# Patient Record
Sex: MALE | Race: WHITE | NOT HISPANIC OR LATINO | Employment: OTHER | URBAN - METROPOLITAN AREA
[De-identification: names, ages, dates, MRNs, and addresses within clinical notes are randomized per-mention and may not be internally consistent; named-entity substitution may affect disease eponyms.]

---

## 2017-01-31 ENCOUNTER — ALLSCRIPTS OFFICE VISIT (OUTPATIENT)
Dept: OTHER | Facility: OTHER | Age: 67
End: 2017-01-31

## 2018-01-14 NOTE — PROGRESS NOTES
Assessment    1  Normal pressure hydrocephalus (331 5) (G91 2)   2  Status post ventriculo-peritoneal shunt placement (V45 2) (Z98 2)    Plan    · Follow-up PRN Evaluation and Treatment  Follow-up  Status: Complete  Done:  [id number]   Ordered; For: Status post ventriculo-peritoneal shunt placement; Ordered By: Kenney Gudino Performed:  Due: [id number]    Discussion/Summary    Very pleasant 58-year-old male, presents for clinical follow-up history  shunt placement for normal pressure hydrocephalus  Clinically he is doing very well, his gait and balance are stable, he is status post left total knee replacement which he is slowly recovering from and has some gait disturbance secondary to this  Otherwise he demonstrates no focal neurologic deficits  His last shunt valve pressure change was 10/27/15, at which point the valve was set at 80 mm water  Further follow-up will be on an as needed basis  He does understand should he develop any new changes relative to his hydrocephalus, again gait or balance disturbance, difficulty with mentation or memory, bowel or bladder incontinence, or level of consciousness he understands to call and return for reassessment  These findings and plans were reviewed with Dr Adrianna Tobar who concurs  These findings, impressions and recommendations are reviewed in great detail with the patient, he expressed understanding and agreement, his questions were answered completely and to his satisfaction  The patient has the current Goals: Management normal pressure hydrocephalus  The patent has the current Barriers: None  Patient is able to Self-Care  The patient was counseled regarding instructions for management, patient and family education, importance of compliance with treatment  Chief Complaint    1   Headache  Follow-up, one year, history  shunt placement, normal pressure hydrocephalus      History of Present Illness  Very pleasant 58-year-old male, returns for follow-up as noted above  No new imaging has been performed at this time  The patient reports he is doing very well, his gait and balance remain at baseline, he is status post left total knee replacement a few months ago, and he has had some gait disturbances secondary to the procedure, but prior to surgery he was fine he reports  As noted in the past he had initial  shunt placement by Dr Julissa Resendez 12/22/05, he then had shunt valve revision 8/3/11 also by Dr Julissa Resendez  At that time the valve was set at 60 mm water  As noted, he had noted some difficulty with gait and balance and was imaged he had some increased hygroma formation, and shunt valve was changed to 80 mm water (10/27/15) which is where the valve remains set  He continues with no complaints of difficulty with memory or mentation, level of consciousness, bowel or bladder incontinence  He otherwise reports no interval changes medical or surgical history, as noted in the past he is a diabetic, maintained on subcutaneous insulins  Krupa Dunlap presents with complaints of headache (no headaches, patient has a  shunt, and sub dural hygroma)      Review of Systems    Constitutional: No fever or chills, feels well, no tiredness, no recent weight gain or weight loss  Eyes: No complaints of eye pain, no red eyes, no discharge from eyes, no itchy eyes  ENT: no complaints of earache, no hearing loss, no nosebleeds, no nasal discharge, no sore throat, no hoarseness  Cardiovascular: No complaints of slow heart rate, no fast heart rate, no chest pain, no palpitations, no leg claudication, no lower extremity  Respiratory: No complaints of shortness of breath, no wheezing, no cough, no SOB on exertion, no orthopnea or PND  Gastrointestinal: No complaints of abdominal pain, no constipation, no nausea or vomiting, no diarrhea or bloody stools     Genitourinary: No complaints of dysuria, no incontinence, no hesitancy, no nocturia, no genital lesion, no testicular pain  Musculoskeletal: arthralgias, joint swelling, limb pain and joint stiffness  Integumentary: No complaints of skin rash or skin lesions, no itching, no skin wound, no dry skin  Neurological: difficulty walking and knee replacement two months ago (L)  Psychiatric: Is not suicidal, no sleep disturbances, no anxiety or depression, no change in personality, no emotional problems  Endocrine: No complaints of proptosis, no hot flashes, no muscle weakness, no erectile dysfunction, no deepening of the voice, no feelings of weakness  Hematologic/Lymphatic: No complaints of swollen glands, no swollen glands in the neck, does not bleed easily, no easy bruising  ROS reviewed  Active Problems    1  Arthritis (716 90) (M19 90)   2  Blurry vision (368 8) (H53 8)   3  Carpal tunnel syndrome (354 0) (G56 00)   4  Chronic sinusitis (473 9) (J32 9)   5  Gait disturbance (781 2) (R26 9)   6  Glaucoma (365 9) (H40 9)   7  Hypertension (401 9) (I10)   8  Neuropathy (355 9) (G62 9)   9  Normal pressure hydrocephalus (331 5) (G91 2)   10  Obstructive sleep apnea (327 23) (G47 33)   11  Status post ventriculo-peritoneal shunt placement (V45 2) (Z98 2)   12  Subdural hygroma (432 1) (D18 1)   13  Type 2 diabetes mellitus (250 00) (E11 9)    Past Medical History    1  Arthritis (716 90) (M19 90)   2  Glaucoma (365 9) (H40 9)   3  History of Gout (274 9) (M10 9)   4  Hypertension (401 9) (I10)   5  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)   6  Obstructive sleep apnea (327 23) (G47 33)   7  Type 2 diabetes mellitus (250 00) (E11 9)   8  History of Urinary tract infection (599 0) (N39 0)    The active problems and past medical history were reviewed and updated today  Surgical History    1  History of Ankle Surgery   2  History of Cataract Surgery   3  History of Elbow Surgery   4  History of Foot Surgery   5  History of Knee Surgery   6   History of Replacement Of Obstructed CSF Shunt Valve   7  History of Tonsillectomy   8  History of Ventricular Shunt (V53 )    The surgical history was reviewed and updated today  Family History  Family History    1  Family history of Family Health Status 1  Children Living   2  Family history of Family Health Status Of Father -    3  Family history of Family Health Status Of Mother -    3  Family history of Family Health Status Siblings 1  Living   5  Family history of Maternal Grandfather Is    6  Family history of Maternal Grandmother Is    7  Family history of Paternal Grandfather Is    6  Family history of Paternal Grandmother Is     The family history was reviewed and updated today  Social History    · Being A Social Drinker   · Denied: History of Drug Use   · Living With Relatives (Other Than Parents)   · Marital History -  ()   · Never A Smoker  The social history was reviewed and updated today  Current Meds   1  Allopurinol 300 MG Oral Tablet; TAKE 1 TABLET DAILY; Therapy: (Recorded:2014) to Recorded   2  Aspirin 325 MG Oral Tablet; TAKE 2 TABLET Twice daily PRN; Therapy: (Recorded:2017) to Recorded   3  Enalapril Maleate 10 MG Oral Tablet; TAKE 1 TABLET DAILY; Therapy: (Recorded:2014) to Recorded   4  Finasteride 5 MG Oral Tablet; TAKE 1 TABLET DAILY; Therapy: (Recorded:2014) to Recorded   5  Lantus SoloStar 100 UNIT/ML SOLN; INJECT 20 TO 25 UNITS SUBCUTANEOUSLY, OR   AS DIRECTED; Therapy: [phone]) to Recorded   6  Multivitamins Oral Capsule; TAKE 1 CAPSULE DAILY; Therapy: (Recorded:2014) to Recorded   7  Tamsulosin HCl - 0 4 MG Oral Capsule; take 1 capsule daily; Therapy: (Recorded:2014) to Recorded   8  Travatan Z 0 004 % Ophthalmic Solution; INSTILL 1 DROP Daily in affected eye; Therapy: (Recorded:2014) to Recorded    The medication list was reviewed and updated today  Allergies    1   No Known Drug Allergies    2  Seasonal    Vitals  Vital Signs    Recorded: [id number] 09:20AM   Temperature 98 4 F   Heart Rate 65   Respiration 18   Systolic 063   Diastolic 58   Height 6 ft 3 in   Weight 309 lb    BMI Calculated 38 62   BSA Calculated 2 64     Physical Exam     Constitutional Patient appears healthy and well developed  No signs of acute distress present  comfortable, obese and appearance reflects stated age  Respiratory Respiratory effort: Normal   Auscultation of lungs: Clear to auscultation bilaterally  Cardiovascular Auscultation of heart: Rate is regular  Rhythm is regular  No heart murmur appreciated  Neurologic - Mental Status: Alert and Oriented x3  Mood and affect: Affect is normal   Grossly nonfocal     Cranial Nerve Exam:  2nd cranial nerve: Visual field was full to confrontation  3rd, 4th, and 6th cranial nerves: Normal with no deficit  5th cranial nerve: Sensation was intact in all three divisions to light touch and temperature  Motor function was intact  7th cranial nerve: Face symmetrical at grimace and at rest  8th cranial nerve: Grossly intact to finger rub bilaterally  9th and 10th cranial nerves: Uvula is midline  11th cranial nerve: Shoulder shrug equal bilaterally  12th cranial nerve: Tongue mideline, no atrophy present  Motor System General Motor Strength: No pronator drift and no parietal drift  Reflexes: Biceps reflexes are 2+ bilaterally  Triceps reflexes are 2+ bilaterally  Achilles reflexes are 2+ bilaterally  Babinski's reflex is 2+ down going bilaterally  Ankle clonus is absent bilaterally  Coordination: Finger to nose was normal    Gait and Station:   Gait and station: Abnormal   Gait evaluation demonstrated a wide-based and ataxic gait        Signatures   Electronically signed by : SKIP Ramos; Jan 31 2017 10:11AM EST                       (Author)    Electronically signed by : CATIA Bennett ; Jan 31 2017  4:16PM EST                       (Author)
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
## 2018-01-22 VITALS
TEMPERATURE: 98.4 F | HEIGHT: 75 IN | WEIGHT: 309 LBS | RESPIRATION RATE: 18 BRPM | DIASTOLIC BLOOD PRESSURE: 58 MMHG | SYSTOLIC BLOOD PRESSURE: 129 MMHG | BODY MASS INDEX: 38.42 KG/M2 | HEART RATE: 65 BPM

## 2018-02-08 ENCOUNTER — TELEPHONE (OUTPATIENT)
Dept: NEUROSURGERY | Facility: CLINIC | Age: 68
End: 2018-02-08

## 2018-02-08 NOTE — TELEPHONE ENCOUNTER
Patient LM on nurse line stating that he had a  shunt placed a number of years ago and would like to get a pocket card with the shunt info on it  I attempted to call patient back and [facility] for call back  I filled out card and mailed it to patient's address on file

## 2020-04-09 ENCOUNTER — TRANSCRIBE ORDERS (OUTPATIENT)
Dept: NEUROSURGERY | Facility: CLINIC | Age: 70
End: 2020-04-09

## 2020-04-09 DIAGNOSIS — Z98.2 STATUS POST VENTRICULOPERITONEAL SHUNT: Primary | ICD-10-CM

## 2020-04-21 ENCOUNTER — CONSULT (OUTPATIENT)
Dept: NEUROSURGERY | Facility: CLINIC | Age: 70
End: 2020-04-21
Payer: COMMERCIAL

## 2020-04-21 VITALS
RESPIRATION RATE: 16 BRPM | HEIGHT: 75 IN | DIASTOLIC BLOOD PRESSURE: 62 MMHG | HEART RATE: 76 BPM | SYSTOLIC BLOOD PRESSURE: 123 MMHG | TEMPERATURE: 98.4 F | WEIGHT: 264 LBS | BODY MASS INDEX: 32.83 KG/M2

## 2020-04-21 DIAGNOSIS — R26.9 UNSPECIFIED ABNORMALITIES OF GAIT AND MOBILITY: Primary | ICD-10-CM

## 2020-04-21 DIAGNOSIS — Z98.2 STATUS POST VENTRICULOPERITONEAL SHUNT: ICD-10-CM

## 2020-04-21 PROCEDURE — 99205 OFFICE O/P NEW HI 60 MIN: CPT | Performed by: PHYSICIAN ASSISTANT

## 2020-04-21 RX ORDER — TRAMADOL HYDROCHLORIDE 50 MG/1
50 TABLET ORAL EVERY 6 HOURS PRN
COMMUNITY
End: 2020-04-30

## 2020-04-21 RX ORDER — ENALAPRIL MALEATE 10 MG/1
10 TABLET ORAL DAILY
Status: ON HOLD | COMMUNITY
Start: 2020-01-20 | End: 2020-07-22 | Stop reason: SDUPTHER

## 2020-04-21 RX ORDER — FINASTERIDE 5 MG/1
1 TABLET, FILM COATED ORAL DAILY
COMMUNITY
End: 2020-06-22

## 2020-04-21 RX ORDER — TRAVOPROST OPHTHALMIC SOLUTION 0.04 MG/ML
1 SOLUTION OPHTHALMIC
COMMUNITY
Start: 2015-07-28

## 2020-04-21 RX ORDER — INSULIN GLARGINE 100 [IU]/ML
INJECTION, SOLUTION SUBCUTANEOUS 2 TIMES DAILY
Status: ON HOLD | COMMUNITY
Start: 2020-04-14 | End: 2020-06-01 | Stop reason: SDUPTHER

## 2020-04-21 RX ORDER — LANCETS 33 GAUGE
EACH MISCELLANEOUS 2 TIMES DAILY
COMMUNITY
Start: 2020-02-10

## 2020-04-21 RX ORDER — METHOCARBAMOL 500 MG/1
500 TABLET, FILM COATED ORAL AS NEEDED
COMMUNITY
End: 2020-04-30

## 2020-04-21 RX ORDER — ALLOPURINOL 300 MG/1
300 TABLET ORAL DAILY
COMMUNITY
Start: 2020-03-25

## 2020-04-21 RX ORDER — DIPHENOXYLATE HYDROCHLORIDE AND ATROPINE SULFATE 2.5; .025 MG/1; MG/1
1 TABLET ORAL DAILY
COMMUNITY

## 2020-04-27 ENCOUNTER — HOSPITAL ENCOUNTER (OUTPATIENT)
Dept: RADIOLOGY | Age: 70
Discharge: HOME | End: 2020-04-27
Payer: COMMERCIAL

## 2020-04-27 ENCOUNTER — APPOINTMENT (OUTPATIENT)
Dept: RADIOLOGY | Age: 70
End: 2020-04-27
Payer: COMMERCIAL

## 2020-04-27 DIAGNOSIS — Z98.2 STATUS POST VENTRICULOPERITONEAL SHUNT: ICD-10-CM

## 2020-04-27 DIAGNOSIS — R26.9 UNSPECIFIED ABNORMALITIES OF GAIT AND MOBILITY: ICD-10-CM

## 2020-04-27 PROCEDURE — 74018 RADEX ABDOMEN 1 VIEW: CPT

## 2020-04-27 PROCEDURE — 70450 CT HEAD/BRAIN W/O DYE: CPT

## 2020-04-27 PROCEDURE — 71046 X-RAY EXAM CHEST 2 VIEWS: CPT

## 2020-04-27 PROCEDURE — 72131 CT LUMBAR SPINE W/O DYE: CPT

## 2020-04-27 PROCEDURE — 70250 X-RAY EXAM OF SKULL: CPT

## 2020-04-30 ENCOUNTER — TELEMEDICINE (OUTPATIENT)
Dept: NEUROSURGERY | Facility: CLINIC | Age: 70
End: 2020-04-30
Payer: COMMERCIAL

## 2020-04-30 DIAGNOSIS — Z98.2 STATUS POST VENTRICULOPERITONEAL SHUNT: Primary | ICD-10-CM

## 2020-04-30 DIAGNOSIS — R26.9 UNSPECIFIED ABNORMALITIES OF GAIT AND MOBILITY: ICD-10-CM

## 2020-04-30 PROCEDURE — G2012 BRIEF CHECK IN BY MD/QHP: HCPCS | Performed by: PHYSICIAN ASSISTANT

## 2020-05-04 ENCOUNTER — HOSPITAL ENCOUNTER (OUTPATIENT)
Dept: CT IMAGING | Facility: HOSPITAL | Age: 70
Discharge: HOME | End: 2020-05-04
Payer: COMMERCIAL

## 2020-05-04 ENCOUNTER — TELEPHONE (OUTPATIENT)
Dept: NEUROSURGERY | Facility: CLINIC | Age: 70
End: 2020-05-04

## 2020-05-04 ENCOUNTER — HOSPITAL ENCOUNTER (OUTPATIENT)
Dept: RADIOLOGY | Facility: HOSPITAL | Age: 70
Discharge: HOME | End: 2020-05-04
Payer: COMMERCIAL

## 2020-05-04 ENCOUNTER — HOSPITAL ENCOUNTER (OUTPATIENT)
Dept: MRI IMAGING | Facility: HOSPITAL | Age: 70
Discharge: HOME | End: 2020-05-04
Payer: COMMERCIAL

## 2020-05-04 DIAGNOSIS — R26.9 UNSPECIFIED ABNORMALITIES OF GAIT AND MOBILITY: ICD-10-CM

## 2020-05-04 PROCEDURE — 72148 MRI LUMBAR SPINE W/O DYE: CPT

## 2020-05-04 PROCEDURE — 70250 X-RAY EXAM OF SKULL: CPT

## 2020-05-04 PROCEDURE — 72128 CT CHEST SPINE W/O DYE: CPT

## 2020-05-07 ENCOUNTER — OFFICE VISIT (OUTPATIENT)
Dept: NEUROSURGERY | Facility: CLINIC | Age: 70
End: 2020-05-07
Payer: COMMERCIAL

## 2020-05-07 VITALS
BODY MASS INDEX: 32.83 KG/M2 | RESPIRATION RATE: 16 BRPM | HEIGHT: 75 IN | WEIGHT: 264 LBS | DIASTOLIC BLOOD PRESSURE: 80 MMHG | TEMPERATURE: 97.8 F | HEART RATE: 67 BPM | SYSTOLIC BLOOD PRESSURE: 142 MMHG

## 2020-05-07 DIAGNOSIS — Z98.2 STATUS POST VENTRICULOPERITONEAL SHUNT: ICD-10-CM

## 2020-05-07 DIAGNOSIS — R26.9 UNSPECIFIED ABNORMALITIES OF GAIT AND MOBILITY: Primary | ICD-10-CM

## 2020-05-07 PROCEDURE — 99214 OFFICE O/P EST MOD 30 MIN: CPT | Performed by: PHYSICIAN ASSISTANT

## 2020-05-08 ENCOUNTER — APPOINTMENT (OUTPATIENT)
Dept: RADIOLOGY | Age: 70
End: 2020-05-08
Payer: COMMERCIAL

## 2020-05-08 DIAGNOSIS — Z98.2 STATUS POST VENTRICULOPERITONEAL SHUNT: ICD-10-CM

## 2020-05-08 DIAGNOSIS — R26.9 UNSPECIFIED ABNORMALITIES OF GAIT AND MOBILITY: ICD-10-CM

## 2020-05-08 PROCEDURE — 70250 X-RAY EXAM OF SKULL: CPT

## 2020-05-28 ENCOUNTER — OFFICE VISIT (OUTPATIENT)
Dept: NEUROSURGERY | Facility: CLINIC | Age: 70
End: 2020-05-28
Payer: COMMERCIAL

## 2020-05-28 ENCOUNTER — APPOINTMENT (EMERGENCY)
Dept: RADIOLOGY | Facility: HOSPITAL | Age: 70
DRG: 629 | End: 2020-05-28
Payer: COMMERCIAL

## 2020-05-28 ENCOUNTER — HOSPITAL ENCOUNTER (INPATIENT)
Facility: HOSPITAL | Age: 70
LOS: 3 days | Discharge: HOME HEALTH | DRG: 629 | End: 2020-06-01
Attending: EMERGENCY MEDICINE | Admitting: INTERNAL MEDICINE
Payer: COMMERCIAL

## 2020-05-28 VITALS
RESPIRATION RATE: 18 BRPM | BODY MASS INDEX: 32.83 KG/M2 | DIASTOLIC BLOOD PRESSURE: 60 MMHG | HEART RATE: 68 BPM | WEIGHT: 264 LBS | TEMPERATURE: 98 F | HEIGHT: 75 IN | SYSTOLIC BLOOD PRESSURE: 148 MMHG

## 2020-05-28 DIAGNOSIS — M43.17 SPONDYLOLISTHESIS OF LUMBOSACRAL REGION: Primary | ICD-10-CM

## 2020-05-28 DIAGNOSIS — E11.621 DIABETIC ULCER OF LEFT MIDFOOT ASSOCIATED WITH TYPE 2 DIABETES MELLITUS, WITH OTHER ULCER SEVERITY (HCC): ICD-10-CM

## 2020-05-28 DIAGNOSIS — Z98.2 STATUS POST VENTRICULOPERITONEAL SHUNT: ICD-10-CM

## 2020-05-28 DIAGNOSIS — I10 ESSENTIAL HYPERTENSION: ICD-10-CM

## 2020-05-28 DIAGNOSIS — L97.529 DIABETIC ULCER OF LEFT FOOT (HCC): ICD-10-CM

## 2020-05-28 DIAGNOSIS — Z11.59 SCREENING FOR VIRAL DISEASE: ICD-10-CM

## 2020-05-28 DIAGNOSIS — L97.428 DIABETIC ULCER OF LEFT MIDFOOT ASSOCIATED WITH TYPE 2 DIABETES MELLITUS, WITH OTHER ULCER SEVERITY (HCC): ICD-10-CM

## 2020-05-28 DIAGNOSIS — E11.65 TYPE 2 DIABETES MELLITUS WITH HYPERGLYCEMIA, WITH LONG-TERM CURRENT USE OF INSULIN (HCC): ICD-10-CM

## 2020-05-28 DIAGNOSIS — E11.621 DIABETIC ULCER OF LEFT FOOT (HCC): ICD-10-CM

## 2020-05-28 DIAGNOSIS — L03.116 CELLULITIS OF LEFT LOWER EXTREMITY: Primary | ICD-10-CM

## 2020-05-28 DIAGNOSIS — Z79.4 TYPE 2 DIABETES MELLITUS WITH HYPERGLYCEMIA, WITH LONG-TERM CURRENT USE OF INSULIN (HCC): ICD-10-CM

## 2020-05-28 PROBLEM — E11.9 TYPE 2 DIABETES MELLITUS (HCC): Status: ACTIVE | Noted: 2020-05-28

## 2020-05-28 PROBLEM — M10.9 GOUT: Status: ACTIVE | Noted: 2020-05-28

## 2020-05-28 PROBLEM — G91.2 NORMAL PRESSURE HYDROCEPHALUS (HCC): Status: ACTIVE | Noted: 2020-05-28

## 2020-05-28 PROBLEM — G47.33 OSA ON CPAP: Status: ACTIVE | Noted: 2020-05-28

## 2020-05-28 PROBLEM — Z99.89 OSA ON CPAP: Status: ACTIVE | Noted: 2020-05-28

## 2020-05-28 LAB
ALBUMIN SERPL BCP-MCNC: 3.2 G/DL (ref 3.5–5)
ALP SERPL-CCNC: 65 U/L (ref 46–116)
ALT SERPL W P-5'-P-CCNC: 25 U/L (ref 12–78)
ANION GAP SERPL CALCULATED.3IONS-SCNC: 8 MMOL/L (ref 4–13)
APTT PPP: 35 SECONDS (ref 23–37)
AST SERPL W P-5'-P-CCNC: 30 U/L (ref 5–45)
BASOPHILS # BLD AUTO: 0.06 THOUSANDS/ΜL (ref 0–0.1)
BASOPHILS NFR BLD AUTO: 1 % (ref 0–1)
BILIRUB SERPL-MCNC: 0.6 MG/DL (ref 0.2–1)
BUN SERPL-MCNC: 16 MG/DL (ref 5–25)
CALCIUM SERPL-MCNC: 9.1 MG/DL (ref 8.3–10.1)
CHLORIDE SERPL-SCNC: 105 MMOL/L (ref 100–108)
CO2 SERPL-SCNC: 24 MMOL/L (ref 21–32)
CREAT SERPL-MCNC: 1.09 MG/DL (ref 0.6–1.3)
EOSINOPHIL # BLD AUTO: 0.2 THOUSAND/ΜL (ref 0–0.61)
EOSINOPHIL NFR BLD AUTO: 3 % (ref 0–6)
ERYTHROCYTE [DISTWIDTH] IN BLOOD BY AUTOMATED COUNT: 14.1 % (ref 11.6–15.1)
GFR SERPL CREATININE-BSD FRML MDRD: 68 ML/MIN/1.73SQ M
GLUCOSE SERPL-MCNC: 102 MG/DL (ref 65–140)
GLUCOSE SERPL-MCNC: 123 MG/DL (ref 65–140)
GLUCOSE SERPL-MCNC: 85 MG/DL (ref 65–140)
HCT VFR BLD AUTO: 41.8 % (ref 36.5–49.3)
HGB BLD-MCNC: 13.7 G/DL (ref 12–17)
IMM GRANULOCYTES # BLD AUTO: 0.03 THOUSAND/UL (ref 0–0.2)
IMM GRANULOCYTES NFR BLD AUTO: 0 % (ref 0–2)
INR PPP: 1.14 (ref 0.84–1.19)
LYMPHOCYTES # BLD AUTO: 0.99 THOUSANDS/ΜL (ref 0.6–4.47)
LYMPHOCYTES NFR BLD AUTO: 13 % (ref 14–44)
MCH RBC QN AUTO: 28.9 PG (ref 26.8–34.3)
MCHC RBC AUTO-ENTMCNC: 32.8 G/DL (ref 31.4–37.4)
MCV RBC AUTO: 88 FL (ref 82–98)
MONOCYTES # BLD AUTO: 0.98 THOUSAND/ΜL (ref 0.17–1.22)
MONOCYTES NFR BLD AUTO: 12 % (ref 4–12)
NEUTROPHILS # BLD AUTO: 5.66 THOUSANDS/ΜL (ref 1.85–7.62)
NEUTS SEG NFR BLD AUTO: 71 % (ref 43–75)
NRBC BLD AUTO-RTO: 0 /100 WBCS
PLATELET # BLD AUTO: 208 THOUSANDS/UL (ref 149–390)
PMV BLD AUTO: 10.3 FL (ref 8.9–12.7)
POTASSIUM SERPL-SCNC: 4.6 MMOL/L (ref 3.5–5.3)
PROT SERPL-MCNC: 6.6 G/DL (ref 6.4–8.2)
PROTHROMBIN TIME: 14 SECONDS (ref 11.6–14.5)
RBC # BLD AUTO: 4.74 MILLION/UL (ref 3.88–5.62)
SARS-COV-2 RNA RESP QL NAA+PROBE: NEGATIVE
SODIUM SERPL-SCNC: 137 MMOL/L (ref 136–145)
WBC # BLD AUTO: 7.92 THOUSAND/UL (ref 4.31–10.16)

## 2020-05-28 PROCEDURE — 73630 X-RAY EXAM OF FOOT: CPT

## 2020-05-28 PROCEDURE — 94660 CPAP INITIATION&MGMT: CPT

## 2020-05-28 PROCEDURE — 85730 THROMBOPLASTIN TIME PARTIAL: CPT | Performed by: PHYSICIAN ASSISTANT

## 2020-05-28 PROCEDURE — 87186 SC STD MICRODIL/AGAR DIL: CPT | Performed by: PHYSICIAN ASSISTANT

## 2020-05-28 PROCEDURE — 99220 PR INITIAL OBSERVATION CARE/DAY 70 MINUTES: CPT | Performed by: INTERNAL MEDICINE

## 2020-05-28 PROCEDURE — 94760 N-INVAS EAR/PLS OXIMETRY 1: CPT

## 2020-05-28 PROCEDURE — 87205 SMEAR GRAM STAIN: CPT | Performed by: PHYSICIAN ASSISTANT

## 2020-05-28 PROCEDURE — 87077 CULTURE AEROBIC IDENTIFY: CPT | Performed by: PHYSICIAN ASSISTANT

## 2020-05-28 PROCEDURE — 83036 HEMOGLOBIN GLYCOSYLATED A1C: CPT | Performed by: INTERNAL MEDICINE

## 2020-05-28 PROCEDURE — 36415 COLL VENOUS BLD VENIPUNCTURE: CPT | Performed by: PHYSICIAN ASSISTANT

## 2020-05-28 PROCEDURE — 80053 COMPREHEN METABOLIC PANEL: CPT | Performed by: PHYSICIAN ASSISTANT

## 2020-05-28 PROCEDURE — 87070 CULTURE OTHR SPECIMN AEROBIC: CPT | Performed by: PHYSICIAN ASSISTANT

## 2020-05-28 PROCEDURE — 87635 SARS-COV-2 COVID-19 AMP PRB: CPT | Performed by: INTERNAL MEDICINE

## 2020-05-28 PROCEDURE — 85610 PROTHROMBIN TIME: CPT | Performed by: PHYSICIAN ASSISTANT

## 2020-05-28 PROCEDURE — 82948 REAGENT STRIP/BLOOD GLUCOSE: CPT

## 2020-05-28 PROCEDURE — 85025 COMPLETE CBC W/AUTO DIFF WBC: CPT | Performed by: PHYSICIAN ASSISTANT

## 2020-05-28 PROCEDURE — 99215 OFFICE O/P EST HI 40 MIN: CPT | Performed by: NURSE PRACTITIONER

## 2020-05-28 PROCEDURE — 87040 BLOOD CULTURE FOR BACTERIA: CPT | Performed by: PHYSICIAN ASSISTANT

## 2020-05-28 PROCEDURE — 99285 EMERGENCY DEPT VISIT HI MDM: CPT | Performed by: PHYSICIAN ASSISTANT

## 2020-05-28 PROCEDURE — 99284 EMERGENCY DEPT VISIT MOD MDM: CPT

## 2020-05-28 RX ORDER — CEFAZOLIN SODIUM 2 G/50ML
2000 SOLUTION INTRAVENOUS ONCE
Status: COMPLETED | OUTPATIENT
Start: 2020-05-28 | End: 2020-05-28

## 2020-05-28 RX ORDER — TRAVOPROST OPHTHALMIC SOLUTION 0.04 MG/ML
1 SOLUTION OPHTHALMIC DAILY
Status: DISCONTINUED | OUTPATIENT
Start: 2020-05-29 | End: 2020-06-01 | Stop reason: HOSPADM

## 2020-05-28 RX ORDER — CHLORHEXIDINE GLUCONATE 0.12 MG/ML
15 RINSE ORAL ONCE
Status: CANCELLED | OUTPATIENT
Start: 2020-05-28 | End: 2020-05-28

## 2020-05-28 RX ORDER — INSULIN GLARGINE 100 [IU]/ML
12 INJECTION, SOLUTION SUBCUTANEOUS
Status: DISCONTINUED | OUTPATIENT
Start: 2020-05-28 | End: 2020-06-01 | Stop reason: HOSPADM

## 2020-05-28 RX ORDER — ALLOPURINOL 300 MG/1
300 TABLET ORAL DAILY
Status: DISCONTINUED | OUTPATIENT
Start: 2020-05-29 | End: 2020-06-01 | Stop reason: HOSPADM

## 2020-05-28 RX ORDER — METRONIDAZOLE 500 MG/1
500 TABLET ORAL EVERY 8 HOURS SCHEDULED
Status: DISCONTINUED | OUTPATIENT
Start: 2020-05-28 | End: 2020-05-31

## 2020-05-28 RX ORDER — LISINOPRIL 20 MG/1
20 TABLET ORAL DAILY
Status: DISCONTINUED | OUTPATIENT
Start: 2020-05-29 | End: 2020-06-01 | Stop reason: HOSPADM

## 2020-05-28 RX ORDER — CEFAZOLIN SODIUM 2 G/50ML
2000 SOLUTION INTRAVENOUS EVERY 8 HOURS
Status: DISCONTINUED | OUTPATIENT
Start: 2020-05-29 | End: 2020-05-29

## 2020-05-28 RX ORDER — ASPIRIN 325 MG
325 TABLET ORAL DAILY
Status: DISCONTINUED | OUTPATIENT
Start: 2020-05-29 | End: 2020-06-01 | Stop reason: HOSPADM

## 2020-05-28 RX ORDER — TERBINAFINE HYDROCHLORIDE 250 MG/1
TABLET ORAL
COMMUNITY
Start: 2020-05-27 | End: 2020-06-01 | Stop reason: HOSPADM

## 2020-05-28 RX ORDER — INSULIN GLARGINE 100 [IU]/ML
10 INJECTION, SOLUTION SUBCUTANEOUS
Status: DISCONTINUED | OUTPATIENT
Start: 2020-05-29 | End: 2020-06-01 | Stop reason: HOSPADM

## 2020-05-28 RX ADMIN — INSULIN GLARGINE 12 UNITS: 100 INJECTION, SOLUTION SUBCUTANEOUS at 22:07

## 2020-05-28 RX ADMIN — METRONIDAZOLE 500 MG: 500 INJECTION, SOLUTION INTRAVENOUS at 17:43

## 2020-05-28 RX ADMIN — METRONIDAZOLE 500 MG: 500 TABLET, FILM COATED ORAL at 21:04

## 2020-05-28 RX ADMIN — CEFAZOLIN SODIUM 2000 MG: 2 SOLUTION INTRAVENOUS at 17:42

## 2020-05-29 ENCOUNTER — APPOINTMENT (OUTPATIENT)
Dept: RADIOLOGY | Facility: HOSPITAL | Age: 70
DRG: 629 | End: 2020-05-29
Payer: COMMERCIAL

## 2020-05-29 ENCOUNTER — APPOINTMENT (INPATIENT)
Dept: RADIOLOGY | Facility: HOSPITAL | Age: 70
DRG: 629 | End: 2020-05-29
Payer: COMMERCIAL

## 2020-05-29 ENCOUNTER — APPOINTMENT (INPATIENT)
Dept: MRI IMAGING | Facility: HOSPITAL | Age: 70
DRG: 629 | End: 2020-05-29
Payer: COMMERCIAL

## 2020-05-29 LAB
ANION GAP SERPL CALCULATED.3IONS-SCNC: 7 MMOL/L (ref 4–13)
BASOPHILS # BLD AUTO: 0.06 THOUSANDS/ΜL (ref 0–0.1)
BASOPHILS NFR BLD AUTO: 1 % (ref 0–1)
BUN SERPL-MCNC: 15 MG/DL (ref 5–25)
CALCIUM SERPL-MCNC: 8.9 MG/DL (ref 8.3–10.1)
CHLORIDE SERPL-SCNC: 103 MMOL/L (ref 100–108)
CO2 SERPL-SCNC: 29 MMOL/L (ref 21–32)
CREAT SERPL-MCNC: 1.29 MG/DL (ref 0.6–1.3)
EOSINOPHIL # BLD AUTO: 0.25 THOUSAND/ΜL (ref 0–0.61)
EOSINOPHIL NFR BLD AUTO: 3 % (ref 0–6)
ERYTHROCYTE [DISTWIDTH] IN BLOOD BY AUTOMATED COUNT: 14.2 % (ref 11.6–15.1)
EST. AVERAGE GLUCOSE BLD GHB EST-MCNC: 120 MG/DL
GFR SERPL CREATININE-BSD FRML MDRD: 55 ML/MIN/1.73SQ M
GLUCOSE P FAST SERPL-MCNC: 106 MG/DL (ref 65–99)
GLUCOSE SERPL-MCNC: 103 MG/DL (ref 65–140)
GLUCOSE SERPL-MCNC: 106 MG/DL (ref 65–140)
GLUCOSE SERPL-MCNC: 107 MG/DL (ref 65–140)
GLUCOSE SERPL-MCNC: 115 MG/DL (ref 65–140)
GLUCOSE SERPL-MCNC: 117 MG/DL (ref 65–140)
HBA1C MFR BLD: 5.8 %
HCT VFR BLD AUTO: 42.7 % (ref 36.5–49.3)
HGB BLD-MCNC: 13.7 G/DL (ref 12–17)
IMM GRANULOCYTES # BLD AUTO: 0.04 THOUSAND/UL (ref 0–0.2)
IMM GRANULOCYTES NFR BLD AUTO: 1 % (ref 0–2)
LYMPHOCYTES # BLD AUTO: 1.11 THOUSANDS/ΜL (ref 0.6–4.47)
LYMPHOCYTES NFR BLD AUTO: 13 % (ref 14–44)
MCH RBC QN AUTO: 28.5 PG (ref 26.8–34.3)
MCHC RBC AUTO-ENTMCNC: 32.1 G/DL (ref 31.4–37.4)
MCV RBC AUTO: 89 FL (ref 82–98)
MONOCYTES # BLD AUTO: 1.02 THOUSAND/ΜL (ref 0.17–1.22)
MONOCYTES NFR BLD AUTO: 12 % (ref 4–12)
NEUTROPHILS # BLD AUTO: 6.12 THOUSANDS/ΜL (ref 1.85–7.62)
NEUTS SEG NFR BLD AUTO: 70 % (ref 43–75)
NRBC BLD AUTO-RTO: 0 /100 WBCS
PLATELET # BLD AUTO: 198 THOUSANDS/UL (ref 149–390)
PMV BLD AUTO: 9.9 FL (ref 8.9–12.7)
POTASSIUM SERPL-SCNC: 4.7 MMOL/L (ref 3.5–5.3)
RBC # BLD AUTO: 4.8 MILLION/UL (ref 3.88–5.62)
SODIUM SERPL-SCNC: 139 MMOL/L (ref 136–145)
WBC # BLD AUTO: 8.6 THOUSAND/UL (ref 4.31–10.16)

## 2020-05-29 PROCEDURE — 99232 SBSQ HOSP IP/OBS MODERATE 35: CPT | Performed by: INTERNAL MEDICINE

## 2020-05-29 PROCEDURE — 82948 REAGENT STRIP/BLOOD GLUCOSE: CPT

## 2020-05-29 PROCEDURE — 87205 SMEAR GRAM STAIN: CPT | Performed by: PODIATRIST

## 2020-05-29 PROCEDURE — 99223 1ST HOSP IP/OBS HIGH 75: CPT | Performed by: INTERNAL MEDICINE

## 2020-05-29 PROCEDURE — 87081 CULTURE SCREEN ONLY: CPT | Performed by: INTERNAL MEDICINE

## 2020-05-29 PROCEDURE — 94660 CPAP INITIATION&MGMT: CPT

## 2020-05-29 PROCEDURE — 73720 MRI LWR EXTREMITY W/O&W/DYE: CPT

## 2020-05-29 PROCEDURE — 87186 SC STD MICRODIL/AGAR DIL: CPT | Performed by: PODIATRIST

## 2020-05-29 PROCEDURE — A9585 GADOBUTROL INJECTION: HCPCS | Performed by: INTERNAL MEDICINE

## 2020-05-29 PROCEDURE — 80048 BASIC METABOLIC PNL TOTAL CA: CPT | Performed by: INTERNAL MEDICINE

## 2020-05-29 PROCEDURE — 99223 1ST HOSP IP/OBS HIGH 75: CPT | Performed by: PODIATRIST

## 2020-05-29 PROCEDURE — 94760 N-INVAS EAR/PLS OXIMETRY 1: CPT

## 2020-05-29 PROCEDURE — 87077 CULTURE AEROBIC IDENTIFY: CPT | Performed by: PODIATRIST

## 2020-05-29 PROCEDURE — 87070 CULTURE OTHR SPECIMN AEROBIC: CPT | Performed by: PODIATRIST

## 2020-05-29 PROCEDURE — 85025 COMPLETE CBC W/AUTO DIFF WBC: CPT | Performed by: INTERNAL MEDICINE

## 2020-05-29 RX ORDER — CEFAZOLIN SODIUM 2 G/50ML
2000 SOLUTION INTRAVENOUS EVERY 8 HOURS
Status: DISCONTINUED | OUTPATIENT
Start: 2020-05-29 | End: 2020-05-31

## 2020-05-29 RX ADMIN — CEFAZOLIN SODIUM 2000 MG: 2 SOLUTION INTRAVENOUS at 14:10

## 2020-05-29 RX ADMIN — INSULIN GLARGINE 12 UNITS: 100 INJECTION, SOLUTION SUBCUTANEOUS at 21:22

## 2020-05-29 RX ADMIN — TRAVOPROST 1 DROP: 0.04 SOLUTION OPHTHALMIC at 09:19

## 2020-05-29 RX ADMIN — METRONIDAZOLE 500 MG: 500 TABLET, FILM COATED ORAL at 05:27

## 2020-05-29 RX ADMIN — ALLOPURINOL 300 MG: 300 TABLET ORAL at 09:19

## 2020-05-29 RX ADMIN — ENOXAPARIN SODIUM 40 MG: 40 INJECTION SUBCUTANEOUS at 09:19

## 2020-05-29 RX ADMIN — METRONIDAZOLE 500 MG: 500 TABLET, FILM COATED ORAL at 21:22

## 2020-05-29 RX ADMIN — INSULIN GLARGINE 10 UNITS: 100 INJECTION, SOLUTION SUBCUTANEOUS at 09:19

## 2020-05-29 RX ADMIN — METRONIDAZOLE 500 MG: 500 TABLET, FILM COATED ORAL at 14:10

## 2020-05-29 RX ADMIN — CEFAZOLIN SODIUM 2000 MG: 2 SOLUTION INTRAVENOUS at 00:54

## 2020-05-29 RX ADMIN — ASPIRIN 325 MG ORAL TABLET 325 MG: 325 PILL ORAL at 09:19

## 2020-05-29 RX ADMIN — GADOBUTROL 13 ML: 604.72 INJECTION INTRAVENOUS at 20:12

## 2020-05-29 RX ADMIN — LISINOPRIL 20 MG: 20 TABLET ORAL at 09:19

## 2020-05-29 RX ADMIN — CEFAZOLIN SODIUM 2000 MG: 2 SOLUTION INTRAVENOUS at 20:46

## 2020-05-30 ENCOUNTER — ANESTHESIA (INPATIENT)
Dept: PERIOP | Facility: HOSPITAL | Age: 70
DRG: 629 | End: 2020-05-30
Payer: COMMERCIAL

## 2020-05-30 ENCOUNTER — APPOINTMENT (INPATIENT)
Dept: RADIOLOGY | Facility: HOSPITAL | Age: 70
DRG: 629 | End: 2020-05-30
Payer: COMMERCIAL

## 2020-05-30 ENCOUNTER — ANESTHESIA EVENT (INPATIENT)
Dept: PERIOP | Facility: HOSPITAL | Age: 70
DRG: 629 | End: 2020-05-30
Payer: COMMERCIAL

## 2020-05-30 LAB
ANION GAP SERPL CALCULATED.3IONS-SCNC: 9 MMOL/L (ref 4–13)
BASOPHILS # BLD AUTO: 0.04 THOUSANDS/ΜL (ref 0–0.1)
BASOPHILS NFR BLD AUTO: 1 % (ref 0–1)
BUN SERPL-MCNC: 16 MG/DL (ref 5–25)
CALCIUM SERPL-MCNC: 8.6 MG/DL (ref 8.3–10.1)
CHLORIDE SERPL-SCNC: 103 MMOL/L (ref 100–108)
CO2 SERPL-SCNC: 24 MMOL/L (ref 21–32)
CREAT SERPL-MCNC: 1.03 MG/DL (ref 0.6–1.3)
EOSINOPHIL # BLD AUTO: 0.15 THOUSAND/ΜL (ref 0–0.61)
EOSINOPHIL NFR BLD AUTO: 2 % (ref 0–6)
ERYTHROCYTE [DISTWIDTH] IN BLOOD BY AUTOMATED COUNT: 14.1 % (ref 11.6–15.1)
GFR SERPL CREATININE-BSD FRML MDRD: 73 ML/MIN/1.73SQ M
GLUCOSE SERPL-MCNC: 102 MG/DL (ref 65–140)
GLUCOSE SERPL-MCNC: 111 MG/DL (ref 65–140)
GLUCOSE SERPL-MCNC: 114 MG/DL (ref 65–140)
GLUCOSE SERPL-MCNC: 115 MG/DL (ref 65–140)
GLUCOSE SERPL-MCNC: 97 MG/DL (ref 65–140)
HCT VFR BLD AUTO: 42.7 % (ref 36.5–49.3)
HGB BLD-MCNC: 14 G/DL (ref 12–17)
IMM GRANULOCYTES # BLD AUTO: 0.05 THOUSAND/UL (ref 0–0.2)
IMM GRANULOCYTES NFR BLD AUTO: 1 % (ref 0–2)
LYMPHOCYTES # BLD AUTO: 0.7 THOUSANDS/ΜL (ref 0.6–4.47)
LYMPHOCYTES NFR BLD AUTO: 10 % (ref 14–44)
MCH RBC QN AUTO: 28.5 PG (ref 26.8–34.3)
MCHC RBC AUTO-ENTMCNC: 32.8 G/DL (ref 31.4–37.4)
MCV RBC AUTO: 87 FL (ref 82–98)
MONOCYTES # BLD AUTO: 0.77 THOUSAND/ΜL (ref 0.17–1.22)
MONOCYTES NFR BLD AUTO: 11 % (ref 4–12)
MRSA NOSE QL CULT: NORMAL
NEUTROPHILS # BLD AUTO: 5.41 THOUSANDS/ΜL (ref 1.85–7.62)
NEUTS SEG NFR BLD AUTO: 75 % (ref 43–75)
NRBC BLD AUTO-RTO: 0 /100 WBCS
PLATELET # BLD AUTO: 213 THOUSANDS/UL (ref 149–390)
PMV BLD AUTO: 9.9 FL (ref 8.9–12.7)
POTASSIUM SERPL-SCNC: 3.9 MMOL/L (ref 3.5–5.3)
RBC # BLD AUTO: 4.91 MILLION/UL (ref 3.88–5.62)
SODIUM SERPL-SCNC: 136 MMOL/L (ref 136–145)
WBC # BLD AUTO: 7.12 THOUSAND/UL (ref 4.31–10.16)

## 2020-05-30 PROCEDURE — 99232 SBSQ HOSP IP/OBS MODERATE 35: CPT | Performed by: INTERNAL MEDICINE

## 2020-05-30 PROCEDURE — 99232 SBSQ HOSP IP/OBS MODERATE 35: CPT | Performed by: PODIATRIST

## 2020-05-30 PROCEDURE — 82948 REAGENT STRIP/BLOOD GLUCOSE: CPT

## 2020-05-30 PROCEDURE — 88304 TISSUE EXAM BY PATHOLOGIST: CPT | Performed by: PATHOLOGY

## 2020-05-30 PROCEDURE — 99239 HOSP IP/OBS DSCHRG MGMT >30: CPT | Performed by: INTERNAL MEDICINE

## 2020-05-30 PROCEDURE — 0QBP0ZZ EXCISION OF LEFT METATARSAL, OPEN APPROACH: ICD-10-PCS | Performed by: INTERNAL MEDICINE

## 2020-05-30 PROCEDURE — 85025 COMPLETE CBC W/AUTO DIFF WBC: CPT | Performed by: INTERNAL MEDICINE

## 2020-05-30 PROCEDURE — 73630 X-RAY EXAM OF FOOT: CPT

## 2020-05-30 PROCEDURE — 80048 BASIC METABOLIC PNL TOTAL CA: CPT | Performed by: INTERNAL MEDICINE

## 2020-05-30 PROCEDURE — 88311 DECALCIFY TISSUE: CPT | Performed by: PATHOLOGY

## 2020-05-30 PROCEDURE — 28113 PART REMOVAL OF METATARSAL: CPT | Performed by: PODIATRIST

## 2020-05-30 RX ORDER — KETAMINE HCL IN NACL, ISO-OSM 100MG/10ML
SYRINGE (ML) INJECTION AS NEEDED
Status: DISCONTINUED | OUTPATIENT
Start: 2020-05-30 | End: 2020-05-30 | Stop reason: SURG

## 2020-05-30 RX ORDER — SODIUM CHLORIDE, SODIUM LACTATE, POTASSIUM CHLORIDE, CALCIUM CHLORIDE 600; 310; 30; 20 MG/100ML; MG/100ML; MG/100ML; MG/100ML
INJECTION, SOLUTION INTRAVENOUS CONTINUOUS PRN
Status: DISCONTINUED | OUTPATIENT
Start: 2020-05-30 | End: 2020-05-30 | Stop reason: SURG

## 2020-05-30 RX ORDER — LIDOCAINE HYDROCHLORIDE 10 MG/ML
INJECTION, SOLUTION EPIDURAL; INFILTRATION; INTRACAUDAL; PERINEURAL AS NEEDED
Status: DISCONTINUED | OUTPATIENT
Start: 2020-05-30 | End: 2020-05-30 | Stop reason: HOSPADM

## 2020-05-30 RX ORDER — MIDAZOLAM HYDROCHLORIDE 2 MG/2ML
INJECTION, SOLUTION INTRAMUSCULAR; INTRAVENOUS AS NEEDED
Status: DISCONTINUED | OUTPATIENT
Start: 2020-05-30 | End: 2020-05-30 | Stop reason: SURG

## 2020-05-30 RX ORDER — PROPOFOL 10 MG/ML
INJECTION, EMULSION INTRAVENOUS AS NEEDED
Status: DISCONTINUED | OUTPATIENT
Start: 2020-05-30 | End: 2020-05-30 | Stop reason: SURG

## 2020-05-30 RX ORDER — GLYCOPYRROLATE 0.2 MG/ML
INJECTION INTRAMUSCULAR; INTRAVENOUS AS NEEDED
Status: DISCONTINUED | OUTPATIENT
Start: 2020-05-30 | End: 2020-05-30 | Stop reason: SURG

## 2020-05-30 RX ORDER — OXYCODONE HYDROCHLORIDE 5 MG/1
5 TABLET ORAL EVERY 6 HOURS PRN
Status: DISCONTINUED | OUTPATIENT
Start: 2020-05-30 | End: 2020-06-01 | Stop reason: HOSPADM

## 2020-05-30 RX ADMIN — CEFAZOLIN SODIUM 2000 MG: 2 SOLUTION INTRAVENOUS at 05:37

## 2020-05-30 RX ADMIN — METRONIDAZOLE 500 MG: 500 TABLET, FILM COATED ORAL at 14:00

## 2020-05-30 RX ADMIN — CEFAZOLIN SODIUM 2000 MG: 2 SOLUTION INTRAVENOUS at 21:35

## 2020-05-30 RX ADMIN — INSULIN GLARGINE 5 UNITS: 100 INJECTION, SOLUTION SUBCUTANEOUS at 09:46

## 2020-05-30 RX ADMIN — PROPOFOL 30 MG: 10 INJECTION, EMULSION INTRAVENOUS at 12:51

## 2020-05-30 RX ADMIN — TRAVOPROST 1 DROP: 0.04 SOLUTION OPHTHALMIC at 09:47

## 2020-05-30 RX ADMIN — Medication 25 MG: at 12:51

## 2020-05-30 RX ADMIN — OXYCODONE HYDROCHLORIDE 5 MG: 5 TABLET ORAL at 20:37

## 2020-05-30 RX ADMIN — CEFAZOLIN SODIUM 2000 MG: 2 SOLUTION INTRAVENOUS at 14:51

## 2020-05-30 RX ADMIN — GLYCOPYRROLATE 0.2 MG: 0.2 INJECTION, SOLUTION INTRAMUSCULAR; INTRAVENOUS at 12:51

## 2020-05-30 RX ADMIN — SODIUM CHLORIDE, SODIUM LACTATE, POTASSIUM CHLORIDE, AND CALCIUM CHLORIDE: .6; .31; .03; .02 INJECTION, SOLUTION INTRAVENOUS at 12:45

## 2020-05-30 RX ADMIN — METRONIDAZOLE 500 MG: 500 TABLET, FILM COATED ORAL at 21:36

## 2020-05-30 RX ADMIN — METRONIDAZOLE 500 MG: 500 TABLET, FILM COATED ORAL at 05:37

## 2020-05-30 RX ADMIN — INSULIN GLARGINE 12 UNITS: 100 INJECTION, SOLUTION SUBCUTANEOUS at 21:36

## 2020-05-30 RX ADMIN — MIDAZOLAM HYDROCHLORIDE 2 MG: 1 INJECTION, SOLUTION INTRAMUSCULAR; INTRAVENOUS at 12:51

## 2020-05-31 LAB
ANION GAP SERPL CALCULATED.3IONS-SCNC: 8 MMOL/L (ref 4–13)
BACTERIA WND AEROBE CULT: ABNORMAL
BACTERIA WND AEROBE CULT: ABNORMAL
BASOPHILS # BLD AUTO: 0.04 THOUSANDS/ΜL (ref 0–0.1)
BASOPHILS NFR BLD AUTO: 1 % (ref 0–1)
BUN SERPL-MCNC: 16 MG/DL (ref 5–25)
CALCIUM SERPL-MCNC: 8.5 MG/DL (ref 8.3–10.1)
CHLORIDE SERPL-SCNC: 102 MMOL/L (ref 100–108)
CO2 SERPL-SCNC: 26 MMOL/L (ref 21–32)
CREAT SERPL-MCNC: 1.11 MG/DL (ref 0.6–1.3)
EOSINOPHIL # BLD AUTO: 0.15 THOUSAND/ΜL (ref 0–0.61)
EOSINOPHIL NFR BLD AUTO: 2 % (ref 0–6)
ERYTHROCYTE [DISTWIDTH] IN BLOOD BY AUTOMATED COUNT: 14 % (ref 11.6–15.1)
GFR SERPL CREATININE-BSD FRML MDRD: 66 ML/MIN/1.73SQ M
GLUCOSE SERPL-MCNC: 101 MG/DL (ref 65–140)
GLUCOSE SERPL-MCNC: 105 MG/DL (ref 65–140)
GLUCOSE SERPL-MCNC: 110 MG/DL (ref 65–140)
GLUCOSE SERPL-MCNC: 121 MG/DL (ref 65–140)
GLUCOSE SERPL-MCNC: 93 MG/DL (ref 65–140)
GRAM STN SPEC: ABNORMAL
GRAM STN SPEC: ABNORMAL
HCT VFR BLD AUTO: 43.3 % (ref 36.5–49.3)
HGB BLD-MCNC: 14.2 G/DL (ref 12–17)
IMM GRANULOCYTES # BLD AUTO: 0.05 THOUSAND/UL (ref 0–0.2)
IMM GRANULOCYTES NFR BLD AUTO: 1 % (ref 0–2)
LYMPHOCYTES # BLD AUTO: 0.79 THOUSANDS/ΜL (ref 0.6–4.47)
LYMPHOCYTES NFR BLD AUTO: 10 % (ref 14–44)
MCH RBC QN AUTO: 28.6 PG (ref 26.8–34.3)
MCHC RBC AUTO-ENTMCNC: 32.8 G/DL (ref 31.4–37.4)
MCV RBC AUTO: 87 FL (ref 82–98)
MONOCYTES # BLD AUTO: 0.86 THOUSAND/ΜL (ref 0.17–1.22)
MONOCYTES NFR BLD AUTO: 11 % (ref 4–12)
NEUTROPHILS # BLD AUTO: 6.12 THOUSANDS/ΜL (ref 1.85–7.62)
NEUTS SEG NFR BLD AUTO: 75 % (ref 43–75)
NRBC BLD AUTO-RTO: 0 /100 WBCS
PLATELET # BLD AUTO: 214 THOUSANDS/UL (ref 149–390)
PMV BLD AUTO: 9.8 FL (ref 8.9–12.7)
POTASSIUM SERPL-SCNC: 4 MMOL/L (ref 3.5–5.3)
RBC # BLD AUTO: 4.96 MILLION/UL (ref 3.88–5.62)
SODIUM SERPL-SCNC: 136 MMOL/L (ref 136–145)
WBC # BLD AUTO: 8.01 THOUSAND/UL (ref 4.31–10.16)

## 2020-05-31 PROCEDURE — 99232 SBSQ HOSP IP/OBS MODERATE 35: CPT | Performed by: INTERNAL MEDICINE

## 2020-05-31 PROCEDURE — 99233 SBSQ HOSP IP/OBS HIGH 50: CPT | Performed by: INTERNAL MEDICINE

## 2020-05-31 PROCEDURE — 99024 POSTOP FOLLOW-UP VISIT: CPT | Performed by: PODIATRIST

## 2020-05-31 PROCEDURE — 97163 PT EVAL HIGH COMPLEX 45 MIN: CPT

## 2020-05-31 PROCEDURE — 97116 GAIT TRAINING THERAPY: CPT

## 2020-05-31 PROCEDURE — NC001 PR NO CHARGE: Performed by: PHYSICIAN ASSISTANT

## 2020-05-31 PROCEDURE — 80048 BASIC METABOLIC PNL TOTAL CA: CPT | Performed by: STUDENT IN AN ORGANIZED HEALTH CARE EDUCATION/TRAINING PROGRAM

## 2020-05-31 PROCEDURE — 85025 COMPLETE CBC W/AUTO DIFF WBC: CPT | Performed by: STUDENT IN AN ORGANIZED HEALTH CARE EDUCATION/TRAINING PROGRAM

## 2020-05-31 PROCEDURE — 82948 REAGENT STRIP/BLOOD GLUCOSE: CPT

## 2020-05-31 RX ADMIN — INSULIN GLARGINE 12 UNITS: 100 INJECTION, SOLUTION SUBCUTANEOUS at 21:34

## 2020-05-31 RX ADMIN — OXYCODONE HYDROCHLORIDE 5 MG: 5 TABLET ORAL at 03:52

## 2020-05-31 RX ADMIN — VANCOMYCIN HYDROCHLORIDE 2000 MG: 1 INJECTION, POWDER, LYOPHILIZED, FOR SOLUTION INTRAVENOUS at 23:58

## 2020-05-31 RX ADMIN — ASPIRIN 325 MG ORAL TABLET 325 MG: 325 PILL ORAL at 08:29

## 2020-05-31 RX ADMIN — CEFAZOLIN SODIUM 2000 MG: 2 SOLUTION INTRAVENOUS at 05:09

## 2020-05-31 RX ADMIN — TRAVOPROST 1 DROP: 0.04 SOLUTION OPHTHALMIC at 08:32

## 2020-05-31 RX ADMIN — INSULIN GLARGINE 10 UNITS: 100 INJECTION, SOLUTION SUBCUTANEOUS at 08:29

## 2020-05-31 RX ADMIN — ENOXAPARIN SODIUM 40 MG: 40 INJECTION SUBCUTANEOUS at 08:29

## 2020-05-31 RX ADMIN — LISINOPRIL 20 MG: 20 TABLET ORAL at 08:29

## 2020-05-31 RX ADMIN — OXYCODONE HYDROCHLORIDE 5 MG: 5 TABLET ORAL at 21:38

## 2020-05-31 RX ADMIN — METRONIDAZOLE 500 MG: 500 TABLET, FILM COATED ORAL at 05:08

## 2020-05-31 RX ADMIN — ALLOPURINOL 300 MG: 300 TABLET ORAL at 08:29

## 2020-05-31 RX ADMIN — VANCOMYCIN HYDROCHLORIDE 2000 MG: 1 INJECTION, POWDER, LYOPHILIZED, FOR SOLUTION INTRAVENOUS at 10:48

## 2020-06-01 VITALS
WEIGHT: 287.7 LBS | RESPIRATION RATE: 18 BRPM | OXYGEN SATURATION: 95 % | TEMPERATURE: 97.7 F | HEIGHT: 75 IN | SYSTOLIC BLOOD PRESSURE: 121 MMHG | DIASTOLIC BLOOD PRESSURE: 63 MMHG | HEART RATE: 60 BPM | BODY MASS INDEX: 35.77 KG/M2

## 2020-06-01 LAB
ANION GAP SERPL CALCULATED.3IONS-SCNC: 8 MMOL/L (ref 4–13)
BACTERIA WND AEROBE CULT: ABNORMAL
BACTERIA WND AEROBE CULT: ABNORMAL
BASOPHILS # BLD AUTO: 0.07 THOUSANDS/ΜL (ref 0–0.1)
BASOPHILS NFR BLD AUTO: 1 % (ref 0–1)
BUN SERPL-MCNC: 16 MG/DL (ref 5–25)
CALCIUM SERPL-MCNC: 8.5 MG/DL (ref 8.3–10.1)
CHLORIDE SERPL-SCNC: 103 MMOL/L (ref 100–108)
CO2 SERPL-SCNC: 27 MMOL/L (ref 21–32)
CREAT SERPL-MCNC: 1.19 MG/DL (ref 0.6–1.3)
EOSINOPHIL # BLD AUTO: 0.15 THOUSAND/ΜL (ref 0–0.61)
EOSINOPHIL NFR BLD AUTO: 2 % (ref 0–6)
ERYTHROCYTE [DISTWIDTH] IN BLOOD BY AUTOMATED COUNT: 14.2 % (ref 11.6–15.1)
GFR SERPL CREATININE-BSD FRML MDRD: 61 ML/MIN/1.73SQ M
GLUCOSE SERPL-MCNC: 104 MG/DL (ref 65–140)
GLUCOSE SERPL-MCNC: 108 MG/DL (ref 65–140)
GLUCOSE SERPL-MCNC: 96 MG/DL (ref 65–140)
GRAM STN SPEC: ABNORMAL
GRAM STN SPEC: ABNORMAL
HCT VFR BLD AUTO: 44.3 % (ref 36.5–49.3)
HGB BLD-MCNC: 14.2 G/DL (ref 12–17)
IMM GRANULOCYTES # BLD AUTO: 0.05 THOUSAND/UL (ref 0–0.2)
IMM GRANULOCYTES NFR BLD AUTO: 1 % (ref 0–2)
LYMPHOCYTES # BLD AUTO: 0.82 THOUSANDS/ΜL (ref 0.6–4.47)
LYMPHOCYTES NFR BLD AUTO: 10 % (ref 14–44)
MCH RBC QN AUTO: 28.1 PG (ref 26.8–34.3)
MCHC RBC AUTO-ENTMCNC: 32.1 G/DL (ref 31.4–37.4)
MCV RBC AUTO: 88 FL (ref 82–98)
MONOCYTES # BLD AUTO: 0.91 THOUSAND/ΜL (ref 0.17–1.22)
MONOCYTES NFR BLD AUTO: 11 % (ref 4–12)
NEUTROPHILS # BLD AUTO: 6.12 THOUSANDS/ΜL (ref 1.85–7.62)
NEUTS SEG NFR BLD AUTO: 75 % (ref 43–75)
NRBC BLD AUTO-RTO: 0 /100 WBCS
PLATELET # BLD AUTO: 210 THOUSANDS/UL (ref 149–390)
PMV BLD AUTO: 9.8 FL (ref 8.9–12.7)
POTASSIUM SERPL-SCNC: 4 MMOL/L (ref 3.5–5.3)
RBC # BLD AUTO: 5.06 MILLION/UL (ref 3.88–5.62)
SODIUM SERPL-SCNC: 138 MMOL/L (ref 136–145)
WBC # BLD AUTO: 8.12 THOUSAND/UL (ref 4.31–10.16)

## 2020-06-01 PROCEDURE — 97116 GAIT TRAINING THERAPY: CPT

## 2020-06-01 PROCEDURE — 80048 BASIC METABOLIC PNL TOTAL CA: CPT | Performed by: INTERNAL MEDICINE

## 2020-06-01 PROCEDURE — 99232 SBSQ HOSP IP/OBS MODERATE 35: CPT | Performed by: INTERNAL MEDICINE

## 2020-06-01 PROCEDURE — 85025 COMPLETE CBC W/AUTO DIFF WBC: CPT | Performed by: INTERNAL MEDICINE

## 2020-06-01 PROCEDURE — 97760 ORTHOTIC MGMT&TRAING 1ST ENC: CPT

## 2020-06-01 PROCEDURE — 82948 REAGENT STRIP/BLOOD GLUCOSE: CPT

## 2020-06-01 RX ORDER — AMOXICILLIN AND CLAVULANATE POTASSIUM 875; 125 MG/1; MG/1
1 TABLET, FILM COATED ORAL EVERY 12 HOURS SCHEDULED
Qty: 9 TABLET | Refills: 0 | Status: SHIPPED | OUTPATIENT
Start: 2020-06-01 | End: 2020-06-06

## 2020-06-01 RX ORDER — ASPIRIN 325 MG
325 TABLET ORAL DAILY
Refills: 0
Start: 2020-06-02 | End: 2020-08-29 | Stop reason: HOSPADM

## 2020-06-01 RX ORDER — AMOXICILLIN AND CLAVULANATE POTASSIUM 875; 125 MG/1; MG/1
1 TABLET, FILM COATED ORAL EVERY 12 HOURS SCHEDULED
Status: DISCONTINUED | OUTPATIENT
Start: 2020-06-01 | End: 2020-06-01 | Stop reason: HOSPADM

## 2020-06-01 RX ORDER — INSULIN GLARGINE 100 [IU]/ML
INJECTION, SOLUTION SUBCUTANEOUS
Qty: 5 PEN | Refills: 0 | Status: SHIPPED | OUTPATIENT
Start: 2020-06-01 | End: 2020-07-22 | Stop reason: HOSPADM

## 2020-06-01 RX ADMIN — LISINOPRIL 20 MG: 20 TABLET ORAL at 08:17

## 2020-06-01 RX ADMIN — ASPIRIN 325 MG ORAL TABLET 325 MG: 325 PILL ORAL at 08:17

## 2020-06-01 RX ADMIN — INSULIN GLARGINE 10 UNITS: 100 INJECTION, SOLUTION SUBCUTANEOUS at 08:19

## 2020-06-01 RX ADMIN — AMOXICILLIN AND CLAVULANATE POTASSIUM 1 TABLET: 875; 125 TABLET, FILM COATED ORAL at 12:41

## 2020-06-01 RX ADMIN — ALLOPURINOL 300 MG: 300 TABLET ORAL at 08:17

## 2020-06-01 RX ADMIN — ENOXAPARIN SODIUM 40 MG: 40 INJECTION SUBCUTANEOUS at 08:18

## 2020-06-01 RX ADMIN — TRAVOPROST 1 DROP: 0.04 SOLUTION OPHTHALMIC at 08:20

## 2020-06-02 LAB
BACTERIA BLD CULT: NORMAL
BACTERIA BLD CULT: NORMAL

## 2020-06-03 ENCOUNTER — TELEPHONE (OUTPATIENT)
Dept: NEUROSURGERY | Facility: CLINIC | Age: 70
End: 2020-06-03

## 2020-06-08 ENCOUNTER — HOSPITAL ENCOUNTER (OUTPATIENT)
Dept: RADIOLOGY | Age: 70
Discharge: HOME | End: 2020-06-08
Payer: COMMERCIAL

## 2020-06-08 DIAGNOSIS — Z98.2 STATUS POST VENTRICULOPERITONEAL SHUNT: ICD-10-CM

## 2020-06-08 DIAGNOSIS — R26.9 UNSPECIFIED ABNORMALITIES OF GAIT AND MOBILITY: ICD-10-CM

## 2020-06-08 PROCEDURE — 70450 CT HEAD/BRAIN W/O DYE: CPT

## 2020-06-09 ENCOUNTER — ANESTHESIA EVENT (OUTPATIENT)
Dept: PERIOP | Facility: HOSPITAL | Age: 70
DRG: 460 | End: 2020-06-09
Payer: COMMERCIAL

## 2020-06-10 ENCOUNTER — TRANSCRIBE ORDERS (OUTPATIENT)
Dept: RADIOLOGY | Facility: HOSPITAL | Age: 70
End: 2020-06-10

## 2020-06-10 ENCOUNTER — HOSPITAL ENCOUNTER (OUTPATIENT)
Dept: RADIOLOGY | Facility: HOSPITAL | Age: 70
Discharge: HOME | End: 2020-06-10
Payer: COMMERCIAL

## 2020-06-10 ENCOUNTER — OFFICE VISIT (OUTPATIENT)
Dept: NEUROSURGERY | Facility: CLINIC | Age: 70
End: 2020-06-10
Payer: COMMERCIAL

## 2020-06-10 VITALS
BODY MASS INDEX: 32.45 KG/M2 | TEMPERATURE: 98.9 F | HEART RATE: 70 BPM | SYSTOLIC BLOOD PRESSURE: 140 MMHG | RESPIRATION RATE: 20 BRPM | DIASTOLIC BLOOD PRESSURE: 74 MMHG | HEIGHT: 75 IN | WEIGHT: 261 LBS

## 2020-06-10 DIAGNOSIS — G91.2 NORMAL PRESSURE HYDROCEPHALUS (HCC): ICD-10-CM

## 2020-06-10 DIAGNOSIS — D18.1 HYGROMA: ICD-10-CM

## 2020-06-10 DIAGNOSIS — Z74.09 IMPAIRED FUNCTIONAL MOBILITY, BALANCE, GAIT, AND ENDURANCE: ICD-10-CM

## 2020-06-10 DIAGNOSIS — G91.2 NORMAL PRESSURE HYDROCEPHALUS (HCC): Primary | ICD-10-CM

## 2020-06-10 PROCEDURE — 70250 X-RAY EXAM OF SKULL: CPT

## 2020-06-10 PROCEDURE — 62252 CSF SHUNT REPROGRAM: CPT | Performed by: PHYSICIAN ASSISTANT

## 2020-06-10 PROCEDURE — 99212 OFFICE O/P EST SF 10 MIN: CPT | Performed by: PHYSICIAN ASSISTANT

## 2020-06-16 ENCOUNTER — LAB (OUTPATIENT)
Dept: LAB | Facility: HOSPITAL | Age: 70
End: 2020-06-16
Attending: NEUROLOGICAL SURGERY
Payer: COMMERCIAL

## 2020-06-16 DIAGNOSIS — M43.17 SPONDYLOLISTHESIS OF LUMBOSACRAL REGION: ICD-10-CM

## 2020-06-16 DIAGNOSIS — Z11.59 SCREENING FOR VIRAL DISEASE: ICD-10-CM

## 2020-06-16 LAB
ABO GROUP BLD: NORMAL
ALBUMIN SERPL BCP-MCNC: 3.5 G/DL (ref 3.5–5)
ALP SERPL-CCNC: 68 U/L (ref 46–116)
ALT SERPL W P-5'-P-CCNC: 24 U/L (ref 12–78)
ANION GAP SERPL CALCULATED.3IONS-SCNC: 7 MMOL/L (ref 4–13)
APTT PPP: 33 SECONDS (ref 23–37)
AST SERPL W P-5'-P-CCNC: 17 U/L (ref 5–45)
ATRIAL RATE: 61 BPM
BASOPHILS # BLD AUTO: 0.05 THOUSANDS/ΜL (ref 0–0.1)
BASOPHILS NFR BLD AUTO: 1 % (ref 0–1)
BILIRUB SERPL-MCNC: 0.55 MG/DL (ref 0.2–1)
BLD GP AB SCN SERPL QL: NEGATIVE
BUN SERPL-MCNC: 13 MG/DL (ref 5–25)
CALCIUM SERPL-MCNC: 8.8 MG/DL (ref 8.3–10.1)
CHLORIDE SERPL-SCNC: 107 MMOL/L (ref 100–108)
CO2 SERPL-SCNC: 25 MMOL/L (ref 21–32)
CREAT SERPL-MCNC: 1.03 MG/DL (ref 0.6–1.3)
EOSINOPHIL # BLD AUTO: 0.14 THOUSAND/ΜL (ref 0–0.61)
EOSINOPHIL NFR BLD AUTO: 2 % (ref 0–6)
ERYTHROCYTE [DISTWIDTH] IN BLOOD BY AUTOMATED COUNT: 14 % (ref 11.6–15.1)
EST. AVERAGE GLUCOSE BLD GHB EST-MCNC: 123 MG/DL
GFR SERPL CREATININE-BSD FRML MDRD: 73 ML/MIN/1.73SQ M
GLUCOSE SERPL-MCNC: 96 MG/DL (ref 65–140)
HBA1C MFR BLD: 5.9 %
HCT VFR BLD AUTO: 44.1 % (ref 36.5–49.3)
HGB BLD-MCNC: 14.3 G/DL (ref 12–17)
IMM GRANULOCYTES # BLD AUTO: 0.05 THOUSAND/UL (ref 0–0.2)
IMM GRANULOCYTES NFR BLD AUTO: 1 % (ref 0–2)
INR PPP: 1.06 (ref 0.84–1.19)
LYMPHOCYTES # BLD AUTO: 0.89 THOUSANDS/ΜL (ref 0.6–4.47)
LYMPHOCYTES NFR BLD AUTO: 11 % (ref 14–44)
MCH RBC QN AUTO: 28.1 PG (ref 26.8–34.3)
MCHC RBC AUTO-ENTMCNC: 32.4 G/DL (ref 31.4–37.4)
MCV RBC AUTO: 87 FL (ref 82–98)
MONOCYTES # BLD AUTO: 0.63 THOUSAND/ΜL (ref 0.17–1.22)
MONOCYTES NFR BLD AUTO: 8 % (ref 4–12)
NEUTROPHILS # BLD AUTO: 6.47 THOUSANDS/ΜL (ref 1.85–7.62)
NEUTS SEG NFR BLD AUTO: 77 % (ref 43–75)
NRBC BLD AUTO-RTO: 0 /100 WBCS
PLATELET # BLD AUTO: 230 THOUSANDS/UL (ref 149–390)
PMV BLD AUTO: 10.1 FL (ref 8.9–12.7)
POTASSIUM SERPL-SCNC: 3.9 MMOL/L (ref 3.5–5.3)
PR INTERVAL: 156 MS
PROT SERPL-MCNC: 6.8 G/DL (ref 6.4–8.2)
PROTHROMBIN TIME: 13.4 SECONDS (ref 11.6–14.5)
QRS AXIS: 152 DEGREES
QRSD INTERVAL: 106 MS
QT INTERVAL: 430 MS
QTC INTERVAL: 432 MS
RBC # BLD AUTO: 5.08 MILLION/UL (ref 3.88–5.62)
RH BLD: POSITIVE
SODIUM SERPL-SCNC: 139 MMOL/L (ref 136–145)
SPECIMEN EXPIRATION DATE: NORMAL
T WAVE AXIS: 74 DEGREES
VENTRICULAR RATE: 61 BPM
WBC # BLD AUTO: 8.23 THOUSAND/UL (ref 4.31–10.16)

## 2020-06-16 PROCEDURE — 93010 ELECTROCARDIOGRAM REPORT: CPT | Performed by: INTERNAL MEDICINE

## 2020-06-16 PROCEDURE — 93005 ELECTROCARDIOGRAM TRACING: CPT

## 2020-06-16 PROCEDURE — 85025 COMPLETE CBC W/AUTO DIFF WBC: CPT

## 2020-06-16 PROCEDURE — 85610 PROTHROMBIN TIME: CPT

## 2020-06-16 PROCEDURE — 80053 COMPREHEN METABOLIC PANEL: CPT

## 2020-06-16 PROCEDURE — 83036 HEMOGLOBIN GLYCOSYLATED A1C: CPT

## 2020-06-16 PROCEDURE — 86901 BLOOD TYPING SEROLOGIC RH(D): CPT

## 2020-06-16 PROCEDURE — 36415 COLL VENOUS BLD VENIPUNCTURE: CPT

## 2020-06-16 PROCEDURE — 86900 BLOOD TYPING SEROLOGIC ABO: CPT

## 2020-06-16 PROCEDURE — 85730 THROMBOPLASTIN TIME PARTIAL: CPT

## 2020-06-16 PROCEDURE — 87081 CULTURE SCREEN ONLY: CPT

## 2020-06-16 PROCEDURE — 86850 RBC ANTIBODY SCREEN: CPT

## 2020-06-17 LAB — MRSA NOSE QL CULT: NORMAL

## 2020-06-22 NOTE — PRE-PROCEDURE INSTRUCTIONS
Pre-Surgery Instructions:   Medication Instructions    allopurinol (ZYLOPRIM) 300 mg tablet Instructed patient per Anesthesia Guidelines   aspirin 325 mg tablet Patient was instructed by Physician and understands   enalapril (VASOTEC) 10 mg tablet Instructed patient per Anesthesia Guidelines   LANTUS SOLOSTAR 100 units/mL injection pen Instructed patient per Anesthesia Guidelines   multivitamin (THERAGRAN) TABS Instructed patient per Anesthesia Guidelines   travoprost (Travatan Z) 0 004 % ophthalmic solution Instructed patient per Anesthesia Guidelines  Pt instructed to stop nsaids and supplements one week prior to surgery  Pt instructed to take rx meds except for enalapril on day of surgery with 1-2 sips of water  Pt instructed to use lantus insulin on day of surgery  Pt denies sob, cough, fever and sore throat  Pt having covid testing on 7/1 zahida  Pt verbalized understanding of shower instructions he received from surgeons office

## 2020-06-22 NOTE — PRE-PROCEDURE INSTRUCTIONS
Pre-Surgery Instructions:   Medication Instructions    allopurinol (ZYLOPRIM) 300 mg tablet Instructed patient per Anesthesia Guidelines   aspirin 325 mg tablet Patient was instructed by Physician and understands   enalapril (VASOTEC) 10 mg tablet Instructed patient per Anesthesia Guidelines   LANTUS SOLOSTAR 100 units/mL injection pen Instructed patient per Anesthesia Guidelines   multivitamin (THERAGRAN) TABS Instructed patient per Anesthesia Guidelines   travoprost (Travatan Z) 0 004 % ophthalmic solution Instructed patient per Anesthesia Guidelines

## 2020-07-01 ENCOUNTER — HOSPITAL ENCOUNTER (OUTPATIENT)
Dept: RADIOLOGY | Age: 70
Discharge: HOME | End: 2020-07-01
Payer: COMMERCIAL

## 2020-07-01 DIAGNOSIS — D18.1 HYGROMA: ICD-10-CM

## 2020-07-01 DIAGNOSIS — G91.2 NORMAL PRESSURE HYDROCEPHALUS (HCC): ICD-10-CM

## 2020-07-01 DIAGNOSIS — M43.17 SPONDYLOLISTHESIS OF LUMBOSACRAL REGION: ICD-10-CM

## 2020-07-01 DIAGNOSIS — Z74.09 IMPAIRED FUNCTIONAL MOBILITY, BALANCE, GAIT, AND ENDURANCE: ICD-10-CM

## 2020-07-01 DIAGNOSIS — Z11.59 SCREENING FOR VIRAL DISEASE: ICD-10-CM

## 2020-07-01 PROCEDURE — 70450 CT HEAD/BRAIN W/O DYE: CPT

## 2020-07-01 PROCEDURE — U0003 INFECTIOUS AGENT DETECTION BY NUCLEIC ACID (DNA OR RNA); SEVERE ACUTE RESPIRATORY SYNDROME CORONAVIRUS 2 (SARS-COV-2) (CORONAVIRUS DISEASE [COVID-19]), AMPLIFIED PROBE TECHNIQUE, MAKING USE OF HIGH THROUGHPUT TECHNOLOGIES AS DESCRIBED BY CMS-2020-01-R: HCPCS | Performed by: OBSTETRICS & GYNECOLOGY

## 2020-07-02 ENCOUNTER — DOCUMENTATION (OUTPATIENT)
Dept: NEUROSURGERY | Facility: CLINIC | Age: 70
End: 2020-07-02

## 2020-07-02 ENCOUNTER — OFFICE VISIT (OUTPATIENT)
Dept: NEUROSURGERY | Facility: CLINIC | Age: 70
End: 2020-07-02
Payer: COMMERCIAL

## 2020-07-02 ENCOUNTER — HOSPITAL ENCOUNTER (OUTPATIENT)
Dept: RADIOLOGY | Facility: HOSPITAL | Age: 70
Discharge: HOME | End: 2020-07-02
Payer: COMMERCIAL

## 2020-07-02 VITALS
RESPIRATION RATE: 16 BRPM | DIASTOLIC BLOOD PRESSURE: 70 MMHG | TEMPERATURE: 98.7 F | BODY MASS INDEX: 32.33 KG/M2 | SYSTOLIC BLOOD PRESSURE: 134 MMHG | WEIGHT: 260 LBS | HEIGHT: 75 IN | HEART RATE: 67 BPM

## 2020-07-02 DIAGNOSIS — Z98.2 STATUS POST VENTRICULOPERITONEAL SHUNT: ICD-10-CM

## 2020-07-02 DIAGNOSIS — G91.2 NORMAL PRESSURE HYDROCEPHALUS (HCC): ICD-10-CM

## 2020-07-02 DIAGNOSIS — M43.17 SPONDYLOLISTHESIS OF LUMBOSACRAL REGION: ICD-10-CM

## 2020-07-02 DIAGNOSIS — G91.2 NORMAL PRESSURE HYDROCEPHALUS (HCC): Primary | ICD-10-CM

## 2020-07-02 PROBLEM — Z74.09 IMPAIRED FUNCTIONAL MOBILITY, BALANCE, GAIT, AND ENDURANCE: Status: ACTIVE | Noted: 2020-07-02

## 2020-07-02 PROCEDURE — 62252 CSF SHUNT REPROGRAM: CPT | Performed by: PHYSICIAN ASSISTANT

## 2020-07-02 PROCEDURE — 70250 X-RAY EXAM OF SKULL: CPT

## 2020-07-02 PROCEDURE — 99212 OFFICE O/P EST SF 10 MIN: CPT | Performed by: PHYSICIAN ASSISTANT

## 2020-07-02 NOTE — PROGRESS NOTES
Patient ID: Bo Gage is a 69 y o  male  Diagnoses and all orders for this visit:    Normal pressure hydrocephalus (HCC)  -     XR skull < 4 vw; Future  -     CT head without contrast; Future    Status post ventriculoperitoneal shunt  -     XR skull < 4 vw; Future  -     CT head without contrast; Future    Spondylolisthesis of lumbosacral region          Assessment/Plan:    Very pleasant 76-year-old male, returns to review CT head this is she normal-pressure hydrocephalus  He is status post shunt adjustment approximately 1 month ago  He makes specific note that over the last 3 weeks his gait balance has become significantly more difficult  With occasional falls  He reports this started about 1 week after his shunt adjustment 6/10/20  He makes note that at his last visit with Neurosurgery, 5/7/20 his shunt was adjusted from 80 cm of water to 70 cm of water which resulted in improved gait and balance  His complaint at that visit was gait and balance difficulties and urinary incontinence  He subsequently had MRI of the brain and his shunt valve was repositioned by the study to 80-90 cm of water  In addition to CT head revealed decreased ventricular size as well as some evidence of 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  With these findings in mind, shunt valve was repositioned to 120 cm of water  He has had CT head as planned 7/1/20, this revealed minimal increase in the ventricles as well as resolution of the subdural hygromas  The patient also reports he has surgery planned for 7/6/20, minimally invasive transverse lumbar interbody fusion L5-S1 left-sided approach by Dr Elvis Rosales  This is for a significant L5-S1 spondylolisthesis  Shunt valve reposition today from 120 cm of water to 90 cm of water  Patient underwent plain films of the skull 7/2/20 and they confirm change of the shunt valve position to  cm of water      At this juncture further follow-up relative this use she will be in approximately 4 weeks for repeat CT of the head  He does understand should he notice any worsening gait or balance issues, urinary incontinence, or decreased level consciousness, for headache, he has return sooner for reassessment  Return in about 4 weeks (around 7/30/2020) for Review CT head  Chief Complaint  Returns to review CT head, reports worsening gait and balance over past 3 weeks    HPI       The following portions of the patient's history were reviewed and updated as appropriate: allergies, current medications, past family history, past medical history, past social history and past surgical history  Review of Systems   Constitutional: Negative  HENT: Negative  Eyes: Negative  Respiratory: Negative  Cardiovascular: Negative  Gastrointestinal: Negative  Endocrine: Negative  Genitourinary: Negative  Musculoskeletal: Positive for gait problem (difficulty waslking and standing since last shunt adjustment  Uses walker for assiatance)  Skin: Negative  Allergic/Immunologic: Negative  Hematological: Bruises/bleeds easily (On Aspirin)  Psychiatric/Behavioral: Negative  All other systems reviewed and are negative  Objective:    Physical Exam   Constitutional: He is oriented to person, place, and time  He appears well-developed and well-nourished  HENT:   Head: Normocephalic and atraumatic  Eyes: Pupils are equal, round, and reactive to light  EOM are normal    Cardiovascular: Normal rate  Pulmonary/Chest: Effort normal and breath sounds normal    Neurological: He is alert and oriented to person, place, and time  Skin: Skin is warm and dry  Psychiatric: He has a normal mood and affect  Vitals reviewed  Neurologic Exam     Mental Status   Oriented to person, place, and time  Cranial Nerves     CN III, IV, VI   Pupils are equal, round, and reactive to light    Extraocular motions are normal           CT BRAIN - WITHOUT CONTRAST 7/1/20     INDICATION:   G91 2: (Idiopathic) normal pressure hydrocephalus  Z74 09: Other reduced mobility  D18 1: Lymphangioma, any site      COMPARISON:  CT 6/8/2020     TECHNIQUE:  CT examination of the brain was performed  In addition to axial images, coronal 2D reformatted images were created and submitted for interpretation        Radiation dose length product (DLP) for this visit:  902 mGy-cm   This examination, like all CT scans performed in the [facility], was performed utilizing techniques to minimize radiation dose exposure, including the use of iterative   reconstruction and automated exposure control        IMAGE QUALITY:  Diagnostic      FINDINGS:     PARENCHYMA:  No acute disease  There is no acute ischemia, intracranial mass or mass effect  No edema  Stable left parieto-occipital cortical infarct      Stable gliosis along the tract of the anterior right frontal shunt catheter  Catheter appears is the midline corpus callosum terminating in the mid body of the left lateral ventricle  Ventricular size is increased slightly in the interval since prior   study and the bilateral subdural hygromas have resolved  Previously, 3rd ventricle approximately a centimeter in width approaches 1 4 cm  Similar increase elsewhere within the ventricular system      VENTRICLES AND EXTRA-AXIAL SPACES:  Interval increase in size of ventricular system with resorption of bilateral subdural hygromas      VISUALIZED ORBITS AND PARANASAL SINUSES:  Unremarkable      CALVARIUM AND EXTRACRANIAL SOFT TISSUES:  Normal      IMPRESSION:     As the ventricular system has increased in size, the subdural hygromas have resolved  3rd ventricle has increased from 1 cm to approximately 1 4 cm in width            PROGRAMMABLE SHUNT EVALUATION 7/2/20     INDICATION:   G91 2: (Idiopathic) normal pressure hydrocephalus  Z98 2: Presence of cerebrospinal fluid drainage device      COMPARISON:  6/10/2020     VIEWS:  XR SKULL < 4 VW         FINDINGS:     Views of the calvarium are obtained with dedicated view positioned to evaluate pressure setting dial of ventriculostomy catheter      The programmable shunt has changed, now in the region of 90 to 100 mm of H2O      IMPRESSION:     Change in programmable shunt as described above

## 2020-07-02 NOTE — PATIENT INSTRUCTIONS
Proceed for skull x-ray, to verify change in shunt valve position  The planned follow-up for surgery 7/6/20 with Dr Scotty Weber  Repeat CT head in approximately 4 weeks  Return sooner with any new changes such as urinary incontinence, proved persisting gait balance issues, decreased level consciousness

## 2020-07-02 NOTE — H&P (VIEW-ONLY)
Patient ID: Lexi Portillo is a 69 y o  male  Diagnoses and all orders for this visit:    Normal pressure hydrocephalus (HCC)  -     XR skull < 4 vw; Future  -     CT head without contrast; Future    Status post ventriculoperitoneal shunt  -     XR skull < 4 vw; Future  -     CT head without contrast; Future    Spondylolisthesis of lumbosacral region          Assessment/Plan:    Very pleasant 54-year-old male, returns to review CT head this is she normal-pressure hydrocephalus  He is status post shunt adjustment approximately 1 month ago  He makes specific note that over the last 3 weeks his gait balance has become significantly more difficult  With occasional falls  He reports this started about 1 week after his shunt adjustment 6/10/20  He makes note that at his last visit with Neurosurgery, 5/7/20 his shunt was adjusted from 80 cm of water to 70 cm of water which resulted in improved gait and balance  His complaint at that visit was gait and balance difficulties and urinary incontinence  He subsequently had MRI of the brain and his shunt valve was repositioned by the study to 80-90 cm of water  In addition to CT head revealed decreased ventricular size as well as some evidence of 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  With these findings in mind, shunt valve was repositioned to 120 cm of water  He has had CT head as planned 7/1/20, this revealed minimal increase in the ventricles as well as resolution of the subdural hygromas  The patient also reports he has surgery planned for 7/6/20, minimally invasive transverse lumbar interbody fusion L5-S1 left-sided approach by Dr Immanuel Quinteros  This is for a significant L5-S1 spondylolisthesis  Shunt valve reposition today from 120 cm of water to 90 cm of water  Patient underwent plain films of the skull 7/2/20 and they confirm change of the shunt valve position to  cm of water      At this juncture further follow-up relative this use she will be in approximately 4 weeks for repeat CT of the head  He does understand should he notice any worsening gait or balance issues, urinary incontinence, or decreased level consciousness, for headache, he has return sooner for reassessment  Return in about 4 weeks (around 7/30/2020) for Review CT head  Chief Complaint  Returns to review CT head, reports worsening gait and balance over past 3 weeks    HPI       The following portions of the patient's history were reviewed and updated as appropriate: allergies, current medications, past family history, past medical history, past social history and past surgical history  Review of Systems   Constitutional: Negative  HENT: Negative  Eyes: Negative  Respiratory: Negative  Cardiovascular: Negative  Gastrointestinal: Negative  Endocrine: Negative  Genitourinary: Negative  Musculoskeletal: Positive for gait problem (difficulty waslking and standing since last shunt adjustment  Uses walker for assiatance)  Skin: Negative  Allergic/Immunologic: Negative  Hematological: Bruises/bleeds easily (On Aspirin)  Psychiatric/Behavioral: Negative  All other systems reviewed and are negative  Objective:    Physical Exam   Constitutional: He is oriented to person, place, and time  He appears well-developed and well-nourished  HENT:   Head: Normocephalic and atraumatic  Eyes: Pupils are equal, round, and reactive to light  EOM are normal    Cardiovascular: Normal rate  Pulmonary/Chest: Effort normal and breath sounds normal    Neurological: He is alert and oriented to person, place, and time  Skin: Skin is warm and dry  Psychiatric: He has a normal mood and affect  Vitals reviewed  Neurologic Exam     Mental Status   Oriented to person, place, and time  Cranial Nerves     CN III, IV, VI   Pupils are equal, round, and reactive to light    Extraocular motions are normal           CT BRAIN - WITHOUT CONTRAST 7/1/20     INDICATION:   G91 2: (Idiopathic) normal pressure hydrocephalus  Z74 09: Other reduced mobility  D18 1: Lymphangioma, any site      COMPARISON:  CT 6/8/2020     TECHNIQUE:  CT examination of the brain was performed  In addition to axial images, coronal 2D reformatted images were created and submitted for interpretation        Radiation dose length product (DLP) for this visit:  902 mGy-cm   This examination, like all CT scans performed in the [facility], was performed utilizing techniques to minimize radiation dose exposure, including the use of iterative   reconstruction and automated exposure control        IMAGE QUALITY:  Diagnostic      FINDINGS:     PARENCHYMA:  No acute disease  There is no acute ischemia, intracranial mass or mass effect  No edema  Stable left parieto-occipital cortical infarct      Stable gliosis along the tract of the anterior right frontal shunt catheter  Catheter appears is the midline corpus callosum terminating in the mid body of the left lateral ventricle  Ventricular size is increased slightly in the interval since prior   study and the bilateral subdural hygromas have resolved  Previously, 3rd ventricle approximately a centimeter in width approaches 1 4 cm  Similar increase elsewhere within the ventricular system      VENTRICLES AND EXTRA-AXIAL SPACES:  Interval increase in size of ventricular system with resorption of bilateral subdural hygromas      VISUALIZED ORBITS AND PARANASAL SINUSES:  Unremarkable      CALVARIUM AND EXTRACRANIAL SOFT TISSUES:  Normal      IMPRESSION:     As the ventricular system has increased in size, the subdural hygromas have resolved  3rd ventricle has increased from 1 cm to approximately 1 4 cm in width            PROGRAMMABLE SHUNT EVALUATION 7/2/20     INDICATION:   G91 2: (Idiopathic) normal pressure hydrocephalus  Z98 2: Presence of cerebrospinal fluid drainage device      COMPARISON:  6/10/2020     VIEWS:  XR SKULL < 4 VW         FINDINGS:     Views of the calvarium are obtained with dedicated view positioned to evaluate pressure setting dial of ventriculostomy catheter      The programmable shunt has changed, now in the region of 90 to 100 mm of H2O      IMPRESSION:     Change in programmable shunt as described above

## 2020-07-02 NOTE — LETTER
July 2, 2020     [place] [address] [address]  Gissel Sarmiento    Patient: Smiley Reyna   YOB: 1950   Date of Visit: 7/2/2020       Dear Dr Alanis Shadow:    Thank you for referring Nestor Snyder to me for evaluation  Below are my notes for this consultation  If you have questions, please do not hesitate to call me  I look forward to following your patient along with you  Sincerely,        Abdulaziz Oden PA-C        CC: No Recipients  Abdulaziz Oden PA-C  7/2/2020 12:48 PM  Sign at close encounter  Patient ID: Smiley Reyna is a 69 y o  male  Diagnoses and all orders for this visit:    Normal pressure hydrocephalus (HCC)  -     XR skull < 4 vw; Future  -     CT head without contrast; Future    Status post ventriculoperitoneal shunt  -     XR skull < 4 vw; Future  -     CT head without contrast; Future    Spondylolisthesis of lumbosacral region          Assessment/Plan:    Very pleasant 76-year-old male, returns to review CT head this is she normal-pressure hydrocephalus  He is status post shunt adjustment approximately 1 month ago  He makes specific note that over the last 3 weeks his gait balance has become significantly more difficult  With occasional falls  He reports this started about 1 week after his shunt adjustment 6/10/20  He makes note that at his last visit with Neurosurgery, 5/7/20 his shunt was adjusted from 80 cm of water to 70 cm of water which resulted in improved gait and balance  His complaint at that visit was gait and balance difficulties and urinary incontinence  He subsequently had MRI of the brain and his shunt valve was repositioned by the study to 80-90 cm of water  In addition to CT head revealed decreased ventricular size as well as some evidence of 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  With these findings in mind, shunt valve was repositioned to 120 cm of water      He has had CT head as planned 7/1/20, this revealed minimal increase in the ventricles as well as resolution of the subdural hygromas  The patient also reports he has surgery planned for 7/6/20, minimally invasive transverse lumbar interbody fusion L5-S1 left-sided approach by Dr Mcgowan Dawley  This is for a significant L5-S1 spondylolisthesis  Shunt valve reposition today from 120 cm of water to 90 cm of water  Patient underwent plain films of the skull 7/2/20 and they confirm change of the shunt valve position to  cm of water  At this juncture further follow-up relative this use she will be in approximately 4 weeks for repeat CT of the head  He does understand should he notice any worsening gait or balance issues, urinary incontinence, or decreased level consciousness, for headache, he has return sooner for reassessment  Return in about 4 weeks (around 7/30/2020) for Review CT head  Chief Complaint  Returns to review CT head, reports worsening gait and balance over past 3 weeks    HPI       The following portions of the patient's history were reviewed and updated as appropriate: allergies, current medications, past family history, past medical history, past social history and past surgical history  Review of Systems   Constitutional: Negative  HENT: Negative  Eyes: Negative  Respiratory: Negative  Cardiovascular: Negative  Gastrointestinal: Negative  Endocrine: Negative  Genitourinary: Negative  Musculoskeletal: Positive for gait problem (difficulty waslking and standing since last shunt adjustment  Uses walker for assiatance)  Skin: Negative  Allergic/Immunologic: Negative  Hematological: Bruises/bleeds easily (On Aspirin)  Psychiatric/Behavioral: Negative  All other systems reviewed and are negative  Objective:    Physical Exam   Constitutional: He is oriented to person, place, and time  He appears well-developed and well-nourished  HENT:   Head: Normocephalic and atraumatic     Eyes: Pupils are equal, round, and reactive to light  EOM are normal    Cardiovascular: Normal rate  Pulmonary/Chest: Effort normal and breath sounds normal    Neurological: He is alert and oriented to person, place, and time  Skin: Skin is warm and dry  Psychiatric: He has a normal mood and affect  Vitals reviewed  Neurologic Exam     Mental Status   Oriented to person, place, and time  Cranial Nerves     CN III, IV, VI   Pupils are equal, round, and reactive to light  Extraocular motions are normal           CT BRAIN - WITHOUT CONTRAST   7/1/20     INDICATION:   G91 2: (Idiopathic) normal pressure hydrocephalus  Z74 09: Other reduced mobility  D18 1: Lymphangioma, any site      COMPARISON:  CT 6/8/2020     TECHNIQUE:  CT examination of the brain was performed  In addition to axial images, coronal 2D reformatted images were created and submitted for interpretation        Radiation dose length product (DLP) for this visit:  902 mGy-cm   This examination, like all CT scans performed in the [facility], was performed utilizing techniques to minimize radiation dose exposure, including the use of iterative   reconstruction and automated exposure control        IMAGE QUALITY:  Diagnostic      FINDINGS:     PARENCHYMA:  No acute disease  There is no acute ischemia, intracranial mass or mass effect  No edema  Stable left parieto-occipital cortical infarct      Stable gliosis along the tract of the anterior right frontal shunt catheter  Catheter appears is the midline corpus callosum terminating in the mid body of the left lateral ventricle  Ventricular size is increased slightly in the interval since prior   study and the bilateral subdural hygromas have resolved  Previously, 3rd ventricle approximately a centimeter in width approaches 1 4 cm    Similar increase elsewhere within the ventricular system      VENTRICLES AND EXTRA-AXIAL SPACES:  Interval increase in size of ventricular system with resorption of bilateral subdural hygromas      VISUALIZED ORBITS AND PARANASAL SINUSES:  Unremarkable      CALVARIUM AND EXTRACRANIAL SOFT TISSUES:  Normal      IMPRESSION:     As the ventricular system has increased in size, the subdural hygromas have resolved  3rd ventricle has increased from 1 cm to approximately 1 4 cm in width            PROGRAMMABLE SHUNT EVALUATION 7/2/20     INDICATION:   G91 2: (Idiopathic) normal pressure hydrocephalus  Z98 2: Presence of cerebrospinal fluid drainage device      COMPARISON:  6/10/2020     VIEWS:  XR SKULL < 4 VW         FINDINGS:     Views of the calvarium are obtained with dedicated view positioned to evaluate pressure setting dial of ventriculostomy catheter      The programmable shunt has changed, now in the region of 90 to 100 mm of H2O      IMPRESSION:     Change in programmable shunt as described above

## 2020-07-05 NOTE — ANESTHESIA PREPROCEDURE EVALUATION
Review of Systems/Medical History  Patient summary reviewed  Chart reviewed  No history of anesthetic complications     Cardiovascular  EKG reviewed, Hypertension controlled,    Pulmonary  Negative pulmonary ROS Sleep apnea Sleep Study completed and CPAP,        GI/Hepatic  Negative GI/hepatic ROS          Negative  ROS        Endo/Other  Diabetes well controlled type 2 Insulin,      GYN       Hematology  Negative hematology ROS      Musculoskeletal  Negative musculoskeletal ROS        Neurology    Diabetic neuropathy,   Comment: [facility] s/p [facility] Psychology   Negative psychology ROS              Physical Exam    Airway    Mallampati score: II  TM Distance: >3 FB  Neck ROM: full     Dental   No notable dental hx upper dentures,     Cardiovascular  Rhythm: regular, Rate: normal, Cardiovascular exam normal    Pulmonary  Pulmonary exam normal Breath sounds clear to auscultation,     Other Findings        Anesthesia Plan  ASA Score- 3     Anesthesia Type- general with ASA Monitors  Additional Monitors:   Airway Plan: ETT  Plan Factors-    Induction- intravenous  Postoperative Plan- Plan for postoperative opioid use  Planned trial extubation    Informed Consent- Anesthetic plan and risks discussed with patient  I personally reviewed this patient with the CRNA  Discussed and agreed on the Anesthesia Plan with the CRNA           NPO verified  NKDA  Patient received PO Tylenol pre-op  Preop glucose was 100 this AM     Plan:  GETA    Risks and benefits discussed with patient  Questions answered  Patient consented

## 2020-07-06 ENCOUNTER — HOSPITAL ENCOUNTER (INPATIENT)
Facility: HOSPITAL | Age: 70
LOS: 4 days | DRG: 460 | End: 2020-07-10
Attending: NEUROLOGICAL SURGERY | Admitting: NEUROLOGICAL SURGERY
Payer: COMMERCIAL

## 2020-07-06 ENCOUNTER — HOSPITAL ENCOUNTER (OUTPATIENT)
Dept: RADIOLOGY | Facility: HOSPITAL | Age: 70
Setting detail: SURGERY ADMIT
Discharge: HOME | DRG: 460 | End: 2020-07-06
Payer: COMMERCIAL

## 2020-07-06 ENCOUNTER — ANESTHESIA (OUTPATIENT)
Dept: PERIOP | Facility: HOSPITAL | Age: 70
DRG: 460 | End: 2020-07-06
Payer: COMMERCIAL

## 2020-07-06 DIAGNOSIS — N28.9 RENAL INSUFFICIENCY: Primary | ICD-10-CM

## 2020-07-06 DIAGNOSIS — Z01.89 ENCOUNTER FOR GERIATRIC ASSESSMENT: ICD-10-CM

## 2020-07-06 DIAGNOSIS — M43.17 SPONDYLOLISTHESIS OF LUMBOSACRAL REGION: ICD-10-CM

## 2020-07-06 DIAGNOSIS — Z74.09 IMPAIRED FUNCTIONAL MOBILITY, BALANCE, GAIT, AND ENDURANCE: ICD-10-CM

## 2020-07-06 DIAGNOSIS — R41.89 COGNITIVE DECLINE: ICD-10-CM

## 2020-07-06 DIAGNOSIS — R26.9 UNSPECIFIED ABNORMALITIES OF GAIT AND MOBILITY: ICD-10-CM

## 2020-07-06 DIAGNOSIS — Z98.890 POST-OPERATIVE STATE: ICD-10-CM

## 2020-07-06 LAB
ABO GROUP BLD: NORMAL
GLUCOSE SERPL-MCNC: 100 MG/DL (ref 65–140)
GLUCOSE SERPL-MCNC: 144 MG/DL (ref 65–140)
RH BLD: POSITIVE
SARS-COV-2 RNA RESP QL NAA+PROBE: NEGATIVE
SARS-COV-2 RNA SPEC QL NAA+PROBE: NOT DETECTED

## 2020-07-06 PROCEDURE — 22853 INSJ BIOMECHANICAL DEVICE: CPT | Performed by: NEUROLOGICAL SURGERY

## 2020-07-06 PROCEDURE — 20936 SP BONE AGRFT LOCAL ADD-ON: CPT | Performed by: NEUROLOGICAL SURGERY

## 2020-07-06 PROCEDURE — 62225 REPLACE/IRRIGATE CATHETER: CPT | Performed by: NEUROLOGICAL SURGERY

## 2020-07-06 PROCEDURE — 0ST40ZZ RESECTION OF LUMBOSACRAL DISC, OPEN APPROACH: ICD-10-PCS | Performed by: NEUROLOGICAL SURGERY

## 2020-07-06 PROCEDURE — C1713 ANCHOR/SCREW BN/BN,TIS/BN: HCPCS | Performed by: NEUROLOGICAL SURGERY

## 2020-07-06 PROCEDURE — 0JWS0JZ REVISION OF SYNTHETIC SUBSTITUTE IN HEAD AND NECK SUBCUTANEOUS TISSUE AND FASCIA, OPEN APPROACH: ICD-10-PCS | Performed by: NEUROLOGICAL SURGERY

## 2020-07-06 PROCEDURE — 22840 INSERT SPINE FIXATION DEVICE: CPT | Performed by: NEUROLOGICAL SURGERY

## 2020-07-06 PROCEDURE — 22633 ARTHRD CMBN 1NTRSPC LUMBAR: CPT | Performed by: NEUROLOGICAL SURGERY

## 2020-07-06 PROCEDURE — 0SG30AJ FUSION OF LUMBOSACRAL JOINT WITH INTERBODY FUSION DEVICE, POSTERIOR APPROACH, ANTERIOR COLUMN, OPEN APPROACH: ICD-10-PCS | Performed by: NEUROLOGICAL SURGERY

## 2020-07-06 PROCEDURE — 4A11X4G MONITORING OF PERIPHERAL NERVOUS ELECTRICAL ACTIVITY, INTRAOPERATIVE, EXTERNAL APPROACH: ICD-10-PCS | Performed by: NEUROLOGICAL SURGERY

## 2020-07-06 PROCEDURE — U0003 INFECTIOUS AGENT DETECTION BY NUCLEIC ACID (DNA OR RNA); SEVERE ACUTE RESPIRATORY SYNDROME CORONAVIRUS 2 (SARS-COV-2) (CORONAVIRUS DISEASE [COVID-19]), AMPLIFIED PROBE TECHNIQUE, MAKING USE OF HIGH THROUGHPUT TECHNOLOGIES AS DESCRIBED BY CMS-2020-01-R: HCPCS | Performed by: NEUROLOGICAL SURGERY

## 2020-07-06 PROCEDURE — 72100 X-RAY EXAM L-S SPINE 2/3 VWS: CPT

## 2020-07-06 PROCEDURE — 86900 BLOOD TYPING SEROLOGIC ABO: CPT | Performed by: NEUROLOGICAL SURGERY

## 2020-07-06 PROCEDURE — 86901 BLOOD TYPING SEROLOGIC RH(D): CPT | Performed by: NEUROLOGICAL SURGERY

## 2020-07-06 PROCEDURE — 99223 1ST HOSP IP/OBS HIGH 75: CPT | Performed by: INTERNAL MEDICINE

## 2020-07-06 PROCEDURE — 82948 REAGENT STRIP/BLOOD GLUCOSE: CPT

## 2020-07-06 DEVICE — DBM 005005 PROGENIX DBM 5 CC SRVC FEE
Type: IMPLANTABLE DEVICE | Site: SPINE LUMBAR | Status: FUNCTIONAL
Brand: PROGENIX® PUTTY AND PROGENIX® PLUS

## 2020-07-06 DEVICE — MAS 54850016555 4.75 EXT TAB CANN 6.5X55
Type: IMPLANTABLE DEVICE | Site: SPINE LUMBAR | Status: FUNCTIONAL
Brand: CD HORIZON® SOLERA® SPINAL SYSTEM

## 2020-07-06 DEVICE — SCREW SET 4.75MM SOLERA PERC: Type: IMPLANTABLE DEVICE | Site: SPINE LUMBAR | Status: FUNCTIONAL

## 2020-07-06 RX ORDER — SODIUM CHLORIDE 9 MG/ML
INJECTION, SOLUTION INTRAVENOUS CONTINUOUS PRN
Status: DISCONTINUED | OUTPATIENT
Start: 2020-07-06 | End: 2020-07-06 | Stop reason: SURG

## 2020-07-06 RX ORDER — FENTANYL CITRATE/PF 50 MCG/ML
25 SYRINGE (ML) INJECTION
Status: DISCONTINUED | OUTPATIENT
Start: 2020-07-06 | End: 2020-07-06 | Stop reason: HOSPADM

## 2020-07-06 RX ORDER — CEFAZOLIN SODIUM 2 G/50ML
2000 SOLUTION INTRAVENOUS EVERY 8 HOURS
Status: COMPLETED | OUTPATIENT
Start: 2020-07-06 | End: 2020-07-07

## 2020-07-06 RX ORDER — SENNOSIDES 8.6 MG
1 TABLET ORAL DAILY
Status: DISCONTINUED | OUTPATIENT
Start: 2020-07-07 | End: 2020-07-10 | Stop reason: HOSPADM

## 2020-07-06 RX ORDER — OXYCODONE HYDROCHLORIDE 5 MG/1
2.5 TABLET ORAL EVERY 4 HOURS PRN
Status: DISCONTINUED | OUTPATIENT
Start: 2020-07-06 | End: 2020-07-10 | Stop reason: HOSPADM

## 2020-07-06 RX ORDER — TRAVOPROST OPHTHALMIC SOLUTION 0.04 MG/ML
1 SOLUTION OPHTHALMIC
Status: DISCONTINUED | OUTPATIENT
Start: 2020-07-06 | End: 2020-07-10 | Stop reason: HOSPADM

## 2020-07-06 RX ORDER — ALLOPURINOL 300 MG/1
300 TABLET ORAL DAILY
Status: DISCONTINUED | OUTPATIENT
Start: 2020-07-07 | End: 2020-07-10 | Stop reason: HOSPADM

## 2020-07-06 RX ORDER — SUCCINYLCHOLINE/SOD CL,ISO/PF 100 MG/5ML
SYRINGE (ML) INTRAVENOUS AS NEEDED
Status: DISCONTINUED | OUTPATIENT
Start: 2020-07-06 | End: 2020-07-06 | Stop reason: SURG

## 2020-07-06 RX ORDER — BUPIVACAINE HYDROCHLORIDE AND EPINEPHRINE 5; 5 MG/ML; UG/ML
INJECTION, SOLUTION EPIDURAL; INTRACAUDAL; PERINEURAL AS NEEDED
Status: DISCONTINUED | OUTPATIENT
Start: 2020-07-06 | End: 2020-07-06 | Stop reason: HOSPADM

## 2020-07-06 RX ORDER — SODIUM CHLORIDE 9 MG/ML
75 INJECTION, SOLUTION INTRAVENOUS CONTINUOUS
Status: DISCONTINUED | OUTPATIENT
Start: 2020-07-06 | End: 2020-07-07

## 2020-07-06 RX ORDER — LIDOCAINE HYDROCHLORIDE 10 MG/ML
0.5 INJECTION, SOLUTION EPIDURAL; INFILTRATION; INTRACAUDAL; PERINEURAL ONCE AS NEEDED
Status: COMPLETED | OUTPATIENT
Start: 2020-07-06 | End: 2020-07-06

## 2020-07-06 RX ORDER — SODIUM CHLORIDE, SODIUM LACTATE, POTASSIUM CHLORIDE, CALCIUM CHLORIDE 600; 310; 30; 20 MG/100ML; MG/100ML; MG/100ML; MG/100ML
100 INJECTION, SOLUTION INTRAVENOUS CONTINUOUS
Status: DISCONTINUED | OUTPATIENT
Start: 2020-07-06 | End: 2020-07-06

## 2020-07-06 RX ORDER — DOCUSATE SODIUM 100 MG/1
100 CAPSULE, LIQUID FILLED ORAL 2 TIMES DAILY
Status: DISCONTINUED | OUTPATIENT
Start: 2020-07-06 | End: 2020-07-10 | Stop reason: HOSPADM

## 2020-07-06 RX ORDER — HYDROMORPHONE HCL/PF 1 MG/ML
0.2 SYRINGE (ML) INJECTION EVERY 4 HOURS PRN
Status: DISCONTINUED | OUTPATIENT
Start: 2020-07-06 | End: 2020-07-07

## 2020-07-06 RX ORDER — GLYCOPYRROLATE 0.2 MG/ML
INJECTION INTRAMUSCULAR; INTRAVENOUS AS NEEDED
Status: DISCONTINUED | OUTPATIENT
Start: 2020-07-06 | End: 2020-07-06 | Stop reason: SURG

## 2020-07-06 RX ORDER — ONDANSETRON 2 MG/ML
4 INJECTION INTRAMUSCULAR; INTRAVENOUS ONCE AS NEEDED
Status: DISCONTINUED | OUTPATIENT
Start: 2020-07-06 | End: 2020-07-06 | Stop reason: HOSPADM

## 2020-07-06 RX ORDER — LISINOPRIL 20 MG/1
20 TABLET ORAL DAILY
Status: DISCONTINUED | OUTPATIENT
Start: 2020-07-07 | End: 2020-07-06

## 2020-07-06 RX ORDER — METHOCARBAMOL 500 MG/1
500 TABLET, FILM COATED ORAL EVERY 6 HOURS SCHEDULED
Status: DISCONTINUED | OUTPATIENT
Start: 2020-07-06 | End: 2020-07-10 | Stop reason: HOSPADM

## 2020-07-06 RX ORDER — CEFAZOLIN SODIUM 1 G/50ML
1000 SOLUTION INTRAVENOUS ONCE
Status: COMPLETED | OUTPATIENT
Start: 2020-07-06 | End: 2020-07-06

## 2020-07-06 RX ORDER — NEOSTIGMINE METHYLSULFATE 1 MG/ML
INJECTION INTRAVENOUS AS NEEDED
Status: DISCONTINUED | OUTPATIENT
Start: 2020-07-06 | End: 2020-07-06 | Stop reason: SURG

## 2020-07-06 RX ORDER — SODIUM CHLORIDE, SODIUM LACTATE, POTASSIUM CHLORIDE, CALCIUM CHLORIDE 600; 310; 30; 20 MG/100ML; MG/100ML; MG/100ML; MG/100ML
75 INJECTION, SOLUTION INTRAVENOUS CONTINUOUS
Status: DISCONTINUED | OUTPATIENT
Start: 2020-07-06 | End: 2020-07-07

## 2020-07-06 RX ORDER — FENTANYL CITRATE 50 UG/ML
INJECTION, SOLUTION INTRAMUSCULAR; INTRAVENOUS AS NEEDED
Status: DISCONTINUED | OUTPATIENT
Start: 2020-07-06 | End: 2020-07-06 | Stop reason: SURG

## 2020-07-06 RX ORDER — CEFAZOLIN SODIUM 2 G/50ML
2000 SOLUTION INTRAVENOUS ONCE
Status: COMPLETED | OUTPATIENT
Start: 2020-07-06 | End: 2020-07-06

## 2020-07-06 RX ORDER — POLYETHYLENE GLYCOL 3350 17 G/17G
17 POWDER, FOR SOLUTION ORAL DAILY PRN
Status: DISCONTINUED | OUTPATIENT
Start: 2020-07-06 | End: 2020-07-10 | Stop reason: HOSPADM

## 2020-07-06 RX ORDER — ONDANSETRON 2 MG/ML
4 INJECTION INTRAMUSCULAR; INTRAVENOUS EVERY 6 HOURS PRN
Status: DISCONTINUED | OUTPATIENT
Start: 2020-07-06 | End: 2020-07-10 | Stop reason: HOSPADM

## 2020-07-06 RX ORDER — OXYCODONE HYDROCHLORIDE 5 MG/1
5 TABLET ORAL EVERY 4 HOURS PRN
Status: DISCONTINUED | OUTPATIENT
Start: 2020-07-06 | End: 2020-07-10 | Stop reason: HOSPADM

## 2020-07-06 RX ORDER — HYDROMORPHONE HCL/PF 1 MG/ML
0.25 SYRINGE (ML) INJECTION
Status: DISCONTINUED | OUTPATIENT
Start: 2020-07-06 | End: 2020-07-06 | Stop reason: HOSPADM

## 2020-07-06 RX ORDER — ACETAMINOPHEN 325 MG/1
975 TABLET ORAL ONCE
Status: COMPLETED | OUTPATIENT
Start: 2020-07-06 | End: 2020-07-06

## 2020-07-06 RX ORDER — LIDOCAINE HYDROCHLORIDE AND EPINEPHRINE 10; 10 MG/ML; UG/ML
INJECTION, SOLUTION INFILTRATION; PERINEURAL AS NEEDED
Status: DISCONTINUED | OUTPATIENT
Start: 2020-07-06 | End: 2020-07-06 | Stop reason: HOSPADM

## 2020-07-06 RX ORDER — ROCURONIUM BROMIDE 10 MG/ML
INJECTION, SOLUTION INTRAVENOUS AS NEEDED
Status: DISCONTINUED | OUTPATIENT
Start: 2020-07-06 | End: 2020-07-06 | Stop reason: SURG

## 2020-07-06 RX ORDER — LIDOCAINE HYDROCHLORIDE 10 MG/ML
INJECTION, SOLUTION EPIDURAL; INFILTRATION; INTRACAUDAL; PERINEURAL AS NEEDED
Status: DISCONTINUED | OUTPATIENT
Start: 2020-07-06 | End: 2020-07-06 | Stop reason: SURG

## 2020-07-06 RX ORDER — PROPOFOL 10 MG/ML
INJECTION, EMULSION INTRAVENOUS CONTINUOUS PRN
Status: DISCONTINUED | OUTPATIENT
Start: 2020-07-06 | End: 2020-07-06 | Stop reason: SURG

## 2020-07-06 RX ORDER — ONDANSETRON 2 MG/ML
INJECTION INTRAMUSCULAR; INTRAVENOUS AS NEEDED
Status: DISCONTINUED | OUTPATIENT
Start: 2020-07-06 | End: 2020-07-06 | Stop reason: SURG

## 2020-07-06 RX ORDER — DEXAMETHASONE SODIUM PHOSPHATE 10 MG/ML
INJECTION, SOLUTION INTRAMUSCULAR; INTRAVENOUS AS NEEDED
Status: DISCONTINUED | OUTPATIENT
Start: 2020-07-06 | End: 2020-07-06 | Stop reason: SURG

## 2020-07-06 RX ORDER — ACETAMINOPHEN 325 MG/1
975 TABLET ORAL EVERY 8 HOURS SCHEDULED
Status: DISCONTINUED | OUTPATIENT
Start: 2020-07-06 | End: 2020-07-10 | Stop reason: HOSPADM

## 2020-07-06 RX ORDER — PROPOFOL 10 MG/ML
INJECTION, EMULSION INTRAVENOUS AS NEEDED
Status: DISCONTINUED | OUTPATIENT
Start: 2020-07-06 | End: 2020-07-06 | Stop reason: SURG

## 2020-07-06 RX ORDER — DEXAMETHASONE SODIUM PHOSPHATE 10 MG/ML
INJECTION, SOLUTION INTRAMUSCULAR; INTRAVENOUS AS NEEDED
Status: DISCONTINUED | OUTPATIENT
Start: 2020-07-06 | End: 2020-07-06

## 2020-07-06 RX ORDER — HYDROMORPHONE HCL/PF 1 MG/ML
SYRINGE (ML) INJECTION AS NEEDED
Status: DISCONTINUED | OUTPATIENT
Start: 2020-07-06 | End: 2020-07-06 | Stop reason: SURG

## 2020-07-06 RX ORDER — CHLORHEXIDINE GLUCONATE 0.12 MG/ML
15 RINSE ORAL ONCE
Status: COMPLETED | OUTPATIENT
Start: 2020-07-06 | End: 2020-07-06

## 2020-07-06 RX ORDER — EPHEDRINE SULFATE 50 MG/ML
INJECTION INTRAVENOUS AS NEEDED
Status: DISCONTINUED | OUTPATIENT
Start: 2020-07-06 | End: 2020-07-06 | Stop reason: SURG

## 2020-07-06 RX ORDER — ALBUMIN, HUMAN INJ 5% 5 %
SOLUTION INTRAVENOUS CONTINUOUS PRN
Status: DISCONTINUED | OUTPATIENT
Start: 2020-07-06 | End: 2020-07-06 | Stop reason: SURG

## 2020-07-06 RX ADMIN — SODIUM CHLORIDE: 9 INJECTION, SOLUTION INTRAVENOUS at 11:00

## 2020-07-06 RX ADMIN — ONDANSETRON 4 MG: 2 INJECTION INTRAMUSCULAR; INTRAVENOUS at 14:12

## 2020-07-06 RX ADMIN — EPHEDRINE SULFATE 5 MG: 50 INJECTION, SOLUTION INTRAVENOUS at 14:34

## 2020-07-06 RX ADMIN — HYDROMORPHONE HYDROCHLORIDE 0.3 MG: 1 INJECTION, SOLUTION INTRAMUSCULAR; INTRAVENOUS; SUBCUTANEOUS at 13:11

## 2020-07-06 RX ADMIN — PROPOFOL 100 MCG/KG/MIN: 10 INJECTION, EMULSION INTRAVENOUS at 11:35

## 2020-07-06 RX ADMIN — PHENYLEPHRINE HYDROCHLORIDE 20 MCG/MIN: 10 INJECTION INTRAVENOUS at 11:35

## 2020-07-06 RX ADMIN — EPHEDRINE SULFATE 10 MG: 50 INJECTION, SOLUTION INTRAVENOUS at 11:59

## 2020-07-06 RX ADMIN — DOCUSATE SODIUM 100 MG: 100 CAPSULE, LIQUID FILLED ORAL at 17:55

## 2020-07-06 RX ADMIN — CEFAZOLIN SODIUM 2000 MG: 2 SOLUTION INTRAVENOUS at 11:34

## 2020-07-06 RX ADMIN — ACETAMINOPHEN 975 MG: 325 TABLET ORAL at 09:23

## 2020-07-06 RX ADMIN — PROPOFOL 200 MG: 10 INJECTION, EMULSION INTRAVENOUS at 10:55

## 2020-07-06 RX ADMIN — EPHEDRINE SULFATE 5 MG: 50 INJECTION, SOLUTION INTRAVENOUS at 15:04

## 2020-07-06 RX ADMIN — LIDOCAINE HYDROCHLORIDE 100 MG: 10 INJECTION, SOLUTION EPIDURAL; INFILTRATION; INTRACAUDAL; PERINEURAL at 10:55

## 2020-07-06 RX ADMIN — ALBUMIN (HUMAN): 12.5 SOLUTION INTRAVENOUS at 12:24

## 2020-07-06 RX ADMIN — SODIUM CHLORIDE, SODIUM LACTATE, POTASSIUM CHLORIDE, AND CALCIUM CHLORIDE 100 ML/HR: .6; .31; .03; .02 INJECTION, SOLUTION INTRAVENOUS at 09:47

## 2020-07-06 RX ADMIN — LIDOCAINE HYDROCHLORIDE 0.5 ML: 10 INJECTION, SOLUTION EPIDURAL; INFILTRATION; INTRACAUDAL; PERINEURAL at 09:47

## 2020-07-06 RX ADMIN — METHOCARBAMOL 500 MG: 500 TABLET, FILM COATED ORAL at 17:55

## 2020-07-06 RX ADMIN — PROPOFOL 80 MG: 10 INJECTION, EMULSION INTRAVENOUS at 12:06

## 2020-07-06 RX ADMIN — CHLORHEXIDINE GLUCONATE 0.12% ORAL RINSE 15 ML: 1.2 LIQUID ORAL at 09:23

## 2020-07-06 RX ADMIN — OXYCODONE HYDROCHLORIDE 5 MG: 5 TABLET ORAL at 19:43

## 2020-07-06 RX ADMIN — NEOSTIGMINE METHYLSULFATE 3 MG: 1 INJECTION, SOLUTION INTRAVENOUS at 14:05

## 2020-07-06 RX ADMIN — EPHEDRINE SULFATE 5 MG: 50 INJECTION, SOLUTION INTRAVENOUS at 11:13

## 2020-07-06 RX ADMIN — DEXAMETHASONE SODIUM PHOSPHATE 10 MG: 10 INJECTION, SOLUTION INTRAMUSCULAR; INTRAVENOUS at 11:00

## 2020-07-06 RX ADMIN — SODIUM CHLORIDE 75 ML/HR: 0.9 INJECTION, SOLUTION INTRAVENOUS at 16:14

## 2020-07-06 RX ADMIN — EPHEDRINE SULFATE 5 MG: 50 INJECTION, SOLUTION INTRAVENOUS at 14:50

## 2020-07-06 RX ADMIN — ALBUMIN (HUMAN): 12.5 SOLUTION INTRAVENOUS at 12:01

## 2020-07-06 RX ADMIN — CEFAZOLIN SODIUM 1000 MG: 1 SOLUTION INTRAVENOUS at 11:35

## 2020-07-06 RX ADMIN — FENTANYL CITRATE 25 MCG: 50 INJECTION, SOLUTION INTRAMUSCULAR; INTRAVENOUS at 10:55

## 2020-07-06 RX ADMIN — DEXMEDETOMIDINE 1 MCG/KG/HR: 100 INJECTION, SOLUTION, CONCENTRATE INTRAVENOUS at 11:35

## 2020-07-06 RX ADMIN — CEFAZOLIN SODIUM 2000 MG: 2 SOLUTION INTRAVENOUS at 19:43

## 2020-07-06 RX ADMIN — HYDROMORPHONE HYDROCHLORIDE 0.5 MG: 1 INJECTION, SOLUTION INTRAMUSCULAR; INTRAVENOUS; SUBCUTANEOUS at 11:55

## 2020-07-06 RX ADMIN — ROCURONIUM BROMIDE 20 MG: 10 INJECTION, SOLUTION INTRAVENOUS at 11:19

## 2020-07-06 RX ADMIN — Medication 120 MG: at 10:56

## 2020-07-06 RX ADMIN — ACETAMINOPHEN 975 MG: 325 TABLET, FILM COATED ORAL at 17:55

## 2020-07-06 RX ADMIN — GLYCOPYRROLATE 0.4 MG: 0.2 INJECTION, SOLUTION INTRAMUSCULAR; INTRAVENOUS at 14:05

## 2020-07-06 NOTE — OP NOTE
OPERATIVE REPORT  PATIENT NAME: Kelsey Berry    :  [id number]  MRN: [id number]  Pt Location: [facility] OR ROOM 17    SURGERY DATE: 2020    Surgeon(s) and Role:     * Ninfa Montana MD - Primary     * Pauline Monroy PA-C - Observing    Preop Diagnosis:  Spondylolisthesis of lumbosacral region [M43 17]    Post-Op Diagnosis Codes: * Spondylolisthesis of lumbosacral region [M43 17]    Procedure(s) (LRB):  MINIMALLY INVASIVE TRANSVERSE LUMBAR INTERBODY FUSION L5-S1 FROM LEFT-SIDED APPROACH (N/A)  REVISION OF SCALP OVER VENTRICULAR CATHETER IN THE RIGHT CORONAL REGION (Right)    Specimen(s):  * No specimens in log *    Estimated Blood Loss:   150 mL    Drains:  [REMOVED] Urethral Catheter Latex 16 Fr  (Removed)   Site Assessment Clean;Skin intact 2020  3:23 PM   Collection Container Standard drainage bag 2020 11:15 AM   Number of days: 0       Anesthesia Type:   General    Operative Indications:  Spondylolisthesis of lumbosacral region [M43 17]      Operative Findings:  Grade 2 spondylolisthesis and superficial    Complications:   None    Procedure and Technique:  After adequate general endotracheal anesthesia the patient was placed prone on the operating room Dex table  The back was prepped with Betadine soap then DuraPrep  Double layer drapes were placed in normal fashion and a Betadine impregnated sticky drape was placed over these  A time-out was called and all parameters a time-out were followed    The procedure began by imaging the projection of the pedicles onto the skin of the low back  In this region, a 30 mm incision was made  These were made in the pedicular line on both sides  K-wires were then introduced into the L5 and S1 pedicles utilizing a Janshede needle  Each was tapped and stimulated and found to be within threshold  On the right side screws were introduced    Solara screws with a percutaneous extender utilizing a 6 5 x 55 mm screw was placed at the L5 level on the right  At the S1 level at 6 5 x 45 mm screw was placed  This was measured to be 40 mm and a 45 mm evelin was utilized  The screws were then distracted and the evelin was tightened into final position  Next attention was placed to the left side  Progressive dilators were placed on the left side and the quadrant retractor system was placed  This was angled medially and checked in the AP and lateral planes fluoroscopically  The intraoperative microscope was used at various degrees of magnification to aid in the identification, illumination, and microdissection necessary to perform this procedure  Next the pars interarticularis was drilled and a partial medial facetectomy was performed  The thecal sac was identified  There were no CSF leaks  The disc space was thus identified  The thecal sac was retracted minimally and an annulotomy was performed  A series of pituitary rongeurs Kerrison rongeurs curettes and rasps were used to perform a complete diskectomy  Demineralized bone matrix was then placed in the to the contralateral side and in 8 mm x 23 mm extra lordotic expandable cage was impacted into position without difficulty  This was expanded  Next 6 5 x 55 mm Solara screw was placed at L5 and a 6 5 x 45 mm screw was placed at S1 a 45 mm cobalt chromium evelin was placed and the caps were tightened to the break-off torque    The fascia was approximated with interrupted 2-0 Vicryl suture  The epidermis was approximated with interrupted inverted 3 0 Vicryl suture  Benzoin and Steri-Strips were placed  Clean sterile dressings were placed  Final x-rays were obtained  The instrumentation was in acceptable position  There were no significant alterations in the somatosensory-evoked potentials or EMG evoked potentials  All the screw stimulation evoked potentials were well within threshold  The right coronal region was then clipped free of hair  The scalp was prepped with Betadine soap then DuraPrep    Double layer drapes were placed in normal fashion and a Betadine impregnated sticky drape was placed over these  A time-out was called and all parameters of the time-out were followed  The skin over the right coronal region was injected with 0 5% bupivacaine with 1 to 557003 %  epinephrine  A 15 blade was used to incise the skin  Dissection was carried out to the underlying shunt and catheter  The catheter was secured down to the skull with the use of a stitch in the para cranium and the galea so as to allow for to be force down towards the bone away from the scalp  The thinned area was ellipsed and removed  The scalp was then undermined over a wide area and the galea was approximated with interrupted inverted 3 0 Vicryl suture  Skin was closed with interrupted inverted vertical mattress sutures with 3 0 Prolene suture  Clean sterile dressings were placed  The patient was taken to the recovery room having tolerated the procedure well             I was present for the entire procedure    Patient Disposition:  PACU     SIGNATURE: Reji Hyde MD  DATE: July 6, 2020  TIME: 3:51 PM

## 2020-07-06 NOTE — PLAN OF CARE
Problem: Prexisting or High Potential for Compromised Skin Integrity  Goal: Skin integrity is maintained or improved  Description  INTERVENTIONS:  - Identify patients at risk for skin breakdown  - Assess and monitor skin integrity  - Assess and monitor nutrition and hydration status  - Monitor labs   - Assess for incontinence   - Turn and reposition patient  - Assist with mobility/ambulation  - Relieve pressure over bony prominences  - Avoid friction and shearing  - Provide appropriate hygiene as needed including keeping skin clean and dry  - Evaluate need for skin moisturizer/barrier cream  - Collaborate with interdisciplinary team   - Patient/family teaching  - Consider wound care consult   Outcome: Progressing     Problem: PAIN - ADULT  Goal: Verbalizes/displays adequate comfort level or baseline comfort level  Description  Interventions:  - Encourage patient to monitor pain and request assistance  - Assess pain using appropriate pain scale  - Administer analgesics based on type and severity of pain and evaluate response  - Implement non-pharmacological measures as appropriate and evaluate response  - Consider cultural and social influences on pain and pain management  - Notify physician/advanced practitioner if interventions unsuccessful or patient reports new pain  Outcome: Progressing     Problem: INFECTION - ADULT  Goal: Absence or prevention of progression during hospitalization  Description  INTERVENTIONS:  - Assess and monitor for signs and symptoms of infection  - Monitor lab/diagnostic results  - Monitor all insertion sites, i e  indwelling lines, tubes, and drains  - Monitor endotracheal if appropriate and nasal secretions for changes in amount and color  - [place] appropriate cooling/warming therapies per order  - Administer medications as ordered  - Instruct and encourage patient and family to use good hand hygiene technique  - Identify and instruct in appropriate isolation precautions for identified infection/condition  Outcome: Progressing  Goal: Absence of fever/infection during neutropenic period  Description  INTERVENTIONS:  - Monitor WBC    Outcome: Progressing     Problem: SAFETY ADULT  Goal: Patient will remain free of falls  Description  INTERVENTIONS:  - Assess patient frequently for physical needs  -  Identify cognitive and physical deficits and behaviors that affect risk of falls    -  [place] fall precautions as indicated by assessment   - Educate patient/family on patient safety including physical limitations  - Instruct patient to call for assistance with activity based on assessment  - Modify environment to reduce risk of injury  - Consider OT/PT consult to assist with strengthening/mobility  Outcome: Progressing  Goal: Maintain or return to baseline ADL function  Description  INTERVENTIONS:  -  Assess patient's ability to carry out ADLs; assess patient's baseline for ADL function and identify physical deficits which impact ability to perform ADLs (bathing, care of mouth/teeth, toileting, grooming, dressing, etc )  - Assess/evaluate cause of self-care deficits   - Assess range of motion  - Assess patient's mobility; develop plan if impaired  - Assess patient's need for assistive devices and provide as appropriate  - Encourage maximum independence but intervene and supervise when necessary  - Involve family in performance of ADLs  - Assess for home care needs following discharge   - Consider OT consult to assist with ADL evaluation and planning for discharge  - Provide patient education as appropriate  Outcome: Progressing  Goal: Maintain or return mobility status to optimal level  Description  INTERVENTIONS:  - Assess patient's baseline mobility status (ambulation, transfers, stairs, etc )    - Identify cognitive and physical deficits and behaviors that affect mobility  - Identify mobility aids required to assist with transfers and/or ambulation (gait belt, sit-to-stand, lift, walker, cane, etc )  - [place] fall precautions as indicated by assessment  - Record patient progress and toleration of activity level on Mobility SBAR; progress patient to next Phase/Stage  - Instruct patient to call for assistance with activity based on assessment  - Consider rehabilitation consult to assist with strengthening/weightbearing, etc   Outcome: Progressing     Problem: DISCHARGE PLANNING  Goal: Discharge to home or other facility with appropriate resources  Description  INTERVENTIONS:  - Identify barriers to discharge w/patient and caregiver  - Arrange for needed discharge resources and transportation as appropriate  - Identify discharge learning needs (meds, wound care, etc )  - Arrange for interpretive services to assist at discharge as needed  - Refer to Case Management Department for coordinating discharge planning if the patient needs post-hospital services based on physician/advanced practitioner order or complex needs related to functional status, cognitive ability, or social support system  Outcome: Progressing     Problem: Knowledge Deficit  Goal: Patient/family/caregiver demonstrates understanding of disease process, treatment plan, medications, and discharge instructions  Description  Complete learning assessment and assess knowledge base    Interventions:  - Provide teaching at level of understanding  - Provide teaching via preferred learning methods  Outcome: Progressing

## 2020-07-06 NOTE — ANESTHESIA POSTPROCEDURE EVALUATION
Post-Op Assessment Note    CV Status:  Stable  Pain Score: 0    Pain management: adequate     Mental Status:  Sleepy and arousable   Hydration Status:  Stable and euvolemic   PONV Controlled:  Controlled   Airway Patency:  Patent   Post Op Vitals Reviewed: Yes      Staff: Anesthesiologist, CRNA   Comments: Report given to recovering RN, VSS, Pt resting comfortable          /51 (07/06/20 1537)    Temp 98 °F (36 7 °C) (07/06/20 1537)    Pulse 60 (07/06/20 1537)   Resp 16 (07/06/20 1537)    SpO2 98 % (07/06/20 1537)

## 2020-07-06 NOTE — INTERVAL H&P NOTE
H&P reviewed  After examining the patient I find no changes in the patients condition since the H&P had been written      Vitals:    07/06/20 0956   BP: 119/56   Pulse: 62   Resp: 16   Temp: 98 5 °F (36 9 °C)   SpO2: 95%

## 2020-07-06 NOTE — CONSULTS
Consultation - Nephrology   Boone Ruffin 69 y o  male MRN: [id number]  Unit/Bed#: [facility] 611-01 Encounter: [id number]    ASSESSMENT and PLAN:  Perioperative optimization to reduce incidence of acute kidney injury in the setting of suspect renal insufficiency with history of diabetes and hypertension  -last albumin creatinine ratio less than 4 8 on 05/16/2018  -A1c 5 9 on 06/16/2020  -after review medical records and through Care everywhere baseline creatinine between 1 0-1 3 dating back to 2018  With EGFR mainly mid 60 range, however intermittent decreased to 55  -will hold ACE-inhibitor for now  -continue with IV fluids  -avoid nephrotoxins/NSAIDs  -avoid hypotension to prevent decreased renal perfusion  -continue trend I/O, lab values volume status  -will check BMP, phosphorus and magnesium in a m   -need follow-up on discharge    Blood pressure:  -120/50's  -home medication includes Vasotec 10 mg daily-last dose this a m   -will hold further ACE-inhibitor  -maximize hemodynamics to maintain MAP >65  -avoid hypotension or fluctuations in blood pressure  -will continue to trend    H&H/anemia:  -current hemoglobin 14 3  -will continue to trend  -transfuse if hemoglobin less than 7 0    Diabetes:  Per primary team  -need adequate blood sugar control  -A1c 5 9 on 06/16/2020    Spondylolisthesis: S/P minimally invasive transverse  lumbar interbody fusion of L5-S1   -neurosurgery following        HISTORY OF PRESENT ILLNESS:  Requesting Physician: Gail Quezada MD  Reason for Consult: perioperative optimization to reduce incidence of acute kidney injury  Boone Ruffin is a 69 y o  male who has past medical history of hypertension, diabetes II with diabetic neuropathy, sleep apnea with CPAP use, gout, known hydrocephalus with shunt and follows neurosurgery, and spondylolisthesis with now presents for elective minimally invasive transfers lumbar interbody fusion of L5-S1     A renal consultation is requested today for assistance perioperative optimization to reduce incidence of acute kidney injury  After review medical records through Care everywhere, baseline creatinine 1 0-1 3 dating back to 2018  Most recently, EGFR decreased to 55 on 05/29/2020 however mainly runs 60s to 70s  He did undergo MRI of foot with and without contrast on 05/29/2020  On discussion, he reports taking his Vasotec this morning before surgery  He denies chest pain, shortness of breath, dizziness, lightheadedness, nausea, vomiting, urinary issues, fevers, chills, lower extremity edema, or ill contacts  He is unaware of having any kidney disease  He denies NSAID use    PAST MEDICAL HISTORY:  Past Medical History:   Diagnosis Date    Cancer 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     [facility]     DM[facility], [facility]     Gout     HTN (hypertension)     [facility]     Hypertension     Neuropathy     ZHOU on CPAP     Pressure injury of skin     TIA (transient ischemic attack)        PAST SURGICAL HISTORY:  Past Surgical History:   Procedure Laterality Date    CSF SHUNT      x3 Op Reports, Dr Irvin Cardoso, Hiren Patel in [facility] chart viewer   [facility]    Procedure: excision 5th metatarsal head   excision 5th metatarsal ulcer ;  Surgeon: Antonette Chua DPM;  Location: [facility];  Service: Podiatry       ALLERGIES:  Allergies   Allergen Reactions    Pollen Extract Allergic Rhinitis       SOCIAL HISTORY:  Social History     Substance and Sexual Activity   Alcohol Use Not Currently    Frequency: Monthly or less    Drinks per session: 1 or 2     Social History     Substance and Sexual Activity   Drug Use Never     Social History     Tobacco Use   Smoking Status Never Smoker   Smokeless Tobacco Never Used       FAMILY HISTORY:  Family History   Problem Relation Age of Onset    Polymyositis Mother     Heart failure Father MEDICATIONS:    Current Facility-Administered Medications:     acetaminophen (TYLENOL) tablet 975 mg, 975 mg, Oral, Q8H [address], Pauline Monroy PA-C    [START ON 7/7/2020] allopurinol (ZYLOPRIM) tablet 300 mg, 300 mg, Oral, Daily, Pauline Monroy PA-C    ceFAZolin (ANCEF) IVPB (premix) 2,000 mg 50 mL, 2,000 mg, Intravenous, Q8H, Pauline Monroy PA-C    docusate sodium (COLACE) capsule 100 mg, 100 mg, Oral, BID, Pauline Monroy PA-C    HYDROmorphone (DILAUDID) injection 0 2 mg, 0 2 mg, Intravenous, Q4H PRN, Pauline Monroy PA-C    insulin lispro (HumaLOG) 100 units/mL subcutaneous injection 2-12 Units, 2-12 Units, Subcutaneous, TID AC **AND** Fingerstick Glucose (POCT), , , TID AC, Pauline Monroy PA-C    lactated ringers infusion, 75 mL/hr, Intravenous, Continuous, Cleve Oliva MD    methocarbamol (ROBAXIN) tablet 500 mg, 500 mg, Oral, Q6H LEONORA, Pauline Monroy PA-C    ondansetron (ZOFRAN) injection 4 mg, 4 mg, Intravenous, Q6H PRN, Pauline Monroy PA-C    oxyCODONE (ROXICODONE) IR tablet 2 5 mg, 2 5 mg, Oral, Q4H PRN, Pauline Monroy PA-C    oxyCODONE (ROXICODONE) IR tablet 5 mg, 5 mg, Oral, Q4H PRN, Pauline Monroy PA-C    polyethylene glycol (MIRALAX) packet 17 g, 17 g, Oral, Daily PRN, Pauline Monroy PA-C    [START ON 7/7/2020] senna (SENOKOT) tablet 8 6 mg, 1 tablet, Oral, Daily, Pauline Monroy PA-C    sodium chloride 0 9 % infusion, 75 mL/hr, Intravenous, Continuous, Pauline Monroy PA-C, Last Rate: 75 mL/hr at 07/06/20 1614, 75 mL/hr at 07/06/20 1614    travoprost (TRAVATAN-Z) 0 004 % ophthalmic solution 1 drop, 1 drop, Right Eye, HS, Pauline Monroy PA-C      REVIEW OF SYSTEMS:  A complete 10 point review of systems was performed and found to be negative unless otherwise noted below or in the HPI  General: No fevers, chills, weakness  Cardiovascular:  Denies chest pain, shortness of breath, palpitations, or leg edema    Respiratory:  Denies cough, sputum production, shortness of breath  Gastrointestinal:  Denies nausea, vomiting, abdominal pain, diarrhea or constipation    Genitourinary: No dysuria, burning, hematuria or increased frequency or difficulty with stream     PHYSICAL EXAM:  Current Weight: Weight - Scale: 118 kg (260 lb)  First Weight: Weight - Scale: 118 kg (260 lb)  Vitals:    07/06/20 1537 07/06/20 1545 07/06/20 1600 07/06/20 1615   BP: 112/51 110/52 113/55 113/56   Pulse: 60 66 62 62   Resp: 16 22 18 19   Temp: 98 °F (36 7 °C)   97 6 °F (36 4 °C)   TempSrc:       SpO2: 98% 98% 99% 98%   Weight:       Height:           Intake/Output Summary (Last 24 hours) at 7/6/2020 1655  Last data filed at 7/6/2020 1614  Gross per 24 hour   Intake 1100 ml   Output 500 ml   Net 600 ml     General: conscious, coherent, cooperative, not in acute distres  Skin: no rash, warm and dry  Eyes: pale conjunctivae, anicteric sclerae  ENT:  Fairly dry lips and mucous membranes  Neck: supple, no JVD noted  Chest:  Essentially clear clear breath sounds  CVS: normal rate, regular rhythm without rub  Abdomen: soft, non-tender, non-distended, bowel sounds present  Extremities: no significant edema of both legs noted  Neuro: awake, alert, oriented  Psych: appropriate affect            Lab Results:       Other Studies:

## 2020-07-07 ENCOUNTER — APPOINTMENT (INPATIENT)
Dept: RADIOLOGY | Facility: HOSPITAL | Age: 70
DRG: 460 | End: 2020-07-07
Payer: COMMERCIAL

## 2020-07-07 LAB
ANION GAP SERPL CALCULATED.3IONS-SCNC: 5 MMOL/L (ref 4–13)
BUN SERPL-MCNC: 16 MG/DL (ref 5–25)
CALCIUM SERPL-MCNC: 8.4 MG/DL (ref 8.3–10.1)
CHLORIDE SERPL-SCNC: 109 MMOL/L (ref 100–108)
CO2 SERPL-SCNC: 24 MMOL/L (ref 21–32)
CREAT SERPL-MCNC: 0.99 MG/DL (ref 0.6–1.3)
ERYTHROCYTE [DISTWIDTH] IN BLOOD BY AUTOMATED COUNT: 14 % (ref 11.6–15.1)
GFR SERPL CREATININE-BSD FRML MDRD: 76 ML/MIN/1.73SQ M
GLUCOSE SERPL-MCNC: 111 MG/DL (ref 65–140)
GLUCOSE SERPL-MCNC: 119 MG/DL (ref 65–140)
GLUCOSE SERPL-MCNC: 122 MG/DL (ref 65–140)
GLUCOSE SERPL-MCNC: 123 MG/DL (ref 65–140)
GLUCOSE SERPL-MCNC: 132 MG/DL (ref 65–140)
HCT VFR BLD AUTO: 39.2 % (ref 36.5–49.3)
HGB BLD-MCNC: 13 G/DL (ref 12–17)
MAGNESIUM SERPL-MCNC: 2.1 MG/DL (ref 1.6–2.6)
MCH RBC QN AUTO: 28.7 PG (ref 26.8–34.3)
MCHC RBC AUTO-ENTMCNC: 33.2 G/DL (ref 31.4–37.4)
MCV RBC AUTO: 87 FL (ref 82–98)
PHOSPHATE SERPL-MCNC: 3.5 MG/DL (ref 2.3–4.1)
PLATELET # BLD AUTO: 172 THOUSANDS/UL (ref 149–390)
PMV BLD AUTO: 10.6 FL (ref 8.9–12.7)
POTASSIUM SERPL-SCNC: 4.1 MMOL/L (ref 3.5–5.3)
RBC # BLD AUTO: 4.53 MILLION/UL (ref 3.88–5.62)
SODIUM SERPL-SCNC: 138 MMOL/L (ref 136–145)
WBC # BLD AUTO: 14.22 THOUSAND/UL (ref 4.31–10.16)

## 2020-07-07 PROCEDURE — 83735 ASSAY OF MAGNESIUM: CPT | Performed by: NURSE PRACTITIONER

## 2020-07-07 PROCEDURE — 99233 SBSQ HOSP IP/OBS HIGH 50: CPT | Performed by: INTERNAL MEDICINE

## 2020-07-07 PROCEDURE — 97163 PT EVAL HIGH COMPLEX 45 MIN: CPT

## 2020-07-07 PROCEDURE — 97167 OT EVAL HIGH COMPLEX 60 MIN: CPT

## 2020-07-07 PROCEDURE — 85027 COMPLETE CBC AUTOMATED: CPT | Performed by: PHYSICIAN ASSISTANT

## 2020-07-07 PROCEDURE — 82948 REAGENT STRIP/BLOOD GLUCOSE: CPT

## 2020-07-07 PROCEDURE — 99222 1ST HOSP IP/OBS MODERATE 55: CPT | Performed by: INTERNAL MEDICINE

## 2020-07-07 PROCEDURE — 80048 BASIC METABOLIC PNL TOTAL CA: CPT | Performed by: PHYSICIAN ASSISTANT

## 2020-07-07 PROCEDURE — 84100 ASSAY OF PHOSPHORUS: CPT | Performed by: NURSE PRACTITIONER

## 2020-07-07 PROCEDURE — 72100 X-RAY EXAM L-S SPINE 2/3 VWS: CPT

## 2020-07-07 RX ORDER — HEPARIN SODIUM 5000 [USP'U]/ML
5000 INJECTION, SOLUTION INTRAVENOUS; SUBCUTANEOUS EVERY 8 HOURS SCHEDULED
Status: DISCONTINUED | OUTPATIENT
Start: 2020-07-07 | End: 2020-07-10 | Stop reason: HOSPADM

## 2020-07-07 RX ADMIN — DOCUSATE SODIUM 100 MG: 100 CAPSULE, LIQUID FILLED ORAL at 09:32

## 2020-07-07 RX ADMIN — ACETAMINOPHEN 975 MG: 325 TABLET, FILM COATED ORAL at 21:35

## 2020-07-07 RX ADMIN — METHOCARBAMOL 500 MG: 500 TABLET, FILM COATED ORAL at 20:31

## 2020-07-07 RX ADMIN — METHOCARBAMOL 500 MG: 500 TABLET, FILM COATED ORAL at 14:23

## 2020-07-07 RX ADMIN — SODIUM CHLORIDE 75 ML/HR: 0.9 INJECTION, SOLUTION INTRAVENOUS at 03:59

## 2020-07-07 RX ADMIN — ACETAMINOPHEN 975 MG: 325 TABLET, FILM COATED ORAL at 05:23

## 2020-07-07 RX ADMIN — OXYCODONE HYDROCHLORIDE 5 MG: 5 TABLET ORAL at 21:36

## 2020-07-07 RX ADMIN — STANDARDIZED SENNA CONCENTRATE 8.6 MG: 8.6 TABLET ORAL at 09:32

## 2020-07-07 RX ADMIN — HEPARIN SODIUM 5000 UNITS: 5000 INJECTION INTRAVENOUS; SUBCUTANEOUS at 21:35

## 2020-07-07 RX ADMIN — METHOCARBAMOL 500 MG: 500 TABLET, FILM COATED ORAL at 00:59

## 2020-07-07 RX ADMIN — METHOCARBAMOL 500 MG: 500 TABLET, FILM COATED ORAL at 05:23

## 2020-07-07 RX ADMIN — ALLOPURINOL 300 MG: 300 TABLET ORAL at 09:32

## 2020-07-07 RX ADMIN — POLYETHYLENE GLYCOL 3350 17 G: 17 POWDER, FOR SOLUTION ORAL at 21:36

## 2020-07-07 RX ADMIN — TRAVOPROST 1 DROP: 0.04 SOLUTION/ DROPS OPHTHALMIC at 21:36

## 2020-07-07 RX ADMIN — ACETAMINOPHEN 975 MG: 325 TABLET, FILM COATED ORAL at 14:23

## 2020-07-07 RX ADMIN — CEFAZOLIN SODIUM 2000 MG: 2 SOLUTION INTRAVENOUS at 03:54

## 2020-07-07 RX ADMIN — CEFAZOLIN SODIUM 2000 MG: 2 SOLUTION INTRAVENOUS at 10:46

## 2020-07-07 NOTE — ASSESSMENT & PLAN NOTE
Lab Results   Component Value Date    HGBA1C 5 9 (H) 06/16/2020       Recent Labs     07/06/20  0911 07/06/20  1550 07/07/20  0754   POCGLU 100 144* 122       Blood Sugar Average: Last 72 hrs:  (P) 122     On ISS

## 2020-07-07 NOTE — PROGRESS NOTES
NEPHROLOGY PROGRESS NOTE   Leo Campbell 69 y o  male MRN: [id number]  Unit/Bed#: [facility] 611-01 Encounter: [id number]      ASSESSMENT & PLAN    1  Stage II chronic kidney disease  o Now with good urine output no Griffith catheterization  o No recent renal imaging but stable creatinine  o Plan:  Blood pressures are acceptable postoperative continue to hold enalapril  o Agree with discontinuing intravenous  2  Electrolytes:  o Stable  3  Acid/Base:  o No anion gap bicarb acceptable  4  BP/HR:  o Hypertension-Blood pressures are at goal from a renal standpoint continue to hold enalapril  5  Volume Status  o Euvolemic given breathing comfortably  6   Clinical Course/Health Maintanance/Risk Reduction  o Status post  shunt, status post L5 S1 fusion per neurosurgery    SUBJECTIVE:    Patient was seen sitting he was having some difficulties urinating but now urinating well  Denies any pain no shortness of breath    OBJECTIVE:  Current Weight: Weight - Scale: 133 kg (293 lb 4 8 oz)  Vitals:    07/07/20 1126   BP: 121/62   Pulse: 61   Resp: 17   Temp: 98 2 °F (36 8 °C)   SpO2: 96%       Intake/Output Summary (Last 24 hours) at 7/7/2020 1209  Last data filed at 7/7/2020 4319  Gross per 24 hour   Intake 2480 ml   Output 1654 ml   Net 826 ml     Weight (last 2 days)     Date/Time   Weight    07/07/20 0600   133 (293 3)    07/06/20 0956   118 (260)              General: conscious, cooperative, in not acute distress, had surgical site stable appropriately dressed  Eyes: conjunctivae pink, anicteric sclerae  ENT: lips and mucous membranes moist  Neck: supple, no JVD  Chest: clear breath sounds bilateral, no crackles, ronchus or wheezings  CVS: distinct S1 & S2, normal rate, regular rhythm  Abdomen: soft, non-tender, non-distended, normoactive bowel sounds  Extremities: no edema of both legs  Skin: no rash  Neuro: awake, alert, oriented      Medications:    Current Facility-Administered Medications:     acetaminophen (TYLENOL) tablet 975 mg, 975 mg, Oral, Q8H [address], Pauline N Ekaterinaifereese, PA-C, 975 mg at 07/07/20 0523    allopurinol (ZYLOPRIM) tablet 300 mg, 300 mg, Oral, Daily, Paulinesarahy Monroy, PA-C, 300 mg at 07/07/20 0932    docusate sodium (COLACE) capsule 100 mg, 100 mg, Oral, BID, Pauline Monroy, PA-C, 100 mg at 07/07/20 0932    heparin (porcine) subcutaneous injection 5,000 Units, 5,000 Units, Subcutaneous, Q8H LEONORA, Pauline Monroy, PA-C    insulin lispro (HumaLOG) 100 units/mL subcutaneous injection 2-12 Units, 2-12 Units, Subcutaneous, TID AC **AND** Fingerstick Glucose (POCT), , , TID AC, Pauline Monroy PA-C    methocarbamol (ROBAXIN) tablet 500 mg, 500 mg, Oral, Q6H [address], Pauline N Ekaterinaifereese, PA-C, 500 mg at 07/07/20 0523    ondansetron (ZOFRAN) injection 4 mg, 4 mg, Intravenous, Q6H PRN, Pauline Monroy PA-C    oxyCODONE (ROXICODONE) IR tablet 2 5 mg, 2 5 mg, Oral, Q4H PRN, Pauline Monroy, PA-C    oxyCODONE (ROXICODONE) IR tablet 5 mg, 5 mg, Oral, Q4H PRN, Pauline Monroy, PA-C, 5 mg at 07/06/20 1943    polyethylene glycol (MIRALAX) packet 17 g, 17 g, Oral, Daily PRN, Pauline Monroy PA-C    senna (SENOKOT) tablet 8 6 mg, 1 tablet, Oral, Daily, Pauline Monroy, PA-C, 8 6 mg at 07/07/20 0932    travoprost (TRAVATAN-Z) 0 004 % ophthalmic solution 1 drop, 1 drop, Right Eye, HS, Pauline Monroy PA-C    Invasive Devices:      Lab Results:   Results from last 7 days   Lab Units 07/07/20  0530 07/07/20  0511   WBC Thousand/uL  --  14 22*   HEMOGLOBIN g/dL  --  13 0   HEMATOCRIT %  --  39 2   PLATELETS Thousands/uL  --  172   POTASSIUM mmol/L 4 1  --    CHLORIDE mmol/L 109*  --    CO2 mmol/L 24  --    BUN mg/dL 16  --    CREATININE mg/dL 0 99  --    CALCIUM mg/dL 8 4  --    MAGNESIUM mg/dL  --  2 1   PHOSPHORUS mg/dL  --  3 5       Previous work up:  Please see previous notes

## 2020-07-07 NOTE — PROGRESS NOTES
Progress Note - Aba Li 1950, 69 y o  male MRN: [id number]    Unit/Bed#: [facility] 611-01 Encounter: [id number]    Primary Care Provider: Minerva Nobles   Date and time admitted to hospital: 7/6/2020  8:31 AM        * Spondylolisthesis of lumbosacral region  Assessment & Plan  POD#1 L5-S1 TLIF    Imaging reviewed personally and with attending, results are as follows:   Upright XR lumbar spine ordered and pending to assess spinal hardware    Plan:   Pain control - well tolerated on current regimen  o Tylenol 975mg [id number]   o Robaxin 500mg [id number] for muscle spasm  o Oxycodone 2 5mg and 5mg [id number] as needed for moderate and severe pain  o Will discontinue IV pain meds   Kae regimen   o Continue senna, colace, and PRN miralax   Will stop fluids as patient tolerating PO diet   Geriatric consult pending   DVT ppx:  SCDs, will start heparin this evening   Mobilize as tolerated with assistance, PT / OT evaluation pending    Neurosurgery will continue to follow  Patient is not yet stable for discharge  Please call with questions or concerns      Status post ventriculoperitoneal shunt  Assessment & Plan  POD#1 revision of scalp over ventricular catheter  · Initial VPS placed on 9/26/2005  · Externalization and wound debridement on 10/29/2005  · New  shunt placement with reprogrammable shunt on 12/22/2005  · Shunt revision on 08/03/2011  · Per [facility] office notes, shunt has been dialed up to 120cm water, no additional adjustments have been made since then      Impaired functional mobility, balance, gait, and endurance  Assessment & Plan  See plan above    Normal pressure hydrocephalus ([address] )  Assessment & Plan  See plan above, shunt in place    Type 2 diabetes mellitus [facility])  Assessment & Plan  Lab Results   Component Value Date    HGBA1C 5 9 (H) 06/16/2020       Recent Labs     07/06/20  0911 07/06/20  1550 07/07/20  0754   POCGLU 100 144* 122       Blood Sugar Average: Last 72 hrs:  (P) 122     On ISS    Unspecified abnormalities of gait and mobility  Assessment & Plan  See plan above    Subjective/Objective   Chief Complaint: "I feel with my fingers"    Subjective:  Patient is in a good mood, making jokes during exam   States he is doing well today with minimal back pain  Moved to the chair this morning with therapy  Denies any new numbness / tingling / weakness from his baseline  Eating well but states he wants to lose some weight  Denies headaches, dizziness, CP, SOB, abdominal pain, N/V     Nursing rounds completed with Selene Pham - no significant overnight events noted  Objective:  Sitting in chair, NAD    I/O       07/05 0701 - 07/06 0700 07/06 0701 - 07/07 0700 07/07 0701 - 07/08 0700    P  O    480    I V  (mL/kg)  1500 (11 3)     IV Piggyback  500     Total Intake(mL/kg)  2000 (15) 480 (3 6)    Urine (mL/kg/hr)  1204 300 (0 6)    Blood  150     Total Output  1354 300    Net  +646 +180           Unmeasured Urine Occurrence   1 x          Invasive Devices     Peripheral Intravenous Line            Peripheral IV 07/06/20 Right Hand 1 day    Peripheral IV 07/06/20 Left Arm less than 1 day                Physical Exam:  Vitals: Blood pressure 121/63, pulse (!) 53, temperature 98 1 °F (36 7 °C), resp  rate 16, height 6' 3" (1 905 m), weight 133 kg (293 lb 4 8 oz), SpO2 96 %  ,Body mass index is 36 66 kg/m²        General appearance: alert, appears stated age, cooperative and no distress  Head: Normocephalic, without obvious abnormality, dressing on scalp intact  Eyes:  Conjugate gaze  Neck: supple, symmetrical, trachea midline and NT  Back: dressing over incision intact with strike through on the right side of dressing  Lungs: non labored breathing  Heart: regular heart rate  Neurologic:   Mental status: Alert, oriented x 3, follows commands, thought content appropriate  Cranial nerves: grossly intact (Cranial nerves II-XII)  Sensory: decreased sensation to LT BLE distal to the knee which is chronic from diabetes, JPS 3/3 bilateral ankle, DST not intact distal BLE  Motor: moving all extremities without focal weakness, strength 5/5 throughout  Reflexes: not well appreciated bilateral knees given previous knee replacements, no curiel or clonus noted  Coordination: finger to nose normal bilaterally, no drift bilaterally      Lab Results:  Results from last 7 days   Lab Units 07/07/20  0511   WBC Thousand/uL 14 22*   HEMOGLOBIN g/dL 13 0   HEMATOCRIT % 39 2   PLATELETS Thousands/uL 172     Results from last 7 days   Lab Units 07/07/20  0530   POTASSIUM mmol/L 4 1   CHLORIDE mmol/L 109*   CO2 mmol/L 24   BUN mg/dL 16   CREATININE mg/dL 0 99   CALCIUM mg/dL 8 4     Results from last 7 days   Lab Units 07/07/20  0511   MAGNESIUM mg/dL 2 1     Results from last 7 days   Lab Units 07/07/20  0511   PHOSPHORUS mg/dL 3 5       Imaging Studies: I have personally reviewed pertinent reports  and I have personally reviewed pertinent films in PACS    Xr Spine Lumbar 2 Or 3 Views Injury    Result Date: 7/7/2020  Impression: Fluoroscopic guidance provided for surgical procedure  Please refer to the separate procedure notes for additional details  Workstation performed: [id number]       EKG, Pathology, and Other Studies: I have personally reviewed pertinent reports        VTE Pharmacologic Prophylaxis: Heparin - later this afternoon    VTE Mechanical Prophylaxis: sequential compression device

## 2020-07-07 NOTE — CONSULTS
Consultation - Geriatric Medicine   Aba Li 69 y o  male MRN: [id number]  Unit/Bed#: [facility] 611-01 Encounter: [id number]      Assessment/Plan     1) Spondylolisthesis of L5-S1  -S/P Minimally Invasive Transverse Lumbar Interbody Fusion of L5-S1; POD #1  -Reports pain is currently controlled on current regimen  -Current pain regimen: scheduled Tylenol 975mg q8hrs, Robaxin 500mg q6hrs  -PRN:  Roxicodone 2 5mg q4hrs for moderate pain, Roxicodone 5mg q4hrs for severe pain, and Dilaudid 0 2mg q4hrs for breakthrough pain  -PT/OT evaluation pending  -Primary team: Neurosurgery      2) Normal Pressure Hydrocephalus   -S/P  shunt placement in 2005 with repositioning   -Follows with Neurosurgery; Dr Ana Cristina Cooper outpaitent  -Hx of adjustments due to worsening gait instability and urinary incontinence       3) Ambulatory dysfunction  -Hx of about two falls per year for "many years"  -Does not use any assistive devices on a regular basis but reports having canes and a walker at home and in his car as needed   -Has a First Alert   Discussed the importance of using assistive devices on a regular basis due to neuropathy and hydrocephalus   -Recommend PT/OT evaluation and may benefit from STR upon discharge    3) Concern for Memory/Cognitive Decline  -Alert and oriented to person, place, and year upon exam  -MOCA performed showed score of 22 indicating mild cognitive impairment   -Will need a follow up [facility] outpatient        4)  Frailty:  -Clinical Frailty score of 4-5: Vulnerable to Mildly Frail  -Patient is able to shop, feed, dress, shower, drive, handle finances on his own but has his friend cook for him and drive him to a few doctor's appointments  -Due to hx of falls and gait issues, patient does not use assistive devices but is strongly recommended to use on regular basis       5) Urinary Incontinence  -Likely secondary to NPH  -Pt states he frequently has issues getting to the bathroom in time  -Does not utilize any adult diapers  -Recommend outpatient Urology      5) Vision and Hearing Impairment:   -Uses glasses on a regular basis with regular visits to ophthalmologist for vision exams  -No change in prescription upon last visit last week  -No use of hearing aides but states his friends have noted he has worsening hearing at times  -Recommend out patient hearing exam   -Patient states he is worried financially regarding the payment if hearing aides needed, may need CM assistance  6) HTN  PTA medication:  Enalapril 10mg q daily  Continue to hold per Nephrology    7) Type 2 DM  Most recent A1C from 6/16/20: 5 9  -PTA: Lantus 10 units AM and 12Units PM  -Care per primary team    8) ZHOU with CPAP    9)Home Medication Review  Confirmed with patient and [id number]    -Allopurinol 300mg qdaily  - mg q daily  -Lantus 10 units AM and 12Units PM  -Travopost opth  Solution  -Enalapril 10mg q daily  -Tramadol 50mg q6hrs PRN        iADL's:  Shopping: patient, Food: patient shops for [employer] and [employer], Housekeeping: Patient, Laundry: patient, Transport: patient and at times friends drives him to doctors visits, Medications: Patient handles on own, Finances: Patient handles    History of Present Illness   Physician Requesting Consult: Pranav Ryder MD  Reason for Consult / Principal Problem: Concern for cognitive decline   Hx and PE limited by: none  Additional history obtained from: patient      HPI: John Nichole is a 69y o  year old male with history of HTN, Type 2DM with neuropathy, ZHOU with CPAP, gout, known Normal Pressure Hydrocephalus S/P  shunt placement in 2005 and signficant Spondylolisthesis of L5-S1  who is S/P Invasive Transverse Lumbar Interbody fusion of L5-S1, POD #1  Geriatrics was consulted due to concern for cognitive decline and history of multiple falls due to balance issues  Patient lives in a home with his friend, Anjelica Lewis who is his close friend for over 21 years   It is a two story home with 13 steps up to the top floor where his bedroom is located  Dimas eBrgeron does the majority of cooking but the patient is able to do is own housekeeping, shopping, finances, dressing, and medications  Patient also drives on his own, no history of any car accidents or difficulty finding his way around town but at times his friend will drive him to doctor visits  In regards to history of falls, he states that he falls about twice a year for "many years" which he states is due to his neuropathy  His most recent fall was last week at his home in which he has fixing the slats of his bed and he suddenly fell between his bed and his dresser  He denies any head injury, LOC, or other lesions  He states he tends to feel off balance when he pivots or turns quickly but if he falls he tries to turn his body to fall onto his bottom and protect his head due to his shunt  His worst injury was about 3 years ago when he was in [place] where he fell backwards after closing the trunk of his car  He fell backwards and after MRI was done, he had broken his 7th Vertebrae and had to use a back brace for about 6 months  No significant falls since then  He denies using any assistive devices regularly for ambulation  When he is home he tends to walk fine on his own, but he does have a cane on each floor of his home and in the trunk of his car if needed  He also has a walker with a seat at his home and in the trunk of his car if needed, but does not feel as though he needs to use any assistance regularly despite the insistence of his PCP and Neurology  Per EMR, patient was seen by neurology in May and at the time of the visit he was reporting "attacks of his legs giving out" on him spontaneously but resolves after he sits and rests for about 5 minutes  He uses glasses on a regular basis and sees his optometrist 4 times a year, most recent visit last week with no change in prescription   He does not use any hearing aides but he states his friends tend to think he has problem hearing at times  Patient has a history of ZHOU with use of CPAP nightly  In addition, patient does have urinary incontinence in which he states he has struggled with for a few years in which he states at times he may not have time to make it to the bathroom but does not use any adult diapers      -Patient states his PCP is Dr Thea Goodman of [facility] and sees him about 4 times a year  Inpatient consult to Gerontology     Performed by  Johan Dee, DO     Authorized by [facility]              Review of Systems   Constitutional: Negative for appetite change, chills, fatigue and fever  HENT: Negative for congestion, rhinorrhea, sinus pain and trouble swallowing  Eyes: Negative for photophobia, pain and visual disturbance  Respiratory: Negative for cough, chest tightness, shortness of breath and wheezing  Cardiovascular: Negative for chest pain, palpitations and leg swelling  Gastrointestinal: Negative for abdominal pain  Genitourinary: Positive for frequency and urgency  Negative for decreased urine volume, difficulty urinating, dysuria and hematuria  Musculoskeletal: Positive for back pain and gait problem  Negative for joint swelling and neck pain  Skin: Negative for rash and wound  Neurological: Positive for dizziness (occasional ), light-headedness (on occasionally but not currently) and numbness (LE due to neuropathy from feet to below knees bilaterally)  Negative for speech difficulty and headaches  Psychiatric/Behavioral: The patient is not nervous/anxious          Historical Information   Past Medical History:   Diagnosis Date    Cancer [facility]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     [facility]     DM2 (di[facility] mellitus, type 2) [facility] [address] [facility]     Gout     HTN (hypertension)     [facility]     Hypertension     Neuropathy     ZHOU on CPAP     Pressure injury of skin     TIA (transient ischemic attack)      Past Surgical History:   Procedure Laterality Date    CSF SHUNT      x3 Op Reports, Dr Krysta Wood in [facility] chart viewer    [address] Left 5/30/2020    Procedure: excision 5th metatarsal head  excision 5th metatarsal ulcer ;  Surgeon: Rajan Greco DPM;  Location: [facility];  Service: Podiatry     Social History   Social History     Substance and Sexual Activity   Alcohol Use Not Currently    Frequency: Monthly or less    Drinks per session: 1 or 2     Social History     Substance and Sexual Activity   Drug Use Never     Social History     Tobacco Use   Smoking Status Never Smoker   Smokeless Tobacco Never Used     Family History:   Family History   Problem Relation Age of Onset    Polymyositis Mother     Heart failure Father        Meds/Allergies   all current active meds have been reviewed, current meds:   Current Facility-Administered Medications   Medication Dose Route Frequency    acetaminophen (TYLENOL) tablet 975 mg  975 mg Oral Q8H [address]    allopurinol (ZYLOPRIM) tablet 300 mg  300 mg Oral Daily    docusate sodium (COLACE) capsule 100 mg  100 mg Oral BID    heparin (porcine) subcutaneous injection 5,000 Units  5,000 Units Subcutaneous Q8H [address]    insulin lispro (HumaLOG) 100 units/mL subcutaneous injection 2-12 Units  2-12 Units Subcutaneous TID AC    methocarbamol (ROBAXIN) tablet 500 mg  500 mg Oral Q6H [address]    ondansetron (ZOFRAN) injection 4 mg  4 mg Intravenous Q6H PRN    oxyCODONE (ROXICODONE) IR tablet 2 5 mg  2 5 mg Oral Q4H PRN    oxyCODONE (ROXICODONE) IR tablet 5 mg  5 mg Oral Q4H PRN    polyethylene glycol (MIRALAX) packet 17 g  17 g Oral Daily PRN    senna (SENOKOT) tablet 8 6 mg  1 tablet Oral Daily    travoprost (TRAVATAN-Z) 0 004 % ophthalmic solution 1 drop  1 drop Right Eye HS    and PTA meds:   Prior to Admission Medications   Prescriptions Last Dose Informant Patient Reported? Taking?    JOSELYN SAMPSON 100 units/mL injection pen 2020 at 0630  No Yes   Sig: Take 10 units and AM and 12 units before bedtime   Lancets (ONETOUCH DELICA PLUS [id number]) MISC   Yes No   Si (two) times a day   ONE TOUCH ULTRA TEST test strip   Yes No   Sig: daily   allopurinol (ZYLOPRIM) 300 mg tablet 2020 at 0530  Yes Yes   Si mg daily   aspirin 325 mg tablet 2020 at 0800  No Yes   Sig: Take 1 tablet (325 mg total) by mouth daily   enalapril (VASOTEC) 10 mg tablet 2020 at 0530  Yes No   Sig: 10 mg daily   multivitamin (THERAGRAN) TABS 2020 at 0900  Yes Yes   Sig: Take 1 tablet by mouth daily   travoprost (Travatan Z) 0 004 % ophthalmic solution 2020 at 2000  Yes Yes   Sig: Administer 1 drop to the right eye daily at bedtime       Facility-Administered Medications: None       Allergies   Allergen Reactions    Pollen Extract Allergic Rhinitis       Objective     Intake/Output Summary (Last 24 hours) at 2020 0836  Last data filed at 2020 0601  Gross per 24 hour   Intake 2000 ml   Output 1354 ml   Net 646 ml     Invasive Devices     Peripheral Intravenous Line            Peripheral IV 20 Left Arm less than 1 day    Peripheral IV 20 Right Hand less than 1 day                Physical Exam   Constitutional: He is oriented to person, place, and time  He appears well-developed and well-nourished  No distress  HENT:   Right Ear: External ear normal    Left Ear: External ear normal    Nose: Nose normal    Mouth/Throat: Oropharynx is clear and moist    Bandage placed over left side of head   Eyes: Pupils are equal, round, and reactive to light  EOM are normal    Neck: Neck supple  Cardiovascular: Normal rate and regular rhythm  Pulmonary/Chest: Effort normal and breath sounds normal  No respiratory distress  He has no wheezes  He has no rales  Abdominal: Soft  Bowel sounds are normal  He exhibits no distension  There is no tenderness  Musculoskeletal: He exhibits no edema or tenderness  Lymphadenopathy:     He has no cervical adenopathy  Neurological: He is alert and oriented to person, place, and time  A sensory deficit (decreased in LE bilaterally from feet to below knees) is present  No cranial nerve deficit  Skin: Skin is warm and dry  He is not diaphoretic  No erythema  Psychiatric: He has a normal mood and affect  His behavior is normal  Judgment and thought content normal    Vitals reviewed        Lab Results:   Lab Results   Component Value Date    WBC 14 22 (H) 07/07/2020    HGB 13 0 07/07/2020    HCT 39 2 07/07/2020    MCV 87 07/07/2020     07/07/2020     Lab Results   Component Value Date    SODIUM 138 07/07/2020    K 4 1 07/07/2020     (H) 07/07/2020    CO2 24 07/07/2020    BUN 16 07/07/2020    CREATININE 0 99 07/07/2020    GLUC 132 07/07/2020    CALCIUM 8 4 07/07/2020       Imaging:  XR lumbar spine 2 or 3 views    (Results Pending)       Therapies:   PT: Pending Evaluation  OT: Pending Evaluation    VTE Prophylaxis: Reason for no pharmacologic prophylaxis recent surgery    Code Status: Prior  Advance Directive and Living Will:      Power of :    POLST:      Family and Social Support: Radha Carvalho   Living Arrangements: Friends  Support Systems: Family members  Assistance Needed: Wheelchair  Type of Current Residence: Private residence  Current Home Care Services: No      Goals of Care: Full Code, hopes to be able to ambulate without pain         Julito Russell DO PGY-2  Herman Moore

## 2020-07-07 NOTE — PHYSICAL THERAPY NOTE
PHYSICAL THERAPY EVALUATION          Patient Name: Aba Li  DGMOV'R Date: 7/7/2020 07/07/20 0930   Note Type   Note type Eval only   Pain Assessment   Pain Assessment Tool 0-10   Pain Score 5   Pain Location/Orientation Orientation: Lower; Location: Back   Pain Onset/Description Onset: Ongoing; Descriptor: Sore   Patient's Stated Pain Goal No pain   Hospital Pain Intervention(s) Repositioned; Ambulation/increased activity   Multiple Pain Sites Yes  (Head (incisional))   Home Living   Type of Home House   Home Layout Two level;Stairs to enter with rails;Bed/bath upstairs  (3 SOBIA; unable to have first floor setup)   Bathroom Shower/Tub Tub/shower unit   Bathroom Toilet Standard   Bathroom Equipment Grab bars in shower   [address] Other (Comment); Life alert  (Rollator)   Prior Function   Level of Anabel Independent with ADLs and functional mobility   Lives With Friend(s)  (Pt lives with friend João Rizzo who works 6-8 hours/day)   Receives Help From Friend(s); Neighbor  (Pt receives help from brayan who is RN)   [id number] in the last 6 months 1 to 4  (2)   Vocational Retired   Comments Pt ambulated with rollator PTA  Pt + home alone while João Rizzo is at work; Pt reports João Rizzo is unable to help him physically  Restrictions/Precautions   Weight Bearing Precautions Per Order No   Braces or Orthoses MAFO  (Pt ambulates short distances w/o; no brace per neurosx)   Other Precautions Chair Alarm;Multiple lines; Fall Risk;Pain;Spinal precautions; Visual impairment  (+ glasses)   General   Additional Pertinent History Pt prefers to be called "Ethan"    Family/Caregiver Present No   Cognition   Overall Cognitive Status WFL   Arousal/Participation Alert   Orientation Level Oriented X4   Memory Decreased recall of recent events;Decreased recall of precautions   Following Commands Follows all commands and directions without difficulty   RLE Assessment   RLE Assessment WFL  (ROM WFL)   Strength RLE   RLE Overall Strength 3+/5   LLE Assessment   LLE Assessment WFL  (ROM WFL)   Strength LLE   LLE Overall Strength 3+/5   Bed Mobility   Rolling R 3  Moderate assistance   Additional items Assist x 2;Bedrails; Increased time required;LE management;Verbal cues   Supine to Sit 3  Moderate assistance   Additional items Assist x 2;Bedrails; Increased time required;Verbal cues;LE management   Transfers   Sit to Stand 3  Moderate assistance   Additional items Assist x 2; Increased time required;Verbal cues   Stand to Sit 3  Moderate assistance   Additional items Assist x 2; Increased time required;Verbal cues;Armrests   Additional Comments Transfers performed using RW    Ambulation/Elevation   Gait pattern R Knee Jannette;L Knee Jannette;Decreased foot clearance; Excessively slow   Gait Assistance 3  Moderate assist   Additional items Assist x 2;Verbal cues; Tactile cues   Assistive Device Rolling walker   Distance 3 ft to chair   Stair Management Assistance Not tested   Balance   Static Sitting Fair -   Static Standing Poor   Ambulatory Poor   Endurance Deficit   Endurance Deficit Yes   Endurance Deficit Description Deteriorating posture; BLE buckling   Activity Tolerance   Activity Tolerance Patient limited by fatigue;Patient limited by pain   Medical Staff Made Aware Pt ok to mobilize per nsg   Nurse Made Aware Bree RN   Assessment   Prognosis Fair   Problem List Decreased strength;Decreased endurance; Impaired balance;Decreased mobility;Obesity;Orthopedic restrictions;Pain   Assessment Pt is a 69 y o  Male presenting to [facility] with an admitting diagnosis of spondylolisthesis of lumbosacral region and other active problems including Impaired functional mobility, balance, gait, and endurance   On 7/6/20, pt underwent MINIMALLY INVASIVE TRANSVERSE LUMBAR INTERBODY FUSION L5-S1 FROM LEFT-SIDED APPROACH (N/A) and REVISION OF SCALP OVER VENTRICULAR CATHETER IN THE RIGHT CORONAL REGION (Right)  Pt's PMH affecting evaluation includes cancer, T2DM, gout, HTN, hydrocephalus, HTN, neuropathy, ZHOU on CPAP, hx of TIA and personal factors including being home alone for most of the day while caregiver is at work and inability to have first floor set up with steps to negotiate at home  Pt seen for high complexity PT eval due to ongoing medical management of admitting diagnosis, s/p surgical intervention, ongoing pulse oximetry monitoring, lumbar spine precautions, ongoing use of MAFO, increased reliance on more restrictive device, fall risk and pain  Upon eval, pt was resting in bed  Pt required Mod Ax2 for bed mobility, transfers and ambulation using RW  Based on eval, pt's current list of problems includes decreased b/l LE strength, decreased endurance, impaired balance, decreased mobility, spinal precautions and pain  PT will continue to follow pt for remainder hospital stay to address these impairments  At conclusion of session, pt was left up in chair with all needs within reach  PT currently recommending rehab at D/C due to decreased caregiver support and inaccessible home environment  Barriers to Discharge Decreased caregiver support; Inaccessible home environment   Goals   Patient Goals To go home   STG Expiration Date 07/17/20   Short Term Goal #1 In 10 days, pt will  [place] 1) perform all aspects of bed mobility with supervision in order to reduce caregiver burden  2) perform transfers with Min Ax1 in order to increase functional independence  3) ambulate 100 ft with Min Ax1 and least restrictive assistive device in order to access community environment  4) ascend/descend 3 steps with Min Ax1 in order to enter home safely  5) increase b/l LE strength by 1/2 grade in order to perform transfers with greater ease   6) improve ambulatory balance by 1 grade in order to reduce risk of falls  Plan   Treatment/Interventions Functional transfer training;LE strengthening/ROM; Elevations; Therapeutic exercise; Endurance training;Patient/family training;Equipment eval/education; Bed mobility;Gait training;Spoke to nursing;OT   PT Frequency Other (Comment)  (3-6x/wk)   Recommendation   PT Discharge Recommendation Post-Acute Rehabilitation Services   Equipment Recommended Walker  (RW)   PT - OK to Discharge Yes  (rehab when medically cleared)   Modified Gray Scale   Modified Yonny Scale 4   Barthel Index   Feeding 10   Bathing 0   Grooming Score 5   Dressing Score 5   Bladder Score 10   Bowels Score 10   Toilet Use Score 5   Transfers (Bed/Chair) Score 5   Mobility (Level Surface) Score 0   Stairs Score 0   Barthel Index Score 50     Ingrid Dunn, SPT

## 2020-07-07 NOTE — ASSESSMENT & PLAN NOTE
POD#1 revision of scalp over ventricular catheter  · Initial VPS placed on 9/26/2005  · Externalization and wound debridement on 10/29/2005  · New  shunt placement with reprogrammable shunt on 12/22/2005  · Shunt revision on 08/03/2011  · Per [address] [place] [address] office notes, shunt has been dialed up to 120cm water, no additional adjustments have been made since then

## 2020-07-07 NOTE — OCCUPATIONAL THERAPY NOTE
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     [facility]     DM2 (diabetes mellitus, type 2) ([facility]     Gout     HTN (hypertension)     Hydrocephalus [facility]     Hypertension     Neuropathy     ZHOU on CPAP     Pressure injury of skin     TIA (transient ischemic attack)      Past Surgical History  Past Surgical History:   Procedure Laterality Date    CSF SHUNT      x3 Op Reports, Sailaja Perez in [facility] chart viewer    [address] Left 5/30/2020    Procedure: excision 5th metatarsal head  excision 5th metatarsal ulcer ;  Surgeon: Alison Andrews DPM;  Location: [facility];  Service: Podiatry    NJ ARTHDSIS POST/POSTEROLATRL/POSTINTERBODY LUMBAR N/A 7/6/2020    Procedure: MINIMALLY INVASIVE TRANSVERSE LUMBAR INTERBODY FUSION L5-S1 FROM LEFT-SIDED APPROACH;  Surgeon: Chacho Connor MD;  Location: [facility];  Service: Neurosurgery    NJ REPLACEMENT/REVISION,CSF SHUNT Right 7/6/2020    Procedure: REVISION OF SCALP OVER VENTRICULAR CATHETER IN THE RIGHT CORONAL REGION;  Surgeon: Chacho Connor MD;  [facility];  Service: Neurosurgery      07/07/20 0931   Note Type   Note type Eval only   Restrictions/Precautions   Weight Bearing Precautions Per Order No   Braces or Orthoses MAFO  (MAFO on L leg, pt reports not wearing during short distances)   Other Precautions Chair Alarm; Bed Alarm;Spinal precautions;Pain; Fall Risk  (No brace per neurosurgery, Chair alarm on at end of session)   Pain Assessment   Pain Assessment Tool 0-10   Pain Score 5   Pain Location/Orientation Orientation: Lower; Location: Back   Hospital Pain Intervention(s) Repositioned; Ambulation/increased activity; Emotional support   Home Living   Type of [facility] Two level;Stairs to enter with rails;Bed/bath upstairs  (3 SOBIA, declines being able to have a 1FL set up)   Bathroom Shower/Tub Tub/shower unit   Bathroom Toilet Standard   Bathroom Equipment Grab bars in shower;Grab bars around toilet   Home Equipment   (Rollator, Life alery, C-PAP)   Prior Function   Level of [place] Independent with ADLs and functional mobility   Lives With Friend(s)  (Friend Shelby Tabares, reports friend works 6-8 hr/day)   Receives Help From [facility])  Rosalva Hannah; reports neighbor is a RN that can assist when needed)   [id number] in the last 6 months 1 to 4  (falls 2x a month)   Vocational Retired   Comments Pt reports that Shelby Tabares is able to assist when he is not working but mentioned that Shelby Tabares will not be able to physically help him due to weight  Lifestyle   Autonomy PTA, pt reports being I in ADLs, IADLs and functional mobility   Reciprocal Relationships His friend, Shelby Tabares   Service to Others Retired   [employer] work   Subjective   Subjective "When vic is at work I am alone, but I recently got a life alert"   ADL   Where Assessed Chair   Eating Assistance 5  Supervision/Setup   Grooming Assistance 5  Supervision/Setup   UB Bathing Assistance 4  Minimal Assistance   LB [address] 2  Maximal Assistance   [address] 4  [address] 2  Maximal [address]  2  Maximal Assistance   Bed Mobility   Rolling R 3  Moderate assistance   Additional items Assist x 2; Increased time required;LE management;Verbal cues   Supine to Sit 3  Moderate assistance   Additional items Assist x 1;Increased time required;LE management;Verbal cues; Bedrails   Transfers   Sit to Stand 3  Moderate assistance   Additional items Assist x 2; Increased time required;Verbal cues   Stand to Sit 3  Moderate assistance   Additional items Assist x 2; Increased time required;Verbal cues   Functional Mobility   Functional Mobility 3  Moderate assistance   Additional Comments Mod Ax2    Additional items Rolling walker   Balance   Static Sitting Fair -   Dynamic Sitting Poor +   Static Standing Poor   Dynamic Standing Poor -   Ambulatory Poor   Activity Tolerance   Activity Tolerance Patient limited by fatigue;Patient limited by pain   Medical Staff Made Aware PT Nahid Zambrano, SPT Jonh Franco, OT Aakash   Nurse Made Aware Spoke with TRACIE Combs Assessment   RUE Assessment WFL   LUE Assessment   LUE Assessment WFL   Hand Function   Gross Motor Coordination Functional   Fine Motor Coordination Functional   Cognition   Overall Cognitive Status WFL   Arousal/Participation Cooperative   Attention Within functional limits   Orientation Level Oriented X4   Memory Decreased recall of recent events;Decreased recall of precautions   Following Commands Follows all commands and directions without difficulty   Comments Pt able to recall 2/3 precautions at end of session, pt re-educated on precautions with visual cue of precautions on board   Assessment   Limitation Decreased ADL status; Decreased Safe judgement during ADL;Decreased endurance;Decreased self-care trans;Decreased high-level ADLs   Prognosis Good   Assessment Pt is a 70 y/o M admitted to [facility] on 7/6/20 with Spondylolisthesis of lumbosacral region s/p minimally invasive transverse lumbar interbody fusion L5-S1 and revision of scalp over ventricular catheter in R coronal region  Pt PMH includes s/p ventriculoperitoneal shunt (4/21/20), abnormalities of gait and mobility, DM2, and normal pressure hydrocephalus  Active OT order and OOB orders  Pt currently has spinal precautions   PTA, pt reports being I in ADLs, IADLs and uses a rollator during functional mobility  Pt is currently Min A with UB ADLs and Max A for LB ADLs  Pt requires MOD Ax2 for transfers and functional mobility with use of RW  Pt is limited at this time due to activity tolerance, pain, endurance, decreased I in ADLs and IADLs, and unsupportive home environment  These impair baseline function in grooming, bathing, toilet hygiene, dressing, health maintenance, functional mobility, and household maintenance  OT educated pt on spinal precautions  From OT standpoint, d/c to short term rehab pending on progress  OT will continue to follow 3-5x in the next 7-10 days in order to meet the following goals  Goals   Patient Goals Return home   LTG Time Frame 7-10   Recommendation   OT Discharge Recommendation Post-Acute Rehabilitation Services   OT - OK to Discharge Yes   Modified Yonny Scale   Modified Yonny Scale 4     GOALS    1  Pt will complete UB ADL tasks with MOD I with DME PRN    2  Pt will complete LB ADL tasks with MOD I with DME PRN    3  Pt will complete IADL tasks with MOD I with DME PRN    4  Pt will have G carryover of spinal precautions during ADL/IADL tasks and functional mobility    5  Pt will have G carryover of safety awareness during ADL/IADL tasks and functional mobility    6  Pt will complete transfers with MOD I with DME PRN    7   Pt will complete functional mobility with MOD I with DME PRN    STANISLAV Lopez

## 2020-07-07 NOTE — SOCIAL WORK
CM met with patient and explained CM role  Patient lives in a 2 fl 3 mikey home w/ his friend Roldan Blount  Patient IPTA, drives and uses a RW to ambulate  Patient owns 3 canes, and a rollator  Patient states he has grab bars in his shower  Patient denies STR, MH and drugs/etoh in patient hx  Patient has had [facility]  Patient states he has advance directives set up and his brother Erin Mott and friend Roldan Blount will execute his wishes  Roldan Blount can be reached at [phone]  PCP is Dr Omar Jimenes  [facility]  CM reviewed d/c planning process including the following: identifying help at home, patient preference for d/c planning needs, Discharge Lounge, [facility] Meds to Bed program, availability of treatment team to discuss questions or concerns patient and/or family may have regarding understanding medications and recognizing signs and symptoms once discharged  CM also encouraged patient to follow up with all recommended appointments after discharge  Patient advised of importance for patient and family to participate in managing patients medical well being

## 2020-07-07 NOTE — UTILIZATION REVIEW
Initial Clinical Review    Elective Inpatient surgical procedure  Age/Sex: 69 y o  male  Surgery Date: 7/6  Procedure: S/P MINIMALLY INVASIVE TRANSVERSE LUMBAR INTERBODY FUSION L5-S1 FROM LEFT-SIDED APPROACH (N/A)  REVISION OF SCALP OVER VENTRICULAR CATHETER IN THE RIGHT CORONAL REGION (Right)  Anesthesia: General    7/7 POD#1 Progress Note: POD#1 L5-S1 TLIF  Pain control  Upright XR lumbar spine ordered and pending to assess spinal hardware  Geriatric consult pending  Admission Orders: Date/Time/Statement: Admission Orders (From admission, onward)     Ordered        07/06/20 1126  Inpatient Admission  Once                   Orders Placed This Encounter   Procedures    Inpatient Admission     Standing Status:   Standing     Number of Occurrences:   1     Order Specific Question:   Admitting Physician     Answer: Lisandro Ojeda [[id number]]     Order Specific Question:   Level of Care     Answer:   Med Surg [[id number]]     Order Specific Question:   Estimated length of stay     Answer:   More than 2 Midnights     Order Specific Question:   Certification     Answer:   I certify that inpatient services are medically necessary for this patient for a duration of greater than two midnights  See H&P and MD Progress Notes for additional information about the patient's course of treatment       Vital Signs: /62   Pulse 61   Temp 98 2 °F (36 8 °C)   Resp 17   Ht 6' 3" (1 905 m)   Wt 133 kg (293 lb 4 8 oz)   SpO2 96%   BMI 36 66 kg/m²      Diet: Consistent Carb  Mobility: OOB  DVT Prophylaxis: SCD    Medications/Pain Control:   Scheduled Medications:  Medications:  acetaminophen 975 mg Oral Q8H [facility]   allopurinol 300 mg Oral Daily   docusate sodium 100 mg Oral BID   heparin (porcine) 5,000 Units Subcutaneous Q8H [facility]   insulin lispro 2-12 Units Subcutaneous TID AC   methocarbamol 500 mg Oral Q6H [facility]ON[facility]   senna 1 tablet Oral Daily   travoprost 1 drop Right Eye HS     Continuous IV Infusions:     PRN Meds:  ondansetron 4 mg Intravenous Q6H PRN   oxyCODONE 2 5 mg Oral Q4H PRN   oxyCODONE 5 mg Oral Q4H PRN 7/6 x1   polyethylene glycol 17 g Oral Daily PRN     Network Utilization Review Department  [email or web address]  ATTENTION: Please call with any questions or concerns to [phone] and carefully listen to the prompts so that you are directed to the right person  All voicemails are confidential   Rubi Fajardo all requests for admission clinical reviews, approved or denied determinations and any other requests to dedicated fax number below belonging to the campus where the patient is receiving treatment   List of dedicated fax numbers for the Facilities:  [address] DENIALS (Administrative/Medical Necessity) [phone]   [address] (Maternity/NICU/Pediatrics) [phone]   Bess Stewart [phone]   Gretta Baez [phone]   Gerardo Fung [phone]   Gerri Nyhan [phone]   [address] [place] [address] [phone]   [facility]  [phone]   [address] [place] [address], S W  24[address] [facility] 1000 [address] [facility] [phone]

## 2020-07-07 NOTE — PLAN OF CARE
Problem: OCCUPATIONAL THERAPY ADULT  Goal: Performs self-care activities at highest level of function for planned discharge setting  See evaluation for individualized goals  Description             See flowsheet documentation for full assessment, interventions and recommendations  Note:   Limitation: Decreased ADL status, Decreased Safe judgement during ADL, Decreased endurance, Decreased self-care trans, Decreased high-level ADLs  Prognosis: Good  Assessment: Pt is a 68 y/o M admitted to [facility] on 7/6/20 with Spondylolisthesis of lumbosacral region s/p minimally invasive transverse lumbar interbody fusion L5-S1 and revision of scalp over ventricular catheter in R coronal region  Pt PMH includes s/p ventriculoperitoneal shunt (4/21/20), abnormalities of gait and mobility, DM2, and normal pressure hydrocephalus  Active OT order and OOB orders  Pt currently has spinal precautions  PTA, pt reports being I in ADLs, IADLs and uses a rollator during functional mobility  Pt is currently Min A with UB ADLs and Max A for LB ADLs  Pt requires MOD Ax2 for transfers and functional mobility with use of RW  Pt is limited at this time due to activity tolerance, pain, endurance, decreased I in ADLs and IADLs, and unsupportive home environment  These impair baseline function in grooming, bathing, toilet hygiene, dressing, health maintenance, functional mobility, and household maintenance  OT educated pt on spinal precautions  From OT standpoint, d/c to short term rehab pending on progress  OT will continue to follow 3-5x in the next 7-10 days in order to meet the following goals  OT Discharge Recommendation: Post-Acute Rehabilitation Services  OT - OK to Discharge:  Yes

## 2020-07-07 NOTE — ASSESSMENT & PLAN NOTE
POD#1 L5-S1 TLIF    Imaging reviewed personally and with attending, results are as follows:   Upright XR lumbar spine ordered and pending to assess spinal hardware    Plan:   Pain control - well tolerated on current regimen  o Tylenol 975mg [id number]   o Robaxin 500mg a0xeeux for muscle spasm  o Oxycodone 2 5mg and 5mg m0oxdpw as needed for moderate and severe pain  o Will discontinue IV pain meds   Kae regimen   o Continue senna, colace, and PRN miralax   Will stop fluids as patient tolerating PO diet   Geriatric consult pending   DVT ppx:  SCDs, will start heparin this evening   Mobilize as tolerated with assistance, PT / OT evaluation pending    Neurosurgery will continue to follow  Patient is not yet stable for discharge  Please call with questions or concerns

## 2020-07-07 NOTE — SOCIAL WORK
Patient reviewed  Patient is here with Spondylolisthesis of lumbosacral region  Patient is POD 1 from L5-S1 fusion  PT/OT evaluation pending  CM will continue to follow for recommendations  Update:    PT/OT recommending rehab  Patient refusing in patient and is requesting home therapies w/ [facility] Patient states his friend Lillian Nix will be home with him for most of the day  Referral made in [place]  Patient is charted as an assist of 2  Update:    Patient now agreeable to rehab, choices provided to patient patient only chose [facility]   PMR consulted

## 2020-07-08 LAB
GLUCOSE SERPL-MCNC: 110 MG/DL (ref 65–140)
GLUCOSE SERPL-MCNC: 116 MG/DL (ref 65–140)
GLUCOSE SERPL-MCNC: 124 MG/DL (ref 65–140)
SARS-COV-2 RNA RESP QL NAA+PROBE: NEGATIVE

## 2020-07-08 PROCEDURE — 82948 REAGENT STRIP/BLOOD GLUCOSE: CPT

## 2020-07-08 PROCEDURE — 97116 GAIT TRAINING THERAPY: CPT

## 2020-07-08 PROCEDURE — 97535 SELF CARE MNGMENT TRAINING: CPT

## 2020-07-08 PROCEDURE — 99233 SBSQ HOSP IP/OBS HIGH 50: CPT | Performed by: INTERNAL MEDICINE

## 2020-07-08 PROCEDURE — 99223 1ST HOSP IP/OBS HIGH 75: CPT | Performed by: PHYSICAL MEDICINE & REHABILITATION

## 2020-07-08 PROCEDURE — U0003 INFECTIOUS AGENT DETECTION BY NUCLEIC ACID (DNA OR RNA); SEVERE ACUTE RESPIRATORY SYNDROME CORONAVIRUS 2 (SARS-COV-2) (CORONAVIRUS DISEASE [COVID-19]), AMPLIFIED PROBE TECHNIQUE, MAKING USE OF HIGH THROUGHPUT TECHNOLOGIES AS DESCRIBED BY CMS-2020-01-R: HCPCS | Performed by: PHYSICIAN ASSISTANT

## 2020-07-08 RX ORDER — LANOLIN ALCOHOL/MO/W.PET/CERES
6 CREAM (GRAM) TOPICAL
Status: DISCONTINUED | OUTPATIENT
Start: 2020-07-08 | End: 2020-07-10 | Stop reason: HOSPADM

## 2020-07-08 RX ADMIN — OXYCODONE HYDROCHLORIDE 5 MG: 5 TABLET ORAL at 16:26

## 2020-07-08 RX ADMIN — ACETAMINOPHEN 975 MG: 325 TABLET, FILM COATED ORAL at 05:00

## 2020-07-08 RX ADMIN — MELATONIN 6 MG: at 21:39

## 2020-07-08 RX ADMIN — OXYCODONE HYDROCHLORIDE 5 MG: 5 TABLET ORAL at 04:55

## 2020-07-08 RX ADMIN — TRAVOPROST 1 DROP: 0.04 SOLUTION/ DROPS OPHTHALMIC at 21:19

## 2020-07-08 RX ADMIN — OXYCODONE HYDROCHLORIDE 5 MG: 5 TABLET ORAL at 20:15

## 2020-07-08 RX ADMIN — HEPARIN SODIUM 5000 UNITS: 5000 INJECTION INTRAVENOUS; SUBCUTANEOUS at 13:01

## 2020-07-08 RX ADMIN — STANDARDIZED SENNA CONCENTRATE 8.6 MG: 8.6 TABLET ORAL at 08:06

## 2020-07-08 RX ADMIN — OXYCODONE HYDROCHLORIDE 5 MG: 5 TABLET ORAL at 12:30

## 2020-07-08 RX ADMIN — ACETAMINOPHEN 975 MG: 325 TABLET, FILM COATED ORAL at 21:19

## 2020-07-08 RX ADMIN — DOCUSATE SODIUM 100 MG: 100 CAPSULE, LIQUID FILLED ORAL at 17:33

## 2020-07-08 RX ADMIN — METHOCARBAMOL 500 MG: 500 TABLET, FILM COATED ORAL at 17:33

## 2020-07-08 RX ADMIN — METHOCARBAMOL 500 MG: 500 TABLET, FILM COATED ORAL at 12:30

## 2020-07-08 RX ADMIN — DOCUSATE SODIUM 100 MG: 100 CAPSULE, LIQUID FILLED ORAL at 08:06

## 2020-07-08 RX ADMIN — HEPARIN SODIUM 5000 UNITS: 5000 INJECTION INTRAVENOUS; SUBCUTANEOUS at 21:19

## 2020-07-08 RX ADMIN — ALLOPURINOL 300 MG: 300 TABLET ORAL at 08:06

## 2020-07-08 RX ADMIN — ACETAMINOPHEN 975 MG: 325 TABLET, FILM COATED ORAL at 13:02

## 2020-07-08 RX ADMIN — HEPARIN SODIUM 5000 UNITS: 5000 INJECTION INTRAVENOUS; SUBCUTANEOUS at 05:00

## 2020-07-08 RX ADMIN — METHOCARBAMOL 500 MG: 500 TABLET, FILM COATED ORAL at 01:59

## 2020-07-08 RX ADMIN — METHOCARBAMOL 500 MG: 500 TABLET, FILM COATED ORAL at 05:00

## 2020-07-08 NOTE — ASSESSMENT & PLAN NOTE
Lab Results   Component Value Date    HGBA1C 5 9 (H) 06/16/2020       Recent Labs     07/07/20  1703 07/07/20  2125 07/08/20  0720 07/08/20  1120   POCGLU 119 111 124 116       Blood Sugar Average: Last 72 hrs:  (P) 119 875     On ISS

## 2020-07-08 NOTE — PLAN OF CARE
Problem: PHYSICAL THERAPY ADULT  Goal: Performs mobility at highest level of function for planned discharge setting  See evaluation for individualized goals  Description  Treatment/Interventions: Functional transfer training, LE strengthening/ROM, Elevations, Therapeutic exercise, Endurance training, Patient/family training, Equipment eval/education, Bed mobility, Gait training, Spoke to nursing, OT  Equipment Recommended: Walker(RW)       See flowsheet documentation for full assessment, interventions and recommendations  Outcome: Progressing  Note:   Prognosis: Good  Problem List: Decreased strength, Impaired balance, Decreased endurance, Decreased range of motion, Decreased mobility, Pain  Assessment: Patient seated in recliner upon arrival, MAFO donned prior to ambualtion  Pt requires Mod A x2 for sit to stand transfers, once standing pt is able to maintain upright balance with B/L LE non buckling  Ambulates 30' with RW and MIn A x1 with one seated rest break, pt has no LOB and is porgressing well, pt returned to room via recliner remained seated in recliner at end of session, doffed MAFO at end of session  Pt will continue to benenfit from skilled PT to increase strength, endurance and balance to maximize safe fucntional mobility  Barriers to Discharge: Inaccessible home environment, Decreased caregiver support     PT Discharge Recommendation: [address]     PT - OK to Discharge: Yes(to STR when medically ready)    See flowsheet documentation for full assessment

## 2020-07-08 NOTE — PROGRESS NOTES
Progress Note - John Nichole 1950, 69 y o  male MRN: [id number]    Unit/Bed#: [facility] 611-01 Encounter: [id number]    Primary Care Provider: Winter Graves   Date and time admitted to hospital: 7/6/2020  8:31 AM        * Spondylolisthesis of lumbosacral region  Assessment & Plan  POD#2 L5-S1 TLIF    Imaging reviewed personally and with attending, results are as follows:   Upright XR lumbar spine 7/7/2020:  Status post TLIF at L5-S1  No complication of the hardware  Plan:   Pain control - well tolerated on current regimen  o Tylenol 975mg [id number]   o Robaxin 500mg [id number] for muscle spasm  o Oxycodone 2 5mg and 5mg [id number] as needed for moderate and severe pain  o Will order heating pad   Kae regimen - passing gas but no bowel movement  o Continue senna, colace, and PRN miralax   Geriatric following, appreciate input - fall risk paperwork in patient's chart, okay to go to rehab when able   [facility] following, appreciate input - continue to hold ACEI, cleared to go to rehab when able   DVT ppx:  SCDs, heparin   Mobilize as tolerated with assistance, PT / OT evaluation recommending rehab, PMR states patient is good ARC candidate  o CM working on placement to [facility] as this is the only place patient would like to go    Neurosurgery will continue to follow  Patient is stable for discharge to rehab, pending acceptance to the [facility]  Please call with questions or concerns      Status post ventriculoperitoneal shunt  Assessment & Plan  POD#2 revision of scalp over ventricular catheter  · Initial VPS placed on 9/26/2005  · Externalization and wound debridement on 10/29/2005  · New  shunt placement with reprogrammable shunt on 12/22/2005  · Shunt revision on 08/03/2011  · Per [facility] office notes, shunt has been dialed up to 120cm water, no additional adjustments have been made since then      Impaired functional mobility, balance, gait, and endurance  Assessment & Plan  See plan above    Normal pressure hydrocephalus [facility])  Assessment & Plan  See plan above, shunt in place    Type 2 diabetes mellitus [facility])  Assessment & Plan  Lab Results   Component Value Date    HGBA1C 5 9 (H) 06/16/2020       Recent Labs     07/07/20  1703 07/07/20  2125 07/08/20  0720 07/08/20  1120   POCGLU 119 111 124 116       Blood Sugar Average: Last 72 hrs:  (P) 119 875     On ISS    Unspecified abnormalities of gait and mobility  Assessment & Plan  See plan above      Subjective/Objective   Chief Complaint: "I am doing well"    Subjective:  Patient states he is doing well today  Complains of 5/10 incisional back pain  Also has some muscular pain on the right side  Did not sleep well last night secondary to pain and does admit to taking some oxycodone  Urinating per baseline  No bowel movement but is passing gas  No news numbness/tingling/weakness, headache, dizziness, chest pain, shortness of breath, abdominal pain, nausea or vomiting, bowel or bladder issues  Nursing rounds completed with Juana Eid - no significant overnight events  Objective:  Sitting in chair, watching TV, NAD    I/O       07/06 0701 - 07/07 0700 07/07 0701 - 07/08 0700 07/08 0701 - 07/09 0700    P  O   1182 480    I V  (mL/kg) 1500 (11 3) 450 (3 4)     IV Piggyback 500      Total Intake(mL/kg) 2000 (15) 1632 (12 3) 480 (3 6)    Urine (mL/kg/hr) 1204 2360 (0 7) 300 (0 5)    Blood 150      Total Output 1354 2360 300    Net +646 -728 +180           Unmeasured Urine Occurrence  1 x           Invasive Devices     Peripheral Intravenous Line            Peripheral IV 07/06/20 Left Arm 2 days    Peripheral IV 07/06/20 Right Hand 2 days                Physical Exam:  Vitals: Blood pressure 136/66, pulse 61, temperature 98 2 °F (36 8 °C), resp  rate 16, height 6' 3" (1 905 m), weight 133 kg (293 lb 4 8 oz), SpO2 94 %  ,Body mass index is 36 66 kg/m²        General appearance: alert, appears stated age, cooperative and no distress  Head: Normocephalic, without obvious abnormality, dressing on head removed and left to air, some dried blood noted but incision intact  Eyes: conjugate gaze  Neck: supple, symmetrical, trachea midline  Back: incision with some steri-strips off, some bloody discharge on gaze but not actively bleeding, new dressing applied with gauze and tegederm  Lungs: non labored breathing  Heart: regular heart rate  Neurologic:   Mental status: Alert, oriented x 3, follows commands, thought content appropriate  Cranial nerves: grossly intact (Cranial nerves II-XII)  Sensory:  Decreased sensation to light touch distal to the knee bilaterally, JPS in DST not intact distal bilateral lower extremities  Motor: moving all extremities without focal weakness, 5/5 BUE, 4+/5 BLE  Reflexes:  No Jensen or clonus noted  Coordination: finger to nose normal bilaterally, no drift bilaterally      Lab Results:  Results from last 7 days   Lab Units 07/07/20  0511   WBC Thousand/uL 14 22*   HEMOGLOBIN g/dL 13 0   HEMATOCRIT % 39 2   PLATELETS Thousands/uL 172     Results from last 7 days   Lab Units 07/07/20  0530   POTASSIUM mmol/L 4 1   CHLORIDE mmol/L 109*   CO2 mmol/L 24   BUN mg/dL 16   CREATININE mg/dL 0 99   CALCIUM mg/dL 8 4     Results from last 7 days   Lab Units 07/07/20  0511   MAGNESIUM mg/dL 2 1     Results from last 7 days   Lab Units 07/07/20  0511   PHOSPHORUS mg/dL 3 5       Imaging Studies: I have personally reviewed pertinent reports  and I have personally reviewed pertinent films in PACS    Xr Lumbar Spine 2 Or 3 Views    Result Date: 7/8/2020  Impression: Status post TLIF at L5-S1  No complication of the hardware  Multilevel lumbar spondylosis and L5-S1 spondylolisthesis  Ventriculoperitoneal catheter is incompletely assessed on this study  Workstation performed: [id number]     Xr Spine Lumbar 2 Or 3 Views Injury    Result Date: 7/7/2020  Impression: Fluoroscopic guidance provided for surgical procedure    Please refer to the separate procedure notes for additional details  Workstation performed: [id number]       EKG, Pathology, and Other Studies: I have personally reviewed pertinent reports        VTE Pharmacologic Prophylaxis: Heparin    VTE Mechanical Prophylaxis: sequential compression device

## 2020-07-08 NOTE — PHYSICAL THERAPY NOTE
Physical Therapy Progress Note     07/08/20 1048   Pain Assessment   Pain Assessment Tool 0-10   Pain Score 8   Pain Location/Orientation Orientation: Bilateral;Location: Neck   Hospital Pain Intervention(s) Medication (See MAR); Ambulation/increased activity;Repositioned   Restrictions/Precautions   Weight Bearing Precautions Per Order No   Braces or Orthoses MAFO   Other Precautions Chair Alarm; Bed Alarm;Pain; Fall Risk;Spinal precautions   General   Chart Reviewed Yes   Response to Previous Treatment Patient with no complaints from previous session  Family/Caregiver Present No   Cognition   Overall Cognitive Status WFL   Arousal/Participation Alert; Responsive; Cooperative   Attention Within functional limits   Orientation Level Oriented X4   Memory Decreased recall of precautions;Decreased recall of recent events   Transfers   Sit to Stand 3  Moderate assistance   Additional items Assist x 2; Increased time required;Verbal cues;Armrests   Stand to Sit 3  Moderate assistance   Additional items Assist x 1; Increased time required;Verbal cues;Armrests   Ambulation/Elevation   Gait pattern Improper Weight shift;Decreased foot clearance; Excessively slow; Shuffling; Short stride   Gait Assistance 4  Minimal assist   Additional items Assist x 1;Verbal cues; Tactile cues   Assistive Device Rolling walker   Distance 30'   Endurance Deficit   Endurance Deficit Yes   Activity Tolerance   Activity Tolerance Patient limited by fatigue   Nurse Made Aware RN ok to see   Assessment   Prognosis Good   Problem List Decreased strength; Impaired balance;Decreased endurance;Decreased range of motion;Decreased mobility;Pain   Assessment Patient seated in recliner upon arrival, MAFO donned prior to ambualtion  Pt requires Mod A x2 for sit to stand transfers, once standing pt is able to maintain upright balance with B/L LE non buckling   Ambulates 30' with RW and MIn A x1 with one seated rest break, pt has no LOB and is porgressing well, pt returned to room via recliner remained seated in recliner at end of session, doffed MAFO at end of session  Pt will continue to benenfit from skilled PT to increase strength, endurance and balance to maximize safe fucntional mobility  Barriers to Discharge Inaccessible home environment;Decreased caregiver support   Goals   Patient Goals to go to rehab   STG Expiration Date 07/17/20   Short Term Goal #1 In 10 days, pt will  Liam Arreaga 1) perform all aspects of bed mobility with supervision in order to reduce caregiver burden  2) perform transfers with Min Ax1 in order to increase functional independence  3) ambulate 100 ft with Min Ax1 and least restrictive assistive device in order to access community environment  4) ascend/descend 3 steps with Min Ax1 in order to enter home safely  5) increase b/l LE strength by 1/2 grade in order to perform transfers with greater ease  6) improve ambulatory balance by 1 grade in order to reduce risk of falls  Plan   Treatment/Interventions Functional transfer training;LE strengthening/ROM; Therapeutic exercise; Endurance training;Bed mobility;Gait training   Progress Progressing toward goals   PT Frequency   (3-6x/week)   Recommendation   PT Discharge Recommendation Post-Acute Rehabilitation Services   Equipment Recommended Walker   PT - OK to Discharge Yes  (to STR when medically ready)     Diane Summers, PTA

## 2020-07-08 NOTE — PLAN OF CARE
Problem: OCCUPATIONAL THERAPY ADULT  Goal: Performs self-care activities at highest level of function for planned discharge setting  See evaluation for individualized goals  Description             See flowsheet documentation for full assessment, interventions and recommendations  Outcome: Progressing  Note:   Limitation: Decreased ADL status, Decreased Safe judgement during ADL, Decreased endurance, Decreased self-care trans, Decreased high-level ADLs  Prognosis: Good  Assessment: pt participated in am ot session and was seen focusing on lb dressing incuding socks shoes and mafo  pt requried max asst to lionel doff b socks shoes and l mafo  pt reports sitting eob usually to lionel same  pt now with spinal precautions limiting reachability  pt requries min asst ub adls and max asst ;lb  pt is incontinent at times of urine and needs max asst clothing management in stance  pt was able to stand with asst x 2 from armchair (taller) and walk short distance with rw and min asst x 1   pt incrased from mod asst x 1 and min of another for sit to stand when fatigued to needing mod asst x 2 for sit to stand  pt performed 3 sit to from stands from armchair and walked x2 short distance while wearing shoes / brace  OT Discharge Recommendation: Post-Acute Rehabilitation Services  OT - OK to Discharge:  Yes     WARD Browning

## 2020-07-08 NOTE — SOCIAL WORK
Patient reviewed in care rounds  Patient is here with Spondylolisthesis of lumbosacral region  Patient is POD 2 from L5-S1 fusion  PT/OT evaluation recommending rehab  Patient agreeable to [facility] only at this time  ARC pending at this time  PMR consult pending  CM will continue to follow for recommendations

## 2020-07-08 NOTE — PROGRESS NOTES
69year-old postop day 2 from a transverse lumbar interbody fusion and deformity correction at L5-S1    In general his pain is well controlled  He complains of pain in his ribs secondary to movement in bed  He says that this has happened to him before  There is no burning quality to the pain and so I do not suspect shingles  Dressings are in place  His scalp dressing remains in place  Recommendations:  -Increased ambulation  -Likely will require some rehabilitation  -he has stairs at home and will be necessary for him to be able to traverse these to go to the bathroom    The ability to safely traversed stairs will be a goal of this physical therapy and rehabilitation

## 2020-07-08 NOTE — OCCUPATIONAL THERAPY NOTE
Occupational Therapy Treatment Note:       07/08/20 1050   Restrictions/Precautions   Braces or Orthoses MAFO   Other Precautions Chair Alarm; Fall Risk   Pain Assessment   Pain Assessment Tool FLACC   Pain Rating: FLACC (Rest) - Face 0   Pain Rating: FLACC (Rest) - Legs 0   Pain Rating: FLACC (Rest) - Activity 0   Pain Rating: FLACC (Rest) - Cry 0   Pain Rating: FLACC (Rest) - Consolability 0   Score: FLACC (Rest) 0   Pain Rating: FLACC (Activity) - Face 1   Pain Rating: FLACC (Activity) - Legs 0   Pain Rating: FLACC (Activity) - Activity 0   Pain Rating: FLACC (Activity) - Cry 0   Pain Rating: FLACC (Activity) - Consolability 0   Score: FLACC (Activity) 1   ADL   UB Dressing Assistance 4  Minimal Assistance   LB Dressing Assistance 2  Maximal Assistance   LB Dressing Comments to ilonel b shoes, mafo and socks while seated on chair  Toileting Assistance  2  Maximal Assistance   Functional Standing Tolerance   Time   (poor plus balance in stance for adls)   Transfers   Sit to Stand 3  Moderate assistance   Additional items Assist x 2   Functional Mobility   Functional Mobility 4  Minimal assistance   Additional items Rolling walker   Cognition   Overall Cognitive Status WFL   Arousal/Participation Alert   Activity Tolerance   Activity Tolerance Patient tolerated treatment well   Assessment   Assessment pt participated in am ot session and was seen focusing on lb dressing incuding socks shoes and mafo  pt requried max asst to lionel doff b socks shoes and l mafo  pt reports sitting eob usually to lionel same  pt now with spinal precautions limiting reachability  pt requries min asst ub adls and max asst ;lb  pt is incontinent at times of urine and needs max asst clothing management in stance   pt was able to stand with asst x 2 from armchair (taller) and walk short distance with rw and min asst x 1   pt incrased from mod asst x 1 and min of another for sit to stand when fatigued to needing mod asst x 2 for sit to stand  pt performed 3 sit to from stands from armchair and walked x2 short distance while wearing shoes / brace  Plan   Treatment Interventions ADL retraining;Functional transfer training;UE strengthening/ROM; Endurance training;Patient/family training;Equipment evaluation/education; Activityengagement   OT Treatment Day   (1)   OT Frequency 3-5x/wk   Recommendation   OT Discharge Recommendation Post-Acute Rehabilitation Services   OT - OK to Discharge Yes   April WARD Powell

## 2020-07-08 NOTE — ASSESSMENT & PLAN NOTE
POD#2 L5-S1 TLIF    Imaging reviewed personally and with attending, results are as follows:   Upright XR lumbar spine 7/7/2020:  Status post TLIF at L5-S1  No complication of the hardware  Plan:   Pain control - well tolerated on current regimen  o Tylenol 975mg [id number]   o Robaxin 500mg [id number] for muscle spasm  o Oxycodone 2 5mg and 5mg [id number] as needed for moderate and severe pain  o Will order heating pad   Kae regimen - passing gas but no bowel movement  o Continue senna, colace, and PRN miralax   Geriatric following, appreciate input - fall risk paperwork in patient's chart, okay to go to rehab when able  [facility] following, appreciate input - continue to hold ACEI, cleared to go to rehab when able   DVT ppx:  SCDs, heparin   Mobilize as tolerated with assistance, PT / OT evaluation recommending rehab, PMR states patient is good ARC candidate  o CM working on placement to [facility] as this is the only place patient would like to go    Neurosurgery will continue to follow  Patient is stable for discharge to rehab, pending acceptance to the [facility]  Please call with questions or concerns

## 2020-07-08 NOTE — PROGRESS NOTES
Progress note- Nephrology   Joon Forbes 69 y o  male MRN: [id number]  Unit/Bed#: [facility] 611-01 Encounter: [id number]    ASSESSMENT and PLAN:  Perioperative optimization to reduce incidence of acute kidney injury in the setting of Chronic Kidney Disease  II with history of diabetes and hypertension  -last albumin creatinine ratio less than 4 8 on 05/16/2018  -A1c 5 9 on 06/16/2020  -after review medical records and through Care everywhere baseline creatinine between 1 0-1 3 dating back to 2018  With EGFR mainly mid 60 range, however intermittent decreased to 55  -Enalapril remains on hold-continue to hold for now  -most recent creatinine 0 99, most recent BP 130s/60s  -avoid nephrotoxins/NSAIDs  -avoid hypotension to prevent decreased renal perfusion  -continue trend I/O, lab values volume status  -okay from renal standpoint for discharge to rehab medically ready  -will need follow-up on discharge     Blood pressure:   BP acceptable 130s/60's and at goal  -remains-off ACE-inhibitor  -home medication includes Vasotec 10 mg daily-currently on hold  -maximize hemodynamics to maintain MAP >65  -avoid hypotension or fluctuations in blood pressure  -will continue to trend     H&H/anemia:  Stable  -current hemoglobin 13 0  -will continue to trend  -transfuse if hemoglobin less than 7 0     Diabetes:  Per primary team  -need adequate blood sugar control  -A1c 5 9 on 06/16/2020     Spondylolisthesis: S/P minimally invasive transverse  lumbar interbody fusion of L5-S1  5/6/2020  -neurosurgery following    Volume status:  Patient examining euvolemic  -no respiratory distress        Disposition:  Renal function stable from a nephrology standpoint  Okay for discharge when medically ready    SUBJECTIVE:  Patient seen examined  Denies chest pain shortness of breath  Seen while working with physical therapy    Patient anticipating discharge to rehab to assist with ambulation    OBJECTIVE:  Current Weight: Weight - Scale: 133 kg (293 lb 4 8 oz)  Vitals:    07/07/20 1452 07/07/20 1927 07/08/20 0010 07/08/20 0646   BP: 135/69 133/63 149/75 136/66   Pulse: 62 63 60 61   Resp: 16  20 16   Temp: 98 3 °F (36 8 °C)   98 2 °F (36 8 °C)   TempSrc:       SpO2: 97% 96% 100% 94%   Weight:       Height:           Intake/Output Summary (Last 24 hours) at 7/8/2020 1155  Last data filed at 7/8/2020 1100  Gross per 24 hour   Intake 1632 ml   Output 2360 ml   Net -728 ml     General:  No acute distress, out of bed, cooperative  Skin:  Warm and dry without rash  ENMT:  Mucous membranes moist, sclera anicteric, tongue is midline  Neck:  Supple without JVD  Respiratory:  Essentially clear on auscultation without crackles, rhonchi, wheezes  Cardiac:  Regular rate and rhythm without rub  GI:  Soft, nontender, no distention, active bowel sounds  Extremities:  No significant edema noted bilaterally  Neuro:  Alert oriented and awake, no gross deficits  Psych:  Appropriate affect    Medications:    Current Facility-Administered Medications:     acetaminophen (TYLENOL) tablet 975 mg, 975 mg, Oral, Q8H [address], Pauline Monroy PA-C, 975 mg at 07/08/20 0500    allopurinol (ZYLOPRIM) tablet 300 mg, 300 mg, Oral, Daily, Paulinesarahy Monroy PA-C, 300 mg at 07/08/20 0806    docusate sodium (COLACE) capsule 100 mg, 100 mg, Oral, BID, Paulinesarahy Monroy, PA-C, 100 mg at 07/08/20 0806    heparin (porcine) subcutaneous injection 5,000 Units, 5,000 Units, Subcutaneous, Q8H [address], Pauline LUZ Monroy PA-C, 5,000 Units at 07/08/20 0500    insulin lispro (HumaLOG) 100 units/mL subcutaneous injection 2-12 Units, 2-12 Units, Subcutaneous, TID AC **AND** Fingerstick Glucose (POCT), , , TID AC, Pauline LUZ Monroy PA-C    methocarbamol (ROBAXIN) tablet 500 mg, 500 mg, Oral, Q6H LEONORA, Pauline Monroy PA-C, 500 mg at 07/08/20 0500    ondansetron (ZOFRAN) injection 4 mg, 4 mg, Intravenous, Q6H PRN, Pauline Monroy PA-C    oxyCODONE (ROXICODONE) IR tablet 2 5 mg, 2 5 mg, Oral, Q4H PRN, Mishel Monroy PA-C    oxyCODONE (ROXICODONE) IR tablet 5 mg, 5 mg, Oral, Q4H PRN, Pauline Monroy, PA-C, 5 mg at 07/08/20 0455    polyethylene glycol (MIRALAX) packet 17 g, 17 g, Oral, Daily PRN, Pauline Tobarifereese, PA-C, 17 g at 07/07/20 2136    senna (SENOKOT) tablet 8 6 mg, 1 tablet, Oral, Daily, Pauline Monroy, PA-C, 8 6 mg at 07/08/20 0806    travoprost (TRAVATAN-Z) 0 004 % ophthalmic solution 1 drop, 1 drop, Right Eye, HS, Pauline Monroy, PA-C, 1 drop at 07/07/20 2136    Laboratory Results:  Results from last 7 days   Lab Units 07/07/20  0530 07/07/20  0511   WBC Thousand/uL  --  14 22*   HEMOGLOBIN g/dL  --  13 0   HEMATOCRIT %  --  39 2   PLATELETS Thousands/uL  --  172   SODIUM mmol/L 138  --    POTASSIUM mmol/L 4 1  --    CHLORIDE mmol/L 109*  --    CO2 mmol/L 24  --    BUN mg/dL 16  --    CREATININE mg/dL 0 99  --    CALCIUM mg/dL 8 4  --    MAGNESIUM mg/dL  --  2 1   PHOSPHORUS mg/dL  --  3 5

## 2020-07-08 NOTE — CONSULTS
PHYSICAL MEDICINE AND REHABILITATION CONSULT NOTE  Camelia Toney 69 y o  male MRN: [id number]  Unit/Bed#: [facility] 611-01 Encounter: [id number]    Requested by (Physician/Service): John Arrington MD  Reason for Consultation:  Assessment of rehabilitation needs    Assessment:  Rehabilitation Diagnosis:    Spondylolisthesis s/p L5-S1 invasive transverse lumbar interbody fusion   NPH with  shunt    Recommendations:  Rehabilitation Plan:   Continue PT/OT while on acute care   The patient is a good candidate for acute inpatient rehabilitation once medically stable  Medical necessity includes acute on chronic pain, hx of NPH with shunt, blood sugar control, incisional healing, blood pressure management, and infection control  Medical Co-morbidities Plan:  · Spondylolisthesis s/p L5-S1 invasive transverse lumbar interbody fusion  · NPH with hx of shunt placement  · Acute pain  · Diabetes type 2  · Neuropathy  · ZHOU on CPAP  · Gout  · Chronic kidney disease stage 2  · Leukocytosis   · HTN  · DVT ppx:  Heparin and SCD    Thank you for this consultation  Do not hesitate to contact service with further questions  Orpha Se, CRNP  PM&R    History of Present Illness:  Camelia Toney is a 69 y o  male with a PMH of NPH, spondylolisthesis of the lumbosacral region, HTN, diabetes type 2, neuropathy, ZHOU with CPAP and gout who presented to the [address] on 7/6/20 for an elective L5-S1 invasive transverse lumbar interbody fusion  He is POD #2  PM&R are consulted for rehabilitation recommendations  Review of Systems: 10 point ROS negative except for what is noted in HPI    Function:  Prior level of function and living situation:  The patient lives with a friend in a two story home with 3 SOBIA and 13 steps to the 2nd floor  He was independent for ADLs  He has a supportive neighbor who is an RN and can assist as well  He uses a RW to ambulate    He has a MAFO for the left leg       Current level of function:  Physical Therapy: Moderate assist for bed mobility, transfers and ambulation  Occupational Therapy:  Supervision for eating and grooming, minimal assist for UB bathing/dressing, maximal assist for LB bathing/dressing, toileting  Physical Exam:  /66   Pulse 61   Temp 98 2 °F (36 8 °C)   Resp 16   Ht 6' 3" (1 905 m)   Wt 133 kg (293 lb 4 8 oz)   SpO2 94%   BMI 36 66 kg/m²        Intake/Output Summary (Last 24 hours) at 7/8/2020 1023  Last data filed at 7/8/2020 0852  Gross per 24 hour   Intake 1632 ml   Output 2060 ml   Net -428 ml       Body mass index is 36 66 kg/m²  Physical Exam   Constitutional: He is oriented to person, place, and time  He appears well-developed and well-nourished  HENT:   Head: Normocephalic  Dressing over  shunt site   Eyes: EOM are normal    Cardiovascular: Intact distal pulses  Pulmonary/Chest: Effort normal    Abdominal: He exhibits no distension  Musculoskeletal: Normal range of motion  Neurological: He is alert and oriented to person, place, and time  Skin: Skin is warm and dry  Psychiatric: He has a normal mood and affect     Tearful at times      Social History:    Social History     Socioeconomic History    Marital status:      Spouse name: None    Number of children: None    Years of education: None    Highest education level: None   Occupational History    None   Social Needs    Financial resource strain: None    Food insecurity:     Worry: None     Inability: None    Transportation needs:     Medical: None     Non-medical: None   Tobacco Use    Smoking status: Never Smoker    Smokeless tobacco: Never Used   Substance and Sexual Activity    Alcohol use: Not Currently     Frequency: Monthly or less     Drinks per session: 1 or 2    Drug use: Never    Sexual activity: None   Lifestyle    Physical activity:     Days per week: None     Minutes per session: None    Stress: None Relationships    Social connections:     Talks on phone: None     Gets together: None     Attends Scientologist service: None     Active member of club or organization: None     Attends meetings of clubs or organizations: None     Relationship status: None    Intimate partner violence:     Fear of current or ex partner: None     Emotionally abused: None     Physically abused: None     Forced sexual activity: None   Other Topics Concern    None   Social History Narrative    None        Family History:    Family History   Problem Relation Age of Onset    Polymyositis Mother     Heart failure Father          Medications:     Current Facility-Administered Medications:     acetaminophen (TYLENOL) tablet 975 mg, 975 mg, Oral, Q8H [address], Pauline N Ireifej, PA-C, 975 mg at 07/08/20 0500    allopurinol (ZYLOPRIM) tablet 300 mg, 300 mg, Oral, Daily, Pauline N Ireifej, PA-C, 300 mg at 07/08/20 0806    docusate sodium (COLACE) capsule 100 mg, 100 mg, Oral, BID, Pauline N Ireifej, PA-C, 100 mg at 07/08/20 0806    heparin (porcine) subcutaneous injection 5,000 Units, 5,000 Units, Subcutaneous, Q8H [address], Pauline N Ireifej, PA-C, 5,000 Units at 07/08/20 0500    insulin lispro (HumaLOG) 100 units/mL subcutaneous injection 2-12 Units, 2-12 Units, Subcutaneous, TID AC **AND** Fingerstick Glucose (POCT), , , TID AC, Pauline N Ireifej, PA-C    methocarbamol (ROBAXIN) tablet 500 mg, 500 mg, Oral, Q6H [address], Pauline N Ireifej, PA-C, 500 mg at 07/08/20 0500    ondansetron (ZOFRAN) injection 4 mg, 4 mg, Intravenous, Q6H PRN, Pauline N Ireifej, PA-C    oxyCODONE (ROXICODONE) IR tablet 2 5 mg, 2 5 mg, Oral, Q4H PRN, Pauline N Ireifej, PA-C    oxyCODONE (ROXICODONE) IR tablet 5 mg, 5 mg, Oral, Q4H PRN, Pauline N Ireifej, PA-C, 5 mg at 07/08/20 0455    polyethylene glycol (MIRALAX) packet 17 g, 17 g, Oral, Daily PRN, Pauline Monroy PA-C, 17 g at 07/07/20 2136    senna (SENOKOT) tablet 8 6 mg, 1 tablet, Oral, Daily, Manuel Fragmin EVI Monroy, 8 6 mg at 07/08/20 0806    travoprost (TRAVATAN-Z) 0 004 % ophthalmic solution 1 drop, 1 drop, Right Eye, HS, Pauline ESCOBAR EVI Monroy, 1 drop at 07/07/20 2136    Past Medical History:     Past Medical History:   Diagnosis Date    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     Diabetes mellitus ([facility] )     DM2 (diabetes mellitus, type 2) ([facility] )     Gout     HTN (hypertension)     Hydrocephalus ([place] [address] )     Hypertension     Neuropathy     ZHOU on CPAP     Pressure injury of skin     TIA (transient ischemic attack)         Past Surgical History:     Past Surgical History:   Procedure Laterality Date    CSF SHUNT      x3 Op Reports, Mitesh Chen in [facility] chart viewer   [facility] 62 Left 5/30/2020    Procedure: excision 5th metatarsal head  excision 5th metatarsal ulcer ;  Surgeon: Flora Perez DPM;  Location: [facility];  Service: Podiatry    OH ARTHDSIS POST/POSTEROLATRL/POSTINTERBODY LUMBAR N/A 7/6/2020    Procedure: MINIMALLY INVASIVE TRANSVERSE LUMBAR INTERBODY FUSION L5-S1 FROM LEFT-SIDED APPROACH;  Surgeon: Tatiana Donis MD;  Location: [facility];  Service: Neurosurgery    OH REPLACEMENT/REVISION,CSF SHUNT Right 7/6/2020    Procedure: REVISION OF SCALP OVER VENTRICULAR CATHETER IN THE RIGHT CORONAL REGION;  Surgeon: Tatiana Donis MD;  Location: [facility];  Service: Neurosurgery         Allergies:      Allergies   Allergen Reactions    Pollen Extract Allergic Rhinitis           LABORATORY RESULTS:      Lab Results   Component Value Date    HGB 13 0 07/07/2020    HCT 39 2 07/07/2020    WBC 14 22 (H) 07/07/2020     Lab Results   Component Value Date    BUN 16 07/07/2020    K 4 1 07/07/2020     (H) 07/07/2020    CREATININE 0 99 07/07/2020     Lab Results   Component Value Date    PROTIME 13 4 06/16/2020    INR 1 06 06/16/2020        DIAGNOSTIC STUDIES: Reviewed  Xr Lumbar Spine 2 Or 3 Views    Result Date: 7/8/2020  Impression: Status post TLIF at L5-S1  No complication of the hardware  Multilevel lumbar spondylosis and L5-S1 spondylolisthesis  Ventriculoperitoneal catheter is incompletely assessed on this study  Workstation performed: [id number]     Xr Spine Lumbar 2 Or 3 Views Injury    Result Date: 7/7/2020  Impression: Fluoroscopic guidance provided for surgical procedure  Please refer to the separate procedure notes for additional details   Workstation performed: [id number]

## 2020-07-08 NOTE — ASSESSMENT & PLAN NOTE
POD#2 revision of scalp over ventricular catheter  · Initial VPS placed on 9/26/2005  · Externalization and wound debridement on 10/29/2005  · New  shunt placement with reprogrammable shunt on 12/22/2005  · Shunt revision on 08/03/2011  · Per [address] [place] [address] office notes, shunt has been dialed up to 120cm water, no additional adjustments have been made since then

## 2020-07-09 LAB
GLUCOSE SERPL-MCNC: 112 MG/DL (ref 65–140)
GLUCOSE SERPL-MCNC: 112 MG/DL (ref 65–140)
GLUCOSE SERPL-MCNC: 122 MG/DL (ref 65–140)
GLUCOSE SERPL-MCNC: 137 MG/DL (ref 65–140)

## 2020-07-09 PROCEDURE — 99024 POSTOP FOLLOW-UP VISIT: CPT | Performed by: PHYSICIAN ASSISTANT

## 2020-07-09 PROCEDURE — 82948 REAGENT STRIP/BLOOD GLUCOSE: CPT

## 2020-07-09 RX ORDER — BISACODYL 10 MG
10 SUPPOSITORY, RECTAL RECTAL DAILY PRN
Status: DISCONTINUED | OUTPATIENT
Start: 2020-07-09 | End: 2020-07-10 | Stop reason: HOSPADM

## 2020-07-09 RX ORDER — BISACODYL 10 MG
10 SUPPOSITORY, RECTAL RECTAL DAILY PRN
Status: DISCONTINUED | OUTPATIENT
Start: 2020-07-09 | End: 2020-07-09

## 2020-07-09 RX ADMIN — MELATONIN 6 MG: at 22:40

## 2020-07-09 RX ADMIN — TRAVOPROST 1 DROP: 0.04 SOLUTION/ DROPS OPHTHALMIC at 21:26

## 2020-07-09 RX ADMIN — ACETAMINOPHEN 975 MG: 325 TABLET, FILM COATED ORAL at 05:20

## 2020-07-09 RX ADMIN — DOCUSATE SODIUM 100 MG: 100 CAPSULE, LIQUID FILLED ORAL at 08:24

## 2020-07-09 RX ADMIN — METHOCARBAMOL 500 MG: 500 TABLET, FILM COATED ORAL at 05:20

## 2020-07-09 RX ADMIN — STANDARDIZED SENNA CONCENTRATE 8.6 MG: 8.6 TABLET ORAL at 08:24

## 2020-07-09 RX ADMIN — OXYCODONE HYDROCHLORIDE 5 MG: 5 TABLET ORAL at 01:13

## 2020-07-09 RX ADMIN — HEPARIN SODIUM 5000 UNITS: 5000 INJECTION INTRAVENOUS; SUBCUTANEOUS at 05:20

## 2020-07-09 RX ADMIN — ALLOPURINOL 300 MG: 300 TABLET ORAL at 08:24

## 2020-07-09 RX ADMIN — ACETAMINOPHEN 975 MG: 325 TABLET, FILM COATED ORAL at 14:55

## 2020-07-09 RX ADMIN — OXYCODONE HYDROCHLORIDE 5 MG: 5 TABLET ORAL at 05:20

## 2020-07-09 RX ADMIN — METHOCARBAMOL 500 MG: 500 TABLET, FILM COATED ORAL at 11:49

## 2020-07-09 RX ADMIN — METHOCARBAMOL 500 MG: 500 TABLET, FILM COATED ORAL at 17:27

## 2020-07-09 RX ADMIN — ACETAMINOPHEN 975 MG: 325 TABLET, FILM COATED ORAL at 21:26

## 2020-07-09 RX ADMIN — HEPARIN SODIUM 5000 UNITS: 5000 INJECTION INTRAVENOUS; SUBCUTANEOUS at 14:55

## 2020-07-09 RX ADMIN — METHOCARBAMOL 500 MG: 500 TABLET, FILM COATED ORAL at 01:12

## 2020-07-09 RX ADMIN — HEPARIN SODIUM 5000 UNITS: 5000 INJECTION INTRAVENOUS; SUBCUTANEOUS at 21:26

## 2020-07-09 RX ADMIN — POLYETHYLENE GLYCOL 3350 17 G: 17 POWDER, FOR SOLUTION ORAL at 18:39

## 2020-07-09 RX ADMIN — DOCUSATE SODIUM 100 MG: 100 CAPSULE, LIQUID FILLED ORAL at 17:27

## 2020-07-09 NOTE — PROGRESS NOTES
Progress Note - Mo Guzmán 1950, 69 y o  male MRN: [id number]    Unit/Bed#: [facility] 611-01 Encounter: [id number]    Primary Care Provider: Tomy Melendez   Date and time admitted to hospital: 7/6/2020  8:31 AM        * Spondylolisthesis of lumbosacral region  Assessment & Plan  POD#3 L5-S1 TLIF    Imaging reviewed personally and with attending, results are as follows:   Upright XR lumbar spine 7/7/2020:  Status post TLIF at L5-S1  No complication of the hardware  Plan:   Pain control - well tolerated on current regimen  o Tylenol 975mg [id number]   o Robaxin 500mg [id number] for muscle spasm  o Oxycodone 2 5mg and 5mg [id number] as needed for moderate and severe pain  o Aqua K   Bowel regimen - passing gas but no bowel movement  o Continue senna, colace, and PRN miralax  Ordered dulcolax suppository PRN as he has not had BM in 4 days on current regimen    Geriatrics following, appreciate input - fall risk paperwork in patient's chart, okay to go to rehab when able  [facility] following, appreciate input - continue to hold ACEI, cleared to go to rehab when able   DVT ppx:  SCDs, heparin   Mobilize as tolerated with assistance, PT / OT evaluation recommending rehab, PMR states patient is good ARC candidate  o Cleared for [facility], waiting for insurance authorization    Neurosurgery will continue to follow  Patient is stable for discharge to rehab, pending insurance authorization  Please call with questions or concerns      Status post ventriculoperitoneal shunt  Assessment & Plan  POD#3 revision of scalp over ventricular catheter  · Initial VPS placed on 9/26/2005  · Externalization and wound debridement on 10/29/2005  · New  shunt placement with reprogrammable shunt on 12/22/2005  · Shunt revision on 08/03/2011  · Per [address] [place] [address] office notes, shunt has been dialed up to 120cm water, no additional adjustments have been made since then        Impaired functional mobility, balance, gait, and endurance  Assessment & Plan  See plan above    Normal pressure hydrocephalus (HCC)  Assessment & Plan  See plan above, shunt in place    Type 2 diabetes mellitus [facility])  Assessment & Plan  Lab Results   Component Value Date    HGBA1C 5 9 (H) 06/16/2020       Recent Labs     07/08/20  0720 07/08/20  1120 07/08/20  1725 07/09/20  0759   POCGLU 124 116 110 122       Blood Sugar Average: Last 72 hrs:  (P) 119 1     On ISS    Unspecified abnormalities of gait and mobility  Assessment & Plan  See plan above        Subjective/Objective   Chief Complaint: I have a little headache at my incision site    Subjective: Patient with NAEO  C/o no BM, but passing gas  Ambulated with PT without difficulty  Pain well controlled  Also complaining of not being able to sleep well at night  Otherwise, patient doing very well  Objective: patient sitting in chair in NAD  VSS  Intake/Output       07/09/20 0701 - 07/10/20 0700      [id number] 4795-3977 Total       Intake    P O   600  -- 600    Total Intake 600 -- 600       Output    Urine  593  -- 593    Urine 400 -- 400    Weighted Urine Output (mL) 193 -- 193    Total Output 593 -- 593       Net I/O     7 -- 7          Invasive Devices     Peripheral Intravenous Line            Peripheral IV 07/06/20 Right Hand 3 days                Vitals: Blood pressure 132/68, pulse (!) 54, temperature 99 2 °F (37 3 °C), resp  rate 19, height 6' 3" (1 905 m), weight 127 kg (280 lb), SpO2 98 %  ,Body mass index is 35 kg/m²  General appearance: alert, appears stated age, cooperative and no distress  Head: Normocephalic, cranial incision CDI, closed with sutures, minimal dried blood around incision  Eyes: EOMI, PERRL  Back: bilateral lumbar incisions CDI, no erythema or drainage  Dressing removed     Lungs: non labored breathing  Heart: regular heart rate  Neurologic:   Mental status: Alert, oriented x3, thought content appropriate  Cranial nerves: grossly intact (Cranial nerves II-XII)  Sensory: normal to LT bilaterally except decreased sensation bilateral feet  Motor: moving all extremities without focal weakness, 5/5 strength bilaterally  Reflexes: 2+ and symmetric    Lab Results: I have personally reviewed pertinent results  Results from last 7 days   Lab Units 07/07/20  0511   WBC Thousand/uL 14 22*   HEMOGLOBIN g/dL 13 0   HEMATOCRIT % 39 2   PLATELETS Thousands/uL 172     Results from last 7 days   Lab Units 07/07/20  0530   POTASSIUM mmol/L 4 1   CHLORIDE mmol/L 109*   CO2 mmol/L 24   BUN mg/dL 16   CREATININE mg/dL 0 99   CALCIUM mg/dL 8 4     Results from last 7 days   Lab Units 07/07/20  0511   MAGNESIUM mg/dL 2 1     Results from last 7 days   Lab Units 07/07/20  0511   PHOSPHORUS mg/dL 3 5         No results found for: TROPONINT  ABG:No results found for: PHART, UAP7QCL, PO2ART, GVQ2DNH, E1EJDRHD, BEART, SOURCE    Imaging Studies: I have personally reviewed pertinent reports  and I have personally reviewed pertinent films in PACS     Xr Skull < 4 Vw    Result Date: 7/2/2020  Narrative: PROGRAMMABLE SHUNT EVALUATION INDICATION:   G91 2: (Idiopathic) normal pressure hydrocephalus Z98 2: Presence of cerebrospinal fluid drainage device  COMPARISON:  6/10/2020 VIEWS:  XR SKULL < 4 VW FINDINGS: Views of the calvarium are obtained with dedicated view positioned to evaluate pressure setting dial of ventriculostomy catheter  The programmable shunt has changed, now in the region of 90 to 100 mm of H2O  Impression: Change in programmable shunt as described above  Workstation performed: [id number]     Xr Skull < 4 Vw    Result Date: 6/10/2020  Narrative: SHUNT EVALUATION INDICATION:   G91 2: (Idiopathic) normal pressure hydrocephalus Z74 09: Other reduced mobility D18 1: Lymphangioma, any site  COMPARISON:  None VIEWS:  XR SKULL < 4 VW FINDINGS: Views of the calvarium are obtained with dedicated view positioned to evaluate pressure setting dial of ventriculostomy catheter   Shunt setting at approximately 120 mm of water      Impression: Shunt setting at approximately 120 mm of water    Workstation performed: [id number]     RG Lumbar Spine 2 Or 3 Views    Result Date: 7/8/2020  Narrative: LUMBAR SPINE INDICATION:   L5-S1 TLIF  COMPARISON:  Intraoperative films from July 6, 2020  VIEWS:  XR SPINE LUMBAR 2 OR 3 VIEWS INJURY FINDINGS: There are 5 non rib bearing lumbar vertebral bodies  Redemonstrated postoperative changes of translumbar interbody fusion with dorsal paired vertical rods and transpedicular pedicle screw fixation at L5 and S1 with an intervening bone graft in the L5-S1 disc space  The previously noted spondylolisthesis is difficult to define due to the projection  The remaining lumbar vertebral bodies demonstrate a maintained lordotic curvature  No lateral subluxation  Redemonstrated multilevel disc space narrowing, endplate sclerosis and marginal osteophytes  Ventricular peritoneal shunt catheter with its tip in the left lower quadrant  There are segments of the catheter which are difficult to define on this radiograph  Impression: Status post TLIF at L5-S1  No complication of the hardware  Multilevel lumbar spondylosis and L5-S1 spondylolisthesis  Ventriculoperitoneal catheter is incompletely assessed on this study  Workstation performed: [id number]     Xr Spine Lumbar 2 Or 3 Views Injury    Result Date: 7/7/2020  Narrative: C-ARM - LUMBOSACRAL SPINE INDICATION: M43 17: Spondylolisthesis, lumbosacral region  Procedure guidance  COMPARISON:  5/4/2020 TECHNIQUE: FLUOROSCOPY TIME:   3 MIN 48 SEC 3 FLUOROSCOPIC IMAGES FINDINGS: Fluoroscopic guidance provided for surgical procedure  Initial image was obtained for intraoperative level verification demonstrating wires at the L5 and S1 levels posteriorly  Subsequently additional images were obtained after posterior fusion at L5-S1  with pedicle screws and bone graft placement  Osseous and soft tissue detail limited by technique       Impression: Fluoroscopic guidance provided for surgical procedure  Please refer to the separate procedure notes for additional details  Workstation performed: [id number]     Ct Head Without Contrast    Result Date: 7/1/2020  Narrative: CT BRAIN - WITHOUT CONTRAST INDICATION:   G91 2: (Idiopathic) normal pressure hydrocephalus Z74 09: Other reduced mobility D18 1: Lymphangioma, any site  COMPARISON:  CT 6/8/2020 TECHNIQUE:  CT examination of the brain was performed  In addition to axial images, coronal 2D reformatted images were created and submitted for interpretation  Radiation dose length product (DLP) for this visit:  902 mGy-cm   This examination, like all CT scans performed in the [facility], was performed utilizing techniques to minimize radiation dose exposure, including the use of iterative reconstruction and automated exposure control  IMAGE QUALITY:  Diagnostic  FINDINGS: PARENCHYMA:  No acute disease  There is no acute ischemia, intracranial mass or mass effect  No edema  Stable left parieto-occipital cortical infarct  Stable gliosis along the tract of the anterior right frontal shunt catheter  Catheter appears is the midline corpus callosum terminating in the mid body of the left lateral ventricle  Ventricular size is increased slightly in the interval since prior study and the bilateral subdural hygromas have resolved  Previously, 3rd ventricle approximately a centimeter in width approaches 1 4 cm  Similar increase elsewhere within the ventricular system  VENTRICLES AND EXTRA-AXIAL SPACES:  Interval increase in size of ventricular system with resorption of bilateral subdural hygromas  VISUALIZED ORBITS AND PARANASAL SINUSES:  Unremarkable  CALVARIUM AND EXTRACRANIAL SOFT TISSUES:  Normal      Impression: As the ventricular system has increased in size, the subdural hygromas have resolved  3rd ventricle has increased from 1 cm to approximately 1 4 cm in width   Stable gliosis along the tract of right frontal shunt catheter  Workstation performed: [id number]     EKG, Pathology, and Other Studies: I have personally reviewed pertinent reports        VTE Pharmacologic Prophylaxis: Heparin    VTE Mechanical Prophylaxis: sequential compression device

## 2020-07-09 NOTE — ASSESSMENT & PLAN NOTE
Lab Results   Component Value Date    HGBA1C 5 9 (H) 06/16/2020       Recent Labs     07/08/20  0720 07/08/20  1120 07/08/20  1725 07/09/20  0759   POCGLU 124 116 110 122       Blood Sugar Average: Last 72 hrs:  (P) 119 1     On ISS

## 2020-07-09 NOTE — ASSESSMENT & PLAN NOTE
POD#3 L5-S1 TLIF    Imaging reviewed personally and with attending, results are as follows:   Upright XR lumbar spine 7/7/2020:  Status post TLIF at L5-S1  No complication of the hardware  Plan:   Pain control - well tolerated on current regimen  o Tylenol 975mg [id number]   o Robaxin 500mg [id number] for muscle spasm  o Oxycodone 2 5mg and 5mg [id number] as needed for moderate and severe pain  o Aqua K   Bowel regimen - passing gas but no bowel movement  o Continue senna, colace, and PRN miralax  Ordered dulcolax suppository PRN as he has not had BM in 4 days on current regimen    Geriatrics following, appreciate input - fall risk paperwork in patient's chart, okay to go to rehab when able  [facility] following, appreciate input - continue to hold ACEI, cleared to go to rehab when able   DVT ppx:  SCDs, heparin   Mobilize as tolerated with assistance, PT / OT evaluation recommending rehab, PMR states patient is good ARC candidate  o Cleared for [facility], waiting for insurance authorization    Neurosurgery will continue to follow  Patient is stable for discharge to rehab, pending insurance authorization  Please call with questions or concerns

## 2020-07-09 NOTE — PROGRESS NOTES
PHYSICAL MEDICINE AND REHABILITATION   PREADMISSION ASSESSMENT     Projected [facility] Diagnoses:  Impairment of mobility, safety and Activities of Daily Living (ADLs) due to Orthopedic Disorders:  08 9  Other Orthopedic    Etiologic: Spondylolisthesis of lumbosacral region   Date of Onset: 7/6/2020   Date of surgery: 7/6/2020 MINIMALLY INVASIVE TRANSVERSE LUMBAR INTERBODY FUSION L5-S1 FROM LEFT-SIDED APPROACH (N/A) REVISION OF SCALP OVER VENTRICULAR CATHETER IN THE RIGHT CORONAL REGION (Right)    PATIENT INFORMATION  Name: Kady Cartagena Phone #: [phone] (home)   Address: [address] [place] [address] Dr CLEMENTINE Velazquez [address]  YOB: 1950 Age: 69 y o  SS#   Marital Status:   Ethnicity:    Employment Status: retired  Extended Emergency Contact Information  Primary Emergency Contact: Suresh Torres  Address: [address] [place] [address] Phone: [phone]  Work Phone: [employer] Phone: [phone]  Relation: Friend  Secondary Emergency Contact: Radha Allen  [place] Phone: [phone]  Relation: Brother  Advance Directive: Level 1 Full Code, Unknown Advanced Directive     INSURANCE/COVERAGE:     Primary Payor: JOSUE Denton / Plan: [facility] REP / Product Type: Medicare HMO /   Secondary Payer: PRIVATE PAY   Payer Contact: Valente Dante Payer Contact:   Contact Phone: [phone]  Contact Fax: [phone] Contact Phone:     Authorization #: [id number]  Coverage Dates: 7/10/2020 - 7/17/2020  LCD: 7/17/2020 with an update due that day  MEDICARE #: [id number]  Medicare Days: N/A  Medical Record #: [id number]    REFERRAL SOURCE:   Referring provider: Roda Eisenmenger, MD  Referring facility: [facility]   Room: [place] [address]/[facility]  PCP: Usha Gardner PCP phone number: [phone]    MEDICAL INFORMATION  HPI: Patient is a 69year old male who originally presented to the [facility] on 7/6/2020 for an elective L5-S1 invasive transverse lumbar interbody fusion  Patient does have normal pressure hydrocephalus and is s/p shunt adjustment approximately 1 month ago (initial placement was 9/26/2005)  Over the last few weeks the patient has had gait imbalance and ambulation has become more difficult  Per the patient, the difficult started about 1 week after his shunt adjustment on 6/10/2020  A CT of the head was done on 7/1/2020 which revealed minimal increase in the ventricles as well as resolution of the subdural hygromas  The patient went to the OR on 7/6/2020 with Dr Paxton Snyder due to the spondylolisthesis of the lumbosacral region for a transverse lumbar interbody fusion and deformity correction at L5-S1   A renal consultation is requested for assistance perioperative optimization to reduce incidence of acute kidney injury  After review - baseline creatinine 1 0-1 3 dating back to 2018  Pain was well controlled post op  PMR was consulted for rehab recommendations and both PT/OT as well as PMR are all recommending inpatient acute rehab  The patient will transfer to [address] [place] [address] in [facility] later on today  COVID negative on 7/6/2020 and 7/8/2020  Past Medical History:   Past Surgical History: Allergies:     Past Medical History:   Diagnosis Date    Cancer [facility]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     Diabetes mellitus ([facility] [address] )     DM2 (diabetes mellitus, type 2) ([facility] [address] )     Gout     HTN (hypertension)     Hydrocephalus (HCC)     Hypertension     Neuropathy     ZHOU on CPAP     Pressure injury of skin     TIA (transient ischemic attack)     Past Surgical History:   Procedure Laterality Date    CSF SHUNT      x3 Op Reports, Fredy Horne in [facility] chart viewer   [facility] Left 5/30/2020    Procedure: excision 5th metatarsal head   excision 5th metatarsal ulcer ;  Surgeon: Lexie Hawkins DPM;  Location: [facility] Main OR;  Service: Podiatry    SC ARTHDSIS POST/POSTEROLATRL/POSTINTERBODY LUMBAR N/A 7/6/2020    Procedure: MINIMALLY INVASIVE TRANSVERSE LUMBAR INTERBODY FUSION L5-S1 FROM LEFT-SIDED APPROACH;  Surgeon: Cherelle Briceño MD;  Location: [facility] MAIN OR;  Service: Neurosurgery    SC REPLACEMENT/REVISION,CSF SHUNT Right 7/6/2020    Procedure: REVISION OF SCALP OVER VENTRICULAR CATHETER IN THE RIGHT CORONAL REGION;  Surgeon: Cherelle Briceño MD;  Location: [facility];  Service: Neurosurgery     Allergies   Allergen Reactions    Pollen Extract Allergic Rhinitis         Comorbidities/Surgeries in the last 100 days: NPH with hx of shunt placement, acute pain, diabetes type 2, neuropathy, ZHOU on CPAP, gout, Chronic kidney disease stage 2, leukocytosis, hypertension, anemia     CURRENT VITAL SIGNS:   Temp:  [98 1 °F (36 7 °C)-99 2 °F (37 3 °C)] 98 6 °F (37 °C)  HR:  [51-59] 51  Resp:  [16-18] 16  BP: (125-137)/(63-72) 137/72   Intake/Output Summary (Last 24 hours) at 7/10/2020 1009  Last data filed at 7/10/2020 0840  Gross per 24 hour   Intake 1320 ml   Output 2450 ml   Net -1130 ml        LABORATORY RESULTS:      Lab Results   Component Value Date    HGB 13 0 07/07/2020    HCT 39 2 07/07/2020    WBC 14 22 (H) 07/07/2020     Lab Results   Component Value Date    BUN 16 07/07/2020    K 4 1 07/07/2020     (H) 07/07/2020    CREATININE 0 99 07/07/2020     Lab Results   Component Value Date    PROTIME 13 4 06/16/2020    INR 1 06 06/16/2020        DIAGNOSTIC STUDIES:  Xr Lumbar Spine 2 Or 3 Views    Result Date: 7/8/2020  Impression: Status post TLIF at L5-S1  No complication of the hardware  Multilevel lumbar spondylosis and L5-S1 spondylolisthesis  Ventriculoperitoneal catheter is incompletely assessed on this study  Workstation performed: [id number]     Xr Spine Lumbar 2 Or 3 Views Injury    Result Date: 7/7/2020  Impression: Fluoroscopic guidance provided for surgical procedure    Please refer to the separate procedure notes for additional details   Workstation performed: [id number]       PRECAUTIONS/SPECIAL NEEDS:  Tobacco:   Social History     Tobacco Use   Smoking Status Never Smoker   Smokeless Tobacco Never Used   , Alcohol:    Social History     Substance and Sexual Activity   Alcohol Use Not Currently    Frequency: Monthly or less    Drinks per session: 1 or 2   , Splints/Braces: MAFO brace, Anticoagulation:  heparin, Blood Sugar Management: per MD recommendations, Edema Management, Safety Concerns, Pain Management, IV: Type: peripheral Location: right hand Reason: medications and fluids, Language Preference: English and fall precautions     MEDICATIONS:     Current Facility-Administered Medications:     acetaminophen (TYLENOL) tablet 975 mg, 975 mg, Oral, Q8H [address], Pauline N Ireifej, PA-C, 975 mg at 07/10/20 0601    allopurinol (ZYLOPRIM) tablet 300 mg, 300 mg, Oral, Daily, Pauline N Ireifej, PA-C, 300 mg at 07/10/20 4261    bisacodyl (DULCOLAX) rectal suppository 10 mg, 10 mg, Rectal, Daily PRN, Vania Fox DO    docusate sodium (COLACE) capsule 100 mg, 100 mg, Oral, BID, Pauline N Ireifej, PA-C, 100 mg at 07/10/20 0824    heparin (porcine) subcutaneous injection 5,000 Units, 5,000 Units, Subcutaneous, Q8H [address], Pauline N Ireifej, PA-C, 5,000 Units at 07/10/20 0601    insulin lispro (HumaLOG) 100 units/mL subcutaneous injection 2-12 Units, 2-12 Units, Subcutaneous, TID AC **AND** Fingerstick Glucose (POCT), , , TID AC, Pauline N Ireifej, PA-C    melatonin tablet 6 mg, 6 mg, Oral, HS, Aleda Footman, PA-C, 6 mg at 07/09/20 2240    methocarbamol (ROBAXIN) tablet 500 mg, 500 mg, Oral, Q6H [address], Pauline N Ireifej, PA-C, 500 mg at 07/10/20 0601    ondansetron (ZOFRAN) injection 4 mg, 4 mg, Intravenous, Q6H PRN, Pauline N Ireifej, PA-C    oxyCODONE (ROXICODONE) IR tablet 2 5 mg, 2 5 mg, Oral, Q4H PRN, Pauline Monroy PA-C    oxyCODONE (ROXICODONE) IR tablet 5 mg, 5 mg, Oral, Q4H PRN, Olu Royal MALISSA Aguilar-LAURA, 5 mg at 07/10/20 0601    polyethylene glycol (MIRALAX) packet 17 g, 17 g, Oral, Daily PRN, Pauline MALISSA Aguilar-C, 17 g at 07/09/20 1839    senna (SENOKOT) tablet 8 6 mg, 1 tablet, Oral, Daily, Pauline MALISSA Aguilar-C, 8 6 mg at 07/10/20 0824    travoprost (TRAVATAN-Z) 0 004 % ophthalmic solution 1 drop, 1 drop, Right Eye, HS, Pauline MALISSA Aguilar-C, 1 drop at 07/09/20 2126    SKIN INTEGRITY:   Incision noted to the bilateral back - dry dressing, incision noted to the right head - PAULO, neuropathic/diabetic foot ulcer (left) - pre-existing    PRIOR LEVEL OF FUNCTION:  He lives in [facility] single family home  All Jacob is  and lives with his friend Nandini Del Toro  Self Care: Independent, Indoor Mobility: Independent, Stairs (in/outdoor): Independent and Cognition: [place] [employer] does the majority of cooking but the patient is able to do is own housekeeping, shopping, finances, dressing, and medications  Patient also drives on his own, no history of any car accidents or difficulty finding his way around town but at times his friend will drive him to doctor visits  In regards to history of falls, he states that he falls about twice a year for "many years" which he states is due to his neuropathy  When he is home he tends to walk fine on his own, but he does have a cane on each floor of his home and in the trunk of his car if needed  He also has a walker with a seat at his home and in the trunk of his car if needed, but does not feel as though he needs to use any assistance regularly despite the insistence of his PCP and Neurology  FALLS IN THE LAST 6 MONTHS: 1-4 (2x in a month)    HOME ENVIRONMENT:  The living area: bedroom on 2nd floor and bathroom on 2nd floor  There are 3 steps to enter the home and 13 steps to the 2nd floor  The patient will not have 24 hour supervision/physical assistance available upon discharge      Adrian Gross - can assist the patient when he is not working  He does work 6-8 hour days  Per the patient, the patient cannot physically assist too much however  When Jr Botello is not home, the patient will be home alone  He has a supportive neighbor who is an RN and can assist as well  PREVIOUS DME:  Equipment in home (previous DME): Grab bars in the shower, grab bars around the toilet, rollator, life-alert, CPAP    FUNCTIONAL STATUS:  Physical Therapy Occupational Therapy Speech Therapy   7/8/2020    Transfers   Sit to Stand 3  Moderate assistance   Additional items Assist x 2; Increased time required;Verbal cues;Armrests   Stand to Sit 3  Moderate assistance   Additional items Assist x 1; Increased time required;Verbal cues;Armrests   Ambulation/Elevation   Gait pattern Improper Weight shift;Decreased foot clearance; Excessively slow; Shuffling; Short stride   Gait Assistance 4  Minimal assist   Additional items Assist x 1;Verbal cues; Tactile cues   Assistive Device Rolling walker   Distance 30'   Endurance Deficit   Endurance Deficit Yes   Activity Tolerance   Activity Tolerance Patient limited by fatigue   Nurse Made Aware RN ok to see   Assessment   Prognosis Good   Problem List Decreased strength; Impaired balance;Decreased endurance;Decreased range of motion;Decreased mobility;Pain   Assessment Patient seated in recliner upon arrival, MAFO donned prior to ambualtion  Pt requires Mod A x2 for sit to stand transfers, once standing pt is able to maintain upright balance with B/L LE non buckling  Ambulates 30' with RW and MIn A x1 with one seated rest break, pt has no LOB and is porgressing well, pt returned to room via recliner remained seated in recliner at end of session, doffed MAFO at end of session  Pt will continue to benenfit from skilled PT to increase strength, endurance and balance to maximize safe fucntional mobility         7/8/2020    ADL   UB Dressing Assistance 4  Minimal Assistance   LB Dressing Assistance 2  Maximal Assistance   LB Dressing Comments to lionel b shoes, mafo and socks while seated on chair  Toileting Assistance  2  Maximal Assistance   Functional Standing Tolerance   Time    (poor plus balance in stance for adls)   Transfers   Sit to Stand 3  Moderate assistance   Additional items Assist x 2   Functional Mobility   Functional Mobility 4  Minimal assistance   Additional items Rolling walker   Cognition   Overall Cognitive Status WFL   Arousal/Participation Alert   Activity Tolerance   Activity Tolerance Patient tolerated treatment well   Assessment   Assessment pt participated in am ot session and was seen focusing on lb dressing incuding socks shoes and mafo  pt requried max asst to lionel doff b socks shoes and l mafo  pt reports sitting eob usually to lionel same  pt now with spinal precautions limiting reachability  pt requries min asst ub adls and max asst ;lb  pt is incontinent at times of urine and needs max asst clothing management in stance  pt was able to stand with asst x 2 from armchair (taller) and walk short distance with rw and min asst x 1   pt incrased from mod asst x 1 and min of another for sit to stand when fatigued to needing mod asst x 2 for sit to stand  pt performed 3 sit to from stands from armchair and walked x2 short distance while wearing shoes / brace  Per Geriatrics on 7/7/2020    screening revealed [facility]30 suggestive of MCI         CURRENT GAP IN FUNCTION  Prior to Admission: Functional Status: Patient was independent with mobility/ambulation, transfers, ADL's, IADL's  Estimated length of stay: 10 to 14 days    Anticipated Post-Discharge Disposition/Treatment  Disposition: Return to previous home/apartment    Outpatient Services: Physical Therapy (PT) and Occupational Therapy (OT)    BARRIERS TO DISCHARGE  Weakness, Pain, Balance Difficulty, Fatigue, Home Accessibility, Caregiver Accessibility, Financial Resources, Equipment Needs and Resource Availability    INTERVENTIONS FOR DISCHARGE  Adaptive equipment, Patient/Family/Caregiver Education, [employer], Financial Assistance, Arrange DME needs, Home Modifcations, Medication Changes per MD recommendations, Therapy exercises, Center of balance support  and Energy conservation education     REQUIRED THERAPY:  Patient will require PT and OT 90 minutes each per day, five days per week to achieve rehab goals  REQUIRED FUNCTIONAL AND MEDICAL MANAGEMENT FOR INPATIENT REHABILITATION:  Skin:  Incision noted to the bilateral back - dry dressing, incision noted to the right head - PAULO, neuropathic/diabetic foot ulcer (left) - pre-existing, Pain Management: Overall pain is moderately controlled, Deep Vein Thrombosis (DVT) Prophylaxis:  heparin, Diabetes Management: continue sliding scale insulin, patient to do finger sticks as ordered, SLIM to continue to manage diabetes, and diet, and further internal medicine management of additional medical conditions while on the ARC, PT/OT intervention, patient/family education and training, and any needed consults PRN    RECOMMENDED LEVEL OF CARE: Patient is a 69year old male who originally presented to the [address] [place] [address] on 7/6/2020 for an elective L5-S1 invasive transverse lumbar interbody fusion  Patient does have normal pressure hydrocephalus and is s/p shunt adjustment approximately 1 month ago (initial placement was 9/26/2005)  Prior to admission the patient was fully independent with both ADLS and IADLS  He did use a rollator for functional mobility  He currently requires min assist for UB ADLS and max assist for LB ADLS  In addition, he also requires mod assist x 1-2 for transfers and min assist x 1 for ambulation  With the use of a RW the patient was able to ambulate 30 feet  At this time close medical management and PM&R management is recommended at this time for the patient while on the ARC to help monitor labs as well as other medical conditions   Nursing management will be required to monitor bowel/bladder function to prevent incontinent episodes and skin breakdown as well as education on medication changes  Inpatient acute rehab is recommended at this time for the patient to maximize overall strength, endurance, self care, and mobility upon discharge to home with the support of his friends/family

## 2020-07-09 NOTE — SOCIAL WORK
Patient reviewed in care rounds  Patient is here with Spondylolisthesis of lumbosacral region  Patient is POD 3 from L5-S1 fusion  PT/OT evaluation recommending rehab  Patient agreeable to [facility] only at this time  ARC accepted, insurance auth pending at this time  PMR consult complete  CM will continue to follow for recommendations

## 2020-07-09 NOTE — DISCHARGE SUMMARY
Discharge- Izabel Soto 1950, 69 y o  male MRN: [id number]    Unit/Bed#: [facility] 611-01 Encounter: [id number]    Primary Care Provider: Katherine Birmingham   Date and time admitted to hospital: 7/6/2020  8:31 AM        * Spondylolisthesis of lumbosacral region  Assessment & Plan  POD#4 L5-S1 TLIF    Imaging reviewed personally and with attending, results are as follows:   Upright XR lumbar spine 7/7/2020:  Status post TLIF at L5-S1  No complication of the hardware  Plan:   Pain control - well tolerated on current regimen  o Tylenol 975mg [id number]   o Robaxin 500mg [id number] for muscle spasm  o Oxycodone 2 5mg and 5mg [id number] as needed for moderate and severe pain  o Aqua K   Bowel regimen - passing gas but no bowel movement  o Continue senna, colace, and PRN miralax  Ordered dulcolax suppository PRN as he has not had BM in 4 days on current regimen    Geriatrics following, appreciate input - fall risk paperwork in patient's chart, okay to go to rehab when able  [facility] following, appreciate input - continue to hold ACEI, cleared to go to rehab    DVT ppx:  SCDs, heparin   Mobilize as tolerated with assistance, PT / OT evaluation recommending rehab, PMR states patient is good ARC candidate  o Cleared for SLB ARC today    Patient is stable for discharge to rehab this afternoon  Follow up appointments are already scheduled  Please call with questions or concerns      Status post ventriculoperitoneal shunt  Assessment & Plan  POD#4 revision of scalp over ventricular catheter  · Initial VPS placed on 9/26/2005  · Externalization and wound debridement on 10/29/2005  · New  shunt placement with reprogrammable shunt on 12/22/2005  · Shunt revision on 08/03/2011  · Per [facility] office notes, shunt has been dialed up to 120cm water, no additional adjustments have been made since then        Impaired functional mobility, balance, gait, and endurance  Assessment & Plan  See plan above    Normal pressure hydrocephalus [facility])  Assessment & Plan  See plan above, shunt in place    Type 2 diabetes mellitus [facility])  Assessment & Plan  Lab Results   Component Value Date    HGBA1C 5 9 (H) 06/16/2020       Recent Labs     07/09/20  1150 07/09/20  1642 07/09/20  2059 07/10/20  0854   POCGLU 112 112 137 171*       Blood Sugar Average: Last 72 hrs:  (P) 123 25     On ISS    Unspecified abnormalities of gait and mobility  Assessment & Plan  See plan above            Resolved Problems  Date Reviewed: 7/9/2020    None          Discharge Date: 7/10/2020     Admitting Diagnosis: Spondylolisthesis of lumbosacral region [M43 17]    Discharge Diagnosis: same    Attending: Dr Say Baird Physician(s): Gerontology, nephrology    Procedures Performed: L5-S1 TLIF, revision scalp incision    Pathology: none    Hospital Course: Maggy Wood is a 68 y/o male who presented to the outpatient office complaining of gait imbalance, back pain, UI, and balance difficulties  Imaging revealed L5-S1 spondylolisthesis  Patient underwent L5-S1 TLIF as well as revision of scalp over ventricular catheter under general anesthesia with minimal blood loss and no complications  Patient was kept in the PACU until stable and then moved to the floor  Patient received xray lumbar spine postoperatively which revealed intact hardware s/p L5-S1 fusion  No drain was placed  PT and OT were consulted and recommend acute rehab  Patient was cleared medically for discharge to [facility]  Prior to discharge, postoperative instructions were discussed with patient  During that time, all questions and concerns were addressed  Patient will follow up outpatient in 2 weeks for an incision check and 6 weeks with repeat xray lumbar spine and CT head w/o contrast     Physical exam:  General appearance: alert, appears stated age, cooperative and no distress  Head: Normocephalic, right incision CDI, closed with sutures   Small amount of dried blood over incision but no evidence of active bleeding  Eyes: EOMI, PERRL  Back: Bilateral lumbar incisions CDI, no erythema or drainage noted  Dressing removed  Lungs: non labored breathing  Heart: regular heart rate  Neurologic:   Mental status: Alert, oriented x3, thought content appropriate  Cranial nerves: grossly intact (Cranial nerves II-XII)  Sensory: normal to LT bilaterally except decreased sensation bilateral feet  Motor: moving all extremities without focal weakness, 5/5 strength bilaterally    Condition at Discharge: good     Discharge instructions/Information to patient and family:   See after visit summary for information provided to patient and family  Provisions for Follow-Up Care:  See after visit summary for information related to follow-up care and any pertinent home health orders  Disposition: Short-term rehab [facility]        Planned Readmission: No    Discharge Statement   I spent 25 minutes discharging the patient  This time was spent on the day of discharge  I had direct contact with the patient on the day of discharge  Additional documentation is required if more than 30 minutes were spent on discharge  Discharge Medications:  See after visit summary for reconciled discharge medications provided to patient and family

## 2020-07-09 NOTE — PLAN OF CARE
Problem: Prexisting or High Potential for Compromised Skin Integrity  Goal: Skin integrity is maintained or improved  Description  INTERVENTIONS:  - Identify patients at risk for skin breakdown  - Assess and monitor skin integrity  - Assess and monitor nutrition and hydration status  - Monitor labs   - Assess for incontinence   - Turn and reposition patient  - Assist with mobility/ambulation  - Relieve pressure over bony prominences  - Avoid friction and shearing  - Provide appropriate hygiene as needed including keeping skin clean and dry  - Evaluate need for skin moisturizer/barrier cream  - Collaborate with interdisciplinary team   - Patient/family teaching  - Consider wound care consult   Outcome: Progressing     Problem: PAIN - ADULT  Goal: Verbalizes/displays adequate comfort level or baseline comfort level  Description  Interventions:  - Encourage patient to monitor pain and request assistance  - Assess pain using appropriate pain scale  - Administer analgesics based on type and severity of pain and evaluate response  - Implement non-pharmacological measures as appropriate and evaluate response  - Consider cultural and social influences on pain and pain management  - Notify physician/advanced practitioner if interventions unsuccessful or patient reports new pain  Outcome: Progressing     Problem: INFECTION - ADULT  Goal: Absence or prevention of progression during hospitalization  Description  INTERVENTIONS:  - Assess and monitor for signs and symptoms of infection  - Monitor lab/diagnostic results  - Monitor all insertion sites, i e  indwelling lines, tubes, and drains  - Monitor endotracheal if appropriate and nasal secretions for changes in amount and color  - [place] appropriate cooling/warming therapies per order  - Administer medications as ordered  - Instruct and encourage patient and family to use good hand hygiene technique  - Identify and instruct in appropriate isolation precautions for identified infection/condition  Outcome: Progressing  Goal: Absence of fever/infection during neutropenic period  Description  INTERVENTIONS:  - Monitor WBC    Outcome: Progressing     Problem: SAFETY ADULT  Goal: Patient will remain free of falls  Description  INTERVENTIONS:  - Assess patient frequently for physical needs  -  Identify cognitive and physical deficits and behaviors that affect risk of falls    -  [place] fall precautions as indicated by assessment   - Educate patient/family on patient safety including physical limitations  - Instruct patient to call for assistance with activity based on assessment  - Modify environment to reduce risk of injury  - Consider OT/PT consult to assist with strengthening/mobility  Outcome: Progressing  Goal: Maintain or return to baseline ADL function  Description  INTERVENTIONS:  -  Assess patient's ability to carry out ADLs; assess patient's baseline for ADL function and identify physical deficits which impact ability to perform ADLs (bathing, care of mouth/teeth, toileting, grooming, dressing, etc )  - Assess/evaluate cause of self-care deficits   - Assess range of motion  - Assess patient's mobility; develop plan if impaired  - Assess patient's need for assistive devices and provide as appropriate  - Encourage maximum independence but intervene and supervise when necessary  - Involve family in performance of ADLs  - Assess for home care needs following discharge   - Consider OT consult to assist with ADL evaluation and planning for discharge  - Provide patient education as appropriate  Outcome: Progressing  Goal: Maintain or return mobility status to optimal level  Description  INTERVENTIONS:  - Assess patient's baseline mobility status (ambulation, transfers, stairs, etc )    - Identify cognitive and physical deficits and behaviors that affect mobility  - Identify mobility aids required to assist with transfers and/or ambulation (gait belt, sit-to-stand, lift, walker, cane, etc )  - [place] fall precautions as indicated by assessment  - Record patient progress and toleration of activity level on Mobility SBAR; progress patient to next Phase/Stage  - Instruct patient to call for assistance with activity based on assessment  - Consider rehabilitation consult to assist with strengthening/weightbearing, etc   Outcome: Progressing     Problem: DISCHARGE PLANNING  Goal: Discharge to home or other facility with appropriate resources  Description  INTERVENTIONS:  - Identify barriers to discharge w/patient and caregiver  - Arrange for needed discharge resources and transportation as appropriate  - Identify discharge learning needs (meds, wound care, etc )  - Arrange for interpretive services to assist at discharge as needed  - Refer to Case Management Department for coordinating discharge planning if the patient needs post-hospital services based on physician/advanced practitioner order or complex needs related to functional status, cognitive ability, or social support system  Outcome: Progressing     Problem: Knowledge Deficit  Goal: Patient/family/caregiver demonstrates understanding of disease process, treatment plan, medications, and discharge instructions  Description  Complete learning assessment and assess knowledge base  Interventions:  - Provide teaching at level of understanding  - Provide teaching via preferred learning methods  Outcome: Progressing     Problem: Potential for Falls  Goal: Patient will remain free of falls  Description  INTERVENTIONS:  - Assess patient frequently for physical needs  -  Identify cognitive and physical deficits and behaviors that affect risk of falls    -  [place] fall precautions as indicated by assessment   - Educate patient/family on patient safety including physical limitations  - Instruct patient to call for assistance with activity based on assessment  - Modify environment to reduce risk of injury  - Consider OT/PT consult to assist with strengthening/mobility  Outcome: Progressing     Problem: NEUROSENSORY - ADULT  Goal: Achieves stable or improved neurological status  Description  INTERVENTIONS  - Monitor and report changes in neurological status  - Monitor vital signs such as temperature, blood pressure, glucose, and any other labs ordered   - Initiate measures to prevent increased intracranial pressure  - Monitor for seizure activity and implement precautions if appropriate      Outcome: Progressing  Goal: Achieves maximal functionality and self care  Description  INTERVENTIONS  - Monitor swallowing and airway patency with patient fatigue and changes in neurological status  - Encourage and assist patient to increase activity and self care     - Encourage visually impaired, hearing impaired and aphasic patients to use assistive/communication devices  Outcome: Progressing     Problem: SKIN/TISSUE INTEGRITY - ADULT  Goal: Skin integrity remains intact  Description  INTERVENTIONS  - Identify patients at risk for skin breakdown  - Assess and monitor skin integrity  - Assess and monitor nutrition and hydration status  - Monitor labs (i e  albumin)  - Assess for incontinence   - Turn and reposition patient  - Assist with mobility/ambulation  - Relieve pressure over bony prominences  - Avoid friction and shearing  - Provide appropriate hygiene as needed including keeping skin clean and dry  - Evaluate need for skin moisturizer/barrier cream  - Collaborate with interdisciplinary team (i e  Nutrition, Rehabilitation, etc )   - Patient/family teaching  Outcome: Progressing  Goal: Incision(s), wounds(s) or drain site(s) healing without S/S of infection  Description  INTERVENTIONS  - Assess and document risk factors for skin impairment   - Assess and document dressing, incision, wound bed, drain sites and surrounding tissue  - Consider nutrition services referral as needed  - Oral mucous membranes remain intact  - Provide patient/ family education  Outcome: Progressing  Goal: Oral mucous membranes remain intact  Description  INTERVENTIONS  - Assess oral mucosa and hygiene practices  - Implement preventative oral hygiene regimen  - Implement oral medicated treatments as ordered  - Initiate Nutrition services referral as needed  Outcome: Progressing     Problem: MUSCULOSKELETAL - ADULT  Goal: Maintain or return mobility to safest level of function  Description  INTERVENTIONS:  - Assess patient's ability to carry out ADLs; assess patient's baseline for ADL function and identify physical deficits which impact ability to perform ADLs (bathing, care of mouth/teeth, toileting, grooming, dressing, etc )  - Assess/evaluate cause of self-care deficits   - Assess range of motion  - Assess patient's mobility  - Assess patient's need for assistive devices and provide as appropriate  - Encourage maximum independence but intervene and supervise when necessary  - Involve family in performance of ADLs  - Assess for home care needs following discharge   - Consider OT consult to assist with ADL evaluation and planning for discharge  - Provide patient education as appropriate  Outcome: Progressing  Goal: Maintain proper alignment of affected body part  Description  INTERVENTIONS:  - Support, maintain and protect limb and body alignment  - Provide patient/ family with appropriate education  Outcome: Progressing

## 2020-07-09 NOTE — ASSESSMENT & PLAN NOTE
POD#3 revision of scalp over ventricular catheter  · Initial VPS placed on 9/26/2005  · Externalization and wound debridement on 10/29/2005  · New  shunt placement with reprogrammable shunt on 12/22/2005  · Shunt revision on 08/03/2011  · Per [address] [place] [address] office notes, shunt has been dialed up to 120cm water, no additional adjustments have been made since then

## 2020-07-10 ENCOUNTER — HOSPITAL ENCOUNTER (INPATIENT)
Facility: HOSPITAL | Age: 70
LOS: 12 days | Discharge: HOME | DRG: 560 | End: 2020-07-22
Attending: PHYSICAL MEDICINE & REHABILITATION | Admitting: PHYSICAL MEDICINE & REHABILITATION
Payer: COMMERCIAL

## 2020-07-10 VITALS
WEIGHT: 280 LBS | HEART RATE: 49 BPM | OXYGEN SATURATION: 96 % | BODY MASS INDEX: 34.82 KG/M2 | DIASTOLIC BLOOD PRESSURE: 72 MMHG | RESPIRATION RATE: 16 BRPM | SYSTOLIC BLOOD PRESSURE: 137 MMHG | TEMPERATURE: 98.6 F | HEIGHT: 75 IN

## 2020-07-10 DIAGNOSIS — E11.9 TYPE 2 DIABETES MELLITUS (HCC): Primary | ICD-10-CM

## 2020-07-10 DIAGNOSIS — Z74.09 IMPAIRED FUNCTIONAL MOBILITY, BALANCE, GAIT, AND ENDURANCE: ICD-10-CM

## 2020-07-10 DIAGNOSIS — I10 ESSENTIAL HYPERTENSION: ICD-10-CM

## 2020-07-10 DIAGNOSIS — M43.17 SPONDYLOLISTHESIS OF LUMBOSACRAL REGION: ICD-10-CM

## 2020-07-10 DIAGNOSIS — R52 PAIN: ICD-10-CM

## 2020-07-10 PROBLEM — S01.01XA SCALP LACERATION: Status: ACTIVE | Noted: 2020-07-10

## 2020-07-10 PROBLEM — S00.03XA SCALP HEMATOMA: Status: ACTIVE | Noted: 2020-07-10

## 2020-07-10 PROBLEM — R29.898 WEAKNESS OF LEFT FOOT: Status: ACTIVE | Noted: 2020-07-10

## 2020-07-10 LAB
GLUCOSE SERPL-MCNC: 107 MG/DL (ref 65–140)
GLUCOSE SERPL-MCNC: 109 MG/DL (ref 65–140)
GLUCOSE SERPL-MCNC: 132 MG/DL (ref 65–140)
GLUCOSE SERPL-MCNC: 171 MG/DL (ref 65–140)

## 2020-07-10 PROCEDURE — 99223 1ST HOSP IP/OBS HIGH 75: CPT | Performed by: PHYSICAL MEDICINE & REHABILITATION

## 2020-07-10 PROCEDURE — 99024 POSTOP FOLLOW-UP VISIT: CPT | Performed by: PHYSICIAN ASSISTANT

## 2020-07-10 PROCEDURE — 82948 REAGENT STRIP/BLOOD GLUCOSE: CPT

## 2020-07-10 PROCEDURE — NC001 PR NO CHARGE

## 2020-07-10 RX ORDER — ONDANSETRON 2 MG/ML
4 INJECTION INTRAMUSCULAR; INTRAVENOUS EVERY 6 HOURS PRN
Status: CANCELLED | OUTPATIENT
Start: 2020-07-10

## 2020-07-10 RX ORDER — ACETAMINOPHEN 325 MG/1
650 TABLET ORAL EVERY 8 HOURS SCHEDULED
Status: DISCONTINUED | OUTPATIENT
Start: 2020-07-10 | End: 2020-07-21

## 2020-07-10 RX ORDER — OXYCODONE HYDROCHLORIDE 5 MG/1
2.5 TABLET ORAL EVERY 4 HOURS PRN
Status: CANCELLED | OUTPATIENT
Start: 2020-07-10

## 2020-07-10 RX ORDER — LANOLIN ALCOHOL/MO/W.PET/CERES
6 CREAM (GRAM) TOPICAL
Status: DISCONTINUED | OUTPATIENT
Start: 2020-07-10 | End: 2020-07-22 | Stop reason: HOSPADM

## 2020-07-10 RX ORDER — ALLOPURINOL 300 MG/1
300 TABLET ORAL DAILY
Status: DISCONTINUED | OUTPATIENT
Start: 2020-07-11 | End: 2020-07-22 | Stop reason: HOSPADM

## 2020-07-10 RX ORDER — DOCUSATE SODIUM 100 MG/1
100 CAPSULE, LIQUID FILLED ORAL 2 TIMES DAILY
Status: CANCELLED | OUTPATIENT
Start: 2020-07-10

## 2020-07-10 RX ORDER — OXYCODONE HYDROCHLORIDE 5 MG/1
5 TABLET ORAL EVERY 4 HOURS PRN
Status: DISCONTINUED | OUTPATIENT
Start: 2020-07-10 | End: 2020-07-22 | Stop reason: HOSPADM

## 2020-07-10 RX ORDER — ALLOPURINOL 300 MG/1
300 TABLET ORAL DAILY
Status: CANCELLED | OUTPATIENT
Start: 2020-07-11

## 2020-07-10 RX ORDER — SENNOSIDES 8.6 MG
1 TABLET ORAL DAILY
Status: DISCONTINUED | OUTPATIENT
Start: 2020-07-11 | End: 2020-07-22 | Stop reason: HOSPADM

## 2020-07-10 RX ORDER — BISACODYL 10 MG
10 SUPPOSITORY, RECTAL RECTAL DAILY PRN
Status: DISCONTINUED | OUTPATIENT
Start: 2020-07-10 | End: 2020-07-11

## 2020-07-10 RX ORDER — OXYCODONE HYDROCHLORIDE 5 MG/1
2.5 TABLET ORAL EVERY 4 HOURS PRN
Status: DISCONTINUED | OUTPATIENT
Start: 2020-07-10 | End: 2020-07-22 | Stop reason: HOSPADM

## 2020-07-10 RX ORDER — TRAVOPROST OPHTHALMIC SOLUTION 0.04 MG/ML
1 SOLUTION OPHTHALMIC
Status: DISCONTINUED | OUTPATIENT
Start: 2020-07-10 | End: 2020-07-22 | Stop reason: HOSPADM

## 2020-07-10 RX ORDER — OXYCODONE HYDROCHLORIDE 5 MG/1
5 TABLET ORAL EVERY 4 HOURS PRN
Status: CANCELLED | OUTPATIENT
Start: 2020-07-10

## 2020-07-10 RX ORDER — HEPARIN SODIUM 5000 [USP'U]/ML
5000 INJECTION, SOLUTION INTRAVENOUS; SUBCUTANEOUS EVERY 8 HOURS SCHEDULED
Status: CANCELLED | OUTPATIENT
Start: 2020-07-10

## 2020-07-10 RX ORDER — BISACODYL 10 MG
10 SUPPOSITORY, RECTAL RECTAL DAILY PRN
Status: DISCONTINUED | OUTPATIENT
Start: 2020-07-10 | End: 2020-07-10

## 2020-07-10 RX ORDER — ONDANSETRON 4 MG/1
4 TABLET, ORALLY DISINTEGRATING ORAL EVERY 6 HOURS PRN
Status: DISCONTINUED | OUTPATIENT
Start: 2020-07-10 | End: 2020-07-22 | Stop reason: HOSPADM

## 2020-07-10 RX ORDER — TRAVOPROST OPHTHALMIC SOLUTION 0.04 MG/ML
1 SOLUTION OPHTHALMIC
Status: CANCELLED | OUTPATIENT
Start: 2020-07-10

## 2020-07-10 RX ORDER — ACETAMINOPHEN 325 MG/1
975 TABLET ORAL EVERY 8 HOURS SCHEDULED
Status: CANCELLED | OUTPATIENT
Start: 2020-07-10

## 2020-07-10 RX ORDER — POLYETHYLENE GLYCOL 3350 17 G/17G
17 POWDER, FOR SOLUTION ORAL DAILY PRN
Status: DISCONTINUED | OUTPATIENT
Start: 2020-07-10 | End: 2020-07-22 | Stop reason: HOSPADM

## 2020-07-10 RX ORDER — METHOCARBAMOL 500 MG/1
500 TABLET, FILM COATED ORAL EVERY 6 HOURS SCHEDULED
Status: CANCELLED | OUTPATIENT
Start: 2020-07-10

## 2020-07-10 RX ORDER — DOCUSATE SODIUM 100 MG/1
100 CAPSULE, LIQUID FILLED ORAL 2 TIMES DAILY
Status: DISCONTINUED | OUTPATIENT
Start: 2020-07-10 | End: 2020-07-22 | Stop reason: HOSPADM

## 2020-07-10 RX ORDER — SENNOSIDES 8.6 MG
1 TABLET ORAL DAILY
Status: CANCELLED | OUTPATIENT
Start: 2020-07-11

## 2020-07-10 RX ORDER — LANOLIN ALCOHOL/MO/W.PET/CERES
6 CREAM (GRAM) TOPICAL
Status: CANCELLED | OUTPATIENT
Start: 2020-07-10

## 2020-07-10 RX ORDER — BISACODYL 10 MG
10 SUPPOSITORY, RECTAL RECTAL DAILY PRN
Status: CANCELLED | OUTPATIENT
Start: 2020-07-10

## 2020-07-10 RX ORDER — POLYETHYLENE GLYCOL 3350 17 G/17G
17 POWDER, FOR SOLUTION ORAL DAILY PRN
Status: CANCELLED | OUTPATIENT
Start: 2020-07-10

## 2020-07-10 RX ORDER — HEPARIN SODIUM 5000 [USP'U]/ML
5000 INJECTION, SOLUTION INTRAVENOUS; SUBCUTANEOUS EVERY 8 HOURS SCHEDULED
Status: DISCONTINUED | OUTPATIENT
Start: 2020-07-10 | End: 2020-07-22 | Stop reason: HOSPADM

## 2020-07-10 RX ORDER — METHOCARBAMOL 500 MG/1
500 TABLET, FILM COATED ORAL EVERY 6 HOURS SCHEDULED
Status: DISCONTINUED | OUTPATIENT
Start: 2020-07-10 | End: 2020-07-22 | Stop reason: HOSPADM

## 2020-07-10 RX ADMIN — DOCUSATE SODIUM 100 MG: 100 CAPSULE, LIQUID FILLED ORAL at 17:05

## 2020-07-10 RX ADMIN — METHOCARBAMOL 500 MG: 500 TABLET, FILM COATED ORAL at 12:44

## 2020-07-10 RX ADMIN — METHOCARBAMOL 500 MG: 500 TABLET, FILM COATED ORAL at 02:50

## 2020-07-10 RX ADMIN — METHOCARBAMOL 500 MG: 500 TABLET, FILM COATED ORAL at 17:05

## 2020-07-10 RX ADMIN — METHOCARBAMOL 500 MG: 500 TABLET, FILM COATED ORAL at 06:01

## 2020-07-10 RX ADMIN — METHOCARBAMOL 500 MG: 500 TABLET, FILM COATED ORAL at 23:48

## 2020-07-10 RX ADMIN — ALLOPURINOL 300 MG: 300 TABLET ORAL at 09:29

## 2020-07-10 RX ADMIN — DOCUSATE SODIUM 100 MG: 100 CAPSULE, LIQUID FILLED ORAL at 08:24

## 2020-07-10 RX ADMIN — STANDARDIZED SENNA CONCENTRATE 8.6 MG: 8.6 TABLET ORAL at 08:24

## 2020-07-10 RX ADMIN — TRAVOPROST 1 DROP: 0.04 SOLUTION/ DROPS OPHTHALMIC at 22:41

## 2020-07-10 RX ADMIN — OXYCODONE HYDROCHLORIDE 5 MG: 5 TABLET ORAL at 06:01

## 2020-07-10 RX ADMIN — HEPARIN SODIUM 5000 UNITS: 5000 INJECTION INTRAVENOUS; SUBCUTANEOUS at 21:26

## 2020-07-10 RX ADMIN — MELATONIN 6 MG: at 21:25

## 2020-07-10 RX ADMIN — HEPARIN SODIUM 5000 UNITS: 5000 INJECTION INTRAVENOUS; SUBCUTANEOUS at 06:01

## 2020-07-10 RX ADMIN — ACETAMINOPHEN 975 MG: 325 TABLET, FILM COATED ORAL at 06:01

## 2020-07-10 RX ADMIN — ACETAMINOPHEN 650 MG: 325 TABLET, FILM COATED ORAL at 21:25

## 2020-07-10 NOTE — SOCIAL WORK
Patient reviewed in care rounds  Patient is here with Spondylolisthesis of lumbosacral region  Patient is POD 4 from L5-S1 fusion  PT/OT evaluation recommending rehab  Patient requested and accepted to [facility] room 458 at 1 pm  RN should call report to   Patient, provider, facility and RN at bedside aware of discharge arrangements

## 2020-07-10 NOTE — DISCHARGE INSTRUCTIONS
Discharge Instructions  Posterior Lumbar Fusion/Fixation      Surgical incisional care:   Maintain dressing in place for 3 days  On postoperative day 3, dressing may be removed and incision may be left open to air   Keep incision clean and dry  Avoid applying creams, lotion or antiseptic to incision area   Check the wound daily  If the incision becomes red, swollen, tender, warm, or has increased drainage please notify physician immediately   If steri-strips are in place, allow them to fall off  If still in place at two week postoperative visit, we will remove them   May shower 3 days after surgery, but do not soak in a tub and no swimming until cleared   o Incision may be cleaned with water and a mild antimicrobial soap using a clean washcloth  Incision is to be gently patted dry with a clean towel   Continue to change bed linens and pajamas more frequently  Wear clean clothes daily  Activity Restrictions:   No heavy lifting greater than 10lbs and no strenuous activities until cleared   No bending or twisting  No BLTs (bending, lifting, twisting)  Avoid pushing/pulling movements   May walk as tolerated  Encourage at least 4 short walks per day  No prolonged sitting   No driving for at least 2 weeks or until cleared by physician   If you were provided a brace, it is to be worn whenever you are out of bed until directed otherwise  It may be put on while sitting at bedside  Postoperative medication:   Take pain medications to relieve incision pain, and muscle relaxants to prevent spasms as directed  Please see after visit summary (AVS) for details   Do not operate heavy machinery or vehicles while taking sedating medications   Use a bowel regimen while on opioids as they induce constipation  Ie  Senokot-S, Miralax, Colace, etc  Increase fiber and water intake      Do not take ibuprofen, Naproxen/Aleve or any non steroidal anti-inflammatory medications, NSAIDs, until cleared by surgeon  May take Tylenol instead   If taking Coumadin, Aspirin, or Plavix, you may resume these medications when cleared by Neurosurgery  Follow-up as scheduled for a 2 week incision check  Follow-up as scheduled for 6 week postoperative visit with repeat Xrays       **Please notify MD if you experience a fever 101F, chills or have increased pain, numbness, tingling, or weakness in your legs, increased walking difficulties and/or bowel/bladder dysfunction/incontinence**

## 2020-07-10 NOTE — ASSESSMENT & PLAN NOTE
POD#4 revision of scalp over ventricular catheter  · Initial VPS placed on 9/26/2005  · Externalization and wound debridement on 10/29/2005  · New  shunt placement with reprogrammable shunt on 12/22/2005  · Shunt revision on 08/03/2011  · Per [address] [place] [address] office notes, shunt has been dialed up to 120cm water, no additional adjustments have been made since then

## 2020-07-10 NOTE — CONSULTS
Internal Medicine  Consultation Note    Patient: Daysi Carpenter  Age/sex: 69 y o  male  Medical Record #: [id number]    Assessment/Plan:     L5-S1 TLIF with hx L-S spondylolisthesis on 7/6/20 ([place])   Monitor incision   Pain control per primary    Scalp revision/hx NPH  shunt 7/6/20   Watch incision    Bradycardia   Appears to be running HR in 50's but BPs OK    DM2   At home on Lantus 10U qma/12U qpm   Continue DM diet, QID Accuchecks/SSI   Currently only on SSI so will add back Lantus when needed    Mild cognitive impairment   Seen by Geriatrics as I/P and MOCA was 20/32   For OP followup with them an a repeat MOCA    HTN   Takes Vasotec at home   Will resume here when needed   Had been held in post-op period to prevent postop SACHI    ZHOU/CPAP   Continue CPAP    Hx gout   Continue Allopurinol    Urine incontinence   Supposedly having this issue at home and Geriatrics recommended that he see Urology as an OP   Will check PVRs here and freq toilet to prevent accidents    Left TMA, diabetic neuropathy   Likely contributes to some gait instability        Subjective/HPI: Daysi Carpenter is a 69year old patient with a history of DM2 with neuropathy, NPH/ shunt, ZHOU/CPAP, gout, HTN, left TMA, lumbar sacral spondylolisthesis who underwent an elective L5-S1 TLIF and a revision of the scalp overlying his  shunt on 7/6/20  Renal saw for advice to prevent SACHI in post-op period  He has normal renal function  ACEI was held and is to be restarted here in [facility] when needed  Geriatrics saw in consult  He had a MOCA done which showed mild cognitive impairmant 22/30  He is to see them as an OP and have a repeat MOCA done  Currently there are no complaints of CP, SOB, dizziness, N/V/D      ROS:   A 10 point ROS was performed; negative except as noted above       Social History:    Substance Use History:   Social History     Substance and Sexual Activity   Alcohol Use Not Currently    Frequency: Monthly or less    Drinks per session: 1 or 2     Social History     Tobacco Use   Smoking Status Never Smoker   Smokeless Tobacco Never Used     Social History     Substance and Sexual Activity   Drug Use Never       Family History:    Family History   Problem Relation Age of Onset    Polymyositis Mother     Heart failure Father          Review of Scheduled Meds:    Current Facility-Administered Medications:  acetaminophen 650 mg Oral Q8H Armani Chopra MD   [START ON 7/11/2020] allopurinol 300 mg Oral Daily Antony Merlos MD   bisacodyl 10 mg Rectal Daily PRN Antony Merlos MD   docusate sodium 100 mg Oral BID Antony Merlos MD   heparin (porcine) 5,000 Units Subcutaneous Q8H Armani Chopra MD   insulin lispro 2-12 Units Subcutaneous TID AC Antony Merlos MD   melatonin 6 mg Oral HS Antony Merlos MD   methocarbamol 500 mg Oral Q6H [facility] Antony Merlos MD   ondansetron 4 mg Oral Q6H PRN Antony Merlos MD   oxyCODONE 2 5 mg Oral Q4H PRN Antony Merlos MD   oxyCODONE 5 mg Oral Q4H PRN Antony Merlos MD   polyethylene glycol 17 g Oral Daily PRN Antony Merlos MD   [START ON 7/11/2020] senna 1 tablet Oral Daily Antony Merlos MD   travoprost 1 drop Right Eye HS Antony Merlos MD       Labs:     Results from last 7 days   Lab Units 07/07/20  0511   WBC Thousand/uL 14 22*   HEMOGLOBIN g/dL 13 0   HEMATOCRIT % 39 2   PLATELETS Thousands/uL 172     Results from last 7 days   Lab Units 07/07/20  0530   SODIUM mmol/L 138   POTASSIUM mmol/L 4 1   CHLORIDE mmol/L 109*   CO2 mmol/L 24   BUN mg/dL 16   CREATININE mg/dL 0 99   CALCIUM mg/dL 8 4                Results from last 7 days   Lab Units 07/10/20  1607 07/10/20  1141 07/10/20  0854   POC GLUCOSE mg/dl 109 107 171*       Lab Results   Component Value Date    BLOODCX No Growth After 5 Days  05/28/2020    BLOODCX No Growth After 5 Days   05/28/2020    WOUNDCULT Few Colonies of Enterococcus faecalis (A) 05/29/2020    WOUNDCULT Few Colonies of  05/29/2020 WOUNDCULT 1+ Growth of Enterococcus faecalis (A) 2020    WOUNDCULT 1+ Growth of  2020       Input and Output Summary (last 24 hours):     No intake or output data in the 24 hours ending 07/10/20 1704    Imaging:     No orders to display       *Labs /Radiology studies reviewed  *Medications reviewed and reconciled as needed  *Please refer to order section for additional ordered labs studies  *Case discussed with primary attending during morning huddle case rounds      Vitals:   Temp (24hrs), Av 4 °F (36 9 °C), Min:97 8 °F (36 6 °C), Max:98 8 °F (37 1 °C)    Temp:  [97 8 °F (36 6 °C)-98 8 °F (37 1 °C)] 97 8 °F (36 6 °C)  HR:  [49-59] 58  Resp:  [16-18] 16  BP: (125-168)/(63-81) 168/81  SpO2:  [94 %-99 %] 99 %  Body mass index is 34 56 kg/m²  Physical Exam:   General Appearance: no distress, conversive  HEENT: PERRLA, conjuctiva normal; oropharynx clear; mucous membranes moist   Scalp incision is w/o erythema/drainage   Neck:  Supple, normal ROM, no JVD  Lungs: CTA, normal respiratory effort, no retractions, expiratory effort normal  CV: regular rate and rhythm; no rubs/murmurs/gallops, PMI normal   ABD: soft; ND/NT; +BS  Back:  Back incision is w/o erythema/drainage  EXT: no edema  Skin: normal turgor, normal texture, no rashes  Psych: affect normal, mood normal  Neuro: AAO          Invasive Devices     None                  DVT prophylaxis:  HSQ  Code Status: Level 1 - Full Code  Current Length of Stay: 0 day(s)    Total floor / unit time spent today 1 hour with more than 50% spent counseling/coordinating care  Counseling includes discussion with patient re: progress  and discussion with patient of his/her current medical state/information  Coordination of patient's care was performed in conjunction with primary service  Time invested included review of patient's labs, vitals, and management of their comorbidities with continued monitoring   In addition, this patient was discussed with medical team including physician and advanced extenders  The care of the patient was extensively discussed and appropriate treatment plan was formulated unique for this patient  ** Please Note: Fluency Direct voice to text software may have been used in the creation of this document   Audio transcription errors may occur**

## 2020-07-10 NOTE — PLAN OF CARE
Problem: Prexisting or High Potential for Compromised Skin Integrity  Goal: Skin integrity is maintained or improved  Description  INTERVENTIONS:  - Identify patients at risk for skin breakdown  - Assess and monitor skin integrity  - Assess and monitor nutrition and hydration status  - Monitor labs   - Assess for incontinence   - Turn and reposition patient  - Assist with mobility/ambulation  - Relieve pressure over bony prominences  - Avoid friction and shearing  - Provide appropriate hygiene as needed including keeping skin clean and dry  - Evaluate need for skin moisturizer/barrier cream  - Collaborate with interdisciplinary team   - Patient/family teaching  - Consider wound care consult   Outcome: Adequate for Discharge     Problem: PAIN - ADULT  Goal: Verbalizes/displays adequate comfort level or baseline comfort level  Description  Interventions:  - Encourage patient to monitor pain and request assistance  - Assess pain using appropriate pain scale  - Administer analgesics based on type and severity of pain and evaluate response  - Implement non-pharmacological measures as appropriate and evaluate response  - Consider cultural and social influences on pain and pain management  - Notify physician/advanced practitioner if interventions unsuccessful or patient reports new pain  Outcome: Adequate for Discharge     Problem: INFECTION - ADULT  Goal: Absence or prevention of progression during hospitalization  Description  INTERVENTIONS:  - Assess and monitor for signs and symptoms of infection  - Monitor lab/diagnostic results  - Monitor all insertion sites, i e  indwelling lines, tubes, and drains  - Monitor endotracheal if appropriate and nasal secretions for changes in amount and color  - [place] appropriate cooling/warming therapies per order  - Administer medications as ordered  - Instruct and encourage patient and family to use good hand hygiene technique  - Identify and instruct in appropriate isolation precautions for identified infection/condition  Outcome: Adequate for Discharge  Goal: Absence of fever/infection during neutropenic period  Description  INTERVENTIONS:  - Monitor WBC    Outcome: Adequate for Discharge     Problem: SAFETY ADULT  Goal: Patient will remain free of falls  Description  INTERVENTIONS:  - Assess patient frequently for physical needs  -  Identify cognitive and physical deficits and behaviors that affect risk of falls    -  [place] fall precautions as indicated by assessment   - Educate patient/family on patient safety including physical limitations  - Instruct patient to call for assistance with activity based on assessment  - Modify environment to reduce risk of injury  - Consider OT/PT consult to assist with strengthening/mobility  Outcome: Adequate for Discharge  Goal: Maintain or return to baseline ADL function  Description  INTERVENTIONS:  -  Assess patient's ability to carry out ADLs; assess patient's baseline for ADL function and identify physical deficits which impact ability to perform ADLs (bathing, care of mouth/teeth, toileting, grooming, dressing, etc )  - Assess/evaluate cause of self-care deficits   - Assess range of motion  - Assess patient's mobility; develop plan if impaired  - Assess patient's need for assistive devices and provide as appropriate  - Encourage maximum independence but intervene and supervise when necessary  - Involve family in performance of ADLs  - Assess for home care needs following discharge   - Consider OT consult to assist with ADL evaluation and planning for discharge  - Provide patient education as appropriate  Outcome: Adequate for Discharge  Goal: Maintain or return mobility status to optimal level  Description  INTERVENTIONS:  - Assess patient's baseline mobility status (ambulation, transfers, stairs, etc )    - Identify cognitive and physical deficits and behaviors that affect mobility  - Identify mobility aids required to assist with transfers and/or ambulation (gait belt, sit-to-stand, lift, walker, cane, etc )  - [place] fall precautions as indicated by assessment  - Record patient progress and toleration of activity level on Mobility SBAR; progress patient to next Phase/Stage  - Instruct patient to call for assistance with activity based on assessment  - Consider rehabilitation consult to assist with strengthening/weightbearing, etc   Outcome: Adequate for Discharge     Problem: DISCHARGE PLANNING  Goal: Discharge to home or other facility with appropriate resources  Description  INTERVENTIONS:  - Identify barriers to discharge w/patient and caregiver  - Arrange for needed discharge resources and transportation as appropriate  - Identify discharge learning needs (meds, wound care, etc )  - Arrange for interpretive services to assist at discharge as needed  - Refer to Case Management Department for coordinating discharge planning if the patient needs post-hospital services based on physician/advanced practitioner order or complex needs related to functional status, cognitive ability, or social support system  Outcome: Adequate for Discharge     Problem: Knowledge Deficit  Goal: Patient/family/caregiver demonstrates understanding of disease process, treatment plan, medications, and discharge instructions  Description  Complete learning assessment and assess knowledge base  Interventions:  - Provide teaching at level of understanding  - Provide teaching via preferred learning methods  Outcome: Adequate for Discharge     Problem: Potential for Falls  Goal: Patient will remain free of falls  Description  INTERVENTIONS:  - Assess patient frequently for physical needs  -  Identify cognitive and physical deficits and behaviors that affect risk of falls    -  [place] fall precautions as indicated by assessment   - Educate patient/family on patient safety including physical limitations  - Instruct patient to call for assistance with activity based on assessment  - Modify environment to reduce risk of injury  - Consider OT/PT consult to assist with strengthening/mobility  Outcome: Adequate for Discharge     Problem: NEUROSENSORY - ADULT  Goal: Achieves stable or improved neurological status  Description  INTERVENTIONS  - Monitor and report changes in neurological status  - Monitor vital signs such as temperature, blood pressure, glucose, and any other labs ordered   - Initiate measures to prevent increased intracranial pressure  - Monitor for seizure activity and implement precautions if appropriate      Outcome: Adequate for Discharge  Goal: Achieves maximal functionality and self care  Description  INTERVENTIONS  - Monitor swallowing and airway patency with patient fatigue and changes in neurological status  - Encourage and assist patient to increase activity and self care     - Encourage visually impaired, hearing impaired and aphasic patients to use assistive/communication devices  Outcome: Adequate for Discharge     Problem: SKIN/TISSUE INTEGRITY - ADULT  Goal: Skin integrity remains intact  Description  INTERVENTIONS  - Identify patients at risk for skin breakdown  - Assess and monitor skin integrity  - Assess and monitor nutrition and hydration status  - Monitor labs (i e  albumin)  - Assess for incontinence   - Turn and reposition patient  - Assist with mobility/ambulation  - Relieve pressure over bony prominences  - Avoid friction and shearing  - Provide appropriate hygiene as needed including keeping skin clean and dry  - Evaluate need for skin moisturizer/barrier cream  - Collaborate with interdisciplinary team (i e  Nutrition, Rehabilitation, etc )   - Patient/family teaching  Outcome: Adequate for Discharge  Goal: Incision(s), wounds(s) or drain site(s) healing without S/S of infection  Description  INTERVENTIONS  - Assess and document risk factors for skin impairment   - Assess and document dressing, incision, wound bed, drain sites and surrounding tissue  - Consider nutrition services referral as needed  - Oral mucous membranes remain intact  - Provide patient/ family education  Outcome: Adequate for Discharge  Goal: Oral mucous membranes remain intact  Description  INTERVENTIONS  - Assess oral mucosa and hygiene practices  - Implement preventative oral hygiene regimen  - Implement oral medicated treatments as ordered  - Initiate Nutrition services referral as needed  Outcome: Adequate for Discharge     Problem: MUSCULOSKELETAL - ADULT  Goal: Maintain or return mobility to safest level of function  Description  INTERVENTIONS:  - Assess patient's ability to carry out ADLs; assess patient's baseline for ADL function and identify physical deficits which impact ability to perform ADLs (bathing, care of mouth/teeth, toileting, grooming, dressing, etc )  - Assess/evaluate cause of self-care deficits   - Assess range of motion  - Assess patient's mobility  - Assess patient's need for assistive devices and provide as appropriate  - Encourage maximum independence but intervene and supervise when necessary  - Involve family in performance of ADLs  - Assess for home care needs following discharge   - Consider OT consult to assist with ADL evaluation and planning for discharge  - Provide patient education as appropriate  Outcome: Adequate for Discharge  Goal: Maintain proper alignment of affected body part  Description  INTERVENTIONS:  - Support, maintain and protect limb and body alignment  - Provide patient/ family with appropriate education  Outcome: Adequate for Discharge

## 2020-07-10 NOTE — ASSESSMENT & PLAN NOTE
s/p L5-S1 transverse lumbar interbody fusion and deformity correction at L5-S1 on 7/6/20 by Dr Pineda Suh healing well with sterstribassam  Follow-up with Dr Cora Westbrook follow-up  X-rays reviewed: Status post TLIF at L5-S1  No complication of the hardware  Multilevel lumbar spondylosis and L5-S1 spondylolisthesis

## 2020-07-10 NOTE — ASSESSMENT & PLAN NOTE
Lab Results   Component Value Date    HGBA1C 5 9 (H) 06/16/2020       Recent Labs     07/09/20  1150 07/09/20  1642 07/09/20  2059 07/10/20  0854   POCGLU 112 112 137 171*       Blood Sugar Average: Last 72 hrs:  (P) 123 25     On ISS

## 2020-07-10 NOTE — PLAN OF CARE
Problem: Prexisting or High Potential for Compromised Skin Integrity  Goal: Skin integrity is maintained or improved  Description  INTERVENTIONS:  - Identify patients at risk for skin breakdown  - Assess and monitor skin integrity  - Assess and monitor nutrition and hydration status  - Monitor labs   - Assess for incontinence   - Turn and reposition patient  - Assist with mobility/ambulation  - Relieve pressure over bony prominences  - Avoid friction and shearing  - Provide appropriate hygiene as needed including keeping skin clean and dry  - Evaluate need for skin moisturizer/barrier cream  - Collaborate with interdisciplinary team   - Patient/family teaching  - Consider wound care consult   Outcome: Progressing     Problem: PAIN - ADULT  Goal: Verbalizes/displays adequate comfort level or baseline comfort level  Description  Interventions:  - Encourage patient to monitor pain and request assistance  - Assess pain using appropriate pain scale  - Administer analgesics based on type and severity of pain and evaluate response  - Implement non-pharmacological measures as appropriate and evaluate response  - Consider cultural and social influences on pain and pain management  - Notify physician/advanced practitioner if interventions unsuccessful or patient reports new pain  Outcome: Progressing     Problem: INFECTION - ADULT  Goal: Absence or prevention of progression during hospitalization  Description  INTERVENTIONS:  - Assess and monitor for signs and symptoms of infection  - Monitor lab/diagnostic results  - Monitor all insertion sites, i e  indwelling lines, tubes, and drains  - Monitor endotracheal if appropriate and nasal secretions for changes in amount and color  - [place] appropriate cooling/warming therapies per order  - Administer medications as ordered  - Instruct and encourage patient and family to use good hand hygiene technique  - Identify and instruct in appropriate isolation precautions for identified infection/condition  Outcome: Progressing  Goal: Absence of fever/infection during neutropenic period  Description  INTERVENTIONS:  - Monitor WBC    Outcome: Progressing     Problem: SAFETY ADULT  Goal: Patient will remain free of falls  Description  INTERVENTIONS:  - Assess patient frequently for physical needs  -  Identify cognitive and physical deficits and behaviors that affect risk of falls    -  [place] fall precautions as indicated by assessment   - Educate patient/family on patient safety including physical limitations  - Instruct patient to call for assistance with activity based on assessment  - Modify environment to reduce risk of injury  - Consider OT/PT consult to assist with strengthening/mobility  Outcome: Progressing  Goal: Maintain or return to baseline ADL function  Description  INTERVENTIONS:  -  Assess patient's ability to carry out ADLs; assess patient's baseline for ADL function and identify physical deficits which impact ability to perform ADLs (bathing, care of mouth/teeth, toileting, grooming, dressing, etc )  - Assess/evaluate cause of self-care deficits   - Assess range of motion  - Assess patient's mobility; develop plan if impaired  - Assess patient's need for assistive devices and provide as appropriate  - Encourage maximum independence but intervene and supervise when necessary  - Involve family in performance of ADLs  - Assess for home care needs following discharge   - Consider OT consult to assist with ADL evaluation and planning for discharge  - Provide patient education as appropriate  Outcome: Progressing  Goal: Maintain or return mobility status to optimal level  Description  INTERVENTIONS:  - Assess patient's baseline mobility status (ambulation, transfers, stairs, etc )    - Identify cognitive and physical deficits and behaviors that affect mobility  - Identify mobility aids required to assist with transfers and/or ambulation (gait belt, sit-to-stand, lift, walker, cane, etc )  - [place] fall precautions as indicated by assessment  - Record patient progress and toleration of activity level on Mobility SBAR; progress patient to next Phase/Stage  - Instruct patient to call for assistance with activity based on assessment  - Consider rehabilitation consult to assist with strengthening/weightbearing, etc   Outcome: Progressing     Problem: DISCHARGE PLANNING  Goal: Discharge to home or other facility with appropriate resources  Description  INTERVENTIONS:  - Identify barriers to discharge w/patient and caregiver  - Arrange for needed discharge resources and transportation as appropriate  - Identify discharge learning needs (meds, wound care, etc )  - Arrange for interpretive services to assist at discharge as needed  - Refer to Case Management Department for coordinating discharge planning if the patient needs post-hospital services based on physician/advanced practitioner order or complex needs related to functional status, cognitive ability, or social support system  Outcome: Progressing     Problem: Knowledge Deficit  Goal: Patient/family/caregiver demonstrates understanding of disease process, treatment plan, medications, and discharge instructions  Description  Complete learning assessment and assess knowledge base  Interventions:  - Provide teaching at level of understanding  - Provide teaching via preferred learning methods  Outcome: Progressing     Problem: Potential for Falls  Goal: Patient will remain free of falls  Description  INTERVENTIONS:  - Assess patient frequently for physical needs  -  Identify cognitive and physical deficits and behaviors that affect risk of falls    -  [place] fall precautions as indicated by assessment   - Educate patient/family on patient safety including physical limitations  - Instruct patient to call for assistance with activity based on assessment  - Modify environment to reduce risk of injury  - Consider OT/PT consult to assist with strengthening/mobility  Outcome: Progressing     Problem: NEUROSENSORY - ADULT  Goal: Achieves stable or improved neurological status  Description  INTERVENTIONS  - Monitor and report changes in neurological status  - Monitor vital signs such as temperature, blood pressure, glucose, and any other labs ordered   - Initiate measures to prevent increased intracranial pressure  - Monitor for seizure activity and implement precautions if appropriate      Outcome: Progressing  Goal: Achieves maximal functionality and self care  Description  INTERVENTIONS  - Monitor swallowing and airway patency with patient fatigue and changes in neurological status  - Encourage and assist patient to increase activity and self care     - Encourage visually impaired, hearing impaired and aphasic patients to use assistive/communication devices  Outcome: Progressing     Problem: SKIN/TISSUE INTEGRITY - ADULT  Goal: Skin integrity remains intact  Description  INTERVENTIONS  - Identify patients at risk for skin breakdown  - Assess and monitor skin integrity  - Assess and monitor nutrition and hydration status  - Monitor labs (i e  albumin)  - Assess for incontinence   - Turn and reposition patient  - Assist with mobility/ambulation  - Relieve pressure over bony prominences  - Avoid friction and shearing  - Provide appropriate hygiene as needed including keeping skin clean and dry  - Evaluate need for skin moisturizer/barrier cream  - Collaborate with interdisciplinary team (i e  Nutrition, Rehabilitation, etc )   - Patient/family teaching  Outcome: Progressing  Goal: Incision(s), wounds(s) or drain site(s) healing without S/S of infection  Description  INTERVENTIONS  - Assess and document risk factors for skin impairment   - Assess and document dressing, incision, wound bed, drain sites and surrounding tissue  - Consider nutrition services referral as needed  - Oral mucous membranes remain intact  - Provide patient/ family education  Outcome: Progressing  Goal: Oral mucous membranes remain intact  Description  INTERVENTIONS  - Assess oral mucosa and hygiene practices  - Implement preventative oral hygiene regimen  - Implement oral medicated treatments as ordered  - Initiate Nutrition services referral as needed  Outcome: Progressing     Problem: MUSCULOSKELETAL - ADULT  Goal: Maintain or return mobility to safest level of function  Description  INTERVENTIONS:  - Assess patient's ability to carry out ADLs; assess patient's baseline for ADL function and identify physical deficits which impact ability to perform ADLs (bathing, care of mouth/teeth, toileting, grooming, dressing, etc )  - Assess/evaluate cause of self-care deficits   - Assess range of motion  - Assess patient's mobility  - Assess patient's need for assistive devices and provide as appropriate  - Encourage maximum independence but intervene and supervise when necessary  - Involve family in performance of ADLs  - Assess for home care needs following discharge   - Consider OT consult to assist with ADL evaluation and planning for discharge  - Provide patient education as appropriate  Outcome: Progressing  Goal: Maintain proper alignment of affected body part  Description  INTERVENTIONS:  - Support, maintain and protect limb and body alignment  - Provide patient/ family with appropriate education  Outcome: Progressing

## 2020-07-10 NOTE — COVID-19 HEALTH CARE FACILITY TRANSFER FORM
[facility] to [address] Transfer - COVID-19 Assessment             Name of Patient: Duke Taylor                : [id number]          Transport Date: 07/10/20       Has the patient been laboratory tested for COVID-19? []  NO  If No,Test was not indicated per  CDC Testing Criteria  May Transfer Patient   [x] YES  If Tested Results below     COVID-19 References              SARS-CoV-2   Date/Time Value Ref Range Status   2020 09:23 PM Negative Negative Final     SARS-CoV-2    Date/Time Value Ref Range Status   2020 01:02 PM Not Detected Not Detected Final     Comment: This test was developed and its performance characteristics determined  by [facility]  This test has not been FDA cleared or  approved  This test has been authorized by FDA under an Emergency Use  Authorization (EUA)  This test is only authorized for the duration of  time the declaration that circumstances exist justifying the  authorization of the emergency use of in vitro diagnostic tests for  detection of SARS-CoV-2 virus and/or diagnosis of COVID-19 infection  under section 564(b)(1) of the Act, 21 U  S C  756MUM-0(C)(6), unless  the authorization is terminated or revoked sooner  When diagnostic testing is negative, the possibility of a false  negative result should be considered in the context of a patient's  recent exposures and the presence of clinical signs and symptoms  consistent with COVID-19  An individual without symptoms of COVID-19  and who is not shedding SARS-CoV-2 virus would expect to have a  negative (not detected) result in this assay              Question is to be completed for any patient who tests positive for COVID-19        1  [x] Yes [May Transfer] [] No [May Not Transfer]          Question is to be completed for any patient who is tested for COVID-19            2    [] Yes [May Not Transfer] [x] No [May Transfer]          Signature of Physician or Health Care Professional: Doreen Fontenot PA-C 07/10/20          Form updated as of 3/24/2020

## 2020-07-10 NOTE — ASSESSMENT & PLAN NOTE
POD#4 L5-S1 TLIF    Imaging reviewed personally and with attending, results are as follows:   Upright XR lumbar spine 7/7/2020:  Status post TLIF at L5-S1  No complication of the hardware  Plan:   Pain control - well tolerated on current regimen  o Tylenol 975mg [id number]   o Robaxin 500mg [id number] for muscle spasm  o Oxycodone 2 5mg and 5mg [id number] as needed for moderate and severe pain  o Aqua K   Bowel regimen - passing gas but no bowel movement  o Continue senna, colace, and PRN miralax  Ordered dulcolax suppository PRN as he has not had BM in 4 days on current regimen    Geriatrics following, appreciate input - fall risk paperwork in patient's chart, okay to go to rehab when able  [facility] following, appreciate input - continue to hold ACEI, cleared to go to rehab    DVT ppx:  SCDs, heparin   Mobilize as tolerated with assistance, PT / OT evaluation recommending rehab, PMR states patient is good ARC candidate  o Cleared for [facility] ARC today    Patient is stable for discharge to rehab this afternoon  Follow up appointments are already scheduled  Please call with questions or concerns

## 2020-07-11 LAB
ANION GAP SERPL CALCULATED.3IONS-SCNC: 5 MMOL/L (ref 4–13)
BASOPHILS # BLD AUTO: 0.06 THOUSANDS/ΜL (ref 0–0.1)
BASOPHILS NFR BLD AUTO: 1 % (ref 0–1)
BUN SERPL-MCNC: 21 MG/DL (ref 5–25)
CALCIUM SERPL-MCNC: 8.9 MG/DL (ref 8.3–10.1)
CHLORIDE SERPL-SCNC: 104 MMOL/L (ref 100–108)
CO2 SERPL-SCNC: 26 MMOL/L (ref 21–32)
CREAT SERPL-MCNC: 0.94 MG/DL (ref 0.6–1.3)
EOSINOPHIL # BLD AUTO: 0.2 THOUSAND/ΜL (ref 0–0.61)
EOSINOPHIL NFR BLD AUTO: 3 % (ref 0–6)
ERYTHROCYTE [DISTWIDTH] IN BLOOD BY AUTOMATED COUNT: 13.9 % (ref 11.6–15.1)
GFR SERPL CREATININE-BSD FRML MDRD: 81 ML/MIN/1.73SQ M
GLUCOSE P FAST SERPL-MCNC: 126 MG/DL (ref 65–99)
GLUCOSE SERPL-MCNC: 100 MG/DL (ref 65–140)
GLUCOSE SERPL-MCNC: 101 MG/DL (ref 65–140)
GLUCOSE SERPL-MCNC: 119 MG/DL (ref 65–140)
GLUCOSE SERPL-MCNC: 121 MG/DL (ref 65–140)
GLUCOSE SERPL-MCNC: 126 MG/DL (ref 65–140)
HCT VFR BLD AUTO: 40.8 % (ref 36.5–49.3)
HGB BLD-MCNC: 13.3 G/DL (ref 12–17)
IMM GRANULOCYTES # BLD AUTO: 0.06 THOUSAND/UL (ref 0–0.2)
IMM GRANULOCYTES NFR BLD AUTO: 1 % (ref 0–2)
LYMPHOCYTES # BLD AUTO: 0.93 THOUSANDS/ΜL (ref 0.6–4.47)
LYMPHOCYTES NFR BLD AUTO: 12 % (ref 14–44)
MCH RBC QN AUTO: 28.2 PG (ref 26.8–34.3)
MCHC RBC AUTO-ENTMCNC: 32.6 G/DL (ref 31.4–37.4)
MCV RBC AUTO: 87 FL (ref 82–98)
MONOCYTES # BLD AUTO: 0.8 THOUSAND/ΜL (ref 0.17–1.22)
MONOCYTES NFR BLD AUTO: 11 % (ref 4–12)
NEUTROPHILS # BLD AUTO: 5.52 THOUSANDS/ΜL (ref 1.85–7.62)
NEUTS SEG NFR BLD AUTO: 72 % (ref 43–75)
NRBC BLD AUTO-RTO: 0 /100 WBCS
PLATELET # BLD AUTO: 215 THOUSANDS/UL (ref 149–390)
PMV BLD AUTO: 10.6 FL (ref 8.9–12.7)
POTASSIUM SERPL-SCNC: 4.2 MMOL/L (ref 3.5–5.3)
RBC # BLD AUTO: 4.71 MILLION/UL (ref 3.88–5.62)
SODIUM SERPL-SCNC: 135 MMOL/L (ref 136–145)
WBC # BLD AUTO: 7.57 THOUSAND/UL (ref 4.31–10.16)

## 2020-07-11 PROCEDURE — 97110 THERAPEUTIC EXERCISES: CPT

## 2020-07-11 PROCEDURE — 97535 SELF CARE MNGMENT TRAINING: CPT

## 2020-07-11 PROCEDURE — 97530 THERAPEUTIC ACTIVITIES: CPT

## 2020-07-11 PROCEDURE — 97163 PT EVAL HIGH COMPLEX 45 MIN: CPT

## 2020-07-11 PROCEDURE — 80048 BASIC METABOLIC PNL TOTAL CA: CPT | Performed by: NURSE PRACTITIONER

## 2020-07-11 PROCEDURE — 85025 COMPLETE CBC W/AUTO DIFF WBC: CPT | Performed by: NURSE PRACTITIONER

## 2020-07-11 PROCEDURE — 97166 OT EVAL MOD COMPLEX 45 MIN: CPT

## 2020-07-11 PROCEDURE — 82948 REAGENT STRIP/BLOOD GLUCOSE: CPT

## 2020-07-11 RX ORDER — BISACODYL 10 MG
10 SUPPOSITORY, RECTAL RECTAL DAILY PRN
Status: DISCONTINUED | OUTPATIENT
Start: 2020-07-11 | End: 2020-07-22 | Stop reason: HOSPADM

## 2020-07-11 RX ORDER — LISINOPRIL 10 MG/1
10 TABLET ORAL DAILY
Status: DISCONTINUED | OUTPATIENT
Start: 2020-07-11 | End: 2020-07-13

## 2020-07-11 RX ADMIN — METHOCARBAMOL 500 MG: 500 TABLET, FILM COATED ORAL at 11:11

## 2020-07-11 RX ADMIN — ALLOPURINOL 300 MG: 300 TABLET ORAL at 08:04

## 2020-07-11 RX ADMIN — HEPARIN SODIUM 5000 UNITS: 5000 INJECTION INTRAVENOUS; SUBCUTANEOUS at 14:23

## 2020-07-11 RX ADMIN — ACETAMINOPHEN 650 MG: 325 TABLET, FILM COATED ORAL at 22:02

## 2020-07-11 RX ADMIN — METHOCARBAMOL 500 MG: 500 TABLET, FILM COATED ORAL at 23:50

## 2020-07-11 RX ADMIN — METHOCARBAMOL 500 MG: 500 TABLET, FILM COATED ORAL at 17:32

## 2020-07-11 RX ADMIN — MELATONIN 6 MG: at 22:04

## 2020-07-11 RX ADMIN — ACETAMINOPHEN 650 MG: 325 TABLET, FILM COATED ORAL at 05:44

## 2020-07-11 RX ADMIN — ACETAMINOPHEN 650 MG: 325 TABLET, FILM COATED ORAL at 14:23

## 2020-07-11 RX ADMIN — LISINOPRIL 10 MG: 10 TABLET ORAL at 11:11

## 2020-07-11 RX ADMIN — HEPARIN SODIUM 5000 UNITS: 5000 INJECTION INTRAVENOUS; SUBCUTANEOUS at 22:10

## 2020-07-11 RX ADMIN — METHOCARBAMOL 500 MG: 500 TABLET, FILM COATED ORAL at 05:44

## 2020-07-11 RX ADMIN — HEPARIN SODIUM 5000 UNITS: 5000 INJECTION INTRAVENOUS; SUBCUTANEOUS at 05:47

## 2020-07-11 RX ADMIN — TRAVOPROST 1 DROP: 0.04 SOLUTION/ DROPS OPHTHALMIC at 22:09

## 2020-07-11 NOTE — TREATMENT PLAN
Individualized Plan of 81 [facility]  Mo Guzmán 69 y o  male MRN: [id number]  Unit/Bed#: -01 Encounter: [id number]     PATIENT INFORMATION  ADMISSION DATE: 7/10/2020  2:41 PM ESTEBAN CATEGORY:Lumbar stenosis/spondylolisthesis   ADMISSION DIAGNOSIS: Lumbar and sacral spondyloarthritis [M47 [id number]]  EXPECTED LOS: 10-14 days     MEDICAL/FUNCTIONAL PROGNOSIS  Based on my assessment of the patient's medical conditions and current functional status, the prognosis for attaining medical and functional goals or the IRF stay is:  Good    Medical Goals: Patient will be medically stable for [facility] completion of [facility] program and Patient will be able to manage medical conditions and comorbid conditions with medications and follow up upon completion of rehab program    ANTICIPATED DISCHARGE [place]: Home - Assistance    ANTICIPATED FOLLOW-UP SERVICE:   Home Health Services: PT, OT and Nursing    DISCIPLINE SPECIFIC PLANS:  Required Disciplines & Services: Rehabillitation Nursing and Case Management    REQUIRED THERAPY:  Therapy Hours per Day Days per Week Total Days   Physical Therapy 1 5 5-6 7   Occupational Therapy 1 5 5-6 7   Speech/Language Therapy 0 0 0       ANTICIPATED FUNCTIONAL OUTCOMES:  ADL: Patient will be independent with ADLs with least restrictive device upon completion of rehab program   Bladder/Bowel: Patient will return to premorbid level for bladder/bowel management upon completion of rehab program   Transfers: Patient will be independent with transfers with least restrictive device upon completion of rehab program   Locomotion: Patient will be independent with locomotion with least restrictive device upon completion of rehab program   Cognitive: Patient will return to premorbid level of cognitive activity upon completion of rehab program     DISCHARGE PLANNING NEEDS  Equipment needs: Discharge needs to be reviewed with team      REHAB ANTICIPATED PARTICIPATION RESTRICTIONS:  None

## 2020-07-11 NOTE — PROGRESS NOTES
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  · Watch incision     Bradycardia  · Appears to be running HR in 50's but BPs OK  · No symptoms     DM2  · At home on Lantus 10U qma/12U qpm  · Continue DM diet, QID Accuchecks/SSI  · Currently only on SSI so will add back Lantus when needed  · BS reviewed     Mild cognitive impairment  · Seen by Geriatrics as I/P and MOCA was 22/30  · For OP followup with them an a repeat MOCA     HTN  · Resume home vasotec 10mg  · Will resume now as BP slighlty trending up      ZHOU/CPAP  · Continue CPAP     Hx gout  · Continue Allopurinol     Urine incontinence  · Supposedly having this issue at home and Geriatrics recommended that he see Urology as an OP  · Will check PVRs here and freq toilet to prevent accidents     Left TMA, diabetic neuropathy  · Likely contributes to some gait instability       The above assessment and plan was reviewed and updated as determined by my evaluation of the patient on 7/11/2020      Labs:   Results from last 7 days   Lab Units 07/11/20  0530 07/07/20  0511   WBC Thousand/uL 7 57 14 22*   HEMOGLOBIN g/dL 13 3 13 0   HEMATOCRIT % 40 8 39 2   PLATELETS Thousands/uL 215 172     Results from last 7 days   Lab Units 07/11/20  0530 07/07/20  0530   SODIUM mmol/L 135* 138   POTASSIUM mmol/L 4 2 4 1   CHLORIDE mmol/L 104 109*   CO2 mmol/L 26 24   BUN mg/dL 21 16   CREATININE mg/dL 0 94 0 99   CALCIUM mg/dL 8 9 8 4             Results from last 7 days   Lab Units 07/11/20  0629 07/10/20  2103 07/10/20  1607   POC GLUCOSE mg/dl 121 132 109       Review of Scheduled Meds:    Current Facility-Administered Medications:  acetaminophen 650 mg Oral Q8H Sarah Bradshaw MD   allopurinol 300 mg Oral Daily Missy Bass MD   bisacodyl 10 mg Rectal Daily PRN Missy Bass MD   docusate sodium 100 mg Oral BID Missy Bass MD   heparin (porcine) 5,000 Units Subcutaneous Q8H [address] Missy Bass MD   insulin lispro 1-5 Units Subcutaneous TID AC AR Epstein   insulin lispro 1-5 Units Subcutaneous HS AR Epstein   melatonin 6 mg Oral HS Missy Bass MD   methocarbamol 500 mg Oral Q6H [address] Missy Bass MD   ondansetron 4 mg Oral Q6H PRN Missy Bass MD   oxyCODONE 2 5 mg Oral Q4H PRN Missy Bass MD   oxyCODONE 5 mg Oral Q4H PRN Missy Bass MD   polyethylene glycol 17 g Oral Daily PRN Missy Bass MD   senna 1 tablet Oral Daily Missy Bass MD   travoprost 1 drop Right Eye HS Missy Bass MD       Subjective/ HPI: Patients overnight issues or events were reviewed with nursing or staff during rounds or morning huddle session  No new or overnight issues  Offers no complaints  ROS:   A 10 point ROS was performed; negative except as noted above         Imaging:     No orders to display       *Labs /Radiology studies reviewed  *Medications reviewed and reconciled as needed  *Please refer to order section for additional ordered labs studies  *Case discussed with primary attending during morning huddle case rounds      Physical Examination:  Vitals:   Vitals:    07/10/20 1442 07/10/20 1553 07/10/20 2030 07/11/20 0539   BP: 168/81  118/63 136/73   BP Location: Right arm  Right arm    Pulse: 58  58 (!) 54   Resp: 16  16 20   Temp: 97 8 °F (36 6 °C)  98 6 °F (37 °C) 97 5 °F (36 4 °C)   TempSrc: Oral  Oral Oral   SpO2: 99%  97% 97%   Weight: 127 kg (279 lb 15 8 oz) 125 kg (276 lb 8 oz)     Height: 6' 3" (1 905 m)          General Appearance: no distress, conversive  HEENT: PERRLA, conjuctiva normal; oropharynx clear; mucous membranes moist   Neck:  Supple, normal ROM, no JVD  Lungs: CTA, normal respiratory effort, no retractions, expiratory effort normal  CV: regular rate and rhythm; no rubs/murmurs/gallops, PMI normal   ABD: soft; ND/NT; +BS  EXT: no edema  Skin: normal turgor, normal texture, no rashes  Psych: affect normal, mood normal  Neuro: AAO      The above physical exam was reviewed and updated as determined by my evaluation of the patient on 7/11/2020  Invasive Devices     None                    VTE Pharmacologic Prophylaxis: Heparin  Code Status: Level 1 - Full Code  Current Length of Stay: 1 day(s)      Total time spent:  30 minutes with more than 50% spent counseling/coordinating care  Counseling includes discussion with patient re: progress  and discussion with patient of his/her current medical state/information  Coordination of patient's care was performed in conjunction with primary service  Time invested included review of patient's labs, vitals, and management of their comorbidities with continued monitoring  In addition, this patient was discussed with medical team including physician and advanced extenders  The care of the patient was extensively discussed and appropriate treatment plan was formulated unique for this patient  ** Please Note:  voice to text software may have been used in the creation of this document   Although proof errors in transcription or interpretation are a potential of such software**

## 2020-07-11 NOTE — PROGRESS NOTES
07/11/20 1240   Patient Data   Rehab Impairment impairment of mobility, safety and ADL's   Etiologic Diagnosis Spondylolisthesis lumbar spine   Date of Onset 07/10/20   Support System   Name sandro rodriguez   Home Setup   Type of Home Multi Level   Method of Entry Curb;Stairs;Hand Rail Bilateral   Number of Stairs 3  (+ 12 inch curb step)   Number of Stairs in Home 13   In Home Hand Rail Bilateral   First Floor Bathroom None   Available Equipment Roller Walker;Rollator;Single [employer]   Prior Level of Function   Self-Care 3  Independent - Patient completed the activities by him/herself, with or without an assistive device, with no assistance from a helper  Indoor-Mobility (Ambulation) 3  Independent - Patient completed the activities by him/herself, with or without an assistive device, with no assistance from a helper  Stairs 3  Independent - Patient completed the activities by him/herself, with or without an assistive device, with no assistance from a helper  Functional Cognition 3  Independent - Patient completed the activities by him/herself, with or without an assistive device, with no assistance from a helper  Falls in the Last Year   Number of falls in the past 12 months 3   Psychosocial   Psychosocial (WDL) WDL   Pain Assessment   Pain Score 4   Pain Location/Orientation Orientation: Bilateral;Orientation: Mid;Orientation: Lower   Hospital Pain Intervention(s) Repositioned; Ambulation/increased activity  (5/10 end of session, did not want pain meds)   Transfer Bed/Chair/Wheelchair   Limitations Noted In Balance; Endurance;UE Strength;LE Strength   Adaptive Equipment Roller Walker   Type of Assistance Needed Physical assistance; Adaptive equipment   Amount of Physical Assistance Provided 50%-74%   Comment heavy reliance on BUE support  Pt rpeorts he has difficulty getting up out of a chair without arm rests/that isn't stable at home PTA   He sits in a recliner and eats meals in this chair at home   Chair/Bed-to-Chair Transfer CARE Score 2   Roll Left and Right   Type of Assistance Needed Physical assistance; Adaptive equipment   Amount of Physical Assistance Provided 75% or more   Roll Left and Right CARE Score 2   Sit to Lying   Type of Assistance Needed Physical assistance; Adaptive equipment   Amount of Physical Assistance Provided 50%-74%   Sit to Lying CARE Score 2   Lying to Sitting on Side of Bed   Type of Assistance Needed Physical assistance; Adaptive equipment   Amount of Physical Assistance Provided 50%-74%   Lying to Sitting on Side of Bed CARE Score 2   Sit to Stand   Type of Assistance Needed Physical assistance; Adaptive equipment   Amount of Physical Assistance Provided 50%-74%   Sit to Stand CARE Score 2   Picking Up Object   Type of Assistance Needed Physical assistance; Adaptive equipment   Amount of Physical Assistance Provided 25%-49%   Picking Up Object CARE Score 3   Car Transfer   Reason if not Attempted Safety concerns   Car Transfer CARE Score 88   Ambulation   Primary Mode of Locomotion Prior to Admission Walk   Distance Walked (feet) 30 ft  (x2)   Assist Device Roller Walker   Gait Pattern Inconsistant Kathie; Slow Kathie;Decreased foot clearance; Forward Flexion; Step through; Improper weight shift   Limitations Noted In Balance; Endurance; Heel Strike;Posture; Safety; Sequencing;Strength;Swing;Speed   Provided Assistance with: Balance;Weight Shift;Trunk Support   Walk Assist Level Minimum Assist;Moderate Assist;Chair Follow   Findings due to many surgeries in [place] past, pt does not have full knee ext bilat  He has forward flexed posture in conjunction iwth bent knees while walking  started with step to pattern and was able to begin to transition to step through pattern  gait belt used, pt rpeorted he feels more fatigued with walking now than PTA   Walk 10 Feet   Type of Assistance Needed Physical assistance; Adaptive equipment   Amount of Physical Assistance Provided Total assistance   Comment Ax2   Walk 10 Feet CARE Score 1   Walk 50 Feet with Two Turns   Reason if not Attempted Safety concerns   Walk 50 Feet with Two Turns CARE Score 88   Walk 150 Feet   Reason if not Attempted Safety concerns   Walk 150 Feet CARE Score 88   Walking 10 Feet on Uneven Surfaces   Reason if not Attempted Safety concerns   Walking 10 Feet on Uneven Surfaces CARE Score 88   Wheel 50 Feet with Two Turns   Reason if not Attempted Activity not applicable   Wheel 50 Feet with Two Turns CARE Score 9   Wheel 150 Feet   Reason if not Attempted Activity not applicable   Wheel 014 Feet CARE Score 9   Curb or Single Stair   Style negotiated Single stair   Type of Assistance Needed Physical assistance; Adaptive equipment   Amount of Physical Assistance Provided 25%-49%   1 Step (Curb) CARE Score 3   4 Steps   Type of Assistance Needed Physical assistance; Adaptive equipment   Amount of Physical Assistance Provided Total assistance   Comment Ax2, 2nd person for safety   4 Steps CARE Score 1   12 Steps   Reason if not Attempted Safety concerns   12 Steps CARE Score 88   Stairs   Type  Steps   # of Steps 6   Assist Devices Bilateral Rail   Findings up forward and down back  with AFO on LLE and sneakers  Practiced up and down with each leg  Attempted to turn around on [place] top step and come down forward, however pt did not feel balanced enough for this  gait belt   Comprehension   QI: Comprehension 3  Usually Understands: Understands most conversations, but misses some part/intent of message  Requires cues at times to understand  Expression   QI: Expression 4   Express complex messages without difficulty and with speech that is clear and easy to [place]   RLE Assessment   RLE Assessment   (gross 3/5)   LLE Assessment   LLE Assessment   (gross 3/5)   Sensation   Light Touch Partial deficits in the RLE;Partial deficits in the LLE   Cognition   Arousal/Participation Cooperative   Objective Measure   PT Measure(s) session focused on review of home setup, PLOF, review of goals and expectations of rehab/therapy  Discussed fall risk factors and need for recommendations to prioritize safety  Pt reported watning to go home in a week but seemed understanding of why recommendations are for longer than a week  Therapeutic Exercise   Therapeutic Exercise/Activity NuStep: 3 min x2 at 0 level but SPM 50-60 for endurance/speed  then did 10 sec on at level 9-12 with BUE x5 with rest breaks as needed between and 3x10 sec reps at level 8 but no UE  assisted pt back to room end of session  Discharge Information   Patient's Discharge Plan dc home with some help from rommate   Patient's Rehab Expectations to get better and go home in a week   Barriers to Discharge Home Limited Family Support;Decreased Cognitive Function;Decreased Endurance; Safety Considerations  (steps, fall risk)   Impressions Pt presents to [facility] s/p elective L5-S1 invasive transverse lumbar interbody fusion on 7/6/2020  Pt has PMH including NPH with shunt, DM2,neuropathy, ZHOU on CPAP, gout, Chronic kidney disease stage 2, leukocytosis, hypertension, anemia  Pt has lumbar spine precautions and acute surgical pain  Pt lives in a house on the 2nd floor with no bathroom on the first floor with a roommate; pt reports his roommate is only home about 6-8 hours a day, and most of the time he is home alone  Pt reports he has neighbors who will help him, however one neighbor is elderly and the other has a full time job  Pt reports he was not using a device for walking at home PTA, however he would use a rollator in the community (grocery shopping and dr pérez and he leaves the rollator in his trunk)  Pt has 2 SPC that he would leave one at top and other at bottom of the stairs to use as needed with fatigue  Pt reports his house is small and he would furniture walk   Pt is aware of the neuropathy, dec knee ROM as fall risk factors, and that when he has fallen he tries his best to land on his bottom so he hasn't gotten hurt yet  Reviewed fall risk factors and that recommendations are made to dec fall risk/dec risk for injury, and most likely recommendation will be for RW at home instead of no device/SPC  With lumbar spine precautions, pt will have more difficulty performing functional mobility at this time in order to maintain precautions  Pt presents with dec BLE strength, dec bilat knee and ankle ROM, dec stability in stance (pt uses MAFO LLE); pt has significantly reduced righting reactions; pt has dec sensation in his feet and hx of TMA, and these in conjunction with NPH and dec activity tolerance significantly inc risk for falls  Currently anticipating need for S level at home with RW for stairs; however goals include Eric for short distance walking and stairs to help pt return home  Cont to reocmmend help for IADLs  Pt will benefit from skilled PT with focus on BLE strength and improving activity tolerance, as well as balance interventions to inc safety and dec fall risk      PT Therapy Minutes   PT Time In 1240   PT Time Out 1420   PT Total Time (minutes) 100   PT Mode of treatment - Individual (minutes) 100   PT Mode of treatment - Concurrent (minutes) 0   PT Mode of treatment - Group (minutes) 0   PT Mode of treatment - Co-treat (minutes) 0   PT Mode of Treatment - Total time(minutes) 100 minutes   PT Cumulative Minutes 100   Cumulative Minutes   Cumulative therapy minutes 190

## 2020-07-11 NOTE — PROGRESS NOTES
Occupational Therapy [facility]     07/11/20 0832   Patient Data   Rehab Impairment impairment of mobility, safety and ADL's   Etiologic Diagnosis Spondylolisthesis lumbar spine   Date of Onset 07/10/20   Home Setup   Type of Home Multi Level   Method of Entry Stairs;Hand Rail Bilateral   Number of Stairs 3   Number of Stairs in Home 13   In Home Hand Rail Bilateral   First Floor Bathroom None   Second Floor Bathroom Tub   Second Floor Bathroom Accessibility Grab bars in tub/shower   Available Equipment Rollator;Single Point Cane   Baseline Information   Transportation    Prior Device(s) Used Rollator;Single [employer]   Prior IADL Participation   Home Cleaning Full Participation   Prior Level of Function   Self-Care 3  Independent - Patient completed the activities by him/herself, with or without an assistive device, with no assistance from a helper  Indoor-Mobility (Ambulation) 3  Independent - Patient completed the activities by him/herself, with or without an assistive device, with no assistance from a helper  Stairs 3  Independent - Patient completed the activities by him/herself, with or without an assistive device, with no assistance from a helper  Functional Cognition 3  Independent - Patient completed the activities by him/herself, with or without an assistive device, with no assistance from a helper  Patient Preference   Nickname (Patient Preference) Darren Burns   Psychosocial   Psychosocial (WDL) WDL   Pain Assessment   Pain Assessment Tool 0-10   Pain Score 4   Pain Location/Orientation Orientation: Bilateral;Location: Back   Effect of Pain on Daily Activities minimal   Eating Assessment   Type of Assistance Needed Set-up / clean-up   Amount of Physical Assistance Provided No physical assistance   Comment A to open packages/containers due to decreased sensation in hands     Eating CARE Score 5   Oral Hygiene   Type of Assistance Needed Set-up / clean-up   Amount of Physical Assistance Provided No physical assistance   Comment Pt requires A to open packages due to decreased sensation in B hands  Oral Hygiene CARE Score 5   Grooming   Able To Initiate Tasks;Comb/Brush Hair;Wash/Dry Face;Brush/Clean Teeth;Wash/Dry Hands   Limitation Noted In Strength   Findings Pt requires A to open packages due to decreased sensation in B hands  Tub/Shower Transfer   Reason Not Assessed Sponge Bath   Shower/Bathe Self   Type of Assistance Needed Physical assistance   Amount of Physical Assistance Provided 25%-49%   Comment Pt requires A for all tasks below upper leg due to spinal precautions  Pt with decreased reach to back side  Pt completed sponge bath in seated at sink and half stand for therapist assist with backside  Shower/Bathe Self CARE Score 3   Dressing/Undressing Clothing   Remove UB Clothes Pullover Shirt   Don UB Clothes Pullover Shirt   Type of Assistance Needed Set-up / clean-up   Amount of Physical Assistance Provided No physical assistance   Upper Body Dressing CARE Score 5   Remove LB Clothes Socks   Don LB [employer]; Undergarment;Socks; Shoes   Type of Assistance Needed Physical assistance   Amount of Physical Assistance Provided 75% or more   Comment Pt required OT support to don all LE clothing due to decreased dynamic reach below waist due to spinal precautions and decreased fine motor dexterity  Pt introduced to [facility] and would benefit from continued training and practice  Lower Body Dressing CARE Score 2   Putting On/Taking Off Footwear   Type of Assistance Needed Physical assistance   Amount of Physical Assistance Provided Total assistance   Comment Pt unable to reach LE's due to spinal precautions and decreased dynamic sitting balance     Putting On/Taking Off Footwear CARE Score 1   Toileting Hygiene   Type of Assistance Needed Physical assistance   Amount of Physical Assistance Provided 75% or more   Comment Pt unaware that he missed the toilet while urinating, pt required A x 2 for reese care and clothing management due to decreased dynamic standing balance  Toileting Hygiene CARE Score 2   Toilet Transfer   Surface Assessed Standard Commode   Transfer Technique Stand Pivot   Limitations Noted In Balance; Endurance; Safety;UE Strength;LE Strength   Adaptive Equipment Grab Bar;Walker   Type of Assistance Needed Physical assistance   Amount of Physical Assistance Provided 25%-49%   Comment Pt mod A x 1 with RW  verbal cues for hand placement  Toilet Transfer CARE Score 3   Transfer Bed/Chair/Wheelchair   Positioning Concerns Skin Integrity   Limitations Noted In Balance; Endurance;UE Strength;LE Strength   Adaptive Equipment Roller Walker   Type of Assistance Needed Physical assistance   Amount of Physical Assistance Provided 25%-49%   Comment Pt initially demonstrating difficulty pushing to stand with increased success with repetition  Pt benefits from raised surfaces for increased success  Pt completed stand pivot transfers with RW mod A x1  Chair/Bed-to-Chair Transfer CARE Score 3   Roll Left and Right   Type of Assistance Needed Physical assistance   Amount of Physical Assistance Provided 25%-49%   Comment verbal reminder of spinal precautions  Roll Left and Right CARE Score 3   Sit to Lying   Type of Assistance Needed Physical assistance   Amount of Physical Assistance Provided 25%-49%   Comment Reminders for spinal precautions  Sit to Lying CARE Score 3   Lying to Sitting on Side of Bed   Type of Assistance Needed Physical assistance   Amount of Physical Assistance Provided 25%-49%   Comment Verbal cues for hand placement  Log roll technique  Lying to Sitting on Side of Bed CARE Score 3   Sit to Stand   Type of Assistance Needed Physical assistance   Amount of Physical Assistance Provided 25%-49%   Comment Mod phys A initially, improved to min A with repetition     Sit to Stand CARE Score 3   Comprehension   Assist Devices Glasses   Auditory Basic;Complex   Visual Basic;Complex   QI: Comprehension 3  Usually Understands: Understands most conversations, but misses some part/intent of message  Requires cues at times to understand  Comprehension (FIM) 5 - Understands basic directions and conversation   Expression   Verbal Basic;Complex   Non-Verbal Basic;Complex   Intelligibility Sentence   QI: Expression 4  Express complex messages without difficulty and with speech that is clear and easy to [place]   Expression (FIM) 6 - Expresses complex/abstract but requires:  more time   Social Interaction   Cooperation with staff   Participation Individual   Behaviors observed Appropriate   Social Interaction (FIM) 6 - Interacts appropriately with others BUT requires extra  time   Problem Solving   Complex Manages finances;Manages discharge planning;Manages medications   Routine Manages call bell;Manges precautions;Manages ADL   Problem solving (FIM) 5 - Solves basic problems 90% of time   Memory   Recognize People Yes   Remember Routine Yes   Initiates Tasks Yes   Short-Term Intact   Long Term Intact   Recalls Precaution Yes   Memory (FIM) 6 - Recognizes with extra time   RUE Assessment   RUE Assessment WFL   LUE Assessment   LUE Assessment WFL   Sensation   Light Touch Partial deficits in the RUE;Partial deficits in the LUE   Cognition   Orientation Level Oriented X4   Objective Measure   OT Measure(s) Functional ADL assessment, MMT, ROM   OT Findings Pt presents s/p elective L5-S1 transverse lumbar interbody fusion and deformity correction with decreased endurance, balance, generalized weakness, and decreased UE sensation impacting pt safety and independence with ADLs and functional transfers  Pt currently requires mod physical A for functional transfers, max A toileting and LB ADL tasks and setup/supervision for all other tasks  Pt lives in a 2 story house with 3 SOBIA and 13 steps to the 2nd floor  Bed and bathroom on 2nd floor only   Pt lives with a friend who can provide some assistance  Pt would benefit from continued skilled OT services to increase pt safety and independence for return home at a mod I level  ELOS 2-3 weeks pending patient progress  Pt good rehab candidate     OT Therapy Minutes   OT Time In 0830   OT Time Out 1000   OT Total Time (minutes) 90   OT Mode of treatment - Individual (minutes) 90   OT Mode of treatment - Concurrent (minutes) 0   OT Mode of treatment - Group (minutes) 0   OT Mode of treatment - Co-treat (minutes) 0   OT Mode of Treatment - Total time(minutes) 90 minutes   OT Cumulative Minutes 90   Cumulative Minutes   Cumulative therapy minutes 90   Franci Valle, OT

## 2020-07-12 LAB
GLUCOSE SERPL-MCNC: 101 MG/DL (ref 65–140)
GLUCOSE SERPL-MCNC: 106 MG/DL (ref 65–140)
GLUCOSE SERPL-MCNC: 128 MG/DL (ref 65–140)
GLUCOSE SERPL-MCNC: 134 MG/DL (ref 65–140)

## 2020-07-12 PROCEDURE — 82948 REAGENT STRIP/BLOOD GLUCOSE: CPT

## 2020-07-12 PROCEDURE — 97535 SELF CARE MNGMENT TRAINING: CPT

## 2020-07-12 PROCEDURE — 97530 THERAPEUTIC ACTIVITIES: CPT

## 2020-07-12 PROCEDURE — 97110 THERAPEUTIC EXERCISES: CPT

## 2020-07-12 RX ADMIN — METHOCARBAMOL 500 MG: 500 TABLET, FILM COATED ORAL at 11:13

## 2020-07-12 RX ADMIN — HEPARIN SODIUM 5000 UNITS: 5000 INJECTION INTRAVENOUS; SUBCUTANEOUS at 05:58

## 2020-07-12 RX ADMIN — TRAVOPROST 1 DROP: 0.04 SOLUTION/ DROPS OPHTHALMIC at 22:34

## 2020-07-12 RX ADMIN — ACETAMINOPHEN 650 MG: 325 TABLET, FILM COATED ORAL at 05:58

## 2020-07-12 RX ADMIN — ACETAMINOPHEN 650 MG: 325 TABLET, FILM COATED ORAL at 13:13

## 2020-07-12 RX ADMIN — HEPARIN SODIUM 5000 UNITS: 5000 INJECTION INTRAVENOUS; SUBCUTANEOUS at 13:12

## 2020-07-12 RX ADMIN — METHOCARBAMOL 500 MG: 500 TABLET, FILM COATED ORAL at 05:58

## 2020-07-12 RX ADMIN — MELATONIN 6 MG: at 21:58

## 2020-07-12 RX ADMIN — METHOCARBAMOL 500 MG: 500 TABLET, FILM COATED ORAL at 17:07

## 2020-07-12 RX ADMIN — HEPARIN SODIUM 5000 UNITS: 5000 INJECTION INTRAVENOUS; SUBCUTANEOUS at 21:59

## 2020-07-12 RX ADMIN — METHOCARBAMOL 500 MG: 500 TABLET, FILM COATED ORAL at 23:48

## 2020-07-12 RX ADMIN — ACETAMINOPHEN 650 MG: 325 TABLET, FILM COATED ORAL at 21:59

## 2020-07-12 RX ADMIN — ALLOPURINOL 300 MG: 300 TABLET ORAL at 08:33

## 2020-07-12 RX ADMIN — LISINOPRIL 10 MG: 10 TABLET ORAL at 08:33

## 2020-07-12 NOTE — PROGRESS NOTES
Internal Medicine Progress Note  Patient: Michael Whittington  Age/sex: 69 y o  male  Medical Record #: [id number]      ASSESSMENT/PLAN: (Interval History)  Michael Kidney is seen and examined and management for following issues:    L5-S1 TLIF with hx L-S spondylolisthesis on 7/6/20 ([place])  · Monitor incision  · Pain control per primary     Scalp revision/hx NPH  shunt 7/6/20  · Watch incision     Bradycardia  · Appears to be running HR in 50's but BPs OK  · No symptoms     DM2  · At home on Lantus 10U qam/12U qpm  · Continue DM diet, QID Accuchecks/SSI  · Currently only on SSI   · Add back Lantus when needed vs try Metformin     Mild cognitive impairment  · Seen by Geriatrics as I/P and MOCA was 22/30  · For OP followup with them an a repeat MOCA     HTN  · At home he is on Vasotec 10mg qd   · On 7/11/20, added Lisinopril 10mg qd as substitute     ZHOU/CPAP  · Continue CPAP     Hx gout  · Continue Allopurinol     Urine incontinence  · Supposedly having this issue at home and Geriatrics recommended that he see Urology as an OP  · Will check PVRs here and freq toilet to prevent accidents  · Only had 1 PVR done and was 300ml so will order again qshift and call >350ml     Left TMA, diabetic neuropathy  · Likely contributes to some gait instability       The above assessment and plan was reviewed and updated as determined by my evaluation of the patient on 7/12/2020      Labs:   Results from last 7 days   Lab Units 07/11/20  0530 07/07/20  0511   WBC Thousand/uL 7 57 14 22*   HEMOGLOBIN g/dL 13 3 13 0   HEMATOCRIT % 40 8 39 2   PLATELETS Thousands/uL 215 172     Results from last 7 days   Lab Units 07/11/20  0530 07/07/20  0530   SODIUM mmol/L 135* 138   POTASSIUM mmol/L 4 2 4 1   CHLORIDE mmol/L 104 109*   CO2 mmol/L 26 24   BUN mg/dL 21 16   CREATININE mg/dL 0 94 0 99   CALCIUM mg/dL 8 9 8 4             Results from last 7 days   Lab Units 07/12/20  0616 07/11/20  2117 07/11/20  1618   POC GLUCOSE mg/dl 134 101 119 Review of Scheduled Meds:    Current Facility-Administered Medications:  acetaminophen 650 mg Oral Q8H Sarah Bradshaw MD   allopurinol 300 mg Oral Daily Missy Bass MD   bisacodyl 10 mg Rectal Daily PRN Missy Bass MD   docusate sodium 100 mg Oral BID Missy Bass MD   heparin (porcine) 5,000 Units Subcutaneous Q8H [address] Missy Bass MD   insulin lispro 1-5 Units Subcutaneous TID AC AR Epstein   insulin lispro 1-5 Units Subcutaneous HS AR Pastor   lisinopril 10 mg Oral Daily Mariana Frederick DO   melatonin 6 mg Oral HS Missy Bass MD   methocarbamol 500 mg Oral Q6H [address] Missy Bass MD   ondansetron 4 mg Oral Q6H PRN Missy Bass MD   oxyCODONE 2 5 mg Oral Q4H PRN Missy Bass MD   oxyCODONE 5 mg Oral Q4H PRN Missy Bass MD   polyethylene glycol 17 g Oral Daily PRN Missy Bass MD   senna 1 tablet Oral Daily Missy Bass MD   travoprost 1 drop Right Eye HS Missy Bass MD       Subjective/ HPI: Patients overnight issues or events were reviewed with nursing or staff during rounds or morning huddle session  No new or overnight issues  Offers no complaints  ROS:   A 10 point ROS was performed; negative except as noted above         Imaging:     No orders to display       *Labs /Radiology studies reviewed  *Medications reviewed and reconciled as needed  *Please refer to order section for additional ordered labs studies  *Case discussed with primary attending during morning huddle case rounds      Physical Examination:  Vitals:   Vitals:    07/11/20 1111 07/11/20 1329 07/11/20 1938 07/12/20 0611   BP: 131/62 151/64 142/62 121/62   BP Location:  Right arm Right arm Right arm   Pulse:  63 68 60   Resp:  20 18 20   Temp:  98 1 °F (36 7 °C) 98 4 °F (36 9 °C) 98 °F (36 7 °C)   TempSrc:  Oral Oral Oral   SpO2:  96% 96% 98%   Weight:       Height:           General Appearance: no distress, conversive  HEENT: PERRLA, conjuctiva normal; oropharynx clear; mucous membranes moist   Head incision is scabbed with no erythema/drainage   Neck:  Supple, normal ROM, no JVD  Lungs: CTA, normal respiratory effort, no retractions, expiratory effort normal  CV: regular rate and rhythm; no rubs/murmurs/gallops, PMI normal   ABD: soft; ND/NT; +BS  EXT: no edema  Skin: normal turgor, normal texture, no rashes  Psych: affect normal, mood normal  Neuro: AAO      The above physical exam was reviewed and updated as determined by my evaluation of the patient on 7/12/2020  Invasive Devices     None                    VTE Pharmacologic Prophylaxis: Heparin  Code Status: Level 1 - Full Code  Current Length of Stay: 2 day(s)      Total time spent:  30 minutes with more than 50% spent counseling/coordinating care  Counseling includes discussion with patient re: progress  and discussion with patient of his/her current medical state/information  Coordination of patient's care was performed in conjunction with primary service  Time invested included review of patient's labs, vitals, and management of their comorbidities with continued monitoring  In addition, this patient was discussed with medical team including physician and advanced extenders  The care of the patient was extensively discussed and appropriate treatment plan was formulated unique for this patient  ** Please Note:  voice to text software may have been used in the creation of this document   Although proof errors in transcription or interpretation are a potential of such software**

## 2020-07-12 NOTE — PROGRESS NOTES
[facility] Occupational Therapy Daily Note  Patient Active Problem List   Diagnosis    Status post ventriculoperitoneal shunt    Unspecified abnormalities of gait and mobility    Diabetic ulcer of left foot associated with type 2 diabetes mellitus ([place] [address] )    Type 2 diabetes mellitus ([place] [address] )    Normal pressure hydrocephalus (HCC)    Essential hypertension    Gout    ZHOU on CPAP    Spondylolisthesis of lumbosacral region    Impaired functional mobility, balance, gait, and endurance    Weakness of left foot    Pain    Scalp hematoma    Scalp laceration       Past Medical History:   Diagnosis Date    Cancer 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 )     DM2 (diabetes mellitus, type 2) ([place] [address] )     Gout     HTN (hypertension)     Hydrocephalus ([place] [address] )     Hypertension     Neuropathy     ZHOU on CPAP     Pressure injury of skin     TIA (transient ischemic attack)      Etiologic Diagnosis: Spondylolisthesis lumbar spine  Restrictions/Precautions  Precautions: Spinal precautions, Fall Risk, Bed/chair alarms  Braces or Orthoses: MAFO((hinge) L LE)  ADL Team Goal: Patient will be independent with ADLs with least restrictive device upon completion of rehab program        07/12/20 0930   Pain Assessment   Pain Assessment Tool 0-10   Pain Score 3   Pain Location/Orientation Orientation: Lower; Location: Back;Orientation: Mid   Pain Onset/Description Descriptor: [facility] Pain Intervention(s) Repositioned   Restrictions/Precautions   Precautions Spinal precautions; Fall Risk;Bed/chair alarms   Braces or Orthoses MAFO  ((hinge) L LE)   Lifestyle   Autonomy "I drive to [place] every year "    Reciprocal Relationships Has suopportive roommate, and neighbor   Service to Others retired bridge    spends time taking care of the house   Intrinsic Gratification reports enjoying reading books, playing with his Beagle,    Putting On/Taking Off Footwear   Comment able to recall spinal prec that limit forward reach  pt reports that he sits on the EOB with his leg supported on the bed  Sit to Stand   Type of Assistance Needed Physical assistance   Amount of Physical Assistance Provided 50%-74%   Comment lower surface is more difficulty with 2x LOB retropulsivly during hand transition  Sit to Stand CARE Score 2   Bed-Chair Transfer   Type of Assistance Needed Physical assistance   Amount of Physical Assistance Provided 25%-49%   Comment Priovided with bariatric RW for improved foot clearance  current rollator in room require repair to handles   Chair/Bed-to-Chair Transfer CARE Score 3   Fadi 77 reporst that he has had more difficulty recently using his R UE  states "Its frozen" however demo abilty to complete near full flexion, but demo limited extension and int rotation  Pt reports that he has been using his L hand for hygiene   ROM- Right Upper Extremities   R Hand   (noted dec extension 2* stritic changes )   ROM - Left Upper Extremities    L Hand   (noted dec extension 2* stritic changes )   Right Upper Extremity- Strength   RUE Strength Comment Pt is R hand dominant  R UE Gross Grasp 60,60,70lbs   Left Upper Extremity-Strength   LUE Strength Comment Gross grasp 65,65,70lbs   Cognition   Overall Cognitive Status WFL   Arousal/Participation Alert; Cooperative   Attention Within functional limits   Orientation Level Oriented X4   Memory Decreased short term memory   Comments Pt reports he is able to drive despite having no feeling in his feet below the knee  Activity Tolerance   Activity Tolerance Patient tolerated treatment well   Assessment   Treatment Assessment Pt participated in skilled OT treatment session with treatment focus on fxnl xfers and cont OT evaluation of UE function, psycosocial interviewing    Pt tolerated session well, with reports of minimal discomfort in his back   Pt continues to require skilled acute rehab OT services to increase overall functional independence and safety w/ ADLs and functional transfers, continue to follow plan of care  Prognosis Good   Problem List Decreased strength;Decreased range of motion;Decreased endurance; Impaired balance;Decreased mobility; Decreased coordination; Impaired sensation   Plan   Treatment/Interventions ADL retraining;Functional transfer training;LE strengthening/ROM; Therapeutic exercise; Endurance training;Cognitive reorientation;Patient/family training;Equipment eval/education; Bed mobility; Compensatory technique education   Progress Progressing toward goals   OT Therapy Minutes   OT Time In 0930   OT Time Out 1030   OT Total Time (minutes) 60   OT Mode of treatment - Individual (minutes) 60   OT Mode of treatment - Concurrent (minutes) 0   OT Mode of treatment - Group (minutes) 0   OT Mode of treatment - Co-treat (minutes) 0   OT Mode of Treatment - Total time(minutes) 60 minutes   OT Cumulative Minutes 150

## 2020-07-12 NOTE — PROGRESS NOTES
07/12/20 1200   Pain Assessment   Pain Score 4   Pain Location/Orientation Orientation: Mid;Orientation: Lower; Location: Back   Hospital Pain Intervention(s) Repositioned; Ambulation/increased activity   Restrictions/Precautions   Precautions Bed/chair alarms;Cognitive; Fall Risk;Spinal precautions;Supervision on toilet/commode;Pain   Braces or Orthoses MAFO  (LLE)   Cognition   Arousal/Participation Cooperative   Subjective   Subjective pt rpeorted feeling well and ready for therapy  he reports his walking still has improvement to go   Sit to Stand   Type of Assistance Needed Physical assistance; Adaptive equipment   Amount of Physical Assistance Provided 25%-49%   Sit to Stand CARE Score 3   Bed-Chair Transfer   Type of Assistance Needed Physical assistance; Adaptive equipment   Amount of Physical Assistance Provided 25%-49%   Chair/Bed-to-Chair Transfer CARE Score 3   Transfer Bed/Chair/Wheelchair   Limitations Noted In Balance; Endurance;UE Strength;LE Strength   Adaptive Equipment Roller Walker   Stand Pivot Minimal Assist   Sit to Stand Minimal Assist   Stand to Sit Minimal Assist   Walk 10 Feet   Type of Assistance Needed Physical assistance; Adaptive equipment   Amount of Physical Assistance Provided 25%-49%   Walk 10 Feet CARE Score 3   Walk 50 Feet with Two Turns   Type of Assistance Needed Physical assistance; Adaptive equipment   Amount of Physical Assistance Provided Total assistance   Comment Ax2   Walk 50 Feet with Two Turns CARE Score 1   Ambulation   Primary Mode of Locomotion Prior to Admission Walk   Distance Walked (feet) 30 ft  (40 65)   Assist Device Roller Walker   Gait Pattern Inconsistant Kathie;Decreased foot clearance; Slow Kathie; Forward Flexion; Step to; Step through; Improper weight shift   Limitations Noted In Balance; Endurance; Heel Strike;Speed;Strength;Swing;Posture;Sensation   Provided Assistance with: Balance;Trunk Support;Weight Shift   Walk Assist Level Minimum Assist;Moderate Assist  (CFA)   Findings better at walking after NuStep    Stairs   Findings to work on 6"  steps tomorrow, BHR for step ups, down backwards  can step up to top and then back down backwards   Equipment Use   NuStep 5 min at level 3 with only legs, SPM around 70  then 5 times of 10 sec at level 8 legs only for strengthening purposes    Other Comments   Comments pt reported wide RW will fit in his house  wide RW is better than standard    Assessment   Treatment Assessment Pt demonstrated slight progress in ambulatory balance today, however still relies heavily on BUE support and still has inc fall risk factors at this time (dec righitng reactions, varied step length, slow gait speed, relies on BUE support on RW with forward flexed posture)  Pt will cont to benefit from skilled PT to focus on BLE strengthening, overall activity toelrance, to progress safety and (I) with walking and stairs  Currently still recommending S level for stairs at home due to fall risk factors, however will cont to progress towards Eric as able as pt will be home alone in [place]ayne morning when he goes doesntairs, as his roommate leaves for work before he waks up  Barriers to Discharge Inaccessible home environment;Decreased caregiver support   PT Barriers   Physical Impairment Decreased strength;Decreased range of motion;Decreased endurance; Impaired balance;Decreased mobility;Pain; Impaired sensation   Functional Limitation Car transfers;Stair negotiation;Standing;Transfers; Walking   Plan   Treatment/Interventions Functional transfer training;LE strengthening/ROM; Elevations; Therapeutic exercise; Endurance training;Patient/family training;Equipment eval/education; Bed mobility;Gait training   Progress Progressing toward goals   Recommendation   PT Discharge Recommendation Home with skilled therapy   Equipment Recommended Walker  (wide RW)   PT Therapy Minutes   PT Time In 1200   PT Time Out 1230   PT Total Time (minutes) 30   PT Mode of treatment - Individual (minutes) 30   PT Mode of treatment - Concurrent (minutes) 0   PT Mode of treatment - Group (minutes) 0   PT Mode of treatment - Co-treat (minutes) 0   PT Mode of Treatment - Total time(minutes) 30 minutes   PT Cumulative Minutes 130   Therapy Time missed   Time missed?  No

## 2020-07-13 LAB
ANION GAP SERPL CALCULATED.3IONS-SCNC: 6 MMOL/L (ref 4–13)
BASOPHILS # BLD AUTO: 0.06 THOUSANDS/ΜL (ref 0–0.1)
BASOPHILS NFR BLD AUTO: 1 % (ref 0–1)
BUN SERPL-MCNC: 19 MG/DL (ref 5–25)
CALCIUM SERPL-MCNC: 9.1 MG/DL (ref 8.3–10.1)
CHLORIDE SERPL-SCNC: 102 MMOL/L (ref 100–108)
CO2 SERPL-SCNC: 26 MMOL/L (ref 21–32)
CREAT SERPL-MCNC: 0.97 MG/DL (ref 0.6–1.3)
EOSINOPHIL # BLD AUTO: 0.18 THOUSAND/ΜL (ref 0–0.61)
EOSINOPHIL NFR BLD AUTO: 2 % (ref 0–6)
ERYTHROCYTE [DISTWIDTH] IN BLOOD BY AUTOMATED COUNT: 14 % (ref 11.6–15.1)
GFR SERPL CREATININE-BSD FRML MDRD: 78 ML/MIN/1.73SQ M
GLUCOSE P FAST SERPL-MCNC: 141 MG/DL (ref 65–99)
GLUCOSE SERPL-MCNC: 113 MG/DL (ref 65–140)
GLUCOSE SERPL-MCNC: 130 MG/DL (ref 65–140)
GLUCOSE SERPL-MCNC: 141 MG/DL (ref 65–140)
GLUCOSE SERPL-MCNC: 159 MG/DL (ref 65–140)
GLUCOSE SERPL-MCNC: 97 MG/DL (ref 65–140)
HCT VFR BLD AUTO: 45 % (ref 36.5–49.3)
HGB BLD-MCNC: 14.6 G/DL (ref 12–17)
IMM GRANULOCYTES # BLD AUTO: 0.13 THOUSAND/UL (ref 0–0.2)
IMM GRANULOCYTES NFR BLD AUTO: 2 % (ref 0–2)
LYMPHOCYTES # BLD AUTO: 0.94 THOUSANDS/ΜL (ref 0.6–4.47)
LYMPHOCYTES NFR BLD AUTO: 11 % (ref 14–44)
MCH RBC QN AUTO: 28.5 PG (ref 26.8–34.3)
MCHC RBC AUTO-ENTMCNC: 32.4 G/DL (ref 31.4–37.4)
MCV RBC AUTO: 88 FL (ref 82–98)
MONOCYTES # BLD AUTO: 0.72 THOUSAND/ΜL (ref 0.17–1.22)
MONOCYTES NFR BLD AUTO: 8 % (ref 4–12)
NEUTROPHILS # BLD AUTO: 6.88 THOUSANDS/ΜL (ref 1.85–7.62)
NEUTS SEG NFR BLD AUTO: 76 % (ref 43–75)
NRBC BLD AUTO-RTO: 0 /100 WBCS
PLATELET # BLD AUTO: 276 THOUSANDS/UL (ref 149–390)
PMV BLD AUTO: 10.1 FL (ref 8.9–12.7)
POTASSIUM SERPL-SCNC: 3.9 MMOL/L (ref 3.5–5.3)
RBC # BLD AUTO: 5.13 MILLION/UL (ref 3.88–5.62)
SODIUM SERPL-SCNC: 134 MMOL/L (ref 136–145)
WBC # BLD AUTO: 8.91 THOUSAND/UL (ref 4.31–10.16)

## 2020-07-13 PROCEDURE — 80048 BASIC METABOLIC PNL TOTAL CA: CPT | Performed by: NURSE PRACTITIONER

## 2020-07-13 PROCEDURE — 99233 SBSQ HOSP IP/OBS HIGH 50: CPT | Performed by: PHYSICAL MEDICINE & REHABILITATION

## 2020-07-13 PROCEDURE — 97530 THERAPEUTIC ACTIVITIES: CPT

## 2020-07-13 PROCEDURE — 97116 GAIT TRAINING THERAPY: CPT

## 2020-07-13 PROCEDURE — 82948 REAGENT STRIP/BLOOD GLUCOSE: CPT

## 2020-07-13 PROCEDURE — 97110 THERAPEUTIC EXERCISES: CPT

## 2020-07-13 PROCEDURE — 85025 COMPLETE CBC W/AUTO DIFF WBC: CPT | Performed by: NURSE PRACTITIONER

## 2020-07-13 RX ORDER — LISINOPRIL 5 MG/1
5 TABLET ORAL DAILY
Status: DISCONTINUED | OUTPATIENT
Start: 2020-07-14 | End: 2020-07-22 | Stop reason: HOSPADM

## 2020-07-13 RX ADMIN — ACETAMINOPHEN 650 MG: 325 TABLET, FILM COATED ORAL at 21:43

## 2020-07-13 RX ADMIN — METHOCARBAMOL 500 MG: 500 TABLET, FILM COATED ORAL at 05:37

## 2020-07-13 RX ADMIN — ACETAMINOPHEN 650 MG: 325 TABLET, FILM COATED ORAL at 14:55

## 2020-07-13 RX ADMIN — INSULIN LISPRO 1 UNITS: 100 INJECTION, SOLUTION INTRAVENOUS; SUBCUTANEOUS at 08:21

## 2020-07-13 RX ADMIN — HEPARIN SODIUM 5000 UNITS: 5000 INJECTION INTRAVENOUS; SUBCUTANEOUS at 14:54

## 2020-07-13 RX ADMIN — MELATONIN 6 MG: at 22:52

## 2020-07-13 RX ADMIN — METHOCARBAMOL 500 MG: 500 TABLET, FILM COATED ORAL at 17:52

## 2020-07-13 RX ADMIN — ALLOPURINOL 300 MG: 300 TABLET ORAL at 08:35

## 2020-07-13 RX ADMIN — TRAVOPROST 1 DROP: 0.04 SOLUTION/ DROPS OPHTHALMIC at 21:44

## 2020-07-13 RX ADMIN — LISINOPRIL 10 MG: 10 TABLET ORAL at 08:35

## 2020-07-13 RX ADMIN — ACETAMINOPHEN 650 MG: 325 TABLET, FILM COATED ORAL at 05:37

## 2020-07-13 RX ADMIN — METHOCARBAMOL 500 MG: 500 TABLET, FILM COATED ORAL at 12:01

## 2020-07-13 RX ADMIN — HEPARIN SODIUM 5000 UNITS: 5000 INJECTION INTRAVENOUS; SUBCUTANEOUS at 05:37

## 2020-07-13 RX ADMIN — HEPARIN SODIUM 5000 UNITS: 5000 INJECTION INTRAVENOUS; SUBCUTANEOUS at 21:43

## 2020-07-13 NOTE — SOCIAL WORK
Met with Pt to review rehab routine, and CM role  Pt shared that he resides with his friend, Kuldip Spivey, who assists with cooking, etc  Pt states there are 3STE, with double handrails, and 13 stairs to the second floor, with double handrails  Pt also has grabs bars, rollator, and life alert  Pt reports he has a lot of supportive friends and neighbors  Pt is not currently connected to community services/supports  Pt has been involved with [facility], but cannot recall the provider  Pt worked with [facility]  for [employer], and was pleased with their service  CM explained the team meeting process, as well as potential LOS  Following to assist with d/c planning needs, and cont'd stay review due 7/17

## 2020-07-13 NOTE — TEAM CONFERENCE
Acute RehabilitationTeam Conference Note  Date: 7/14/2020   Time: 10:50 AM       Patient Name:  Smiley Reyna       Medical Record Number: [id number]   YOB: 1950  Sex: Male          Room/Bed:  [facility] 458/[facility] 458-01  Payor Info:  Payor: Sarah Grant / Plan: Ame Gutierrez [facility] REP / Product Type: Medicare HMO /      Admitting Diagnosis: Lumbar and sacral spondyloarthritis [[id number]]   Admit Date/Time:  7/10/2020  2:41 PM  Admission Comments: No comment available     Primary Diagnosis:  Spondylolisthesis of lumbosacral region  Principal Problem: Spondylolisthesis of lumbosacral region    Patient Active Problem List    Diagnosis Date Noted    Weakness of left foot 07/10/2020    Pain 07/10/2020    Scalp hematoma 07/10/2020    Scalp laceration 07/10/2020    Spondylolisthesis of lumbosacral region 07/02/2020    Impaired functional mobility, balance, gait, and endurance 07/02/2020    Diabetic ulcer of left foot associated with type 2 diabetes mellitus ([facility] ) 05/28/2020    Type 2 diabetes mellitus ([facility] ) 05/28/2020    Normal pressure hydrocephalus ([facility] ) 05/28/2020    Essential hypertension 05/28/2020    Gout 05/28/2020    ZHOU on CPAP 05/28/2020    Status post ventriculoperitoneal shunt 04/21/2020    Unspecified abnormalities of gait and mobility 04/21/2020       Physical Therapy:    Weight Bearing Status: Full Weight Bearing  Transfers: Minimal Assistance  Bed Mobility: Maximum Assistance  Amulation Distance (ft): 50 feet  Ambulation: Minimal Assistance  Assistive Device for Ambulation: Roller Walker  Number of Stairs: 8  Assistive Device for Stairs: Bilateral [employer]  Stair Assistance: Minimal Assistance  Discharge Recommendations: Home with:  [address] with[de-identified] Home Physical Therapy, Family Support    7/13/2020: overall pt making progress towards LTGs since his admission to the [facility] Maryann level with use of RW and Max A for bed mobility   Physical impairments include dec strength, dec ROM B/L knees, impaired balance, dec righting reactions, dec act tolerance and functional mobility  barriers to d/c include SOBIA, stairs to second floor to access bathroom, and dec caregiver support  Pt lives with Yany Rojas but roomate works so pt is alone during the day  Only BR is on second floor however pt has been urinating in jug to avoid having to use steps  Will have to go upstairs for BM  Pt with 3 SOBIA BHR and 12in curb step into house with mounted grab bars pt pulls up from  has BHR on FF to second floor  Due to dec act tolerance, impaired LE strength and ROM, B/L LE neuropathy and MAFO LLE pt at inc risk of falls  Cont to address physical impairments prior to d/c  Will need Eric level for d/c home  Reports roommate can help with IADLs does have neighbors who help but one works full time and the other is elderly     Occupational Therapy:  Eating: Supervision  Grooming: Supervision  Bathing: Moderate Assistance  Bathing: Moderate Assistance  Upper Body Dressing: Supervision  Lower Body Dressing: Maximum Assistance  Toileting: Maximum Assistance  Toilet Transfer: Maximum Assistance  Cognition: Exceptions to WNL  Cognition: Decreased Memory, Decreased Safety  Orientation: Person, Place, Time, Situation  Discharge Recommendations: Home with:  [address] with[de-identified] Family Support, First Floor Setup, Home Occupational Therapy       Pt is demonstrating good progress with occupational therapy and is progressing toward Long term goals for ADL, IADL, and functional transfers/mobility  Pts Long term goals for ADLs are Independent with Rolling Walker  Pt continues to present with impairments in activity tolerance, endurance, standing balance/tolerance, sitting balance/tolerance, UE strength, UE ROM, memory and insight  Occupational performance remains limited by decreased sensation, LE Bracing, fatigue, pain, spinal precautions, orthopedic restricitions , decreased caregiver support and risk for falls   Pt will continue to benefit from skilled acute rehab OT services to address above mentioned barriers and maximize functional independence in baseline areas of occupation to meet established treatment goals with overall decreased burden of care  Plan of care cont to focus on ADL re-training, [address] training, IADL re-training, fxnl xfers, fxnl cognition, short term memory, fxnl attention, standing tolerance, standing balance, UE strengthening, UE endurance, DME training/education, family training/education, EC techniques/education, healthy coping education, leisure pursuits, sitting balance and core/trunk control  Anticipate re-team at this time  Speech Therapy:           No notes on file    Nursing Notes:  Appetite: Good  Diet Type: Diabetic                                                                     Pain Location/Orientation: Location: Back, Orientation: Lower  Pain Score: 0                       Hospital Pain Intervention(s): Repositioned, Shower/Bath          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, lumbar spinal stenosis who presented to the [facility] on 7/6/20 for worsening back pain and gait instability from known spondylolisthesis L5/S1  He was recommended to have operative intervention, and was taken to the OR on 7/6/20 by Dr Alpesh Singh for an elective L5-S1 transverse lumbar interbody fusion and deformity correction at L5-S1  History of hypertension, diabetes, cancer, TIA, NPH with shunt revision 6/10/2020, neuropathy, gout and ZHOU with CPAP at HS  Pain managed with PRN Tylenol and oxycodone and routine robaxin  Diabetes managed with QID BS and insulin  Gout managed with zyloprim  Hypertension managed with lisinopril  Diabetic diet is ordered  Skin is intact with the exception of a lumbar incision, diabetic foot ulcer, and scalp incision from shunt revision  Alarms required for safety   Incontinent for bladder and continent for bowel  This week we will encourage independence with ADLs while monitoring vitals and maintaining skin integrity  We will keep the patient free from falls through patient education with safe transferring and maintaining safety precautions  Case Management:     Discharge Planning  Living Arrangements: Friends  Support Systems: Family members  Assistance Needed: tbd  Type of Current Residence: Private residence  [address]: No  Pt is participating well with therapy, and plans on a return home  Pt has been educated on the potential for cont'd care, ie therapy and services  Following to assist with d/c planning needs  Is the patient actively participating in therapies? yes  List any modifications to the treatment plan:     Barriers Interventions   Decreased activity tolerance Energy conservation education   Decreased righting reactions Therapy exercises/adaptive equipment   Impaired balance Therapy exercises   Decreased strength Therapy exercises         Is the patient making expected progress toward goals?  yes  List any update or changes to goals:     Medical Goals: Patient will be medically stable for discharge to [facility] upon completion of rehab program and Patient will be able to manage medical conditions and comorbid conditions with medications and follow up upon completion of rehab program    Weekly Team Goals:   Rehab Team Goals  ADL Team Goal: Patient will be independent with ADLs with least restrictive device upon completion of rehab program  Bowel/Bladder Team Goal: Patient will return to premorbid level for bladder/bowel management upon completion of rehab program  Transfer Team Goal: Patient will be independent with transfers with least restrictive device upon completion of rehab program  Locomotion Team Goal: Patient will be independent with locomotion with least restrictive device upon completion of rehab program  Cognitive Team Goal: Patient will return to premorbid level of cognitive activity upon completion of rehab program    Discussion: Pt presents with the above barriers  Pt is currently min a for transfers, max a for bed mobility, ambulating 50ft, min a with RW  Pt has completed 8 stairs, min a with bilateral handrails  Pts ADLs are mod for bathing, supervision for upper body dressing, max a for lower body dressing, toileting, toilet transfer  Pt goals are mod I, with a 2wk ELOS  Anticipated Discharge Date: reteam [facility] Team Members Present: The following team members are supervising care for this patient and were present during this Weekly Team Conference      Physician: Dr Augustine Calvillo MD  : VALERIANO Richmond/ ERINW  Registered Nurse: Alicia Fuchs RN  Physical Therapist: Esme Lima DPT  Occupational Therapist: Ebb Mortimer, MS, OTR/L  Speech Therapist: Kelin Terry MA, CCC-SLP  Other:

## 2020-07-13 NOTE — PROGRESS NOTES
[facility] Occupational Therapy Daily Note  Patient Active Problem List   Diagnosis    Status post ventriculoperitoneal shunt    Unspecified abnormalities of gait and mobility    Diabetic ulcer of left foot associated with type 2 diabetes mellitus ([place] [address] )    Type 2 diabetes mellitus ([place] [address] )    Normal pressure hydrocephalus ([facility])    Essential hypertension    Gout    ZHOU on CPAP    Spondylolisthesis of lumbosacral region    Impaired functional mobility, balance, gait, and endurance    Weakness of left foot    Pain    Scalp hematoma    Scalp laceration       Past Medical History:   Diagnosis Date    Cancer [facility]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     Diabetes mellitus ([place] [address] )     DM2 (diabetes mellitus, type 2) ([place] [address] )     Gout     HTN (hypertension)     Hydrocephalus ([place] [address] )     Hypertension     Neuropathy     ZHOU on CPAP     Pressure injury of skin     TIA (transient ischemic attack)      Etiologic Diagnosis: Spondylolisthesis lumbar spine  Restrictions/Precautions  Precautions: Spinal precautions, Fall Risk, Bed/chair alarms  Braces or Orthoses: MAFO((hinge) L LE)  ADL Team Goal: Patient will be independent with ADLs with least restrictive device upon completion of rehab program  Recommendation  Equipment Recommended: (P) Tub seat with back(Pt owns RW, rollator, and BSC)     07/13/20 1030   Pain Assessment   Pain Assessment Tool 0-10   Pain Score 4   Pain Location/Orientation Orientation: Lower; Location: Back   Hospital Pain Intervention(s) Repositioned   Lifestyle   Autonomy "I fell like its less of a chore to sit up "   Intrinsic Gratification Pt enjoys sports  was a state champion in [place] Mode Global Transfer   Type of Assistance Needed Physical assistance   Amount of Physical Assistance Provided Less than 25%   Comment trialed use of rollator for short distance func mobilty  Pt demo good safety w/ brake management and control  Rollaor handles are loose 2* replaced screws  Chair/Bed-to-Chair Transfer CARE Score 3   Kitchen Mobility   Kitchen Mobility Comments Discussed participation in meal prep tasks in kitchen  reports he has a small ktichen that he can sit in  table is cliose to counter top for item transfer  discussed use of rollator in the house at time of d/c for item transport  Neuromuscular Education   Trunk Control table top glides for initiating forward weight shifting  3x 10 reps   Additional Activities   Additional Activities Comments Pt educated on hardware placement with viewing of xrays to increase understanding of back precautions and safety during ADLS and daily tasks  Activity Tolerance   Activity Tolerance Patient tolerated treatment well   Assessment   Treatment Assessment Pt participated in skilled OT treatment session with treatment focus on fxnl xfers and education on procedures  Pt cont to reports improvements in LE strength, and stating that his back is feeling less tight  Pt cont to make good progress  Prognosis Good   Problem List Decreased range of motion;Decreased endurance; Impaired balance;Decreased coordination;Orthopedic restrictions   Barriers to Discharge Decreased caregiver support   Plan   Treatment/Interventions ADL retraining;Functional transfer training;LE strengthening/ROM; Therapeutic exercise; Endurance training;Cognitive reorientation;Patient/family training;Equipment eval/education; Bed mobility; Compensatory technique education   Progress Progressing toward goals   Recommendation   Equipment Recommended Tub seat with back  (Pt owns RW, rollator, and BSC)   OT Therapy Minutes   OT Time In 1030   OT Time Out 1130   OT Total Time (minutes) 60   OT Mode of treatment - Individual (minutes) 60   OT Mode of treatment - Concurrent (minutes) 0   OT Mode of treatment - Group (minutes) 0   OT Mode of treatment - Co-treat (minutes) 0   OT Mode of Treatment - Total time(minutes) 60 minutes   OT Cumulative Minutes 210

## 2020-07-13 NOTE — PROGRESS NOTES
07/13/20 0830   Pain Assessment   Pain Assessment Tool 0-10   Pain Score 4   Pain Location/Orientation Orientation: Lower; Location: Back  (at incision )   Pain Onset/Description Descriptor: Aching   Effect of Pain on Daily Activities minimal c/o pain t o session   Hospital Pain Intervention(s) Repositioned; Ambulation/increased activity   Restrictions/Precautions   Precautions Bed/chair alarms;Cognitive; Fall Risk;Pain;Spinal precautions;Supervision on toilet/commode   Braces or Orthoses MAFO  (LLE)   Cognition   Arousal/Participation Alert; Cooperative   Subjective   Subjective pt reports slept well for first time last night  Agreeable to PT session    Sit to Stand   Type of Assistance Needed Physical assistance; Adaptive equipment   Amount of Physical Assistance Provided 25%-49%   Comment Maryann w RW   Sit to Stand CARE Score 3   Bed-Chair Transfer   Type of Assistance Needed Physical assistance; Adaptive equipment   Amount of Physical Assistance Provided 25%-49%   Comment Maryann w RW  Heavy reliance of BUE support on RW w trasnfer    Chair/Bed-to-Chair Transfer CARE Score 3   Transfer Bed/Chair/Wheelchair   Limitations Noted In Balance; Endurance;UE Strength;LE Strength   Adaptive Equipment Roller Walker   Stand Pivot Minimal Assist   Sit to Stand Minimal Assist   Stand to Sit Minimal Assist   Findings Maryann w RW   Car Transfer   Comment anticipate difficulty with lower surface    Reason if not Attempted Safety concerns   Car Transfer CARE Score 88   Walk 10 Feet   Type of Assistance Needed Physical assistance; Adaptive equipment   Amount of Physical Assistance Provided 25%-49%   Walk 10 Feet CARE Score 3   Walk 50 Feet with Two Turns   Type of Assistance Needed Physical assistance; Adaptive equipment   Amount of Physical Assistance Provided Total assistance   Comment Ax2    Walk 50 Feet with Two Turns CARE Score 1   Walk 150 Feet   Reason if not Attempted Safety concerns   Walk 150 Feet CARE Score 88   Walking 10 Feet on Uneven Surfaces   Reason if not Attempted Safety concerns   Walking 10 Feet on Uneven Surfaces CARE Score 88   Ambulation   Does the patient walk? 2  Yes   Primary Mode of Locomotion Prior to Admission Walk   Distance Walked (feet) 50 ft  (60,75)   Assist Device Roller Walker   Gait Pattern Inconsistant Kathie;Decreased foot clearance; Slow Kathie; Forward Flexion; Step to; Step through; Improper weight shift   Limitations Noted In Balance; Endurance; Heel Strike;Posture;Sensation;Speed;Swing;Strength   Provided Assistance with: Balance;Trunk Support   Walk Assist Level Minimum Assist;Chair Follow   Findings Maryann with CFA for safety  complete 50ft amb no CFA brought for longer distance amb for seat when pt got tired   heavy use of BUE support on RW  complete nu-step as nural priming before amb trials    Wheel 50 Feet with Two Turns   Reason if not Attempted Activity not applicable   Wheel 50 Feet with Two Turns CARE Score 9   Wheel 150 Feet   Reason if not Attempted Activity not applicable   Wheel 365 Feet CARE Score 9   Wheelchair mobility   Does the patient use a wheelchair? 0  No   Curb or Single Stair   Style negotiated Single stair   Type of Assistance Needed Physical assistance; Adaptive equipment   Amount of Physical Assistance Provided 25%-49%   1 Step (Curb) CARE Score 3   4 Steps   Type of Assistance Needed Physical assistance; Adaptive equipment   Amount of Physical Assistance Provided 25%-49%   4 Steps CARE Score 3   12 Steps   Reason if not Attempted Safety concerns   12 Steps CARE Score 88   Stairs   Type Stairs   # of Steps 8   Weight Bearing Precautions Fall Risk   Assist Devices Bilateral Rail   Findings on 6"  stairs ascned fwd descend bwd non recip pattern pt leading up w LLE   Maryann with gait belt for safety    Therapeutic Interventions   Strengthening repeated STSx5,    Flexibility BLE seated passive HS and gastroc stretch x4min x2 during session    Balance Static standing tolerance at window 2minx2    Equipment Use   NuStep all LEs only 7min L3 then additional 10second intervals x5 at level 5 for inc LE strengthening    Assessment   Treatment Assessment Session focus on inc endurance, LE strengthening, ambulation and stairs  overall pt making progress towards LTGs since his admission to the [facility]  Functioning Maryann level with use of RW and Max A for bed mobility  Physical impairments include dec strength, dec ROM B/L knees, impaired balance, dec righting reactions, dec act tolerance and functional mobility  barriers to d/c include SOBIA, stairs to second floor to access bathroom, and dec caregiver support  Pt lives with Yany Rojas but roomate works so pt is alone during the day  Only BR is on second floor however pt has been urinating in jug to avoid having to use steps  Will have to go upstairs for BM  Pt with 3 SOBIA BHR and 12in curb step into house with mounted grab bars pt pulls up from  has BHR on FF to second floor  Due to dec act tolerance, impaired LE strength and ROM, B/L LE neuropathy and MAFO LLE pt at inc risk of falls  Cont to address physical impairments prior to d/c    Family/Caregiver Present no    Barriers to Discharge Inaccessible home environment;Decreased caregiver support   PT Barriers   Physical Impairment Decreased strength;Decreased range of motion; Impaired balance;Decreased endurance;Decreased mobility; Impaired sensation;Orthopedic restrictions;Pain   Functional Limitation Car transfers; Ramp negotiation;Standing;Stair negotiation;Transfers; Walking   Plan   Treatment/Interventions Functional transfer training;LE strengthening/ROM; Elevations; Therapeutic exercise; Endurance training;Patient/family training;Equipment eval/education; Bed mobility;Gait training   Progress Progressing toward goals   Recommendation   PT Discharge Recommendation Home with skilled therapy   Equipment Recommended Walker  (wide RW )   PT Therapy Minutes   PT Time In 0830   PT Time Out 1000   PT Total Time (minutes) 90   PT Mode of treatment - Individual (minutes) 90   PT Mode of treatment - Concurrent (minutes) 0   PT Mode of treatment - Group (minutes) 0   PT Mode of treatment - Co-treat (minutes) 0   PT Mode of Treatment - Total time(minutes) 90 minutes   PT Cumulative Minutes 220   Therapy Time missed   Time missed?  No

## 2020-07-13 NOTE — PCC PHYSICAL THERAPY
7/20/20  Pt has made steady functional progress this past week  Pt with good safety awareness and judgement  Pt progressed to Ind in his room with use of RW  Practiced walking 10ft with SPC as pt uses when he is upstairs with no LOB  Able to negotiate FF of stairs with B HR w/o complaints  Has DME in place at home  Recommend d/c home this week  7/13/2020: overall pt making progress towards LTGs since his admission to the [facility]  Functioning Maryann level with use of RW and Max A for bed mobility  Physical impairments include dec strength, dec ROM B/L knees, impaired balance, dec righting reactions, dec act tolerance and functional mobility  barriers to d/c include SOBIA, stairs to second floor to access bathroom, and dec caregiver support  Pt lives with Vesta Miller but roomate works so pt is alone during the day  Only BR is on second floor however pt has been urinating in jug to avoid having to use steps  Will have to go upstairs for BM  Pt with 3 SOBIA BHR and 12in curb step into house with mounted grab bars pt pulls up from  has BHR on FF to second floor  Due to dec act tolerance, impaired LE strength and ROM, B/L LE neuropathy and MAFO LLE pt at inc risk of falls  Cont to address physical impairments prior to d/c  Will need Eric level for d/c home   Reports roommate can help with IADLs does have neighbors who help but one works full time and the other is elderly

## 2020-07-13 NOTE — PROGRESS NOTES
07/13/20 1400   Pain Assessment   Pain Assessment Tool 0-10   Pain Score 4   Pain Location/Orientation Orientation: Lower; Location: Back   Hospital Pain Intervention(s) Repositioned   Restrictions/Precautions   Precautions Bed/chair alarms;Cognitive; Fall Risk;Pain;Spinal precautions;Supervision on toilet/commode   Braces or Orthoses MAFO  (LLE)   Exercise Tools   Exercise Tools Yes   Other Exercise Tool 1 Seated in recliner with Sup, [id number] each chest press, horiz ABD/ADD, bicep curls, and prograde rowing utlizing 3# tbar (4# for bicep curls) for increased UB strength and endurance for improved safety and stability during fxl transfers  Pt tolerated well, easily distractable and tangential, requiring A to keep track of rep count and for timeliness  Required rest breaks between sets to manage fatigue  Pt observed with difficulty maintaining B/L gross grasp on tbar, requiring frequent vc's and [facility] A to maintain grasp, with pt stating peripheral neuropathy causes him to have difficulty maintaining grasp  Pt also observed with difficulty fully supinating B/L hands for proper positioning and  when holding tbar underhanded, requiring vc's for body awareness and to improve gross grasp  Cognition   Overall Cognitive Status WFL   Arousal/Participation Alert; Cooperative   Attention Attends with cues to redirect   Orientation Level Oriented X4   Memory Decreased short term memory   Following Commands Follows all commands and directions without difficulty   Activity Tolerance   Activity Tolerance Patient tolerated treatment well   Assessment   Treatment Assessment Pt seen for 30min skilled OT session focused on increasing BUE strength and endurance for improved safety and stability during fxl transfers and ADLs, and decreased caregiver burden  See detailed descriptions of fxl performance above   Pt continues to be limited by decreased endurance, strength, and UE sensation, and requires vc's for body awareness/positioning 2* sensory deficits and rest breaks to manage fatigue  Pt able to recall and verbalize spinal precautions, G carryover  Pt would benefit from continued skilled OT focused on fxl transfers, dynamic standing balance, toileting, ADL skills retraining, endurance/strengthening, and standing tolerance  Prognosis Good   Problem List Decreased strength;Decreased range of motion;Decreased endurance; Impaired balance;Decreased coordination;Orthopedic restrictions; Impaired sensation   Barriers to Discharge Decreased caregiver support   Plan   Treatment/Interventions ADL retraining;Functional transfer training; Therapeutic exercise; Endurance training;Cognitive reorientation;Patient/family training;Equipment eval/education; Bed mobility; Compensatory technique education   Progress Progressing toward goals   Recommendation   OT Discharge Recommendation Other (Comment)  (pending pt progress)   OT Therapy Minutes   OT Time In 1400   OT Time Out 1430   OT Total Time (minutes) 30   OT Mode of treatment - Individual (minutes) 30   OT Mode of treatment - Concurrent (minutes) 0   OT Mode of treatment - Group (minutes) 0   OT Mode of treatment - Co-treat (minutes) 0   OT Mode of Treatment - Total time(minutes) 30 minutes   OT Cumulative Minutes 240   Therapy Time missed   Time missed?  No

## 2020-07-13 NOTE — PCC NURSING
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, lumbar spinal stenosis who presented to the [facility] on 7/6/20 for worsening back pain and gait instability from known spondylolisthesis L5/S1  He was recommended to have operative intervention, and was taken to the OR on 7/6/20 by Dr Theron Taveras for an elective L5-S1 transverse lumbar interbody fusion and deformity correction at L5-S1  History of hypertension, diabetes, cancer, TIA, NPH with shunt revision 6/10/2020, neuropathy, gout and ZHOU with CPAP at HS  Pain managed with PRN Tylenol and oxycodone and routine robaxin  Diabetes managed with QID BS and insulin  Gout managed with zyloprim  Hypertension managed with lisinopril  Diabetic diet is ordered  Skin is intact with the exception of a lumbar incision, diabetic foot ulcer, and scalp incision from shunt revision  Alarms required for safety  Incontinent for bladder and continent for bowel  This week we will encourage independence with ADLs while monitoring vitals and maintaining skin integrity  We will keep the patient free from falls through patient education with safe transferring and maintaining safety precautions

## 2020-07-13 NOTE — PCC CARE MANAGEMENT
Pt continues to participate well with therapy, and plans to return home this week  Pt has been educated on the potential for cont'd care, ie therapy and services  Following to assist with d/c planning needs

## 2020-07-13 NOTE — PCC OCCUPATIONAL THERAPY
Pt is demonstrating good progress with occupational therapy and is progressing toward LTGs for ADL, IADL , and functional transfers/mobility  Pt's goals are for [place] with ADL tasks with RW for d/c to PLOF  Pt has progressed to Mod I level at this time and recommending discharge scheduled

## 2020-07-13 NOTE — PLAN OF CARE
Reviewed    Problem: PAIN - ADULT  Goal: Verbalizes/displays adequate comfort level or baseline comfort level  Description  Interventions:  - Encourage patient to monitor pain and request assistance  - Assess pain using appropriate pain scale  - Administer analgesics based on type and severity of pain and evaluate response  - Implement non-pharmacological measures as appropriate and evaluate response  - Consider cultural and social influences on pain and pain management  - Notify physician/advanced practitioner if interventions unsuccessful or patient reports new pain  Outcome: Progressing     Problem: INFECTION - ADULT  Goal: Absence or prevention of progression during hospitalization  Description  INTERVENTIONS:  - Assess and monitor for signs and symptoms of infection  - Monitor lab/diagnostic results  - Monitor all insertion sites, i e  indwelling lines, tubes, and drains  - Monitor endotracheal if appropriate and nasal secretions for changes in amount and color  - [place] appropriate cooling/warming therapies per order  - Administer medications as ordered  - Instruct and encourage patient and family to use good hand hygiene technique  - Identify and instruct in appropriate isolation precautions for identified infection/condition  Outcome: Progressing  Goal: Absence of fever/infection during neutropenic period  Description  INTERVENTIONS:  - Monitor WBC    Outcome: Progressing     Problem: SAFETY ADULT  Goal: Patient will remain free of falls  Description  INTERVENTIONS:  - Assess patient frequently for physical needs  -  Identify cognitive and physical deficits and behaviors that affect risk of falls    -  [place] fall precautions as indicated by assessment   - Educate patient/family on patient safety including physical limitations  - Instruct patient to call for assistance with activity based on assessment  - Modify environment to reduce risk of injury  - Consider OT/PT consult to assist with strengthening/mobility  Outcome: Progressing  Goal: Maintain or return to baseline ADL function  Description  INTERVENTIONS:  -  Assess patient's ability to carry out ADLs; assess patient's baseline for ADL function and identify physical deficits which impact ability to perform ADLs (bathing, care of mouth/teeth, toileting, grooming, dressing, etc )  - Assess/evaluate cause of self-care deficits   - Assess range of motion  - Assess patient's mobility; develop plan if impaired  - Assess patient's need for assistive devices and provide as appropriate  - Encourage maximum independence but intervene and supervise when necessary  - Involve family in performance of ADLs  - Assess for home care needs following discharge   - Consider OT consult to assist with ADL evaluation and planning for discharge  - Provide patient education as appropriate  Outcome: Progressing  Goal: Maintain or return mobility status to optimal level  Description  INTERVENTIONS:  - Assess patient's baseline mobility status (ambulation, transfers, stairs, etc )    - Identify cognitive and physical deficits and behaviors that affect mobility  - Identify mobility aids required to assist with transfers and/or ambulation (gait belt, sit-to-stand, lift, walker, cane, etc )  - [place] fall precautions as indicated by assessment  - Record patient progress and toleration of activity level on Mobility SBAR; progress patient to next Phase/Stage  - Instruct patient to call for assistance with activity based on assessment  - Consider rehabilitation consult to assist with strengthening/weightbearing, etc   Outcome: Progressing     Problem: Prexisting or High Potential for Compromised Skin Integrity  Goal: Skin integrity is maintained or improved  Description  INTERVENTIONS:  - Identify patients at risk for skin breakdown  - Assess and monitor skin integrity  - Assess and monitor nutrition and hydration status  - Monitor labs   - Assess for incontinence   - Turn and reposition patient  - Assist with mobility/ambulation  - Relieve pressure over bony prominences  - Avoid friction and shearing  - Provide appropriate hygiene as needed including keeping skin clean and dry  - Evaluate need for skin moisturizer/barrier cream  - Collaborate with interdisciplinary team   - Patient/family teaching  - Consider wound care consult   Outcome: Progressing     Problem: Potential for Falls  Goal: Patient will remain free of falls  Description  INTERVENTIONS:  - Assess patient frequently for physical needs  -  Identify cognitive and physical deficits and behaviors that affect risk of falls    -  [place] fall precautions as indicated by assessment   - Educate patient/family on patient safety including physical limitations  - Instruct patient to call for assistance with activity based on assessment  - Modify environment to reduce risk of injury  - Consider OT/PT consult to assist with strengthening/mobility  Outcome: Progressing

## 2020-07-13 NOTE — PROGRESS NOTES
Internal Medicine Progress Note  Patient: Juliano Wolfe  Age/sex: 69 y o  male  Medical Record #: [id number]      ASSESSMENT/PLAN: (Interval History)  Juliano Wolfe is seen and examined and management for following issues:    L5-S1 TLIF with hx L-S spondylolisthesis on 7/6/20 ([place])  · Monitor incision  · Pain control per primary     Scalp revision/hx NPH  shunt 7/6/20  · Sutures intact     Bradycardia  · stable     DM2  · At home on Lantus 10U qam/12U qpm  · Continue DM diet, QID Accuchecks/SSI  · Currently only on SSI   · BS remain stable on sliding scale only  · Add back Lantus when needed vs try Metformin     Mild cognitive impairment  · Seen by Geriatrics as I/P and MOCA was 22/30  · For OP followup with them an a repeat MOCA     HTN  · At home he is on Vasotec 10mg qd   · On 7/11/20, added Lisinopril 10mg qd as substitute     ZHOU/CPAP  · Continue CPAP     Hx gout  · Continue Allopurinol     Urine incontinence  · Supposedly having this issue at home and Geriatrics recommended that he see Urology as an OP  · PMR managing     Left TMA, diabetic neuropathy  · Likely contributes to some gait instability       The above assessment and plan was reviewed and updated as determined by my evaluation of the patient on 7/13/2020      Labs:   Results from last 7 days   Lab Units 07/13/20  0540 07/11/20  0530   WBC Thousand/uL 8 91 7 57   HEMOGLOBIN g/dL 14 6 13 3   HEMATOCRIT % 45 0 40 8   PLATELETS Thousands/uL 276 215     Results from last 7 days   Lab Units 07/13/20  0540 07/11/20  0530   SODIUM mmol/L 134* 135*   POTASSIUM mmol/L 3 9 4 2   CHLORIDE mmol/L 102 104   CO2 mmol/L 26 26   BUN mg/dL 19 21   CREATININE mg/dL 0 97 0 94   CALCIUM mg/dL 9 1 8 9             Results from last 7 days   Lab Units 07/13/20  0602 07/12/20 2015 07/12/20  1601   POC GLUCOSE mg/dl 159* 128 106       Review of Scheduled Meds:    Current Facility-Administered Medications:  acetaminophen 650 mg Oral Q8H Shelby Vega MD allopurinol 300 mg Oral Daily Daquan López MD   bisacodyl 10 mg Rectal Daily PRN Daquan López MD   docusate sodium 100 mg Oral BID Daquan López MD   heparin (porcine) 5,000 Units Subcutaneous Q8H [address] Daquan López MD   insulin lispro 1-5 Units Subcutaneous TID AC Elizabeth Ramirez, [place]   insulin lispro 1-5 Units Subcutaneous HS Teetee Fernando, [place]   lisinopril 10 mg Oral Daily Guillaume Olson, DO   melatonin 6 mg Oral HS Daquan López MD   methocarbamol 500 mg Oral Q6H Mirta Christianson MD   ondansetron 4 mg Oral Q6H PRN Daquan López MD   oxyCODONE 2 5 mg Oral Q4H PRN Daquan López MD   oxyCODONE 5 mg Oral Q4H PRN Daquan López MD   polyethylene glycol 17 g Oral Daily PRN Daquan López MD   senna 1 tablet Oral Daily Daquan López MD   travoprost 1 drop Right Eye HS Daquan López MD       Subjective/ HPI: Patients overnight issues or events were reviewed with nursing or staff during rounds or morning huddle session  No new or overnight issues  Offers no complaints  ROS:   A 10 point ROS was performed; negative except as noted above         Imaging:     No orders to display       *Labs /Radiology studies reviewed  *Medications reviewed and reconciled as needed  *Please refer to order section for additional ordered labs studies  *Case discussed with primary attending during morning huddle case rounds      Physical Examination:  Vitals:   Vitals:    07/12/20 1345 07/12/20 2031 07/13/20 0531 07/13/20 0831   BP: 96/56 111/58 121/54 122/60   BP Location: Right arm Left arm Right arm Right arm   Pulse: 56 55 67 58   Resp: 20 20 20    Temp: 98 1 °F (36 7 °C) 98 8 °F (37 1 °C) (!) 97 3 °F (36 3 °C)    TempSrc: Oral Oral Oral    SpO2: 98% 97% 99%    Weight:       Height:           GEN: No apparent distress, interactive  NEURO: Alert and oriented x3  HEENT: Pupils are equal and reactive, EOMI, mucous membranes are moist, face symmetrical  CV: S1 S2 regular, no MRG, no peripheral edema noted  RESP: Lungs are clear bilaterally, no wheezes, rales or rhonchi noted, on room air, respirations easy and non labored  GI: obese, soft non tender, non distended; +BS x4  : Voiding without difficulty  MUSC: Moves all extremities  SKIN: pink, warm and dry, normal turgor, no rashes, lesions; scalp incision intact; lumbar incision/dressing intact         The above physical exam was reviewed and updated as determined by my evaluation of the patient on 7/13/2020  Invasive Devices     None                    VTE Pharmacologic Prophylaxis: Heparin  Code Status: Level 1 - Full Code  Current Length of Stay: 3 day(s)      Total time spent:  30 minutes with more than 50% spent counseling/coordinating care  Counseling includes discussion with patient re: progress  and discussion with patient of his/her current medical state/information  Coordination of patient's care was performed in conjunction with primary service  Time invested included review of patient's labs, vitals, and management of their comorbidities with continued monitoring  In addition, this patient was discussed with medical team including physician and advanced extenders  The care of the patient was extensively discussed and appropriate treatment plan was formulated unique for this patient  ** Please Note:  voice to text software may have been used in the creation of this document   Although proof errors in transcription or interpretation are a potential of such software**

## 2020-07-13 NOTE — UTILIZATION REVIEW
Notification of Discharge  This is a Notification of Discharge from our facility [address]  Please be advised that this patient has been discharge from our facility  Below you will find the admission and discharge date and time including the patients disposition  PRESENTATION DATE: 7/6/2020  8:31 AM  OBS ADMISSION DATE:   IP ADMISSION DATE: 7/6/20 1126   DISCHARGE DATE: 7/10/2020  2:15 PM  DISPOSITION: [address] [place] [address] Dr [facility]   Admission Orders listed below:  Admission Orders (From admission, onward)     Ordered        07/06/20 1126  Inpatient Admission  Once                   Please contact the UR Department if additional information is required to close this patient's authorization/case  [address] Utilization Review Department  Main: [phone] x carefully listen to the prompts  All voicemails are confidential   [email or web address]  Send all requests for admission clinical reviews, approved or denied determinations and any other requests to dedicated fax number below belonging to the campus where the patient is receiving treatment   List of dedicated fax numbers:  [address] DENIALS (Administrative/Medical Necessity) [phone]   [address] (Maternity/NICU/Pediatrics) [phone]   Samantha Couch [phone]   Alison Ash [phone]   Cullen Opitz [phone]   [facility] [address] [place] [address] [phone]   [facility]  [phone]   [address] [facility] [address] [facility]  [address] [facility] [address] [facility] [phone]

## 2020-07-14 LAB
GLUCOSE SERPL-MCNC: 113 MG/DL (ref 65–140)
GLUCOSE SERPL-MCNC: 121 MG/DL (ref 65–140)
GLUCOSE SERPL-MCNC: 133 MG/DL (ref 65–140)
GLUCOSE SERPL-MCNC: 142 MG/DL (ref 65–140)

## 2020-07-14 PROCEDURE — 97535 SELF CARE MNGMENT TRAINING: CPT

## 2020-07-14 PROCEDURE — 97110 THERAPEUTIC EXERCISES: CPT

## 2020-07-14 PROCEDURE — 99233 SBSQ HOSP IP/OBS HIGH 50: CPT | Performed by: PHYSICAL MEDICINE & REHABILITATION

## 2020-07-14 PROCEDURE — 82948 REAGENT STRIP/BLOOD GLUCOSE: CPT

## 2020-07-14 PROCEDURE — 97530 THERAPEUTIC ACTIVITIES: CPT

## 2020-07-14 PROCEDURE — 97116 GAIT TRAINING THERAPY: CPT

## 2020-07-14 RX ADMIN — ACETAMINOPHEN 650 MG: 325 TABLET, FILM COATED ORAL at 21:51

## 2020-07-14 RX ADMIN — HEPARIN SODIUM 5000 UNITS: 5000 INJECTION INTRAVENOUS; SUBCUTANEOUS at 05:40

## 2020-07-14 RX ADMIN — METHOCARBAMOL 500 MG: 500 TABLET, FILM COATED ORAL at 00:27

## 2020-07-14 RX ADMIN — TRAVOPROST 1 DROP: 0.04 SOLUTION/ DROPS OPHTHALMIC at 22:37

## 2020-07-14 RX ADMIN — ACETAMINOPHEN 650 MG: 325 TABLET, FILM COATED ORAL at 05:40

## 2020-07-14 RX ADMIN — DOCUSATE SODIUM 100 MG: 100 CAPSULE, LIQUID FILLED ORAL at 17:16

## 2020-07-14 RX ADMIN — SENNOSIDES 8.6 MG: 8.6 TABLET ORAL at 08:36

## 2020-07-14 RX ADMIN — DOCUSATE SODIUM 100 MG: 100 CAPSULE, LIQUID FILLED ORAL at 08:36

## 2020-07-14 RX ADMIN — METHOCARBAMOL 500 MG: 500 TABLET, FILM COATED ORAL at 13:04

## 2020-07-14 RX ADMIN — HEPARIN SODIUM 5000 UNITS: 5000 INJECTION INTRAVENOUS; SUBCUTANEOUS at 21:51

## 2020-07-14 RX ADMIN — ALLOPURINOL 300 MG: 300 TABLET ORAL at 08:35

## 2020-07-14 RX ADMIN — ACETAMINOPHEN 650 MG: 325 TABLET, FILM COATED ORAL at 13:04

## 2020-07-14 RX ADMIN — HEPARIN SODIUM 5000 UNITS: 5000 INJECTION INTRAVENOUS; SUBCUTANEOUS at 13:03

## 2020-07-14 RX ADMIN — METHOCARBAMOL 500 MG: 500 TABLET, FILM COATED ORAL at 17:16

## 2020-07-14 RX ADMIN — LISINOPRIL 5 MG: 5 TABLET ORAL at 08:36

## 2020-07-14 RX ADMIN — METHOCARBAMOL 500 MG: 500 TABLET, FILM COATED ORAL at 05:40

## 2020-07-14 RX ADMIN — MELATONIN 6 MG: at 21:51

## 2020-07-14 NOTE — PROGRESS NOTES
07/14/20 1400   Pain Assessment   Pain Assessment Tool 0-10   Pain Score 3   Pain Location/Orientation Location: Back   Effect of Pain on Daily Activities tolerated all interventions well   Restrictions/Precautions   Precautions Cognitive; Fall Risk;Pain;Supervision on toilet/commode;Spinal precautions; Bed/chair alarms   Braces or Orthoses MARCO ANTONIOO  (LLE)   Cognition   Overall Cognitive Status WFL   Arousal/Participation Alert; Cooperative   Attention Attends with cues to redirect   Orientation Level Oriented X4   Memory Decreased short term memory   Following Commands Follows all commands and directions without difficulty   Comments Pt able to recall 2/3 spinal precautions this session needing assist for "no lifting"   Subjective   Subjective Pt reporting he is anxious upon PT's arrival as he had been waiting all day for therapy tx  Pt calm, pleasant, and agreeale to session after PT introduced self  Sit to Stand   Type of Assistance Needed Physical assistance; Adaptive equipment   Amount of Physical Assistance Provided 25%-49%   Comment with RW, CGA for standing, Hellen for descent as pt with poor eccentric control  Also one posterior LOB during initial standing as pt reports "I had all my weight back on my heels" requiring modAx1  Sit to Stand CARE Score 3   Bed-Chair Transfer   Type of Assistance Needed Physical assistance; Adaptive equipment   Amount of Physical Assistance Provided Less than 25%   Comment RW   Chair/Bed-to-Chair Transfer CARE Score 3   Transfer Bed/Chair/Wheelchair   Limitations Noted In Balance; Endurance;LE Strength;UE Strength   Adaptive Equipment Roller Walker   Stand Pivot Minimal Assist;Contact Guard   Sit to Stand Minimal Assist;Contact Guard   Stand to [employer]   Reason if not Attempted Safety concerns   Car Transfer CARE Score 88   Walk 10 Feet   Type of Assistance Needed Physical assistance; Adaptive equipment   Amount of Physical Assistance Provided Less than 25%   Comment with RW   Walk 10 Feet CARE Score 3   Walk 50 Feet with Two Turns   Type of Assistance Needed Physical assistance; Adaptive equipment   Amount of Physical Assistance Provided Less than 25%   Comment with RW and CF   Walk 50 Feet with Two Turns CARE Score 3   Walk 150 Feet   Type of Assistance Needed Physical assistance   Amount of Physical Assistance Provided Total assistance   Comment minAx1 with RW and CF by second person for safety; pt cont with heavy b/l UE weight bearing, also noted veering to R side repeatedly needing cues to stay in center of hallway to avoid obstacles   Walk 150 Feet CARE Score 1   Walking 10 Feet on Uneven Surfaces   Reason if not Attempted Safety concerns   Walking 10 Feet on Uneven Surfaces CARE Score 88   Ambulation   Does the patient walk? 2  Yes   Primary Mode of Locomotion Prior to Admission Walk   Distance Walked (feet) 205 ft  (x1, 135'x1, 125'x1)   Assist Device Roller Walker   Gait Pattern Inconsistant Kathie; Forward Flexion;Narrow ZABRINA; Decreased foot clearance; Step to; Step through   Limitations Noted In Balance; Endurance; Heel Strike;Posture;Speed;Strength;Swing   Provided Assistance with: Balance   Walk Assist Level Minimum Assist;Contact Guard; Chair Follow   Findings fluctuates from CGA to minAx1 with fatigue, however, CF by second person for longer distances due to pt's fatigue levels  Pt able to increase amb distances this session with fatigue being limiting factor to farther distance amb  Did offer to trial rollator during amb, however pt wishing to utilize RW this session,    Wheel 50 Feet with Two Turns   Reason if not Attempted Activity not applicable   Wheel 50 Feet with Two Turns CARE Score 9   Wheel 150 Feet   Reason if not Attempted Activity not applicable   Wheel 530 Feet CARE Score 9   Wheelchair mobility   Does the patient use a wheelchair? 0   No   Curb or Single Stair   Style negotiated Single stair   Type of Assistance Needed Physical assistance Amount of Physical Assistance Provided 25%-49%   1 Step (Curb) CARE Score 3   4 Steps   Type of Assistance Needed Physical assistance   Amount of Physical Assistance Provided 25%-49%   4 Steps CARE Score 3   12 Steps   Reason if not Attempted Safety concerns   12 Steps CARE Score 88   Stairs   Type Stairs   # of Steps 6   Weight Bearing Precautions Fall Risk   Assist Devices Bilateral Rail   Findings pt ascending/descending forward this session as he reports he does not wish to descend bacwkards  Second person SBA, however, not needed  On first 2 steps descending, pt needing vc's for hand placement as pt keeping hands posteriorly on railings and with posterior lean  Pt able to self correct on next trials  Fatigued after completing 6 steps this session needing seated rest break  Therapeutic Interventions   Strengthening seated TE with 3# ankle weights: 3x15 hip flex (maintaining spinal precautions) and 3x15 LAQs  Pt tolerated well and weight could be increased next session as pt not reporting difficulty with exercises  Flexibility B/L passive seated hamstring/gastroc stretching x5 mins total   Equipment Use   NuStep Lvl 3, 10 mins, all extremeties to improve pt's overall strength and endurance; pt tolerated well   Assessment   Treatment Assessment Pt engaged in skilled PT session focusing primarily on transfers, gait training, stair trng, and improving pt's activity tolerance and LE strength  Pt with good tolerance to session however does require seated therapeutic rest breaks between activities due to decreased endurance  Pt currently functioning at [place] [address] with use of RW for transfers and amb, did have second person CF for safety for long distance amb  Focused on increasing amb distances this session which pt able to do so, however, believe that limited pt's ability to complete more steps on  steps this session as he was fatigued after amb trials   Did speak with pt about utilizing RW vs rollator, however, pt reporting that he wishes to utilize RW at this time as he feels more stable with RW  Plan to cont to work on pt's overall strength, activity tolerance, gait, transfers, and balance in order to decrease pt's risk of falls and caregiver burden  Pt is progressing towards [place] (short distance) and S goals at this time but would benefit from cont skilled PT intervention to address barriers to d/c home  Family/Caregiver Present no    Problem List Decreased strength;Decreased range of motion;Decreased endurance; Impaired balance;Decreased coordination;Orthopedic restrictions; Impaired sensation;Obesity   Barriers to Discharge Decreased caregiver support   PT Barriers   Physical Impairment Decreased strength;Decreased range of motion; Impaired balance;Decreased endurance;Decreased mobility; Impaired sensation;Orthopedic restrictions;Pain;Obesity   Functional Limitation Car transfers; Ramp negotiation;Standing;Stair negotiation;Transfers; Walking   Plan   Treatment/Interventions Functional transfer training;LE strengthening/ROM; Therapeutic exercise; Endurance training;Gait training   Progress Progressing toward goals   Recommendation   PT Discharge Recommendation Home with skilled therapy   Equipment Recommended Walker   PT - OK to Discharge No   PT Therapy Minutes   PT Time In 1400   PT Time Out 1530   PT Total Time (minutes) 90   PT Mode of treatment - Individual (minutes) 90   PT Mode of treatment - Concurrent (minutes) 0   PT Mode of treatment - Group (minutes) 0   PT Mode of treatment - Co-treat (minutes) 0   PT Mode of Treatment - Total time(minutes) 90 minutes   PT Cumulative Minutes 310   Therapy Time missed   Time missed?  No

## 2020-07-14 NOTE — PLAN OF CARE
Problem: PAIN - ADULT  Goal: Verbalizes/displays adequate comfort level or baseline comfort level  Description  Interventions:  - Encourage patient to monitor pain and request assistance  - Assess pain using appropriate pain scale  - Administer analgesics based on type and severity of pain and evaluate response  - Implement non-pharmacological measures as appropriate and evaluate response  - Consider cultural and social influences on pain and pain management  - Notify physician/advanced practitioner if interventions unsuccessful or patient reports new pain  Outcome: Progressing     Problem: INFECTION - ADULT  Goal: Absence or prevention of progression during hospitalization  Description  INTERVENTIONS:  - Assess and monitor for signs and symptoms of infection  - Monitor lab/diagnostic results  - Monitor all insertion sites, i e  indwelling lines, tubes, and drains  - Monitor endotracheal if appropriate and nasal secretions for changes in amount and color  - [place] appropriate cooling/warming therapies per order  - Administer medications as ordered  - Instruct and encourage patient and family to use good hand hygiene technique  - Identify and instruct in appropriate isolation precautions for identified infection/condition  Outcome: Progressing  Goal: Absence of fever/infection during neutropenic period  Description  INTERVENTIONS:  - Monitor WBC    Outcome: Progressing     Problem: SAFETY ADULT  Goal: Patient will remain free of falls  Description  INTERVENTIONS:  - Assess patient frequently for physical needs  -  Identify cognitive and physical deficits and behaviors that affect risk of falls    -  [place] fall precautions as indicated by assessment   - Educate patient/family on patient safety including physical limitations  - Instruct patient to call for assistance with activity based on assessment  - Modify environment to reduce risk of injury  - Consider OT/PT consult to assist with strengthening/mobility  Outcome: Progressing  Goal: Maintain or return to baseline ADL function  Description  INTERVENTIONS:  -  Assess patient's ability to carry out ADLs; assess patient's baseline for ADL function and identify physical deficits which impact ability to perform ADLs (bathing, care of mouth/teeth, toileting, grooming, dressing, etc )  - Assess/evaluate cause of self-care deficits   - Assess range of motion  - Assess patient's mobility; develop plan if impaired  - Assess patient's need for assistive devices and provide as appropriate  - Encourage maximum independence but intervene and supervise when necessary  - Involve family in performance of ADLs  - Assess for home care needs following discharge   - Consider OT consult to assist with ADL evaluation and planning for discharge  - Provide patient education as appropriate  Outcome: Progressing  Goal: Maintain or return mobility status to optimal level  Description  INTERVENTIONS:  - Assess patient's baseline mobility status (ambulation, transfers, stairs, etc )    - Identify cognitive and physical deficits and behaviors that affect mobility  - Identify mobility aids required to assist with transfers and/or ambulation (gait belt, sit-to-stand, lift, walker, cane, etc )  - [place] fall precautions as indicated by assessment  - Record patient progress and toleration of activity level on Mobility SBAR; progress patient to next Phase/Stage  - Instruct patient to call for assistance with activity based on assessment  - Consider rehabilitation consult to assist with strengthening/weightbearing, etc   Outcome: Progressing     Problem: Prexisting or High Potential for Compromised Skin Integrity  Goal: Skin integrity is maintained or improved  Description  INTERVENTIONS:  - Identify patients at risk for skin breakdown  - Assess and monitor skin integrity  - Assess and monitor nutrition and hydration status  - Monitor labs   - Assess for incontinence   - Turn and reposition patient  - Assist with mobility/ambulation  - Relieve pressure over bony prominences  - Avoid friction and shearing  - Provide appropriate hygiene as needed including keeping skin clean and dry  - Evaluate need for skin moisturizer/barrier cream  - Collaborate with interdisciplinary team   - Patient/family teaching  - Consider wound care consult   Outcome: Progressing     Problem: Potential for Falls  Goal: Patient will remain free of falls  Description  INTERVENTIONS:  - Assess patient frequently for physical needs  -  Identify cognitive and physical deficits and behaviors that affect risk of falls    -  [place] fall precautions as indicated by assessment   - Educate patient/family on patient safety including physical limitations  - Instruct patient to call for assistance with activity based on assessment  - Modify environment to reduce risk of injury  - Consider OT/PT consult to assist with strengthening/mobility  Outcome: Progressing

## 2020-07-14 NOTE — PROGRESS NOTES
[facility] Occupational Therapy Daily Note  Patient Active Problem List   Diagnosis    Status post ventriculoperitoneal shunt    Unspecified abnormalities of gait and mobility    Diabetic ulcer of left foot associated with type 2 diabetes mellitus ([place] [id number] )    Type 2 diabetes mellitus ([place] [address] )    Normal pressure hydrocephalus (HCC)    Essential hypertension    Gout    ZHOU on CPAP    Spondylolisthesis of lumbosacral region    Impaired functional mobility, balance, gait, and endurance    Weakness of left foot    Pain    Scalp hematoma    Scalp laceration       Past Medical History:   Diagnosis Date    Cancer [facility]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     Diabetes mellitus ([place] [address] )     DM2 (diabetes mellitus, type 2) ([place] [address] )     Gout     HTN (hypertension)     Hydrocephalus ([place] [address] )     Hypertension     Neuropathy     ZHOU on CPAP     Pressure injury of skin     TIA (transient ischemic attack)      Etiologic Diagnosis: Spondylolisthesis lumbar spine  Restrictions/Precautions  Precautions: Bed/chair alarms, Cognitive, Fall Risk, Pain, Spinal precautions, Supervision on toilet/commode  Braces or Orthoses: ANNI(LLE)  ADL Team Goal: Patient will be independent with ADLs with least restrictive device upon completion of rehab program  Recommendation  OT Discharge Recommendation: (P) (Home with friend support-Home OT)  Equipment Recommended: Tub seat with back(Pt owns RW, rollator, and BSC)      07/14/20 0700   Pain Assessment   Pain Assessment Tool 0-10   Pain Score 4   Pain Location/Orientation Location: Back;Orientation: Lower   Hospital Pain Intervention(s) Repositioned; Shower/Bath   Lifestyle   Autonomy "The bench didnt fit in the tub "   Shower/Bathe Self   Type of Assistance Needed Physical assistance   Amount of Physical Assistance Provided Less than 25%   Comment EDU on LH sponge use for reaching to lower feet  EDU for use of long towel for drying   able to use L UE for bathing buttocks in stance with grab bar use, CGA in stance    Shower/Bathe Self CARE Score 3   Tub/Shower Transfer   Findings Pt complete lateral step over simulated tub lip roughly 6" with grab bars  Pt reports that in the past he had a tub bench but he did not care for the hang over  educated on use of shower chair  will require bariatric version for optimal safety  will ask roommmate to measure space to ensure best selection of chair is he wishes to purchase one  Upper Body Dressing   Type of Assistance Needed Set-up / clean-up   Upper Body Dressing CARE Score 5   Lower Body Dressing   Type of Assistance Needed Physical assistance   Amount of Physical Assistance Provided 50%-74%   Comment assist for threading underwear  able to thread shorts with larger waist band  Pt edu on reacher use for reach to feet  upon stance shorts fall to the floor  pt states "that would be a good thing for the reacher " Pt may benefit from EDU on a clothing clip  Lower Body Dressing CARE Score 2   Putting On/Taking Off Footwear   Comment Pt completes use of LH shoe horn to reduce reaching to feel for slipping feet into shoe  assist for donning sock at this time until positioning completed  following footwear donning Pt is able to complete side sit at EOB with LE supported  Pt reports that this is how he dons footwear at home  Pt able to reach foot with minimal reaching  will cont to trial abilty to complete  Pt reports no change in pain in position      Sit to Stand   Type of Assistance Needed Physical assistance   Amount of Physical Assistance Provided 25%-49%   Comment still req control for safe dencent to surface at times   Sit to Stand CARE Score 3   Bed-Chair Transfer   Type of Assistance Needed Physical assistance   Amount of Physical Assistance Provided Less than 25%   Chair/Bed-to-Chair Transfer CARE Score 3   Activity Tolerance   Activity Tolerance Patient tolerated treatment well   Assessment   Treatment Assessment Pt participated in skilled OT session focusing on functional ADL retraining, activity tolerance, activity modification, use of AE/DME, and functional transfers  See above for details on ADL function  Pt continues to demonstrate improvements in standing balance, however still req NGUYỄN  At times for retropulsion in stance  Pt continues to require skilled acute rehab OT services to increase overall functional independence and safety w/ ADLs and functional transfers, continue to follow plan of care  Continued plan of care for OT sessions to focus on the following areas:  ADL re-training, [address] training, IADL re-training, fxnl xfers, fxnl cognition, standing tolerance, standing balance, UE strengthening, UE endurance, DME training/education, family training/education, EC techniques/education, healthy coping education, leisure pursuits, community re-integration, sitting balance and core/trunk control   Prognosis Good   Plan   Treatment/Interventions ADL retraining;Functional transfer training;LE strengthening/ROM; Therapeutic exercise; Endurance training;Cognitive reorientation;Equipment eval/education;Patient/family training;Bed mobility; Compensatory technique education   Progress Progressing toward goals   Recommendation   OT Discharge Recommendation   (Home with friend support-Home OT)   OT Therapy Minutes   OT Time In 0700   OT Time Out 0830   OT Total Time (minutes) 90   OT Mode of treatment - Individual (minutes) 90   OT Mode of treatment - Concurrent (minutes) 0   OT Mode of treatment - Group (minutes) 0   OT Mode of treatment - Co-treat (minutes) 0   OT Mode of Treatment - Total time(minutes) 90 minutes   OT Cumulative Minutes 330

## 2020-07-14 NOTE — PROGRESS NOTES
Internal Medicine Progress Note  Patient: Lexi Portillo  Age/sex: 69 y o  male  Medical Record #: [id number]      ASSESSMENT/PLAN: (Interval History)  Lexi Portillo is seen and examined and management for following issues:    L5-S1 TLIF with hx L-S spondylolisthesis on 7/6/20 ([place])  · Monitor incision  · Pain control per primary     Scalp revision/hx NPH  shunt 7/6/20  · Sutures intact     Bradycardia  · stable     DM2  · At home on Lantus 10U qam/12U qpm  · Continue DM diet, QID Accuchecks/SSI  · Currently only on SSI   · BS remain stable on sliding scale only  · Add back Lantus when needed vs try Metformin     Mild cognitive impairment  · Seen by Geriatrics as I/P and MOCA was 22/30  · For OP followup with them an a repeat MOCA     HTN  · At home he is on Vasotec 10mg qd   · On 7/11/20, added Lisinopril 10mg qd as substitute  · BP stable     ZHOU/CPAP  · Continue CPAP     Hx gout  · stable  · Continue Allopurinol     Urine incontinence  · Supposedly having this issue at home and Geriatrics recommended that he see Urology as an OP  · PMR managing     Left TMA, diabetic neuropathy  · Likely contributes to some gait instability       The above assessment and plan was reviewed and updated as determined by my evaluation of the patient on 7/14/2020      Labs:   Results from last 7 days   Lab Units 07/13/20  0540 07/11/20  0530   WBC Thousand/uL 8 91 7 57   HEMOGLOBIN g/dL 14 6 13 3   HEMATOCRIT % 45 0 40 8   PLATELETS Thousands/uL 276 215     Results from last 7 days   Lab Units 07/13/20  0540 07/11/20  0530   SODIUM mmol/L 134* 135*   POTASSIUM mmol/L 3 9 4 2   CHLORIDE mmol/L 102 104   CO2 mmol/L 26 26   BUN mg/dL 19 21   CREATININE mg/dL 0 97 0 94   CALCIUM mg/dL 9 1 8 9             Results from last 7 days   Lab Units 07/14/20  0627 07/13/20  2106 07/13/20  1606   POC GLUCOSE mg/dl 121 130 113       Review of Scheduled Meds:    Current Facility-Administered Medications:  acetaminophen 650 mg Oral [facility] Vic Vieira MD   allopurinol 300 mg Oral Daily Vic Vieira MD   bisacodyl 10 mg Rectal Daily PRN Vic Vieira MD   docusate sodium 100 mg Oral BID Vic Vieira MD   heparin (porcine) 5,000 Units Subcutaneous Q8H [address] Vic Vieira MD   insulin lispro 1-5 Units Subcutaneous TID AC Elizabeth Ramirez, CRNP   insulin lispro 1-5 Units Subcutaneous HS Elizabeth Ramirez, AR   lisinopril 5 mg Oral Daily Denia Sood, CRNP   melatonin 6 mg Oral HS Vic Vieira MD   methocarbamol 500 mg Oral Q6H [address] Vic Vieira MD   ondansetron 4 mg Oral Q6H PRN Vic Vieira MD   oxyCODONE 2 5 mg Oral Q4H PRN Vic Vieira MD   oxyCODONE 5 mg Oral Q4H PRN Vic Vieira MD   polyethylene glycol 17 g Oral Daily PRN Vic Vieira MD   senna 1 tablet Oral Daily Vic Vieira MD   travoprost 1 drop Right Eye HS Vic Vieira MD       Subjective/ HPI: Patients overnight issues or events were reviewed with nursing or staff during rounds or morning huddle session  No new or overnight issues  Offers no complaints  ROS:   A 10 point ROS was performed; negative except as noted above         Imaging:     No orders to display       *Labs /Radiology studies reviewed  *Medications reviewed and reconciled as needed  *Please refer to order section for additional ordered labs studies  *Case discussed with primary attending during morning huddle case rounds      Physical Examination:  Vitals:   Vitals:    07/13/20 0831 07/13/20 1302 07/13/20 2015 07/14/20 0540   BP: 122/60 98/52 106/55 121/60   BP Location: Right arm Right arm Right arm    Pulse: 58 59 (!) 53 58   Resp:  18 18 20   Temp:  98 5 °F (36 9 °C) 98 3 °F (36 8 °C) 98 °F (36 7 °C)   TempSrc:  Oral Oral Oral   SpO2:  96% 98% 100%   Weight:       Height:           GEN: No apparent distress, interactive  NEURO: Alert and oriented x3  HEENT: Pupils are equal and reactive, EOMI, mucous membranes are moist, face symmetrical  CV: S1 S2 regular, no MRG, no peripheral edema noted  RESP: Lungs are clear bilaterally, no wheezes, rales or rhonchi noted, on room air, respirations easy and non labored  GI: obese, soft non tender, non distended; +BS x4  : Voiding without difficulty  MUSC: Moves all extremities  SKIN: pink, warm and dry, normal turgor, no rashes, lesions; scalp incision intact; lumbar incision/dressing intact         The above physical exam was reviewed and updated as determined by my evaluation of the patient on 7/14/2020  Invasive Devices     None                    VTE Pharmacologic Prophylaxis: Heparin  Code Status: Level 1 - Full Code  Current Length of Stay: 4 day(s)      Total time spent:  30 minutes with more than 50% spent counseling/coordinating care  Counseling includes discussion with patient re: progress  and discussion with patient of his/her current medical state/information  Coordination of patient's care was performed in conjunction with primary service  Time invested included review of patient's labs, vitals, and management of their comorbidities with continued monitoring  In addition, this patient was discussed with medical team including physician and advanced extenders  The care of the patient was extensively discussed and appropriate treatment plan was formulated unique for this patient  ** Please Note:  voice to text software may have been used in the creation of this document   Although proof errors in transcription or interpretation are a potential of such software**

## 2020-07-14 NOTE — PROGRESS NOTES
PM&R Progress Note:    HPI: Lenora Xiong is a 69 y o  male with HTN, DM2, Gout, known NPH s/p VPS chronic left foot weakness, lumbar spinal stenosis who presented to the [address] [place] [address] on 7/6/20 for worsening back pain and gait instability from known spondylolisthesis L5/S1  He was recommended to have operative intervention, and was taken to the OR on 7/6/20 by Dr Mauricio Akers for an elective L5-S1 transverse lumbar interbody fusion and deformity correction at L5-S1  Patient found to have functional deficits from baseline and admitted to [address] [place] [address] on 7/10/20  ASSESSMENT: Stable, progressing      PLAN:    Rehabilitation   Continue current rehabilitation plan of care to maximize function  I personally attended, reviewed, and discussed medical and functional updates in team conference today  Please refer to advance care planning note for details   Expected Discharge: TBD, RETEAM    Pain   Controlled  DVT prophylaxis   Heparin    Bladder plan   Incontinent at times  Bowel plan   Continent    * Spondylolisthesis of lumbosacral region  Assessment & Plan  s/p L5-S1 transverse lumbar interbody fusion and deformity correction at L5-S1 on 7/6/20 by Dr Friedman Ek healing well with staples intact  Scalp laceration  Assessment & Plan  Sutures in place from scalp closure about 1 week ago - extensive scabbing continues to be present  Will not remove sutures  Pain  Assessment & Plan  Managed on Tylenol and Robaxin  P r n  Oxycodone    ZHOU on CPAP  Assessment & Plan  Continue nocturnal CPAP home settings    Gout  Assessment & Plan  Managed on allopurinol    Essential hypertension  Assessment & Plan  Stable without blood pressure medication    Normal pressure hydrocephalus (HCC)  Assessment & Plan  Chronic  shunt        Appreciate IM consultants medical co-management  Labs, medications, and imaging personally reviewed  SUBJECTIVE:  Patient seen face to face today    No acute issues overnight  Feeling day by day he has making improvement especially with his lower extremity strength  Having only mild pain in his lower back  ROS:  A ten point review of systems was completed 7/14/2020 and pertinent positives are listed in subjective section  All other systems reviewed were negative  OBJECTIVE:   BP 96/50 (BP Location: Right arm)   Pulse 56   Temp 97 5 °F (36 4 °C) (Oral)   Resp 17   Ht 6' 3" (1 905 m)   Wt 125 kg (276 lb 8 oz)   SpO2 97%   BMI 34 56 kg/m²     Physical Exam   Constitutional: He is oriented to person, place, and time  He appears well-developed and well-nourished  No distress  HENT:   Head: Normocephalic  Nose: Nose normal    Eyes: Conjunctivae and EOM are normal    Neck: Neck supple  Cardiovascular: Normal rate and intact distal pulses  Pulmonary/Chest: Effort normal  He has no wheezes  Abdominal: Soft  He exhibits no distension  Musculoskeletal: He exhibits no edema  Neurological: He is alert and oriented to person, place, and time  Skin:   Back incisions clean dry and intact   Psychiatric: He has a normal mood and affect  Nursing note and vitals reviewed         Lab Results   Component Value Date    WBC 8 91 07/13/2020    HGB 14 6 07/13/2020    HCT 45 0 07/13/2020    MCV 88 07/13/2020     07/13/2020     Lab Results   Component Value Date    SODIUM 134 (L) 07/13/2020    K 3 9 07/13/2020     07/13/2020    CO2 26 07/13/2020    BUN 19 07/13/2020    CREATININE 0 97 07/13/2020    GLUC 141 (H) 07/13/2020    CALCIUM 9 1 07/13/2020     Lab Results   Component Value Date    INR 1 06 06/16/2020    INR 1 14 05/28/2020    PROTIME 13 4 06/16/2020    PROTIME 14 0 05/28/2020           Current Facility-Administered Medications:     acetaminophen (TYLENOL) tablet 650 mg, 650 mg, Oral, Q8H [address], Nadir Rajput MD, 650 mg at 07/14/20 1304    allopurinol (ZYLOPRIM) tablet 300 mg, 300 mg, Oral, Daily, Nadir Rajput MD, 300 mg at 07/14/20 9242    bisacodyl (DULCOLAX) rectal suppository 10 mg, 10 mg, Rectal, Daily PRN, Negro Vargas MD    docusate sodium (COLACE) capsule 100 mg, 100 mg, Oral, BID, Negro Vargas MD, 100 mg at 07/14/20 0836    heparin (porcine) subcutaneous injection 5,000 Units, 5,000 Units, Subcutaneous, Q8H [address], Negro Vargas MD, 5,000 Units at 07/14/20 1303    insulin lispro (HumaLOG) 100 units/mL subcutaneous injection 1-5 Units, 1-5 Units, Subcutaneous, TID AC, 1 Units at 07/13/20 0821 **AND** Fingerstick Glucose (POCT), , , TID AC, AR Gan    insulin lispro (HumaLOG) 100 units/mL subcutaneous injection 1-5 Units, 1-5 Units, Subcutaneous, HS, AR Gan    lisinopril (ZESTRIL) tablet 5 mg, 5 mg, Oral, Daily, DeniaAR Lazo, 5 mg at 07/14/20 0836    melatonin tablet 6 mg, 6 mg, Oral, HS, Negro Vargas MD, 6 mg at 07/13/20 2252    methocarbamol (ROBAXIN) tablet 500 mg, 500 mg, Oral, Q6H [address], Negro Vargas MD, 500 mg at 07/14/20 1304    ondansetron (ZOFRAN-ODT) dispersible tablet 4 mg, 4 mg, Oral, Q6H PRN, Negro Vargas MD    oxyCODONE (ROXICODONE) IR tablet 2 5 mg, 2 5 mg, Oral, Q4H PRN, Negro Vargas MD    oxyCODONE (ROXICODONE) IR tablet 5 mg, 5 mg, Oral, Q4H PRN, Negro Vargas MD    polyethylene glycol (MIRALAX) packet 17 g, 17 g, Oral, Daily PRN, Negro Vargas MD    senna (SENOKOT) tablet 8 6 mg, 1 tablet, Oral, Daily, Negro Vargas MD, 8 6 mg at 07/14/20 0836    travoprost (TRAVATAN-Z) 0 004 % ophthalmic solution 1 drop, 1 drop, Right Eye, HS, Negro Vargas MD, 1 drop at 07/13/20 9519    Past Medical History:   Diagnosis Date    Cancer [facility]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     Diabetes mellitus ([place] [id number] )     DM2 (diabetes mellitus, type 2) ([place] [address] )     Gout     HTN (hypertension)     Hydrocephalus ([place] [address] )     Hypertension     Neuropathy     ZHOU on CPAP     Pressure injury of skin     TIA (transient ischemic attack)        Patient Active Problem List Diagnosis Date Noted    Spondylolisthesis of lumbosacral region 07/02/2020     Priority: High    Weakness of left foot 07/10/2020    Pain 07/10/2020    Scalp hematoma 07/10/2020    Scalp laceration 07/10/2020    Impaired functional mobility, balance, gait, and endurance 07/02/2020    Diabetic ulcer of left foot associated with type 2 diabetes mellitus ([facility] [address] ) 05/28/2020    [facility] 05/28/2020    Normal pressure hydrocephalus [facility] ) 05/28/2020    Essential hypertension 05/28/2020    Gout 05/28/2020    ZHOU on CPAP 05/28/2020    Status post ventriculoperitoneal shunt 04/21/2020    Unspecified abnormalities of gait and mobility 04/21/2020          Elida Cobb MD    Total time spent:  45 minutes with more than 50% spent counseling/coordinating care  Counseling includes discussion with patient re: progress and discussion with patient of his/her current medical state/information  Coordination of patient's care was performed in conjunction with consulting services  Time invested included review of patient's labs, vitals, and management of their comorbidities with continued monitoring  The care of the patient was extensively discussed and appropriate treatment plan was formulated unique for this patient  ** Please Note:  voice to text software may have been used in the creation of this document   Although proof errors in transcription or interpretation are a potential of such software**

## 2020-07-14 NOTE — SOCIAL WORK
Met with Pt to review the team meeting  Pt understands, and is in agreement with, the recommendation to reteam again next week    CM offered to provide a list of HHA, in the event Pt feels he needs more assistance at d/c       CM provided Pt with the [address] list

## 2020-07-15 LAB
GLUCOSE SERPL-MCNC: 105 MG/DL (ref 65–140)
GLUCOSE SERPL-MCNC: 109 MG/DL (ref 65–140)
GLUCOSE SERPL-MCNC: 110 MG/DL (ref 65–140)
GLUCOSE SERPL-MCNC: 110 MG/DL (ref 65–140)

## 2020-07-15 PROCEDURE — 82948 REAGENT STRIP/BLOOD GLUCOSE: CPT

## 2020-07-15 PROCEDURE — 97530 THERAPEUTIC ACTIVITIES: CPT

## 2020-07-15 PROCEDURE — 99233 SBSQ HOSP IP/OBS HIGH 50: CPT | Performed by: PHYSICAL MEDICINE & REHABILITATION

## 2020-07-15 PROCEDURE — 97535 SELF CARE MNGMENT TRAINING: CPT

## 2020-07-15 PROCEDURE — 97110 THERAPEUTIC EXERCISES: CPT

## 2020-07-15 RX ADMIN — ACETAMINOPHEN 650 MG: 325 TABLET, FILM COATED ORAL at 13:21

## 2020-07-15 RX ADMIN — ACETAMINOPHEN 650 MG: 325 TABLET, FILM COATED ORAL at 22:32

## 2020-07-15 RX ADMIN — METHOCARBAMOL 500 MG: 500 TABLET, FILM COATED ORAL at 05:40

## 2020-07-15 RX ADMIN — METHOCARBAMOL 500 MG: 500 TABLET, FILM COATED ORAL at 17:30

## 2020-07-15 RX ADMIN — ACETAMINOPHEN 650 MG: 325 TABLET, FILM COATED ORAL at 05:40

## 2020-07-15 RX ADMIN — HEPARIN SODIUM 5000 UNITS: 5000 INJECTION INTRAVENOUS; SUBCUTANEOUS at 22:33

## 2020-07-15 RX ADMIN — TRAVOPROST 1 DROP: 0.04 SOLUTION/ DROPS OPHTHALMIC at 22:36

## 2020-07-15 RX ADMIN — HEPARIN SODIUM 5000 UNITS: 5000 INJECTION INTRAVENOUS; SUBCUTANEOUS at 05:41

## 2020-07-15 RX ADMIN — HEPARIN SODIUM 5000 UNITS: 5000 INJECTION INTRAVENOUS; SUBCUTANEOUS at 13:21

## 2020-07-15 RX ADMIN — ALLOPURINOL 300 MG: 300 TABLET ORAL at 08:00

## 2020-07-15 RX ADMIN — MELATONIN 6 MG: at 22:32

## 2020-07-15 RX ADMIN — METHOCARBAMOL 500 MG: 500 TABLET, FILM COATED ORAL at 11:28

## 2020-07-15 NOTE — PROGRESS NOTES
07/15/20 1410   Pain Assessment   Pain Score 2   Pain Location/Orientation Orientation: Lower; Location: Back   Restrictions/Precautions   Braces or Orthoses MAFO  (L LE )   Cognition   Arousal/Participation Alert; Cooperative   Attention Within functional limits   Memory Decreased short term memory   Following Commands Follows multistep commands with increased time or repetition   Subjective   Subjective No new complaints reported  Pt  ready to participate in PT    Sit to Stand   Type of Assistance Needed Incidental touching   Sit to Stand CARE Score 4   Bed-Chair Transfer   Type of Assistance Needed Incidental touching   Chair/Bed-to-Chair Transfer CARE Score 4   Transfer Bed/Chair/Wheelchair   Adaptive Equipment Roller Walker   Sit to [employer]   Stand to [employer] Transfer   Comment Pt  stated "it will just a waste of time, I know how to get in out of my car, I have a high seat car so it will be easy"   Walk 10 Feet   Type of Assistance Needed Incidental touching   Walk 10 Feet CARE Score 4   Walk 50 Feet with Two Turns   Type of Assistance Needed Incidental touching   Walk 50 Feet with Two Turns CARE Score 4   Walk 150 Feet   Type of Assistance Needed Incidental touching   Walk 150 Feet CARE Score 4   Ambulation   Does the patient walk? 2  Yes   Primary Mode of Locomotion Prior to Admission Walk   Distance Walked (feet) 150 ft  (X 2 )   Assist Device Roller Walker   Gait Pattern Inconsistant Kathie; Forward Flexion  (B knee flex)   Limitations Noted In Balance; Endurance   Provided Assistance with: Balance   Walk Assist Level Contact Guard   Findings no CF this session   Wheelchair mobility   Does the patient use a wheelchair? 0   No   Therapeutic Interventions   Strengthening standing marching in place using RW, hip abd holding on to rail outside and hamstring curl using green TB   Equipment Use   NuStep Level 2 X 10 mins    Assessment   Treatment Assessment Pt  tolerated session but was fatigued after standing exercise  Pt  stated "I am done" after he did hip abd by the rail but agreed to do nu step after  Pt  noted to have inc in forward flexion when he was fatigued but no extra physical assist needed and had no LOB  Pt  again was in recliner at end of session with no other complaints  Cont with POC to inc strength, balance , activity tolerance and inc overall safety and independence in functional mobility  Problem List Decreased strength;Decreased endurance; Impaired balance;Decreased mobility;Orthopedic restrictions;Pain   Barriers to Discharge Inaccessible home environment;Decreased caregiver support   PT Barriers   Physical Impairment Decreased strength;Decreased endurance; Impaired balance;Decreased mobility;Orthopedic restrictions;Pain   Functional Limitation Car transfers; Ramp negotiation;Stair negotiation;Standing;Transfers; Walking   Plan   Treatment/Interventions Functional transfer training;LE strengthening/ROM; Elevations; Therapeutic exercise; Endurance training;Patient/family training;Equipment eval/education; Bed mobility;Gait training   Recommendation   PT Discharge Recommendation Return to previous environment with social support;Home with skilled therapy  (TBD)   Equipment Recommended Walker   PT Therapy Minutes   PT Time In 1410   PT Time Out 1440   PT Total Time (minutes) 30   PT Mode of treatment - Individual (minutes) 30   PT Mode of treatment - Concurrent (minutes) 0   PT Mode of treatment - Group (minutes) 0   PT Mode of treatment - Co-treat (minutes) 0   PT Mode of Treatment - Total time(minutes) 30 minutes   PT Cumulative Minutes 400   Therapy Time missed   Time missed?  No

## 2020-07-15 NOTE — PROGRESS NOTES
PM&R Progress Note:    HPI: Izabel Soto is a 69 y o  male with HTN, DM2, Gout, known NPH s/p VPS chronic left foot weakness, lumbar spinal stenosis who presented to the [facility] on 7/6/20 for worsening back pain and gait instability from known spondylolisthesis L5/S1  He was recommended to have operative intervention, and was taken to the OR on 7/6/20 by Dr Raj Pollard for an elective L5-S1 transverse lumbar interbody fusion and deformity correction at L5-S1  Patient found to have functional deficits from baseline and admitted to [address] [place] [address] on 7/10/20  ASSESSMENT: Stable, progressing      PLAN:    Rehabilitation   Continue current rehabilitation plan of care to maximize function  Rehab update:  Min assist transfers  Total assist ambulation 150 ft with rolling walker and wheelchair follow  Mod assist lower body dressing   Expected Discharge: TBD, RETEAM    Pain   Controlled  DVT prophylaxis   Heparin    Bladder plan   Incontinent at times but improving  Bowel plan   Continent    * Spondylolisthesis of lumbosacral region  Assessment & Plan  s/p L5-S1 transverse lumbar interbody fusion and deformity correction at L5-S1 on 7/6/20 by Dr Erika Lucero healing well with staples intact  Scalp laceration  Assessment & Plan  Sutures in place from scalp closure about 1 week ago - extensive scabbing continues to be present  Will not remove sutures  Pain  Assessment & Plan  Managed on Tylenol and Robaxin  P r n  Oxycodone    ZHOU on CPAP  Assessment & Plan  Continue nocturnal CPAP home settings    Gout  Assessment & Plan  Managed on allopurinol    Essential hypertension  Assessment & Plan  Stable without blood pressure medication    Normal pressure hydrocephalus (HCC)  Assessment & Plan  Chronic  shunt with scalp revision  Appreciate IM consultants medical co-management  Labs, medications, and imaging personally reviewed        SUBJECTIVE:  Patient seen face to face today  No acute issues overnight  He states that he feels he is progressing in therapy  He denies any current complaints  ROS:  A ten point review of systems was completed 7/15/2020 and pertinent positives are listed in subjective section  All other systems reviewed were negative  OBJECTIVE:   /69 (BP Location: Right arm)   Pulse 60   Temp 98 2 °F (36 8 °C) (Oral)   Resp 18   Ht 6' 3" (1 905 m)   Wt 122 kg (269 lb 13 5 oz)   SpO2 97%   BMI 33 73 kg/m²     Physical Exam   Constitutional: He is oriented to person, place, and time  He appears well-developed and well-nourished  HENT:   Head: Normocephalic and atraumatic  Mouth/Throat: Oropharynx is clear and moist    Eyes: Pupils are equal, round, and reactive to light  EOM are normal    Neck: Normal range of motion  Neck supple  Cardiovascular: Normal rate  Pulmonary/Chest: Effort normal and breath sounds normal    Abdominal: Soft  Bowel sounds are normal    Musculoskeletal: Normal range of motion  Neurological: He is alert and oriented to person, place, and time  Skin: Skin is warm and dry  Scalp incision well approximated with sutures  There is significant scabbing  Psychiatric: He has a normal mood and affect  Vitals reviewed         Lab Results   Component Value Date    WBC 8 91 07/13/2020    HGB 14 6 07/13/2020    HCT 45 0 07/13/2020    MCV 88 07/13/2020     07/13/2020     Lab Results   Component Value Date    SODIUM 134 (L) 07/13/2020    K 3 9 07/13/2020     07/13/2020    CO2 26 07/13/2020    BUN 19 07/13/2020    CREATININE 0 97 07/13/2020    GLUC 141 (H) 07/13/2020    CALCIUM 9 1 07/13/2020     Lab Results   Component Value Date    INR 1 06 06/16/2020    INR 1 14 05/28/2020    PROTIME 13 4 06/16/2020    PROTIME 14 0 05/28/2020           Current Facility-Administered Medications:     acetaminophen (TYLENOL) tablet 650 mg, 650 mg, Oral, Q8H [facility], Lala Kerr MD, 650 mg at 07/15/20 1321   allopurinol (ZYLOPRIM) tablet 300 mg, 300 mg, Oral, Daily, Prieto Ponce MD, 300 mg at 07/15/20 0800    bisacodyl (DULCOLAX) rectal suppository 10 mg, 10 mg, Rectal, Daily PRN, Prieto Ponce MD    docusate sodium (COLACE) capsule 100 mg, 100 mg, Oral, BID, Prieto Ponce MD, 100 mg at 07/14/20 1716    heparin (porcine) subcutaneous injection 5,000 Units, 5,000 Units, Subcutaneous, Q8H [facility], Prieto Ponce MD, 5,000 Units at 07/15/20 1321    insulin lispro (HumaLOG) 100 units/mL subcutaneous injection 1-5 Units, 1-5 Units, Subcutaneous, TID AC, 1 Units at 07/13/20 0821 **AND** Fingerstick Glucose (POCT), , , TID AC, AR Goldstein    insulin lispro (HumaLOG) 100 units/mL subcutaneous injection 1-5 Units, 1-5 Units, Subcutaneous, HS, AR Goldstein    lisinopril (ZESTRIL) tablet 5 mg, 5 mg, Oral, Daily, AR Donato, Stopped at 07/15/20 0803    melatonin tablet 6 mg, 6 mg, Oral, HS, Prieto Ponce MD, 6 mg at 07/14/20 2151    methocarbamol (ROBAXIN) tablet 500 mg, 500 mg, Oral, Q6H [facility], Prieto Ponce MD, 500 mg at 07/15/20 1128    ondansetron (ZOFRAN-ODT) dispersible tablet 4 mg, 4 mg, Oral, Q6H PRN, Prieto Ponce MD    oxyCODONE (ROXICODONE) IR tablet 2 5 mg, 2 5 mg, Oral, Q4H PRN, Prieto Ponce MD    oxyCODONE (ROXICODONE) IR tablet 5 mg, 5 mg, Oral, Q4H PRN, Prieto Ponce MD    polyethylene glycol (MIRALAX) packet 17 g, 17 g, Oral, Daily PRN, Prieto Ponce MD    senna (SENOKOT) tablet 8 6 mg, 1 tablet, Oral, Daily, Prieto Ponce MD, 8 6 mg at 07/14/20 0836    travoprost (TRAVATAN-Z) 0 004 % ophthalmic solution 1 drop, 1 drop, Right Eye, HS, Prieto Ponce MD, 1 drop at 07/14/20 6167    Past Medical History:   Diagnosis Date    Cancer [facility]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     Diabetes mellitus ([facility] )     DM2 (diabetes mellitus, type 2) ([facility] )     Gout     HTN (hypertension)     Hydrocephalus ([facility] )     Hypertension     Neuropathy     ZHOU on CPAP  Pressure injury of skin     TIA (transient ischemic attack)        Patient Active Problem List    Diagnosis Date Noted    Spondylolisthesis of lumbosacral region 07/02/2020     Priority: High    Weakness of left foot 07/10/2020    Pain 07/10/2020    Scalp hematoma 07/10/2020    Scalp laceration 07/10/2020    Impaired functional mobility, balance, gait, and endurance 07/02/2020    Diabetic ulcer of left foot associated with type 2 diabetes mellitus ([facility] ) 05/28/2020    Type 2 diabetes mellitus ([facility] ) 05/28/2020    Normal pressure hydrocephalus ([facility] ) 05/28/2020    Essential hypertension 05/28/2020    Gout 05/28/2020    ZHOU on CPAP 05/28/2020    Status post ventriculoperitoneal shunt 04/21/2020    Unspecified abnormalities of gait and mobility 04/21/2020          Alexandria Bobby PA-C    Total time spent:  30 minutes with more than 50% spent counseling/coordinating care  Counseling includes discussion with patient re: progress and discussion with patient of his/her current medical state/information  Coordination of patient's care was performed in conjunction with consulting services  Time invested included review of patient's labs, vitals, and management of their comorbidities with continued monitoring  The care of the patient was extensively discussed and appropriate treatment plan was formulated unique for this patient  ** Please Note:  voice to text software may have been used in the creation of this document   Although proof errors in transcription or interpretation are a potential of such software**

## 2020-07-15 NOTE — PROGRESS NOTES
07/15/20 1030   Pain Assessment   Pain Score 2   Pain Location/Orientation Orientation: Lower; Location: Back   Hospital Pain Intervention(s)   (pain is tolerable )   Restrictions/Precautions   Precautions Fall Risk;Spinal precautions;Supervision on toilet/commode   Braces or Orthoses ANNI  (L LE )   Cognition   Arousal/Participation Alert; Cooperative   Attention Within functional limits   Memory Decreased short term memory   Following Commands Follows multistep commands with increased time or repetition   Subjective   Subjective Pt  has min pain as above and reported that he is getting stroger everyday    Sit to Stand   Type of Assistance Needed Incidental touching   Sit to Stand CARE Score 4   Bed-Chair Transfer   Type of Assistance Needed Incidental touching   Chair/Bed-to-Chair Transfer CARE Score 4   Transfer Bed/Chair/Wheelchair   Adaptive Equipment Roller Walker   Stand Pivot Contact Guard   Sit to [facility]   Stand to [facility]   Walk 10 Feet   Type of Assistance Needed Physical assistance   Amount of Physical Assistance Provided Total assistance   Comment CGA with CF   Walk 10 Feet CARE Score 1   Walk 50 Feet with Two Turns   Type of Assistance Needed Physical assistance   Amount of Physical Assistance Provided Total assistance   Comment CGA with CF   Walk 50 Feet with Two Turns CARE Score 1   Walk 150 Feet   Type of Assistance Needed Physical assistance   Amount of Physical Assistance Provided Total assistance   Comment CGA with CF   Walk 150 Feet CARE Score 1   Ambulation   Does the patient walk? 2  Yes   Primary Mode of Locomotion Prior to Admission Walk   Distance Walked (feet) 150 ft  (X 2 and back to room from gym but not limited )   Assist Device Roller Walker   Gait Pattern Inconsistant Kathie; Forward Flexion   Limitations Noted In Balance; Endurance   Walk Assist Level Contact Guard; Chair Follow   Wheelchair mobility   Does the patient use a wheelchair? 0   No   Curb or Single Stair   Style negotiated Single stair   Type of Assistance Needed Physical assistance   Amount of Physical Assistance Provided Total assistance   Comment CGA with CF   1 Step (Curb) CARE Score 1   4 Steps   Type of Assistance Needed Physical assistance   Amount of Physical Assistance Provided Total assistance   Comment CGA with CF   4 Steps CARE Score 1   12 Steps   Reason if not Attempted Safety concerns   12 Steps CARE Score 88   Stairs   Type Stairs   # of Steps 6   Weight Bearing Precautions Back; Fall Risk   Assist Devices Bilateral Rail   Therapeutic Interventions   Strengthening LAQ and hip flex with 3# wts, add squeeze with ball , hip abd with green TB   Flexibility B hamstring and gastroc stretching   Equipment Use   Torbitep Level 2 X 10 mins    Assessment   Treatment Assessment Pt  able to tolerate 60 mins PT session and able to participate above activities  Pt  CGA with most functional mobility but CF needed to be provided for walking and steps for safety and just incase pt gets fatigued  No complaints reported by pt  Cont with POC  Problem List Decreased strength;Decreased endurance; Impaired balance;Decreased mobility;Orthopedic restrictions;Pain   Barriers to Discharge Inaccessible home environment;Decreased caregiver support   PT Barriers   Physical Impairment Decreased strength;Decreased endurance; Impaired balance;Decreased mobility;Orthopedic restrictions;Pain   Functional Limitation Car transfers; Ramp negotiation;Stair negotiation;Standing;Transfers; Walking   Plan   Treatment/Interventions Functional transfer training;LE strengthening/ROM; Elevations; Therapeutic exercise; Endurance training;Patient/family training;Equipment eval/education; Bed mobility;Gait training   Recommendation   PT Discharge Recommendation Home with skilled therapy   Equipment Recommended Walker   PT Therapy Minutes   PT Time In 1030   PT Time Out 1130   PT Total Time (minutes) 60   PT Mode of treatment - Individual (minutes) 60   PT Mode of treatment - Concurrent (minutes) 0   PT Mode of treatment - Group (minutes) 0   PT Mode of treatment - Co-treat (minutes) 0   PT Mode of Treatment - Total time(minutes) 60 minutes   PT Cumulative Minutes 370   Therapy Time missed   Time missed?  No

## 2020-07-15 NOTE — PROGRESS NOTES
07/15/20 1320   Pain Assessment   Pain Assessment Tool 0-10   Pain Score No Pain   Restrictions/Precautions   Precautions Fall Risk;Spinal precautions;Supervision on toilet/commode   Braces or Orthoses MAFO   Tub/Shower Transfer   Findings Pt engaged in tub transfer with use of step over technique and grab bar assist to assess safety with ADL transfer and determine DME needs  Pt reports that he has tubshower and has 3 grab bars in place, no chair  Pt edu on safe xfer technique for dry tub xfer with grab bar assist, pt completed at min A level to clear tub ledge  Pt edu on recommendation for tub seat with back, pt verbalized understanding but states "I dont need that, i really only shower for 5-6 minutes standing" continue to recommend tub seat   Commmunity Re-entry   Community Re-entry pt engaged in safe item retrieval with use of rollator as pt reports he utilizes in community setting  Pt completed cone retrieval with use of LHR to collect cones from various surfaces and heights at min A level with safety edu on positioning and use of reacher  min VCs for carryover   Assessment   Treatment Assessment Pt engaged in skilled OT tx session with focus on safe item retrieval and ADL transfers  see above for further details  Pt demo dec safety with rollator today, with recommendation to be use of RW, will confirm with PT tomorrow  continue per OT POC   OT Therapy Minutes   OT Time In 1320   OT Time Out 1400   OT Total Time (minutes) 40   OT Mode of treatment - Individual (minutes) 40   OT Mode of treatment - Concurrent (minutes) 0   OT Mode of treatment - Group (minutes) 0   OT Mode of treatment - Co-treat (minutes) 0   OT Mode of Treatment - Total time(minutes) 40 minutes   OT Cumulative Minutes 430   Therapy Time missed   Time missed?  No

## 2020-07-15 NOTE — PROGRESS NOTES
Internal Medicine Progress Note  Patient: Duke Taylor  Age/sex: 69 y o  male  Medical Record #: [id number]      ASSESSMENT/PLAN: (Interval History)  Duke Taylor is seen and examined and management for following issues:    L5-S1 TLIF with hx L-S spondylolisthesis on 7/6/20 ([place])  · Monitor incision  · Pain control per primary  · Progressing in therapy     Scalp revision/hx NPH  shunt 7/6/20  · Sutures intact     Bradycardia  · stable     DM2  · At home on Lantus 10U qam/12U qpm  · Continue DM diet, QID Accuchecks/SSI  · Currently only on SSI   · BS remain stable on sliding scale only  · Add back Lantus when needed vs try Metformin     Mild cognitive impairment  · Seen by Geriatrics as I/P and MOCA was 22/30  · For OP followup with them an a repeat MOCA     HTN  · At home he is on Vasotec 10mg qd   · On 7/11/20, added Lisinopril 10mg qd as substitute  · BP stable     ZHOU/CPAP  · Continue CPAP     Hx gout  · stable  · Continue Allopurinol     Urine incontinence  · Supposedly having this issue at home and Geriatrics recommended that he see Urology as an OP  · PMR managing     Left TMA, diabetic neuropathy  · Likely contributes to some gait instability       The above assessment and plan was reviewed and updated as determined by my evaluation of the patient on 7/15/2020      Labs:   Results from last 7 days   Lab Units 07/13/20  0540 07/11/20  0530   WBC Thousand/uL 8 91 7 57   HEMOGLOBIN g/dL 14 6 13 3   HEMATOCRIT % 45 0 40 8   PLATELETS Thousands/uL 276 215     Results from last 7 days   Lab Units 07/13/20  0540 07/11/20  0530   SODIUM mmol/L 134* 135*   POTASSIUM mmol/L 3 9 4 2   CHLORIDE mmol/L 102 104   CO2 mmol/L 26 26   BUN mg/dL 19 21   CREATININE mg/dL 0 97 0 94   CALCIUM mg/dL 9 1 8 9             Results from last 7 days   Lab Units 07/15/20  0637 07/14/20  2111 07/14/20  1607   POC GLUCOSE mg/dl 105 133 113       Review of Scheduled Meds:    Current Facility-Administered Medications:  acetaminophen 650 mg Oral Q8H [address] Missy Bass MD   allopurinol 300 mg Oral Daily Missy Bass MD   bisacodyl 10 mg Rectal Daily PRN Missy Bass MD   docusate sodium 100 mg Oral BID Missy Bass MD   heparin (porcine) 5,000 Units Subcutaneous Q8H [address] Missy Bass MD   insulin lispro 1-5 Units Subcutaneous TID AC Elizabeth Ramirez, AR   insulin lispro 1-5 Units Subcutaneous HS AR Epstein   lisinopril 5 mg Oral Daily Denia Sood, CRNP   melatonin 6 mg Oral HS Missy Bass MD   methocarbamol 500 mg Oral Q6H [address] Missy Bass MD   ondansetron 4 mg Oral Q6H PRN Missy Bass MD   oxyCODONE 2 5 mg Oral Q4H PRN Missy Bass MD   oxyCODONE 5 mg Oral Q4H PRN Missy Bass MD   polyethylene glycol 17 g Oral Daily PRN Missy Bass MD   senna 1 tablet Oral Daily Missy Bass MD   travoprost 1 drop Right Eye HS Missy Bass MD       Subjective/ HPI: Patients overnight issues or events were reviewed with nursing or staff during rounds or morning huddle session  No new or overnight issues  Offers no complaints  Pt without any overnight issues or concerns      ROS:   A 10 point ROS was performed; negative except as noted above  Imaging:     No orders to display       *Labs /Radiology studies reviewed  *Medications reviewed and reconciled as needed  *Please refer to order section for additional ordered labs studies  *Case discussed with primary attending during morning huddle case rounds      Physical Examination:  Vitals:   Vitals:    07/14/20 2100 07/15/20 0604 07/15/20 0757 07/15/20 0803   BP: 116/56 111/58 109/51 108/62   BP Location: Right arm  Right arm    Pulse: 55 (!) 52 60    Resp: 18 20     Temp: 98 1 °F (36 7 °C) 97 6 °F (36 4 °C)     TempSrc: Oral Oral     SpO2: 97% 99%     Weight:  122 kg (269 lb 13 5 oz)     Height:             GEN: No apparent distress, interactive  NEURO: Alert and oriented x3, mild cognitive delay  HEENT: Pupils are equal and reactive, EOMI, mucous membranes are moist, face symmetrical;   CV: S1 S2 regular, no MRG, no peripheral edema noted  RESP: Lungs are clear bilaterally, no wheezes, rales or rhonchi noted, on room air, respirations easy and non labored  GI: Flat, soft non tender, non distended; +BS x4  : Voiding without difficulty  MUSC: Moves all extremities, brace to LLE  SKIN: pink, warm and dry, normal turgor, no rashes, lesions; incision intact to head and lumbar spine         The above physical exam was reviewed and updated as determined by my evaluation of the patient on 7/15/2020  Invasive Devices     None                    VTE Pharmacologic Prophylaxis: Heparin  Code Status: Level 1 - Full Code  Current Length of Stay: 5 day(s)      Total time spent:  30 minutes    with more than 50% spent counseling/coordinating care  Counseling includes discussion with patient re: progress  and discussion with patient of his/her current medical state/information  Coordination of patient's care was performed in conjunction with primary service  Time invested included review of patient's labs, vitals, and management of their comorbidities with continued monitoring  In addition, this patient was discussed with medical team including physician and advanced extenders  The care of the patient was extensively discussed and appropriate treatment plan was formulated unique for this patient  ** Please Note:  voice to text software may have been used in the creation of this document   Although proof errors in transcription or interpretation are a potential of such software**

## 2020-07-15 NOTE — PROGRESS NOTES
07/15/20 0800   Pain Assessment   Pain Assessment Tool 0-10   Pain Score No Pain  (stiffness)   Restrictions/Precautions   Precautions Fall Risk;Supervision on toilet/commode;Spinal precautions   Braces or Orthoses MAFO   Oral Hygiene   Type of Assistance Needed Supervision   Comment pt completed in stance at CS level with no LOB noted   Oral Hygiene CARE Score 4   Putting On/Taking Off Footwear   Type of Assistance Needed Physical assistance   Amount of Physical Assistance Provided 25%-49%   Comment Pt completed LB dressing seated EOB to don/doff b/l socks, LLE MAFO, and b/l sneakers  pt able to don/doff b/l socks and don R sneaker  Pt req A to don L sneaker with MAFO donned   Putting On/Taking Off Footwear CARE Score 3   Sit to Stand   Type of Assistance Needed Supervision   Sit to Stand CARE Score 4   Bed-Chair Transfer   Type of Assistance Needed Incidental touching   Chair/Bed-to-Chair Transfer CARE Score 4   Functional Standing Tolerance   Time 6 min x 1 with engagement in game of scrabble encouraging fxnl reach and balance/standing tolerance for improved indep and tolerance with ADl/IADL fxn  Pt tolereated reaching min outside ZABRINA with no LOB at CS level   Assessment   Treatment Assessment Pt engaged in skilled OT tx session wtih focus on standing tolerance/balance, ADL fxn, and pt education  see above for further details  Pt prefers to complete LB ADL fxn seated EOB to inc low dynamic reach  OTR edu pt on use of LHAE to dec need for low dynamic reach and to reduce strain on low back  Pt verbalized understanding  Will continue to address with ADL fxn      OT Therapy Minutes   OT Time In 0800   OT Time Out 0900   OT Total Time (minutes) 60   OT Mode of treatment - Individual (minutes) 60   OT Mode of treatment - Concurrent (minutes) 0   OT Mode of treatment - Group (minutes) 0   OT Mode of treatment - Co-treat (minutes) 0   OT Mode of Treatment - Total time(minutes) 60 minutes   OT Cumulative Minutes 390 Therapy Time missed   Time missed?  No

## 2020-07-15 NOTE — PLAN OF CARE
Problem: PAIN - ADULT  Goal: Verbalizes/displays adequate comfort level or baseline comfort level  Description  Interventions:  - Encourage patient to monitor pain and request assistance  - Assess pain using appropriate pain scale  - Administer analgesics based on type and severity of pain and evaluate response  - Implement non-pharmacological measures as appropriate and evaluate response  - Consider cultural and social influences on pain and pain management  - Notify physician/advanced practitioner if interventions unsuccessful or patient reports new pain  Outcome: Progressing     Problem: INFECTION - ADULT  Goal: Absence or prevention of progression during hospitalization  Description  INTERVENTIONS:  - Assess and monitor for signs and symptoms of infection  - Monitor lab/diagnostic results  - Monitor all insertion sites, i e  indwelling lines, tubes, and drains  - Monitor endotracheal if appropriate and nasal secretions for changes in amount and color  - [place] appropriate cooling/warming therapies per order  - Administer medications as ordered  - Instruct and encourage patient and family to use good hand hygiene technique  - Identify and instruct in appropriate isolation precautions for identified infection/condition  Outcome: Progressing  Goal: Absence of fever/infection during neutropenic period  Description  INTERVENTIONS:  - Monitor WBC    Outcome: Progressing     Problem: SAFETY ADULT  Goal: Patient will remain free of falls  Description  INTERVENTIONS:  - Assess patient frequently for physical needs  -  Identify cognitive and physical deficits and behaviors that affect risk of falls    -  [place] fall precautions as indicated by assessment   - Educate patient/family on patient safety including physical limitations  - Instruct patient to call for assistance with activity based on assessment  - Modify environment to reduce risk of injury  - Consider OT/PT consult to assist with strengthening/mobility  Outcome: Progressing  Goal: Maintain or return to baseline ADL function  Description  INTERVENTIONS:  -  Assess patient's ability to carry out ADLs; assess patient's baseline for ADL function and identify physical deficits which impact ability to perform ADLs (bathing, care of mouth/teeth, toileting, grooming, dressing, etc )  - Assess/evaluate cause of self-care deficits   - Assess range of motion  - Assess patient's mobility; develop plan if impaired  - Assess patient's need for assistive devices and provide as appropriate  - Encourage maximum independence but intervene and supervise when necessary  - Involve family in performance of ADLs  - Assess for home care needs following discharge   - Consider OT consult to assist with ADL evaluation and planning for discharge  - Provide patient education as appropriate  Outcome: Progressing  Goal: Maintain or return mobility status to optimal level  Description  INTERVENTIONS:  - Assess patient's baseline mobility status (ambulation, transfers, stairs, etc )    - Identify cognitive and physical deficits and behaviors that affect mobility  - Identify mobility aids required to assist with transfers and/or ambulation (gait belt, sit-to-stand, lift, walker, cane, etc )  - [place] fall precautions as indicated by assessment  - Record patient progress and toleration of activity level on Mobility SBAR; progress patient to next Phase/Stage  - Instruct patient to call for assistance with activity based on assessment  - Consider rehabilitation consult to assist with strengthening/weightbearing, etc   Outcome: Progressing     Problem: Prexisting or High Potential for Compromised Skin Integrity  Goal: Skin integrity is maintained or improved  Description  INTERVENTIONS:  - Identify patients at risk for skin breakdown  - Assess and monitor skin integrity  - Assess and monitor nutrition and hydration status  - Monitor labs   - Assess for incontinence   - Turn and reposition patient  - Assist with mobility/ambulation  - Relieve pressure over bony prominences  - Avoid friction and shearing  - Provide appropriate hygiene as needed including keeping skin clean and dry  - Evaluate need for skin moisturizer/barrier cream  - Collaborate with interdisciplinary team   - Patient/family teaching  - Consider wound care consult   Outcome: Progressing     Problem: Potential for Falls  Goal: Patient will remain free of falls  Description  INTERVENTIONS:  - Assess patient frequently for physical needs  -  Identify cognitive and physical deficits and behaviors that affect risk of falls    -  [place] fall precautions as indicated by assessment   - Educate patient/family on patient safety including physical limitations  - Instruct patient to call for assistance with activity based on assessment  - Modify environment to reduce risk of injury  - Consider OT/PT consult to assist with strengthening/mobility  Outcome: Progressing

## 2020-07-16 ENCOUNTER — TELEPHONE (OUTPATIENT)
Dept: NEUROSURGERY | Facility: CLINIC | Age: 70
End: 2020-07-16

## 2020-07-16 LAB
ANION GAP SERPL CALCULATED.3IONS-SCNC: 5 MMOL/L (ref 4–13)
BASOPHILS # BLD AUTO: 0.06 THOUSANDS/ΜL (ref 0–0.1)
BASOPHILS NFR BLD AUTO: 1 % (ref 0–1)
BUN SERPL-MCNC: 19 MG/DL (ref 5–25)
CALCIUM SERPL-MCNC: 9.1 MG/DL (ref 8.3–10.1)
CHLORIDE SERPL-SCNC: 102 MMOL/L (ref 100–108)
CO2 SERPL-SCNC: 27 MMOL/L (ref 21–32)
CREAT SERPL-MCNC: 0.97 MG/DL (ref 0.6–1.3)
EOSINOPHIL # BLD AUTO: 0.18 THOUSAND/ΜL (ref 0–0.61)
EOSINOPHIL NFR BLD AUTO: 3 % (ref 0–6)
ERYTHROCYTE [DISTWIDTH] IN BLOOD BY AUTOMATED COUNT: 13.8 % (ref 11.6–15.1)
GFR SERPL CREATININE-BSD FRML MDRD: 78 ML/MIN/1.73SQ M
GLUCOSE P FAST SERPL-MCNC: 110 MG/DL (ref 65–99)
GLUCOSE SERPL-MCNC: 110 MG/DL (ref 65–140)
GLUCOSE SERPL-MCNC: 119 MG/DL (ref 65–140)
GLUCOSE SERPL-MCNC: 141 MG/DL (ref 65–140)
GLUCOSE SERPL-MCNC: 144 MG/DL (ref 65–140)
GLUCOSE SERPL-MCNC: 92 MG/DL (ref 65–140)
HCT VFR BLD AUTO: 42.1 % (ref 36.5–49.3)
HGB BLD-MCNC: 13.7 G/DL (ref 12–17)
IMM GRANULOCYTES # BLD AUTO: 0.08 THOUSAND/UL (ref 0–0.2)
IMM GRANULOCYTES NFR BLD AUTO: 1 % (ref 0–2)
LYMPHOCYTES # BLD AUTO: 1.15 THOUSANDS/ΜL (ref 0.6–4.47)
LYMPHOCYTES NFR BLD AUTO: 18 % (ref 14–44)
MCH RBC QN AUTO: 28.2 PG (ref 26.8–34.3)
MCHC RBC AUTO-ENTMCNC: 32.5 G/DL (ref 31.4–37.4)
MCV RBC AUTO: 87 FL (ref 82–98)
MONOCYTES # BLD AUTO: 0.8 THOUSAND/ΜL (ref 0.17–1.22)
MONOCYTES NFR BLD AUTO: 12 % (ref 4–12)
NEUTROPHILS # BLD AUTO: 4.29 THOUSANDS/ΜL (ref 1.85–7.62)
NEUTS SEG NFR BLD AUTO: 65 % (ref 43–75)
NRBC BLD AUTO-RTO: 0 /100 WBCS
PLATELET # BLD AUTO: 227 THOUSANDS/UL (ref 149–390)
PMV BLD AUTO: 9.6 FL (ref 8.9–12.7)
POTASSIUM SERPL-SCNC: 4.1 MMOL/L (ref 3.5–5.3)
RBC # BLD AUTO: 4.85 MILLION/UL (ref 3.88–5.62)
SODIUM SERPL-SCNC: 134 MMOL/L (ref 136–145)
WBC # BLD AUTO: 6.56 THOUSAND/UL (ref 4.31–10.16)

## 2020-07-16 PROCEDURE — 97530 THERAPEUTIC ACTIVITIES: CPT

## 2020-07-16 PROCEDURE — 82948 REAGENT STRIP/BLOOD GLUCOSE: CPT

## 2020-07-16 PROCEDURE — 80048 BASIC METABOLIC PNL TOTAL CA: CPT | Performed by: NURSE PRACTITIONER

## 2020-07-16 PROCEDURE — 97535 SELF CARE MNGMENT TRAINING: CPT

## 2020-07-16 PROCEDURE — 97116 GAIT TRAINING THERAPY: CPT

## 2020-07-16 PROCEDURE — 97110 THERAPEUTIC EXERCISES: CPT

## 2020-07-16 PROCEDURE — 85025 COMPLETE CBC W/AUTO DIFF WBC: CPT | Performed by: NURSE PRACTITIONER

## 2020-07-16 PROCEDURE — 99233 SBSQ HOSP IP/OBS HIGH 50: CPT | Performed by: PHYSICAL MEDICINE & REHABILITATION

## 2020-07-16 RX ADMIN — METHOCARBAMOL 500 MG: 500 TABLET, FILM COATED ORAL at 12:16

## 2020-07-16 RX ADMIN — TRAVOPROST 1 DROP: 0.04 SOLUTION/ DROPS OPHTHALMIC at 21:53

## 2020-07-16 RX ADMIN — ACETAMINOPHEN 650 MG: 325 TABLET, FILM COATED ORAL at 14:59

## 2020-07-16 RX ADMIN — METHOCARBAMOL 500 MG: 500 TABLET, FILM COATED ORAL at 05:41

## 2020-07-16 RX ADMIN — ACETAMINOPHEN 650 MG: 325 TABLET, FILM COATED ORAL at 05:41

## 2020-07-16 RX ADMIN — METHOCARBAMOL 500 MG: 500 TABLET, FILM COATED ORAL at 18:22

## 2020-07-16 RX ADMIN — HEPARIN SODIUM 5000 UNITS: 5000 INJECTION INTRAVENOUS; SUBCUTANEOUS at 05:41

## 2020-07-16 RX ADMIN — ACETAMINOPHEN 650 MG: 325 TABLET, FILM COATED ORAL at 21:53

## 2020-07-16 RX ADMIN — METHOCARBAMOL 500 MG: 500 TABLET, FILM COATED ORAL at 00:03

## 2020-07-16 RX ADMIN — DOCUSATE SODIUM 100 MG: 100 CAPSULE, LIQUID FILLED ORAL at 18:22

## 2020-07-16 RX ADMIN — HEPARIN SODIUM 5000 UNITS: 5000 INJECTION INTRAVENOUS; SUBCUTANEOUS at 21:53

## 2020-07-16 RX ADMIN — HEPARIN SODIUM 5000 UNITS: 5000 INJECTION INTRAVENOUS; SUBCUTANEOUS at 14:59

## 2020-07-16 RX ADMIN — MELATONIN 6 MG: at 21:53

## 2020-07-16 RX ADMIN — ALLOPURINOL 300 MG: 300 TABLET ORAL at 09:47

## 2020-07-16 RX ADMIN — LISINOPRIL 5 MG: 5 TABLET ORAL at 09:47

## 2020-07-16 NOTE — PLAN OF CARE
Reviewed    Problem: PAIN - ADULT  Goal: Verbalizes/displays adequate comfort level or baseline comfort level  Description  Interventions:  - Encourage patient to monitor pain and request assistance  - Assess pain using appropriate pain scale  - Administer analgesics based on type and severity of pain and evaluate response  - Implement non-pharmacological measures as appropriate and evaluate response  - Consider cultural and social influences on pain and pain management  - Notify physician/advanced practitioner if interventions unsuccessful or patient reports new pain  Outcome: Progressing     Problem: INFECTION - ADULT  Goal: Absence or prevention of progression during hospitalization  Description  INTERVENTIONS:  - Assess and monitor for signs and symptoms of infection  - Monitor lab/diagnostic results  - Monitor all insertion sites, i e  indwelling lines, tubes, and drains  - Monitor endotracheal if appropriate and nasal secretions for changes in amount and color  - [place] appropriate cooling/warming therapies per order  - Administer medications as ordered  - Instruct and encourage patient and family to use good hand hygiene technique  - Identify and instruct in appropriate isolation precautions for identified infection/condition  Outcome: Progressing  Goal: Absence of fever/infection during neutropenic period  Description  INTERVENTIONS:  - Monitor WBC    Outcome: Progressing     Problem: SAFETY ADULT  Goal: Patient will remain free of falls  Description  INTERVENTIONS:  - Assess patient frequently for physical needs  -  Identify cognitive and physical deficits and behaviors that affect risk of falls    -  [place] fall precautions as indicated by assessment   - Educate patient/family on patient safety including physical limitations  - Instruct patient to call for assistance with activity based on assessment  - Modify environment to reduce risk of injury  - Consider OT/PT consult to assist with strengthening/mobility  Outcome: Progressing  Goal: Maintain or return to baseline ADL function  Description  INTERVENTIONS:  -  Assess patient's ability to carry out ADLs; assess patient's baseline for ADL function and identify physical deficits which impact ability to perform ADLs (bathing, care of mouth/teeth, toileting, grooming, dressing, etc )  - Assess/evaluate cause of self-care deficits   - Assess range of motion  - Assess patient's mobility; develop plan if impaired  - Assess patient's need for assistive devices and provide as appropriate  - Encourage maximum independence but intervene and supervise when necessary  - Involve family in performance of ADLs  - Assess for home care needs following discharge   - Consider OT consult to assist with ADL evaluation and planning for discharge  - Provide patient education as appropriate  Outcome: Progressing  Goal: Maintain or return mobility status to optimal level  Description  INTERVENTIONS:  - Assess patient's baseline mobility status (ambulation, transfers, stairs, etc )    - Identify cognitive and physical deficits and behaviors that affect mobility  - Identify mobility aids required to assist with transfers and/or ambulation (gait belt, sit-to-stand, lift, walker, cane, etc )  - [place] fall precautions as indicated by assessment  - Record patient progress and toleration of activity level on Mobility SBAR; progress patient to next Phase/Stage  - Instruct patient to call for assistance with activity based on assessment  - Consider rehabilitation consult to assist with strengthening/weightbearing, etc   Outcome: Progressing     Problem: Prexisting or High Potential for Compromised Skin Integrity  Goal: Skin integrity is maintained or improved  Description  INTERVENTIONS:  - Identify patients at risk for skin breakdown  - Assess and monitor skin integrity  - Assess and monitor nutrition and hydration status  - Monitor labs   - Assess for incontinence   - Turn and reposition patient  - Assist with mobility/ambulation  - Relieve pressure over bony prominences  - Avoid friction and shearing  - Provide appropriate hygiene as needed including keeping skin clean and dry  - Evaluate need for skin moisturizer/barrier cream  - Collaborate with interdisciplinary team   - Patient/family teaching  - Consider wound care consult   Outcome: Progressing     Problem: Potential for Falls  Goal: Patient will remain free of falls  Description  INTERVENTIONS:  - Assess patient frequently for physical needs  -  Identify cognitive and physical deficits and behaviors that affect risk of falls    -  [place] fall precautions as indicated by assessment   - Educate patient/family on patient safety including physical limitations  - Instruct patient to call for assistance with activity based on assessment  - Modify environment to reduce risk of injury  - Consider OT/PT consult to assist with strengthening/mobility  Outcome: Progressing

## 2020-07-16 NOTE — SOCIAL WORK
Clinical update sent to [facility], fax#[phone], attn: Jim Teran, requesting additional days for Pt

## 2020-07-16 NOTE — PROGRESS NOTES
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  · Sutures intact  · Wound clean non erythema     Bradycardia  · stable     DM2  · At home on Lantus 10U qam/12U qpm  · Continue DM diet, QID Accuchecks/SSI  · Currently only on SSI   · BS remain stable on sliding scale only  · Add back Lantus when needed vs try Metformin     Mild cognitive impairment  · Seen by Geriatrics as I/P and MOCA was 22/30  · For OP followup with them an a repeat MOCA     HTN  · At home he is on Vasotec 10mg qd   · On 7/11/20, added Lisinopril 10mg qd as substitute  · BP stable     ZHOU/CPAP  · Continue CPAP     Hx gout  · stable  · Continue Allopurinol     Urine incontinence  · Supposedly having this issue at home and Geriatrics recommended that he see Urology as an OP  · PMR managing     Left TMA, diabetic neuropathy  · Likely contributes to some gait instability       The above assessment and plan was reviewed and updated as determined by my evaluation of the patient on 7/16/2020      Labs:   Results from last 7 days   Lab Units 07/16/20  0445 07/13/20  0540   WBC Thousand/uL 6 56 8 91   HEMOGLOBIN g/dL 13 7 14 6   HEMATOCRIT % 42 1 45 0   PLATELETS Thousands/uL 227 276     Results from last 7 days   Lab Units 07/16/20  0445 07/13/20  0540   SODIUM mmol/L 134* 134*   POTASSIUM mmol/L 4 1 3 9   CHLORIDE mmol/L 102 102   CO2 mmol/L 27 26   BUN mg/dL 19 19   CREATININE mg/dL 0 97 0 97   CALCIUM mg/dL 9 1 9 1             Results from last 7 days   Lab Units 07/16/20  0629 07/15/20  2103 07/15/20  1614   POC GLUCOSE mg/dl 119 109 110       Review of Scheduled Meds:    Current Facility-Administered Medications:  acetaminophen 650 mg Oral Q8H [address] Missy Bass MD   allopurinol 300 mg Oral Daily Missy Bass MD   bisacodyl 10 mg Rectal Daily PRN Missy Bass MD   docusate sodium 100 mg Oral BID Missy Bass MD   heparin (porcine) 5,000 Units Subcutaneous Q8H [address] Missy Bass MD   insulin lispro 1-5 Units Subcutaneous TID AC Elizabeth Ramirez, CRNP   insulin lispro 1-5 Units Subcutaneous HS AR Epstein   lisinopril 5 mg Oral Daily Denia Sood, CRNP   melatonin 6 mg Oral HS Missy Bass MD   methocarbamol 500 mg Oral Q6H [address] Missy Bass MD   ondansetron 4 mg Oral Q6H PRN Missy Bass MD   oxyCODONE 2 5 mg Oral Q4H PRN Missy Bass MD   oxyCODONE 5 mg Oral Q4H PRN Missy Bass MD   polyethylene glycol 17 g Oral Daily PRN Missy Bass MD   senna 1 tablet Oral Daily Missy Bass MD   travoprost 1 drop Right Eye HS Missy Bass MD       Subjective/ HPI: Patients overnight issues or events were reviewed with nursing or staff during rounds or morning huddle session  No new or overnight issues  Offers no complaints  Pt without any overnight events or reported nursing issues      ROS:   A 10 point ROS was performed; negative except as noted above         Imaging:     No orders to display       *Labs /Radiology studies reviewed  *Medications reviewed and reconciled as needed  *Please refer to order section for additional ordered labs studies  *Case discussed with primary attending during morning huddle case rounds      Physical Examination:  Vitals:   Vitals:    07/15/20 0803 07/15/20 1301 07/15/20 2106 07/16/20 0541   BP: 108/62 140/69 131/73 124/66   BP Location:  Right arm Right arm    Pulse:  60 56 (!) 53   Resp:  18 18 20   Temp:  98 2 °F (36 8 °C) 98 °F (36 7 °C) 98 °F (36 7 °C)   TempSrc:  Oral Oral Oral   SpO2:  97% 99% 98%   Weight:       Height:           GEN: No apparent distress, interactive  NEURO: Alert and oriented x3  HEENT: Pupils are equal and reactive, EOMI, mucous membranes are moist, face symmetrical  CV: S1 S2 regular, no MRG, no peripheral edema noted  RESP: Lungs are clear bilaterally, no wheezes, rales or rhonchi noted, on room air, respirations easy and non labored  GI: Flat, soft non tender, non distended; +BS x4  : Voiding without difficulty  MUSC: Moves all extremities  SKIN: pink, warm and dry, normal turgor, no rashes, lesions, incision intact to head and lumbar spine         The above physical exam was reviewed and updated as determined by my evaluation of the patient on 7/16/2020  Invasive Devices     None                    VTE Pharmacologic Prophylaxis: Heparin  Code Status: Level 1 - Full Code  Current Length of Stay: 6 day(s)      Total time spent:  30 minutes    with more than 50% spent counseling/coordinating care  Counseling includes discussion with patient re: progress  and discussion with patient of his/her current medical state/information  Coordination of patient's care was performed in conjunction with primary service  Time invested included review of patient's labs, vitals, and management of their comorbidities with continued monitoring  In addition, this patient was discussed with medical team including physician and advanced extenders  The care of the patient was extensively discussed and appropriate treatment plan was formulated unique for this patient  ** Please Note:  voice to text software may have been used in the creation of this document   Although proof errors in transcription or interpretation are a potential of such software**

## 2020-07-16 NOTE — TELEPHONE ENCOUNTER
07/23/2020-PT DISCHARGED TO HOME    07/22/2020-PT STILL IN HOSPITAL    07/21/2020-PT STILL IN HOSPITAL    07/20/2020-PT STILL IN HOSPITAL  07/20 APT CX-Hospitalized (Still admitted in [facility] to see patient )  08/20/2020-6 [address] POV      07/17/2020-PT Atul Reaves    07/16/2020-PT Atul Reaves  07/20/2020-2 [facility] POV W/NURSE  08/20/2020-6 [facility] POV W/DKO      07/10/2020-(KIET)DISCHARGED TO [facility] TODAY  2 AND 6 WEEK FOLLOW UP APPOINTMENTS SCHEDULED

## 2020-07-16 NOTE — PROGRESS NOTES
07/16/20 1010   Pain Assessment   Pain Assessment Tool 0-10   Pain Score 3   Pain Location/Orientation Orientation: Lower; Location: Back   Pain Onset/Description Onset: Ongoing; Descriptor: Aching;Descriptor: Sore   Effect of Pain on Daily Activities tolerated session no c/o pain    Hospital Pain Intervention(s) Repositioned; Ambulation/increased activity   Restrictions/Precautions   Precautions Fall Risk;Supervision on toilet/commode;Spinal precautions   Braces or Orthoses MAFO  (LLE )   Cognition   Overall Cognitive Status WFL   Arousal/Participation Alert; Cooperative   Subjective   Subjective no new c o, agreeable to PT session    Sit to Stand   Type of Assistance Needed Supervision; Adaptive equipment   Amount of Physical Assistance Provided No physical assistance   Comment CS w RW   Sit to Stand CARE Score 4   Bed-Chair Transfer   Type of Assistance Needed Supervision; Adaptive equipment   Amount of Physical Assistance Provided No physical assistance   Comment    Chair/Bed-to-Chair Transfer CARE Score 4   Transfer Bed/Chair/Wheelchair   Limitations Noted In Balance; Endurance;UE Strength;LE Strength   Adaptive Equipment Roller Walker   Findings CS w 6APT   Reason if not Attempted Safety concerns   Car Transfer CARE Score 88   Walk 10 Feet   Type of Assistance Needed Incidental touching; Adaptive equipment   Amount of Physical Assistance Provided No physical assistance   Comment RW   Walk 10 Feet CARE Score 4   Walk 50 Feet with Two Turns   Type of Assistance Needed Incidental touching; Adaptive equipment   Amount of Physical Assistance Provided No physical assistance   Comment RW   Walk 50 Feet with Two Turns CARE Score 4   Walk 150 Feet   Type of Assistance Needed Incidental touching; Adaptive equipment   Amount of Physical Assistance Provided No physical assistance   Comment RW   Walk 150 Feet CARE Score 4   Walking 10 Feet on Uneven Surfaces   Reason if not Attempted Safety concerns   Walking 10 Feet on Uneven Surfaces CARE Score 88   Ambulation   Does the patient walk? 2  Yes   Primary Mode of Locomotion Prior to Admission Walk   Distance Walked (feet) 200 ft  (x2 )   Assist Device Roller Walker   Gait Pattern Inconsistant Kathie; Slow Kathie; Forward Flexion; Wide ZABRINA;Step through   Limitations Noted In Balance; Endurance; Heel Strike;Speed;Strength;Swing   Provided Assistance with: Balance   Walk Assist Level Contact Guard;Close Supervision   Findings CGA/CS w RW   Wheel 50 Feet with Two Turns   Reason if not Attempted Activity not applicable   Wheel 50 Feet with Two Turns CARE Score 9   Wheel 150 Feet   Reason if not Attempted Activity not applicable   Wheel 220 Feet CARE Score 9   Wheelchair mobility   Does the patient use a wheelchair? 0  No   Stairs   Findings to perform next session    Therapeutic Interventions   Flexibility Seated passive HS and gastroc stretch x5min total    Equipment Use   NuStep x10min BLE only L2 SPM 80-90 for general strengthening and endurance    Assessment   Treatment Assessment short session focus on functional trasnfers, ambulation and nu-step for inc LE strength and endurance  Pt overall CGA/CS level with use of RW  Plan to complete stairs and ambulation outside B entrance next session to assess RW mgmt and balance  Pt making good progress w therapy  Stairs to BR remain biggest barrier at this time  Pt will need to achieve Eric level for d/c home as will be home alone during the day  cont with FF with ambulation and mobility  Cont to benefit from skilled PT foucs on general strengthening, balance, endurance, stairs and mobility to maximize indpendence and safety    Family/Caregiver Present no    Barriers to Discharge Inaccessible home environment;Decreased caregiver support   PT Barriers   Physical Impairment Decreased strength;Decreased range of motion;Decreased endurance; Impaired balance;Decreased mobility; Impaired sensation;Orthopedic restrictions;Pain   Functional Limitation Car transfers;Stair negotiation;Ramp negotiation;Standing;Transfers; Walking   Plan   Treatment/Interventions Functional transfer training;LE strengthening/ROM; Elevations; Therapeutic exercise; Endurance training;Patient/family training;Equipment eval/education;Gait training;Bed mobility   Progress Progressing toward goals   Recommendation   PT Discharge Recommendation Home with skilled therapy   Equipment Recommended Walker   PT Therapy Minutes   PT Time In 1010   PT Time Out 1040   PT Total Time (minutes) 30   PT Mode of treatment - Individual (minutes) 20   PT Mode of treatment - Concurrent (minutes) 10   PT Mode of treatment - Group (minutes) 0   PT Mode of treatment - Co-treat (minutes) 0   PT Mode of Treatment - Total time(minutes) 30 minutes   PT Cumulative Minutes 430   Therapy Time missed   Time missed?  No

## 2020-07-16 NOTE — PROGRESS NOTES
07/16/20 1230   Pain Assessment   Pain Assessment Tool Pain Assessment not indicated - pt denies pain   Pain Score No Pain   Restrictions/Precautions   Precautions Fall Risk;Supervision on toilet/commode;Spinal precautions   Braces or Orthoses MAFO  (LLE )   Cognition   Overall Cognitive Status WFL   Arousal/Participation Alert; Cooperative   Subjective   Subjective Pt ready for PT session    Sit to Stand   Type of Assistance Needed Supervision; Adaptive equipment   Amount of Physical Assistance Provided No physical assistance   Sit to Stand CARE Score 4   Bed-Chair Transfer   Type of Assistance Needed Supervision; Adaptive equipment   Amount of Physical Assistance Provided No physical assistance   Comment RW   Chair/Bed-to-Chair Transfer CARE Score 4   Transfer Bed/Chair/Wheelchair   Limitations Noted In Balance; Endurance;LE Strength;UE Strength   Adaptive Equipment Roller Walker   Walk 10 Feet   Type of Assistance Needed Incidental touching; Adaptive equipment   Amount of Physical Assistance Provided No physical assistance   Walk 10 Feet CARE Score 4   Walk 50 Feet with Two Turns   Type of Assistance Needed Incidental touching; Adaptive equipment   Amount of Physical Assistance Provided No physical assistance   Walk 50 Feet with Two Turns CARE Score 4   Walk 150 Feet   Type of Assistance Needed Incidental touching; Adaptive equipment   Amount of Physical Assistance Provided No physical assistance   Walk 150 Feet CARE Score 4   Walking 10 Feet on Uneven Surfaces   Type of Assistance Needed Incidental touching; Adaptive equipment   Amount of Physical Assistance Provided No physical assistance   Comment CGA outside B entrance on sidewalk, curb cutout    Walking 10 Feet on Uneven Surfaces CARE Score 4   Ambulation   Does the patient walk? 2   Yes   Primary Mode of Locomotion Prior to Admission Walk   Distance Walked (feet) 200 ft  ( 150  and 100 outside )   Assist Device Roller Walker   Gait Pattern Inconsistant Kathie; Slow Kathie; Forward Flexion; Wide ZABRINA;Step through   Limitations Noted In Balance; Endurance; Heel Strike;Speed;Strength;Swing;Posture   Provided Assistance with: Balance   Walk Assist Level Contact Guard   Curb or Single Stair   Style negotiated Curb   Type of Assistance Needed Adaptive equipment; Incidental touching   Comment CGA on curb step outside B entrance   1 Step (Curb) CARE Score 4   4 Steps   Type of Assistance Needed Physical assistance; Adaptive equipment   Amount of Physical Assistance Provided Less than 25%   Comment Min/CGA    4 Steps CARE Score 3   12 Steps   Reason if not Attempted Safety concerns   12 Steps CARE Score 88   Stairs   Type Stairs;Curb;Ramp   # of Steps 6   Weight Bearing Precautions Fall Risk;Back   Assist Devices Bilateral Rail   Findings min/CGA on  steps BHR reciprocal pattern ascending non-recip descending    Therapeutic Interventions   Strengthening standing marching 2x10 3# BLE, seated LAQ 2x10 3# BLE    Flexibility Seated passive hs and gastroc stretch x5min    Balance amb w RW weaving through cones and stepping over [id number] x4   Other ambulation, curb step and curb cut out outside B entrance    Assessment   Treatment Assessment session focus on functional mobility outside B entrance, balance, strengthening, stairs  PT overall CS/CGA with mobility using RW  offered to trial use of rollator but pt reports feels it is too "fast" for him at this moment  Pt does rely on BUE support and FF posture with mobility  Good safety awareness and pacing with mobility and txfr  Will cont to require skilled PT focus on inc endurance, balance, mobilit, act tolerance as pt high fall risk due to B/L LE weakness, neuropathy, LLE MAFO and general deconditioning  Family/Caregiver Present no    Barriers to Discharge Inaccessible home environment;Decreased caregiver support   PT Barriers   Physical Impairment Decreased strength;Decreased range of motion;Decreased endurance; Impaired balance;Decreased mobility; Impaired sensation;Orthopedic restrictions;Pain   Functional Limitation Car transfers; Ramp negotiation;Stair negotiation;Standing;Transfers; Walking   Plan   Treatment/Interventions Functional transfer training;LE strengthening/ROM; Elevations; Therapeutic exercise; Endurance training;Patient/family training;Equipment eval/education; Bed mobility;Gait training   Progress Progressing toward goals   Recommendation   PT Discharge Recommendation Home with skilled therapy   Equipment Recommended Walker   PT Therapy Minutes   PT Time In 1230   PT Time Out 1330   PT Total Time (minutes) 60   PT Mode of treatment - Individual (minutes) 60   PT Mode of treatment - Concurrent (minutes) 0   PT Mode of treatment - Group (minutes) 0   PT Mode of treatment - Co-treat (minutes) 0   PT Mode of Treatment - Total time(minutes) 60 minutes   PT Cumulative Minutes 490   Therapy Time missed   Time missed?  No

## 2020-07-16 NOTE — PROGRESS NOTES
07/16/20 0745   Pain Assessment   Pain Assessment Tool 0-10   Pain Score 2   Pain Location/Orientation Location: Back   Effect of Pain on Daily Activities no change, pt tolerated tx well   Hospital Pain Intervention(s) Shower/Bath   Restrictions/Precautions   Precautions Fall Risk;Spinal precautions   Braces or Orthoses MAFO   Eating   Type of Assistance Needed Independent   Eating CARE Score 6   Oral Hygiene   Type of Assistance Needed Supervision   Comment in stance at sink; pt able to manage all aspects at S level    Oral Hygiene CARE Score 4   Shower/Bathe Self   Type of Assistance Needed Supervision   Shower/Bathe Self CARE Score 4   Bathing   Assessed Bath Style Shower   Anticipated D/C Bath Style Tub   Able to Gather/Transport Yes   Able to Adjust Water Temperature Yes   Able to Wash/Rinse/Dry (body part) Left Arm;Right Arm;L Upper Leg;L Lower Leg/Foot;R Upper Leg;R Lower Leg/Foot;Chest;Abdomen;Perineal Area; Buttocks   Limitations Noted in Endurance;Strength;Balance   Positioning Seated;Standing   Adaptive Equipment Shower Bars; Shower Seat;Hand Held Shower;Longhand Sponge   Findings  Pt edu on recommendation for LH sponge to manage LB bathing pt states "i just let the water run over my legs at home"  additional recommendations for shower chair with back to dec fall risk and dec need for low dynamic reach to adhere to spinal precautions  Pt states "i only really stand for 7-8 minutes in the shower and i have grab bars"   Tub/Shower Transfer   Limitations Noted In Balance; Endurance;LE Strength   Adaptive Equipment Grab Bars;Seat with Back   Findings S level transfer with grab bar assist   Upper Body Dressing   Type of Assistance Needed Supervision   Comment seated EOB   Upper Body Dressing CARE Score 4   Lower Body Dressing   Type of Assistance Needed Supervision   Comment seated EOB with minor deviation in balance for sit>stand when donning pants over hips   Edu on use of unilateral UE support technique on RW when donning/doffing pants over hips  Pt carried over at S level   Lower Body Dressing CARE Score 4   Putting On/Taking Off Footwear   Type of Assistance Needed Physical assistance   Amount of Physical Assistance Provided 25%-49%   Comment A for management of L shoe and to don strap on distal aspect of MAFO of LLE  pt utilizing shoe horn to assist   Putting On/Taking Off Footwear CARE Score 3   Sit to Stand   Type of Assistance Needed Supervision   Comment CS with RW   Sit to Stand CARE Score 4   Bed-Chair Transfer   Type of Assistance Needed Supervision   Amount of Physical Assistance Provided No physical assistance   Comment CS with RW   Chair/Bed-to-Chair Transfer CARE Score 4   Assessment   Treatment Assessment Pt engaged in skilled OT tx session with focus on ADL fxn, fxnl xfers, LHAE/DME education, and safe d/c planning  see above for further details  Pt demo improved performance with ADL fxn and xfers today, overall S with exception to A for LB dressing to don L shoe and assist with MAFO strap management  Pt requires cues at times to adhere to spinal precautions however is able to problem solve EOB technique to manage LB dressing  It continues to be recommended that pt have shower chair in place in home environment  continue per OT POC   Plan   Treatment/Interventions ADL retraining;Functional transfer training; Therapeutic exercise; Endurance training;Patient/family training;Equipment eval/education   Progress Progressing toward goals   Recommendation   Equipment Recommended Tub seat with back   OT Therapy Minutes   OT Time In 0745   OT Time Out 0915   OT Total Time (minutes) 90   OT Mode of treatment - Individual (minutes) 90   OT Mode of treatment - Concurrent (minutes) 0   OT Mode of treatment - Group (minutes) 0   OT Mode of treatment - Co-treat (minutes) 0   OT Mode of Treatment - Total time(minutes) 90 minutes   OT Cumulative Minutes 520   Therapy Time missed   Time missed?  No

## 2020-07-16 NOTE — PROGRESS NOTES
PM&R Progress Note:    HPI: Pj Trujillo is a 69 y o  male with HTN, DM2, Gout, known NPH s/p VPS chronic left foot weakness, lumbar spinal stenosis who presented to the [facility] on 7/6/20 for worsening back pain and gait instability from known spondylolisthesis L5/S1  He was recommended to have operative intervention, and was taken to the OR on 7/6/20 by Dr Nilsa Herrera for an elective L5-S1 transverse lumbar interbody fusion and deformity correction at L5-S1  Patient found to have functional deficits from baseline and admitted to [address] [place] [address] on 7/10/20  ASSESSMENT: Stable, progressing      PLAN:    Rehabilitation   Continue current rehabilitation plan of care to maximize function   Expected Discharge: TBD, RETEAM    Pain   Controlled  DVT prophylaxis   Heparin    Bladder plan   Rare incontinence  Bowel plan   Continent    * Spondylolisthesis of lumbosacral region  Assessment & Plan  s/p L5-S1 transverse lumbar interbody fusion and deformity correction at L5-S1 on 7/6/20 by Dr Kapoor July healing well with staples intact  Scalp laceration  Assessment & Plan  Sutures in place from scalp closure about 1 week ago - extensive scabbing continues to be present  Will not remove sutures  Pain  Assessment & Plan  Managed on Tylenol and Robaxin  P r n  Oxycodone    ZHOU on CPAP  Assessment & Plan  Continue nocturnal CPAP home settings    Gout  Assessment & Plan  Managed on allopurinol    Essential hypertension  Assessment & Plan  Stable without blood pressure medication    Normal pressure hydrocephalus (HCC)  Assessment & Plan  Chronic  shunt with scalp revision  Appreciate IM consultants medical co-management  Labs, medications, and imaging personally reviewed  SUBJECTIVE:  Patient seen face to face today  He continues to make progress towards discharge  Pain is well controlled  Urinary incontinence is improving    He denies any current complaints  ROS:  A ten point review of systems was completed 7/16/2020 and pertinent positives are listed in subjective section  All other systems reviewed were negative  OBJECTIVE:   /59 (BP Location: Right arm)   Pulse 65   Temp 97 6 °F (36 4 °C) (Oral)   Resp 17   Ht 6' 3" (1 905 m)   Wt 122 kg (269 lb 13 5 oz)   SpO2 97%   BMI 33 73 kg/m²     Physical Exam   Constitutional: He is oriented to person, place, and time  He appears well-developed and well-nourished  HENT:   Head: Normocephalic and atraumatic  Mouth/Throat: Oropharynx is clear and moist    Eyes: Pupils are equal, round, and reactive to light  EOM are normal    Neck: Normal range of motion  Neck supple  Cardiovascular: Normal rate  Pulmonary/Chest: Effort normal and breath sounds normal    Abdominal: Soft  Bowel sounds are normal    Musculoskeletal: Normal range of motion  Neurological: He is alert and oriented to person, place, and time  Skin: Skin is warm and dry  Scabbed areas at the scalp incision   Psychiatric: He has a normal mood and affect  Vitals reviewed         Lab Results   Component Value Date    WBC 6 56 07/16/2020    HGB 13 7 07/16/2020    HCT 42 1 07/16/2020    MCV 87 07/16/2020     07/16/2020     Lab Results   Component Value Date    SODIUM 134 (L) 07/16/2020    K 4 1 07/16/2020     07/16/2020    CO2 27 07/16/2020    BUN 19 07/16/2020    CREATININE 0 97 07/16/2020    GLUC 110 07/16/2020    CALCIUM 9 1 07/16/2020     Lab Results   Component Value Date    INR 1 06 06/16/2020    INR 1 14 05/28/2020    PROTIME 13 4 06/16/2020    PROTIME 14 0 05/28/2020           Current Facility-Administered Medications:     acetaminophen (TYLENOL) tablet 650 mg, 650 mg, Oral, Q8H [address], Raj Ornelas MD, 650 mg at 07/16/20 0541    allopurinol (ZYLOPRIM) tablet 300 mg, 300 mg, Oral, Daily, Raj Ornelas MD, 300 mg at 07/16/20 0947    bisacodyl (DULCOLAX) rectal suppository 10 mg, 10 mg, Rectal, Daily PRN, Ariane Correa MD    docusate sodium (COLACE) capsule 100 mg, 100 mg, Oral, BID, Ariane Correa MD, 100 mg at 07/14/20 1716    heparin (porcine) subcutaneous injection 5,000 Units, 5,000 Units, Subcutaneous, Q8H [facility], Ariane Correa MD, 5,000 Units at 07/16/20 0541    insulin lispro (HumaLOG) 100 units/mL subcutaneous injection 1-5 Units, 1-5 Units, Subcutaneous, TID AC, 1 Units at 07/13/20 0821 **AND** Fingerstick Glucose (POCT), , , TID AC, AR Capone    insulin lispro (HumaLOG) 100 units/mL subcutaneous injection 1-5 Units, 1-5 Units, Subcutaneous, HS, AR Capone    lisinopril (ZESTRIL) tablet 5 mg, 5 mg, Oral, Daily, AR Zuniga, 5 mg at 07/16/20 0947    melatonin tablet 6 mg, 6 mg, Oral, HS, Ariane Correa MD, 6 mg at 07/15/20 2232    methocarbamol (ROBAXIN) tablet 500 mg, 500 mg, Oral, Q6H [facility], Ariane Correa MD, 500 mg at 07/16/20 1216    ondansetron (ZOFRAN-ODT) dispersible tablet 4 mg, 4 mg, Oral, Q6H PRN, Ariane Correa MD    oxyCODONE (ROXICODONE) IR tablet 2 5 mg, 2 5 mg, Oral, Q4H PRN, Ariane Correa MD    oxyCODONE (ROXICODONE) IR tablet 5 mg, 5 mg, Oral, Q4H PRN, Ariane Correa MD    polyethylene glycol (MIRALAX) packet 17 g, 17 g, Oral, Daily PRN, Ariane Correa MD    senna (SENOKOT) tablet 8 6 mg, 1 tablet, Oral, Daily, Ariane Correa MD, 8 6 mg at 07/14/20 0836    travoprost (TRAVATAN-Z) 0 004 % ophthalmic solution 1 drop, 1 drop, Right Eye, HS, Ariane Correa MD, 1 drop at 07/15/20 5985    Past Medical History:   Diagnosis Date    Cancer [facility]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     Diabetes mellitus ([place] [address] )     DM2 (diabetes mellitus, type 2) ([place] [address] )     Gout     HTN (hypertension)     Hydrocephalus ([place] [address] )     Hypertension     Neuropathy     ZHOU on CPAP     Pressure injury of skin     TIA (transient ischemic attack)        Patient Active Problem List    Diagnosis Date Noted    Spondylolisthesis of lumbosacral region 07/02/2020 Priority: High    Weakness of left foot 07/10/2020    Pain 07/10/2020    Scalp hematoma 07/10/2020    Scalp laceration 07/10/2020    Impaired functional mobility, balance, gait, and endurance 07/02/2020    Diabetic ulcer [facility] left foot [facility] with [facility] ) 05/28/2020    Type 2 diabetes mellitus ([facility] ) 05/28/2020    Normal pressure hydrocephalus ([facility] ) 05/28/2020    Essential hypertension 05/28/2020    Gout 05/28/2020    ZHOU on CPAP 05/28/2020    Status post ventriculoperitoneal shunt 04/21/2020    Unspecified abnormalities of gait and mobility 04/21/2020          Alexandria Bobby PA-C    Total time spent:  30 minutes with more than 50% spent counseling/coordinating care  Counseling includes discussion with patient re: progress and discussion with patient of his/her current medical state/information  Coordination of patient's care was performed in conjunction with consulting services  Time invested included review of patient's labs, vitals, and management of their comorbidities with continued monitoring  The care of the patient was extensively discussed and appropriate treatment plan was formulated unique for this patient  ** Please Note:  voice to text software may have been used in the creation of this document   Although proof errors in transcription or interpretation are a potential of such software**

## 2020-07-17 LAB
GLUCOSE SERPL-MCNC: 111 MG/DL (ref 65–140)
GLUCOSE SERPL-MCNC: 121 MG/DL (ref 65–140)
GLUCOSE SERPL-MCNC: 134 MG/DL (ref 65–140)
GLUCOSE SERPL-MCNC: 96 MG/DL (ref 65–140)

## 2020-07-17 PROCEDURE — 97530 THERAPEUTIC ACTIVITIES: CPT

## 2020-07-17 PROCEDURE — 97110 THERAPEUTIC EXERCISES: CPT

## 2020-07-17 PROCEDURE — 82948 REAGENT STRIP/BLOOD GLUCOSE: CPT

## 2020-07-17 PROCEDURE — 97116 GAIT TRAINING THERAPY: CPT

## 2020-07-17 PROCEDURE — 99233 SBSQ HOSP IP/OBS HIGH 50: CPT | Performed by: PHYSICAL MEDICINE & REHABILITATION

## 2020-07-17 RX ADMIN — HEPARIN SODIUM 5000 UNITS: 5000 INJECTION INTRAVENOUS; SUBCUTANEOUS at 21:52

## 2020-07-17 RX ADMIN — HEPARIN SODIUM 5000 UNITS: 5000 INJECTION INTRAVENOUS; SUBCUTANEOUS at 05:12

## 2020-07-17 RX ADMIN — METHOCARBAMOL 500 MG: 500 TABLET, FILM COATED ORAL at 23:05

## 2020-07-17 RX ADMIN — ACETAMINOPHEN 650 MG: 325 TABLET, FILM COATED ORAL at 15:01

## 2020-07-17 RX ADMIN — HEPARIN SODIUM 5000 UNITS: 5000 INJECTION INTRAVENOUS; SUBCUTANEOUS at 15:01

## 2020-07-17 RX ADMIN — METHOCARBAMOL 500 MG: 500 TABLET, FILM COATED ORAL at 11:33

## 2020-07-17 RX ADMIN — ALLOPURINOL 300 MG: 300 TABLET ORAL at 08:48

## 2020-07-17 RX ADMIN — TRAVOPROST 1 DROP: 0.04 SOLUTION/ DROPS OPHTHALMIC at 21:52

## 2020-07-17 RX ADMIN — MELATONIN 6 MG: at 21:52

## 2020-07-17 RX ADMIN — ACETAMINOPHEN 650 MG: 325 TABLET, FILM COATED ORAL at 05:12

## 2020-07-17 RX ADMIN — METHOCARBAMOL 500 MG: 500 TABLET, FILM COATED ORAL at 05:13

## 2020-07-17 RX ADMIN — METHOCARBAMOL 500 MG: 500 TABLET, FILM COATED ORAL at 17:00

## 2020-07-17 RX ADMIN — LISINOPRIL 5 MG: 5 TABLET ORAL at 08:48

## 2020-07-17 RX ADMIN — ACETAMINOPHEN 650 MG: 325 TABLET, FILM COATED ORAL at 21:51

## 2020-07-17 RX ADMIN — METHOCARBAMOL 500 MG: 500 TABLET, FILM COATED ORAL at 00:09

## 2020-07-17 NOTE — PROGRESS NOTES
07/17/20 1030   Pain Assessment   Pain Assessment Tool Pain Assessment not indicated - pt denies pain   Pain Score No Pain   Restrictions/Precautions   Precautions Fall Risk;Spinal precautions   Braces or Orthoses MAFO   Picking Up Object   Type of Assistance Needed Supervision; Adaptive equipment;Verbal cues   Amount of Physical Assistance Provided No physical assistance   Comment With CS in dynamic stance at RW, pt engaged in retrieving small foam puzzle letters from floor-level located around OT gym (across 20ft) using LHR and placing letters in walker basket to transport  Focus was on dynamic standing balance/tolerance, utilization of RW basket for item transportation, and standing tolerance  Pt tolerated stance for approx 5min, no LOB, min vc's for posture  Educated pt on safe approach with RW to avoid twisting, as pt attempted to retrieve pieces by twisting x2 requiring cues to recognize breaking spinal precaution  After retrieving all letters, pt then in static stance at raised table for 3min to place letters into puzzle board, no LOB, mod vc's for increased upright posture  Picking Up Object CARE Score 4   Sit to Stand   Type of Assistance Needed Supervision; Adaptive equipment   Amount of Physical Assistance Provided No physical assistance   Comment CS w/RW   Sit to Stand CARE Score 4   Bed-Chair Transfer   Type of Assistance Needed Supervision; Adaptive equipment   Amount of Physical Assistance Provided No physical assistance   Comment fxl mobility from pt's room<>therapy gym w/RW   Chair/Bed-to-Chair Transfer CARE Score 4   Transfer Bed/Chair/Wheelchair   Positioning Concerns Skin Integrity   Limitations Noted In Balance; Endurance;UE Strength;LE Strength   Adaptive Equipment Roller Walker   Functional Standing Tolerance   Time 6min x1   Activity item transport in pt's room   Comments With CS at RW, pt engaged in transporting items from pt's room to pt's bathroom and back, with focus on trialing RW basket for increased safety and independence with item transport  Educated pt on RW bag vs  tray vs  basket, with pt resistant to utilizing RW tray as pt strongly believes he will not be using RW at home  Pt states he already uses walker bag w/rollator during community mobility  Educated pt on all options, pending pt progress in therapy  Exercise Tools   Exercise Tools Yes   Other Exercise Tool 1 With Sup while seated, [id number] each BUE chest press, FF, horiz ABD/ADD, biceps curls, and prograde/retrograde rowing using 3# tbar (4# for biceps curls), for increased UB strength/endurance for improved safety during fxl transfers and fxl mob  Required rest breaks between sets to manage mild fatigue  Pt continues to demo min difficulty maintaining gross grasp underhanded on tbar during biceps curls 2* peripheral neuropathy and arthritis, requiring rest breaks to readjust   Cognition   Overall Cognitive Status WFL   Arousal/Participation Alert; Cooperative   Attention Within functional limits   Orientation Level Oriented X4   Memory Decreased short term memory   Following Commands Follows multistep commands with increased time or repetition   Activity Tolerance   Activity Tolerance Patient tolerated treatment well   Assessment   Treatment Assessment Pt seen for 60min skilled OT session focused on UB strengthening, item transport w/RW, education on RW bag/tray/basket for improved independence w/IADLs, dynamic standing balance, item retrieval w/LHR, and standing tolerance, for increased independence w/ADLs and fxl mobility, and decreased caregiver burden  See detailed descriptions of fxl performance above  Pt tolerated session well, but continues to be limited by spinal precautions and tangential nature  Continues to require min cues to adhere to spinal precautions during dynamic standing tasks   Pt would benefit from continued skilled OT focused on ADL skills retraining, LHAE carryover, fxl transfers/mobility w/RW, standing balance/tolerance, fxl cog, UE strength/endurance, community re-integration, and sitting balance/core control  Prognosis Good   Problem List Decreased strength;Decreased range of motion;Decreased endurance; Impaired balance;Decreased mobility; Decreased safety awareness;Decreased skin integrity;Orthopedic restrictions; Obesity   Barriers to Discharge Inaccessible home environment   Plan   Treatment/Interventions ADL retraining;Functional transfer training; Therapeutic exercise; Endurance training;Patient/family training;Equipment eval/education; Compensatory technique education   Progress Progressing toward goals   Recommendation   OT Discharge Recommendation Home with skilled therapy  (home w/friend support, home OT)   OT Therapy Minutes   OT Time In 1030   OT Time Out 1130   OT Total Time (minutes) 60   OT Mode of treatment - Individual (minutes) 60   OT Mode of treatment - Concurrent (minutes) 0   OT Mode of treatment - Group (minutes) 0   OT Mode of treatment - Co-treat (minutes) 0   OT Mode of Treatment - Total time(minutes) 60 minutes   OT Cumulative Minutes 580   Therapy Time missed   Time missed?  No

## 2020-07-17 NOTE — PROGRESS NOTES
Internal Medicine Progress Note  Patient: Viridiana Gay  Age/sex: 69 y o  male  Medical Record #: [id number]      ASSESSMENT/PLAN: (Interval History)  Viridiana Gay is seen and examined and management for following issues:    L5-S1 TLIF with hx L-S spondylolisthesis on 7/6/20 ([place])  · Monitor incision  · Pain control per primary  · Progressing in therapy     Scalp revision/hx NPH  shunt 7/6/20  · Sutures intact  · Wound clean non erythema     Bradycardia  · stable     DM2  · At home on Lantus 10U qam/12U qpm  · Continue DM diet, QID Accuchecks/SSI  · Currently only on SSI   · BS remain stable on sliding scale only  · Add back Lantus when needed vs try Metformin     Mild cognitive impairment  · Seen by Geriatrics as I/P and MOCA was 22/30  · For OP followup with them an a repeat MOCA     HTN  · At home he is on Vasotec 10mg qd   · On 7/11/20, added Lisinopril 10mg qd as substitute  · BP stable     ZHOU/CPAP  · Continue CPAP     Hx gout  · stable  · Continue Allopurinol     Urine incontinence  · Supposedly having this issue at home and Geriatrics recommended that he see Urology as an OP  · PMR managing     Left TMA, diabetic neuropathy  · Likely contributes to some gait instability       The above assessment and plan was reviewed and updated as determined by my evaluation of the patient on 7/17/2020      Labs:   Results from last 7 days   Lab Units 07/16/20  0445 07/13/20  0540   WBC Thousand/uL 6 56 8 91   HEMOGLOBIN g/dL 13 7 14 6   HEMATOCRIT % 42 1 45 0   PLATELETS Thousands/uL 227 276     Results from last 7 days   Lab Units 07/16/20  0445 07/13/20  0540   SODIUM mmol/L 134* 134*   POTASSIUM mmol/L 4 1 3 9   CHLORIDE mmol/L 102 102   CO2 mmol/L 27 26   BUN mg/dL 19 19   CREATININE mg/dL 0 97 0 97   CALCIUM mg/dL 9 1 9 1             Results from last 7 days   Lab Units 07/17/20  0643 07/16/20  2107 07/16/20  1608   POC GLUCOSE mg/dl 121 144* 141*       Review of Scheduled Meds:    Current Facility-Administered Medications:  acetaminophen 650 mg Oral Q8H [address] Bryan Henriquez MD   allopurinol 300 mg Oral Daily Bryan Henriquez MD   bisacodyl 10 mg Rectal Daily PRN Bryan Henriquez MD   docusate sodium 100 mg Oral BID Bryan Henriquez MD   heparin (porcine) 5,000 Units Subcutaneous Q8H [address] Bryan Henriquez MD   insulin lispro 1-5 Units Subcutaneous TID AC Elizabeth Ramirez, [place]   insulin lispro 1-5 Units Subcutaneous HS AR Epstein   lisinopril 5 mg Oral Daily Denia Sood, AR   melatonin 6 mg Oral HS Onejerald Henriquez MD   methocarbamol 500 mg Oral Q6H [address] Bryan Henriquez MD   ondansetron 4 mg Oral Q6H PRN Bryan Henriquez MD   oxyCODONE 2 5 mg Oral Q4H PRN Bryan Henriquez MD   oxyCODONE 5 mg Oral Q4H PRN Bryan Henriquez MD   polyethylene glycol 17 g Oral Daily PRN Bryan Henriquez MD   senna 1 tablet Oral Daily Onejerald Henriquez MD   travoprost 1 drop Right Eye HS Bryan Henriquez MD       Subjective/ HPI: Patients overnight issues or events were reviewed with nursing or staff during rounds or morning huddle session  No new or overnight issues  Offers no complaints  Pt without any overnight events or reported nursing issues      ROS:   A 10 point ROS was performed; negative except as noted above         Imaging:     No orders to display       *Labs /Radiology studies reviewed  *Medications reviewed and reconciled as needed  *Please refer to order section for additional ordered labs studies  *Case discussed with primary attending during morning huddle case rounds      Physical Examination:  Vitals:   Vitals:    07/16/20 1352 07/16/20 2028 07/17/20 0613 07/17/20 0848   BP: 113/59 100/58 119/66 120/68   BP Location: Right arm Right arm     Pulse: 65 59 55    Resp: 17 18 20    Temp: 97 6 °F (36 4 °C) 98 1 °F (36 7 °C) 98 °F (36 7 °C)    TempSrc: Oral Oral Oral    SpO2: 97% 97% 100%    Weight:       Height:           GEN: No apparent distress, interactive  NEURO: Alert and oriented x3  HEENT: Pupils are equal and reactive, EOMI, mucous membranes are moist, face symmetrical  CV: S1 S2 regular, no MRG, no peripheral edema noted  RESP: Lungs are clear bilaterally, no wheezes, rales or rhonchi noted, on room air, respirations easy and non labored  GI: Flat, soft non tender, non distended; +BS x4  : Voiding without difficulty  MUSC: Moves all extremities  SKIN: pink, warm and dry, normal turgor, no rashes, lesions, incision intact to head and lumbar spine         The above physical exam was reviewed and updated as determined by my evaluation of the patient on 7/17/2020  Invasive Devices     None                    VTE Pharmacologic Prophylaxis: Heparin  Code Status: Level 1 - Full Code  Current Length of Stay: 7 day(s)      Total time spent:  30 minutes    with more than 50% spent counseling/coordinating care  Counseling includes discussion with patient re: progress  and discussion with patient of his/her current medical state/information  Coordination of patient's care was performed in conjunction with primary service  Time invested included review of patient's labs, vitals, and management of their comorbidities with continued monitoring  In addition, this patient was discussed with medical team including physician and advanced extenders  The care of the patient was extensively discussed and appropriate treatment plan was formulated unique for this patient  ** Please Note:  voice to text software may have been used in the creation of this document   Although proof errors in transcription or interpretation are a potential of such software**

## 2020-07-17 NOTE — SOCIAL WORK
Met with Pt to touch base, and he reports things are going very well  Pt believes that a date will be set for d/c at the team meeting Tuesday  Fax received from [facility] stating that Pt is covered through 7/22, with review due 7/23

## 2020-07-17 NOTE — PROGRESS NOTES
PM&R Progress Note:    HPI: Khloe Rangel is a 69 y o  male with HTN, DM2, Gout, known NPH s/p VPS chronic left foot weakness, lumbar spinal stenosis who presented to the [email or web address] on 7/6/20 for worsening back pain and gait instability from known spondylolisthesis L5/S1  He was recommended to have operative intervention, and was taken to the OR on 7/6/20 by Dr Todd Batista for an elective L5-S1 transverse lumbar interbody fusion and deformity correction at L5-S1  Patient found to have functional deficits from baseline and admitted to [facility] on 7/10/20  ASSESSMENT: Stable, progressing      PLAN:    Rehabilitation   Continue current rehabilitation plan of care to maximize function   Functional deficits: Continues to have proximal leg weakness, impaired mobility and self care   Expected Discharge: TBD, RETEAM    Pain   Located in lower back and hips: managed on scheduled tylenol, robaxin    DVT prophylaxis   Continue SQ Heparin    Bladder plan   Overall continent    Bowel plan   Continent    * Spondylolisthesis of lumbosacral region  Assessment & Plan  s/p L5-S1 transverse lumbar interbody fusion and deformity correction at L5-S1 on 7/6/20 by Dr James Money healing well with staples intact  Follow-up with Dr Todd Batista    Scalp laceration  Mikayla Founds in place from scalp closure about 1 week ago - extensive scabbing continues to be present  Will not remove sutures either today or Tuesday    Pain  Assessment & Plan  Managed on Tylenol and Robaxin  P r n  Oxycodone    ZHOU on CPAP  Assessment & Plan  Continue nocturnal CPAP home settings    Gout  Assessment & Plan  Managed on allopurinol    Essential hypertension  Assessment & Plan  Stable without blood pressure medication    Normal pressure hydrocephalus (HCC)  Assessment & Plan  Chronic  shunt with scalp revision  Appreciate IM consultants medical co-management    Labs, medications, and imaging personally reviewed  SUBJECTIVE:  Patient seen face to face  No acute issues overnight  Improving  Back pain is minimal       ROS:  A ten point review of systems was completed 7/17/2020 and pertinent positives are listed in subjective section  All other systems reviewed were negative  OBJECTIVE:   /66   Pulse 66   Temp 98 °F (36 7 °C)   Resp 18   Ht 6' 3" (1 905 m)   Wt 122 kg (269 lb 13 5 oz)   SpO2 94%   BMI 33 73 kg/m²     Physical Exam   Constitutional: He is oriented to person, place, and time  He appears well-developed and well-nourished  No distress  HENT:   Head: Normocephalic  Nose: Nose normal    Eyes: Conjunctivae and EOM are normal    Neck: Neck supple  Cardiovascular: Normal rate, regular rhythm and intact distal pulses  Pulmonary/Chest: Effort normal and breath sounds normal  He has no wheezes  Abdominal: Soft  He exhibits no distension  Musculoskeletal: He exhibits no edema  AFO in place   Neurological: He is alert and oriented to person, place, and time  Bilateral lower extremity motor: 4/5 throughout   Skin: Skin is warm  Incision C/D/I on back    Psychiatric: He has a normal mood and affect  Nursing note and vitals reviewed         Lab Results   Component Value Date    WBC 6 56 07/16/2020    HGB 13 7 07/16/2020    HCT 42 1 07/16/2020    MCV 87 07/16/2020     07/16/2020     Lab Results   Component Value Date    SODIUM 134 (L) 07/16/2020    K 4 1 07/16/2020     07/16/2020    CO2 27 07/16/2020    BUN 19 07/16/2020    CREATININE 0 97 07/16/2020    GLUC 110 07/16/2020    CALCIUM 9 1 07/16/2020     Lab Results   Component Value Date    INR 1 06 06/16/2020    INR 1 14 05/28/2020    PROTIME 13 4 06/16/2020    PROTIME 14 0 05/28/2020           Current Facility-Administered Medications:     acetaminophen (TYLENOL) tablet 650 mg, 650 mg, Oral, Q8H [facility], Mc Charles MD, 650 mg at 07/17/20 0512    allopurinol (ZYLOPRIM) tablet 300 mg, 300 mg, Oral, Daily, Bennie Parker MD, 300 mg at 07/17/20 0848    bisacodyl (DULCOLAX) rectal suppository 10 mg, 10 mg, Rectal, Daily PRN, Bennie Parker MD    docusate sodium (COLACE) capsule 100 mg, 100 mg, Oral, BID, Bennie Parker MD, 100 mg at 07/16/20 1822    heparin (porcine) subcutaneous injection 5,000 Units, 5,000 Units, Subcutaneous, Q8H [address], Bennie Parker MD, 5,000 Units at 07/17/20 0512    insulin lispro (HumaLOG) 100 units/mL subcutaneous injection 1-5 Units, 1-5 Units, Subcutaneous, TID AC, 1 Units at 07/13/20 8576 **AND** Fingerstick Glucose (POCT), , , TID AC, AR Liang    insulin lispro (HumaLOG) 100 units/mL subcutaneous injection 1-5 Units, 1-5 Units, Subcutaneous, HS, AR Liang    lisinopril (ZESTRIL) tablet 5 mg, 5 mg, Oral, Daily, Denia SoodAR, 5 mg at 07/17/20 0848    melatonin tablet 6 mg, 6 mg, Oral, HS, Bennie Parker MD, 6 mg at 07/16/20 2153    methocarbamol (ROBAXIN) tablet 500 mg, 500 mg, Oral, Q6H [address], Bennie Parker MD, 500 mg at 07/17/20 1133    ondansetron (ZOFRAN-ODT) dispersible tablet 4 mg, 4 mg, Oral, Q6H PRN, Bennie Parker MD    oxyCODONE (ROXICODONE) IR tablet 2 5 mg, 2 5 mg, Oral, Q4H PRN, Bennie Parker MD    oxyCODONE (ROXICODONE) IR tablet 5 mg, 5 mg, Oral, Q4H PRN, Bennie Parker MD    polyethylene glycol (MIRALAX) packet 17 g, 17 g, Oral, Daily PRN, Bennie Parker MD    senna (SENOKOT) tablet 8 6 mg, 1 tablet, Oral, Daily, Bennie Parker MD, 8 6 mg at 07/14/20 0836    travoprost (TRAVATAN-Z) 0 004 % ophthalmic solution 1 drop, 1 drop, Right Eye, HS, Bennie Parker MD, 1 drop at 07/16/20 2119    Past Medical History:   Diagnosis Date    Cancer [facility]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     Diabetes mellitus ([facility] )     DM2 (diabetes mellitus, type 2) ([facility] )     Gout     HTN (hypertension)     Hydrocephalus ([facility] )     Hypertension     Neuropathy     ZHOU on CPAP     Pressure injury of skin     TIA (transient ischemic attack)        Patient Active Problem List    Diagnosis Date Noted    Spondylolisthesis of lumbosacral region 07/02/2020     Priority: High    Weakness of left foot 07/10/2020    Pain 07/10/2020    Scalp hematoma 07/10/2020    Scalp laceration 07/10/2020    Impaired functional mobility, balance, gait, and endurance 07/02/2020    Diabetic ulcer of left foot associated with type 2 diabetes mellitus ([facility] ) 05/28/2020    Type 2 diabetes mellitus ([facility] ) 05/28/2020    Normal pressure hydrocephalus ([facility] ) 05/28/2020    Essential hypertension 05/28/2020    Gout 05/28/2020    ZHOU on CPAP 05/28/2020    Status post ventriculoperitoneal shunt 04/21/2020    Unspecified abnormalities of gait and mobility 04/21/2020          Ayanna Garvin MD    Total time spent:  30 minutes with more than 50% spent counseling/coordinating care  Counseling includes discussion with patient re: progress and discussion with patient of his/her current medical state/information  Coordination of patient's care was performed in conjunction with consulting services  Time invested included review of patient's labs, vitals, and management of their comorbidities with continued monitoring  The care of the patient was extensively discussed and appropriate treatment plan was formulated unique for this patient  ** Please Note:  voice to text software may have been used in the creation of this document   Although proof errors in transcription or interpretation are a potential of such software**

## 2020-07-17 NOTE — PROGRESS NOTES
07/17/20 1300   Pain Assessment   Pain Assessment Tool 0-10   Pain Score No Pain   Restrictions/Precautions   Precautions Fall Risk;Supervision on toilet/commode;Spinal precautions   Braces or Orthoses MAFO   Cognition   Overall Cognitive Status WFL   Arousal/Participation Alert; Cooperative   Attention Within functional limits   Orientation Level Oriented X4   Memory Decreased short term memory   Following Commands Follows multistep commands with increased time or repetition   Subjective   Subjective Patient seated in chair and ready for PT  Sit to Stand   Type of Assistance Needed Supervision   Amount of Physical Assistance Provided No physical assistance   Comment CS with RW   Sit to Stand CARE Score 4   Transfer Bed/Chair/Wheelchair   Limitations Noted In Balance; Endurance   Adaptive Equipment Roller Walker   Stand Pivot Supervision   Sit to Stand Supervision   Stand to [employer]   Findings CS with RW   Walk 10 Feet   Type of Assistance Needed Incidental touching   Amount of Physical Assistance Provided No physical assistance   Comment RW   Walk 10 Feet CARE Score 4   Walk 50 Feet with Two Turns   Type of Assistance Needed Incidental touching   Amount of Physical Assistance Provided No physical assistance   Comment RW   Walk 50 Feet with Two Turns CARE Score 4   Ambulation   Does the patient walk? 2  Yes   Primary Mode of Locomotion Prior to Admission Walk   Distance Walked (feet) 100 ft   Assist Device Roller Walker   Gait Pattern Inconsistant Kathie; Slow Kathie; Forward Flexion; Wide ZABRINA;Step through   Limitations Noted In Balance; Endurance; Heel Strike;Speed;Strength;Swing;Posture   Provided Assistance with: Balance   Walk Assist Level Contact Guard   Findings CS with RW for shorter distances   Wheelchair mobility   Does the patient use a wheelchair? 0   No   Curb or Single Stair   Style negotiated Single stair   Type of Assistance Needed Adaptive equipment   Amount of Physical Assistance Provided 25%-49%   Comment CGA with bilateral hand rail   1 Step (Curb) CARE Score 3   4 Steps   Type of Assistance Needed Physical assistance   Amount of Physical Assistance Provided Less than 25%   Comment min assist with bilateral hand rail   4 Steps CARE Score 3   Stairs   Type Stairs;Curb   # of Steps 6   Weight Bearing Precautions Fall Risk;Back   Assist Devices Bilateral Rail   Findings min assist with increased steps   Therapeutic Interventions   Strengthening Hip Flexion, Hip Abduction, Hip extension- 20 reps, 3 second holds; mini squats- 20 reps; seated abduction with green theraband, 20 reps 3 second holds; 20 reps, 3 second holds for seated adduction 20 reps, 3 second holds   Balance Reciprocal step taps- 2 UE support, 20 reps, floor to second 4" step    Neuromuscular Re-Education Step negotiation per objective- 6 steps with bilateral hand rails   Other Ambulation and transfers per objective   Equipment Use   NuStep 10 minutes bilateral LE and UE, seat 12 level 2   Assessment   Treatment Assessment Patient tolerated session well today, focusing on stair training and LE strengthening  Patient demonstrates challenged with quad strengthening and decreased endurance, patient improving with tolerance but continually reliant on UE support with steps  Patient demonstrates decreased step heights as well  Next session focused on ambulation and confidence reducing UE support  Patient requires skilled PT in order to improve his LE strengthening and to maximize function  Family/Caregiver Present no   PT Family training done with:  no   Problem List Decreased strength;Decreased range of motion;Decreased endurance; Impaired balance;Decreased safety awareness;Decreased skin integrity;Orthopedic restrictions;Pain   Barriers to Discharge Inaccessible home environment   PT Barriers   Physical Impairment Decreased strength;Decreased range of motion;Decreased endurance; Impaired balance;Decreased mobility; Impaired sensation;Orthopedic restrictions;Pain   Functional Limitation Car transfers; Ramp negotiation;Stair negotiation;Standing;Transfers; Walking   Plan   Treatment/Interventions Functional transfer training;LE strengthening/ROM; Elevations; Therapeutic exercise; Endurance training;Patient/family training;Equipment eval/education; Bed mobility;Gait training   Progress Progressing toward goals   PT Therapy Minutes   PT Time In 1300   PT Time Out 1400   PT Total Time (minutes) 60   PT Mode of treatment - Individual (minutes) 60   PT Mode of treatment - Concurrent (minutes) 0   PT Mode of treatment - Group (minutes) 0   PT Mode of treatment - Co-treat (minutes) 0   PT Mode of Treatment - Total time(minutes) 60 minutes   PT Cumulative Minutes 550   Therapy Time missed   Time missed?  No

## 2020-07-17 NOTE — PROGRESS NOTES
07/17/20 1500   Pain Assessment   Pain Assessment Tool 0-10   Pain Score No Pain   Restrictions/Precautions   Precautions Fall Risk;Supervision on toilet/commode;Spinal precautions   Braces or Orthoses MAFO   Cognition   Overall Cognitive Status WFL   Arousal/Participation Alert; Cooperative   Attention Within functional limits   Orientation Level Oriented X4   Memory Decreased short term memory   Following Commands Follows multistep commands with increased time or repetition   Subjective   Subjective Patient resting in chair ready for PT  Sit to Stand   Type of Assistance Needed Supervision   Amount of Physical Assistance Provided No physical assistance   Comment CS with RW   Sit to Stand CARE Score 4   Transfer Bed/Chair/Wheelchair   Limitations Noted In Balance; Endurance   Adaptive Equipment Roller Walker   Stand Pivot Supervision   Sit to Stand Supervision   Stand to [employer]   Findings CS with RW   Walk 10 Feet   Type of Assistance Needed Incidental touching   Amount of Physical Assistance Provided No physical assistance   Comment  RW   Walk 10 Feet CARE Score 4   Walk 50 Feet with Two Turns   Type of Assistance Needed Incidental touching   Amount of Physical Assistance Provided No physical assistance   Comment RW   Walk 50 Feet with Two Turns CARE Score 4   Walk 150 Feet   Type of Assistance Needed Incidental touching   Amount of Physical Assistance Provided No physical assistance   Comment Rollator Walker   Walk 150 Feet CARE Score 4   Ambulation   Does the patient walk? 2  Yes   Primary Mode of Locomotion Prior to Admission Walk   Distance Walked (feet) 200 ft  (200 x2, 200 feet with Rollator with WC follow)   Assist Device Rollator   Gait Pattern Inconsistant Kathie; Slow Kathie; Forward Flexion; Wide ZABRINA;Step through   Limitations Noted In Balance; Endurance; Heel Strike;Speed;Strength;Swing;Posture   Provided Assistance with: Balance   Walk Assist Level Close Supervision   Findings CS with Rollator today   Wheelchair mobility   Does the patient use a wheelchair? 0  No   Curb or Single Stair   Style negotiated Single stair   Type of Assistance Needed Adaptive equipment   Amount of Physical Assistance Provided 25%-49%   Comment CGA with bilateral hand rail 8" step    1 Step (Curb) CARE Score 3   4 Steps   Type of Assistance Needed Physical assistance   Amount of Physical Assistance Provided Less than 25%   Comment Min assist with increased distance    4 Steps CARE Score 3   Therapeutic Interventions   Strengthening Sit to stand 20 reps    Balance Cone taps with 2 UE support 20 reps    Neuromuscular Re-Education Step activities per objective with 8" step    Other Gait activities- Ambulation with RW- step over obstacles ranging from 2" to 6", Weaving between cones, use of rollator walker; Gait activities completed 10x by 20 feet distances; rollator 200 feet   Assessment   Treatment Assessment Patient tolerated session well, focusing on complex gait tasks and increasing difficulties with endurance  Patient demonstrates slow cadenace but safety with increased gait challenges per objective, patient challenged but improving  Patient trialed rollator walker today for increased distance, patient demonstrates slow cesia but demonstrated adequate control  Patient requires skilled PT in order to improve his balance, strength, and function to return home safely  Family/Caregiver Present no   PT Family training done with: no   Problem List Decreased strength;Decreased range of motion;Decreased endurance; Impaired balance;Decreased safety awareness;Decreased skin integrity;Orthopedic restrictions;Pain   Barriers to Discharge Inaccessible home environment   PT Barriers   Physical Impairment Decreased strength;Decreased range of motion;Decreased endurance; Impaired balance;Decreased mobility; Impaired sensation;Orthopedic restrictions;Pain   Functional Limitation Car transfers; Ramp negotiation;Stair negotiation;Standing;Transfers; Walking   Plan   Treatment/Interventions Functional transfer training   Progress Progressing toward goals   PT Therapy Minutes   PT Time In 1500   PT Time Out 1600   PT Total Time (minutes) 60   PT Mode of treatment - Individual (minutes) 60   PT Mode of treatment - Concurrent (minutes) 0   PT Mode of treatment - Group (minutes) 0   PT Mode of treatment - Co-treat (minutes) 0   PT Mode of Treatment - Total time(minutes) 60 minutes   PT Cumulative Minutes 610   Therapy Time missed   Time missed?  No

## 2020-07-18 LAB
GLUCOSE SERPL-MCNC: 104 MG/DL (ref 65–140)
GLUCOSE SERPL-MCNC: 115 MG/DL (ref 65–140)
GLUCOSE SERPL-MCNC: 139 MG/DL (ref 65–140)
GLUCOSE SERPL-MCNC: 95 MG/DL (ref 65–140)

## 2020-07-18 PROCEDURE — 97530 THERAPEUTIC ACTIVITIES: CPT

## 2020-07-18 PROCEDURE — 97110 THERAPEUTIC EXERCISES: CPT

## 2020-07-18 PROCEDURE — 82948 REAGENT STRIP/BLOOD GLUCOSE: CPT

## 2020-07-18 RX ADMIN — TRAVOPROST 1 DROP: 0.04 SOLUTION/ DROPS OPHTHALMIC at 21:26

## 2020-07-18 RX ADMIN — HEPARIN SODIUM 5000 UNITS: 5000 INJECTION INTRAVENOUS; SUBCUTANEOUS at 21:25

## 2020-07-18 RX ADMIN — ACETAMINOPHEN 650 MG: 325 TABLET, FILM COATED ORAL at 21:25

## 2020-07-18 RX ADMIN — METHOCARBAMOL 500 MG: 500 TABLET, FILM COATED ORAL at 11:56

## 2020-07-18 RX ADMIN — LISINOPRIL 5 MG: 5 TABLET ORAL at 08:29

## 2020-07-18 RX ADMIN — METHOCARBAMOL 500 MG: 500 TABLET, FILM COATED ORAL at 17:21

## 2020-07-18 RX ADMIN — HEPARIN SODIUM 5000 UNITS: 5000 INJECTION INTRAVENOUS; SUBCUTANEOUS at 05:32

## 2020-07-18 RX ADMIN — ALLOPURINOL 300 MG: 300 TABLET ORAL at 08:29

## 2020-07-18 RX ADMIN — DOCUSATE SODIUM 100 MG: 100 CAPSULE, LIQUID FILLED ORAL at 17:21

## 2020-07-18 RX ADMIN — MELATONIN 6 MG: at 21:25

## 2020-07-18 RX ADMIN — METHOCARBAMOL 500 MG: 500 TABLET, FILM COATED ORAL at 23:29

## 2020-07-18 RX ADMIN — ACETAMINOPHEN 650 MG: 325 TABLET, FILM COATED ORAL at 05:32

## 2020-07-18 RX ADMIN — HEPARIN SODIUM 5000 UNITS: 5000 INJECTION INTRAVENOUS; SUBCUTANEOUS at 14:09

## 2020-07-18 RX ADMIN — ACETAMINOPHEN 650 MG: 325 TABLET, FILM COATED ORAL at 14:09

## 2020-07-18 RX ADMIN — METHOCARBAMOL 500 MG: 500 TABLET, FILM COATED ORAL at 05:32

## 2020-07-18 NOTE — PROGRESS NOTES
07/18/20 1300   Pain Assessment   Pain Assessment Tool Pain Assessment not indicated - pt denies pain   Pain Score No Pain   Restrictions/Precautions   Precautions Fall Risk;Spinal precautions;Supervision on toilet/commode   Braces or Orthoses ANNI MEYER)   Lifestyle   Autonomy "I've learned how to fall safely"   Sit to Stand   Type of Assistance Needed Supervision   Amount of Physical Assistance Provided No physical assistance   Sit to Stand CARE Score 4   Bed-Chair Transfer   Type of Assistance Needed Supervision   Amount of Physical Assistance Provided No physical assistance   Comment RW   Chair/Bed-to-Chair Transfer CARE Score 4   Kitchen Mobility   Kitchen-Mobility Level Walker   Kitchen Activity Retrieve items;Transport items   Kitchen Mobility Comments Engaged in kitchen mobility with RW at S level  Educated on use of counter for support during reach into upper cabinets, use of countertop for transport of items  Pt completed with G carryover of education  Cont to be resistive to use of walker tray or basket with pt reporting table within arms reach of countertop and items can be transitioned in this manner  Spoke about use of walker bag on front of RW for transport of snacks, etc with pt verbalizing agreement  Does report that most of meal prep done by roommate but will microwave meal prep at times and perform simple baking tasks, therefore, educated on sitting for prep of food prior to cooking if necessary  Cognition   Overall Cognitive Status WFL   Arousal/Participation Alert; Cooperative   Attention Attends with cues to redirect   Orientation Level Oriented X4   Memory Decreased short term memory   Following Commands Follows multistep commands with increased time or repetition   Activity Tolerance   Activity Tolerance Patient tolerated treatment well   Assessment   Treatment Assessment Pt participated in skilled OT session with focus on kitchen mobility with RW  Refer above for details   Pt in recliner at end of session  Cont with POC with focus on dynamic balance, IADL management, self care completion to dec burden of care upon dc home  Prognosis Good   Problem List Decreased strength;Decreased endurance; Impaired balance;Decreased mobility;Orthopedic restrictions   Barriers to Discharge Inaccessible home environment;Decreased caregiver support   Plan   Treatment/Interventions ADL retraining;Functional transfer training; Therapeutic exercise; Endurance training;Patient/family training;Equipment eval/education; Bed mobility   Progress Progressing toward goals   Recommendation   OT Discharge Recommendation Home with skilled therapy   Equipment Recommended   (home OT)   OT Therapy Minutes   OT Time In 1300   OT Time Out 1330   OT Total Time (minutes) 30   OT Mode of treatment - Individual (minutes) 30   OT Mode of treatment - Concurrent (minutes) 0   OT Mode of treatment - Group (minutes) 0   OT Mode of treatment - Co-treat (minutes) 0   OT Mode of Treatment - Total time(minutes) 30 minutes   OT Cumulative Minutes 610   Therapy Time missed   Time missed?  No

## 2020-07-18 NOTE — PROGRESS NOTES
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  · Sutures intact  · Wound clean non erythema     Bradycardia  · stable     DM2  · At home on Lantus 10U qam/12U qpm  · Continue DM diet, QID Accuchecks/SSI  · Currently only on SSI   · BS remain stable on sliding scale only  · Add back Lantus when needed vs try Metformin     Mild cognitive impairment  · Seen by [facility] as I/P and MOCA was 22/30  · For OP followup with them an a repeat MOCA     HTN  · At home he is on Vasotec 10mg qd   · On 7/11/20, added Lisinopril 10mg qd as substitute  · BP stable     ZHOU/CPAP  · Continue CPAP     Hx gout  · stable  · Continue Allopurinol     Urine incontinence  · Supposedly having this issue at home and [facility] recommended that he see Urology as an OP  · PMR managing     Left TMA, diabetic neuropathy  · Likely contributes to some gait instability      The above assessment and plan was reviewed and updated as determined by my evaluation of the patient on 7/18/2020      Labs:   Results from last 7 days   Lab Units 07/16/20  0445 07/13/20  0540   WBC Thousand/uL 6 56 8 91   HEMOGLOBIN g/dL 13 7 14 6   HEMATOCRIT % 42 1 45 0   PLATELETS Thousands/uL 227 276     Results from last 7 days   Lab Units 07/16/20  0445 07/13/20  0540   SODIUM mmol/L 134* 134*   POTASSIUM mmol/L 4 1 3 9   CHLORIDE mmol/L 102 102   CO2 mmol/L 27 26   BUN mg/dL 19 19   CREATININE mg/dL 0 97 0 97   CALCIUM mg/dL 9 1 9 1             Results from last 7 days   Lab Units 07/18/20  0650 07/17/20  2106 07/17/20  1555   POC GLUCOSE mg/dl 115 134 111       Review of Scheduled Meds:    Current Facility-Administered Medications:  acetaminophen 650 mg Oral Q8H [address] Han Hollins MD   allopurinol 300 mg Oral Daily Han Hollins MD   bisacodyl 10 mg Rectal Daily PRN Han Hollins MD   docusate sodium 100 mg Oral BID Han Hollins MD   heparin (porcine) 5,000 Units Subcutaneous Q8H [address] Han Hollins MD   insulin lispro 1-5 Units Subcutaneous TID AC Elizabeth Ramirez, CRNP   insulin lispro 1-5 Units Subcutaneous HS Elizabeth Ramirez, AR   lisinopril 5 mg Oral Daily Denia Sood, CRNP   melatonin 6 mg Oral HS Han Hollins MD   methocarbamol 500 mg Oral Q6H [address] Han Hollins MD   ondansetron 4 mg Oral Q6H PRN Han Hollins MD   oxyCODONE 2 5 mg Oral Q4H PRLUZ Hollins MD   oxyCODONE 5 mg Oral Q4H PRN Han Hollins MD   polyethylene glycol 17 g Oral Daily PRLUZ Hollins MD   senna 1 tablet Oral Daily Han Hollins MD   travoprost 1 drop Right Eye HS Han Hollins MD       Subjective/ HPI: Patient seen and examined  Patients overnight issues or events were reviewed with nursing or staff during rounds or morning huddle session  New or overnight issues include the following:     Pt states he is doing well  He would like to have the sutures removed from the shunt revision site  He is 14 days post-op 7/20/2020  He denies any other complaints  ROS:   A 10 point ROS was performed; negative except as noted above         Imaging:     No orders to display       *Labs /Radiology studies Reviewed  *Medications  reviewed and reconciled as needed  *Please refer to order section for additional ordered labs studies  *Case discussed with primary attending during morning huddle case rounds    Physical Examination:  Vitals:   Vitals:    07/17/20 1300 07/17/20 2031 07/18/20 0547 07/18/20 0829   BP: 118/66 97/54 115/57 112/60   BP Location:  Right arm Right arm    Pulse: 66 59 60    Resp: 18 18 18    Temp: 98 °F (36 7 °C) 97 9 °F (36 6 °C) 97 7 °F (36 5 °C)    TempSrc:  Oral Oral    SpO2: 94% 97% 98% Weight:       Height:           General Appearance: no distress, conversive  HEENT: PERRLA, conjuctiva normal; oropharynx clear; mucous membranes moist;   Neck:  Supple, no lymphadenopathy or thyromegaly  Lungs: CTA, normal respiratory effort, no retractions, expiratory effort normal  CV: regular rate and rhythm , PMI normal   ABD: soft non tender, no masses , no hepatic or splenomegaly  EXT: DP pulses intact, no lymphadenopathy, no edema  Skin: normal turgor, normal texture, no rash  Well healing incisions scalp and lumbar spine  Psych: affect normal, mood normal  Neuro: AAOx3      The above physical exam was reviewed and updated as determined by my evaluation of the patient on 7/18/2020  Invasive Devices     None                    VTE Pharmacologic Prophylaxis: Heparin  Code Status: Level 1 - Full Code  Current Length of Stay: 8 day(s)      Total time spent:  30 minutes with more than 50% spent counseling/coordinating care  Counseling includes discussion with patient re: progress  and discussion with patient of his/her current medical state/information  Coordination of patient's care was performed in conjunction with primary service  Time invested included review of patient's labs, vitals, and management of their comorbidities with continued monitoring  In addition, this patient was discussed with medical team including physician and advanced extenders  The care of the patient was extensively discussed and appropriate treatment plan was formulated unique for this patient  ** Please Note:  voice to text software may have been used in the creation of this document   Although proof errors in transcription or interpretation are a potential of such software**

## 2020-07-18 NOTE — PROGRESS NOTES
07/18/20 0930   Pain Assessment   Pain Score No Pain   Restrictions/Precautions   Precautions Fall Risk;Spinal precautions;Supervision on toilet/commode   Braces or Orthoses MAFO  (L MAFO)   Cognition   Arousal/Participation Alert; Cooperative   Attention Attends with cues to redirect   Memory Decreased short term memory   Following Commands Follows multistep commands with increased time or repetition   Subjective   Subjective Pt  denies pain and is is ready to participate in therapy    Sit to Stand   Type of Assistance Needed Supervision   Sit to Stand CARE Score 4   Bed-Chair Transfer   Type of Assistance Needed Supervision   Chair/Bed-to-Chair Transfer CARE Score 4   Transfer Bed/Chair/Wheelchair   Adaptive Equipment Roller Walker   Stand Pivot Supervision   Sit to Stand Supervision   Stand to Sit Supervision   Walk 10 Feet   Type of Assistance Needed Supervision   Comment CS   Walk 10 Feet CARE Score 4   Walk 50 Feet with Two Turns   Type of Assistance Needed Supervision   Comment CS   Walk 50 Feet with Two Turns CARE Score 4   Walk 150 Feet   Type of Assistance Needed Supervision   Comment CS   Walk 150 Feet CARE Score 4   Ambulation   Does the patient walk? 2  Yes   Primary Mode of Locomotion Prior to Admission Walk   Distance Walked (feet) 150 ft  (then short distance walking room<> gym x 3 )   Assist Device Roller Walker   Gait Pattern Inconsistant Kathie; Forward Flexion; Improper weight shift   Limitations Noted In Balance; Endurance   Provided Assistance with:   (standby)   Walk Assist Level Close Supervision   Wheelchair mobility   Does the patient use a wheelchair? 0   No   Curb or Single Stair   Style negotiated Single stair   Type of Assistance Needed Incidental touching   1 Step (Curb) CARE Score 4   4 Steps   Type of Assistance Needed Incidental touching   4 Steps CARE Score 4   12 Steps   Reason if not Attempted Safety concerns   12 Steps CARE Score 88   Stairs   Type Stairs  ( )   # of Steps 8   Weight Bearing Precautions Fall Risk   Assist Devices Bilateral Rail   Toilet Transfer   Type of Assistance Needed Supervision   Toilet Transfer CARE Score 4   Toilet Transfer   Surface Assessed Raised Toilet   Positioning Concerns Grab Bars   Therapeutic Interventions   Strengthening seated LAQ and hip flex with 2# wts, add squeeze with ball, hip abd and hamstring curl with red TB   Flexibility B hamstring and gastroc stretching    Equipment Use   NuStep Level 2 X 10 mins    Assessment   Treatment Assessment Pt  tolerated 60 mins PT session and was able to participate in above exercises  Pt  mostly CS level in functional mobility using RW with no LOB noted  Pt  reported that he only uses rollator outside because his house is not accessible for it   Otherwise he uses RW inside the house  No complaints reported  pt  hoping that he would be able to go home on wednesday  COnt with POC   Problem List Decreased strength;Decreased endurance; Impaired balance;Decreased mobility;Orthopedic restrictions   Barriers to Discharge Inaccessible home environment;Decreased caregiver support   PT Barriers   Physical Impairment Decreased strength;Decreased endurance; Impaired balance;Decreased mobility;Orthopedic restrictions   Functional Limitation Car transfers;Stair negotiation;Standing;Transfers; Walking   Plan   Treatment/Interventions Functional transfer training;LE strengthening/ROM; Elevations; Therapeutic exercise; Endurance training;Patient/family training;Equipment eval/education; Bed mobility;Gait training   Recommendation   PT Discharge Recommendation Home with skilled therapy   Equipment Recommended Walker   PT Therapy Minutes   PT Time In 0930   PT Time Out 1030   PT Total Time (minutes) 60   PT Mode of treatment - Individual (minutes) 60   PT Mode of treatment - Concurrent (minutes) 0   PT Mode of treatment - Group (minutes) 0   PT Mode of treatment - Co-treat (minutes) 0   PT Mode of Treatment - Total time(minutes) 60 minutes   PT Cumulative Minutes 670   Therapy Time missed   Time missed?  No

## 2020-07-19 LAB
GLUCOSE SERPL-MCNC: 110 MG/DL (ref 65–140)
GLUCOSE SERPL-MCNC: 115 MG/DL (ref 65–140)
GLUCOSE SERPL-MCNC: 127 MG/DL (ref 65–140)
GLUCOSE SERPL-MCNC: 129 MG/DL (ref 65–140)

## 2020-07-19 PROCEDURE — 97530 THERAPEUTIC ACTIVITIES: CPT

## 2020-07-19 PROCEDURE — 82948 REAGENT STRIP/BLOOD GLUCOSE: CPT

## 2020-07-19 RX ORDER — GINSENG 100 MG
1 CAPSULE ORAL 2 TIMES DAILY
Status: DISCONTINUED | OUTPATIENT
Start: 2020-07-19 | End: 2020-07-22 | Stop reason: HOSPADM

## 2020-07-19 RX ADMIN — METHOCARBAMOL 500 MG: 500 TABLET, FILM COATED ORAL at 17:38

## 2020-07-19 RX ADMIN — METHOCARBAMOL 500 MG: 500 TABLET, FILM COATED ORAL at 05:56

## 2020-07-19 RX ADMIN — TRAVOPROST 1 DROP: 0.04 SOLUTION/ DROPS OPHTHALMIC at 22:17

## 2020-07-19 RX ADMIN — METHOCARBAMOL 500 MG: 500 TABLET, FILM COATED ORAL at 11:21

## 2020-07-19 RX ADMIN — ACETAMINOPHEN 650 MG: 325 TABLET, FILM COATED ORAL at 14:08

## 2020-07-19 RX ADMIN — HEPARIN SODIUM 5000 UNITS: 5000 INJECTION INTRAVENOUS; SUBCUTANEOUS at 05:56

## 2020-07-19 RX ADMIN — BACITRACIN 1 SMALL APPLICATION: 500 OINTMENT TOPICAL at 11:22

## 2020-07-19 RX ADMIN — HEPARIN SODIUM 5000 UNITS: 5000 INJECTION INTRAVENOUS; SUBCUTANEOUS at 14:08

## 2020-07-19 RX ADMIN — BACITRACIN 1 SMALL APPLICATION: 500 OINTMENT TOPICAL at 17:38

## 2020-07-19 RX ADMIN — MELATONIN 6 MG: at 22:16

## 2020-07-19 RX ADMIN — ALLOPURINOL 300 MG: 300 TABLET ORAL at 09:10

## 2020-07-19 RX ADMIN — DOCUSATE SODIUM 100 MG: 100 CAPSULE, LIQUID FILLED ORAL at 17:38

## 2020-07-19 RX ADMIN — HEPARIN SODIUM 5000 UNITS: 5000 INJECTION INTRAVENOUS; SUBCUTANEOUS at 22:16

## 2020-07-19 RX ADMIN — ACETAMINOPHEN 650 MG: 325 TABLET, FILM COATED ORAL at 22:16

## 2020-07-19 RX ADMIN — ACETAMINOPHEN 650 MG: 325 TABLET, FILM COATED ORAL at 05:56

## 2020-07-19 RX ADMIN — METHOCARBAMOL 500 MG: 500 TABLET, FILM COATED ORAL at 23:10

## 2020-07-19 NOTE — PROGRESS NOTES
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  · Sutures intact - to be removed 7/21/2020  Add bacitracin 2x daily to loosen up scabbed area  · Wound clean non erythema     Bradycardia  · stable     DM2  · At home on Lantus 10U qam/12U qpm  · Continue DM diet, QID Accuchecks/SSI  · Currently only on SSI   · BS remain stable on sliding scale only  · Add back Lantus when needed vs try Metformin     Mild cognitive impairment  · Seen by [facility] as I/P and MOCA was 22/30  · For OP followup with them an a repeat MOCA     HTN  · At home he is on Vasotec 10mg qd   · On 7/11/20, added Lisinopril  · Lisinopril held the past 2 days  Will monitor but may need to decrease dose  ZHOU/CPAP  · Continue CPAP     Hx gout  · stable  · Continue Allopurinol     Urine incontinence  · Supposedly having this issue at home and [facility] recommended that he see Urology as an OP  · PMR managing     Left TMA, diabetic neuropathy  · Likely contributes to some gait instability      The above assessment and plan was reviewed and updated as determined by my evaluation of the patient on 7/19/2020      Labs:   Results from last 7 days   Lab Units 07/16/20  0445 07/13/20  0540   WBC Thousand/uL 6 56 8 91   HEMOGLOBIN g/dL 13 7 14 6   HEMATOCRIT % 42 1 45 0   PLATELETS Thousands/uL 227 276     Results from last 7 days   Lab Units 07/16/20  0445 07/13/20  0540   SODIUM mmol/L 134* 134*   POTASSIUM mmol/L 4 1 3 9   CHLORIDE mmol/L 102 102   CO2 mmol/L 27 26   BUN mg/dL 19 19   CREATININE mg/dL 0 97 0 97   CALCIUM mg/dL 9 1 9 1             Results from last 7 days   Lab Units 07/19/20  0634 07/18/20  2101 07/18/20  1603   POC GLUCOSE mg/dl 115 95 139       Review of Scheduled Meds:    Current Facility-Administered Medications:  acetaminophen 650 mg Oral Q8H [address] Delories Day, MD   allopurinol 300 mg Oral Daily Delories Day, MD   bisacodyl 10 mg Rectal Daily PRN Delories Day, MD   docusate sodium 100 mg Oral BID Delories Day, MD   heparin (porcine) 5,000 Units Subcutaneous Q8H [address] Delories Day, MD   insulin lispro 1-5 Units Subcutaneous TID AC Elizabeth Ramirez, AR   insulin lispro 1-5 Units Subcutaneous HS AR Epstein   lisinopril 5 mg Oral Daily Denia Sood, AR   melatonin 6 mg Oral HS Delories Day, MD   methocarbamol 500 mg Oral Q6H [address] Delories Day, MD   ondansetron 4 mg Oral Q6H PRN Delories Day, MD   oxyCODONE 2 5 mg Oral Q4H PRN Delories Day, MD   oxyCODONE 5 mg Oral Q4H PRN Delories Day, MD   polyethylene glycol 17 g Oral Daily PRN Delories Day, MD   senna 1 tablet Oral Daily Delories Day, MD   travoprost 1 drop Right Eye HS Delories Day, MD       Subjective/ HPI: Patient seen and examined  Patients overnight issues or events were reviewed with nursing or staff during rounds or morning huddle session  New or overnight issues include the following:     Pt states he is doing well  He denies any current complaints  ROS:   A 10 point ROS was performed; negative except as noted above         Imaging:     No orders to display       *Labs /Radiology studies Reviewed  *Medications  reviewed and reconciled as needed  *Please refer to order section for additional ordered labs studies  *Case discussed with primary attending during morning huddle case rounds    Physical Examination:  Vitals:   Vitals:    07/18/20 1300 07/18/20 2000 07/19/20 0552 07/19/20 0914   BP: 100/60 125/60 102/55 96/52   BP Location: Right arm Right arm     Pulse: 59 (!) 51 57    Resp: 15 16 20    Temp: 97 7 °F (36 5 °C) 97 6 °F (36 4 °C) 97 7 °F (36 5 °C)    TempSrc: Oral Oral Oral    SpO2: 97% 100% 96%    Weight:       Height:           General Appearance: no distress, conversive  HEENT: PERRLA, conjuctiva normal; oropharynx clear; mucous membranes moist;   Neck:  Supple, no lymphadenopathy or thyromegaly  Lungs: CTA, normal respiratory effort, no retractions, expiratory effort normal  CV: regular rate and rhythm , PMI normal   ABD: soft non tender, no masses , no hepatic or splenomegaly  EXT: DP pulses intact, no lymphadenopathy, no edema  Skin: normal turgor, normal texture, no rash  Sutures present on scalp  Lumbar spine with steristrips  Psych: affect normal, mood normal  Neuro: AAOx3       The above physical exam was reviewed and updated as determined by my evaluation of the patient on 7/19/2020  Invasive Devices     None                    VTE Pharmacologic Prophylaxis: Heparin  Code Status: Level 1 - Full Code  Current Length of Stay: 9 day(s)      Total time spent:  30 minutes with more than 50% spent counseling/coordinating care  Counseling includes discussion with patient re: progress  and discussion with patient of his/her current medical state/information  Coordination of patient's care was performed in conjunction with primary service  Time invested included review of patient's labs, vitals, and management of their comorbidities with continued monitoring  In addition, this patient was discussed with medical team including physician and advanced extenders  The care of the patient was extensively discussed and appropriate treatment plan was formulated unique for this patient  ** Please Note:  voice to text software may have been used in the creation of this document   Although proof errors in transcription or interpretation are a potential of such software**

## 2020-07-19 NOTE — PROGRESS NOTES
07/19/20 0930   Pain Assessment   Pain Assessment Tool Pain Assessment not indicated - pt denies pain   Pain Score No Pain   Restrictions/Precautions   Precautions Fall Risk;Spinal precautions   Braces or Orthoses MAFO  (LLE)   Sit to Stand   Type of Assistance Needed Supervision   Amount of Physical Assistance Provided No physical assistance   Sit to Stand CARE Score 4   Bed-Chair Transfer   Type of Assistance Needed Supervision   Amount of Physical Assistance Provided No physical assistance   Comment RW   Chair/Bed-to-Chair Transfer CARE Score 4   Toilet Transfer   Type of Assistance Needed Supervision   Amount of Physical Assistance Provided No physical assistance   Toilet Transfer CARE Score 4   Light Housekeeping   Light Housekeeping Level Walker   Light Housekeeping Level of Assistance Close supervision   Light Housekeeping Engaged in simulated laundry task with focus on promoting safe postioniing to maintain back precautions as well as balance safety  Utilized LH reacher to retrieve clothing from dryer  Pt receptive to education  Anticipate pt will progress to mod(I) with cont progression of mobility and balance with RW  Pt reporting roommate will A with transport of clthing up and down stairs  Cognition   Overall Cognitive Status WFL   Arousal/Participation Alert; Cooperative   Attention Attends with cues to redirect   Orientation Level Oriented X4   Memory Decreased short term memory   Following Commands Follows multistep commands with increased time or repetition   Activity Tolerance   Activity Tolerance Patient tolerated treatment well   Assessment   Treatment Assessment Engaged in OT session with focus on IADL management of laundry and pet care  Pt reporting roommate can assist with large item transport including clothing and pet supplies  Pt verbalized techniques for pet care management including use of tea kettle to direct water into water bowl   Educated that pt can sit on chair in kitchen to [facility] maintenance of precautions during task as well  Pt does not have to walk dog as he has fenced in yard and keeps leash attached to door handle so he does not have to bend to retrieve  Educated on attaching to dog collar while seated again to limit bend as well as limit risk of LOB  Pt receptive  Pt performing mobility at S overall and demo ability to be progressed to indep soon as anticipating dc home at end of this week with home OT and support from roommate  Cont with POC with focus on self care and IADL management  Prognosis Good   Problem List Decreased strength;Decreased endurance; Impaired balance;Decreased mobility;Orthopedic restrictions   Barriers to Discharge Inaccessible home environment;Decreased caregiver support   Plan   Treatment/Interventions ADL retraining;Functional transfer training; Therapeutic exercise; Endurance training;Patient/family training;Equipment eval/education; Bed mobility   Progress Progressing toward goals   Recommendation   OT Discharge Recommendation Home with skilled therapy   OT Therapy Minutes   OT Time In 0930   OT Time Out 1000   OT Total Time (minutes) 30   OT Mode of treatment - Individual (minutes) 30   OT Mode of treatment - Concurrent (minutes) 0   OT Mode of treatment - Group (minutes) 0   OT Mode of treatment - Co-treat (minutes) 0   OT Mode of Treatment - Total time(minutes) 30 minutes   OT Cumulative Minutes 640   Therapy Time missed   Time missed?  No

## 2020-07-19 NOTE — PROGRESS NOTES
07/19/20 0800   Restrictions/Precautions   Precautions Fall Risk;Spinal precautions   Braces or Orthoses MAFO  (L MAFO)   Cognition   Arousal/Participation Alert; Cooperative   Attention Attends with cues to redirect   Memory Decreased short term memory   Following Commands Follows multistep commands with increased time or repetition   Subjective   Subjective No new complaints reported , ready to participate in PT    Sit to Stand   Type of Assistance Needed Supervision   Sit to Stand CARE Score 4   Bed-Chair Transfer   Type of Assistance Needed Supervision   Chair/Bed-to-Chair Transfer CARE Score 4   Transfer Bed/Chair/Wheelchair   Adaptive Equipment Roller Walker   Stand Pivot Supervision   Sit to Stand Supervision   Stand to Sit Supervision   Walk 10 Feet   Type of Assistance Needed Supervision   Comment CS   Walk 10 Feet CARE Score 4   Walk 50 Feet with Two Turns   Type of Assistance Needed Supervision   Comment CS   Walk 50 Feet with Two Turns CARE Score 4   Walk 150 Feet   Type of Assistance Needed Supervision   Comment CS   Walk 150 Feet CARE Score 4   Ambulation   Does the patient walk? 2  Yes   Primary Mode of Locomotion Prior to Admission Walk   Distance Walked (feet) 150 ft  (X 2 )   Assist Device Roller Walker   Gait Pattern Inconsistant Kathie; Forward Flexion   Limitations Noted In Posture   Walk Assist Level Close Supervision   Wheelchair mobility   Does the patient use a wheelchair? 0   No   Curb or Single Stair   Style negotiated Single stair   Type of Assistance Needed Incidental touching   1 Step (Curb) CARE Score 4   4 Steps   Type of Assistance Needed Incidental touching   4 Steps CARE Score 4   12 Steps   Reason if not Attempted Safety concerns   12 Steps CARE Score 88   Stairs   Type Stairs   # of Steps 8   Weight Bearing Precautions Back   Assist Devices Roller Walker   Findings Trial FF tomorrow with chair on top for safety if pt  needs to rest and sit   Equipment Use   NuStep Level 2 X 10 mins    Assessment   Treatment Assessment Pt  engaged in 30 mins PT session with tx focused on general endurance through walking, stairs management and nu step  Pt  CS with all functional mobility with no LOB noted  Trial IRP next couple of days if safe  COnt with POC to improve overall balance, strength, independence and safety   Problem List Decreased strength;Decreased endurance; Impaired balance;Decreased mobility;Orthopedic restrictions   Barriers to Discharge Inaccessible home environment;Decreased caregiver support   PT Barriers   Physical Impairment Decreased strength;Decreased endurance; Impaired balance;Decreased mobility;Orthopedic restrictions   Functional Limitation Car transfers;Stair negotiation;Standing;Transfers; Walking   Plan   Treatment/Interventions Functional transfer training;LE strengthening/ROM; Elevations; Therapeutic exercise; Endurance training;Patient/family training;Equipment eval/education; Bed mobility;Gait training   Recommendation   PT Discharge Recommendation Home with skilled therapy   Equipment Recommended Walker   PT Therapy Minutes   PT Time In 0800   PT Time Out 0830   PT Total Time (minutes) 30   PT Mode of treatment - Individual (minutes) 30   PT Mode of treatment - Concurrent (minutes) 0   PT Mode of treatment - Group (minutes) 0   PT Mode of treatment - Co-treat (minutes) 0   PT Mode of Treatment - Total time(minutes) 30 minutes   PT Cumulative Minutes 700   Therapy Time missed   Time missed?  No

## 2020-07-20 LAB
ANION GAP SERPL CALCULATED.3IONS-SCNC: 6 MMOL/L (ref 4–13)
BASOPHILS # BLD AUTO: 0.06 THOUSANDS/ΜL (ref 0–0.1)
BASOPHILS NFR BLD AUTO: 1 % (ref 0–1)
BUN SERPL-MCNC: 18 MG/DL (ref 5–25)
CALCIUM SERPL-MCNC: 9.2 MG/DL (ref 8.3–10.1)
CHLORIDE SERPL-SCNC: 104 MMOL/L (ref 100–108)
CO2 SERPL-SCNC: 26 MMOL/L (ref 21–32)
CREAT SERPL-MCNC: 0.94 MG/DL (ref 0.6–1.3)
EOSINOPHIL # BLD AUTO: 0.16 THOUSAND/ΜL (ref 0–0.61)
EOSINOPHIL NFR BLD AUTO: 2 % (ref 0–6)
ERYTHROCYTE [DISTWIDTH] IN BLOOD BY AUTOMATED COUNT: 14.1 % (ref 11.6–15.1)
GFR SERPL CREATININE-BSD FRML MDRD: 81 ML/MIN/1.73SQ M
GLUCOSE P FAST SERPL-MCNC: 107 MG/DL (ref 65–99)
GLUCOSE SERPL-MCNC: 105 MG/DL (ref 65–140)
GLUCOSE SERPL-MCNC: 107 MG/DL (ref 65–140)
GLUCOSE SERPL-MCNC: 115 MG/DL (ref 65–140)
GLUCOSE SERPL-MCNC: 130 MG/DL (ref 65–140)
GLUCOSE SERPL-MCNC: 94 MG/DL (ref 65–140)
HCT VFR BLD AUTO: 40.7 % (ref 36.5–49.3)
HGB BLD-MCNC: 13.1 G/DL (ref 12–17)
IMM GRANULOCYTES # BLD AUTO: 0.06 THOUSAND/UL (ref 0–0.2)
IMM GRANULOCYTES NFR BLD AUTO: 1 % (ref 0–2)
LYMPHOCYTES # BLD AUTO: 0.99 THOUSANDS/ΜL (ref 0.6–4.47)
LYMPHOCYTES NFR BLD AUTO: 13 % (ref 14–44)
MCH RBC QN AUTO: 27.9 PG (ref 26.8–34.3)
MCHC RBC AUTO-ENTMCNC: 32.2 G/DL (ref 31.4–37.4)
MCV RBC AUTO: 87 FL (ref 82–98)
MONOCYTES # BLD AUTO: 0.69 THOUSAND/ΜL (ref 0.17–1.22)
MONOCYTES NFR BLD AUTO: 9 % (ref 4–12)
NEUTROPHILS # BLD AUTO: 5.92 THOUSANDS/ΜL (ref 1.85–7.62)
NEUTS SEG NFR BLD AUTO: 74 % (ref 43–75)
NRBC BLD AUTO-RTO: 0 /100 WBCS
PLATELET # BLD AUTO: 237 THOUSANDS/UL (ref 149–390)
PMV BLD AUTO: 10 FL (ref 8.9–12.7)
POTASSIUM SERPL-SCNC: 4.2 MMOL/L (ref 3.5–5.3)
RBC # BLD AUTO: 4.7 MILLION/UL (ref 3.88–5.62)
SODIUM SERPL-SCNC: 136 MMOL/L (ref 136–145)
WBC # BLD AUTO: 7.88 THOUSAND/UL (ref 4.31–10.16)

## 2020-07-20 PROCEDURE — 85025 COMPLETE CBC W/AUTO DIFF WBC: CPT | Performed by: NURSE PRACTITIONER

## 2020-07-20 PROCEDURE — 82948 REAGENT STRIP/BLOOD GLUCOSE: CPT

## 2020-07-20 PROCEDURE — 99232 SBSQ HOSP IP/OBS MODERATE 35: CPT | Performed by: PHYSICAL MEDICINE & REHABILITATION

## 2020-07-20 PROCEDURE — 97110 THERAPEUTIC EXERCISES: CPT

## 2020-07-20 PROCEDURE — 97535 SELF CARE MNGMENT TRAINING: CPT

## 2020-07-20 PROCEDURE — 97530 THERAPEUTIC ACTIVITIES: CPT

## 2020-07-20 PROCEDURE — 97116 GAIT TRAINING THERAPY: CPT

## 2020-07-20 PROCEDURE — 99024 POSTOP FOLLOW-UP VISIT: CPT | Performed by: NEUROLOGICAL SURGERY

## 2020-07-20 PROCEDURE — 80048 BASIC METABOLIC PNL TOTAL CA: CPT | Performed by: NURSE PRACTITIONER

## 2020-07-20 RX ORDER — PANTOPRAZOLE SODIUM 40 MG/1
40 TABLET, DELAYED RELEASE ORAL
Status: DISCONTINUED | OUTPATIENT
Start: 2020-07-20 | End: 2020-07-22 | Stop reason: HOSPADM

## 2020-07-20 RX ORDER — CALCIUM CARBONATE 200(500)MG
500 TABLET,CHEWABLE ORAL DAILY PRN
Status: DISCONTINUED | OUTPATIENT
Start: 2020-07-20 | End: 2020-07-22 | Stop reason: HOSPADM

## 2020-07-20 RX ADMIN — METHOCARBAMOL 500 MG: 500 TABLET, FILM COATED ORAL at 17:21

## 2020-07-20 RX ADMIN — ACETAMINOPHEN 650 MG: 325 TABLET, FILM COATED ORAL at 05:16

## 2020-07-20 RX ADMIN — LISINOPRIL 5 MG: 5 TABLET ORAL at 08:15

## 2020-07-20 RX ADMIN — HEPARIN SODIUM 5000 UNITS: 5000 INJECTION INTRAVENOUS; SUBCUTANEOUS at 14:01

## 2020-07-20 RX ADMIN — ACETAMINOPHEN 650 MG: 325 TABLET, FILM COATED ORAL at 14:01

## 2020-07-20 RX ADMIN — ACETAMINOPHEN 650 MG: 325 TABLET, FILM COATED ORAL at 22:09

## 2020-07-20 RX ADMIN — ALLOPURINOL 300 MG: 300 TABLET ORAL at 08:15

## 2020-07-20 RX ADMIN — METHOCARBAMOL 500 MG: 500 TABLET, FILM COATED ORAL at 23:51

## 2020-07-20 RX ADMIN — PANTOPRAZOLE SODIUM 40 MG: 40 TABLET, DELAYED RELEASE ORAL at 11:42

## 2020-07-20 RX ADMIN — MELATONIN 6 MG: at 22:08

## 2020-07-20 RX ADMIN — HEPARIN SODIUM 5000 UNITS: 5000 INJECTION INTRAVENOUS; SUBCUTANEOUS at 05:16

## 2020-07-20 RX ADMIN — HEPARIN SODIUM 5000 UNITS: 5000 INJECTION INTRAVENOUS; SUBCUTANEOUS at 22:09

## 2020-07-20 RX ADMIN — METHOCARBAMOL 500 MG: 500 TABLET, FILM COATED ORAL at 11:42

## 2020-07-20 RX ADMIN — BACITRACIN 1 SMALL APPLICATION: 500 OINTMENT TOPICAL at 17:21

## 2020-07-20 RX ADMIN — BACITRACIN 1 SMALL APPLICATION: 500 OINTMENT TOPICAL at 08:15

## 2020-07-20 RX ADMIN — METHOCARBAMOL 500 MG: 500 TABLET, FILM COATED ORAL at 05:16

## 2020-07-20 RX ADMIN — TRAVOPROST 1 DROP: 0.04 SOLUTION/ DROPS OPHTHALMIC at 22:11

## 2020-07-20 NOTE — ASSESSMENT & PLAN NOTE
POD 15 L5-S1 TLIF     Imaging:    Upright lumbar spine x-ray 07/07/2020:Status post TLIF at L5-S1  No complication of the hardware  Plan:  · Continue neurological exams  · Posterior lumbar incision C/D/I healed well, no erythema or drainage noted some Steri-Strips remain in place  · Pain well controlled on current regimen  · Medical management and pain control per primary team  · Mobilize patient as tolerated  · DVT PPX: SCDs and SQ heparin     Neurosurgery will sign off, patient will follow up in 4 weeks with repeat CT head and upright lumbar spine x-ray, call with any questions or concerns

## 2020-07-20 NOTE — PROGRESS NOTES
Progress Note - Boone Ruffin 1950, 69 y o  male MRN: [id number]    Unit/Bed#: -01 Encounter: [id number]    Primary Care Provider: Addis Ortega   Date and time admitted to hospital: 7/10/2020  2:41 PM    Patient is seen today for 2 week incision check    * Spondylolisthesis of lumbosacral region  Assessment & Plan  Seen today for 2 week incision check  POD 14 L5-S1 TLIF     Imaging:    Upright lumbar spine x-ray 07/07/2020:Status post TLIF at L5-S1  No complication of the hardware  Plan:  · Continue neurological exams  · Posterior lumbar incision C/DI healed well, no erythema or drainage noted some Steri-Strips remain in place  · Pain well controlled on current regimen  · Medical management and pain control per primary team  · Mobilize patient as tolerated  · DVT PPX: SCDs and SQ heparin     Neurosurgery will continue to follow, pending CT head, will tentatively remove sutures tomorrow from scalp revision, call with any questions or concerns          Normal pressure hydrocephalus (HCC)  Assessment & Plan  · See above plan, shunt in place    Type 2 diabetes mellitus [facility])  Assessment & Plan  Lab Results   Component Value Date    HGBA1C 5 9 (H) 06/16/2020       Recent Labs     07/19/20  2127 07/20/20  0622 07/20/20  1048 07/20/20  1603   POCGLU 129 115 94 105       Blood Sugar Average: Last 72 hrs:  (P) 114   · Management per primary team    Status post ventriculoperitoneal shunt  Assessment & Plan  POD 14 revision of scalp over ventricular catheter    · Initial VPS placed on 09/26/2005  · Externalization and wound debridement on 10/29/2005  · New  shunt placement with reprogrammable shunt on 12/22/2005  · Shunt revision on 08/03/2011  · Per last neurosurgery note shunt was dialed down to 90 cm water, no additional adjustments have been made since then  · Right sided incision C/D/I, closed with sutures with small amount of dried blood over incision, no erythema or new drainage noted  · Ordered CT head for tomorrow morning  · Will tentatively remove sutures tomorrow      Subjective/Objective      Chief Complaint: "I feel great"    Subjective:  Patient denies any back pain, headaches, dizziness, blurry vision, chest pain, shortness of breath, abdominal pain, nausea, vomiting, diarrhea, no problems with bowel or bladder, no new weakness or numbness/tingling  Patient states he has chronic numbness/tingling in bilateral hands and from mid shin to feet  Patient reports he had a BM this morning  Objective:  Patient comfortably sitting on recliner, NAD  I/O       07/18 0701 - 07/19 0700 07/19 0701 - 07/20 0700 07/20 0701 - 07/21 0700    P  O  600 840 180    Total Intake(mL/kg) 600 (4 9) 840 (6 9) 180 (1 5)    Urine (mL/kg/hr) 650 (0 2) 500 (0 2)     Stool 0      Total Output 650 500     Net -50 +340 +180           Unmeasured Urine Occurrence  2 x     Unmeasured Stool Occurrence 1 x            Invasive Devices     None                 Physical Exam:  Vitals: Blood pressure 125/66, pulse 65, temperature 97 7 °F (36 5 °C), temperature source Oral, resp  rate 16, height 6' 3" (1 905 m), weight 122 kg (269 lb 13 5 oz), SpO2 98 %  ,Body mass index is 33 73 kg/m²  General appearance: alert, appears stated age, cooperative and no distress  Head:  Right-sided incision C/D/I, closed with sutures with small amount of dried blood over incision, no erythema or new drainage noted  Eyes: EOMI, PERRL, conjugate gaze  Back: B/L lumbar incisions C/D/I, incision remains open to air with some Steri-Strips in place, no erythema or drainage noted    No tenderness to palpation   Lungs: non labored breathing  Heart: regular heart rate  Neurologic:   Mental status: Alert, oriented x3, thought content appropriate, speech is clear, following commands  Cranial nerves: grossly intact (Cranial nerves II-XII)  Sensory: normal to LT in all extremities except for decreased sensation in bilateral hands and from mid shift to feet which is chronic  Motor: moving all extremities without focal weakness strength 5/5 throughout  Reflexes: 2+ and symmetric, no Ariza's or clonus appreciated  Coordination: finger to nose normal bilaterally, no drift bilaterally      Lab Results:  Results from last 7 days   Lab Units 07/20/20 0446 07/16/20  0445   WBC Thousand/uL 7 88 6 56   HEMOGLOBIN g/dL 13 1 13 7   HEMATOCRIT % 40 7 42 1   PLATELETS Thousands/uL 237 227   NEUTROS PCT % 74 65   MONOS PCT % 9 12     Results from last 7 days   Lab Units 07/20/20  0446 07/16/20  0445   POTASSIUM mmol/L 4 2 4 1   CHLORIDE mmol/L 104 102   CO2 mmol/L 26 27   BUN mg/dL 18 19   CREATININE mg/dL 0 94 0 97   CALCIUM mg/dL 9 2 9 1                 No results found for: TROPONINT  ABG:No results found for: PHART, GOS3FJY, PO2ART, SQA6NOH, V6BVBHPU, BEART, SOURCE    Imaging Studies: I have personally reviewed pertinent reports  and I have personally reviewed pertinent films in PACS    No results found  EKG, Pathology, and Other Studies: I have personally reviewed pertinent reports        VTE Pharmacologic Prophylaxis: Heparin    VTE Mechanical Prophylaxis: sequential compression device

## 2020-07-20 NOTE — ASSESSMENT & PLAN NOTE
Lab Results   Component Value Date    HGBA1C 5 9 (H) 06/16/2020       Recent Labs     07/19/20  2127 07/20/20  0622 07/20/20  1048 07/20/20  1603   POCGLU 129 115 94 105       Blood Sugar Average: Last 72 hrs:  (P) 114   · Management per primary team

## 2020-07-20 NOTE — ASSESSMENT & PLAN NOTE
POD 15 revision of scalp over ventricular catheter    Imaging:    CT head 07/21/2020: Minimal decreased size of ventricular system with associated minimal increased bilateral cerebral hemispheres subdural hygromas  Skull x-ray 07/21/2020:Shunt valve has been set to approximately 100 mm H2O      Plan:  · Initial VPS placed on 09/26/2005  · Externalization and wound debridement on 10/29/2005  · New  shunt placement with reprogrammable shunt on 12/22/2005  · Shunt revision on 08/03/2011  · Per last neurosurgery office note shunt was dialed down to 90 cm water  · Reviewed imaging with patient   · Shunt today 7/21 was dialed up to 100 cm water  · Right sided incision C/D/I, removed sutures without difficulty, small amount of dried blood over incision, no erythema or new drainage noted

## 2020-07-20 NOTE — ASSESSMENT & PLAN NOTE
Seen today for 2 week incision check  POD 14 L5-S1 TLIF     Imaging:    Upright lumbar spine x-ray 07/07/2020:Status post TLIF at L5-S1  No complication of the hardware  Plan:  · Continue neurological exams  · Posterior lumbar incision C/DI healed well, no erythema or drainage noted some Steri-Strips remain in place  · Pain well controlled on current regimen  · Medical management and pain control per primary team  · Mobilize patient as tolerated  · DVT PPX: SCDs and SQ heparin     Neurosurgery will continue to follow, pending CT head, will tentatively remove sutures tomorrow, call with any questions or concerns

## 2020-07-20 NOTE — PROGRESS NOTES
Internal Medicine Progress Note  Patient: Mcneil Gowers  Age/sex: 69 y o  male  Medical Record #: [id number]      ASSESSMENT/PLAN: (Interval History)  Mcneil Gowers is seen and examined and management for following issues:    L5-S1 TLIF with hx L-S spondylolisthesis on 7/6/20 ([place])  · Monitor incision  · Pain control per primary  · Progressing in therapy     Scalp revision/hx NPH  shunt 7/6/20  · Sutures intact - to be removed 7/21/2020  · Cont bacitracin 2x daily to loosen up scabbed area  · Wound clean non erythema     Bradycardia  · stable     DM2  · At home on Lantus 10U qam/12U qpm  · Continue DM diet, QID Accuchecks/SSI  · Currently only on SSI   · BS remain stable on sliding scale only  · Add back Lantus when needed vs try Metformin     Mild cognitive impairment  · Seen by Geriatrics as I/P and MOCA was 22/30  · For OP followup with them an a repeat MOCA     HTN  · At home he is on Vasotec 10mg qd   · On 7/11/20, added Lisinopril  · Lisinopril held this past week  · BP elevated this a m  · Will monitor      ZHOU/CPAP  · Continue CPAP     Hx gout  · stable  · Continue Allopurinol     Urine incontinence  · Supposedly having this issue at home and Geriatrics recommended that he see Urology as an OP  · PMR managing     Left TMA, diabetic neuropathy  · Likely contributes to some gait instability    GERD  · Add PPI daily  · TUMS prn        The above assessment and plan was reviewed and updated as determined by my evaluation of the patient on 7/20/2020      Labs:   Results from last 7 days   Lab Units 07/20/20  0446 07/16/20  0445   WBC Thousand/uL 7 88 6 56   HEMOGLOBIN g/dL 13 1 13 7   HEMATOCRIT % 40 7 42 1   PLATELETS Thousands/uL 237 227     Results from last 7 days   Lab Units 07/20/20  0446 07/16/20  0445   SODIUM mmol/L 136 134*   POTASSIUM mmol/L 4 2 4 1   CHLORIDE mmol/L 104 102   CO2 mmol/L 26 27   BUN mg/dL 18 19   CREATININE mg/dL 0 94 0 97   CALCIUM mg/dL 9 2 9 1             Results from last 7 days   Lab Units 07/20/20  1048 07/20/20  0622 07/19/20  2127   POC GLUCOSE mg/dl 94 115 129       Review of Scheduled Meds:    Current Facility-Administered Medications:  acetaminophen 650 mg Oral Q8H [address] Xin Sibley MD   allopurinol 300 mg Oral Daily Xin Sibley MD   bacitracin 1 small application Topical BID Alexandria Bobby PA-C   bisacodyl 10 mg Rectal Daily PRN Xin Sibley MD   calcium carbonate 500 mg Oral Daily PRN AR Huff   docusate sodium 100 mg Oral BID Xin Sibley MD   heparin (porcine) 5,000 Units Subcutaneous Q8H [address] Xin Sibley MD   insulin lispro 1-5 Units Subcutaneous TID AC AR Epstein   insulin lispro 1-5 Units Subcutaneous HS AR Epstein   lisinopril 5 mg Oral Daily AR Zuniga   melatonin 6 mg Oral HS Xin Sibley MD   methocarbamol 500 mg Oral Q6H [address] Xin Sibley MD   ondansetron 4 mg Oral Q6H PRN Xin Sibley MD   oxyCODONE 2 5 mg Oral Q4H PRN Xin Sibley MD   oxyCODONE 5 mg Oral Q4H PRN Xin Sibley MD   pantoprazole 40 mg Oral Early Morning AR Zuniga   polyethylene glycol 17 g Oral Daily PRN Xin Sibley MD   senna 1 tablet Oral Daily Xin Sibley MD   travoprost 1 drop Right Eye HS Xin Sibley MD       Subjective/ HPI: Patient seen and examined  Patients overnight issues or events were reviewed with nursing or staff during rounds or morning huddle session  New or overnight issues include the following:     Pt without complaints this am, states GERD is improved but will keep above reccs    ROS:   A 10 point ROS was performed; negative except as noted above         Imaging:     No orders to display       *Labs /Radiology studies Reviewed  *Medications  reviewed and reconciled as needed  *Please refer to order section for additional ordered labs studies  *Case discussed with primary attending during morning huddle case rounds    Physical Examination:  Vitals:   Vitals:    07/19/20 1314 07/19/20 2000 07/20/20 0516 07/20/20 0815   BP: 109/62 111/59 127/62 159/78   BP Location: Right arm Right arm     Pulse: 61 56 92    Resp: 18 18 20    Temp: 97 8 °F (36 6 °C) 98 4 °F (36 9 °C) 97 7 °F (36 5 °C)    TempSrc: Oral Oral Oral    SpO2: 96% 99% 98%    Weight:       Height:           GEN: No apparent distress, interactive  NEURO: Alert and oriented x3;   HEENT: Pupils are equal and reactive, EOMI, mucous membranes are moist, face symmetrical  CV: S1 S2 regular, no MRG, no peripheral edema noted  RESP: Lungs are clear bilaterally, no wheezes, rales or rhonchi noted, on room air, respirations easy and non labored  GI: obese, soft non tender, non distended; +BS x4  : Voiding without difficulty  MUSC: Moves all extremities  SKIN: pink, warm and dry, normal turgor, no rashes, lesions; scalp incision intact, lumbar incision intact       The above physical exam was reviewed and updated as determined by my evaluation of the patient on 7/20/2020  Invasive Devices     None                    VTE Pharmacologic Prophylaxis: Heparin  Code Status: Level 1 - Full Code  Current Length of Stay: 10 day(s)      Total time spent:  30 minutes with more than 50% spent counseling/coordinating care  Counseling includes discussion with patient re: progress  and discussion with patient of his/her current medical state/information  Coordination of patient's care was performed in conjunction with primary service  Time invested included review of patient's labs, vitals, and management of their comorbidities with continued monitoring  In addition, this patient was discussed with medical team including physician and advanced extenders  The care of the patient was extensively discussed and appropriate treatment plan was formulated unique for this patient  ** Please Note:  voice to text software may have been used in the creation of this document   Although proof errors in transcription or interpretation are a potential of such software**

## 2020-07-20 NOTE — PROGRESS NOTES
PM&R Progress Note:    HPI: Kelsey Berry is a 69 y o  male with HTN, DM2, Gout, known NPH s/p VPS chronic left foot weakness, lumbar spinal stenosis who presented to the [facility] on 7/6/20 for worsening back pain and gait instability from known spondylolisthesis L5/S1  He was recommended to have operative intervention, and was taken to the OR on 7/6/20 by Dr Dunia Sanchez for an elective L5-S1 transverse lumbar interbody fusion and deformity correction at L5-S1  Patient found to have functional deficits from baseline and admitted to [address] [place] [address] on 7/10/20  ASSESSMENT: Stable, progressing      PLAN:    Rehabilitation   Continue current rehabilitation plan of care to maximize function   Functional deficits: Continues to have proximal leg weakness, impaired mobility and self care   Expected Discharge: 7/24    Pain   Located in lower back and hips: managed on scheduled tylenol, robaxin    DVT prophylaxis   Continue SQ Heparin    Bladder plan   Overall continent    Bowel plan   Continent    Scalp laceration  Assessment & Plan  Sutures in place from scalp closure about 1 week ago - extensive scabbing continues to be present  Anticipate removal on 7/21    Pain  Assessment & Plan  Managed on Tylenol and Robaxin  P r n  Oxycodone    ZHOU on CPAP  Assessment & Plan  Continue nocturnal CPAP home settings    Gout  Assessment & Plan  Managed on allopurinol    Essential hypertension  Assessment & Plan  Stable without blood pressure medication    Normal pressure hydrocephalus (HCC)  Assessment & Plan  Chronic  shunt with scalp revision  * Spondylolisthesis of lumbosacral region  Assessment & Plan  s/p L5-S1 transverse lumbar interbody fusion and deformity correction at L5-S1 on 7/6/20 by Dr Bibi Bullock healing well with staples intact  Follow-up with Dr Elia Naranjo    Labs, medications, and imaging personally reviewed  SUBJECTIVE:  No acute events over weekend  Patient requests earlier discharge than anticipated date 7/24/20  Dr Temitope Cornejo to discuss in team meeting tomorrow  ROS:  No fever, chills, chest pain, SOB, cough, nausea, vomiting, diarrhea, constipation, abdominal pain, dysuria, bowel/bladder incontinence, new weakness or changes in sensation  Complete ROS obtained and otherwise negative  OBJECTIVE:   /78   Pulse 92   Temp 97 7 °F (36 5 °C) (Oral)   Resp 20   Ht 6' 3" (1 905 m)   Wt 122 kg (269 lb 13 5 oz)   SpO2 98%   BMI 33 73 kg/m²     Physical Exam   Constitutional: He is oriented to person, place, and time  He appears well-developed and well-nourished  No distress  HENT:   Head: Normocephalic  Nose: Nose normal    Eyes: Conjunctivae and EOM are normal    Neck: Neck supple  Cardiovascular: Normal rate, regular rhythm and intact distal pulses  Pulmonary/Chest: Effort normal and breath sounds normal  He has no wheezes  Abdominal: Soft  He exhibits no distension  Musculoskeletal: He exhibits no edema  AFO in place   Neurological: He is alert and oriented to person, place, and time  Bilateral lower extremity motor: 4/5 throughout   Skin: Skin is warm  Incision C/D/I on back    Psychiatric: He has a normal mood and affect  Nursing note and vitals reviewed         Lab Results   Component Value Date    WBC 7 88 07/20/2020    HGB 13 1 07/20/2020    HCT 40 7 07/20/2020    MCV 87 07/20/2020     07/20/2020     Lab Results   Component Value Date    SODIUM 136 07/20/2020    K 4 2 07/20/2020     07/20/2020    CO2 26 07/20/2020    BUN 18 07/20/2020    CREATININE 0 94 07/20/2020    GLUC 107 07/20/2020    CALCIUM 9 2 07/20/2020     Lab Results   Component Value Date    INR 1 06 06/16/2020    INR 1 14 05/28/2020    PROTIME 13 4 06/16/2020    PROTIME 14 0 05/28/2020           Current Facility-Administered Medications:     acetaminophen (TYLENOL) tablet 650 mg, 650 mg, Isabela Mckeon, Tavo Reyes MD, 650 mg at 07/20/20 0516    allopurinol (ZYLOPRIM) tablet 300 mg, 300 mg, Oral, Daily, Tavo Reyes MD, 300 mg at 07/20/20 0815    bacitracin topical ointment 1 small application, 1 small application, Topical, BID, Alexandria Bobby PA-C, 1 small application at 83/42/73 0815    bisacodyl (DULCOLAX) rectal suppository 10 mg, 10 mg, Rectal, Daily PRN, Tavo Reyes MD    calcium carbonate (TUMS) chewable tablet 500 mg, 500 mg, Oral, Daily PRN, AR Talamantes    docusate sodium (COLACE) capsule 100 mg, 100 mg, Oral, BID, Tavo Reyes MD, 100 mg at 07/19/20 1738    heparin (porcine) subcutaneous injection 5,000 Units, 5,000 Units, Subcutaneous, Q8H [address], Tavo Reyes MD, 5,000 Units at 07/20/20 0516    insulin lispro (HumaLOG) 100 units/mL subcutaneous injection 1-5 Units, 1-5 Units, Subcutaneous, TID AC, 1 Units at 07/13/20 0696 **AND** Fingerstick Glucose (POCT), , , TID AC, AR Honeycutt    insulin lispro (HumaLOG) 100 units/mL subcutaneous injection 1-5 Units, 1-5 Units, Subcutaneous, HS, AR Honeycutt    lisinopril (ZESTRIL) tablet 5 mg, 5 mg, Oral, Daily, AR Zuniga, 5 mg at 07/20/20 0815    melatonin tablet 6 mg, 6 mg, Oral, HS, Tavo Reyes MD, 6 mg at 07/19/20 2216    methocarbamol (ROBAXIN) tablet 500 mg, 500 mg, Oral, Q6H [address], Tavo Reyes MD, 500 mg at 07/20/20 0516    ondansetron (ZOFRAN-ODT) dispersible tablet 4 mg, 4 mg, Oral, Q6H PRN, Tavo Reyes MD    oxyCODONE (ROXICODONE) IR tablet 2 5 mg, 2 5 mg, Oral, Q4H PRN, Tavo Reyes MD    oxyCODONE (ROXICODONE) IR tablet 5 mg, 5 mg, Oral, Q4H PRN, Tavo Reyes MD    pantoprazole (PROTONIX) EC tablet 40 mg, 40 mg, Oral, Early Morning, AR Zuniga    polyethylene glycol (MIRALAX) packet 17 g, 17 g, Oral, Daily PRN, Tavo Reyes MD    senna (SENOKOT) tablet 8 6 mg, 1 tablet, Oral, Daily, Tavo Reyes MD, 8 6 mg at 07/14/20 0836    travoprost (TRAVATAN-Z) 0 004 % ophthalmic solution 1 drop, 1 drop, Right Eye, HS, Elida Cobb MD, 1 drop at 07/19/20 8975    Past Medical History:   Diagnosis Date    Cancer [facility]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     Diabetes mellitus ([place] [address] )     DM2 (diabetes mellitus, type 2) ([place] [address] )     Gout     HTN (hypertension)     Hydrocephalus ([place] [address] )     Hypertension     Neuropathy     ZHOU on CPAP     Pressure injury of skin     TIA (transient ischemic attack)        Patient Active Problem List    Diagnosis Date Noted    Weakness of left foot 07/10/2020    Pain 07/10/2020    Scalp hematoma 07/10/2020    Scalp laceration 07/10/2020    Spondylolisthesis of lumbosacral region 07/02/2020    Impaired functional mobility, balance, gait, and endurance 07/02/2020    Diabetic ulcer of left foot associated with type 2 diabetes mellitus ([place] [address] ) 05/28/2020    Type 2 diabetes mellitus ([place] [address] ) 05/28/2020    Normal pressure hydrocephalus ([place] [address] ) 05/28/2020    Essential hypertension 05/28/2020    Gout 05/28/2020    ZHOU on CPAP 05/28/2020    Status post ventriculoperitoneal shunt 04/21/2020    Unspecified abnormalities of gait and mobility 04/21/2020          Jason Preston MD    ** Please Note:  voice to text software may have been used in the creation of this document   Although proof errors in transcription or interpretation are a potential of such software**

## 2020-07-20 NOTE — PROGRESS NOTES
07/20/20 1230   Pain Assessment   Pain Assessment Tool 0-10   Pain Score No Pain   Restrictions/Precautions   Precautions Fall Risk   Braces or Orthoses MAFO   General   Change In Medical/Functional Status Progressed to In Room Privileges   Cognition   Overall Cognitive Status WFL   Subjective   Subjective feels that he is getting stronger and progressing well   Roll Left and Right   Type of Assistance Needed Independent   Roll Left and Right CARE Score 6   Sit to Lying   Type of Assistance Needed Independent   Sit to Lying CARE Score 6   Lying to Sitting on Side of Bed   Type of Assistance Needed Independent   Lying to Sitting on Side of Bed CARE Score 6   Sit to Stand   Type of Assistance Needed Independent   Sit to Stand CARE Score 6   Bed-Chair Transfer   Type of Assistance Needed Independent; Adaptive equipment   Comment with    Chair/Bed-to-Chair Transfer CARE Score 6   Car Transfer   Type of Assistance Needed Supervision   Car Transfer CARE Score 4   Walk 10 Feet   Type of Assistance Needed Independent; Adaptive equipment   Comment RW   Walk 10 Feet CARE Score 6   Walk 50 Feet with Two Turns   Type of Assistance Needed Set-up / clean-up; Adaptive equipment   Comment RW   Walk 50 Feet with Two Turns CARE Score 5   Walk 150 Feet   Type of Assistance Needed Supervision; Adaptive equipment   Comment RW   Walk 150 Feet CARE Score 4   Ambulation   Does the patient walk? 2  Yes   Primary Mode of Locomotion Prior to Admission Walk   Distance Walked (feet) 200 ft  (x3, 150 x 2)   Assist Device IQcard   Wheelchair mobility   Does the patient use a wheelchair? 0   No   4 Steps   Type of Assistance Needed Supervision   Comment B HR   4 Steps CARE Score 4   12 Steps   Type of Assistance Needed Supervision   Comment B HR   12 Steps CARE Score 4   Stairs   Type Stairs   # of Steps 14   Assist Devices Bilateral Rail   Therapeutic Interventions   Flexibility B seated HS stretch   Other Practiced functional mobility in room having pt manage moving bedside table and position Rw within reach  Equipment Use   NuStep 10 min lvl 3 no complaints   Assessment   Treatment Assessment Pt continues to functionally progress toward Mod I  Pt demo excellent safety awareness, managing well in room with RW  Progressed to Mod I in room  Able to complete FF of stairs today with B HR  No LOB, pain or knee instability with any activity  Practiced walking with SPC as pt uses once he is upstairs at home  Plan for d/c home this week  Will confirm with team tomorrow  PT Therapy Minutes   PT Time In 1230   PT Time Out 1400   PT Total Time (minutes) 90   PT Mode of treatment - Individual (minutes) 90   PT Mode of treatment - Concurrent (minutes) 0   PT Mode of treatment - Group (minutes) 0   PT Mode of treatment - Co-treat (minutes) 0   PT Mode of Treatment - Total time(minutes) 90 minutes   PT Cumulative Minutes 790   Therapy Time missed   Time missed?  No

## 2020-07-20 NOTE — SOCIAL WORK
Met with Pt to round, and Pt shared he would like to d/c home Wednesday  CM shared that the date would be set at team tomorrow, but she gathered therapy thought end of the week was appropriate  Pt states he feels physically and mentally ready  CM will shared with the team       Dr Armaan Negron and therapy notified  Dr Armaan Negron stated Dr Jaime Bai will determine the date at team tomorrow

## 2020-07-20 NOTE — PROGRESS NOTES
07/20/20 0930   Pain Assessment   Pain Assessment Tool 0-10   Pain Score No Pain   Restrictions/Precautions   Precautions Fall Risk;Spinal precautions   Weight Bearing Restrictions No   ROM Restrictions No   Braces or Orthoses MAFO  (ANNI ELDER)   Lifestyle   Autonomy "I want to go home on Wednesday "   Oral Hygiene   Type of Assistance Needed Independent; Adaptive equipment   Amount of Physical Assistance Provided No physical assistance   Comment Mod I for brushing teeth seated at sinkside  Oral Hygiene CARE Score 6   Grooming   Able To Comb/Brush Hair;Wash/Dry Face;Brush/Clean Teeth;Wash/Dry Hands   Limitation Noted In Strength;Timeliness   Shower/Bathe Self   Type of Assistance Needed Independent; Adaptive equipment   Amount of Physical Assistance Provided No physical assistance   Comment Mod I seated in shower on shower bench with grab bars, hand held faucet  Pt able to follow spinal pecautions 100% of the time during showering routine with modified technique with wash cloth utilized to wash bilateral feet  Shower/Bathe Self CARE Score 6   Bathing   Assessed Bath Style Shower   Anticipated D/C Bath Style Shower   Able to [place] Yes   Able to [employer] Temperature Yes   Able to Wash/Rinse/Dry (body part) Left Arm;Right Arm;L Upper Leg;R Upper Leg;L Lower Leg/Foot;R Lower Leg/Foot;Chest;Abdomen;Perineal Area; Buttocks   Limitations Noted in Strength;Timeliness   Adaptive Equipment Shower Seat;Hand Costa's  (grab bars)   Tub/Shower Transfer   Adaptive Equipment Grab Bars;Seat with Back   Assessed Shower   Findings Mod I with use of grab bar and shower seat for transfers  Upper Body Dressing   Type of Assistance Needed Independent   Amount of Physical Assistance Provided No physical assistance   Comment Mod I seated supported in chair      Upper Body Dressing CARE Score 6   Lower Body Dressing   Type of Assistance Needed Independent   Amount of Physical Assistance Provided No physical assistance   Comment Mod I for LB dressing routine  Lower Body Dressing CARE Score 6   Putting On/Taking Off Footwear   Type of Assistance Needed Independent; Adaptive equipment   Amount of Physical Assistance Provided No physical assistance   Comment Mod I with modified technique adhering to spinal precautions  Putting On/Taking Off Footwear CARE Score 6   Sit to Stand   Type of Assistance Needed Independent; Adaptive equipment   Amount of Physical Assistance Provided No physical assistance   Comment Mod I with RW   Sit to Stand CARE Score 6   Bed-Chair Transfer   Type of Assistance Needed Independent; Adaptive equipment   Amount of Physical Assistance Provided No physical assistance   Comment Mod I with RW   Chair/Bed-to-Chair Transfer CARE Score 6   Toileting Hygiene   Type of Assistance Needed Independent; Adaptive equipment   Amount of Physical Assistance Provided No physical assistance   Comment Mod I with [address] Score 6   Toileting   Able to [address] down yes, up yes  Able to Manage Clothing Closures Yes   Manage Hygiene Bladder   Toilet Transfer   Type of Assistance Needed Independent; Adaptive equipment   Amount of Physical Assistance Provided No physical assistance   Comment Mod I with RW   Toilet Transfer CARE Score 6   Toilet Transfer   Surface Assessed Raised Toilet   Transfer Technique Standard   Cognition   Overall Cognitive Status WFL   Orientation Level Oriented X4   Assessment   Treatment Assessment Pt participated in 90 min OT session with good activity tolerance with focus on ADL training  Pt seated in bedside recliner at start of OT session  Pt agreeable to participate in showering routine  Pt participated in functional transfer at [facility] I level with RW  Pt participated in shower transfer from w/c to shower chair at Mod I level utilizing grab bar side stepping into shower  Pt participated in showering routine seated on shower chair in shower at [facility] I level   Pt adhered to spinal precautions 100% of the time, dropping wash cloth on shower floor to wash feet  Pt participated in dressing routine seated at sinkside at [facility] I level adhering to spinal precautions 100% of the time  Pt dons shoes, MAFO, and socks seated at bedside utilizing propping technique at Mod I level with shoe horn with 100% carryover of spinal precautions  Pt participated in toileting routine at [facility] I level  PT and OT spoke with pt to be trailed by PT in afternoon session for in room privileges  Pt transferred from bed to bedside recliner at [facility] I level with RW  Pt and OTR discussed pt's d/c planning with case management and supervising OTR informed of pt's desired d/c date (Wednesday)  Pt would benefit from continued OT services for progression of all self-care ADL/IADL tasks to Mod I level for safe d/c to [facility] at [facility]  Recommended home skilled therapy services upon d/c  Prognosis Good   Plan   Treatment/Interventions ADL retraining;Functional transfer training; Therapeutic exercise; Endurance training;Patient/family training; Compensatory technique education   OT Therapy Minutes   OT Time In 0930   OT Time Out 1100   OT Total Time (minutes) 90   OT Mode of treatment - Individual (minutes) 90   OT Mode of treatment - Concurrent (minutes) 0   OT Mode of treatment - Group (minutes) 0   OT Mode of treatment - Co-treat (minutes) 0   OT Mode of Treatment - Total time(minutes) 90 minutes   OT Cumulative Minutes 730   Therapy Time missed   Time missed?  No

## 2020-07-20 NOTE — TEAM CONFERENCE
Acute RehabilitationTeam Conference Note  Date: 7/21/2020   Time: 10:19 AM       Patient Name:  Cass Flowers       Medical Record Number: [id number]   YOB: 1950  Sex: Male          Room/Bed:  [facility] 458/[facility] 458-01  Payor Info:  Payor: Reuben Vargas / Plan: Rupert Gr[employer] [facility] REP / Product Type: Medicare HMO /      Admitting Diagnosis: Lumbar and sacral spondyloarthritis [[id number]]   Admit Date/Time:  7/10/2020  2:41 PM  Admission Comments: No comment available     Primary Diagnosis:  Spondylolisthesis of lumbosacral region  Principal Problem: Spondylolisthesis of lumbosacral region    Patient Active Problem List    Diagnosis Date Noted    Weakness of left foot 07/10/2020    Pain 07/10/2020    Scalp hematoma 07/10/2020    Scalp laceration 07/10/2020    Spondylolisthesis of lumbosacral region 07/02/2020    Impaired functional mobility, balance, gait, and endurance 07/02/2020    Diabetic ulcer of left foot associated with type 2 diabetes mellitus ([facility] ) 05/28/2020    Type 2 diabetes mellitus ([facility] ) 05/28/2020    Normal pressure hydrocephalus ([facility] ) 05/28/2020    Essential hypertension 05/28/2020    Gout 05/28/2020    ZHOU on CPAP 05/28/2020    Status post ventriculoperitoneal shunt 04/21/2020    Unspecified abnormalities of gait and mobility 04/21/2020       Physical Therapy:    Weight Bearing Status: Full Weight Bearing  Transfers: Independent  Bed Mobility: Independent  Amulation Distance (ft): 200 feet  Ambulation: Supervision(Mod I for Household distances)  Assistive Device for Ambulation: Roller Walker  Wheelchair Mobility Distance: (NA)  Number of Stairs: 14  Assistive Device for Stairs: Bilateral [employer]  Stair Assistance: Supervision  Ramp: Supervision  Assistive Device for Ramp: Roller Walker  Discharge Recommendations: Home with:  [address] with[de-identified] Family Support, Outpatient Physical Therapy    7/20/20  Pt has made steady functional progress this past week   Pt with good safety awareness and judgement  Pt progressed to Ind in his room with use of RW  Practiced walking 10ft with SPC as pt uses when he is upstairs with no LOB  Able to negotiate FF of stairs with B HR w/o complaints  Has DME in place at home  Recommend d/c home this week  7/13/2020: overall pt making progress towards LTGs since his admission to the [facility] [place]  Functioning Maryann level with use of RW and Max A for bed mobility  Physical impairments include dec strength, dec ROM B/L knees, impaired balance, dec righting reactions, dec act tolerance and functional mobility  barriers to d/c include SOBIA, stairs to second floor to access bathroom, and dec caregiver support  Pt lives with [place] Iz[place] but roomate works so pt is alone during the day  Only BR is on second floor however pt has been urinating in jug to avoid having to use steps  Will have to go upstairs for BM  Pt with 3 SOBIA BHR and 12in curb step into house with mounted grab bars pt pulls up from  has BHR on FF to second floor  Due to dec act tolerance, impaired LE strength and ROM, B/L LE neuropathy and MAFO LLE pt at inc risk of falls  Cont to address physical impairments prior to d/c  Will need Eric level for d/c home  Reports roommate can help with IADLs does have neighbors who help but one works full time and the other is elderly     Occupational Therapy:  Eating: Independent  Grooming: Independent  Bathing: Independent  Bathing: Independent  Upper Body Dressing: Independent  Lower Body Dressing: Independent  Toileting: Independent  Tub/Shower Transfer: Independent  Toilet Transfer: Independent  Cognition: Within Defined Limits  Cognition: Decreased Memory, Decreased Safety  Orientation: Person, Place, Time, Situation  Discharge Recommendations: Home with:  [address] with[de-identified] Home Occupational Therapy, Family Support       Pt is demonstrating good progress with occupational therapy and is progressing toward LTGs for ADL, IADL , and functional transfers/mobility   Pt's goals are for [place] with ADL tasks with RW for d/c to PLOF  Pt has progressed to Mod I level at this time and recommending discharge scheduled  Speech Therapy:           No notes on file    Nursing Notes:  Appetite: Good  Diet Type: Diabetic                                                                     Pain Location/Orientation: Orientation: Lower, Location: Back  Pain Score: 0                       Hospital Pain Intervention(s): Repositioned, Ambulation/increased activity          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, lumbar spinal stenosis who presented to the [address] on 7/6/20 for worsening back pain and gait instability from known spondylolisthesis L5/S1  He was recommended to have operative intervention, and was taken to the OR on 7/6/20 by Dr She Strong for an elective L5-S1 transverse lumbar interbody fusion and deformity correction at L5-S1  History of hypertension, diabetes, cancer, TIA, NPH with shunt revision 6/10/2020, neuropathy, gout and ZHOU with CPAP at HS  Pain managed with PRN Tylenol and oxycodone and routine robaxin  Diabetes managed with QID BS and insulin  Gout managed with zyloprim  Hypertension managed with lisinopril  Diabetic diet is ordered  Skin is intact with the exception of a lumbar incision, diabetic foot ulcer, and scalp incision from shunt revision  Alarms required for safety  Incontinent for bladder and continent for bowel  This week we will encourage independence with ADLs while monitoring vitals and maintaining skin integrity  We will keep the patient free from falls through patient education with safe transferring and maintaining safety precautions         Case Management:     Discharge Planning  Living Arrangements: Friends  Support Systems: Family members  Assistance Needed: tbd  Type of Current Residence: Private residence  Current Home Care Services: No  Pt continues to participate well with therapy, and plans to return home this week  Pt has been educated on the potential for cont'd care, ie therapy and services  Following to assist with d/c planning needs  Is the patient actively participating in therapies? yes  List any modifications to the treatment plan:     Barriers Interventions   All functional barriers resolved  Is the patient making expected progress toward goals? yes  List any update or changes to goals:     Medical Goals: Patient will be medically stable for discharge to [facility] upon completion of rehab program and Patient will be able to manage medical conditions and comorbid conditions with medications and follow up upon completion of rehab program    Weekly Team Goals:   Rehab Team Goals  ADL Team Goal: Patient will be independent with ADLs with least restrictive device upon completion of rehab program  Bowel/Bladder Team Goal: Patient will return to premorbid level for bladder/bowel management upon completion of rehab program  Transfer Team Goal: Patient will be independent with transfers with least restrictive device upon completion of rehab program  Locomotion Team Goal: Patient will be independent with locomotion with least restrictive device upon completion of rehab program  Cognitive Team Goal: Patient will return to premorbid level of cognitive activity upon completion of rehab program    Discussion: Pt has progressed very well, and is currently independent for transfers/bed mobility, ambulating 200ft, supervision/mod I for household distances, with RW  Pt has completed 14 stairs, supervision with bilateral handrails  Pts ADLs are independent overall  Pt has progressed well toward his goals, and would like to d/c home tomorrow  Recommendations for outpt PT  Anticipated Discharge Date: 7 22 20  [facility] Team Members Present: The following team members are supervising care for this patient and were present during this Weekly Team Conference      Physician: Dr Eulas Essex, MD  : Leti Zavala, VALERIANO/ LCSW  Registered Nurse: Gab Pierce, TRACIE  Physical Therapist: RANDALL ArroyoT  Occupational Therapist: Georgette Guzman MS, OTR/L  Speech Therapist: Radha Hughes MA, CCC-SLP  Other:

## 2020-07-20 NOTE — ASSESSMENT & PLAN NOTE
POD 14 revision of scalp over ventricular catheter    · Initial VPS placed on 09/26/2005  · Externalization and wound debridement on 10/29/2005  · New  shunt placement with reprogrammable shunt on 12/22/2005  · Shunt revision on 08/03/2011  · Per last neurosurgery note shunt was dialed down to 90 cm water, no additional adjustments have been made since then  · Right sided incision C/D/I, closed with sutures with small amount of dried blood over incision, no erythema or new drainage noted  · Ordered CT head for tomorrow morning  · Will tentatively remove sutures tomorrow

## 2020-07-21 ENCOUNTER — APPOINTMENT (INPATIENT)
Dept: RADIOLOGY | Facility: HOSPITAL | Age: 70
DRG: 560 | End: 2020-07-21
Payer: COMMERCIAL

## 2020-07-21 ENCOUNTER — APPOINTMENT (INPATIENT)
Dept: RADIOLOGY | Facility: HOSPITAL | Age: 70
DRG: 560 | End: 2020-07-21
Attending: NEUROLOGICAL SURGERY
Payer: COMMERCIAL

## 2020-07-21 LAB
GLUCOSE SERPL-MCNC: 103 MG/DL (ref 65–140)
GLUCOSE SERPL-MCNC: 107 MG/DL (ref 65–140)
GLUCOSE SERPL-MCNC: 107 MG/DL (ref 65–140)
GLUCOSE SERPL-MCNC: 111 MG/DL (ref 65–140)

## 2020-07-21 PROCEDURE — 99024 POSTOP FOLLOW-UP VISIT: CPT | Performed by: NEUROLOGICAL SURGERY

## 2020-07-21 PROCEDURE — 97530 THERAPEUTIC ACTIVITIES: CPT

## 2020-07-21 PROCEDURE — 97110 THERAPEUTIC EXERCISES: CPT

## 2020-07-21 PROCEDURE — 82948 REAGENT STRIP/BLOOD GLUCOSE: CPT

## 2020-07-21 PROCEDURE — 70450 CT HEAD/BRAIN W/O DYE: CPT

## 2020-07-21 PROCEDURE — 99233 SBSQ HOSP IP/OBS HIGH 50: CPT | Performed by: PHYSICAL MEDICINE & REHABILITATION

## 2020-07-21 PROCEDURE — 70250 X-RAY EXAM OF SKULL: CPT

## 2020-07-21 PROCEDURE — 62252 CSF SHUNT REPROGRAM: CPT | Performed by: NEUROLOGICAL SURGERY

## 2020-07-21 RX ORDER — ACETAMINOPHEN 325 MG/1
650 TABLET ORAL EVERY 8 HOURS PRN
Status: DISCONTINUED | OUTPATIENT
Start: 2020-07-21 | End: 2020-07-22 | Stop reason: HOSPADM

## 2020-07-21 RX ADMIN — DOCUSATE SODIUM 100 MG: 100 CAPSULE, LIQUID FILLED ORAL at 17:16

## 2020-07-21 RX ADMIN — ALLOPURINOL 300 MG: 300 TABLET ORAL at 08:03

## 2020-07-21 RX ADMIN — BACITRACIN 1 SMALL APPLICATION: 500 OINTMENT TOPICAL at 17:16

## 2020-07-21 RX ADMIN — DOCUSATE SODIUM 100 MG: 100 CAPSULE, LIQUID FILLED ORAL at 08:03

## 2020-07-21 RX ADMIN — MELATONIN 6 MG: at 22:19

## 2020-07-21 RX ADMIN — HEPARIN SODIUM 5000 UNITS: 5000 INJECTION INTRAVENOUS; SUBCUTANEOUS at 05:44

## 2020-07-21 RX ADMIN — ACETAMINOPHEN 650 MG: 325 TABLET, FILM COATED ORAL at 14:28

## 2020-07-21 RX ADMIN — TRAVOPROST 1 DROP: 0.04 SOLUTION/ DROPS OPHTHALMIC at 22:21

## 2020-07-21 RX ADMIN — METHOCARBAMOL 500 MG: 500 TABLET, FILM COATED ORAL at 12:20

## 2020-07-21 RX ADMIN — HEPARIN SODIUM 5000 UNITS: 5000 INJECTION INTRAVENOUS; SUBCUTANEOUS at 22:19

## 2020-07-21 RX ADMIN — PANTOPRAZOLE SODIUM 40 MG: 40 TABLET, DELAYED RELEASE ORAL at 05:44

## 2020-07-21 RX ADMIN — METHOCARBAMOL 500 MG: 500 TABLET, FILM COATED ORAL at 05:44

## 2020-07-21 RX ADMIN — HEPARIN SODIUM 5000 UNITS: 5000 INJECTION INTRAVENOUS; SUBCUTANEOUS at 14:47

## 2020-07-21 RX ADMIN — SENNOSIDES 8.6 MG: 8.6 TABLET ORAL at 08:03

## 2020-07-21 RX ADMIN — METHOCARBAMOL 500 MG: 500 TABLET, FILM COATED ORAL at 17:16

## 2020-07-21 RX ADMIN — BACITRACIN 1 SMALL APPLICATION: 500 OINTMENT TOPICAL at 08:03

## 2020-07-21 RX ADMIN — ACETAMINOPHEN 650 MG: 325 TABLET, FILM COATED ORAL at 05:44

## 2020-07-21 NOTE — PROGRESS NOTES
Progress Note - Bo Sports 1950, 69 y o  male MRN: [id number]    Unit/Bed#: -01 Encounter: [id number]    Primary Care Provider: Nury De Souza   Date and time admitted to hospital: 7/10/2020  2:41 PM        * Spondylolisthesis of lumbosacral region  Assessment & Plan  POD 15 L5-S1 TLIF     Imaging:    Upright lumbar spine x-ray 07/07/2020:Status post TLIF at L5-S1  No complication of the hardware  Plan:  · Continue neurological exams  · Posterior lumbar incision C/D/I healed well, no erythema or drainage noted some Steri-Strips remain in place  · Pain well controlled on current regimen  · Medical management and pain control per primary team  · Mobilize patient as tolerated  · DVT PPX: SCDs and SQ heparin     Neurosurgery will sign off, patient will follow up in 4 weeks with repeat CT head and upright lumbar spine x-ray, call with any questions or concerns  Normal pressure hydrocephalus (HCC)  Assessment & Plan  · See above plan, shunt in place    Type 2 diabetes mellitus [facility])  Assessment & Plan  Lab Results   Component Value Date    HGBA1C 5 9 (H) 06/16/2020       Recent Labs     07/19/20  2127 07/20/20  0622 07/20/20  1048 07/20/20  1603   POCGLU 129 115 94 105       Blood Sugar Average: Last 72 hrs:  (P) 114   · Management per primary team    Status post ventriculoperitoneal shunt  Assessment & Plan  POD 15 revision of scalp over ventricular catheter    Imaging:    CT head 07/21/2020: Minimal decreased size of ventricular system with associated minimal increased bilateral cerebral hemispheres subdural hygromas  Skull x-ray 07/21/2020:Shunt valve has been set to approximately 100 mm H2O      Plan:  · Initial VPS placed on 09/26/2005  · Externalization and wound debridement on 10/29/2005  · New  shunt placement with reprogrammable shunt on 12/22/2005  · Shunt revision on 08/03/2011  · Per last neurosurgery office note shunt was dialed down to 90 cm water  · Reviewed imaging with patient   · Shunt today 7/21 was dialed up to 100 cm water  · Right sided incision C/D/I, removed sutures without difficulty, small amount of dried blood over incision, no erythema or new drainage noted    Subjective/Objective      Chief Complaint: "I'm getting out of here tomorrow"     Subjective:  Patient denies any headaches, back pain, dizziness, blurry vision, chest pain, shortness of breath, abdominal pain, nausea, vomiting, diarrhea, no problems with bowel or bladder, no new weakness or numbness/tingling  Patient reports he has chronic numbness/tingling in bilateral hands and from mid shin to feet  Objective:  Patient comfortably sitting out in recliner, NAD  I/O       07/19 0701 - 07/20 0700 07/20 0701 - 07/21 0700 07/21 0701 - 07/22 0700    P  O  840 900 540    Total Intake(mL/kg) 840 (6 9) 900 (7 4) 540 (4 4)    Urine (mL/kg/hr) 500 (0 2) 450 (0 2)     Stool       Total Output 500 450     Net +340 +450 +540           Unmeasured Urine Occurrence 2 x            Invasive Devices     None                 Physical Exam:  Vitals: Blood pressure 120/68, pulse (!) 54, temperature (!) 97 4 °F (36 3 °C), temperature source Oral, resp  rate 18, height 6' 3" (1 905 m), weight 122 kg (269 lb 13 5 oz), SpO2 99 %  ,Body mass index is 33 73 kg/m²  General appearance: alert, appears stated age, cooperative and no distress  Head:  Right-sided incision C/D/I, sutures were removed without difficulty with small amount of dried blood over incision, no erythema or new drainage noted  Eyes: EOMI, PERRL, conjugate gaze  Back: B/L lumbar incisions C/D/I, incision remains open to air with some Steri-Strips in place, no erythema or drainage noted    No tenderness to palpation    Lungs: non labored breathing  Heart: regular heart rate  Neurologic:   Mental status: Alert, oriented x3, thought content appropriate, speech is clear, following commands  Cranial nerves: grossly intact (Cranial nerves II-XII)  Sensory: normal to LT in all extremities except for decreased sensation in bilateral hands and from mid shin to feet which is chronic  Motor: moving all extremities without focal weakness strength 5/5 throughout  Reflexes: 2+ and symmetric, no Ariza's or clonus appreciated  Coordination: finger to nose normal bilaterally, no drift bilaterally      Lab Results:  Results from last 7 days   Lab Units 07/20/20  0446 07/16/20  0445   WBC Thousand/uL 7 88 6 56   HEMOGLOBIN g/dL 13 1 13 7   HEMATOCRIT % 40 7 42 1   PLATELETS Thousands/uL 237 227   NEUTROS PCT % 74 65   MONOS PCT % 9 12     Results from last 7 days   Lab Units 07/20/20  0446 07/16/20  0445   POTASSIUM mmol/L 4 2 4 1   CHLORIDE mmol/L 104 102   CO2 mmol/L 26 27   BUN mg/dL 18 19   CREATININE mg/dL 0 94 0 97   CALCIUM mg/dL 9 2 9 1                 No results found for: TROPONINT  ABG:No results found for: PHART, YNZ9OQC, PO2ART, YTF5YCO, Y1MXJOKP, BEART, SOURCE    Imaging Studies: I have personally reviewed pertinent reports  and I have personally reviewed pertinent films in PACS    No results found  EKG, Pathology, and Other Studies: I have personally reviewed pertinent reports        VTE Pharmacologic Prophylaxis: Heparin    VTE Mechanical Prophylaxis: sequential compression device

## 2020-07-21 NOTE — PROGRESS NOTES
07/21/20 1030   Therapy Time missed   Time missed?  Yes   Amount of time missed 60   Reason for time missed Medical procedure   Time(s) multiple attempts made 1030, 1230,

## 2020-07-21 NOTE — PROGRESS NOTES
Internal Medicine Progress Note  Patient: Keyur Flynn  Age/sex: 69 y o  male  Medical Record #: [id number]      ASSESSMENT/PLAN: (Interval History)  Keyur Flynn is seen and examined and management for following issues:    L5-S1 TLIF with hx L-S spondylolisthesis on 7/6/20 ([place])  · Monitor incision  · Pain control per primary  · Progressing in therapy     Scalp revision/hx NPH  shunt 7/6/20  · Sutures intact - to be removed 7/22/2020  · Cont bacitracin 2x daily to loosen up scabbed area  · Wound clean non erythema     Bradycardia  · stable     DM2  · At home on Lantus 10U qam/12U qpm  · Continue DM diet, QID Accuchecks/SSI  · Currently only on SSI   · BS remain stable on sliding scale only  · Add back Lantus when needed vs try Metformin     Mild cognitive impairment  · Seen by Geriatrics as I/P and MOCA was 22/30  · For OP followup with them an a repeat MOCA     HTN  · At home he is on Vasotec 10mg qd   · BP stable  · Will monitor      ZHOU/CPAP  · Continue CPAP     Hx gout  · stable  · Continue Allopurinol     Urine incontinence  · Supposedly having this issue at home and Geriatrics recommended that he see Urology as an OP  · PMR managing     Left TMA, diabetic neuropathy  · Likely contributes to some gait instability    GERD  · Cont PPI daily  · TUMS prn        The above assessment and plan was reviewed and updated as determined by my evaluation of the patient on 7/21/2020      Labs:   Results from last 7 days   Lab Units 07/20/20  0446 07/16/20  0445   WBC Thousand/uL 7 88 6 56   HEMOGLOBIN g/dL 13 1 13 7   HEMATOCRIT % 40 7 42 1   PLATELETS Thousands/uL 237 227     Results from last 7 days   Lab Units 07/20/20  0446 07/16/20  0445   SODIUM mmol/L 136 134*   POTASSIUM mmol/L 4 2 4 1   CHLORIDE mmol/L 104 102   CO2 mmol/L 26 27   BUN mg/dL 18 19   CREATININE mg/dL 0 94 0 97   CALCIUM mg/dL 9 2 9 1             Results from last 7 days   Lab Units 07/21/20  0633 07/20/20  2100 07/20/20  1603   POC GLUCOSE mg/dl 107 130 105       Review of Scheduled Meds:    Current Facility-Administered Medications:  acetaminophen 650 mg Oral Q8H [address] Daquan López MD   allopurinol 300 mg Oral Daily Daquan López MD   bacitracin 1 small application Topical BID Alexandria Bobby PA-C   bisacodyl 10 mg Rectal Daily PRN Daquan López MD   calcium carbonate 500 mg Oral Daily PRN [place] Delay, CRNP   docusate sodium 100 mg Oral BID Daquan López MD   heparin (porcine) 5,000 Units Subcutaneous Q8H [address] Daquan López MD   insulin lispro 1-5 Units Subcutaneous TID AC Elizabeth Ramirez, AR   insulin lispro 1-5 Units Subcutaneous HS AR Epstein   lisinopril 5 mg Oral Daily Denia Sood, AR   melatonin 6 mg Oral HS Daquan López MD   methocarbamol 500 mg Oral Q6H [address] Daquan López MD   ondansetron 4 mg Oral Q6H PRN Daquan López MD   oxyCODONE 2 5 mg Oral Q4H PRN Daquan López MD   oxyCODONE 5 mg Oral Q4H PRN Daquan López MD   pantoprazole 40 mg Oral Early Morning Denia Sood, CRNP   polyethylene glycol 17 g Oral Daily PRN Daquan López MD   senna 1 tablet Oral Daily Daquan López MD   travoprost 1 drop Right Eye HS Daquan López MD       Subjective/ HPI: Patient seen and examined  Patients overnight issues or events were reviewed with nursing or staff during rounds or morning huddle session  New or overnight issues include the following:     Pt without complaints this am    ROS:   A 10 point ROS was performed; negative except as noted above         Imaging:     CT head wo contrast    (Results Pending)       *Labs /Radiology studies Reviewed  *Medications  reviewed and reconciled as needed  *Please refer to order section for additional ordered labs studies  *Case discussed with primary attending during morning huddle case rounds    Physical Examination:  Vitals:   Vitals:    07/20/20 1352 07/20/20 2016 07/21/20 0603 07/21/20 0802   BP: 125/66 109/54 100/64 103/54   BP Location: Left arm Right arm Pulse: 65 66 60    Resp: 16 16 20    Temp: 97 7 °F (36 5 °C) 98 1 °F (36 7 °C) 97 6 °F (36 4 °C)    TempSrc: Oral Oral Oral    SpO2: 98% 99% 98%    Weight:       Height:           GEN: No apparent distress, interactive  NEURO: Alert and oriented x3  HEENT: Pupils are equal and reactive, EOMI, mucous membranes are moist, face symmetrical  CV: S1 S2 regular, no MRG, no peripheral edema noted  RESP: Lungs are clear bilaterally, no wheezes, rales or rhonchi noted, on room air, respirations easy and non labored  GI: obese, soft non tender, non distended; +BS x4  : Voiding without difficulty  MUSC: Moves all extremities  SKIN: pink, warm and dry, normal turgor, no rashes, lesions; sutures intact to scalp/lumbar spine         The above physical exam was reviewed and updated as determined by my evaluation of the patient on 7/21/2020  Invasive Devices     None                    VTE Pharmacologic Prophylaxis: Heparin  Code Status: Level 1 - Full Code  Current Length of Stay: 11 day(s)      Total time spent:  30 minutes with more than 50% spent counseling/coordinating care  Counseling includes discussion with patient re: progress  and discussion with patient of his/her current medical state/information  Coordination of patient's care was performed in conjunction with primary service  Time invested included review of patient's labs, vitals, and management of their comorbidities with continued monitoring  In addition, this patient was discussed with medical team including physician and advanced extenders  The care of the patient was extensively discussed and appropriate treatment plan was formulated unique for this patient  ** Please Note:  voice to text software may have been used in the creation of this document   Although proof errors in transcription or interpretation are a potential of such software**

## 2020-07-21 NOTE — SOCIAL WORK
Met with Pt briefly, as he was leaving the unit for follow up  CM explained the team meeting, and recommendations for d/c tomorrow  Pt was thrilled! CM will follow up with Pt to determine which outpt PT location he would like  CM provided Pt with a list of [facility] outpt locations  Met with Pt to determine where he would like his outpt PT  [facility]   CM will call and schedule his initial evaluation

## 2020-07-21 NOTE — ASSESSMENT & PLAN NOTE
Repeat CTH reviewed by NSGY: Minimal decreased size of ventricular system with associated minimal increased bilateral cerebral hemispheres subdural hygromas    Appreciate follow-up and suture removal:  Shunt today 7/21 was dialed up to 100 cm water by SOPHIE additionally  Follow-up as outpatient

## 2020-07-21 NOTE — PROGRESS NOTES
PM&R Progress Note:    HPI: Arlene Elizondo a 69 y  o  male with HTN, DM2, Gout, known NPH s/p VPS chronic left foot weakness, lumbar spinal stenosis who presented to the [facility] 7/6/20 for worsening back pain and gait instability from known spondylolisthesis L5/S1   He was recommended to have operative intervention, and was taken to the OR on 7/6/20 by Dr Volodymyr Betts for an elective L5-S1 transverse lumbar interbody fusion and deformity correction at L5-S1   Patient found to have functional deficits from baseline and admitted to [address] [place] [address] on 7/10/20        ASSESSMENT: Stable, progressing    PLAN:    Rehabilitation   Continue current rehabilitation plan of care to maximize function  I personally attended, reviewed, and discussed medical and functional updates in team conference today  Please refer to advance care planning note for details   Expected Discharge: H ome 7/22/20 with outpatient PT    Pain   No current issues, changed Tylenol to PRN    DVT prophylaxis   Managed on SQ heparin    Bladder plan   Continent    Bowel plan   Continent    * Spondylolisthesis of lumbosacral region  Assessment & Plan  s/p L5-S1 transverse lumbar interbody fusion and deformity correction at L5-S1 on 7/6/20 by Dr Mandy Kelly healing well with sterstrips  Follow-up with Dr Doc Lozano follow-up  X-rays reviewed: Status post TLIF at L5-S1  No complication of the hardware  Multilevel lumbar spondylosis and L5-S1 spondylolisthesis  Scalp laceration  Assessment & Plan  Sutures removed by NSGY today    Pain  Assessment & Plan  Managed on Tylenol and Robaxin  P r n   Oxycodone    ZHOU on CPAP  Assessment & Plan  Continue nocturnal CPAP home settings    Gout  Assessment & Plan  Managed on allopurinol    Essential hypertension  Assessment & Plan  Stable without blood pressure medication    Normal pressure hydrocephalus (HCC)  Assessment & Plan  Chronic  shunt with scalp revision  - Shunt today 7/21 was dialed up to 100 cm water by SOPHIE    Status post ventriculoperitoneal shunt  Assessment & Plan  Repeat CTH reviewed by SOPHIE: Minimal decreased size of ventricular system with associated minimal increased bilateral cerebral hemispheres subdural hygromas  Appreciate follow-up and suture removal:  Shunt today 7/21 was dialed up to 100 cm water by SOPHIE additionally  Follow-up as outpatient        Appreciate IM consultants medical co-management  Labs, medications, and imaging personally reviewed  SUBJECTIVE: Patient seen face to face  No acute issues  Progressing as expected in rehabilitation  ROS:  A ten point review of systems was completed 7/21/20 and pertinent positives are listed in subjective section  All other systems reviewed were negative  OBJECTIVE:   /68 (BP Location: Left arm)   Pulse (!) 54   Temp (!) 97 4 °F (36 3 °C) (Oral)   Resp 18   Ht 6' 3" (1 905 m)   Wt 122 kg (269 lb 13 5 oz)   SpO2 99%   BMI 33 73 kg/m²     Physical Exam   Constitutional: He is oriented to person, place, and time  He appears well-developed and well-nourished  No distress  HENT:   Head: Normocephalic  Nose: Nose normal    Eyes: Conjunctivae and EOM are normal    Neck: Neck supple  Cardiovascular: Normal rate, regular rhythm and intact distal pulses  Pulmonary/Chest: Effort normal and breath sounds normal  He has no wheezes  Abdominal: Soft  He exhibits no distension  Musculoskeletal: Normal range of motion  He exhibits no edema  Neurological: He is alert and oriented to person, place, and time  Motor: 4/5 throughout    Skin:   Incisions on back C/D/I    Psychiatric: He has a normal mood and affect  Nursing note and vitals reviewed         Lab Results   Component Value Date    WBC 7 88 07/20/2020    HGB 13 1 07/20/2020    HCT 40 7 07/20/2020    MCV 87 07/20/2020     07/20/2020     Lab Results   Component Value Date    SODIUM 136 07/20/2020    K 4 2 07/20/2020     07/20/2020    CO2 26 07/20/2020    BUN 18 07/20/2020    CREATININE 0 94 07/20/2020    GLUC 107 07/20/2020    CALCIUM 9 2 07/20/2020     Lab Results   Component Value Date    INR 1 06 06/16/2020    INR 1 14 05/28/2020    PROTIME 13 4 06/16/2020    PROTIME 14 0 05/28/2020           Current Facility-Administered Medications:     acetaminophen (TYLENOL) tablet 650 mg, 650 mg, Oral, Q8H PRN, Mc Charles MD    allopurinol (ZYLOPRIM) tablet 300 mg, 300 mg, Oral, Daily, Mc Charles MD, 300 mg at 07/21/20 0803    bacitracin topical ointment 1 small application, 1 small application, Topical, BID, Alexandria Bobby PA-C, 1 small application at 63/21/20 0803    bisacodyl (DULCOLAX) rectal suppository 10 mg, 10 mg, Rectal, Daily PRN, Mc Charles MD    calcium carbonate (TUMS) chewable tablet 500 mg, 500 mg, Oral, Daily PRN, AR Wright    docusate sodium (COLACE) capsule 100 mg, 100 mg, Oral, BID, Mc Charles MD, 100 mg at 07/21/20 0803    heparin (porcine) subcutaneous injection 5,000 Units, 5,000 Units, Subcutaneous, Q8H [facility], Mc Charles MD, 5,000 Units at 07/21/20 1447    insulin lispro (HumaLOG) 100 units/mL subcutaneous injection 1-5 Units, 1-5 Units, Subcutaneous, TID AC, 1 Units at 07/13/20 0821 **AND** Fingerstick Glucose (POCT), , , TID AC, AR Morales    insulin lispro (HumaLOG) 100 units/mL subcutaneous injection 1-5 Units, 1-5 Units, Subcutaneous, HS, AR Epstein    lisinopril (ZESTRIL) tablet 5 mg, 5 mg, Oral, Daily, AR Zuniga, 5 mg at 07/20/20 0815    melatonin tablet 6 mg, 6 mg, Oral, HS, Mc Charles MD, 6 mg at 07/20/20 2208    methocarbamol (ROBAXIN) tablet 500 mg, 500 mg, Oral, Q6H [facility], Mc Charles MD, 500 mg at 07/21/20 1220    ondansetron (ZOFRAN-ODT) dispersible tablet 4 mg, 4 mg, Oral, Q6H PRN, Mc Charles MD    oxyCODONE (ROXICODONE) IR tablet 2 5 mg, 2 5 mg, Oral, Q4H PRN, MD Willis Ramos oxyCODONE (ROXICODONE) IR tablet 5 mg, 5 mg, Oral, Q4H PRN, Nadir Rajput MD    pantoprazole (PROTONIX) EC tablet 40 mg, 40 mg, Oral, Early Morning, AR Zuniga, 40 mg at 07/21/20 0544    polyethylene glycol (MIRALAX) packet 17 g, 17 g, Oral, Daily PRN, Nadir Rajput MD    senna (SENOKOT) tablet 8 6 mg, 1 tablet, Oral, Daily, Nadir Rajput MD, 8 6 mg at 07/21/20 0803    travoprost (TRAVATAN-Z) 0 004 % ophthalmic solution 1 drop, 1 drop, Right Eye, HS, Nadir Rajput MD, 1 drop at 07/20/20 2211    Past Medical History:   Diagnosis Date    Cancer [facility]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     Diabetes mellitus ([facility] )     DM2 (diabetes mellitus, type 2) ([place] [address] )     Gout     HTN (hypertension)     Hydrocephalus ([place] [address] )     Hypertension     Neuropathy     ZHOU on CPAP     Pressure injury of skin     TIA (transient ischemic attack)        Patient Active Problem List    Diagnosis Date Noted    Spondylolisthesis of lumbosacral region 07/02/2020     Priority: High    Weakness of left foot 07/10/2020    Pain 07/10/2020    Scalp hematoma 07/10/2020    Scalp laceration 07/10/2020    Impaired functional mobility, balance, gait, and endurance 07/02/2020    Diabetic ulcer of left foot associated with type 2 diabetes mellitus ([facility] ) 05/28/2020    Type 2 diabetes mellitus ([place] [address] ) 05/28/2020    Normal pressure hydrocephalus ([place] [address] ) 05/28/2020    Essential hypertension 05/28/2020    Gout 05/28/2020    ZHOU on CPAP 05/28/2020    Status post ventriculoperitoneal shunt 04/21/2020    Unspecified abnormalities of gait and mobility 04/21/2020          Nadir Rajput MD    Total time spent:  45 minutes with more than 50% spent counseling/coordinating care  Counseling includes discussion with patient re: progress and discussion with patient of his/her current medical state/information  Coordination of patient's care was performed in conjunction with consulting services   Time invested included review of patient's labs, vitals, and management of their comorbidities with continued monitoring  The care of the patient was extensively discussed and appropriate treatment plan was formulated unique for this patient  ** Please Note:  voice to text software may have been used in the creation of this document   Although proof errors in transcription or interpretation are a potential of such software**

## 2020-07-21 NOTE — PLAN OF CARE
Problem: PAIN - ADULT  Goal: Verbalizes/displays adequate comfort level or baseline comfort level  Description  Interventions:  - Encourage patient to monitor pain and request assistance  - Assess pain using appropriate pain scale  - Administer analgesics based on type and severity of pain and evaluate response  - Implement non-pharmacological measures as appropriate and evaluate response  - Consider cultural and social influences on pain and pain management  - Notify physician/advanced practitioner if interventions unsuccessful or patient reports new pain  Outcome: Progressing     Problem: INFECTION - ADULT  Goal: Absence or prevention of progression during hospitalization  Description  INTERVENTIONS:  - Assess and monitor for signs and symptoms of infection  - Monitor lab/diagnostic results  - Monitor all insertion sites, i e  indwelling lines, tubes, and drains  - Monitor endotracheal if appropriate and nasal secretions for changes in amount and color  - [place] appropriate cooling/warming therapies per order  - Administer medications as ordered  - Instruct and encourage patient and family to use good hand hygiene technique  - Identify and instruct in appropriate isolation precautions for identified infection/condition  Outcome: Progressing  Goal: Absence of fever/infection during neutropenic period  Description  INTERVENTIONS:  - Monitor WBC    Outcome: Progressing     Problem: SAFETY ADULT  Goal: Patient will remain free of falls  Description  INTERVENTIONS:  - Assess patient frequently for physical needs  -  Identify cognitive and physical deficits and behaviors that affect risk of falls    -  [place] fall precautions as indicated by assessment   - Educate patient/family on patient safety including physical limitations  - Instruct patient to call for assistance with activity based on assessment  - Modify environment to reduce risk of injury  - Consider OT/PT consult to assist with strengthening/mobility  Outcome: Progressing  Goal: Maintain or return to baseline ADL function  Description  INTERVENTIONS:  -  Assess patient's ability to carry out ADLs; assess patient's baseline for ADL function and identify physical deficits which impact ability to perform ADLs (bathing, care of mouth/teeth, toileting, grooming, dressing, etc )  - Assess/evaluate cause of self-care deficits   - Assess range of motion  - Assess patient's mobility; develop plan if impaired  - Assess patient's need for assistive devices and provide as appropriate  - Encourage maximum independence but intervene and supervise when necessary  - Involve family in performance of ADLs  - Assess for home care needs following discharge   - Consider OT consult to assist with ADL evaluation and planning for discharge  - Provide patient education as appropriate  Outcome: Progressing  Goal: Maintain or return mobility status to optimal level  Description  INTERVENTIONS:  - Assess patient's baseline mobility status (ambulation, transfers, stairs, etc )    - Identify cognitive and physical deficits and behaviors that affect mobility  - Identify mobility aids required to assist with transfers and/or ambulation (gait belt, sit-to-stand, lift, walker, cane, etc )  - [place] fall precautions as indicated by assessment  - Record patient progress and toleration of activity level on Mobility SBAR; progress patient to next Phase/Stage  - Instruct patient to call for assistance with activity based on assessment  - Consider rehabilitation consult to assist with strengthening/weightbearing, etc   Outcome: Progressing     Problem: Prexisting or High Potential for Compromised Skin Integrity  Goal: Skin integrity is maintained or improved  Description  INTERVENTIONS:  - Identify patients at risk for skin breakdown  - Assess and monitor skin integrity  - Assess and monitor nutrition and hydration status  - Monitor labs   - Assess for incontinence   - Turn and reposition patient  - Assist with mobility/ambulation  - Relieve pressure over bony prominences  - Avoid friction and shearing  - Provide appropriate hygiene as needed including keeping skin clean and dry  - Evaluate need for skin moisturizer/barrier cream  - Collaborate with interdisciplinary team   - Patient/family teaching  - Consider wound care consult   Outcome: Progressing     Problem: Potential for Falls  Goal: Patient will remain free of falls  Description  INTERVENTIONS:  - Assess patient frequently for physical needs  -  Identify cognitive and physical deficits and behaviors that affect risk of falls    -  [place] fall precautions as indicated by assessment   - Educate patient/family on patient safety including physical limitations  - Instruct patient to call for assistance with activity based on assessment  - Modify environment to reduce risk of injury  - Consider OT/PT consult to assist with strengthening/mobility  Outcome: Progressing

## 2020-07-21 NOTE — PROCEDURES
Operative Note    Nikole Nolasco    Age 70yr         20    69 y o  male      -01    Preop Diagnosis:   1  Normal pressure hydrocephalus  2  Original shunt   3  Revision  Codman Hakim valve system  4  Last adjustment from 120 mm H2O down to 90 mm of H2O on 2020 at office visit with Dr Gregorio Armas  5  Lumbar spondylosis  6  Prior lumbar surgery  7  Recent L5-S1 T LIF 2020  8  Also recent right frontal scalp incision revision for  dehiscence and thinning 2020 under same anesthesia  9  Hypertension  10  DM 2  11  Morbid obesity  12  ZHOU on CPAP      Postop Diagnosis: same    Title of procedure:   Reprogramming of right frontal Codman Hakim  shunt system from existing setting of 19 mmHg to 100 mmHg level    Surgeon: Edgar Cardoso MD    Assistants: None    No qualified resident was available to assist with this case  Location:  X-ray fluoro [address]     Indications:  Although he is making good progress in rehab for his lumbar surgery and he thinks his balance and walking are as good as they can be we noted on CT scan today that his subdural hygromas have increased slightly  In abundance of caution to limit further increase dialing up of his shunt setting is indeed  In he he says setting of 120 is far too high for him any becomes unsteady and incontinent  He is fairly happy where it shunt is at the moment  The lateral  x-ray and lateral skull x-ray show reading 90 to 100  He was set at 80  To be sure he is at slightly higher setting reprogramming is indicated  We will dial aim up 10 mmHg  from 90 mm level to 100 mm level    He agreed with this    Anesthesia:  None    Procedure Details: The patient was brought down to the fluoro suite      Identity  and procedure confirmed with his x-ray tech Christina Segal    The head was tilted slightly towards the x-ray be so that direct 90 degree view of the Codman Hakim valve could be obtained    Lateral x-ray showed pre adjust setting at 90 mmH20    Post adjustment setting showed Codman Hakim valve setting at 100 mmh20  Reviewed and confirmed with Dr Kanu Henriquez from neuro Radiology      Findings: As above  Complications:  None    4/66/3165 1:03 PM    Hermann Arteaga MD, Attending Neurological Surgeon

## 2020-07-21 NOTE — PROGRESS NOTES
07/21/20 1330   Pain Assessment   Pain Score No Pain   Restrictions/Precautions   Precautions Spinal precautions   Braces or Orthoses MAFO  (L MAFO)   Cognition   Arousal/Participation Alert; Cooperative   Attention Within functional limits   Following Commands Follows multistep commands with increased time or repetition   Subjective   Subjective No new complaints reported  Expressed that he is thrilled to go home tomorrow    Roll Left and Right   Type of Assistance Needed Independent   Roll Left and Right CARE Score 6   Sit to Lying   Type of Assistance Needed Independent   Sit to Lying CARE Score 6   Lying to Sitting on Side of Bed   Type of Assistance Needed Independent   Lying to Sitting on Side of Bed CARE Score 6   Sit to Stand   Type of Assistance Needed Independent; Adaptive equipment   Sit to Stand CARE Score 6   Bed-Chair Transfer   Type of Assistance Needed Independent; Adaptive equipment   Chair/Bed-to-Chair Transfer CARE Score 6   Transfer Bed/Chair/Wheelchair   Adaptive Equipment Roller Walker   Stand Pivot Modified Independent   Sit to Stand Modified Independent   Stand to Sit Modified Independent   Supine to Sit Modified Independent   Sit to Supine Modified Independent   Car Transfer   Type of Assistance Needed Supervision   Car Transfer CARE Score 4   Walk 10 Feet   Type of Assistance Needed Independent; Adaptive equipment   Walk 10 Feet CARE Score 6   Walk 50 Feet with Two Turns   Type of Assistance Needed Independent; Adaptive equipment   Walk 50 Feet with Two Turns CARE Score 6   Walk 150 Feet   Type of Assistance Needed Supervision   Walk 150 Feet CARE Score 4   Walking 10 Feet on Uneven Surfaces   Type of Assistance Needed Supervision; Incidental touching   Comment CS/ CGA ramp    Walking 10 Feet on Uneven Surfaces CARE Score 4   Ambulation   Does the patient walk? 2  Yes   Distance Walked (feet) 200 ft  (X 2 )   Assist Device Roller Walker   Gait Pattern Inconsistant Kathie; Forward Flexion Walk Assist Level Supervision;Modified Independent   Findings mod I household distanc more than that    Wheelchair mobility   Does the patient use a wheelchair? 0  No   Curb or Single Stair   Style negotiated Single stair   Type of Assistance Needed Supervision   Comment CGA curb step    1 Step (Curb) CARE Score 4   4 Steps   Type of Assistance Needed Supervision   4 Steps CARE Score 4   12 Steps   Type of Assistance Needed Supervision   12 Steps CARE Score 4   Stairs   Type Stairs   # of Steps   (FF)   Weight Bearing Precautions Fall Risk   Assist Devices Bilateral Rail   Picking Up Object   Type of Assistance Needed Independent; Adaptive equipment   Comment using grabber picking up a pen    Picking Up Object CARE Score 6   Toilet Transfer   Type of Assistance Needed Independent; Adaptive equipment   Toilet Transfer CARE Score 6   Toilet Transfer   Surface Assessed Raised Toilet   Adaptive Equipment   (RW)   Therapeutic Interventions   Strengthening seated B LE LAQ, hip flex with 3# wts  add squeeze with ball, hip abd and hamstring curl with green TB, standing marching in place   Balance ball toss using RW with CGA    Equipment Use   NuStep Level 3 X 15 mins    Assessment   Treatment Assessment Pt  had several test today, had CT scan in the morning then X-ray after luch and had neuro surgery checked him and sutures were also removed hence 30 mins was missed today  Pt  cont to do well mod I level in room using RW  No reported issues or LOB overnight being mod I  Pt  eager to go home tomorrow and was educated to have somebody with him to manage steps and to limit going up and down as much as possible  Pt  able to demonstrate understanding  pt  able to tolerate 90 mins session with no complaints reported  Pt  anticipate to be d/c to home tomorrow with continued PT services (Out pt )   Problem List Decreased endurance; Impaired balance;Decreased mobility;Orthopedic restrictions   Barriers to Discharge Inaccessible home environment;Decreased caregiver support   PT Barriers   Physical Impairment Decreased endurance; Impaired balance;Decreased mobility;Orthopedic restrictions   Functional Limitation Car transfers;Stair negotiation;Standing;Transfers; Walking   Recommendation   PT Discharge Recommendation   (Out patient )   Equipment Recommended Walker   PT Therapy Minutes   PT Time In 1330   PT Time Out 1500   PT Total Time (minutes) 90   PT Mode of treatment - Individual (minutes) 90   PT Mode of treatment - Concurrent (minutes) 0   PT Mode of treatment - Group (minutes) 0   PT Mode of treatment - Co-treat (minutes) 0   PT Mode of Treatment - Total time(minutes) 90 minutes   PT Cumulative Minutes 880   Therapy Time missed   Time missed?  Yes   Amount of time missed 30   Reason for time missed Medical procedure   Time(s) multiple attempts made Neuro was in with pt  after lunch and then had to go to X-ray after that

## 2020-07-21 NOTE — PROGRESS NOTES
07/21/20 1330   Pain Assessment   Pain Score No Pain   Restrictions/Precautions   Precautions Spinal precautions   Braces or Orthoses MAFO  (L MAFO)   Cognition   Arousal/Participation Alert; Cooperative   Attention Within functional limits   Following Commands Follows multistep commands with increased time or repetition   Subjective   Subjective No new complaints reported  Expressed that he is thrilled to go home tomorrow    Roll Left and Right   Type of Assistance Needed Independent   Roll Left and Right CARE Score 6   Sit to Lying   Type of Assistance Needed Independent   Sit to Lying CARE Score 6   Lying to Sitting on Side of Bed   Type of Assistance Needed Independent   Lying to Sitting on Side of Bed CARE Score 6   Sit to Stand   Type of Assistance Needed Independent; Adaptive equipment   Sit to Stand CARE Score 6   Bed-Chair Transfer   Type of Assistance Needed Independent; Adaptive equipment   Chair/Bed-to-Chair Transfer CARE Score 6   Transfer Bed/Chair/Wheelchair   Adaptive Equipment Roller Walker   Stand Pivot Modified Independent   Sit to Stand Modified Independent   Stand to Sit Modified Independent   Supine to Sit Modified Independent   Sit to Supine Modified Independent   Car Transfer   Type of Assistance Needed Supervision   Car Transfer CARE Score 4   Walk 10 Feet   Type of Assistance Needed Independent; Adaptive equipment   Walk 10 Feet CARE Score 6   Walk 50 Feet with Two Turns   Type of Assistance Needed Independent; Adaptive equipment   Walk 50 Feet with Two Turns CARE Score 6   Walk 150 Feet   Type of Assistance Needed Supervision   Walk 150 Feet CARE Score 4   Walking 10 Feet on Uneven Surfaces   Type of Assistance Needed Supervision; Incidental touching   Comment CS/ CGA ramp    Walking 10 Feet on Uneven Surfaces CARE Score 4   Ambulation   Does the patient walk? 2  Yes   Distance Walked (feet) 200 ft  (X 2 )   Assist Device Roller Walker   Gait Pattern Inconsistant Kathie; Forward Flexion Walk Assist Level Supervision;Modified Independent   Findings mod I household distanc more than that    Wheelchair mobility   Does the patient use a wheelchair? 0  No   Curb or Single Stair   Style negotiated Single stair   Type of Assistance Needed Supervision   Comment CGA curb step    1 Step (Curb) CARE Score 4   4 Steps   Type of Assistance Needed Supervision   4 Steps CARE Score 4   12 Steps   Type of Assistance Needed Supervision   12 Steps CARE Score 4   Stairs   Type Stairs   # of Steps   (FF)   Weight Bearing Precautions Fall Risk   Assist Devices Bilateral Rail   Toilet Transfer   Type of Assistance Needed Independent; Adaptive equipment   Toilet Transfer CARE Score 6   Toilet Transfer   Surface Assessed Raised Toilet   Adaptive Equipment   (RW)   Therapeutic Interventions   Strengthening seated B LE LAQ, hip flex with 3# wts  add squeeze with ball, hip abd and hamstring curl with green TB, standing marching in place   Balance ball toss using RW with CGA    Equipment Use   NuStep Level 3 X 15 mins    Assessment   Treatment Assessment Pt  had several test today, had CT scan in the morning then X-ray after luch and had neuro surgery checked him and sutures were also removed hence 30 mins was missed today  Pt  cont to do well mod I level in room using RW  No reported issues or LOB overnight being mod I  Pt  eager to go home tomorrow and was educated to have somebody with him to manage steps and to limit going up and down as much as possible  Pt  able to demonstrate understanding  pt  able to tolerate 90 mins session with no complaints reported  Pt  anticipate to be d/c to home tomorrow with continued PT services (Out pt )   Problem List Decreased endurance; Impaired balance;Decreased mobility;Orthopedic restrictions   Barriers to Discharge Inaccessible home environment;Decreased caregiver support   PT Barriers   Physical Impairment Decreased endurance; Impaired balance;Decreased mobility;Orthopedic restrictions   Functional Limitation Car transfers;Stair negotiation;Standing;Transfers; Walking   Recommendation   PT Discharge Recommendation   (Out patient )   Equipment Recommended Walker   PT Therapy Minutes   PT Time In 1330   PT Time Out 1500   PT Total Time (minutes) 90   PT Mode of treatment - Individual (minutes) 90   PT Mode of treatment - Concurrent (minutes) 0   PT Mode of treatment - Group (minutes) 0   PT Mode of treatment - Co-treat (minutes) 0   PT Mode of Treatment - Total time(minutes) 90 minutes   PT Cumulative Minutes 880   Therapy Time missed   Time missed?  Yes   Amount of time missed 30   Reason for time missed Medical procedure   Time(s) multiple attempts made Neuro was in with pt  after lunch and then had to go to X-ray after that

## 2020-07-22 VITALS
SYSTOLIC BLOOD PRESSURE: 121 MMHG | WEIGHT: 269.84 LBS | HEIGHT: 75 IN | TEMPERATURE: 97.6 F | RESPIRATION RATE: 18 BRPM | BODY MASS INDEX: 33.55 KG/M2 | DIASTOLIC BLOOD PRESSURE: 59 MMHG | HEART RATE: 50 BPM | OXYGEN SATURATION: 98 %

## 2020-07-22 LAB
GLUCOSE SERPL-MCNC: 109 MG/DL (ref 65–140)
GLUCOSE SERPL-MCNC: 99 MG/DL (ref 65–140)

## 2020-07-22 PROCEDURE — 97110 THERAPEUTIC EXERCISES: CPT

## 2020-07-22 PROCEDURE — 97535 SELF CARE MNGMENT TRAINING: CPT

## 2020-07-22 PROCEDURE — 82948 REAGENT STRIP/BLOOD GLUCOSE: CPT

## 2020-07-22 PROCEDURE — 99239 HOSP IP/OBS DSCHRG MGMT >30: CPT | Performed by: PHYSICAL MEDICINE & REHABILITATION

## 2020-07-22 RX ORDER — ENALAPRIL MALEATE 5 MG/1
5 TABLET ORAL DAILY
Qty: 30 TABLET | Refills: 0 | Status: SHIPPED | OUTPATIENT
Start: 2020-07-22 | End: 2020-09-22 | Stop reason: HOSPADM

## 2020-07-22 RX ORDER — METHOCARBAMOL 500 MG/1
500 TABLET, FILM COATED ORAL 2 TIMES DAILY PRN
Start: 2020-07-22 | End: 2020-08-29 | Stop reason: HOSPADM

## 2020-07-22 RX ADMIN — HEPARIN SODIUM 5000 UNITS: 5000 INJECTION INTRAVENOUS; SUBCUTANEOUS at 06:02

## 2020-07-22 RX ADMIN — DOCUSATE SODIUM 100 MG: 100 CAPSULE, LIQUID FILLED ORAL at 08:03

## 2020-07-22 RX ADMIN — LISINOPRIL 5 MG: 5 TABLET ORAL at 08:03

## 2020-07-22 RX ADMIN — METHOCARBAMOL 500 MG: 500 TABLET, FILM COATED ORAL at 06:02

## 2020-07-22 RX ADMIN — SENNOSIDES 8.6 MG: 8.6 TABLET ORAL at 08:03

## 2020-07-22 RX ADMIN — BACITRACIN 1 SMALL APPLICATION: 500 OINTMENT TOPICAL at 08:03

## 2020-07-22 RX ADMIN — PANTOPRAZOLE SODIUM 40 MG: 40 TABLET, DELAYED RELEASE ORAL at 06:02

## 2020-07-22 RX ADMIN — METHOCARBAMOL 500 MG: 500 TABLET, FILM COATED ORAL at 00:39

## 2020-07-22 RX ADMIN — ALLOPURINOL 300 MG: 300 TABLET ORAL at 08:03

## 2020-07-22 NOTE — PLAN OF CARE
Problem: PAIN - ADULT  Goal: Verbalizes/displays adequate comfort level or baseline comfort level  Description  Interventions:  - Encourage patient to monitor pain and request assistance  - Assess pain using appropriate pain scale  - Administer analgesics based on type and severity of pain and evaluate response  - Implement non-pharmacological measures as appropriate and evaluate response  - Consider cultural and social influences on pain and pain management  - Notify physician/advanced practitioner if interventions unsuccessful or patient reports new pain  Outcome: Progressing     Problem: INFECTION - ADULT  Goal: Absence or prevention of progression during hospitalization  Description  INTERVENTIONS:  - Assess and monitor for signs and symptoms of infection  - Monitor lab/diagnostic results  - Monitor all insertion sites, i e  indwelling lines, tubes, and drains  - Monitor endotracheal if appropriate and nasal secretions for changes in amount and color  - [place] appropriate cooling/warming therapies per order  - Administer medications as ordered  - Instruct and encourage patient and family to use good hand hygiene technique  - Identify and instruct in appropriate isolation precautions for identified infection/condition  Outcome: Progressing  Goal: Absence of fever/infection during neutropenic period  Description  INTERVENTIONS:  - Monitor WBC    Outcome: Progressing     Problem: SAFETY ADULT  Goal: Patient will remain free of falls  Description  INTERVENTIONS:  - Assess patient frequently for physical needs  -  Identify cognitive and physical deficits and behaviors that affect risk of falls    -  [place] fall precautions as indicated by assessment   - Educate patient/family on patient safety including physical limitations  - Instruct patient to call for assistance with activity based on assessment  - Modify environment to reduce risk of injury  - Consider OT/PT consult to assist with strengthening/mobility  Outcome: Progressing  Goal: Maintain or return to baseline ADL function  Description  INTERVENTIONS:  -  Assess patient's ability to carry out ADLs; assess patient's baseline for ADL function and identify physical deficits which impact ability to perform ADLs (bathing, care of mouth/teeth, toileting, grooming, dressing, etc )  - Assess/evaluate cause of self-care deficits   - Assess range of motion  - Assess patient's mobility; develop plan if impaired  - Assess patient's need for assistive devices and provide as appropriate  - Encourage maximum independence but intervene and supervise when necessary  - Involve family in performance of ADLs  - Assess for home care needs following discharge   - Consider OT consult to assist with ADL evaluation and planning for discharge  - Provide patient education as appropriate  Outcome: Progressing  Goal: Maintain or return mobility status to optimal level  Description  INTERVENTIONS:  - Assess patient's baseline mobility status (ambulation, transfers, stairs, etc )    - Identify cognitive and physical deficits and behaviors that affect mobility  - Identify mobility aids required to assist with transfers and/or ambulation (gait belt, sit-to-stand, lift, walker, cane, etc )  - [place] fall precautions as indicated by assessment  - Record patient progress and toleration of activity level on Mobility SBAR; progress patient to next Phase/Stage  - Instruct patient to call for assistance with activity based on assessment  - Consider rehabilitation consult to assist with strengthening/weightbearing, etc   Outcome: Progressing     Problem: Prexisting or High Potential for Compromised Skin Integrity  Goal: Skin integrity is maintained or improved  Description  INTERVENTIONS:  - Identify patients at risk for skin breakdown  - Assess and monitor skin integrity  - Assess and monitor nutrition and hydration status  - Monitor labs   - Assess for incontinence   - Turn and reposition patient  - Assist with mobility/ambulation  - Relieve pressure over bony prominences  - Avoid friction and shearing  - Provide appropriate hygiene as needed including keeping skin clean and dry  - Evaluate need for skin moisturizer/barrier cream  - Collaborate with interdisciplinary team   - Patient/family teaching  - Consider wound care consult   Outcome: Progressing     Problem: Potential for Falls  Goal: Patient will remain free of falls  Description  INTERVENTIONS:  - Assess patient frequently for physical needs  -  Identify cognitive and physical deficits and behaviors that affect risk of falls    -  [place] fall precautions as indicated by assessment   - Educate patient/family on patient safety including physical limitations  - Instruct patient to call for assistance with activity based on assessment  - Modify environment to reduce risk of injury  - Consider OT/PT consult to assist with strengthening/mobility  Outcome: Progressing

## 2020-07-22 NOTE — NURSING NOTE
Patient discharged to home with roomate at [facility] I level  Instructions reviewed and all questions answered

## 2020-07-22 NOTE — PROGRESS NOTES
07/22/20 0800   Pain Assessment   Pain Assessment Tool Pain Assessment not indicated - pt denies pain   Pain Score No Pain   Restrictions/Precautions   Precautions Fall Risk;Spinal precautions   Weight Bearing Restrictions No   ROM Restrictions No   Braces or Orthoses MAFO  (LLE MAFO)   Eating   Type of Assistance Needed Independent   Amount of Physical Assistance Provided No physical assistance   Eating CARE Score 6   Oral Hygiene   Type of Assistance Needed Independent; Adaptive equipment   Amount of Physical Assistance Provided No physical assistance   Comment Mod I for brushing teeth seated in w/c at sink   Oral Hygiene CARE Score 6   Grooming   Able To Initiate Tasks;Comb/Brush Hair;Wash/Dry Face;Brush/Clean Teeth;Wash/Dry Hands; Shave   Limitation Noted In Strength;Timeliness   Findings Mod I for grooming seated in w/c at sink   Shower/Bathe Self   Type of Assistance Needed Independent; Adaptive equipment   Amount of Physical Assistance Provided No physical assistance   Comment Pt engaged in showering at Mod I level, able to wash 10/10 parts  Completed majority of shower while seated on shower chair, using combo of dynamic reach and cross leg technique to wash B/L feet while adhering to spinal precautions w/o cueing  Pt able to wash buttocks in stance with unilateral UE support on shower grab bar, no LOB  Shower/Bathe Self CARE Score 6   Bathing   Assessed Bath Style Shower   Anticipated D/C Bath Style Shower   Able to Lupe Bhavin Yes   Able to [employer] Temperature Yes   Able to Wash/Rinse/Dry (body part) Left Arm;Right Arm;L Upper Leg;R Upper Leg;L Lower Leg/Foot;R Lower Leg/Foot;Chest;Abdomen;Perineal Area; Buttocks   Limitations Noted in Strength;Timeliness   Positioning Seated;Standing   Adaptive Equipment Shower Bars; Shower Seat;Hand [employer]   Limitations Noted In Balance; Endurance;LE Strength   Adaptive Equipment Grab Bars;Seat with Back   Assessed Shower   Findings Mod I SPT w/c<>shower chair w/RW, no LOB, utilized shower grab bars w/o cues   Upper Body Dressing   Type of Assistance Needed Independent   Amount of Physical Assistance Provided No physical assistance   Comment Mod I seated in w/c   Upper Body Dressing CARE Score 6   Lower Body Dressing   Type of Assistance Needed Independent   Amount of Physical Assistance Provided No physical assistance   Comment Mod I seated in w/c to thread BLEs, stance with unilateral UE support on RW for CM, no LOB   Lower Body Dressing CARE Score 6   Putting On/Taking Off Footwear   Type of Assistance Needed Independent; Adaptive equipment   Amount of Physical Assistance Provided No physical assistance   Comment Mod I while seated EOB using propping technique to doff/don socks and shoe horn to don L shoe over MAFO   Putting On/Taking Off Footwear CARE Score 6   Picking Up Object   Type of Assistance Needed Independent; Adaptive equipment   Amount of Physical Assistance Provided No physical assistance   Comment using LHR in stance, no LOB   Picking Up Object CARE Score 6   Dressing/Undressing Clothing   Able to  Obtain Clothing;Store removed clothing; Apply LE Orthosis   Remove UB Clothes Other  (hospital gown)   Fabian Shiver UB Clothes Pullover Shirt   Remove LB Clothes Socks   Don LB Clothes Shorts;Socks; Shoes   Limitations Noted In Balance; Endurance;Strength;Timeliness   Adaptive Equipment [address] [place]   Positioning Supported Sit; Sit Edge Of Bed   Roll Left and Right   Type of Assistance Needed Independent   Amount of Physical Assistance Provided No physical assistance   Roll Left and Right CARE Score 6   Sit to Lying   Type of Assistance Needed Independent   Amount of Physical Assistance Provided No physical assistance   Sit to Lying CARE Score 6   Lying to Sitting on Side of Bed   Type of Assistance Needed Independent   Amount of Physical Assistance Provided No physical assistance   Lying to Sitting on Side of Bed CARE Score 6   Sit to Stand   Type of Assistance Needed Independent; Adaptive equipment   Amount of Physical Assistance Provided No physical assistance   Comment Mod I at RW   Sit to Stand CARE Score 6   Bed Mobility   Rolling R 7  Independent   Rolling L 7  Independent   Supine to Sit 7  Independent   Sit to Supine 7  Independent   Bed-Chair Transfer   Type of Assistance Needed Independent; Adaptive equipment   Amount of Physical Assistance Provided No physical assistance   Comment Mod I SPT w/RW   Chair/Bed-to-Chair Transfer CARE Score 6   Transfer Bed/Chair/Wheelchair   Positioning Concerns Skin Integrity   Limitations Noted In Balance; Endurance;LE Strength   Adaptive Equipment Roller Walker   Toileting Hygiene   Type of Assistance Needed Independent; Adaptive equipment   Amount of Physical Assistance Provided No physical assistance   Comment Mod I in stance w/unilateral UE support on RW for hygiene and CM up/down   Toileting Hygiene CARE Score 6   Toileting   Able to Pull Clothing down yes, up yes  Able to Manage Clothing Closures Yes   Manage Hygiene Bladder   Limitations Noted In Balance;LE Strength   Toilet Transfer   Type of Assistance Needed Independent; Adaptive equipment   Amount of Physical Assistance Provided No physical assistance   Comment Mod I SPT w/c<>toilet w/RW   Toilet Transfer CARE Score 6   Toilet Transfer   Surface Assessed Raised Toilet   Transfer Technique Stand Pivot   Limitations Noted In Balance; Endurance;LE Strength   Adaptive Equipment Grab Bar;Walker   Positioning Concerns Grab Bars   Exercise Tools   Exercise Tools Yes   UE Ergometer UEB bilaterally while seated with Sup, 5min prograde against L1 9, 5min retrograde against L1 2, for increased UB strength and endurance for improved stability and safety during fxl transfers and ADL tasks in stance  Pt tolerated well, required brief 1min rest break x1 between prograde and retrograde to manage c/o min B/L bicep fatigue     Cognition   Overall Cognitive Status [facility] Arousal/Participation Alert; Cooperative   Attention Within functional limits   Orientation Level Oriented X4   Memory Decreased short term memory   Following Commands Follows multistep commands with increased time or repetition   Activity Tolerance   Activity Tolerance Patient tolerated treatment well   Assessment   Treatment Assessment Pt seen for 90min skilled OT session focused on D/C ADL, fxl transfers, and BUE strengthening/endurance for improved safety and independence during fxl transfers/mobility w/RW and ADL routine, and decreased caregiver burden  See detailed descriptions of fxl performance above  Pt completed all transfers and ADL components at Mod I level w/RW, no LOB, and pt adhered to spinal precautions throughout session w/o cueing  Pt continues to be limited by decreased endurance and activity tolerance, but able to manage with rest breaks which pt is able to initiate  Pt is expected to D/C home today, with OT recommendation being home at 50 [address] w/ skilled therapy services  Pt reported no further questions or concerns for OT team    Prognosis Good   Problem List Decreased strength;Decreased endurance; Impaired balance;Decreased mobility;Orthopedic restrictions   Barriers to Discharge Inaccessible home environment;Decreased caregiver support   Recommendation   OT Discharge Recommendation Home with skilled therapy   OT Therapy Minutes   OT Time In 0800   OT Time Out 0930   OT Total Time (minutes) 90   OT Mode of treatment - Individual (minutes) 90   OT Mode of treatment - Concurrent (minutes) 0   OT Mode of treatment - Group (minutes) 0   OT Mode of treatment - Co-treat (minutes) 0   OT Mode of Treatment - Total time(minutes) 90 minutes   OT Cumulative Minutes 820   Therapy Time missed   Time missed?  No

## 2020-07-22 NOTE — PROGRESS NOTES
Internal Medicine Progress Note  Patient: Mo Guzmán  Age/sex: 69 y o  male  Medical Record #: [id number]      ASSESSMENT/PLAN: (Interval History)  Mo Guzmán is seen and examined and management for following issues:    L5-S1 TLIF with hx L-S spondylolisthesis on 7/6/20 ([place])  · Monitor incision, sutures removed  · Pain control per primary  · Progressing in therapy  · F/u neurology as scheduled in 4 weeks     Scalp revision/hx NPH  shunt 7/6/20  · Sutures removed   · Wound clean non erythema     Bradycardia  · stable     DM2  · At home on Lantus 10U qam/12U qpm  · Continue DM diet, QID Accuchecks/SSI  · Currently only on SSI   · BS remain stable on sliding scale only  · Add back Lantus when needed vs try Metformin  · Will need close f/u with PCP on dc     Mild cognitive impairment  · Seen by Geriatrics as I/P and MOCA was 22/30  · For OP followup with them an a repeat MOCA     HTN  · At home he is on Vasotec 10mg qd   · BP stable  · Will monitor      ZHOU/CPAP  · Continue CPAP     Hx gout  · stable  · Continue Allopurinol     Urine incontinence  · Supposedly having this issue at home and Geriatrics recommended that he see Urology as an OP  · PMR managing     Left TMA, diabetic neuropathy  · Likely contributes to some gait instability    GERD  · Cont PPI daily  · TUMS prn    OK for dc today    The above assessment and plan was reviewed and updated as determined by my evaluation of the patient on 7/22/2020      Labs:   Results from last 7 days   Lab Units 07/20/20  0446 07/16/20  0445   WBC Thousand/uL 7 88 6 56   HEMOGLOBIN g/dL 13 1 13 7   HEMATOCRIT % 40 7 42 1   PLATELETS Thousands/uL 237 227     Results from last 7 days   Lab Units 07/20/20  0446 07/16/20  0445   SODIUM mmol/L 136 134*   POTASSIUM mmol/L 4 2 4 1   CHLORIDE mmol/L 104 102   CO2 mmol/L 26 27   BUN mg/dL 18 19   CREATININE mg/dL 0 94 0 97   CALCIUM mg/dL 9 2 9 1             Results from last 7 days   Lab Units 07/22/20  6963 07/21/20 2059 07/21/20  1604   POC GLUCOSE mg/dl 99 111 107       Review of Scheduled Meds:    Current Facility-Administered Medications:  acetaminophen 650 mg Oral Q8H PRN Diane Cardoza MD   allopurinol 300 mg Oral Daily Diane Cardoza MD   bacitracin 1 small application Topical BID Alexandria Bobby PA-C   bisacodyl 10 mg Rectal Daily PRN Diane Cardoza MD   calcium carbonate 500 mg Oral Daily PRN AR Tavarez   docusate sodium 100 mg Oral BID Diane Cardoza MD   heparin (porcine) 5,000 Units Subcutaneous Q8H [facility] Diane Cardoza MD   insulin lispro 1-5 Units Subcutaneous TID AC AR Epstein   insulin lispro 1-5 Units Subcutaneous HS AR Epstein   lisinopril 5 mg Oral Daily Denia Sood, AR   melatonin 6 mg Oral HS Diane Cardoza MD   methocarbamol 500 mg Oral Q6H [facility] Diane Cardoza MD   ondansetron 4 mg Oral Q6H PRN Diane Cardoza MD   oxyCODONE 2 5 mg Oral Q4H PRN Diane Cardoza MD   oxyCODONE 5 mg Oral Q4H PRN Diane Cardoza MD   pantoprazole 40 mg Oral Early Morning Denia Sood, AR   polyethylene glycol 17 g Oral Daily PRN Diane Cardoza MD   senna 1 tablet Oral Daily Diane Cardoza MD   travoprost 1 drop Right Eye HS Diane Cardoza MD       Subjective/ HPI: Patient seen and examined  Patients overnight issues or events were reviewed with nursing or staff during rounds or morning huddle session  New or overnight issues include the following:     Pt anxious for dc today    ROS:   A 10 point ROS was performed; negative except as noted above  Imaging:     XR skull < 4 vw   Final Result by Romi Smith MD (07/21 1328)      Shunt valve has been set to approximately 100 mm H2O  Study was reviewed with Dr Roxanne Leonard at time of film acquisition           Workstation performed: [id number]         CT head wo contrast   Final Result by King Santiago MD (07/21 1130)      Minimal decreased size of ventricular system with associated minimal increased bilateral cerebral hemispheres subdural hygromas  The study was marked in EPIC for significant notification  Workstation performed: [id number]             *Labs /Radiology studies Reviewed  *Medications  reviewed and reconciled as needed  *Please refer to order section for additional ordered labs studies  *Case discussed with primary attending during morning huddle case rounds    Physical Examination:  Vitals:   Vitals:    07/22/20 0000 07/22/20 0554 07/22/20 0600 07/22/20 0803   BP:  117/68  121/59   BP Location:  Left arm     Pulse:  (!) 50     Resp:  18     Temp:  97 6 °F (36 4 °C)     TempSrc:  Oral     SpO2: 96% 98%     Weight:   122 kg (269 lb 13 5 oz)    Height:           GEN: No apparent distress, interactive  NEURO: Alert and oriented x3  HEENT: Pupils are equal and reactive, EOMI, mucous membranes are moist, face symmetrical  CV: S1 S2 regular, no MRG, no peripheral edema noted  RESP: Lungs are clear bilaterally, no wheezes, rales or rhonchi noted, on room air, respirations easy and non labored  GI: Flat, soft non tender, non distended; +BS x4  : Voiding without difficulty  MUSC: Moves all extremities, braces to LE in place  SKIN: pink, warm and dry, normal turgor, no rashes, lesions, lumbar incision and scalp incision intact         The above physical exam was reviewed and updated as determined by my evaluation of the patient on 7/22/2020  Invasive Devices     None                    VTE Pharmacologic Prophylaxis: Heparin  Code Status: Level 1 - Full Code  Current Length of Stay: 12 day(s)      Total time spent:  30 minutes with more than 50% spent counseling/coordinating care  Counseling includes discussion with patient re: progress  and discussion with patient of his/her current medical state/information  Coordination of patient's care was performed in conjunction with primary service   Time invested included review of patient's labs, vitals, and management of their comorbidities with continued monitoring  In addition, this patient was discussed with medical team including physician and advanced extenders  The care of the patient was extensively discussed and appropriate treatment plan was formulated unique for this patient  ** Please Note:  voice to text software may have been used in the creation of this document   Although proof errors in transcription or interpretation are a potential of such software**

## 2020-07-22 NOTE — CASE MANAGEMENT
Team Discharge Summary  Pt made good progress, and d/c'd home with support from household member and brother  Pt will continue with outpt PT at [facility]  There were no DME needs identified during this stay  Pts medications were sent to his home pharmacy, [address] [place] [address]  Pts friend provided transportation at d/c

## 2020-07-22 NOTE — DISCHARGE SUMMARY
Discharge Summary - PMR   Carlos Soto 69 y o  male MRN: [id number]  Unit/Bed#: -01 Encounter: [id number]    Admission Date: 7/10/2020     Discharge Date: 7/22/20    Etiologic/Rehabilitation Diagnosis: Impairment of mobility, safety and Activities of Daily Living (ADLs) due to Orthopedic Disorders:  08 9  Other Orthopedic Spondylolisthesis    HPI: Carlos Soto is a 69 y o  male with HTN, DM2, Gout, known NPH s/p VPS chronic left foot weakness, lumbar spinal stenosis who presented to the [facility] [place] on 7/6/20 for worsening back pain and gait instability from known spondylolisthesis L5/S1  He was recommended to have operative intervention, and was taken to the OR on 7/6/20 by Dr Trev Hunter for an elective L5-S1 transverse lumbar interbody fusion and deformity correction at L5-S1  Patient found to have functional deficits from baseline and admitted to [address] [place] [address] on 7/10/20  Acute Rehabilitation Center Course: Patient participated in a comprehensive interdisciplinary inpatient rehabilitation program which included involvment of MD, therapies (PT, OT, and/or SLP), RN, CM, SW, dietary, and psychology services  He was able to be advanced to an overall supervision level of assist and considered safe for discharge home with family  Please see below for patient's day to day management of medical needs  Rehabilitation  · Much improved in his functional deficits of lower extremity weakness, improved balance, endurance    · Patient to continue his rehabilitation with outpatient PT  Physical Therapy Occupational Therapy Speech Therapy   Weight Bearing Status: Full Weight Bearing  Transfers: Independent  Bed Mobility: Independent  Amulation Distance (ft): 200 feet  Ambulation: Supervision(Mod I for Household distances)  Assistive Device for Ambulation: Roller Walker  Wheelchair Mobility Distance: (NA)  Number of Stairs: 14  Assistive Device for Stairs: Bilateral Bear Loma Assistance: Supervision  Ramp: Supervision  Assistive Device for Ramp: Roller Walker  Discharge Recommendations: Home with:  [address] with[de-identified] Family Support, Outpatient Physical Therapy   Eating: Independent  Grooming: Independent  Bathing: Independent  Bathing: Independent  Upper Body Dressing: Independent  Lower Body Dressing: Independent  Toileting: Independent  Tub/Shower Transfer: Independent  Toilet Transfer: Independent  Cognition: Within Defined Limits  Cognition: Decreased Memory, Decreased Safety  Orientation: Person, Place, Time, Situation                  Pain  · No current issues, continue Tylenol to PRN     DVT prophylaxis  · Managed on SQ heparin while inpatient only     Bladder plan  · Continent     Bowel plan  · Continent    * Spondylolisthesis of lumbosacral region  Assessment & Plan  s/p L5-S1 transverse lumbar interbody fusion and deformity correction at L5-S1 on 7/6/20 by Dr Stacey Johns healing well with sterstrips  Follow-up with Dr Amor Mott follow-up  X-rays reviewed: Status post TLIF at L5-S1  No complication of the hardware  Multilevel lumbar spondylosis and L5-S1 spondylolisthesis  Scalp laceration  Assessment & Plan  Sutures removed by SOPHIE today    Pain  Assessment & Plan  Managed on Tylenol and Robaxin  P r n  Oxycodone    ZHOU on CPAP  Assessment & Plan  Continue nocturnal CPAP home settings    Gout  Assessment & Plan  Managed on allopurinol    Essential hypertension  Assessment & Plan  Patient discharged with enalapril 5mg (half his home dose)    Normal pressure hydrocephalus (HCC)  Assessment & Plan  Chronic  shunt with scalp revision  - Shunt today 7/21 was dialed up to 100 cm water by SOPHIE    Status post ventriculoperitoneal shunt  Assessment & Plan  Repeat CTH reviewed by SOPHIE: Minimal decreased size of ventricular system with associated minimal increased bilateral cerebral hemispheres subdural hygromas    Appreciate follow-up and suture removal:  Shunt today 7/21 was dialed up to 100 cm water by NSGY additionally  Follow-up as outpatient      Discharge Medications:   See after visit summary for reconciled discharge medications provided to patient and family  Condition at Discharge: good     Discharge instructions/Information to patient and family:   See after visit summary for information provided to patient and family  Provisions for Follow-Up Care:  See after visit summary for information related to follow-up care and any pertinent home health orders  Future Appointments   Date Time Provider Pedro Luis Love   7/28/2020  8:00 AM Carmen Ortega PT BE RYAN SALAZAR [facility]   8/20/2020 11:00 AM Rosaline Molina MD NEURO [facility]       Disposition: Home      Planned Readmission: No    Discharge Statement   I spent more than 30 minutes discharging the patient  This time was spent on the day of discharge  I had direct contact with the patient on the day of discharge  Greater than 50% of the total time was spent examining patient, answering all patient questions, arranging and discussing plan of care with patient as well as directly providing post-discharge instructions  Additional time then spent on discharge activities  Discharge Medications:  See after visit summary for reconciled discharge medications provided to patient and family

## 2020-07-22 NOTE — SOCIAL WORK
PCT [address] [place] [address], [phone], to schedule Pts initial PT evaluation  7/28, at 8am       Information placed on d/c instructions

## 2020-07-24 NOTE — PHYSICAL THERAPY NOTE
PT Discharge Summary    Pt made good functional progress during his rehab stay and will return home with family safely  Pt Mod I with use of RW for transfers and household mobility  Recommend supervision when leaving the house for safety  All goal were met for safe discharge home  Pt will benefit from continued skilled therapy to continue to improve his overall gait and standing balance

## 2020-07-26 NOTE — OCCUPATIONAL THERAPY NOTE
Occupational Therapy Discharge Summary     Pt discharge home from [address] [place] [address], and made good in Occupational Therapy  Pt progressed to Independent  level for functional transfers,  Independent level for ADL function with use of RW  OT recommending use of DME/AE as mentioned and pt/family received and/or purchased prior to d/c for home use  Pt safe to d/c home at current level of function and continues to present with deficits of impaired balance, decreased coordination and increased fall risk  Recommending None Supervision for ADLS and daily activities at time of D/C  Pt does not require further OT needs

## 2020-07-28 ENCOUNTER — EVALUATION (OUTPATIENT)
Dept: PHYSICAL THERAPY | Facility: REHABILITATION | Age: 70
End: 2020-07-28
Payer: COMMERCIAL

## 2020-07-28 DIAGNOSIS — M43.17 SPONDYLOLISTHESIS OF LUMBOSACRAL REGION: Primary | ICD-10-CM

## 2020-07-28 DIAGNOSIS — Z74.09 IMPAIRED FUNCTIONAL MOBILITY, BALANCE, GAIT, AND ENDURANCE: ICD-10-CM

## 2020-07-28 PROCEDURE — 97162 PT EVAL MOD COMPLEX 30 MIN: CPT | Performed by: PHYSICAL THERAPIST

## 2020-07-28 PROCEDURE — 97110 THERAPEUTIC EXERCISES: CPT | Performed by: PHYSICAL THERAPIST

## 2020-07-28 NOTE — PROGRESS NOTES
PT Evaluation  and PT Discharge    Today's date: 2020  Patient name: Khloe Rangel  : [id number]  MRN: [id number]  Referring provider: Teresa Hollins MD  Dx:   Encounter Diagnosis     ICD-10-CM    1  Spondylolisthesis of lumbosacral region M43 17    2  Impaired functional mobility, balance, gait, and endurance Z74 09        Start Time: 810  Stop Time: 910  Total time in clinic (min): 60 minutes    Assessment  Assessment details: Khloe Rangel has not returned since initial evaluation, unable to perform objective measures since then  Alvin Valdez was admitted to the hospital and unable to attend outpatient therapy at this time  Khloe Rangel will be discharged from physical therapy services  Patient given HEP at IE and is encouraged to contact PT with any questions or concerns in the future  Khloe Rangel is a 69 y o  male patient presenting with Spondylolisthesis of lumbosacral region and Impaired functional mobility, balance, gait, and endurance s/p L5/S1 TLIF performed by Dr Todd Batista on 2020  Bilateral incisions at L5/S1 level intact, clean and dry with good healing  Reviewed spinal precautions with patient able to verbalize precautions, however noted patient is lifting rollator into car and closing heavy trunk door to car  Educated patient on having a second person assist him with any lifting or pushing/pulling; patient verbalized understanding  Also educated patient regarding skin checks on plantar surface of feet due to significantly diminished sensation  Patient is currently having difficulty with gait, balance, endurance and overall functional mobility due to multiple co-morbidities  Patient is placed at a falls risk according to functional outcome measures and gait assessment this visit  Pt would like to return to being able to walk his dog and reduce risk of falls  Next follow up with the physician is 2020   Evaluation findings today reveal significant limitations in BLE sensation, strength, balance and gait deficits due to impairments from peripheral neuropathy, extensive surgical history in BLE and contributions from NPH  Georgina Blevins presents with the impairments as listed above and would benefit from Physical Therapy to address these impairments and to maximize function  Impairments: abnormal gait, abnormal muscle tone, abnormal or restricted ROM, activity intolerance, impaired balance, impaired physical strength, lacks appropriate home exercise program, safety issue and poor posture     Symptom irritability: moderateUnderstanding of Dx/Px/POC: fair   Prognosis: fair    Goals  Short-Term Goals:   1  Patient will report 50% decrease in low back pain and stiffness in 10 visits  2  Patient will report     Long-Term Goals:   1  Patient will demonstrate increased BLE strength by 1-2 grades for improved functional strength with STS and hip/lumbar stability in 20 visits  2  Patient will have improved TUG score by 25% for normalized gait, increased steadiness and decreased risk of falls in 20 visits  3  Patient independent with HEP at time of discharge  4  Patient will be able to walk for at least 10 minutes without seated rest break to return to walking dog safely in 20 visits  Plan  Plan details: Thank you for opportunity to participate in the care of Yasmin Valencia      Patient would benefit from: skilled physical therapy and PT eval  Planned therapy interventions: abdominal trunk stabilization, activity modification, balance, flexibility, functional ROM exercises, home exercise program, therapeutic training, transfer training, therapeutic exercise, therapeutic activities, strengthening, stretching, postural training, patient education and neuromuscular re-education  Frequency: 2x week  Duration in visits: 10  Plan of Care beginning date: 7/28/2020  Treatment plan discussed with: patient        Subjective Evaluation    History of Present Illness  Date of surgery: 2020  Mechanism of injury: Patient is a 69 y o  presenting to physical therapy s/p TLIF L/S1  performed by Dr Volodymyr Betts secondary to L5/S1 spondylolisthesis  Patient also with a history of NPH with a placement of  shunt, Dr Volodymyr Betts revised scalp incision of the shunt on 2020, however no changes to the shunt were made  Patient states long history of low back pain, unable to differentiate between radicular symptoms and peripheral neuropathy as patient notes decreased sensation bilaterally from knees to feet  Patient wears L hinged AFO stating "I need to wear that because I would get pressure sores on my feet and it keeps the pressure on the outside of my foot " Patient denies foot drop  Patient reports "I have had 8 knee replacements on each side, the first set was done in  " Patient states "they told me my balance will never get better "    Sensation changes: diminished feeling from calf to feet bilaterally  Sleep position: 95% of time supine on back, no difficulty getting in and out of bed   Household set up:  3 grab bars in shower, step out of tub one hand on grab bar and sink   Small double home stating "Not using rollator at home because there isn't any space  I use a cane or grab onto furniture "  Bowel/Bladder changes: Incontinence  Goals for PT: be able to take dog for a walk again (over a year ago)  Quality of life: good    Pain  Current pain ratin  At best pain ratin  At worst pain ratin  Location: low back  Quality: tight  Relieving factors: rest  Aggravating factors: sitting, walking and standing  Progression: improved    Social Support  Steps to enter house: yes (3 SOBIA BHR)  Stairs in house: yes (13 BHR )   Lives in: multiple-level home  Lives with: friend      Employment status: not working  Patient Goals  Patient goals for therapy: decreased pain, improved balance, increased motion, increased strength and return to sport/leisure activities          Objective     Neurological Testing     Additional Neurological Details  Dermatomes bilaterally:  L1: diminished b/l  L2: diminished L, absent R  L3: diminished L, absent R  L4: dminished b/l  L5: diminished b/l  S1: diminished L, absent R      Active Range of Motion   Left Knee   Flexion contracture  Extension: -15 degrees     Right Knee   Flexion contracture  Extension: -20 degrees     Strength/Myotome Testing     Left Hip   Planes of Motion   Flexion: 4-  Abduction: 4-    Right Hip   Planes of Motion   Flexion: 4-  Abduction: 4-    Left Knee   Extension: 4-    Right Knee   Extension: 4    Left Ankle/Foot   Dorsiflexion: 3+  Plantar flexion: 3-    Right Ankle/Foot   Dorsiflexion: 4  Plantar flexion: 3-    Additional Strength Details  Seated heel raise test: 10 reps half height R>L    Muscle Activation   Patient able to activate left transverse abdominals and right transverse abdominals       Ambulation   Weight-Bearing Status   Assistive device used: wheeled walker    General Comments:      Lumbar Comments  STS: CGA with BUE, min A no UE support   Neuro Exam:     Sensation   Light touch LE: left impaired and right impaired  Proprioception LE: left impaired and right impaired    Transfers Sit to supine: independent Supine to sit: independent     Functional outcomes   Gait speed: 1'  5x sit to stand: 26" (seconds)  TU" (seconds)  Functional outcome comment: TUG  Trial 1: 25" BUE  Trial 2: 23" BUE    5xSTS: 26" BUE   Uni-UE: able to initiate motion stood 4 inch from chair             Precautions: NPH w/ shunt, DM 2, Gout, HTN, Neuropathy, TIA      Manuals                                                                 Neuro Re-Ed             WBOS foam nv            NBOS foam nv                                                                             Ther Ex             Bike nv            Glute sets* :05x20            Diaphragmatic breathing* 3' cues for [employer] sets* :05x10 b/l            Standing marches  nv SAQ nv            LAQ nv                         Ther Activity             Mat ambulation nv                         Gait Training                                       Modalities

## 2020-07-29 NOTE — TELEPHONE ENCOUNTER
Patient reports that he is doing very well overall and denies any incisional issues or fevers  Patient denies any bleeding, dizziness, fever, redness, significant pain and swelling  Verified date/time/location of his upcoming POV on 8/20/2020 and advised him to call the office with any further questions or concerns, or if any incisional issues or fevers would arise  Patient aware he needs head CT and upright L-spine xr prior to his 6 week POV with the surgeon  I provided him with central scheduling's phone number and mailed him the scripts per his request     Patient received and does understand the discharge instructions  Reviewed incision care with Patient and he has no further questions at this time  He was appreciative for the call

## 2020-07-30 ENCOUNTER — HOSPITAL ENCOUNTER (INPATIENT)
Facility: HOSPITAL | Age: 70
LOS: 29 days | Discharge: NURSING, REHAB OR LONG-TERM CARE | DRG: 031 | End: 2020-08-29
Attending: EMERGENCY MEDICINE | Admitting: INTERNAL MEDICINE
Payer: COMMERCIAL

## 2020-07-30 DIAGNOSIS — G91.0 COMMUNICATING HYDROCEPHALUS (HCC): ICD-10-CM

## 2020-07-30 DIAGNOSIS — K55.9 ISCHEMIC BOWEL DISEASE (HCC): ICD-10-CM

## 2020-07-30 DIAGNOSIS — T85.730A: ICD-10-CM

## 2020-07-30 DIAGNOSIS — R26.2 AMBULATORY DYSFUNCTION: ICD-10-CM

## 2020-07-30 DIAGNOSIS — G96.89 CNS INFECTION: ICD-10-CM

## 2020-07-30 DIAGNOSIS — G91.2 NORMAL PRESSURE HYDROCEPHALUS (HCC): ICD-10-CM

## 2020-07-30 DIAGNOSIS — B99.9 CNS INFECTION: ICD-10-CM

## 2020-07-30 DIAGNOSIS — R53.1 GENERALIZED WEAKNESS: ICD-10-CM

## 2020-07-30 DIAGNOSIS — K29.01 ACUTE SUPERFICIAL GASTRITIS WITH HEMORRHAGE: Primary | ICD-10-CM

## 2020-07-30 DIAGNOSIS — Z98.2 STATUS POST VENTRICULOPERITONEAL SHUNT: ICD-10-CM

## 2020-07-30 LAB
ALBUMIN SERPL BCP-MCNC: 2.7 G/DL (ref 3.5–5)
ALP SERPL-CCNC: 91 U/L (ref 46–116)
ALT SERPL W P-5'-P-CCNC: 19 U/L (ref 12–78)
ANION GAP SERPL CALCULATED.3IONS-SCNC: 8 MMOL/L (ref 4–13)
AST SERPL W P-5'-P-CCNC: 39 U/L (ref 5–45)
BASOPHILS # BLD AUTO: 0.04 THOUSANDS/ΜL (ref 0–0.1)
BASOPHILS NFR BLD AUTO: 0 % (ref 0–1)
BILIRUB SERPL-MCNC: 0.82 MG/DL (ref 0.2–1)
BUN SERPL-MCNC: 32 MG/DL (ref 5–25)
CALCIUM SERPL-MCNC: 9.7 MG/DL (ref 8.3–10.1)
CHLORIDE SERPL-SCNC: 104 MMOL/L (ref 100–108)
CO2 SERPL-SCNC: 26 MMOL/L (ref 21–32)
CREAT SERPL-MCNC: 1.21 MG/DL (ref 0.6–1.3)
EOSINOPHIL # BLD AUTO: 0.01 THOUSAND/ΜL (ref 0–0.61)
EOSINOPHIL NFR BLD AUTO: 0 % (ref 0–6)
ERYTHROCYTE [DISTWIDTH] IN BLOOD BY AUTOMATED COUNT: 14.5 % (ref 11.6–15.1)
GFR SERPL CREATININE-BSD FRML MDRD: 60 ML/MIN/1.73SQ M
GLUCOSE SERPL-MCNC: 136 MG/DL (ref 65–140)
HCT VFR BLD AUTO: 46 % (ref 36.5–49.3)
HGB BLD-MCNC: 14.8 G/DL (ref 12–17)
IMM GRANULOCYTES # BLD AUTO: 0.14 THOUSAND/UL (ref 0–0.2)
IMM GRANULOCYTES NFR BLD AUTO: 1 % (ref 0–2)
LYMPHOCYTES # BLD AUTO: 0.33 THOUSANDS/ΜL (ref 0.6–4.47)
LYMPHOCYTES NFR BLD AUTO: 2 % (ref 14–44)
MCH RBC QN AUTO: 27.8 PG (ref 26.8–34.3)
MCHC RBC AUTO-ENTMCNC: 32.2 G/DL (ref 31.4–37.4)
MCV RBC AUTO: 87 FL (ref 82–98)
MONOCYTES # BLD AUTO: 1.33 THOUSAND/ΜL (ref 0.17–1.22)
MONOCYTES NFR BLD AUTO: 10 % (ref 4–12)
NEUTROPHILS # BLD AUTO: 11.91 THOUSANDS/ΜL (ref 1.85–7.62)
NEUTS SEG NFR BLD AUTO: 87 % (ref 43–75)
NRBC BLD AUTO-RTO: 0 /100 WBCS
PLATELET # BLD AUTO: 272 THOUSANDS/UL (ref 149–390)
PMV BLD AUTO: 10 FL (ref 8.9–12.7)
POTASSIUM SERPL-SCNC: 4.9 MMOL/L (ref 3.5–5.3)
PROT SERPL-MCNC: 7.1 G/DL (ref 6.4–8.2)
RBC # BLD AUTO: 5.32 MILLION/UL (ref 3.88–5.62)
SODIUM SERPL-SCNC: 138 MMOL/L (ref 136–145)
TROPONIN I SERPL-MCNC: <0.02 NG/ML
WBC # BLD AUTO: 13.76 THOUSAND/UL (ref 4.31–10.16)

## 2020-07-30 PROCEDURE — 36415 COLL VENOUS BLD VENIPUNCTURE: CPT

## 2020-07-30 PROCEDURE — 84484 ASSAY OF TROPONIN QUANT: CPT | Performed by: EMERGENCY MEDICINE

## 2020-07-30 PROCEDURE — 83690 ASSAY OF LIPASE: CPT | Performed by: EMERGENCY MEDICINE

## 2020-07-30 PROCEDURE — 85025 COMPLETE CBC W/AUTO DIFF WBC: CPT | Performed by: EMERGENCY MEDICINE

## 2020-07-30 PROCEDURE — 99285 EMERGENCY DEPT VISIT HI MDM: CPT | Performed by: EMERGENCY MEDICINE

## 2020-07-30 PROCEDURE — 93005 ELECTROCARDIOGRAM TRACING: CPT

## 2020-07-30 PROCEDURE — 99285 EMERGENCY DEPT VISIT HI MDM: CPT

## 2020-07-30 PROCEDURE — 80053 COMPREHEN METABOLIC PANEL: CPT | Performed by: EMERGENCY MEDICINE

## 2020-07-30 RX ORDER — PANTOPRAZOLE SODIUM 40 MG/1
40 INJECTION, POWDER, FOR SOLUTION INTRAVENOUS ONCE
Status: COMPLETED | OUTPATIENT
Start: 2020-07-31 | End: 2020-07-31

## 2020-07-31 ENCOUNTER — ANESTHESIA EVENT (INPATIENT)
Dept: PERIOP | Facility: HOSPITAL | Age: 70
DRG: 031 | End: 2020-07-31
Payer: COMMERCIAL

## 2020-07-31 ENCOUNTER — APPOINTMENT (INPATIENT)
Dept: NON INVASIVE DIAGNOSTICS | Facility: HOSPITAL | Age: 70
DRG: 031 | End: 2020-07-31
Payer: COMMERCIAL

## 2020-07-31 ENCOUNTER — APPOINTMENT (EMERGENCY)
Dept: RADIOLOGY | Facility: HOSPITAL | Age: 70
DRG: 031 | End: 2020-07-31
Payer: COMMERCIAL

## 2020-07-31 ENCOUNTER — APPOINTMENT (INPATIENT)
Dept: RADIOLOGY | Facility: HOSPITAL | Age: 70
DRG: 031 | End: 2020-07-31
Payer: COMMERCIAL

## 2020-07-31 ENCOUNTER — ANESTHESIA (INPATIENT)
Dept: PERIOP | Facility: HOSPITAL | Age: 70
DRG: 031 | End: 2020-07-31
Payer: COMMERCIAL

## 2020-07-31 PROBLEM — Z74.1 SELF-CARE DEFICIT FOR HYGIENE: Status: ACTIVE | Noted: 2020-07-31

## 2020-07-31 PROBLEM — K55.9 ISCHEMIC BOWEL DISEASE (HCC): Status: ACTIVE | Noted: 2020-07-31

## 2020-07-31 PROBLEM — K29.01 ACUTE SUPERFICIAL GASTRITIS WITH HEMORRHAGE: Status: ACTIVE | Noted: 2020-07-31

## 2020-07-31 LAB
ABO GROUP BLD: NORMAL
ANION GAP SERPL CALCULATED.3IONS-SCNC: 10 MMOL/L (ref 4–13)
APPEARANCE CSF: ABNORMAL
APTT PPP: 41 SECONDS (ref 23–37)
APTT PPP: 53 SECONDS (ref 23–37)
APTT PPP: 60 SECONDS (ref 23–37)
APTT PPP: 72 SECONDS (ref 23–37)
ARTERIAL PATENCY WRIST A: YES
ATRIAL RATE: 88 BPM
ATRIAL RATE: 91 BPM
ATRIAL RATE: 98 BPM
BACTERIA UR QL AUTO: ABNORMAL /HPF
BASE EXCESS BLDA CALC-SCNC: -3.5 MMOL/L
BASE EXCESS BLDA CALC-SCNC: -6 MMOL/L (ref -2–3)
BASE EXCESS BLDA CALC-SCNC: -6.7 MMOL/L
BASE EXCESS BLDA CALC-SCNC: -7 MMOL/L (ref -2–3)
BASOPHILS # BLD MANUAL: 0 THOUSAND/UL (ref 0–0.1)
BASOPHILS NFR MAR MANUAL: 0 % (ref 0–1)
BILIRUB UR QL STRIP: NEGATIVE
BLD GP AB SCN SERPL QL: NEGATIVE
BUN SERPL-MCNC: 33 MG/DL (ref 5–25)
CA-I BLD-SCNC: 0.98 MMOL/L (ref 1.12–1.32)
CA-I BLD-SCNC: 1.12 MMOL/L (ref 1.12–1.32)
CALCIUM SERPL-MCNC: 8.5 MG/DL (ref 8.3–10.1)
CHLORIDE SERPL-SCNC: 108 MMOL/L (ref 100–108)
CLARITY UR: CLEAR
CO2 SERPL-SCNC: 21 MMOL/L (ref 21–32)
COLOR UR: ABNORMAL
CREAT SERPL-MCNC: 1.27 MG/DL (ref 0.6–1.3)
EOSINOPHIL # BLD MANUAL: 0 THOUSAND/UL (ref 0–0.4)
EOSINOPHIL NFR BLD MANUAL: 0 % (ref 0–6)
ERYTHROCYTE [DISTWIDTH] IN BLOOD BY AUTOMATED COUNT: 14.6 % (ref 11.6–15.1)
EST. AVERAGE GLUCOSE BLD GHB EST-MCNC: 114 MG/DL
GFR SERPL CREATININE-BSD FRML MDRD: 56 ML/MIN/1.73SQ M
GLUCOSE CSF-MCNC: 88 MG/DL (ref 50–80)
GLUCOSE SERPL-MCNC: 101 MG/DL (ref 65–140)
GLUCOSE SERPL-MCNC: 132 MG/DL (ref 65–140)
GLUCOSE SERPL-MCNC: 136 MG/DL (ref 65–140)
GLUCOSE SERPL-MCNC: 148 MG/DL (ref 65–140)
GLUCOSE SERPL-MCNC: 159 MG/DL (ref 65–140)
GLUCOSE SERPL-MCNC: 171 MG/DL (ref 65–140)
GLUCOSE UR STRIP-MCNC: NEGATIVE MG/DL
GRAM STN SPEC: ABNORMAL
GRAM STN SPEC: ABNORMAL
HBA1C MFR BLD: 5.6 %
HCO3 BLDA-SCNC: 17.3 MMOL/L (ref 22–28)
HCO3 BLDA-SCNC: 17.7 MMOL/L (ref 22–28)
HCO3 BLDA-SCNC: 17.7 MMOL/L (ref 22–28)
HCO3 BLDA-SCNC: 19.2 MMOL/L (ref 24–30)
HCT VFR BLD AUTO: 39.7 % (ref 36.5–49.3)
HCT VFR BLD CALC: 29 % (ref 36.5–49.3)
HCT VFR BLD CALC: 37 % (ref 36.5–49.3)
HGB BLD-MCNC: 11 G/DL (ref 12–17)
HGB BLD-MCNC: 13 G/DL (ref 12–17)
HGB BLDA-MCNC: 12.6 G/DL (ref 12–17)
HGB BLDA-MCNC: 9.9 G/DL (ref 12–17)
HGB UR QL STRIP.AUTO: ABNORMAL
HOROWITZ INDEX BLDA+IHG-RTO: 50 MM[HG]
HYALINE CASTS #/AREA URNS LPF: ABNORMAL /LPF
INR PPP: 1.25 (ref 0.84–1.19)
INR PPP: 1.41 (ref 0.84–1.19)
KETONES UR STRIP-MCNC: NEGATIVE MG/DL
LACTATE SERPL-SCNC: 1.6 MMOL/L (ref 0.5–2)
LACTATE SERPL-SCNC: 2.2 MMOL/L (ref 0.5–2)
LACTATE SERPL-SCNC: 2.2 MMOL/L (ref 0.5–2)
LACTATE SERPL-SCNC: 2.3 MMOL/L (ref 0.5–2)
LEUKOCYTE ESTERASE UR QL STRIP: NEGATIVE
LIPASE SERPL-CCNC: 33 U/L (ref 73–393)
LYMPHOCYTES # BLD AUTO: 0 % (ref 14–44)
LYMPHOCYTES # BLD AUTO: 0 THOUSAND/UL (ref 0.6–4.47)
LYMPHOCYTES NFR CSF MANUAL: 4 %
MCH RBC QN AUTO: 28.4 PG (ref 26.8–34.3)
MCHC RBC AUTO-ENTMCNC: 32.7 G/DL (ref 31.4–37.4)
MCV RBC AUTO: 87 FL (ref 82–98)
MONOCYTES # BLD AUTO: 0.9 THOUSAND/UL (ref 0–1.22)
MONOCYTES NFR BLD: 9 % (ref 4–12)
MONOS+MACROS CSF MANUAL: 4 %
MYELOCYTES NFR BLD MANUAL: 1 % (ref 0–1)
NEUTROPHILS # BLD MANUAL: 9.02 THOUSAND/UL (ref 1.85–7.62)
NEUTROPHILS NFR CSF MANUAL: 91 %
NEUTS BAND NFR BLD MANUAL: 8 % (ref 0–8)
NEUTS BAND NFR CSF MANUAL: 1 %
NEUTS SEG NFR BLD AUTO: 82 % (ref 43–75)
NITRITE UR QL STRIP: NEGATIVE
NON VENT ROOM AIR: 21 %
NON-SQ EPI CELLS URNS QL MICRO: ABNORMAL /HPF
NRBC BLD AUTO-RTO: 0 /100 WBCS
O2 CT BLDA-SCNC: 17.5 ML/DL (ref 16–23)
O2 CT BLDA-SCNC: 19.3 ML/DL (ref 16–23)
OXYHGB MFR BLDA: 94.7 % (ref 94–97)
OXYHGB MFR BLDA: 98 % (ref 94–97)
P AXIS: 48 DEGREES
P AXIS: 48 DEGREES
P AXIS: 50 DEGREES
PCO2 BLD: 19 MMOL/L (ref 21–32)
PCO2 BLD: 20 MMOL/L (ref 21–32)
PCO2 BLD: 28.3 MM HG (ref 36–44)
PCO2 BLD: 38.5 MM HG (ref 42–50)
PCO2 BLDA: 21.9 MM HG (ref 36–44)
PCO2 BLDA: 32.3 MM HG (ref 36–44)
PEEP RESPIRATORY: 5 CM[H2O]
PH BLD: 7.3 [PH] (ref 7.3–7.4)
PH BLD: 7.4 [PH] (ref 7.35–7.45)
PH BLDA: 7.36 [PH] (ref 7.35–7.45)
PH BLDA: 7.52 [PH] (ref 7.35–7.45)
PH UR STRIP.AUTO: 5.5 [PH]
PLATELET # BLD AUTO: 234 THOUSANDS/UL (ref 149–390)
PLATELET BLD QL SMEAR: ADEQUATE
PMV BLD AUTO: 9.8 FL (ref 8.9–12.7)
PO2 BLD: 133 MM HG (ref 35–45)
PO2 BLD: 355 MM HG (ref 75–129)
PO2 BLDA: 173.5 MM HG (ref 75–129)
PO2 BLDA: 87.8 MM HG (ref 75–129)
POIKILOCYTOSIS BLD QL SMEAR: PRESENT
POLYCHROMASIA BLD QL SMEAR: PRESENT
POTASSIUM BLD-SCNC: 3.6 MMOL/L (ref 3.5–5.3)
POTASSIUM BLD-SCNC: 4.3 MMOL/L (ref 3.5–5.3)
POTASSIUM SERPL-SCNC: 4.6 MMOL/L (ref 3.5–5.3)
PR INTERVAL: 140 MS
PR INTERVAL: 160 MS
PR INTERVAL: 162 MS
PROT CSF-MCNC: 88 MG/DL (ref 15–45)
PROT UR STRIP-MCNC: ABNORMAL MG/DL
PROTHROMBIN TIME: 15.7 SECONDS (ref 11.6–14.5)
PROTHROMBIN TIME: 17.2 SECONDS (ref 11.6–14.5)
QRS AXIS: 35 DEGREES
QRS AXIS: 39 DEGREES
QRS AXIS: 40 DEGREES
QRSD INTERVAL: 100 MS
QRSD INTERVAL: 96 MS
QRSD INTERVAL: 98 MS
QT INTERVAL: 316 MS
QT INTERVAL: 350 MS
QT INTERVAL: 390 MS
QTC INTERVAL: 403 MS
QTC INTERVAL: 423 MS
QTC INTERVAL: 484 MS
RBC # BLD AUTO: 4.57 MILLION/UL (ref 3.88–5.62)
RBC # CSF MANUAL: 2 UL (ref 0–10)
RBC #/AREA URNS AUTO: ABNORMAL /HPF
RBC MORPH BLD: PRESENT
RH BLD: POSITIVE
SAO2 % BLD FROM PO2: 100 % (ref 60–85)
SAO2 % BLD FROM PO2: 99 % (ref 60–85)
SARS-COV-2 RNA RESP QL NAA+PROBE: NEGATIVE
SODIUM BLD-SCNC: 138 MMOL/L (ref 136–145)
SODIUM BLD-SCNC: 141 MMOL/L (ref 136–145)
SODIUM SERPL-SCNC: 139 MMOL/L (ref 136–145)
SP GR UR STRIP.AUTO: 1.03 (ref 1–1.03)
SPECIMEN EXPIRATION DATE: NORMAL
SPECIMEN SOURCE: ABNORMAL
T WAVE AXIS: -63 DEGREES
T WAVE AXIS: -8 DEGREES
T WAVE AXIS: 36 DEGREES
TOTAL CELLS COUNTED BLD: NO
TOTAL CELLS COUNTED SPEC: 100
UROBILINOGEN UR QL STRIP.AUTO: 0.2 E.U./DL
VENT AC: 14
VENT- AC: AC
VENTRICULAR RATE: 88 BPM
VENTRICULAR RATE: 93 BPM
VENTRICULAR RATE: 98 BPM
VT SETTING VENT: 500 ML
WBC # BLD AUTO: 10.02 THOUSAND/UL (ref 4.31–10.16)
WBC # CSF AUTO: 61 /UL (ref 0–5)
WBC #/AREA URNS AUTO: ABNORMAL /HPF

## 2020-07-31 PROCEDURE — 82805 BLOOD GASES W/O2 SATURATION: CPT | Performed by: SURGERY

## 2020-07-31 PROCEDURE — 82330 ASSAY OF CALCIUM: CPT

## 2020-07-31 PROCEDURE — 86923 COMPATIBILITY TEST ELECTRIC: CPT

## 2020-07-31 PROCEDURE — 70450 CT HEAD/BRAIN W/O DYE: CPT

## 2020-07-31 PROCEDURE — 85027 COMPLETE CBC AUTOMATED: CPT | Performed by: EMERGENCY MEDICINE

## 2020-07-31 PROCEDURE — 37799 UNLISTED PX VASCULAR SURGERY: CPT | Performed by: SURGERY

## 2020-07-31 PROCEDURE — 85018 HEMOGLOBIN: CPT | Performed by: SURGERY

## 2020-07-31 PROCEDURE — C1751 CATH, INF, PER/CENT/MIDLINE: HCPCS

## 2020-07-31 PROCEDURE — 82948 REAGENT STRIP/BLOOD GLUCOSE: CPT

## 2020-07-31 PROCEDURE — 84157 ASSAY OF PROTEIN OTHER: CPT | Performed by: NEUROLOGICAL SURGERY

## 2020-07-31 PROCEDURE — 85730 THROMBOPLASTIN TIME PARTIAL: CPT | Performed by: SURGERY

## 2020-07-31 PROCEDURE — 89050 BODY FLUID CELL COUNT: CPT | Performed by: NEUROLOGICAL SURGERY

## 2020-07-31 PROCEDURE — 03HY32Z INSERTION OF MONITORING DEVICE INTO UPPER ARTERY, PERCUTANEOUS APPROACH: ICD-10-PCS | Performed by: STUDENT IN AN ORGANIZED HEALTH CARE EDUCATION/TRAINING PROGRAM

## 2020-07-31 PROCEDURE — 99223 1ST HOSP IP/OBS HIGH 75: CPT | Performed by: SURGERY

## 2020-07-31 PROCEDURE — 36600 WITHDRAWAL OF ARTERIAL BLOOD: CPT

## 2020-07-31 PROCEDURE — 0JWT33Z REVISION OF INFUSION DEVICE IN TRUNK SUBCUTANEOUS TISSUE AND FASCIA, PERCUTANEOUS APPROACH: ICD-10-PCS | Performed by: SURGERY

## 2020-07-31 PROCEDURE — 89051 BODY FLUID CELL COUNT: CPT | Performed by: NEUROLOGICAL SURGERY

## 2020-07-31 PROCEDURE — 30233N1 TRANSFUSION OF NONAUTOLOGOUS RED BLOOD CELLS INTO PERIPHERAL VEIN, PERCUTANEOUS APPROACH: ICD-10-PCS | Performed by: INTERNAL MEDICINE

## 2020-07-31 PROCEDURE — 93005 ELECTROCARDIOGRAM TRACING: CPT

## 2020-07-31 PROCEDURE — 83036 HEMOGLOBIN GLYCOSYLATED A1C: CPT | Performed by: INTERNAL MEDICINE

## 2020-07-31 PROCEDURE — 85007 BL SMEAR W/DIFF WBC COUNT: CPT | Performed by: EMERGENCY MEDICINE

## 2020-07-31 PROCEDURE — 85610 PROTHROMBIN TIME: CPT | Performed by: SURGERY

## 2020-07-31 PROCEDURE — A6550 NEG PRES WOUND THER DRSG SET: HCPCS | Performed by: SURGERY

## 2020-07-31 PROCEDURE — 80048 BASIC METABOLIC PNL TOTAL CA: CPT | Performed by: EMERGENCY MEDICINE

## 2020-07-31 PROCEDURE — 84295 ASSAY OF SERUM SODIUM: CPT

## 2020-07-31 PROCEDURE — C1769 GUIDE WIRE: HCPCS | Performed by: SURGERY

## 2020-07-31 PROCEDURE — 86901 BLOOD TYPING SEROLOGIC RH(D): CPT | Performed by: SURGERY

## 2020-07-31 PROCEDURE — 87040 BLOOD CULTURE FOR BACTERIA: CPT | Performed by: PHYSICIAN ASSISTANT

## 2020-07-31 PROCEDURE — C9113 INJ PANTOPRAZOLE SODIUM, VIA: HCPCS | Performed by: EMERGENCY MEDICINE

## 2020-07-31 PROCEDURE — 87070 CULTURE OTHR SPECIMN AEROBIC: CPT | Performed by: NEUROLOGICAL SURGERY

## 2020-07-31 PROCEDURE — 81001 URINALYSIS AUTO W/SCOPE: CPT | Performed by: PHYSICIAN ASSISTANT

## 2020-07-31 PROCEDURE — 96374 THER/PROPH/DIAG INJ IV PUSH: CPT

## 2020-07-31 PROCEDURE — 75726 ARTERY X-RAYS ABDOMEN: CPT | Performed by: SURGERY

## 2020-07-31 PROCEDURE — 87075 CULTR BACTERIA EXCEPT BLOOD: CPT | Performed by: SURGERY

## 2020-07-31 PROCEDURE — 36415 COLL VENOUS BLD VENIPUNCTURE: CPT | Performed by: EMERGENCY MEDICINE

## 2020-07-31 PROCEDURE — U0003 INFECTIOUS AGENT DETECTION BY NUCLEIC ACID (DNA OR RNA); SEVERE ACUTE RESPIRATORY SYNDROME CORONAVIRUS 2 (SARS-COV-2) (CORONAVIRUS DISEASE [COVID-19]), AMPLIFIED PROBE TECHNIQUE, MAKING USE OF HIGH THROUGHPUT TECHNOLOGIES AS DESCRIBED BY CMS-2020-01-R: HCPCS | Performed by: SURGERY

## 2020-07-31 PROCEDURE — NC001 PR NO CHARGE: Performed by: SURGERY

## 2020-07-31 PROCEDURE — 71275 CT ANGIOGRAPHY CHEST: CPT

## 2020-07-31 PROCEDURE — 99223 1ST HOSP IP/OBS HIGH 75: CPT | Performed by: INTERNAL MEDICINE

## 2020-07-31 PROCEDURE — 86850 RBC ANTIBODY SCREEN: CPT | Performed by: SURGERY

## 2020-07-31 PROCEDURE — 4A133B1 MONITORING OF ARTERIAL PRESSURE, PERIPHERAL, PERCUTANEOUS APPROACH: ICD-10-PCS | Performed by: STUDENT IN AN ORGANIZED HEALTH CARE EDUCATION/TRAINING PROGRAM

## 2020-07-31 PROCEDURE — 86900 BLOOD TYPING SEROLOGIC ABO: CPT | Performed by: SURGERY

## 2020-07-31 PROCEDURE — 99291 CRITICAL CARE FIRST HOUR: CPT | Performed by: SURGERY

## 2020-07-31 PROCEDURE — 71045 X-RAY EXAM CHEST 1 VIEW: CPT

## 2020-07-31 PROCEDURE — C1894 INTRO/SHEATH, NON-LASER: HCPCS | Performed by: SURGERY

## 2020-07-31 PROCEDURE — 85610 PROTHROMBIN TIME: CPT | Performed by: EMERGENCY MEDICINE

## 2020-07-31 PROCEDURE — 93312 ECHO TRANSESOPHAGEAL: CPT

## 2020-07-31 PROCEDURE — C9113 INJ PANTOPRAZOLE SODIUM, VIA: HCPCS | Performed by: INTERNAL MEDICINE

## 2020-07-31 PROCEDURE — 87102 FUNGUS ISOLATION CULTURE: CPT | Performed by: NEUROLOGICAL SURGERY

## 2020-07-31 PROCEDURE — 4A133J1 MONITORING OF ARTERIAL PULSE, PERIPHERAL, PERCUTANEOUS APPROACH: ICD-10-PCS | Performed by: STUDENT IN AN ORGANIZED HEALTH CARE EDUCATION/TRAINING PROGRAM

## 2020-07-31 PROCEDURE — 94002 VENT MGMT INPAT INIT DAY: CPT

## 2020-07-31 PROCEDURE — 87077 CULTURE AEROBIC IDENTIFY: CPT | Performed by: SURGERY

## 2020-07-31 PROCEDURE — 74177 CT ABD & PELVIS W/CONTRAST: CPT

## 2020-07-31 PROCEDURE — 0WWG0JZ REVISION OF SYNTHETIC SUBSTITUTE IN PERITONEAL CAVITY, OPEN APPROACH: ICD-10-PCS | Performed by: SURGERY

## 2020-07-31 PROCEDURE — 62230 REPLACE/REVISE BRAIN SHUNT: CPT | Performed by: NEUROLOGICAL SURGERY

## 2020-07-31 PROCEDURE — 83605 ASSAY OF LACTIC ACID: CPT | Performed by: SURGERY

## 2020-07-31 PROCEDURE — 87070 CULTURE OTHR SPECIMN AEROBIC: CPT | Performed by: SURGERY

## 2020-07-31 PROCEDURE — 85730 THROMBOPLASTIN TIME PARTIAL: CPT | Performed by: EMERGENCY MEDICINE

## 2020-07-31 PROCEDURE — 0BH17EZ INSERTION OF ENDOTRACHEAL AIRWAY INTO TRACHEA, VIA NATURAL OR ARTIFICIAL OPENING: ICD-10-PCS | Performed by: SURGERY

## 2020-07-31 PROCEDURE — 82805 BLOOD GASES W/O2 SATURATION: CPT | Performed by: EMERGENCY MEDICINE

## 2020-07-31 PROCEDURE — 00P63JZ REMOVAL OF SYNTHETIC SUBSTITUTE FROM CEREBRAL VENTRICLE, PERCUTANEOUS APPROACH: ICD-10-PCS | Performed by: SURGERY

## 2020-07-31 PROCEDURE — 82803 BLOOD GASES ANY COMBINATION: CPT

## 2020-07-31 PROCEDURE — 49000 EXPLORATION OF ABDOMEN: CPT | Performed by: SURGERY

## 2020-07-31 PROCEDURE — 87205 SMEAR GRAM STAIN: CPT | Performed by: SURGERY

## 2020-07-31 PROCEDURE — 94760 N-INVAS EAR/PLS OXIMETRY 1: CPT

## 2020-07-31 PROCEDURE — 36245 INS CATH ABD/L-EXT ART 1ST: CPT | Performed by: SURGERY

## 2020-07-31 PROCEDURE — 3E063GC INTRODUCTION OF OTHER THERAPEUTIC SUBSTANCE INTO CENTRAL ARTERY, PERCUTANEOUS APPROACH: ICD-10-PCS | Performed by: SURGERY

## 2020-07-31 PROCEDURE — 85014 HEMATOCRIT: CPT

## 2020-07-31 PROCEDURE — 5A1945Z RESPIRATORY VENTILATION, 24-96 CONSECUTIVE HOURS: ICD-10-PCS | Performed by: SURGERY

## 2020-07-31 PROCEDURE — 82947 ASSAY GLUCOSE BLOOD QUANT: CPT

## 2020-07-31 PROCEDURE — 82945 GLUCOSE OTHER FLUID: CPT | Performed by: NEUROLOGICAL SURGERY

## 2020-07-31 PROCEDURE — 87186 SC STD MICRODIL/AGAR DIL: CPT | Performed by: NEUROLOGICAL SURGERY

## 2020-07-31 PROCEDURE — 97605 NEG PRS WND THER DME<=50SQCM: CPT | Performed by: SURGERY

## 2020-07-31 PROCEDURE — C9113 INJ PANTOPRAZOLE SODIUM, VIA: HCPCS | Performed by: SURGERY

## 2020-07-31 PROCEDURE — 04H533Z INSERTION OF INFUSION DEVICE INTO SUPERIOR MESENTERIC ARTERY, PERCUTANEOUS APPROACH: ICD-10-PCS | Performed by: SURGERY

## 2020-07-31 PROCEDURE — 75726 ARTERY X-RAYS ABDOMEN: CPT

## 2020-07-31 PROCEDURE — 87186 SC STD MICRODIL/AGAR DIL: CPT | Performed by: SURGERY

## 2020-07-31 PROCEDURE — 84132 ASSAY OF SERUM POTASSIUM: CPT

## 2020-07-31 PROCEDURE — 93010 ELECTROCARDIOGRAM REPORT: CPT | Performed by: INTERNAL MEDICINE

## 2020-07-31 RX ORDER — METHOCARBAMOL 500 MG/1
500 TABLET, FILM COATED ORAL 2 TIMES DAILY PRN
Status: DISCONTINUED | OUTPATIENT
Start: 2020-07-31 | End: 2020-07-31

## 2020-07-31 RX ORDER — FENTANYL CITRATE 50 UG/ML
INJECTION, SOLUTION INTRAMUSCULAR; INTRAVENOUS AS NEEDED
Status: DISCONTINUED | OUTPATIENT
Start: 2020-07-31 | End: 2020-07-31 | Stop reason: SURG

## 2020-07-31 RX ORDER — OXYCODONE HYDROCHLORIDE 10 MG/1
10 TABLET ORAL
Status: DISCONTINUED | OUTPATIENT
Start: 2020-07-31 | End: 2020-07-31

## 2020-07-31 RX ORDER — VANCOMYCIN HYDROCHLORIDE 500 MG/100ML
500 INJECTION, SOLUTION INTRAVENOUS ONCE
Status: COMPLETED | OUTPATIENT
Start: 2020-07-31 | End: 2020-07-31

## 2020-07-31 RX ORDER — PANTOPRAZOLE SODIUM 40 MG/1
40 INJECTION, POWDER, FOR SOLUTION INTRAVENOUS EVERY 12 HOURS
Status: DISCONTINUED | OUTPATIENT
Start: 2020-07-31 | End: 2020-08-03

## 2020-07-31 RX ORDER — TRAVOPROST OPHTHALMIC SOLUTION 0.04 MG/ML
1 SOLUTION OPHTHALMIC
Status: DISCONTINUED | OUTPATIENT
Start: 2020-07-31 | End: 2020-07-31

## 2020-07-31 RX ORDER — PAPAVERINE HYDROCHLORIDE 30 MG/ML
30 INJECTION INTRAMUSCULAR; INTRAVENOUS CONTINUOUS
Status: DISCONTINUED | OUTPATIENT
Start: 2020-07-31 | End: 2020-08-01

## 2020-07-31 RX ORDER — PROPOFOL 10 MG/ML
INJECTION, EMULSION INTRAVENOUS AS NEEDED
Status: DISCONTINUED | OUTPATIENT
Start: 2020-07-31 | End: 2020-07-31 | Stop reason: SURG

## 2020-07-31 RX ORDER — DOCUSATE SODIUM 100 MG/1
100 CAPSULE, LIQUID FILLED ORAL 2 TIMES DAILY PRN
Status: DISCONTINUED | OUTPATIENT
Start: 2020-07-31 | End: 2020-07-31

## 2020-07-31 RX ORDER — LISINOPRIL 10 MG/1
10 TABLET ORAL DAILY
Status: DISCONTINUED | OUTPATIENT
Start: 2020-07-31 | End: 2020-07-31

## 2020-07-31 RX ORDER — HYDROMORPHONE HCL/PF 1 MG/ML
0.5 SYRINGE (ML) INJECTION
Status: DISCONTINUED | OUTPATIENT
Start: 2020-07-31 | End: 2020-07-31

## 2020-07-31 RX ORDER — ONDANSETRON 2 MG/ML
INJECTION INTRAMUSCULAR; INTRAVENOUS AS NEEDED
Status: DISCONTINUED | OUTPATIENT
Start: 2020-07-31 | End: 2020-07-31 | Stop reason: SURG

## 2020-07-31 RX ORDER — ROCURONIUM BROMIDE 10 MG/ML
INJECTION, SOLUTION INTRAVENOUS AS NEEDED
Status: DISCONTINUED | OUTPATIENT
Start: 2020-07-31 | End: 2020-07-31 | Stop reason: SURG

## 2020-07-31 RX ORDER — SODIUM CHLORIDE, SODIUM GLUCONATE, SODIUM ACETATE, POTASSIUM CHLORIDE, MAGNESIUM CHLORIDE, SODIUM PHOSPHATE, DIBASIC, AND POTASSIUM PHOSPHATE .53; .5; .37; .037; .03; .012; .00082 G/100ML; G/100ML; G/100ML; G/100ML; G/100ML; G/100ML; G/100ML
1000 INJECTION, SOLUTION INTRAVENOUS ONCE
Status: COMPLETED | OUTPATIENT
Start: 2020-07-31 | End: 2020-07-31

## 2020-07-31 RX ORDER — ACETAMINOPHEN 650 MG/1
650 SUPPOSITORY RECTAL EVERY 4 HOURS PRN
Status: DISCONTINUED | OUTPATIENT
Start: 2020-07-31 | End: 2020-08-03

## 2020-07-31 RX ORDER — LIDOCAINE HYDROCHLORIDE 10 MG/ML
INJECTION, SOLUTION EPIDURAL; INFILTRATION; INTRACAUDAL; PERINEURAL AS NEEDED
Status: DISCONTINUED | OUTPATIENT
Start: 2020-07-31 | End: 2020-07-31 | Stop reason: SURG

## 2020-07-31 RX ORDER — ALLOPURINOL 300 MG/1
300 TABLET ORAL DAILY
Status: DISCONTINUED | OUTPATIENT
Start: 2020-07-31 | End: 2020-07-31

## 2020-07-31 RX ORDER — HEPARIN SODIUM 10000 [USP'U]/100ML
3-30 INJECTION, SOLUTION INTRAVENOUS
Status: DISCONTINUED | OUTPATIENT
Start: 2020-07-31 | End: 2020-08-02

## 2020-07-31 RX ORDER — ALBUMIN, HUMAN INJ 5% 5 %
SOLUTION INTRAVENOUS CONTINUOUS PRN
Status: DISCONTINUED | OUTPATIENT
Start: 2020-07-31 | End: 2020-07-31 | Stop reason: SURG

## 2020-07-31 RX ORDER — ONDANSETRON 2 MG/ML
4 INJECTION INTRAMUSCULAR; INTRAVENOUS EVERY 6 HOURS PRN
Status: DISCONTINUED | OUTPATIENT
Start: 2020-07-31 | End: 2020-07-31

## 2020-07-31 RX ORDER — SODIUM CHLORIDE, SODIUM GLUCONATE, SODIUM ACETATE, POTASSIUM CHLORIDE, MAGNESIUM CHLORIDE, SODIUM PHOSPHATE, DIBASIC, AND POTASSIUM PHOSPHATE .53; .5; .37; .037; .03; .012; .00082 G/100ML; G/100ML; G/100ML; G/100ML; G/100ML; G/100ML; G/100ML
75 INJECTION, SOLUTION INTRAVENOUS CONTINUOUS
Status: DISCONTINUED | OUTPATIENT
Start: 2020-07-31 | End: 2020-07-31

## 2020-07-31 RX ORDER — FENTANYL CITRATE-0.9 % NACL/PF 10 MCG/ML
PLASTIC BAG, INJECTION (ML) INTRAVENOUS
Status: COMPLETED
Start: 2020-07-31 | End: 2020-07-31

## 2020-07-31 RX ORDER — MAGNESIUM HYDROXIDE/ALUMINUM HYDROXICE/SIMETHICONE 120; 1200; 1200 MG/30ML; MG/30ML; MG/30ML
15 SUSPENSION ORAL EVERY 6 HOURS PRN
Status: DISCONTINUED | OUTPATIENT
Start: 2020-07-31 | End: 2020-07-31

## 2020-07-31 RX ORDER — DIPHENOXYLATE HYDROCHLORIDE AND ATROPINE SULFATE 2.5; .025 MG/1; MG/1
1 TABLET ORAL DAILY
Status: DISCONTINUED | OUTPATIENT
Start: 2020-07-31 | End: 2020-07-31

## 2020-07-31 RX ORDER — HEPARIN SODIUM 1000 [USP'U]/ML
9600 INJECTION, SOLUTION INTRAVENOUS; SUBCUTANEOUS
Status: DISCONTINUED | OUTPATIENT
Start: 2020-07-31 | End: 2020-08-02

## 2020-07-31 RX ORDER — HYDROMORPHONE HCL/PF 1 MG/ML
SYRINGE (ML) INJECTION AS NEEDED
Status: DISCONTINUED | OUTPATIENT
Start: 2020-07-31 | End: 2020-07-31 | Stop reason: SURG

## 2020-07-31 RX ORDER — PROPOFOL 10 MG/ML
5-50 INJECTION, EMULSION INTRAVENOUS
Status: DISCONTINUED | OUTPATIENT
Start: 2020-07-31 | End: 2020-08-01

## 2020-07-31 RX ORDER — SODIUM CHLORIDE, SODIUM GLUCONATE, SODIUM ACETATE, POTASSIUM CHLORIDE, MAGNESIUM CHLORIDE, SODIUM PHOSPHATE, DIBASIC, AND POTASSIUM PHOSPHATE .53; .5; .37; .037; .03; .012; .00082 G/100ML; G/100ML; G/100ML; G/100ML; G/100ML; G/100ML; G/100ML
100 INJECTION, SOLUTION INTRAVENOUS CONTINUOUS
Status: DISCONTINUED | OUTPATIENT
Start: 2020-07-31 | End: 2020-08-02

## 2020-07-31 RX ORDER — HYDROMORPHONE HCL/PF 1 MG/ML
0.5 SYRINGE (ML) INJECTION EVERY 4 HOURS PRN
Status: DISCONTINUED | OUTPATIENT
Start: 2020-07-31 | End: 2020-08-03

## 2020-07-31 RX ORDER — CALCIUM CHLORIDE 100 MG/ML
INJECTION INTRAVENOUS; INTRAVENTRICULAR AS NEEDED
Status: DISCONTINUED | OUTPATIENT
Start: 2020-07-31 | End: 2020-07-31 | Stop reason: SURG

## 2020-07-31 RX ORDER — CHLORHEXIDINE GLUCONATE 0.12 MG/ML
15 RINSE ORAL EVERY 12 HOURS SCHEDULED
Status: DISCONTINUED | OUTPATIENT
Start: 2020-07-31 | End: 2020-08-05

## 2020-07-31 RX ORDER — HYDROMORPHONE HCL/PF 1 MG/ML
1 SYRINGE (ML) INJECTION EVERY 4 HOURS PRN
Status: DISCONTINUED | OUTPATIENT
Start: 2020-07-31 | End: 2020-08-03

## 2020-07-31 RX ORDER — PROPOFOL 10 MG/ML
INJECTION, EMULSION INTRAVENOUS CONTINUOUS PRN
Status: DISCONTINUED | OUTPATIENT
Start: 2020-07-31 | End: 2020-07-31 | Stop reason: SURG

## 2020-07-31 RX ORDER — SODIUM CHLORIDE 9 MG/ML
INJECTION, SOLUTION INTRAVENOUS CONTINUOUS PRN
Status: DISCONTINUED | OUTPATIENT
Start: 2020-07-31 | End: 2020-07-31 | Stop reason: SURG

## 2020-07-31 RX ORDER — DEXAMETHASONE SODIUM PHOSPHATE 10 MG/ML
INJECTION, SOLUTION INTRAMUSCULAR; INTRAVENOUS AS NEEDED
Status: DISCONTINUED | OUTPATIENT
Start: 2020-07-31 | End: 2020-07-31 | Stop reason: SURG

## 2020-07-31 RX ORDER — OXYCODONE HYDROCHLORIDE 5 MG/1
5 TABLET ORAL
Status: DISCONTINUED | OUTPATIENT
Start: 2020-07-31 | End: 2020-07-31

## 2020-07-31 RX ORDER — FENTANYL CITRATE-0.9 % NACL/PF 10 MCG/ML
50 PLASTIC BAG, INJECTION (ML) INTRAVENOUS CONTINUOUS
Status: DISCONTINUED | OUTPATIENT
Start: 2020-07-31 | End: 2020-08-01

## 2020-07-31 RX ORDER — HEPARIN SODIUM 1000 [USP'U]/ML
4800 INJECTION, SOLUTION INTRAVENOUS; SUBCUTANEOUS
Status: DISCONTINUED | OUTPATIENT
Start: 2020-07-31 | End: 2020-08-02

## 2020-07-31 RX ORDER — HEPARIN SODIUM 1000 [USP'U]/ML
INJECTION, SOLUTION INTRAVENOUS; SUBCUTANEOUS AS NEEDED
Status: DISCONTINUED | OUTPATIENT
Start: 2020-07-31 | End: 2020-07-31 | Stop reason: SURG

## 2020-07-31 RX ADMIN — PROPOFOL 30 MCG/KG/MIN: 10 INJECTION, EMULSION INTRAVENOUS at 15:33

## 2020-07-31 RX ADMIN — PROPOFOL 35 MCG/KG/MIN: 10 INJECTION, EMULSION INTRAVENOUS at 12:41

## 2020-07-31 RX ADMIN — HYDROMORPHONE HYDROCHLORIDE 0.5 MG: 1 INJECTION, SOLUTION INTRAMUSCULAR; INTRAVENOUS; SUBCUTANEOUS at 04:49

## 2020-07-31 RX ADMIN — PIPERACILLIN SODIUM AND TAZOBACTAM SODIUM 4.5 G: 36; 4.5 INJECTION, POWDER, FOR SOLUTION INTRAVENOUS at 04:24

## 2020-07-31 RX ADMIN — PAPAVERINE HYDROCHLORIDE: 30 INJECTION, SOLUTION INTRAVENOUS at 11:27

## 2020-07-31 RX ADMIN — PAPAVERINE HYDROCHLORIDE: 30 INJECTION, SOLUTION INTRAVENOUS at 20:24

## 2020-07-31 RX ADMIN — ROCURONIUM BROMIDE 50 MG: 10 INJECTION, SOLUTION INTRAVENOUS at 05:38

## 2020-07-31 RX ADMIN — HEPARIN SODIUM AND DEXTROSE 18 UNITS/KG/HR: 10000; 5 INJECTION INTRAVENOUS at 20:38

## 2020-07-31 RX ADMIN — PANTOPRAZOLE SODIUM 40 MG: 40 INJECTION, POWDER, FOR SOLUTION INTRAVENOUS at 02:29

## 2020-07-31 RX ADMIN — PROPOFOL 150 MG: 10 INJECTION, EMULSION INTRAVENOUS at 05:33

## 2020-07-31 RX ADMIN — PHENYLEPHRINE HYDROCHLORIDE 200 MCG: 10 INJECTION INTRAVENOUS at 08:04

## 2020-07-31 RX ADMIN — HEPARIN SODIUM 4800 UNITS: 1000 INJECTION INTRAVENOUS; SUBCUTANEOUS at 23:26

## 2020-07-31 RX ADMIN — ACETAMINOPHEN 650 MG: 650 SUPPOSITORY RECTAL at 13:08

## 2020-07-31 RX ADMIN — ALBUMIN (HUMAN): 12.5 SOLUTION INTRAVENOUS at 07:45

## 2020-07-31 RX ADMIN — HEPARIN SODIUM 3000 UNITS: 1000 INJECTION INTRAVENOUS; SUBCUTANEOUS at 08:07

## 2020-07-31 RX ADMIN — PAPAVERINE HYDROCHLORIDE: 30 INJECTION, SOLUTION INTRAVENOUS at 16:19

## 2020-07-31 RX ADMIN — PAPAVERINE HYDROCHLORIDE: 30 INJECTION, SOLUTION INTRAVENOUS at 18:28

## 2020-07-31 RX ADMIN — IOHEXOL 100 ML: 350 INJECTION, SOLUTION INTRAVENOUS at 00:58

## 2020-07-31 RX ADMIN — METRONIDAZOLE 500 MG: 500 INJECTION, SOLUTION INTRAVENOUS at 13:01

## 2020-07-31 RX ADMIN — METRONIDAZOLE 500 MG: 500 INJECTION, SOLUTION INTRAVENOUS at 20:38

## 2020-07-31 RX ADMIN — Medication 3 MG: at 06:01

## 2020-07-31 RX ADMIN — PROPOFOL 30 MCG/KG/MIN: 10 INJECTION, EMULSION INTRAVENOUS at 22:27

## 2020-07-31 RX ADMIN — Medication 50 MCG/HR: at 10:20

## 2020-07-31 RX ADMIN — LIDOCAINE HYDROCHLORIDE 50 MG: 10 INJECTION, SOLUTION EPIDURAL; INFILTRATION; INTRACAUDAL; PERINEURAL at 05:33

## 2020-07-31 RX ADMIN — DEXAMETHASONE SODIUM PHOSPHATE 4 MG: 10 INJECTION, SOLUTION INTRAMUSCULAR; INTRAVENOUS at 06:01

## 2020-07-31 RX ADMIN — SODIUM CHLORIDE, SODIUM GLUCONATE, SODIUM ACETATE, POTASSIUM CHLORIDE, MAGNESIUM CHLORIDE, SODIUM PHOSPHATE, DIBASIC, AND POTASSIUM PHOSPHATE 125 ML/HR: .53; .5; .37; .037; .03; .012; .00082 INJECTION, SOLUTION INTRAVENOUS at 15:26

## 2020-07-31 RX ADMIN — CEFEPIME HYDROCHLORIDE 2000 MG: 2 INJECTION, POWDER, FOR SOLUTION INTRAVENOUS at 21:52

## 2020-07-31 RX ADMIN — SODIUM CHLORIDE: 0.9 INJECTION, SOLUTION INTRAVENOUS at 06:12

## 2020-07-31 RX ADMIN — FENTANYL CITRATE 50 MCG: 50 INJECTION, SOLUTION INTRAMUSCULAR; INTRAVENOUS at 07:17

## 2020-07-31 RX ADMIN — ACETAMINOPHEN 650 MG: 650 SUPPOSITORY RECTAL at 21:40

## 2020-07-31 RX ADMIN — PROPOFOL 30 MCG/KG/MIN: 10 INJECTION, EMULSION INTRAVENOUS at 09:30

## 2020-07-31 RX ADMIN — PANTOPRAZOLE SODIUM 40 MG: 40 INJECTION, POWDER, FOR SOLUTION INTRAVENOUS at 14:15

## 2020-07-31 RX ADMIN — ROCURONIUM BROMIDE 20 MG: 10 INJECTION, SOLUTION INTRAVENOUS at 07:00

## 2020-07-31 RX ADMIN — ALBUMIN (HUMAN): 12.5 SOLUTION INTRAVENOUS at 07:13

## 2020-07-31 RX ADMIN — CHLORHEXIDINE GLUCONATE 0.12% ORAL RINSE 15 ML: 1.2 LIQUID ORAL at 20:44

## 2020-07-31 RX ADMIN — SODIUM CHLORIDE, SODIUM GLUCONATE, SODIUM ACETATE, POTASSIUM CHLORIDE, MAGNESIUM CHLORIDE, SODIUM PHOSPHATE, DIBASIC, AND POTASSIUM PHOSPHATE 1000 ML: .53; .5; .37; .037; .03; .012; .00082 INJECTION, SOLUTION INTRAVENOUS at 17:08

## 2020-07-31 RX ADMIN — SODIUM CHLORIDE, SODIUM GLUCONATE, SODIUM ACETATE, POTASSIUM CHLORIDE, MAGNESIUM CHLORIDE, SODIUM PHOSPHATE, DIBASIC, AND POTASSIUM PHOSPHATE 125 ML/HR: .53; .5; .37; .037; .03; .012; .00082 INJECTION, SOLUTION INTRAVENOUS at 23:17

## 2020-07-31 RX ADMIN — VANCOMYCIN HYDROCHLORIDE 500 MG: 500 INJECTION, SOLUTION INTRAVENOUS at 13:44

## 2020-07-31 RX ADMIN — HEPARIN SODIUM 2000 UNITS: 1000 INJECTION INTRAVENOUS; SUBCUTANEOUS at 08:00

## 2020-07-31 RX ADMIN — HYDROMORPHONE HYDROCHLORIDE 1 MG: 1 INJECTION, SOLUTION INTRAMUSCULAR; INTRAVENOUS; SUBCUTANEOUS at 06:21

## 2020-07-31 RX ADMIN — SODIUM CHLORIDE, SODIUM GLUCONATE, SODIUM ACETATE, POTASSIUM CHLORIDE, MAGNESIUM CHLORIDE, SODIUM PHOSPHATE, DIBASIC, AND POTASSIUM PHOSPHATE 75 ML/HR: .53; .5; .37; .037; .03; .012; .00082 INJECTION, SOLUTION INTRAVENOUS at 02:24

## 2020-07-31 RX ADMIN — FENTANYL CITRATE 50 MCG: 50 INJECTION, SOLUTION INTRAMUSCULAR; INTRAVENOUS at 09:21

## 2020-07-31 RX ADMIN — FENTANYL CITRATE 100 MCG: 50 INJECTION, SOLUTION INTRAMUSCULAR; INTRAVENOUS at 05:33

## 2020-07-31 RX ADMIN — CALCIUM CHLORIDE 0.5 G: 100 INJECTION, SOLUTION INTRAVENOUS; INTRAVENTRICULAR at 06:01

## 2020-07-31 RX ADMIN — SODIUM CHLORIDE 1000 ML: 0.9 INJECTION, SOLUTION INTRAVENOUS at 00:07

## 2020-07-31 RX ADMIN — PAPAVERINE HYDROCHLORIDE: 30 INJECTION, SOLUTION INTRAVENOUS at 22:46

## 2020-07-31 RX ADMIN — PAPAVERINE HYDROCHLORIDE: 30 INJECTION, SOLUTION INTRAVENOUS at 14:11

## 2020-07-31 RX ADMIN — PANTOPRAZOLE SODIUM 40 MG: 40 INJECTION, POWDER, FOR SOLUTION INTRAVENOUS at 00:09

## 2020-07-31 RX ADMIN — ALBUMIN (HUMAN): 12.5 SOLUTION INTRAVENOUS at 07:54

## 2020-07-31 RX ADMIN — PHENYLEPHRINE HYDROCHLORIDE 50 MCG: 10 INJECTION INTRAVENOUS at 05:38

## 2020-07-31 RX ADMIN — SODIUM CHLORIDE: 0.9 INJECTION, SOLUTION INTRAVENOUS at 05:38

## 2020-07-31 RX ADMIN — SODIUM CHLORIDE, SODIUM GLUCONATE, SODIUM ACETATE, POTASSIUM CHLORIDE, MAGNESIUM CHLORIDE, SODIUM PHOSPHATE, DIBASIC, AND POTASSIUM PHOSPHATE 1000 ML: .53; .5; .37; .037; .03; .012; .00082 INJECTION, SOLUTION INTRAVENOUS at 14:20

## 2020-07-31 RX ADMIN — PHENYLEPHRINE HYDROCHLORIDE 200 MCG: 10 INJECTION INTRAVENOUS at 05:33

## 2020-07-31 RX ADMIN — SODIUM CHLORIDE, SODIUM GLUCONATE, SODIUM ACETATE, POTASSIUM CHLORIDE, MAGNESIUM CHLORIDE, SODIUM PHOSPHATE, DIBASIC, AND POTASSIUM PHOSPHATE 125 ML/HR: .53; .5; .37; .037; .03; .012; .00082 INJECTION, SOLUTION INTRAVENOUS at 09:55

## 2020-07-31 RX ADMIN — VANCOMYCIN HYDROCHLORIDE 2000 MG: 10 INJECTION, POWDER, LYOPHILIZED, FOR SOLUTION INTRAVENOUS at 14:12

## 2020-07-31 RX ADMIN — CEFEPIME HYDROCHLORIDE 2000 MG: 2 INJECTION, POWDER, FOR SOLUTION INTRAVENOUS at 13:38

## 2020-07-31 RX ADMIN — ONDANSETRON 4 MG: 2 INJECTION INTRAMUSCULAR; INTRAVENOUS at 06:01

## 2020-07-31 NOTE — ASSESSMENT & PLAN NOTE
Patient came in somewhat unkept with stool in underwear  He currently lives with a  who he claims takes care of him  At this point, it seems patient has inability to care for self  Will place physical therapy consult for ADL  Possibility of placement in skilled nursing facility  Recently was seen by adult rehabilitation post lumbar surgery

## 2020-07-31 NOTE — PLAN OF CARE
Problem: Potential for Falls  Goal: Patient will remain free of falls  Description  INTERVENTIONS:  - Assess patient frequently for physical needs  -  Identify cognitive and physical deficits and behaviors that affect risk of falls    -  [place] fall precautions as indicated by assessment   - Educate patient/family on patient safety including physical limitations  - Instruct patient to call for assistance with activity based on assessment  - Modify environment to reduce risk of injury  - Consider OT/PT consult to assist with strengthening/mobility  Outcome: Progressing     Problem: Prexisting or High Potential for Compromised Skin Integrity  Goal: Skin integrity is maintained or improved  Description  INTERVENTIONS:  - Identify patients at risk for skin breakdown  - Assess and monitor skin integrity  - Assess and monitor nutrition and hydration status  - Monitor labs   - Assess for incontinence   - Turn and reposition patient  - Assist with mobility/ambulation  - Relieve pressure over bony prominences  - Avoid friction and shearing  - Provide appropriate hygiene as needed including keeping skin clean and dry  - Evaluate need for skin moisturizer/barrier cream  - Collaborate with interdisciplinary team   - Patient/family teaching  - Consider wound care consult   Outcome: Progressing     Problem: PAIN - ADULT  Goal: Verbalizes/displays adequate comfort level or baseline comfort level  Description  Interventions:  - Encourage patient to monitor pain and request assistance  - Assess pain using appropriate pain scale  - Administer analgesics based on type and severity of pain and evaluate response  - Implement non-pharmacological measures as appropriate and evaluate response  - Consider cultural and social influences on pain and pain management  - Notify physician/advanced practitioner if interventions unsuccessful or patient reports new pain  Outcome: Progressing     Problem: SAFETY ADULT  Goal: Maintain or return to baseline ADL function  Description  INTERVENTIONS:  -  Assess patient's ability to carry out ADLs; assess patient's baseline for ADL function and identify physical deficits which impact ability to perform ADLs (bathing, care of mouth/teeth, toileting, grooming, dressing, etc )  - Assess/evaluate cause of self-care deficits   - Assess range of motion  - Assess patient's mobility; develop plan if impaired  - Assess patient's need for assistive devices and provide as appropriate  - Encourage maximum independence but intervene and supervise when necessary  - Involve family in performance of ADLs  - Assess for home care needs following discharge   - Consider OT consult to assist with ADL evaluation and planning for discharge  - Provide patient education as appropriate  Outcome: Progressing  Goal: Maintain or return mobility status to optimal level  Description  INTERVENTIONS:  - Assess patient's baseline mobility status (ambulation, transfers, stairs, etc )    - Identify cognitive and physical deficits and behaviors that affect mobility  - Identify mobility aids required to assist with transfers and/or ambulation (gait belt, sit-to-stand, lift, walker, cane, etc )  - [place] fall precautions as indicated by assessment  - Record patient progress and toleration of activity level on Mobility SBAR; progress patient to next Phase/Stage  - Instruct patient to call for assistance with activity based on assessment  - Consider rehabilitation consult to assist with strengthening/weightbearing, etc   Outcome: Progressing     Problem: GASTROINTESTINAL - ADULT  Goal: Minimal or absence of nausea and/or vomiting  Description  INTERVENTIONS:  - Administer IV fluids if ordered to ensure adequate hydration  - Maintain NPO status until nausea and vomiting are resolved  - Nasogastric tube if ordered  - Administer ordered antiemetic medications as needed  - Provide nonpharmacologic comfort measures as appropriate  - Advance diet as tolerated, if ordered  - Consider nutrition services referral to assist patient with adequate nutrition and appropriate food choices  Outcome: Progressing  Goal: Maintains or returns to baseline bowel function  Description  INTERVENTIONS:  - Assess bowel function  - Encourage oral fluids to ensure adequate hydration  - Administer IV fluids if ordered to ensure adequate hydration  - Administer ordered medications as needed  - Encourage mobilization and activity  - Consider nutritional services referral to assist patient with adequate nutrition and appropriate food choices  Outcome: Progressing  Goal: Maintains adequate nutritional intake  Description  INTERVENTIONS:  - Monitor percentage of each meal consumed  - Identify factors contributing to decreased intake, treat as appropriate  - Assist with meals as needed  - Monitor I&O, weight, and lab values if indicated  - Obtain nutrition services referral as needed  Outcome: Progressing     Problem: GENITOURINARY - ADULT  Goal: Maintains or returns to baseline urinary function  Description  INTERVENTIONS:  - Assess urinary function  - Encourage oral fluids to ensure adequate hydration if ordered  - Administer IV fluids as ordered to ensure adequate hydration  - Administer ordered medications as needed  - Offer frequent toileting  - Follow urinary retention protocol if ordered  Outcome: Progressing  Goal: Absence of urinary retention  Description  INTERVENTIONS:  - Assess patients ability to void and empty bladder  - Monitor I/O  - Bladder scan as needed  - Discuss with physician/AP medications to alleviate retention as needed  - Discuss catheterization for long term situations as appropriate  Outcome: Progressing

## 2020-07-31 NOTE — ED PROVIDER NOTES
History  Chief Complaint   Patient presents with    Weakness - Generalized     pt had back surgery 2 weeks ago going to OP PT at home now for 2 days pt has been unable to get out of bed to care for self, EMS reported 2-3 falls today, vomited at home, pt has dried stool over body     57-year-old male with past medical history of HTN, DM2, neuropathy, hydrocephalus with  shunt, and s/p L5-S1 spine surgery (2020 with Dr Stefania Guallpa) presents to the ED via EMS with intermittent nausea and hematemesis over the last 2-3 days  He also c/o bilateral lower extremity weakness and difficulty getting out of his bed and walking around his home, he notes that this has been an ongoing problem for several months  He has been going to PT/OT appointments since his back surgery 3 weeks ago, but he has had difficulty over last several days with performing his at-home exercises  He notes that he slid out of bed today to the floor, but denies any head strike, LOC, or any other injury  He denies fevers, chills, cough, shortness of breath, chest pain, abdominal pain, diarrhea, hematochezia, melena, urinary symptoms, back pain, headache, visual changes, and new numbness  He has a history of peripheral neuropathy and reports that he has baseline paresthesias and tingling in his hands and bilateral lower extremities, which are unchanged  He has been taking Aleve Cold and Sinus twice daily for the last several days  He denies history of any abdominal surgeries  Prior to Admission Medications   Prescriptions Last Dose Informant Patient Reported? Taking?    Lancets (Judi Stage) MISC   Yes No   Si (two) times a day   ONE TOUCH ULTRA TEST test strip   Yes No   Sig: daily   allopurinol (ZYLOPRIM) 300 mg tablet   Yes No   Si mg daily   aspirin 325 mg tablet   No No   Sig: Take 1 tablet (325 mg total) by mouth daily   enalapril (VASOTEC) 5 mg tablet   No No   Sig: Take 1 tablet (5 mg total) by mouth daily   methocarbamol (ROBAXIN) 500 mg tablet   No No   Sig: Take 1 tablet (500 mg total) by mouth 2 (two) times a day as needed for muscle spasms (back pain)   multivitamin (THERAGRAN) TABS   Yes No   Sig: Take 1 tablet by mouth daily   travoprost (Travatan Z) 0 004 % ophthalmic solution   Yes No   Sig: Administer 1 drop to the right eye daily at bedtime       Facility-Administered Medications: None       Past Medical History:   Diagnosis Date    Cancer [facility]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     Diabetes mellitus ([facility] )     DM[facility], [facility] 2[facility] )     Gout     HTN (hypertension)     Hydrocephalus ([place] [address] )     Hypertension     Neuropathy     ZHOU on CPAP     Pressure injury of skin     TIA (transient ischemic attack)        Past Surgical History:   Procedure Laterality Date    CSF SHUNT      x3 Op Reports, Mary Cancino in [facility] chart viewer   [address] Left 5/30/2020    Procedure: excision 5th metatarsal head  excision 5th metatarsal ulcer ;  Surgeon: Garland Bence, DPM;  Location: [facility];  Service: Podiatry    DE ARTHDSIS POST/POSTEROLATRL/POSTINTERBODY LUMBAR N/A 7/6/2020    Procedure: MINIMALLY INVASIVE TRANSVERSE LUMBAR INTERBODY FUSION L5-S1 FROM LEFT-SIDED APPROACH;  Surgeon: Diana Olvera MD;  Location: [facility];  Service: Neurosurgery    DE REPLACEMENT/REVISION,CSF SHUNT Right 7/6/2020    Procedure: REVISION OF SCALP OVER VENTRICULAR CATHETER IN THE RIGHT CORONAL REGION;  Surgeon: Diana Olvera MD;  [facility];  Service: Neurosurgery       Family History   Problem Relation Age of Onset    Polymyositis Mother     Heart failure Father      I have reviewed and agree with the history as documented      E-Cigarette/Vaping     E-Cigarette/Vaping Substances    Nicotine No     THC No     CBD No     Flavoring No     Other No     Unknown No      Social History     Tobacco Use    Smoking status: Never Smoker  Smokeless tobacco: Never Used   Substance Use Topics    Alcohol use: Not Currently     Frequency: Monthly or less     Drinks per session: 1 or 2    Drug use: Never        Review of Systems   Constitutional: Negative for chills and fever  HENT: Positive for congestion  Negative for sore throat  Eyes: Negative for visual disturbance  Respiratory: Negative for cough and shortness of breath  Cardiovascular: Negative for chest pain, palpitations and leg swelling  Gastrointestinal: Positive for nausea and vomiting  Negative for abdominal pain  Hematemesis x 2-3 days, approximately 2-3 episodes per day  Genitourinary: Negative for dysuria, flank pain and hematuria  Musculoskeletal: Negative for back pain and neck pain  Neurological: Negative for syncope and headaches  Bilateral lower extremity weakness  Baseline bilateral hand and LE paresthesias 2/2 neuropathy, unchanged  All other systems reviewed and are negative  Physical Exam  ED Triage Vitals   Temperature Pulse Respirations Blood Pressure SpO2   07/30/20 2314 07/30/20 2314 07/30/20 2314 07/30/20 2314 07/30/20 2314   99 1 °F (37 3 °C) 97 18 132/65 94 %      Temp Source Heart Rate Source Patient Position - Orthostatic VS BP Location FiO2 (%)   07/30/20 2314 07/30/20 2314 07/30/20 2314 07/30/20 2314 --   Oral Monitor Sitting Left arm       Pain Score       07/31/20 0210       No Pain             Orthostatic Vital Signs  Vitals:    07/30/20 2314 07/31/20 0000 07/31/20 0205   BP: 132/65 130/71 127/65   Pulse: 97 94 90   Patient Position - Orthostatic VS: Sitting Lying        Physical Exam   Constitutional: He is oriented to person, place, and time  He appears well-developed and well-nourished  Elderly, obese, male lying in bed, appears somwhat unkempt, with dried stool on the medial left thigh and in the shorts he wore on arrival to the hospital    HENT:   Head: Normocephalic and atraumatic     Right Ear: External ear normal  Left Ear: External ear normal    Nose: Nose normal    Mouth/Throat: No oropharyngeal exudate  Slightly dry MM  Evidence of  shunt on the scalp, to the right of midline, without erythema or skin changes  Eyes: Pupils are equal, round, and reactive to light  Conjunctivae and EOM are normal    Neck: Normal range of motion  Neck supple  Cardiovascular: Normal rate, regular rhythm, normal heart sounds and intact distal pulses  No murmur heard  Pulses 2+ and symmetric, radials and Dps  Pulmonary/Chest: He has no wheezes  He has no rales  Poor effort on exam   No tachypnea  No accessory muscle use  Abdominal: Bowel sounds are normal    Marked tenderness of the epigastrium, periumbilical, left upper quadrant, and left lower quadrant  Guarding noted  No suprapubic or right-sided abdominal tenderness  Bruising noted over the right lower abdomen  Musculoskeletal: Normal range of motion  He exhibits no edema or tenderness  No calf tenderness  No lower extremity edema  Neurological: He is alert and oriented to person, place, and time  Patient with generalized weakness and difficulty getting out of bed to ambulate, but strength 5/5 and symmetric bilateral upper extremities and lower extremities (dorsiflexion, plantar flexion, lifts both legs against resistance)  Normal finger-to-nose  Normal heel-to-shin bilaterally  Mildly decreased sensation to light touch bilateral lower extremities, which the patient reports as normal and unchanged  Skin: Skin is warm and dry  Capillary refill takes less than 2 seconds  Nursing note and vitals reviewed        ED Medications  Medications   allopurinol (ZYLOPRIM) tablet 300 mg (has no administration in time range)   methocarbamol (ROBAXIN) tablet 500 mg (has no administration in time range)   lisinopril (ZESTRIL) tablet 10 mg (has no administration in time range)   multivitamin (THERAGRAN) per tablet 1 tablet (has no administration in time range)   travoprost (TRAVATAN-Z) 0 004 % ophthalmic solution 1 drop ( Right Eye Canceled Entry 7/31/20 0223)   multi-electrolyte (PLASMALYTE-A/ISOLYTE-S PH 7 4) IV solution ( Intravenous Rate/Dose Change 7/31/20 0526)   docusate sodium (COLACE) capsule 100 mg (has no administration in time range)   ondansetron (ZOFRAN) injection 4 mg (has no administration in time range)   aluminum-magnesium hydroxide-simethicone (MYLANTA) 200-200-20 mg/5 mL oral suspension 15 mL (has no administration in time range)   pantoprazole (PROTONIX) injection 40 mg (40 mg Intravenous Given 7/31/20 0229)   insulin lispro (HumaLOG) 100 units/mL subcutaneous injection 2-12 Units (2 Units Subcutaneous Not Given 7/31/20 0550)   HYDROmorphone (DILAUDID) injection 0 5 mg (0 5 mg Intravenous Given 7/31/20 0449)   oxyCODONE (ROXICODONE) IR tablet 5 mg (has no administration in time range)   oxyCODONE (ROXICODONE) immediate release tablet 10 mg (has no administration in time range)   sodium chloride 0 9 % bolus 1,000 mL (1,000 mL Intravenous New Bag 7/31/20 0007)   pantoprazole (PROTONIX) injection 40 mg (40 mg Intravenous Given 7/31/20 0009)   iohexol (OMNIPAQUE) 350 MG/ML injection (MULTI-DOSE) 100 mL (100 mL Intravenous Given 7/31/20 0058)   piperacillin-tazobactam (ZOSYN) 4 5 g in sodium chloride 0 9 % 100 mL IVPB (4 5 g Intravenous New Bag 7/31/20 0424)       Diagnostic Studies  Results Reviewed     Procedure Component Value Units Date/Time    Lipase [[id number]]  (Abnormal) Collected:  07/30/20 2325    Lab Status:  Final result Specimen:  Blood from Arm, Left Updated:  07/31/20 0152     Lipase 33 u/L     Protime-INR [[id number]]  (Abnormal) Collected:  07/31/20 0008    Lab Status:  Final result Specimen:  Blood from Arm, Left Updated:  07/31/20 0101     Protime 15 7 seconds      INR 1 25    APTT [[id number]]  (Abnormal) Collected:  07/31/20 0008    Lab Status:  Final result Specimen:  Blood from Arm, Left Updated:  07/31/20 0101     PTT 41 seconds     Troponin I [[id number]]  (Normal) Collected:  07/30/20 2325    Lab Status:  Final result Specimen:  Blood from Arm, Left Updated:  07/30/20 2359     Troponin I <0 02 ng/mL     Comprehensive metabolic panel [[id number]]  (Abnormal) Collected:  07/30/20 2325    Lab Status:  Final result Specimen:  Blood from Arm, Left Updated:  07/30/20 2357     Sodium 138 mmol/L      Potassium 4 9 mmol/L      Chloride 104 mmol/L      CO2 26 mmol/L      ANION GAP 8 mmol/L      BUN 32 mg/dL      Creatinine 1 21 mg/dL      Glucose 136 mg/dL      Calcium 9 7 mg/dL      AST 39 U/L      ALT 19 U/L      Alkaline Phosphatase 91 U/L      Total Protein 7 1 g/dL      Albumin 2 7 g/dL      Total Bilirubin 0 82 mg/dL      eGFR 60 ml/min/1 73sq m     Narrative:       National Kidney Disease Foundation guidelines for Chronic Kidney Disease (CKD):     Stage 1 with normal or high GFR (GFR > 90 mL/min/1 73 square meters)    Stage 2 Mild CKD (GFR = 60-89 mL/min/1 73 square meters)    Stage 3A Moderate CKD (GFR = 45-59 mL/min/1 73 square meters)    Stage 3B Moderate CKD (GFR = 30-44 mL/min/1 73 square meters)    Stage 4 Severe CKD (GFR = 15-29 mL/min/1 73 square meters)    Stage 5 End Stage CKD (GFR <15 mL/min/1 73 square meters)  Note: GFR calculation is accurate only with a steady state creatinine    POCT urinalysis dipstick [[id number]]     Lab Status:  No result Specimen:  Urine     CBC and differential [[id number]]  (Abnormal) Collected:  07/30/20 2325    Lab Status:  Final result Specimen:  Blood from Arm, Left Updated:  07/30/20 2332     WBC 13 76 Thousand/uL      RBC 5 32 Million/uL      Hemoglobin 14 8 g/dL      Hematocrit 46 0 %      MCV 87 fL      MCH 27 8 pg      MCHC 32 2 g/dL      RDW 14 5 %      MPV 10 0 fL      Platelets 927 Thousands/uL      nRBC 0 /100 WBCs      Neutrophils Relative 87 %      Immat GRANS % 1 %      Lymphocytes Relative 2 %      Monocytes Relative 10 %      Eosinophils Relative 0 %      Basophils Relative 0 %      Neutrophils Absolute 11 91 Thousands/µL      Immature Grans Absolute 0 14 Thousand/uL      Lymphocytes Absolute 0 33 Thousands/µL      Monocytes Absolute 1 33 Thousand/µL      Eosinophils Absolute 0 01 Thousand/µL      Basophils Absolute 0 04 Thousands/µL                  CT head without contrast   ED Interpretation by Dayron Marques MD (07/31 0141)   CT read by radiologist; will discuss with medicine  Final Result by Ravindra Eugene DO (07/31 0127)      Minimal increase in size of ventricular system      The study was marked in EPIC for immediate notification  Workstation performed: [id number]         PE Study with CT Abdomen and Pelvis with contrast   Final Result by Ravindra Eugene DO (07/31 0150)      Fluid-filled loops of small bowel with pneumatosis and bowel wall thickening  Findings are suspicious for ischemic bowel  Nodular airspace opacities within the right upper lobe  Findings may represent infection  I personally discussed this study with Hangelinor Lance on 7/31/2020 at 1:46 AM                               Workstation performed: [id number]               Procedures  Procedures      ED Course  ED Course as of Jul 31 0654 Fri Jul 31, 2020   0008 Procedure Note: EKG  Date/Time: 07/31/20 12:08 AM   Interpreted by: Reid Fernandez MD  Indications / Diagnosis: Vomiting, weakness  ECG reviewed by me, the ED Physician: yes   The EKG demonstrates:  Interpretation: Sinus rhythm rate 98 bpm, with PVCs  Normal axis  S1Q3T3, new compared to prior study from 06/16/2020  Identification of Seniors at Risk      Most Recent Value   (ISAR) Identification of Seniors at Risk   Before the illness or injury that brought you to the Emergency, did you need someone to help you on a regular basis?   1 Filed at: 07/30/2020 9331   In the last 24 hours, have you needed more help than usual?  1 Filed at: 07/30/2020 3965   Have you been hospitalized for one or more nights during the past 6 months? 1 Filed at: 07/30/2020 2315   In general, do you see well?  0 Filed at: 07/30/2020 2315   In general, do you have serious problems with your memory? 0 Filed at: 07/30/2020 2315   Do you take more than three different medications every day? 1 Filed at: 07/30/2020 2315   ISAR Score  4 Filed at: 07/30/2020 2315                  Initial Sepsis Screening     Row Name 07/31/20 0240                Is the patient's history suggestive of a new or worsening infection? No  -VB        Suspected source of infection          Are two or more of the following signs & symptoms of infection both present and new to the patient?         Indicate SIRS criteria          If the answer is yes to both questions, suspicion of sepsis is present          If severe sepsis is present AND tissue hypoperfusion perists in the hour after fluid resuscitation or lactate > 4, the patient meets criteria for SEPTIC SHOCK          Are any of the following organ dysfunction criteria present within 6 hours of suspected infection and SIRS criteria that are NOT considered to be chronic conditions?         Organ dysfunction          Date of presentation of severe sepsis          Time of presentation of severe sepsis          Tissue hypoperfusion persists in the hour after crystalloid fluid administration, evidenced, by either:          Was hypotension present within one hour of the conclusion of crystalloid fluid administration?           Date of presentation of septic shock          Time of presentation of septic shock            User Key  (r) = Recorded By, (t) = Taken By, (c) = Cosigned By    [address] Name Provider Yancy Rene MD Physician            Emmanuelle Henry' Criteria for PE      Most Recent Value   Jacobo' Criteria for PE   Clinical signs and symptoms of DVT  0 Filed at: 07/31/2020 0017   PE is primary diagnosis or equally likely  3 Filed at: 07/31/2020 0017   HR >100  0 Filed at: 07/31/2020 0017   Immobilization at least 3 days or Surgery in the previous 4 weeks  1 5 Filed at: 07/31/2020 0017   Previous, objectively diagnosed PE or DVT  0 Filed at: 07/31/2020 0017   Hemoptysis  0 Filed at: 07/31/2020 0017   Malignancy with treatment within 6 months or palliative  0 Filed at: 07/31/2020 4953   Bijan Braxton' Criteria Total  4 5 Filed at: 07/31/2020 0017                MDM  Number of Diagnoses or Management Options  Acute superficial gastritis with hemorrhage:   Ambulatory dysfunction:   Generalized weakness:   Ischemic bowel disease [facility]):   Diagnosis management comments: 76-year-old malewith past medical history of HTN, DM2, neuropathy, hydrocephalus with  shunt, and s/p L5-S1 spine surgery (07/06/2020 with Dr Raj Pollard) presents to the ED via EMS with intermittent nausea and hematemesis over the last 2-3 days  Patient has also had bilateral lower extremity weakness and ambulatory dysfunction over the last several months  On exam: bowel sounds are normal, but he has marked tenderness of the epigastrium, periumbilical, left upper quadrant, and left lower quadrant, with guarding noted  No suprapubic or right-sided abdominal tenderness  Bruising noted over the right lower abdomen  WBC 13 7  EKG with S1 Q 3 T3, new compared to prior  CT PE protocol without evidence of PE   CT A/P with fluid-filled loops of small bowel with pneumatosis and bowel wall thickening, findings are suspicious for ischemic bowel  CT head shows minimal increase in the ventricular system and  shunt  Discussed indications for admission with the patient and he understands and agrees  Patient was initially admitted to the general medicine service, [facility], by Dr Edelmira Sarabia  However upon CT finding of pneumatosis, general surgery service was consulted  Case discussed with Dr Charles Fenton, general surgery, regarding the patient's pneumatosis seen on CT the patient was seen by the general surgery service   Case discussed with Dr Raj Pollard, neurosurgery, and they will see the patient in consult  Disposition  Final diagnoses:   Acute superficial gastritis with hemorrhage   Generalized weakness   Ambulatory dysfunction   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[facility] )     Time reflects when diagnosis was documented in both MDM as applicable and the Disposition within this note     Time User Action Codes Description Comment    7/31/2020 12:24 AM [place] Griffes S Add [K29 01] Acute superficial gastritis with hemorrhage     7/31/2020 12:24 AM [place] Griffes S Modify [K29 01] Acute superficial gastritis with hemorrhage     7/31/2020  1:27 AM Alivia Therese Modify [K29 01] Acute superficial gastritis with hemorrhage     7/31/2020  1:28 AM Alivia Therese Add [R53 1] Generalized weakness     7/31/2020  1:28 AM Alivia Therese Add [R26 2] Ambulatory dysfunction     7/31/2020  2:04 AM Arslan Griffes S Add [K55 9] 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[facility] )     7/31/2020  2:04 AM Milinda Ice Add [Z98 2] Status post ventriculoperitoneal shunt     7/31/2020  6:22 AM Bennetta Lukes L Modify [K29 01] Acute superficial gastritis with hemorrhage     7/31/2020  6:22 AM Bennetta Lukes L Modify [K55 9] 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[facility] )     7/31/2020  6:54 AM Alivia Therese Modify [K29 01] Acute superficial gastritis with hemorrhage     7/31/2020  6:54 AM Alivia Therese Modify [K55 9] Ischemic bowel disease [facility])       ED Disposition     ED Disposition Condition Date/Time Comment    Admit Stable Fri Jul 31, 2020  1:28 AM Case was discussed with Dr Sandra YANCEY, and the patient's admission status was agreed to be Admission Status: inpatient status to the service of Dr Sandra Romero          Follow-up Information    None         Current Discharge Medication List      CONTINUE these medications which have NOT CHANGED    Details   allopurinol (ZYLOPRIM) 300 mg tablet 300 mg daily      aspirin 325 mg tablet Take 1 tablet (325 mg total) by mouth daily  Refills: 0    Associated Diagnoses: Essential hypertension      enalapril (VASOTEC) 5 mg tablet Take 1 tablet (5 mg total) by mouth daily  Qty: 30 tablet, Refills: 0    Associated Diagnoses: Essential hypertension      Lancets (ONETOUCH DELICA PLUS [id number]) MISC 2 (two) times a day      methocarbamol (ROBAXIN) 500 mg tablet Take 1 tablet (500 mg total) by mouth 2 (two) times a day as needed for muscle spasms (back pain)    Comments: Patient has at home  Associated Diagnoses: Pain      multivitamin (THERAGRAN) TABS Take 1 tablet by mouth daily      ONE TOUCH ULTRA TEST test strip daily      travoprost (Travatan Z) 0 004 % ophthalmic solution Administer 1 drop to the right eye daily at bedtime            No discharge procedures on file  PDMP Review     None           ED Provider  Attending physically available and evaluated Yolanda Lozano I managed the patient along with the ED Attending      Electronically Signed by         Becky Severe, MD  07/31/20 2624

## 2020-07-31 NOTE — CONSULTS
Consultation - Neurosurgery   Ralph Rojo 69 y o  male MRN: [id number]  Unit/Bed#: ICU 12 Encounter: [id number]      Assessment/Plan     Assessment:  1  Need to externalize right-sided  shunt system at right anterior chest wall level to lateral subcostal anterior axillary line level closed Integra drainage system - done -- see op note  2  CSF aspirated by sterile 18 gauge needle directly from externalized  shunt system after 1 cm tip externalized peritoneal end catheter separately removed showed CSF WBC 64 with 91% neutrophils, CSF glucose of 88 and CSF protein of 88 and 1+ polys and 4+ GNR on Gram stain  3  Acute Peritonitis four-day history of increasing abdominal pain  4  Acute gastritis  5  Ischemic proximal small bowel  6  Status post emergency ex lap Dr Emelia Correa MD , General Surgery  7  SMA was spite patent time of surgery - explored with vascular surgery  8  Status post femoral artery and SMA artery cannulation for continued papaverine per  vascular surgery  9  NPH  10  Right-sided  shunt system since 2005  11  Revision in 2011  12  Has [employer] system with opening pressure set at 100 cm of water last adjustment on 7/21/2020  13  Recent TLIF L5-S1 7/6/2020 Dr Laverne Davis     [facility] as above    Plan:  1  Establish closed protected CSF drainage system for externalized right  shunt system at anterior the the chest wall level interspaces ribs 9 in 10  2  Aim to allow no more than 10 cc an hour CSF drainage  3  Current level of EVD is at +8 mmHg level above level of right atrium - viz fifth intercostal space and mid exam blurry  4  May need elevation to +8 mmHg level CSF drainage goes above 10 cc an hour  5  Continue IV antibiotics with cefepime and metronidazole  6  Acute peritonitis further management by general surgery  7  Plans  for return to OR tomorrow - reexploration of abdomen washout and further VAC dressing  8  Maintained on ventilator propofol fentanyl for now  9   Papaverine direct SMA infusion continuing  Further surveillance CSF some playing in 48 --72 hrs    Consults  ID    History, ROS and PFSH unobtainable from any source due to emergency presentation being maintained on ventilator    See below within HPI    HPI: Boone Ruffin is a 69y o  year old male who presents with acute peritonitis   Patient well known to me    This 58-year-old man has hypertension diabetes and NPH     Original  shunt system was placed in 2005  Had revisions and second shunt placed in 2011  Various settings tried in response to his NPH symptoms of unsteadiness inning content     Last adjustment was down from 120 mm of water H2O to 90 mm h20  7/2/2020 at his preop office visit for his TLIF surgery     At the same anesthesia for his TLIF  surgery on 7/6/2020 had minor skin revision of skin incision over right  frontal shunt unit with Dr Blanka Candelario great recovery, walking well      Had follow-up surveillance CT scan on 7/21/2020      Postop right Codman Hakim  shunt shunt was adjusted up from 90 mm H20  to 100 mm Hg20 as the were now some small increase in subdural hygromas on CT scan head    Was discharged well to the care of his friend Sandy Montez  He continued good progress in [facility] and went home  Lives with a partner, Sandy Montez     And re-presented 7/30/2020 with confusion and in unkempt state and had been taking large quantities of Naprosyn and 4 days of abdominal pain      Found to have pneumatosis of the small bowel on CT imaging     Abdominal exam showed guarding tenderness and reduced bowel sounds suggestive of peritonitis     WBC was 13 7     Decision was made to take him to the OR for Ex Lap      Found to have acute gastritis ischemic appearing bowel from the ligament of Trietz down  to mid ileum    Hazy fibrinous ascites upon entering the abdomen consistent with peritonitis     Peritoneal and a  shunt on the right encountered and externalized through a transverse stab incision in anterior lower chest wall around ribs 9 - 10 interspace  Cut and of tubing was sutured and clipped covered with sterile Tegaderm     Vascular surgery evaluated SMA for patency in OR  SMA appeared widely patent with minimal plaque at the origin      It was felt there was element of nonocclusive mesenteric ischemia     Infusion catheter with papaverine set up     Midline abdominal incision  was left open and with Abthera VAC sealed dressing      We recommend:  1  Interval CSF sample directly from the end externalized peritoneal tubing in right subcostal location  2  Closed drainage and monitoring of peritoneal to end of shunt tubing now externalized in right lower anterior chest wall region ribs 9 and 10 interspace region w closed Intergra Monitorr  drainage system     Please see op note from today     Review of Systems patient intubated ventilated no direct information possible    Historical Information   Past Medical History:   Diagnosis Date    Cancer [facility]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     Diabetes mellitus ([facility] [address] )     DM2 (diabetes mellitus, type 2) ([facility] [address] )     Gout     HTN (hypertension)     Hydrocephalus ([facility] [address] )     Hypertension     Neuropathy     ZHOU on CPAP     Pressure injury of skin     TIA (transient ischemic attack)      Past Surgical History:   Procedure Laterality Date    CSF SHUNT      x3 Op Reports, Dr Eda Auguste, Alena Najjar in [facility] chart viewer   [facility] [facility] 5/30/2020    Procedure: excision 5th metatarsal head   excision 5th metatarsal ulcer ;  Surgeon: Tavo García DPM;  Location: [facility];  Service: Podiatry    GA ARTHDSIS POST/POSTEROLATRL/POSTINTERBODY LUMBAR N/A 7/6/2020    Procedure: MINIMALLY INVASIVE TRANSVERSE LUMBAR INTERBODY FUSION L5-S1 FROM LEFT-SIDED APPROACH;  Surgeon: Alma Rosa Laboy MD;  Location: [facility];  Service: Neurosurgery    GA REPLACEMENT/REVISION,CSF SHUNT Right 7/6/2020    Procedure: REVISION OF SCALP OVER VENTRICULAR CATHETER IN THE RIGHT CORONAL REGION;  Surgeon: Christine Lua MD;  Location: [facility];  Service: Neurosurgery     Social History     Substance and Sexual Activity   Alcohol Use Not Currently    Frequency: Monthly or less    Drinks per session: 1 or 2     Social History     Substance and Sexual Activity   Drug Use Never     Social History     Tobacco Use   Smoking Status Never Smoker   Smokeless Tobacco Never Used     Family History   Problem Relation Age of Onset    Polymyositis Mother     Heart failure Father        Meds/Allergies   all current active meds have been reviewed  Current Facility-Administered Medications   Medication Dose Route Frequency Provider Last Rate Last Dose    acetaminophen (TYLENOL) rectal suppository 650 mg  650 mg Rectal Q4H PRN Meron Mcrae, DO   650 mg at 07/31/20 1308    cefepime (MAXIPIME) 2,000 mg in dextrose 5 % 50 mL IVPB  2,000 mg Intravenous Q8H Jannie Louis PA-C        chlorhexidine (PERIDEX) 0 12 % oral rinse 15 mL  15 mL Swish & Spit Q12H [address], DO        fentaNYL 1000 mcg in sodium chloride 0 9% 100mL infusion  50 mcg/hr Intravenous Continuous Meron Mcrae DO 5 mL/hr at 07/31/20 1020 50 mcg/hr at 07/31/20 1020    heparin (porcine) 25,000 units in 250 mL infusion (premix)  3-30 Units/kg/hr (Order-Specific) Intravenous Titrated Sav Guevara MD 21 6 mL/hr at 07/31/20 1152 18 Units/kg/hr at 07/31/20 1152    heparin (porcine) injection 4,800 Units  4,800 Units Intravenous Q1H PRN Sav Guevara MD        heparin (porcine) injection 9,600 Units  9,600 Units Intravenous Q1H PRN Sav Guevara MD        HYDROmorphone (DILAUDID) injection 0 5 mg  0 5 mg Intravenous Q4H PRN Meron Mcrae DO        HYDROmorphone (DILAUDID) injection 1 mg  1 mg Intravenous Q4H PRN Meron Mcrae DO        insulin lispro (HumaLOG) 100 units/mL subcutaneous injection 2-12 Units  2-12 Units Subcutaneous Q6H [address] Mague Morrison MD        iodixanol (VISIPAQUE) 320 MG/ML injection 15 mL  15 mL Intravenous Once in imaging Abimael Sutton DO        metroNIDAZOLE (FLAGYL) IVPB (premix) 500 mg 100 mL  500 mg Intravenous Q8H Jannie Louis PA-C 200 mL/hr at 07/31/20 1301 500 mg at 07/31/20 1301    multi-electrolyte (PLASMALYTE-A/ISOLYTE-S PH 7 4) IV solution  125 mL/hr Intravenous Continuous Mague Morrison  mL/hr at 07/31/20 0955 125 mL/hr at 07/31/20 0955    pantoprazole (PROTONIX) injection 40 mg  40 mg Intravenous Q12H Mague Morrison MD   40 mg at 07/31/20 0229    papaverine 60 mg in sodium chloride 0 9 % 250 mL infusion   Intravenous Continuous Jovanna Berrios  mL/hr at 07/31/20 1220      papaverine injection 30 mg  30 mg Intravenous Continuous Mague Morrison MD        propofol (DIPRIVAN) 1000 mg in 100 mL infusion (premix)  5-50 mcg/kg/min Intravenous Titrated Abimael Sutton DO 25 6 mL/hr at 07/31/20 1241 35 mcg/kg/min at 07/31/20 1241    vancomycin (VANCOCIN) 2,000 mg in sodium chloride 0 9 % 500 mL IVPB  2,000 mg Intravenous Once Princella Clarence, DO        And    vancomycin (VANCOCIN) IVPB (premix) 500 mg 100 mL  500 mg Intravenous Once Princella Clarence, DO        [START ON 8/1/2020] vancomycin (VANCOCIN) 2,000 mg in sodium chloride 0 9 % 500 mL IVPB  2,000 mg Intravenous Q24H Chacho Tamez DO           Allergies   Allergen Reactions    Pollen Extract Allergic Rhinitis         Physical Exam  Neurologic Exam     Remained intubated on the ventilator GCS 7 T    Propofol fentanyl    Small pupils 2 5 mm reactive    Localizes with upper extremity  Flexes briskly lower extremities    Impression: This is a 69-year-old man with multiple medical issues including hypertension DM to ZHOU  normally on CPAP previous lumbar surgeries and recent lumbar surgery with L5-S1 T LIF has NPH and  longstanding right-sided  shunt system      Doing quite well improved walking after lumbar surgery T LIFL5-S1 on 7/6/2020  Good progress in arc rehab    Shunt setting as being confirmed 100 mm of water 7/21/2020    Now presents with 4 day history of abdominal pain found to have acute peritonitis  Ex lap showed ischemic high small bowel    On SMA a Pap bring infusion    Right  shunt had to be externalized  It was tied off and sealed also with like a clips in sterile dressing in the right lower anterior chest wall region  Today we have a connected this up to closed Integra monitor or drainage system set at +8 mmHg level above right atrium    Prior to connection with [place]  clear straight connector after removing distal 1 cm of externalized peritoneal tubing a CSF supple was easily obtained using sterile 18 gauge needle    White cell count was elevated at 64 with 91% neutrophils but glucose of 88 and protein of 88/he is a diabetic    However 1+ polys and 4+ GNR on Gram stain of CS    So continues on IV cefepime and metronidazole    Going for ex lap VAC dressing change tomorrow again tomorrow    Discussion: We will continue externalization while his peritoneal sepsis subsides  This may take a long time    Further interim CSF sampling in next 48 to 72 hours    Externalized CSF drain may be needed for 2 to 3 weeks    I discussed his management Dr Silvina Good discussed his management my colleague Dr Deanne Hendrix who is on for us over the weekend    Hopefully there will be no assent of organisms up the tubing    Eventually need conversion into ventriculopleural shunt or VA shunt        Intake/Output Summary (Last 24 hours) at 7/31/2020 1320  Last data filed at 7/31/2020 1222  Gross per 24 hour   Intake 2081 39 ml   Output 500 ml   Net 1581 39 ml       Vitals:Blood pressure 127/65, pulse 78, temperature (!) 102 8 °F (39 3 °C), resp  rate (!) 27, height 6' 3" (1 905 m), weight 122 kg (268 lb 15 4 oz), SpO2 99 %  ,Body mass index is 33 62 kg/m²  Lab Results: I have personally reviewed pertinent results  Imaging Studies: I have personally reviewed pertinent films in PACS with a Radiologist     EKG, Pathology, and Other Studies: I have personally reviewed pertinent reports  VTE Prophylaxis: Sequential compression device Yani Bliss)     Code Status: Level 1 - Full Code  Advance Directive and Living Will:      Power of :    POLST:      Counseling / Coordination of Care  Counseling/Coordination of Care: Total floor / unit time spent today 90 min minutes  Greater than 50% of total time was spent with the patient and / or family counseling and / or coordination of care  A description of the counseling / coordination of care: see assessment and plan documentation above for description  7/31/2020 1:20 PM    Lou Zambrano MD, Attending Neurological Surgeon

## 2020-07-31 NOTE — UTILIZATION REVIEW
Initial Clinical Review    Admission: Date/Time/Statement: Admission Orders (From admission, onward)     Ordered        07/31/20 0028  Inpatient Admission  Once                   Orders Placed This Encounter   Procedures    Inpatient Admission     Standing Status:   Standing     Number of Occurrences:   1     Order Specific Question:   Admitting Physician     Answer:   Elige Romberg [[id number]]     Order Specific Question:   Level of Care     Answer:   Med Surg [16]     Order Specific Question:   Estimated length of stay     Answer:   More than 2 Midnights     Order Specific Question:   Certification     Answer:   I certify that inpatient services are medically necessary for this patient for a duration of greater than two midnights  See H&P and MD Progress Notes for additional information about the patient's course of treatment  ED Arrival Information     Expected Arrival Acuity Means of Arrival Escorted By Service Admission Type    - 7/30/2020 23:10 [facility]-General Emergency    Arrival Complaint    weakness        Chief Complaint   Patient presents with    Weakness - Generalized     pt had back surgery 2 weeks ago going to OP PT at home now for 2 days pt has been unable to get out of bed to care for self, EMS reported 2-3 falls today, vomited at home, pt has dried stool over body     Assessment/Plan: 68 y/o male presents to ED from home via EMS admitted to ICU with GI bleed  Pt with PMhx of obesity, ZHOU on CPAP, HTn, gout, T2 DM, normal pressure hydrocephalus s/p shunt in 2005 with revision this past July, chronic back pain with spondylolisthesis of the L-S spine region with spinal stenosis and s/p L5-S1 transverse lumbar interbody fusion and deformity correction at L5-S1  Presents to ED today d/t vomiting blood  States he has been taking naproxen for the past few days  States he is nauseous and has epigastric discomfort that seems to get better with food intake    WBC 13 76, creatinine 32, INR 1 25  Plan: hold asa for now  Protonix 40 mg IV BID  H&H q12h  GI & surgery consults  Npo  Continue home po meds for HTN  Fingerstick glucose checks w/ ssi  PT/OT evals  CT head shows possibility of mild enlargement of ventricle  NSx consulted  Neuro checks q4h  Surgery consult 7/31 -- A: pneumatosis of small bowel on CT imaging  Abdomen is clinically peritoneal signs, patient's exam reveal involuntary guarding and other signs of peritonitis  Patient appears to have an acute abdomen  Continue Npo, lactic and EKG pending  Plan for surgical intervention      OPERATIVE REPORT   SURGERY DATE: 7/31/2020    Procedure(s) (LRB):  LAPAROTOMY EXPLORATORY   Operative Findings:  Largely dilated and ischemic appearing bowel from Ligament of Treitz to mid ileum  Distal/Terminal ileum appeared decompressed and pink with a normal appearing colon throughout      Hazy/Fibrinous ascites upon entering abdomen  Fibrinous exudate on distended small bowel      shunt was externalized to abdominal wall at the point at which it entered the abdomen on the right costal margin, Clipped x3 and covered with Sterile Tegaderm and Abthera VAC sticky sheets     Open Abdomen w/ Abthera VAC placement at end of case for re-examination of bowel   Palpable Aortic pulse, Doppler mesenteric vessels in portion of ischemic appearing bowel     Vascular surgery to perform Femoral artery cannulation and potential Papaverine injection and Mesenteric Arteriogram     Likely a DELIA picture    Admit ICU post-op       ED Triage Vitals   Temperature Pulse Respirations Blood Pressure SpO2   07/30/20 2314 07/30/20 2314 07/30/20 2314 07/30/20 2314 07/30/20 2314   99 1 °F (37 3 °C) 97 18 132/65 94 %      Temp Source Heart Rate Source Patient Position - Orthostatic VS BP Location FiO2 (%)   07/30/20 2314 07/30/20 2314 07/30/20 2314 07/30/20 2314 --   Oral Monitor Sitting Left arm       Pain Score       07/31/20 0210       No Pain Wt Readings from Last 1 Encounters:   07/31/20 122 kg (268 lb 15 4 oz)     Additional Vital Signs:   Time  Temp  Pulse  Resp  BP  MAP (mmHg)  Arterial Line BP  MAP  SpO2  O2 Device  Patient Position - Orthostatic VS  ICP Mean (mmHg)  CPP   07/31/20 1535  102 2 °F (39 °C)Abnormal   72  20      130/52  72 mmHg  100 %      8 mmHg  65   07/31/20 1500  102 2 °F (39 °C)Abnormal   74  20      128/50  70 mmHg  99 %      8 mmHg  63   07/31/20 1400    80  21      124/52  72 mmHg  99 %      8 mmHg  64   07/31/20 1300    76  21  116/59  82  150/62  84 mmHg  99 %      8 mmHg  76   07/31/20 1200  102 8 °F (39 3 °C)Abnormal   78  27Abnormal       170/72  100 mmHg  99 %      8 mmHg  92   07/31/20 1100    84  21      138/60  80 mmHg  97 %           07/31/20 0214                  Other (comment)          O2 Device: CPAP baseline at 07/31/20 0214   07/31/20 02:05:37  100 1 °F (37 8 °C)  90  18  127/65  86      92 %           07/31/20 0028                  None (Room air)         07/31/20 0000    94  18  130/71  95      95 %  None (Room air)  Lying       07/30/20 2314  99 1 °F (37 3 °C)  97  18  132/65        94 %  None (Room air)  Sitting           Pertinent Labs/Diagnostic Test Results:   CT a/p 7/31 --   Fluid-filled loops of small bowel with pneumatosis and bowel wall thickening   Findings are suspicious for ischemic bowel  Nodular airspace opacities within the right upper lobe   Findings may represent infection  CT head 7/31 -- Minimal increase in size of ventricular system     CXR 7/31 --   1   Interval intubation with coiling of the nasogastric tube in the midesophagus   Repositioning of the the nasogastric tube advised  2   Endotracheal tube noted with the tip well above the tye  3   Scattered strandy densities likely related to diminished degree of inspiration      Results from last 7 days   Lab Units 07/31/20  0436   SARS-COV-2  Negative     Results from last 7 days   Lab Units 07/31/20  1012 07/30/20  2325   WBC Thousand/uL 10 02 13 76*   HEMOGLOBIN g/dL 13 0 14 8   HEMATOCRIT % 39 7 46 0   PLATELETS Thousands/uL 234 272   NEUTROS ABS Thousands/µL  --  11 91*   BANDS PCT % 8  --      Results from last 7 days   Lab Units 07/31/20  1012 07/30/20  2325   SODIUM mmol/L 139 138   POTASSIUM mmol/L 4 6 4 9   CHLORIDE mmol/L 108 104   CO2 mmol/L 21 26   ANION GAP mmol/L 10 8   BUN mg/dL 33* 32*   CREATININE mg/dL 1 27 1 21   EGFR ml/min/1 73sq m 56 60   CALCIUM mg/dL 8 5 9 7     Results from last 7 days   Lab Units 07/30/20  2325   AST U/L 39   ALT U/L 19   ALK PHOS U/L 91   TOTAL PROTEIN g/dL 7 1   ALBUMIN g/dL 2 7*   TOTAL BILIRUBIN mg/dL 0 82     Results from last 7 days   Lab Units 07/31/20  1151   POC GLUCOSE mg/dl 148*     Results from last 7 days   Lab Units 07/31/20  1012 07/30/20  2325   GLUCOSE RANDOM mg/dL 159* 136     Results from last 7 days   Lab Units 07/31/20  0257   HEMOGLOBIN A1C % 5 6   EAG mg/dl 114     Results from last 7 days   Lab Units 07/31/20  1151 07/31/20  0257   PH ART  7 357 7 516*   PCO2 ART mm Hg 32 3* 21 9*   PO2 ART mm Hg 87 8 173 5*   HCO3 ART mmol/L 17 7* 17 3*   BASE EXC ART mmol/L -6 7 -3 5   O2 CONTENT ART mL/dL 17 5 19 3   O2 HGB, ARTERIAL % 94 7 98 0*   ABG SOURCE  Line, Arterial Radial, Left     Results from last 7 days   Lab Units 07/30/20  2325   TROPONIN I ng/mL <0 02     Results from last 7 days   Lab Units 07/31/20  1559 07/31/20  1012 07/31/20  0008   PROTIME seconds  --  17 2* 15 7*   INR   --  1 41* 1 25*   PTT seconds 60* 72* 41*       Results from last 7 days   Lab Units 07/31/20  1012 07/31/20  0258   LACTIC ACID mmol/L 2 2* 2 2*     Results from last 7 days   Lab Units 07/30/20  2325   LIPASE u/L 33*     Results from last 7 days   Lab Units 07/31/20  1251   CLARITY UA  Clear   COLOR UA  Dk Yellow   SPEC GRAV UA  1 032*   PH UA  5 5   GLUCOSE UA mg/dl Negative   KETONES UA mg/dl Negative   BLOOD UA  Small*   PROTEIN UA mg/dl Trace*   NITRITE UA  Negative   BILIRUBIN UA  Negative   UROBILINOGEN UA E U /dl 0 2   LEUKOCYTES UA  Negative   WBC UA /hpf 2-4*   RBC UA /hpf 4-10*   BACTERIA UA /hpf None Seen   EPITHELIAL CELLS WET PREP /hpf None Seen     Results from last 7 days   Lab Units 07/31/20  1411 07/31/20  0622   GRAM STAIN RESULT  1+ Polys*  4+ Gram negative rods* No Polys or Bacteria seen     Results from last 7 days   Lab Units 07/31/20  1230   TOTAL COUNTED  100     Results from last 7 days   Lab Units 07/31/20  1329 07/31/20  1230   APPEARANCE CSF   --  Hazy   WBC CSF /uL  --  61*   XANTHOCHROMIA   --  No   NEUTROPHILS % (CSF) %  --  91   LYMPHS % (CSF) %  --  4   MONOCYTES % (CSF) %  --  4   GLUCOSE CSF mg/dL 88*  --    PROTEIN CSF mg/dL 88*  --    RBC CSF uL  --  2   ED Treatment:   Medication Administration from 07/30/2020 2309 to 07/31/2020 0150       Date/Time Order Dose Route Action     07/31/2020 0007 sodium chloride 0 9 % bolus 1,000 mL 1,000 mL Intravenous New Bag     07/31/2020 0009 pantoprazole (PROTONIX) injection 40 mg 40 mg Intravenous Given     Past Medical History:   Diagnosis Date    Cancer 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     Diabetes mellitus [facility] [address] [facility]     DM2 (diabetes mellitus, type 2) ([facility] [address] [facility]     Gout     HTN (hypertension)     Hydrocephalus ([facility] [address] )     Hypertension     Neuropathy     ZHOU on CPAP     Pressure injury of skin     TIA (transient ischemic attack)      Present on Admission:   Unspecified abnormalities of gait and mobility   Type 2 diabetes mellitus ([facility] [address] )   Essential hypertension   Spondylolisthesis of lumbosacral region   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[facility] [address] )      Admitting Diagnosis: Weakness [R53 1]  Generalized weakness [R53 1]  Ambulatory dysfunction [R26 2]  Acute superficial gastritis with hemorrhage [K29 01]  Age/Sex: 69 y o  male  Admission Orders:  Scheduled Medications:  Medications:  cefepime 2,000 mg Intravenous Q8H   chlorhexidine 15 mL [employer] Spit Q12H [address] insulin lispro 2-12 Units Subcutaneous Q6H [address]   metroNIDAZOLE 500 mg Intravenous Q8H   pantoprazole 40 mg Intravenous Q12H     Continuous IV Infusions:  fentaNYL 50 mcg/hr Intravenous Continuous   heparin (porcine) 3-30 Units/kg/hr (Order-Specific) Intravenous Titrated   multi-electrolyte 125 mL/hr Intravenous Continuous   IV infusion builder  Intravenous Continuous   papaverine 30 mg Intravenous Continuous   propofol 5-50 mcg/kg/min Intravenous Titrated     PRN Meds:  acetaminophen 650 mg Rectal Q4H PRN   heparin (porcine) 4,800 Units Intravenous Q1H PRN   heparin (porcine) 9,600 Units Intravenous Q1H PRN   HYDROmorphone 0 5 mg Intravenous Q4H PRN   HYDROmorphone 1 mg Intravenous Q4H PRN   iodixanol 15 mL Intravenous Once in imaging       IP CONSULT TO CASE MANAGEMENT  IP CONSULT TO ACUTE CARE SURGERY  IP CONSULT TO NEUROSURGERY  IP CONSULT TO WOUND CARE  IP CONSULT TO VASCULAR SURGERY  IP CONSULT TO SURGICAL CRITICAL CARE  IP CONSULT TO PHARMACY  IP CONSULT TO INFECTIOUS DISEASES    Network Utilization Review Department  [email or web address]  ATTENTION: Please call with any questions or concerns to [phone] and carefully listen to the prompts so that you are directed to the right person  All voicemails are confidential   [facility] all requests for admission clinical reviews, approved or denied determinations and any other requests to dedicated fax number below belonging to the campus where the patient is receiving treatment   List of dedicated fax numbers for the Facilities:  FACILITY NAME UR FAX NUMBER   ADMISSION DENIALS (Administrative/Medical Necessity) [phone]   [address]  (Maternity/NICU/Pediatrics) [phone]   [facility] [phone]   [place] [facility] [phone]   [facility] [phone]   Margaret Miguel [address] [place] [address] [phone]     [facility] [phone]   [address] [facility]  [phone] [address] [address] [address] [facility] [phone]

## 2020-07-31 NOTE — ANESTHESIA PROCEDURE NOTES
Arterial Line Insertion  Performed by: Filomena Barthel, CRNA  Authorized by: Vel Howard MD   Consent: Verbal consent obtained  Risks and benefits: risks, benefits and alternatives were discussed  Consent given by: patient  Patient understanding: patient states understanding of the procedure being performed  Patient consent: the patient's understanding of the procedure matches consent given  Procedure consent: procedure consent matches procedure scheduled  Relevant documents: relevant documents present and verified  Test results: test results available and properly labeled  Site marked: the operative site was marked  Radiology Images: Radiology Images displayed and confirmed  If images not available, report reviewed  Required items: required blood products, implants, devices, and special equipment available  Patient identity confirmed: verbally with patient, arm band, provided demographic data and hospital-assigned identification number  Time out: Immediately prior to procedure a "time out" was called to verify the correct patient, procedure, equipment, support staff and site/side marked as required  Preparation: Patient was prepped and draped in the usual sterile fashion    Indications: multiple ABGs and hemodynamic monitoring  Orientation:  Left  Location: radial artery  Sedation:  Patient sedated: no    Procedure Details:  Mk's test normal: yes  Needle gauge: 20  Number of attempts: 1    Post-procedure:  Post-procedure: dressing applied  Waveform: good waveform  Post-procedure CNS: normal  Patient tolerance: Patient tolerated the procedure well with no immediate complications

## 2020-07-31 NOTE — PROGRESS NOTES
Papavarine gtt alarming occlusion  Relayed message to Dr Dorothy Graham in [address] () and was guided to contact IR to have a tech come and evaluate  Called IR and spoke with Lois Bartholomew, all IR techs currently in cases as well  Called OR6 back to relay message to Dr Dorothy Graham that on one was available from IR    Dr Johny Dunlap from vascular came to bedside, flushed papavarine line with sterile saline and line currently infusing

## 2020-07-31 NOTE — PROGRESS NOTES
Progress Note - Critical Care   Viridiana Gay 69 y o  male MRN: [id number]  Unit/Bed#: ICU 12 Encounter: [id number]    Assessment:   Principal Problem:    Acute superficial gastritis with hemorrhage  Active Problems:    Status post ventriculoperitoneal shunt    Unspecified abnormalities of gait and mobility    Type 2 diabetes mellitus ([address] )    Essential hypertension    Spondylolisthesis of lumbosacral region    Self-care deficit for hygiene  Resolved Problems:    * No resolved hospital problems  *    Plan:   · Neuro:   · Dx: acute post-op pain  · Analgesia: fentanyl, prn tylenol and prn dulaudid  · Sedation - fentanyl, propofol  RASS goal 0 to -2  · Delirium ppx: CAM-ICU, sleep hygiene  · Hx of  shunt for normal pressure hydrocephalus, originally placed in 2005 and revised this month   · Shunt series yesterday due to minimal increase in size of ventricular system  · Neurosurgery consulted, who tapped shunt  · F/u neurosurgery note/procedure  · CV:   · No active issues  · Hemodynamic support: none  · MAP goal > 65   · Hx of HTN  · Hold home meds  · Lung:   · Dx: intubated for OR case 7/31  · Vent day 1    · Wean vent settings and extubate when able  Respiratory    Lab Data (Last 4 hours)    None         O2/Vent Data (Last 4 hours)      07/31 0936 07/31 1151         Vent Mode AC/VC AC/VC      Resp Rate (BPM) (BPM) 14 14      Vt (mL) (mL) 500 500      FIO2 (%) (%) 50 40      PEEP (cmH2O) (cmH2O) 5 5      Patient safety screen outcome: Failed       MV 10 9 11              ·    · Continue Respiratory Protocol and Airway Clearance Protocol  · Hx of ZHOU on home CPAP  · Order CPAP when extubated for qhs use    · GI:   · Dx: mesenteric ischemia s/p ex-lap with femoral sheath placement through which papaverine is infused through SMA, with heparin through side port  No bowel resection but some concern for development of ischemic bowel in near future    Wound vac in place  · Recheck planned for 8/1  · Check post-op labs  · Trend lactate  · Trend abdominal exam   · Monitor for dark/bloody stool, which was present on arrival to ED  · Dx: acute superficial gastritis with hemorrhage, POA  · Likely 2/2 the above  · Patient taking 4 tablets naproxen daily POA  · Hold AP/AC, NSAIDs  · Stress ulcer ppx: protonix  · Bowel regimen: none at this time  · FEN:   · Fluids: isolyte 125cc/hr  · Electrolytes: trend and replete as needed  · Nutrition: NPO   · :   · No active issues  I/O last 24 hours: In: 1750 [I V :1000; IV Piggyback:750]  Out: 500 [Urine:450; Blood:50]  · Griffith: present  · Monitor I&O's, BUN/Cr  · ID:   · Dx: fever of unknown origin  · WBC 10  · t max 102 8  · Shunt tapped, CSF studies pending  · Consider blood and urine cx  · Consider starting abx  · Monitor WBC/temp curve  · Heme:   · Dx: mesenteric ischemia as above  · Management as above  · Trend Hb for goal > 7  · DVT ppx: SCDs   · Endo:   · Dx: DM type II not on home medications  · ISS   · Goal -180  · Msk/Skin:   · Hx of gout  · Hold home allopurinol  · Maintain knee immobilizer in presence of arterial sheath  · PT/OT  · Turning/repositioning  · Wound care   · Disposition: ICU    ______________________________________________________________________    HPI/24hr events: Patient brought to ICU intubated and sedated post-op   shunt tapped at bedside by Dr Da Sarah shortly after arrival      Per HPI by Dr Rosario Pacheco: "69 y o  male who has a past medical history significant for obesity with obstructive sleep apnea on CPAP, hypertension, gout, diabetes mellitus type 2 not on any insulin or medications and currently hemoglobin A1c less than 5 9; normal pressure hydrocephalus status post ventriculoperitoneal shunt in 2005 with revision this past July and chronic back pain with spondylolisthesis of the lumbosacral region with spinal stenosis and status post L5-S1 transverse lumbar interbody fusion and deformity correction at L5-S1    At the same time, the patient had a revision of the scalp over ventricular catheter  Patient comes in brought about by ambulance from home with complaints of vomiting blood  Patient states that he has been using naproxen for the past few days since discharge with note of epigastric discomfort for the past 3 days  Patient states that he is nauseous and the epigastric discomfort does not seem to get better with intake food  When asked specifically just how much is the hematemesis he quantifies it as a lot and would not say exactly just how much  Patient denies any fever or cough or cold  He also denies any shortness of breath as of the moment  Only epigastric discomfort  Denies any hypogastric discomfort or pain "      Lines   PIV x2  A-line x2  Griffith  ETT  Wound vac    Infusions  Propofol  Fentanyl  isolyte 125 cc/hr  papverine  Heparin   ______________________________________________________________________  Physical Exam:       Physical Exam   Constitutional:   Intubated and sedated   HENT:   Head: Normocephalic and atraumatic  Mouth/Throat: Oropharynx is clear and moist    ETT in place   Eyes: Conjunctivae are normal    Neck: No JVD present  No tracheal deviation present  Cardiovascular: Normal rate and regular rhythm  Pulmonary/Chest: No respiratory distress  Intubated, mechanical breath sounds   Abdominal: Soft  He exhibits no distension  Incision clean, dry, intact   Musculoskeletal: He exhibits no edema or deformity  Skin: Skin is warm and dry  Nursing note and vitals reviewed      ______________________________________________________________________  Temperature:   Temp (24hrs), Av 6 °F (37 6 °C), Min:99 1 °F (37 3 °C), Max:100 1 °F (37 8 °C)    Current Temperature: 100 1 °F (37 8 °C)    Vitals:    20 2314 20 0000 20 0205 20 1100   BP: 132/65 130/71 127/65    BP Location: Left arm Left arm     Pulse: 97 94 90 84   Resp: 18 18 18 21   Temp: 99 1 °F (37 3 °C)  100 1 °F (37 8 °C)    TempSrc: Oral  Oral    SpO2: 94% 95% 92% 97%   Weight: 122 kg (270 lb)  122 kg (268 lb 15 4 oz)    Height:   6' 3" (1 905 m)              Weights:   IBW: 84 5 kg    Body mass index is 33 62 kg/m²  Weight (last 2 days)     Date/Time   Weight    07/31/20 02:05:37   122 (268 96)    07/30/20 2314   122 (270)            Height: 6' 3" (190 5 cm)  Hemodynamic Monitoring:  N/A       Ventilator Settings:   Vent Mode: AC/VC                      Lab Results   Component Value Date    PHART 7 516 (H) 07/31/2020    BRU5LMU 21 9 (LL) 07/31/2020    PO2ART 173 5 (H) 07/31/2020    YEE9IUZ 17 3 (L) 07/31/2020    BEART -3 5 07/31/2020    SOURCE Radial, Left 07/31/2020       Intake and Outputs:  I/O       07/29 0701 - 07/30 0700 07/30 0701 - 07/31 0700 07/31 0701 - 08/01 0700    I V  (mL/kg)  1000 (8 2)     IV Piggyback   750    Total Intake(mL/kg)  1000 (8 2) 750 (6 1)    Urine (mL/kg/hr)   350 (1)    Blood   50    Total Output   400    Net  +1000 +350                 Nutrition:        Diet Orders   (From admission, onward)             Start     Ordered    07/31/20 0944  Diet NPO  Diet effective now     Question Answer Comment   Diet Type NPO    RD to adjust diet per protocol?  No        07/31/20 0944                Labs:   Results from last 7 days   Lab Units 07/31/20  1012 07/30/20  2325   WBC Thousand/uL 10 02 13 76*   HEMOGLOBIN g/dL 13 0 14 8   HEMATOCRIT % 39 7 46 0   PLATELETS Thousands/uL 234 272   NEUTROS PCT %  --  87*   MONOS PCT %  --  10   MONO PCT % 9  --       Results from last 7 days   Lab Units 07/31/20  1012 07/30/20  2325   POTASSIUM mmol/L 4 6 4 9   CHLORIDE mmol/L 108 104   CO2 mmol/L 21 26   BUN mg/dL 33* 32*   CREATININE mg/dL 1 27 1 21   CALCIUM mg/dL 8 5 9 7   ALK PHOS U/L  --  91   ALT U/L  --  19   AST U/L  --  39              Results from last 7 days   Lab Units 07/31/20  1012 07/31/20  0008   INR  1 41* 1 25*   PTT seconds 72* 41*     Results from last 7 days   Lab Units 07/31/20  1012   LACTIC ACID mmol/L 2 2* 0   Lab Value Date/Time    TROPONINI <0 02 07/30/2020 0195     Imaging:  I have personally reviewed pertinent reports  CT head without contrast   ED Interpretation by Dolores Mcguire MD (07/31 0141)   CT read by radiologist; will discuss with medicine  Final Result by Hilarie Duverney, DO (07/31 0127)      Minimal increase in size of ventricular system      The study was marked in EPIC for immediate notification  Workstation performed: [id number]         PE Study with CT Abdomen and Pelvis with contrast   Final Result by Hilarie Duverney, DO (07/31 0150)      Fluid-filled loops of small bowel with pneumatosis and bowel wall thickening  Findings are suspicious for ischemic bowel  Nodular airspace opacities within the right upper lobe  Findings may represent infection  I personally discussed this study with Eldonna Bernheim on 7/31/2020 at 1:46 AM                               Workstation performed: [id number]         IR other    (Results Pending)       EKG:   Micro:  Blood Culture:   Lab Results   Component Value Date    BLOODCX No Growth After 5 Days  05/28/2020    BLOODCX No Growth After 5 Days  05/28/2020     Urine Culture: No results found for: URINECX  Sputum Culture: No components found for: SPUTUMCX  Wound Culure:   Lab Results   Component Value Date    WOUNDCULT Few Colonies of Enterococcus faecalis (A) 05/29/2020    WOUNDCULT Few Colonies of  05/29/2020    WOUNDCULT 1+ Growth of Enterococcus faecalis (A) 05/28/2020    WOUNDCULT 1+ Growth of  05/28/2020       Lab Results   Component Value Date    BLOODCX No Growth After 5 Days  05/28/2020    BLOODCX No Growth After 5 Days  05/28/2020    WOUNDCULT Few Colonies of Enterococcus faecalis (A) 05/29/2020    WOUNDCULT Few Colonies of  05/29/2020    WOUNDCULT 1+ Growth of Enterococcus faecalis (A) 05/28/2020    WOUNDCULT 1+ Growth of  05/28/2020     Allergies:    Allergies   Allergen Reactions    Pollen Extract Allergic Rhinitis     Medications:   Scheduled Meds:    Current Facility-Administered Medications:  acetaminophen 650 mg Rectal [facility]4H [facility]N [facility]    chlorhexidine 15 mL Swish & Spit Q12H [facility] Sapp[place], DO    fentaNYL 50 mcg/hr Intravenous Continuous [facility] Last Rate: 50 mcg/hr (07/31/20 1020)   heparin (porcine) 3-30 Units/kg/hr (Order-Specific) Intravenous Titrated Flaquita Salvador MD Last Rate: 18 Units/kg/hr (07/31/20 1152)   heparin (porcine) 4,800 Units Intravenous Q1H PRN Flaquita Salvador MD    heparin (porcine) 9,600 Units Intravenous Q1H PRN Flaquita Salvador MD    HYDROmorphone 0 5 mg Intravenous Q4H PRN [facility] [place]app[place]g, DO    HYDROmorphone 1 mg Intravenous Q4H [facility]N [facility]    insulin lispro 2-12 Units Subcutaneous Q6H [facility] Olayinka Pacheco MD    iodixanol 15 mL Intravenous Once in imaging [facility] Sapphire, DO    multi-electrolyte 125 mL/hr Intravenous Continuous Flaquita Salvador MD Last Rate: 125 mL/hr (07/31/20 0955)   pantoprazole 40 mg Intravenous Q12H Flaquita Salvador MD    IV infusion builder  Intravenous Continuous Josee Britton MD Last Rate: Stopped (07/31/20 1130)   papaverine 30 mg Intravenous Continuous Flaquita Salvador MD    propofol 5-50 mcg/kg/min Intravenous Titrated Cat Velasquez, DO      Continuous Infusions:    fentaNYL 50 mcg/hr Last Rate: 50 mcg/hr (07/31/20 1020)   heparin (porcine) 3-30 Units/kg/hr (Order-Specific) Last Rate: 18 Units/kg/hr (07/31/20 1152)   multi-electrolyte 125 mL/hr Last Rate: 125 mL/hr (07/31/20 0955)   IV infusion builder  Last Rate: Stopped (07/31/20 1130)   papaverine 30 mg    propofol 5-50 mcg/kg/min      PRN Meds:    acetaminophen 650 mg Q4H PRN   heparin (porcine) 4,800 Units Q1H PRN   heparin (porcine) 9,600 Units Q1H PRN   HYDROmorphone 0 5 mg Q4H PRN   HYDROmorphone 1 mg Q4H PRN   iodixanol 15 mL Once in imaging     VTE Pharmacologic Prophylaxis: Heparin  VTE Mechanical Prophylaxis: sequential compression device  Invasive lines and devices: Invasive Devices     Peripheral Intravenous Line            Peripheral IV 07/30/20 Left Forearm less than 1 day    Peripheral IV 07/31/20 Right Hand less than 1 day          Arterial Line            Arterial Line 07/31/20 Left Femoral less than 1 day    Arterial Line 07/31/20 Left Radial less than 1 day          Line            Arterial Sheath Left Femoral less than 1 day          Drain            Negative Pressure Wound Therapy (V A C ) Abdomen Anterior less than 1 day    Urethral Catheter Latex 16 Fr  less than 1 day          Airway            ETT  less than 1 day                   Code Status: Level 1 - Full Code    Portions of the record may have been created with voice recognition software  Occasional wrong word or "sound a like" substitutions may have occurred due to the inherent limitations of voice recognition software  Read the chart carefully and recognize, using context, where substitutions have occurred      Bonita Flores DO

## 2020-07-31 NOTE — ASSESSMENT & PLAN NOTE
Physical therapy consult in preparation for possibility skilled nursing facility or rehab placement

## 2020-07-31 NOTE — OP NOTE
OPERATIVE REPORT  PATIENT NAME: Bo Gage    :    MRN: [id number]  Pt Location: [facility] OR ROOM 02    SURGERY DATE: 2020    Surgeon(s) and Role:  Panel 1:     * Lizbeth Delaney MD - Primary     * Liam Acevedo MD - Humaira Ramirez, Rhianna Rhodes MD - Assisting  Panel 2:     * Ana Maria Lopez MD - Primary    Preop Diagnosis:  Acute superficial gastritis with hemorrhage [K29 01]  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[address] ) [K55 9]    Post-Op Diagnosis Codes:     * Acute superficial gastritis with hemorrhage [K29 01]     * 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[address] ) [K55 9]    Procedure(s) (LRB):  LAPAROTOMY EXPLORATORY: Externalizes  shunt Abthera application (N/A)  ARTERIOGRAM Of SMA and placement of Papavarine onfusion arterial catheter (N/A)   Exploration of SMA  Angiogram SMA  Limited angiogram Left lower extremity  Placement of infusion catheter to SMA 10cm infusion length    Specimen(s):  ID Type Source Tests Collected by Time Destination   A :  Body Fluid Peritoneal Fluid ANAEROBIC CULTURE AND GRAM STAIN, BODY FLUID CULTURE AND GRAM STAIN Lizbeth Delaney MD 2020 0622        Estimated Blood Loss:   50 mL    Drains:  Negative Pressure Wound Therapy (V A C ) Abdomen Anterior (Active)   Blue foam- # applied 2 2020 11:24 PM   Target Pressure (mmHg) 125 2020 10:20 AM   Dressing Status Clean 2020 12:00 AM   Output (mL) 175 mL 2020 12:00 PM   Number of days: 0       Urethral Catheter Latex 16 Fr   (Active)   Reasons to continue Urinary Catheter  Accurate I&O assessment in critically ill patients (48 hr  max) 2020 12:28 PM   Goal for Removal Voiding trial when ambulation improves 2020 12:00 PM   Site Assessment Clean;Skin intact 2020 12:00 PM   Collection Container Standard drainage bag 2020 12:00 PM   Securement Method Securing device (Describe) 2020 12:00 PM   Output (mL) 175 mL 2020  1:57 PM   Number of days: 0 Ventriculostomy/Subdural Ventricular drainage catheter with ICP microsensor Right Other (Comment) (Active)   Drain Status Open/CSF collection chamber 7/31/2020 10:20 AM   Level Other (Comment) 7/31/2020 10:20 AM   Status Open to drain 7/31/2020 10:20 AM   Site Assessment Other (Comment) 7/31/2020 10:20 AM   Drainage Bloody 7/31/2020 10:20 AM   Output (mL) 8 mL 7/31/2020  3:22 PM   Drain Level (mmHg) 8 mmHg 7/31/2020 10:20 AM   CSF Color Clear 7/31/2020  3:22 PM   Dressing Status Clean;Dry; Intact 7/31/2020 10:20 AM   Number of days: 0       [REMOVED] Urethral Catheter Latex 16 Fr  (Removed)   Number of days: 0       Anesthesia Type:   General    Operative Indications:  Acute superficial gastritis with hemorrhage [K29 01]  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[facility] [address] ) [K55 9]    Operative Findings:  SMA noted to be palpable  Angiogram showed estimated be widely patent  Origin not well visualized however origin was noted to be widely patent on CT  Placement of 10 cm infusion catheter into some SMA and initiation of 30 milligrams/hour papaverine  Complications:   None    Procedure and Technique:  Please see Dr Moncho Lorenzo dictation for the non-vascular portion of the operation  Vascular surgery was consulted intraoperatively to explore the SMA for patency  The CT was reviewed which showed the origin of the SMA to be widely patent with minimal plaque at the origin  However in the presence of significant small-bowel ischemia question of nonocclusive mesenteric ischemia was raised and treatment options  Discussion between General surgery and vascular surgery decision was made to place an infusion catheter into the superior mesenteric artery and initiate 30 milligrams/hour of papaverine  The left groin had been previously prepped for the operation  Under ultrasound guidance the left common femoral artery was successfully cannulated using a micro introducer technique    Limited left lower extremity angiogram was then performed to ensure cannulation was within the proper portion of the common femoral artery, this was ensured  The micro introducer sheath was then exchanged for a 5 Greek sheath  Patient was bolused with 5000 units of intravenous heparin  Patient was then started on a therapeutic heparin drip  A [employer] wire was then placed  A C1 cobra catheter was then passed into the aorta at T12  Aortogram was performed  The superior mesenteric artery was then successfully cannulated using a Glidewire  A 135 cm 5 Greek infusion catheter within infusion length of 10 cm was then placed into the SMA  The proximal infusion portion of the catheter was approximately 2 cm from the origin  Angiogram was again performed through the infusion catheter which showed the SMA to be widely patent  The sheath secured in place as was the catheter with 3 0 nylon suture  Plan will be for the patient to return to the operating room tomorrow for a 2nd look at that time the infusion catheter will be removed  The patient will be maintained at 30 milligrams/hour of papaverine with therapeutic peripheral heparin       I was present for all critical portions of the procedure    Patient Disposition:  PACU  and APU    SIGNATURE: Regina Snow MD  DATE: July 31, 2020  TIME: 3:55 PM

## 2020-07-31 NOTE — ANESTHESIA PROCEDURE NOTES
Procedure Performed: ROSEMARIE Anesthesia  Start Time:  7/31/2020 7:00 AM        Preanesthesia Checklist    Patient identified, IV assessed, risks and benefits discussed, monitors and equipment assessed, procedure being performed at surgeon's request and anesthesia consent obtained  Procedure    Diagnostic Indications for ROSEMARIE:  assessment of ascending aorta  Type of ROSEMARIE: complete ROSEMARIE with interpretation  Images Saved: ultrasound permanent image saved  Physician Requesting Echo: Marina Quintana MD  Location performed: OR  Intubated  Heart visualized  Insertion of ROSEMARIE Probe:  Easy  Probe Type:  Epiaortic and multiplane  Modalities:  Color flow mapping, 3D, pulse wave Doppler and continuous wave Doppler  Echocardiographic and Doppler Measurements    PREPROCEDURE    LEFT VENTRICLE:  Systolic Function: normal  Ejection Fraction: 50-55%  Cavity size: normal      RIGHT VENTRICLE:  Systolic Function: normal   Cavity size normal  No hypertrophy              AORTIC VALVE:  Leaflets: normal and trileaflet  Leaflet motions normal and normal  Stenosis: none  Regurgitation: none  MITRAL VALVE:  Leaflets: normal  Leaflet Motions: normal  Regurgitation: trace  TRICUSPID VALVE:  Leaflets: normal  Leaflet Motions: normal  Stenosis: none  Regurgitation: mild  PULMONIC VALVE:  Leaflets: normal          ASCENDING AORTA:  Size:  normal  Dissection not present  AORTIC ARCH:  Size:  normal  dissection not present  Grade 2: severe intimal thickening without protruding atheroma  DESCENDING AORTA:  Size: normal  Dissection not present  Grade 2: severe intimal thickening without protruding atheroma  RIGHT ATRIUM:  Size:  normal  No spontaneous echo contrast     LEFT ATRIUM:  Size: normal  No spontaneous echo contrast     LEFT ATRIAL APPENDAGE:  Size: normal  No spontaneous echo contrast         ATRIAL SEPTUM:  Intra-atrial septal morphology: lipomatous hypertrophy           VENTRICULAR SEPTUM:  Intra-ventricular septum morphology: normal              OTHER FINDINGS:  Pericardium:  normal  Pleural Effusion:  none  POSTPROCEDURE    LEFT VENTRICLE: Unchanged   RIGHT VENTRICLE: Unchanged   AORTIC VALVE: Unchanged   MITRAL VALVE: Unchanged   TRICUSPID VALVE: Unchanged   PULMONIC VALVE: Unchanged           ATRIA: Unchanged   AORTA: Unchanged   REMOVAL:  Probe Removal: atraumatic

## 2020-07-31 NOTE — PROGRESS NOTES
Vancomycin IV Pharmacy-to-Dose Consultation    Carlos Soto is a 69 y o  male who is currently receiving Vancomycin IV with management by the Pharmacy Consult service  Relevant clinical data and objective / subjective history reviewed  Vancomycin Assessment:  Indication: MRSA suspected, CNS infection  Status: critically ill  Micro:   Blood cultures, sputums cultures ordered  7/31 CSF cultures: in process  7/31 Body fluid culture (peritoneal fluid): in process  7/31 Anaerobic culture (peritoneal fluid): in process  7/31 SARS-CoV-2: negative   Renal Function: Scr 1 27 (baseline 1-1 1); CrCl 65 mL/min  Potential Nephrotoxicity Factors (select ALL that apply):  Medications: IV contrast  Patient-Factors: elderly, blood loss (surgery)  Days of Therapy: 1  Current Dose: 1750 mg IV x 1 (not started)  Goal Trough: 15-20 (appropriate for most indications)   Goal AUC(24h): 400-600  Last Level: n/a  Pharmacokinetic Analysis: predicted level 20 hours after loading dose is 15; ke 0 045 hr-; t1/2 15 3 hr    Vancomycin Plan:  New Dosing: change to 2500 mg IV x 1, followed by 2000 mg IV q24h (next dose: 7/31 at 1300)  Predicted Trough / AUC: 15 / 620  Next Level: Trough 8/2 at 0830  Renal Function Monitoring: Daily BMP and [address] will continue to follow closely for s/sx of nephrotoxicity, infusion reactions and appropriateness of therapy  BMP and CBC will be ordered per protocol  We will continue to follow the patients culture results and clinical progress daily       Trevor Romero, PharmD, [address] Clinical Pharmacist  [phone]

## 2020-07-31 NOTE — ASSESSMENT & PLAN NOTE
Awaiting results of shunt series  Patient does not demonstrate any preferential movement  Generalized weakness  Update: CT scan of the head shows possibility of mild enlargement of ventricle  Will get neuro surgical opinion  Also will perform neuro checks every 4 hours

## 2020-07-31 NOTE — PROCEDURES
PATIENT NAME: Vern Perez    :    MRN: [id number]  Pt Location:  ICU bed 12    Operation note    Date of surgery 2020    Title of surgery:  1  Externalization of right-sided  shunt system through lateral anterior chest wall T9-10 rib level out laterally to anterior axillary line ribs 9- 10 level with Pudenz straight connector to Codman Bactiseal 20 cm interval length of EVD tubing to intra closed CSF buretrol monitoring system  2  S/p Peritonitis  3  Ischemic proximal small bowel  4  Status post emergency ex lap Dr Renee Howe MD , General Surgery  5  Midline VAC dressing  6  Status post femoral artery and SMA artery cannulation for papaverine vascular surgery     Preop Diagnosis:  1  Need to externalize right-sided  shunt system at right anterior chest wall level to lateral subcostal anterior axillary line level closed Integra drainage system  2  Peritonitis  3  Ischemic proximal small bowel  4  Status post emergency ex lap Dr Renee Howe MD , General Surgery  5  Status post femoral artery and SMA artery cannulation for papaverine vascular surgery  6  NPH  7  Right-sided  shunt system     Postop diagnosis:  Same     Surgeon Dr Marcelino Arteaga MD    Assistant none  Qualified resident was available to assist    Specimen(s):  Surveillance CSF withdrawn from distal prepped and of externalized  shunt at subcostal margin ( terminal 1 cm tubing with tie off suture removed with sterile scissors prior to some playing with 18 gauge needle and 3 cc syringe)     Specimen slightly yellow xanthochromia     Estimated Blood Loss:   N/a     Drains:       Negative Pressure Wound Therapy (V A C ) Abdomen Anterior (Active)   Number of days: 0       Urethral Catheter Latex 16 Fr  (Active)   Number of days: 0       [REMOVED] Urethral Catheter Latex 16 Fr   (Removed)   Site Assessment Clean;Skin intact 2020  3:23 PM   Collection Container Standard drainage bag 2020 11:15 AM   Number of days: 0       Anesthesia Type:  Patient already intubated  Neuroleptic with propofol and fentanyl     Operative Indications: This 50-year-old man has hypertension diabetes and NPH     Initial right  shunt shunt was placed in 2005  Had revisions and adjustments of settings and  second shunt placed in 2011 Lubna Mcdaniel type  Various settings tried in response to his NPH symptoms of unsteadiness  Again trialed various settings from 80 mm H2O up to 120 mm H2O    At the higher setting of 120 H2O for him his balance and incontinence is worse     Last adjustment was down from 120 mm of water H2O to 90 mm h20 on 2/2020 at his preop office visit for his upcoming TLIF surgery     At the same anesthesia for his TLIF surgery on 7/6/2020 had minor skin revision of skin incision over right  frontal shunt unit with structural     In [facility] made great recovery, walking well      Had follow-up surveillance head CT scan on 7/21/2020  Some slight increase in subdural hygromas noted     Lubna Mcdaniel  shunt shunt was adjusted up from 90 h20 to 100 mm Hg20 as the were now some small increase in subdural hygromas on CT scan head  He continued good progress in [facility] and went home  [facility]    And re-presented 7/30/2020 with his confusion and  in unkempt state and had been taking large quantities of Naprosyn and 4 days of abdominal pain  Found to have pneumatosis of the small bowel on CT imaging    Abdominal exam showed guarding tenderness and reduced bowel sounds suggestive of peritonitis    WBC was 13 7    Decision was made to take him to the OR for Ex Lap  Found to have acute gastritis ischemic appearing bowel from the ligament of Trietz down  to mid ileum  Hazy fibrinous ascites upon entering the abdomen consistent with peritonitis    Peritoneal and a  shunt on the right encountered and externalized through a transverse stab incision in anterior lower chest wall around ribs 9 - 10 interspace    Cut and of tubing was sutured and clipped covered with sterile Tegaderm    Vascular surgery evaluated SMA for patency in OR  SMA appeared widely patent with minimal plaque at the origin  It was felt there was element of nonocclusive mesenteric ischemia    Infusion catheter with papaverine set up    Midline abdominal incision  was left open and with Abthera VAC sealed dressing  We recommend:  1  Interval CSF sample directly from the end externalized peritoneal tubing in right subcostal location  2  Closed drainage and monitoring of peritoneal and closed Intergra Monitorr  drainage system        Operative Findings:  Slightly xanthochromic fluid on aspiration from distal end externalized peritoneal tubing in right subcostal space anterior chest wall ribs 9 and 10 in mid clavicular line  6 cc easily aspirate and for CSF surveillance panel including fungal culture    Preliminary CSF results show CSF WBC 61 with 91% neutrophils CSF glucose 88 and CSF protein 80    Gram stain was positive for 1+ polys and  4 +GNR     Complications:   None     Procedure and Technique:  Patient was maintained on the ventilator propofol and fentanyl supine with left leg splint for femoral line access    Time-out was occasioned with his primary nurse Khang  Patient's identity was confirmed  Procedure confirmed    Immediate family/POA  not available for consent discussion  Lives with a friend  Emergency consent [place] law was invoked in order to proceed as establishing safe closed circuit for CSF drainage being able to open distal end of  shunt system was needed at this time  Were in agreement    The area of the right subcostal region was prepped after the sterile Tegaderm around the distal end of the tight of shunt had been removed    Isolation sterile drapes were placed around operative site    The previous transverse incision in the ninth and tenth intercostal space had been closed with 2 staples      A lateral stab incision with a 15 blade was made and then a angled mosquito was placed to the medial aspect of the prior incision as this was too medial and near the X lap VAC dressing  The distal end of the closed and stapled shunt tubing was brought out laterally  The tubing was carefully prepped with Betadine over 5 minutes  1 cm of the end of the distal external closed suturef tubing was removed and an 18 gauge needle was then placed within side in the distal end of the tubing which was starting to drain small amounts of CSF    Over the course of 5 minutes about 6 cc was removed  This was sent for surveillance CSF panel analysis as reported in findings    The needle was withdrawn and replaced with a sputum and straight nylon connected  This was then connected up to a 20 segment length of Bactiseal ventricular catheter  Two 0 silk ties were used to affect the union over the pudendal straight connector    The long ends of the 2 0 silk were connected together to limit separation at a later time,    The distal end of the Bactiseal catheter was then connected up to a male Luer lock connector and then to the [place]'s closed drainage system  In male Lovenox junction with the Bactiseal tubing secured with 2 silk    He tolerated the procedure well and the distal externalized tubing at the lower chest wall ribs 9 and 10 interspace was placed 5 cm above the level of the right atrium  Over the course of an hour about 18 cc of slightly xanthochromic fluid drained    So the closed Integra Monnitor system opened and said set at of +8 mmHg level is mercury level above the level of the right atrium  This level - the right atrium level 0_  was explained to his primary nurse as level with the fifth and sixth rib proximally level with the nipple in the midclavicular line  Instructions were given that no more than 10 cc an hour be allowed to drain      If this happened then the level of the externalized drain would be raise to +10 mmHg level above the level of the right atrium  I was present for the entire procedure      Continues on IV cefepime Q 8 and IV metronidazole Q 8    Await results of final culture    I discussed his management Dr Tatiana Nicole from [address] [place] [address] and also with Dr Darrin Casper      Patient Disposition:  Remains under care of care of surgical Critical Care  Critical Care Unit     SIGNATURE: Cynthia Cooper MD  DATE: July 31, 2020  TIME: 11:19 AM

## 2020-07-31 NOTE — ANESTHESIA POSTPROCEDURE EVALUATION
Post-Op Assessment Note    CV Status:  Stable    Pain management: adequate     Mental Status:  Unresponsive   Hydration Status:  Euvolemic   PONV Controlled:  Controlled   Airway Patency:  Patent  Airway: intubated   Post Op Vitals Reviewed: Yes      Staff: Anesthesiologist, CRNA   Comments: See last vitals  Pt started on propofol gtt taken to ICU with CRNA, RN,and  sx team on portable monitor manually ventilated   Pt handoff to ICU RN          BP      Temp      Pulse     Resp      SpO2

## 2020-07-31 NOTE — SEPSIS NOTE
Sepsis Note   Luly Nava 69 y o  male MRN: [id number]  Unit/Bed#: [facility] 721-80 Encounter: [id number]      qSOFA     [address] Name 07/31/20 02:05:37 07/31/20 0000 07/30/20 2314          Altered mental status GCS < 15            Respiratory Rate > / =22    0  0      Systolic BP < / =856  0  0  0      Q Sofa Score  0  0  0          Initial Sepsis Screening     Row Name 07/31/20 0240                Is the patient's history suggestive of a new or worsening infection? No  -VB        Suspected source of infection          Are two or more of the following signs & symptoms of infection both present and new to the patient?         Indicate SIRS criteria          If the answer is yes to both questions, suspicion of sepsis is present          If severe sepsis is present AND tissue hypoperfusion perists in the hour after fluid resuscitation or lactate > 4, the patient meets criteria for SEPTIC SHOCK          Are any of the following organ dysfunction criteria present within 6 hours of suspected infection and SIRS criteria that are NOT considered to be chronic conditions?         Organ dysfunction          Date of presentation of severe sepsis          Time of presentation of severe sepsis          Tissue hypoperfusion persists in the hour after crystalloid fluid administration, evidenced, by either:          Was hypotension present within one hour of the conclusion of crystalloid fluid administration?           Date of presentation of septic shock          Time of presentation of septic shock            User Key  (r) = Recorded By, (t) = Taken By, (c) = Cosigned By    [address] Name Provider Tatiana Dickson MD Physician

## 2020-07-31 NOTE — CONSULTS
Consult Note - Wound   Sotero oNe 69 y o  male MRN: [id number]  Unit/Bed#: ICU 12 Encounter: [id number]      History and Present Illness:  69year old male presented to the hospital with weakness, unable to care for himself, falling, vomiting  Patient's history significant for DM, hydrocephalus with  shunt, L5-S1 spinal surgery in July 2020  Assessment Findings:   Patient seen for wound care consultation  He is on ventilator, restrained with left lower extremity knee  immobilizer  Griffith catheter in place  VAC dressing intact to midline abdomen per surgery status post exploratory laparotomy  Scab to right head  Scabbed abrasions to bilateral knees  Bilateral heels intact  Blanchable erythema to sacrum and buttocks  1   Bruising to right buttocks, skin intact  2   Present on admission unstageable pressure injury to right lateral malleolus--dry, brown, well adhered eschar  No induration or drainage  3   Present on admission stage 1 pressure injury to right plantar foot (hallux)--non-blanchable erythema  4  MASD with partial thickness open area along gluteal cleft  See flowsheet and media for wound details  Wound Care Plan:   1-Apply Allevyn Life foam dressing to bilateral heels and left medial thigh under immobilizer for prevention  Mik with P   Peel back at least daily for skin assessment and re-apply  Change dressing every 3 days and PRN  2-Apply Allevyn Life foam dressing to right lateral malleolus and midline sacrum  Mik with T   Change dressing every 3 days  3-Elevate heels and right hallux area (float off of pillows) to offload pressure  Skin care plans:  1-Turn/reposition q2h or when medically stable for pressure re-distribution on skin--use positioning wedges  2-Ehob cushion in chair when out of bed  3-Moisturize skin daily with skin nourishing cream     Wound care team to follow  Patient assessed along with primary RN          Wound 07/31/20 Buttocks Right (Active) Wound Description Intact; Light purple;Dry;Clean 7/31/2020  1:20 PM   Frances-wound Assessment Clean;Dry; Intact 7/31/2020  1:20 PM   Drainage Amount None 7/31/2020  1:20 PM   Treatments Site care 7/31/2020  9:00 AM   Dressing Foam, Silicon (eg  Allevyn, etc) 7/31/2020 12:00 PM       Wound 07/31/20 Pressure Injury Ankle Right;Lateral (Active)   Wound Image   7/31/2020  1:17 PM   Wound Description Brown;Dry 7/31/2020  1:17 PM   Staging Unstagable 7/31/2020  1:17 PM   Frances-wound Assessment Pink 7/31/2020  1:17 PM   Wound Length (cm) 1 cm 7/31/2020  1:17 PM   Wound Width (cm) 0 5 cm 7/31/2020  1:17 PM   Wound Depth (cm) 0 7/31/2020  1:17 PM   Calculated Wound Area (cm^2) 0 5 cm^2 7/31/2020  1:17 PM   Calculated Wound Volume (cm^3) 0 cm^3 7/31/2020  1:17 PM   Drainage Amount None 7/31/2020  1:17 PM   Treatments Elevated 7/31/2020  9:00 AM   Dressing Open to air 7/31/2020  1:17 PM   Patient Tolerance Tolerated well 7/31/2020  1:17 PM       Wound 07/31/20 Pressure Injury Foot Right (Active)   Wound Image   7/31/2020  1:31 PM   Wound Description Non-blanchable erythema 7/31/2020  1:31 PM   Staging Stage I 7/31/2020  1:31 PM   Frances-wound Assessment Clean;Dry; Intact 7/31/2020  1:31 PM   Wound Length (cm) 4 cm 7/31/2020  1:31 PM   Wound Width (cm) 5 cm 7/31/2020  1:31 PM   Wound Depth (cm) 0 7/31/2020  1:31 PM   Calculated Wound Area (cm^2) 20 cm^2 7/31/2020  1:31 PM   Calculated Wound Volume (cm^3) 0 cm^3 7/31/2020  1:31 PM   Drainage Amount None 7/31/2020  1:31 PM   Treatments Elevated 7/31/2020  9:00 AM   Dressing Open to air 7/31/2020  1:31 PM   Patient Tolerance Tolerated well 7/31/2020  1:31 PM       Wound 07/31/20 Moisture associated skin damage Sacrum (Active)   Wound Description Pink 7/31/2020  1:31 PM   Frances-wound Assessment Erythema 7/31/2020  1:31 PM   Wound Length (cm) 1 5 cm 7/31/2020  1:31 PM   Wound Width (cm) 0 3 cm 7/31/2020  1:31 PM   Wound Depth (cm) 0 1 7/31/2020  1:31 PM   Calculated Wound Area (cm^2) 0 45 cm^2 7/31/2020  1:31 PM   Calculated Wound Volume (cm^3) 0 04 cm^3 7/31/2020  1:31 PM   Drainage Amount None 7/31/2020  1:31 PM   Non-staged Wound Description Partial thickness 7/31/2020  1:31 PM   Dressing Foam, Silicon (eg  Allevyn, etc) 7/31/2020  1:31 PM   Dressing Changed New 7/31/2020  1:31 PM   Patient Tolerance Tolerated well 7/31/2020  1:31 PM   Dressing Status Clean;Dry; Intact 7/31/2020  1:31 PM     Aurora Nova BSN, RN, [employer]

## 2020-07-31 NOTE — H&P
H&P- [employer] 1950, 69 y o  male MRN: [id number]    Unit/Bed#: QDGL 538-89 Encounter: [id number]    Primary Care Provider: Nury De Souza   Date and time admitted to hospital: 7/30/2020 11:12 PM        Self-care deficit for hygiene  Assessment & Plan  Patient came in somewhat unkept with stool in underwear  He currently lives with a  who he claims takes care of him  At this point, it seems patient has inability to care for self  Will place physical therapy consult for ADL  Possibility of placement in skilled nursing facility  Recently was seen by adult rehabilitation post lumbar surgery  * Acute superficial gastritis with hemorrhage  Assessment & Plan  Patient has been using approximately 4 tablets of naproxen daily and therefore gastritis may be superficial probably secondary to NSAIDs use  Will put on hold any aspirin for now  Patient placed NPO post midnight  Protonix 40 mg intravenous twice daily  Type and screen  Check blood counts every 12 hours x3  GI consult  Update:  Final reading of CT scan of abdomen reveals the possibility of pneumatosis with wall thickening  Currently patient is NPO  Will also add general surgery consult  Spondylolisthesis of lumbosacral region  Assessment & Plan  Status post lumbar surgery and was placed in rehabilitation and discharged to home  Essential hypertension  Assessment & Plan  Continue enalapril/lisinopril  Type 2 diabetes mellitus [facility])  Assessment & Plan  Lab Results   Component Value Date    HGBA1C 5 9 (H) 06/16/2020   Currently not on any medications or insulin  However will place patient on Q 6 hours of Accu-Cheks with insulin sliding scale as per protocol  Patient currently NPO due to the problem gastritis  No results for input(s): POCGLU in the last 72 hours      Blood Sugar Average: Last 72 hrs:      Unspecified abnormalities of gait and mobility  Assessment & Plan  Physical therapy consult in preparation for possibility skilled nursing facility or rehab placement  Status post ventriculoperitoneal shunt  Assessment & Plan  Awaiting results of shunt series  Patient does not demonstrate any preferential movement  Generalized weakness  Update: CT scan of the head shows possibility of mild enlargement of ventricle  Will get neuro surgical opinion  Also will perform neuro checks every 4 hours  VTE Prophylaxis: Pharmacologic VTE Prophylaxis contraindicated due to GI bleeding  / sequential compression device   Code Status: Level 1 - Full Code full Code as discussed with patient  POLST: There is no POLST form on file for this patient (pre-hospital)    Anticipated Length of Stay:  Patient will be admitted on an Inpatient basis with an anticipated length of stay of  greater than 2 midnights  Justification for Hospital Stay: Please see detailed plans noted above  Chief Complaint:     Vomiting blood  History of Present Illness:  Ralph Huitron is a 69 y o  male who has a past medical history significant for obesity with obstructive sleep apnea on CPAP, hypertension, gout, diabetes mellitus type 2 not on any insulin or medications and currently hemoglobin A1c less than 5 9; normal pressure hydrocephalus status post ventriculoperitoneal shunt in 2005 with revision this past July and chronic back pain with spondylolisthesis of the lumbosacral region with spinal stenosis and status post L5-S1 transverse lumbar interbody fusion and deformity correction at L5-S1  At the same time, the patient had a revision of the scalp over ventricular catheter  Patient comes in brought about by ambulance from home with complaints of vomiting blood  Patient states that he has been using naproxen for the past few days since discharge with note of epigastric discomfort for the past 3 days  Patient states that he is nauseous and the epigastric discomfort does not seem to get better with intake food    When asked specifically just how much is the hematemesis he quantifies it as a lot and would not say exactly just how much  Patient denies any fever or cough or cold  He also denies any shortness of breath as of the moment  Only epigastric discomfort  Denies any hypogastric discomfort or pain  Review of Systems:    Constitutional:  Denies fever or chills   Eyes:  Denies change in visual acuity   HENT:  Denies nasal congestion or sore throat   Respiratory:  Denies cough or shortness of breath   Cardiovascular:  Denies chest pain or edema   GI:  Denies abdominal pain, nausea, vomiting, bloody stools or diarrhea   :  Denies dysuria   Musculoskeletal:  Denies back pain or joint pain   Integument:  Denies rash   Neurologic:  Denies headache, focal weakness or sensory changes   Endocrine:  Denies polyuria or polydipsia   Lymphatic:  Denies swollen glands   Psychiatric:  Denies depression or anxiety     Past Medical and Surgical History:   Past Medical History:   Diagnosis Date    Cancer ([facility] 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     Diabetes mellitus ([facility] )     [facility], [facility]     Gout     HTN (hypertension)     [facility]     Hypertension     Neuropathy     ZHOU on CPAP     Pressure injury of skin     TIA (transient ischemic attack)      Past Surgical History:   Procedure Laterality Date    CSF SHUNT      x3 Op Reports, Mary Cancino in [facility] chart viewer    [facility] Left 5/30/2020    Procedure: excision 5th metatarsal head   excision 5th metatarsal ulcer ;  Surgeon: Garland Bence, DPM;  Location: [facility];  Service: Podiatry    CT ARTHDSIS POST/POSTEROLATRL/POSTINTERBODY LUMBAR N/A 7/6/2020    Procedure: MINIMALLY INVASIVE TRANSVERSE LUMBAR INTERBODY FUSION L5-S1 FROM LEFT-SIDED APPROACH;  Surgeon: Diana Olvera MD;  Location: [facility];  Service: Neurosurgery    CT REPLACEMENT/REVISION,CSF SHUNT Right 7/6/2020    Procedure: REVISION OF SCALP OVER VENTRICULAR CATHETER IN THE RIGHT CORONAL REGION;  Surgeon: Roda Eisenmenger, MD;  Location: [facility];  Service: Neurosurgery       Meds/Allergies:  Medications Prior to Admission   Medication    allopurinol (ZYLOPRIM) 300 mg tablet    aspirin 325 mg tablet    enalapril (VASOTEC) 5 mg tablet    Lancets (ONETOUCH DELICA PLUS [id number]) MISC    methocarbamol (ROBAXIN) 500 mg tablet    multivitamin (THERAGRAN) TABS    ONE TOUCH ULTRA TEST test strip    travoprost (Travatan Z) 0 004 % ophthalmic solution       Allergies: Allergies   Allergen Reactions    Pollen Extract Allergic Rhinitis     History:  Marital Status:    Occupation:  Retired [employer]   Patient Pre-hospital Living Situation:  Lives at home with a  who takes care of him  However,  is not a family member  Unsure whether such person lives with him 24/7  Patient Pre-hospital Level of Mobility:  Needing assistance  Patient Pre-hospital Diet Restrictions:  Cardiac  Substance Use History:   Social History     Substance and Sexual Activity   Alcohol Use Not Currently    Frequency: Monthly or less    Drinks per session: 1 or 2     Social History     Tobacco Use   Smoking Status Never Smoker   Smokeless Tobacco Never Used     Social History     Substance and Sexual Activity   Drug Use Never       Family History:  Family History   Problem Relation Age of Onset    Polymyositis Mother     Heart failure Father        Physical Exam:     Vitals:   Blood Pressure: 127/65 (07/31/20 0205)  Pulse: 90 (07/31/20 0205)  Temperature: 100 1 °F (37 8 °C) (07/31/20 0205)  Temp Source: Oral (07/30/20 2314)  Respirations: 18 (07/31/20 0000)  Weight - Scale: 122 kg (270 lb) (07/30/20 2314)  SpO2: 92 % (07/31/20 0205)    Constitutional:  Well developed, well nourished, no acute distress, non-toxic appearance but sickly appearing  Morbidly obese    Eyes:  PERRL, conjunctiva normal   HENT:  Atraumatic, external ears normal, nose normal, oropharynx moist, no pharyngeal exudates  Neck- normal range of motion, no tenderness, supple   Respiratory:  No respiratory distress, normal breath sounds, no rales, no wheezing   Cardiovascular:  Normal rate, normal rhythm, no murmurs, no gallops, no rubs   GI:  Soft, nondistended, normal bowel sounds, nontender, with pain located at the epigastric area when pressed, no organomegaly, no mass, no rebound, no guarding   :  No costovertebral angle tenderness   Musculoskeletal:  No edema, no tenderness, no deformities  Back- no tenderness  Integument:  Well hydrated, no rash   Lymphatic:  No lymphadenopathy noted   Neurologic:  Alert &awake, communicative, CN 2-12 normal, normal motor function, normal sensory function, no focal deficits noted with no preferential movement  Psychiatric:  Speech and behavior appropriate       Lab Results: I have personally reviewed pertinent reports  Results from last 7 days   Lab Units 07/30/20  2325   WBC Thousand/uL 13 76*   HEMOGLOBIN g/dL 14 8   HEMATOCRIT % 46 0   PLATELETS Thousands/uL 272   NEUTROS PCT % 87*   LYMPHS PCT % 2*   MONOS PCT % 10   EOS PCT % 0     Results from last 7 days   Lab Units 07/30/20  2325   POTASSIUM mmol/L 4 9   CHLORIDE mmol/L 104   CO2 mmol/L 26   BUN mg/dL 32*   CREATININE mg/dL 1 21   CALCIUM mg/dL 9 7   ALK PHOS U/L 91   ALT U/L 19   AST U/L 39     Results from last 7 days   Lab Units 07/31/20  0008   INR  1 25*           Imaging: I have personally reviewed pertinent reports  CT PULMONARY ANGIOGRAM OF THE CHEST AND CT ABDOMEN AND PELVIS WITH INTRAVENOUS CONTRAST     INDICATION:   new S1Q3T3 and was going to scan belly (epigastric pain)      COMPARISON:  None      TECHNIQUE:  CT examination of the chest, abdomen and pelvis was performed    Thin section CT angiographic technique was used in the chest in order to evaluate for pulmonary embolus and coronal 3D MIP postprocessing was performed on the acquisition scanner  Axial, sagittal, and coronal 2D reformatted images were created from the source data and submitted for interpretation      Radiation dose length product (DLP) for this visit:  3893 33 mGy-cm   This examination, like all CT scans performed in the [facility], was performed utilizing techniques to minimize radiation dose exposure, including the use of   iterative reconstruction and automated exposure control      IV Contrast:  100 mL of iohexol (OMNIPAQUE)  Enteric Contrast:  Enteric contrast was not administered      FINDINGS:     CHEST     PULMONARY ARTERIAL TREE:  No evidence of pulmonary embolus      LUNGS:  The trachea and central bronchial tree are patent  Nodular airspace opacities are seen within the right upper lobe      PLEURA:  Pleural calcification is seen within the right upper lobe      HEART/AORTA:  Unremarkable for patient's age      MEDIASTINUM AND ERNIE:  Unremarkable      CHEST WALL AND LOWER NECK:   Unremarkable      ABDOMEN     LIVER/BILIARY TREE:  Liver is diffusely decreased in density consistent with fatty change  No CT evidence of suspicious hepatic mass  Normal hepatic contours  No biliary dilatation      GALLBLADDER:  There are gallstone(s) within the gallbladder, without pericholecystic inflammatory changes      SPLEEN:  Unremarkable      PANCREAS:  Unremarkable      ADRENAL GLANDS:  Unremarkable      KIDNEYS/URETERS:  No hydronephrosis or urinary tract calculus  One or more sharply circumscribed subcentimeter renal hypodensities are present, too small to accurately characterize, and statistically most likely benign findings  According to recent   literature (Radiology 2019) no further workup of these findings is recommended      STOMACH AND BOWEL:  Fluid-filled loops of small bowel with pneumatosis is seen    Thickening of the loops of small bowel are visualized      APPENDIX:  No findings to suggest appendicitis      ABDOMINOPELVIC CAVITY:  Mesenteric edema is visualized  Small amount of perihepatic free fluid is seen  Tip of the  shunt is visualized      VESSELS:  Unremarkable for patient's age      PELVIS     REPRODUCTIVE ORGANS:  Unremarkable for patient's age      URINARY BLADDER:  Unremarkable      ABDOMINAL WALL/INGUINAL REGIONS:  Unremarkable      OSSEOUS STRUCTURES:  Status post L5/S1 surgical changes are seen      IMPRESSION:     Fluid-filled loops of small bowel with pneumatosis and bowel wall thickening  Findings are suspicious for ischemic bowel      Nodular airspace opacities within the right upper lobe  Findings may represent infection            I personally discussed this study with Michelle Lagos on 7/31/2020 at 1:46 AM       CT BRAIN - WITHOUT CONTRAST     INDICATION:   vomiting, has shunt      COMPARISON:  July 21, 2020     TECHNIQUE:  CT examination of the brain was performed  In addition to axial images, coronal 2D reformatted images were created and submitted for interpretation        Radiation dose length product (DLP) for this visit:  959 37 mGy-cm   This examination, like all CT scans performed in the [facility], was performed utilizing techniques to minimize radiation dose exposure, including the use of iterative   reconstruction and automated exposure control        IMAGE QUALITY:  Diagnostic      FINDINGS:     PARENCHYMA:  No intracranial mass, mass effect or midline shift  No CT signs of acute infarction  No acute parenchymal hemorrhage  Encephalomalacia in the right frontal lobe is seen  Unchanged hypoattenuation in the left occipital temporal lobe      VENTRICLES AND EXTRA-AXIAL SPACES:  Stable position of the right ventriculostomy shunt  The ventricular system is minimally prominent compared to prior study    Unchanged bilateral subdural hygromas      VISUALIZED ORBITS AND PARANASAL SINUSES:  Unremarkable      CALVARIUM AND EXTRACRANIAL SOFT TISSUES:  Normal      IMPRESSION:     Minimal increase in size of ventricular system     The study was marked in EPIC for immediate notification  Xr Skull < 4 Vw    Result Date: 7/21/2020  Narrative: MR SHUNT EVALUATION INDICATION:   Assess Hakim shunt setting  COMPARISON:  None VIEWS:  XR SKULL < 4 VW FINDINGS: Views of the calvarium are obtained with dedicated view positioned to evaluate pressure setting dial of ventriculostomy catheter  Ifeanyi Certas programmable shunt set to approximately 100        Impression: Shunt valve has been set to approximately 100 mm H2O  Study was reviewed with Dr Faith Kendall at time of film acquisition  Workstation performed: [id number]     Xr Skull < 4 Vw    Result Date: 7/2/2020  Narrative: PROGRAMMABLE SHUNT EVALUATION INDICATION:   G91 2: (Idiopathic) normal pressure hydrocephalus Z98 2: Presence of cerebrospinal fluid drainage device  COMPARISON:  6/10/2020 VIEWS:  XR SKULL < 4 VW FINDINGS: Views of the calvarium are obtained with dedicated view positioned to evaluate pressure setting dial of ventriculostomy catheter  The programmable shunt has changed, now in the region of 90 to 100 mm of H2O  Impression: Change in programmable shunt as described above  Workstation performed: [id number]     Xr Lumbar Spine 2 Or 3 Views    Result Date: 7/8/2020  Narrative: LUMBAR SPINE INDICATION:   L5-S1 TLIF  COMPARISON:  Intraoperative films from July 6, 2020  VIEWS:  XR SPINE LUMBAR 2 OR 3 VIEWS INJURY FINDINGS: There are 5 non rib bearing lumbar vertebral bodies  Redemonstrated postoperative changes of translumbar interbody fusion with dorsal paired vertical rods and transpedicular pedicle screw fixation at L5 and S1 with an intervening bone graft in the L5-S1 disc space  The previously noted spondylolisthesis is difficult to define due to the projection  The remaining lumbar vertebral bodies demonstrate a maintained lordotic curvature  No lateral subluxation   Redemonstrated multilevel disc space narrowing, endplate sclerosis and marginal osteophytes  Ventricular peritoneal shunt catheter with its tip in the left lower quadrant  There are segments of the catheter which are difficult to define on this radiograph  Impression: Status post TLIF at L5-S1  No complication of the hardware  Multilevel lumbar spondylosis and L5-S1 spondylolisthesis  Ventriculoperitoneal catheter is incompletely assessed on this study  Workstation performed: [id number]     Xr Spine Lumbar 2 Or 3 Views Injury    Result Date: 7/7/2020  Narrative: C-ARM - LUMBOSACRAL SPINE INDICATION: M43 17: Spondylolisthesis, lumbosacral region  Procedure guidance  COMPARISON:  5/4/2020 TECHNIQUE: FLUOROSCOPY TIME:   3 MIN 48 SEC 3 FLUOROSCOPIC IMAGES FINDINGS: Fluoroscopic guidance provided for surgical procedure  Initial image was obtained for intraoperative level verification demonstrating wires at the L5 and S1 levels posteriorly  Subsequently additional images were obtained after posterior fusion at L5-S1  with pedicle screws and bone graft placement  Osseous and soft tissue detail limited by technique  Impression: Fluoroscopic guidance provided for surgical procedure  Please refer to the separate procedure notes for additional details  Workstation performed: [id number]     Ct Head Wo Contrast    Result Date: 7/21/2020  Narrative: CT BRAIN - WITHOUT CONTRAST INDICATION:   f/u subdural hygromas  COMPARISON:  Head CT 7/1/2020 TECHNIQUE:  CT examination of the brain was performed  In addition to axial images, coronal 2D reformatted images were created and submitted for interpretation  Radiation dose length product (DLP) for this visit:  937 79 mGy-cm   This examination, like all CT scans performed in the [facility], was performed utilizing techniques to minimize radiation dose exposure, including the use of iterative  reconstruction and automated exposure control  IMAGE QUALITY:  Diagnostic   FINDINGS: PARENCHYMA:  No intracranial masslike lesion, mass effect or midline shift  Stable encephalomalacia/gliosis in the right frontal lobe  Unchanged hypoattenuation in the left occipitotemporal region  No acute intracranial hemorrhage  VENTRICLES AND EXTRA-AXIAL SPACES: Stable position of right ventriculostomy shunt  There is minimal decreased size of ventricular system with associated minimal increased subdural hygroma overlying bilateral cerebral hemisphere, now measuring up to 7 mm  in maximum thickness, previously 5 mm  VISUALIZED ORBITS AND PARANASAL SINUSES:  Unremarkable  CALVARIUM AND EXTRACRANIAL SOFT TISSUES:  Normal      Impression: Minimal decreased size of ventricular system with associated minimal increased bilateral cerebral hemispheres subdural hygromas  The study was marked in EPIC for significant notification  Workstation performed: [id number]     Ct Head Without Contrast    Result Date: 7/1/2020  Narrative: CT BRAIN - WITHOUT CONTRAST INDICATION:   G91 2: (Idiopathic) normal pressure hydrocephalus Z74 09: Other reduced mobility D18 1: Lymphangioma, any site  COMPARISON:  CT 6/8/2020 TECHNIQUE:  CT examination of the brain was performed  In addition to axial images, coronal 2D reformatted images were created and submitted for interpretation  Radiation dose length product (DLP) for this visit:  902 mGy-cm   This examination, like all CT scans performed in the [facility], was performed utilizing techniques to minimize radiation dose exposure, including the use of iterative reconstruction and automated exposure control  IMAGE QUALITY:  Diagnostic  FINDINGS: PARENCHYMA:  No acute disease  There is no acute ischemia, intracranial mass or mass effect  No edema  Stable left parieto-occipital cortical infarct  Stable gliosis along the tract of the anterior right frontal shunt catheter  Catheter appears is the midline corpus callosum terminating in the mid body of the left lateral ventricle    Ventricular size is increased slightly in the interval since prior study and the bilateral subdural hygromas have resolved  Previously, 3rd ventricle approximately a centimeter in width approaches 1 4 cm  Similar increase elsewhere within the ventricular system  VENTRICLES AND EXTRA-AXIAL SPACES:  Interval increase in size of ventricular system with resorption of bilateral subdural hygromas  VISUALIZED ORBITS AND PARANASAL SINUSES:  Unremarkable  CALVARIUM AND EXTRACRANIAL SOFT TISSUES:  Normal      Impression: As the ventricular system has increased in size, the subdural hygromas have resolved  3rd ventricle has increased from 1 cm to approximately 1 4 cm in width  Stable gliosis along the tract of right frontal shunt catheter  Workstation performed: [id number]         ** Please Note: Dragon 360 Dictation voice to text software was used in the creation of this document   **

## 2020-07-31 NOTE — DISCHARGE INSTR - OTHER ORDERS
Wound Care Plan:   1-Apply bordered foam dressing to right lateral malleolus wound  Change dressing three times weekly  2-Elevate heels and right hallux area (float off of pillows) to offload pressure  Skin care plans:  1-Turn/reposition every 2 hours for pressure re-distribution on skin--use positioning wedges  2-Offloading air cushion in chair when out of bed  3-Moisturize skin daily with skin nourishing cream   4-Apply Hydraguard lotion to bilateral buttocks, sacrum, and heels for skin protection twice daily

## 2020-07-31 NOTE — ASSESSMENT & PLAN NOTE
Patient has been using approximately 4 tablets of naproxen daily and therefore gastritis may be superficial probably secondary to NSAIDs use  Will put on hold any aspirin for now  Patient placed NPO post midnight  Protonix 40 mg intravenous twice daily  Type and screen  Check blood counts every 12 hours x3  GI consult  Update:  Final reading of CT scan of abdomen reveals the possibility of pneumatosis with wall thickening  Currently patient is NPO  Will also add general surgery consult

## 2020-07-31 NOTE — CONSULTS
Consultation - General Surgery   Lenora Xiong 69 y o  male MRN: [id number]  Unit/Bed#: [facility] 36-26 Encounter: [id number]    Assessment/Plan   Assessment:  70 yo male with pneumatosis of small bowel on CT imaging    AF VSS  Leukocytosis of 13 7  Abdomen is clinically peritoneal signs, patient's exam reveal involuntary guarding and other signs of peritonitis  Patient appears to have an acute abdomen  On exam of patient pulse was noted to be irregular        Plan:  NPO  EKG pending  Lactic pending  Patient will likely need a surgical intervention tonight vs tomorrow morning  History of Present Illness     HPI:  Lenora Xiong is a 69 y o  male who presents with worsening abd pain for 4 days duration  Patient reports pain has gotten worse since then  He started having bloody emesis 2 days ago  Denies any position in which pain is better, hurts significantly more when someone touches his stomach  Patient has never had any abdominal surgeries  Last known bowel movement was 4 days ago which was not bloody  Denies any fevers, chills, night sweats  Inpatient consult to Acute Care Surgery     Date/Time 7/31/2020 3:06 AM     Performed by  Juan Messer DO     Authorized by Jonn Portillo MD              Review of Systems   Constitutional: Negative for chills, fatigue and fever  HENT: Negative for congestion  Respiratory: Negative for cough and shortness of breath  Cardiovascular: Negative for chest pain and palpitations  Gastrointestinal: Positive for abdominal distention, abdominal pain, constipation, nausea, rectal pain and vomiting  Negative for blood in stool  Endocrine: Negative for polyuria  Genitourinary: Positive for difficulty urinating and urgency  Musculoskeletal: Negative for back pain  Skin: Negative for wound  Neurological: Negative for dizziness, seizures, facial asymmetry, numbness and headaches         Historical Information   Past Medical History:   Diagnosis Date    Cancer [facility]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     Diabetes mellitus [facility] )     [facility]     Gout     HTN (hypertension)     Hydrocephalus ([facility] )     Hypertension     Neuropathy     ZHOU on CPAP     Pressure injury of skin     TIA (transient ischemic attack)      Past Surgical History:   Procedure Laterality Date    CSF SHUNT      x3 Op Reports, Gia Valle in [facility] chart viewer   [facility]    Procedure: excision 5th metatarsal head  excision 5th metatarsal ulcer ;  Surgeon: Flavia Martinez DPM;  Location: [facility] Main OR;  Service: Podiatry    WI ARTHDSIS POST/POSTEROLATRL/POSTINTERBODY LUMBAR N/A 2020    Procedure: MINIMALLY INVASIVE TRANSVERSE LUMBAR INTERBODY FUSION L5-S1 FROM LEFT-SIDED APPROACH;  Surgeon: Fransisco Rosario MD;  Location: [facility] MAIN OR;  Service: Neurosurgery    WI REPLACEMENT/REVISION,CSF SHUNT Right 2020    Procedure: REVISION OF SCALP OVER VENTRICULAR CATHETER IN THE RIGHT CORONAL REGION;  Surgeon: Fransisco Rosario MD;  Location: [facility] MAIN OR;  Service: Neurosurgery     Social History   Social History     Substance and Sexual Activity   Alcohol Use Not Currently    Frequency: Monthly or less    Drinks per session: 1 or 2     Social History     Substance and Sexual Activity   Drug Use Never     E-Cigarette/Vaping     E-Cigarette/Vaping Substances    Nicotine No     THC No     CBD No     Flavoring No     Other No     Unknown No      Social History     Tobacco Use   Smoking Status Never Smoker   Smokeless Tobacco Never Used     Family History:   Family History   Problem Relation Age of Onset    Polymyositis Mother     Heart failure Father        Meds/Allergies   PTA meds:   Prior to Admission Medications   Prescriptions Last Dose Informant Patient Reported? Taking?    Lancets (ONETOUCH DELICA PLUS [id number]) MISC   Yes No   Si (two) times a day   ONE TOUCH ULTRA TEST test strip   Yes No   Sig: daily   allopurinol (ZYLOPRIM) 300 mg tablet   Yes No   Si mg daily   aspirin 325 mg tablet   No No   Sig: Take 1 tablet (325 mg total) by mouth daily   enalapril (VASOTEC) 5 mg tablet   No No   Sig: Take 1 tablet (5 mg total) by mouth daily   methocarbamol (ROBAXIN) 500 mg tablet   No No   Sig: Take 1 tablet (500 mg total) by mouth 2 (two) times a day as needed for muscle spasms (back pain)   multivitamin (THERAGRAN) TABS   Yes No   Sig: Take 1 tablet by mouth daily   travoprost (Travatan Z) 0 004 % ophthalmic solution   Yes No   Sig: Administer 1 drop to the right eye daily at bedtime       Facility-Administered Medications: None     Allergies   Allergen Reactions    Pollen Extract Allergic Rhinitis       Objective   First Vitals:   Blood Pressure: 132/65 (20)  Pulse: 97 (20)  Temperature: 99 1 °F (37 3 °C) (20)  Temp Source: Oral (20)  Respirations: 18 (20)  Weight - Scale: 122 kg (270 lb) (20)  SpO2: 94 % (20)    Current Vitals:   Blood Pressure: 127/65 (20)  Pulse: 90 (20)  Temperature: 100 1 °F (37 8 °C) (20)  Temp Source: Oral (20)  Respirations: 18 (20 0000)  Weight - Scale: 122 kg (270 lb) (20)  SpO2: 92 % (20)    No intake or output data in the 24 hours ending 20 0306    Invasive Devices     Peripheral Intravenous Line            Peripheral IV 20 Left Forearm less than 1 day                Physical Exam    Lab Results:   CBC:   Lab Results   Component Value Date    WBC 13 76 (H) 2020    HGB 14 8 2020    HCT 46 0 2020    MCV 87 2020     2020    MCH 27 8 2020    MCHC 32 2 2020    RDW 14 5 2020    MPV 10 0 2020    NRBC 0 2020   , CMP:   Lab Results   Component Value Date    SODIUM 138 2020    K 4 9 2020     2020    CO2 26 07/30/2020    BUN 32 (H) 07/30/2020    CREATININE 1 21 07/30/2020    CALCIUM 9 7 07/30/2020    AST 39 07/30/2020    ALT 19 07/30/2020    ALKPHOS 91 07/30/2020    EGFR 60 07/30/2020   , Coagulation:   Lab Results   Component Value Date    INR 1 25 (H) 07/31/2020     Imaging: I have personally reviewed pertinent films in PACS with a Radiologist   EKG, Pathology, and Other Studies: I have personally reviewed pertinent films in PACS

## 2020-07-31 NOTE — CONSULTS
Consultation - Infectious Disease   Pj Trujillo 69 y o  male MRN: [id number]  Unit/Bed#: ICU 12 Encounter: [id number]      IMPRESSION & RECOMMENDATIONS:   Impression/Recommendations:  1  Severe sepsis, tachycardia leukocytosis, elevated lactic acid  With early development of high fevers  Secondary to #2  Patient remains critically ill  Not on vasopressors     -antibiotic plan as below  -monitor temperatures and hemodynamics  -follow-up blood cultures  -monitor CBC  -supportive care per Critical Care service    2   shunt infection  Fluid WBC count was 61 with neutrophil predominance  Gram stain from shunt fluid shows GNRs  Suspect secondary to peritonitis and setting #3  Status post externalization of  shunt on 07/30     -continue IV cefepime 2 g q 8 hours  -continue IV Flagyl 500 mg q 8 hours  -followup CSF culture  -follow-up blood cultures  -neurosurgery follow-up ongoing    3  Ischemic bowel with peritonitis  Status post exploratory laparotomy, VAC placement  Status post femoral artery and SMA artery cannulation for paraverine by vascular surgery  Suspect intra-abdominal infection is etiology of #2     -antibiotic plan as above  -follow-up peritoneal fluid cultures  -vascular surgery/general surgical follow-up ongoing    4  NPH requiring  shunt placement in 2005, status post recent revision in July 2019      5  Lumbar spinal stenosis status post L5-S1 fusion on 07/06/2020     6  Diabetes mellitus with neuropathy  Antibiotics:  Vancomycin/cefepime/Flagyl 1    Discussed above plan with critical care service  I personally reviewed recent medical records in detail  Thank you for this consultation  We will follow along with you        HISTORY OF PRESENT ILLNESS:  Reason for Consult:   shunt infection    HPI: Pj Trujillo is a 69 y o  male with hydrocephalus requiring  shunt placement in 2005 with revision in July 2019, type 2 diabetes with neuropathy, bilateral TKAs chronic left foot ulceration with left a course post recent trauma section on 07/06/2020, lumbar spinal stenosis status post L5-S1 fusion 07/06/2020 who presented to ER today due to worsening abdominal pain and bloody emesis  Abdominal CT was concerning for pneumatosis  Patient went emergently to OR today for exploratory laparotomy, externalization of  shunt, open abdomen with VAC placement  Intraoperative findings included largely dilated and ischemic appearing bowel from ligament of Treitz to mid ileum  Hazy fibrinous ascites was noted suggestive of peritonitis  Vascular surgery performed mesenteric arteriogram with she was placed in left femoral artery with catheter at SMA running Paraverine  Patient was also noted to have high fever of 102 8  Blood cultures were sent  CSF from  shunt was sent for analysis and culture  CSF WBC count is 61 with neutrophil predominance  Glucose is 88, protein is 88  Gram stain shows gram-negative rods  We are consulted for guidance regarding these new findings  CT head shows mild increase in size of ventricular system  Patient remains sedated on ventilator postoperatively  Not on vasopressors  REVIEW OF SYSTEMS:  A complete system-based review of systems is otherwise negative  PAST MEDICAL HISTORY:  Past Medical History:   Diagnosis Date    Cancer [facility]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     Diabetes mellitus ([facility] )     DM2 (diabetes mellitus, type 2) ([facility] )     Gout     HTN (hypertension)     Hydrocephalus ([facility] )     Hypertension     Neuropathy     ZHOU on CPAP     Pressure injury of skin     TIA (transient ischemic attack)      Past Surgical History:   Procedure Laterality Date    CSF SHUNT      x3 Op Reports, Analia Kunz in [facility] chart viewer   [facility] 62 Left 5/30/2020    Procedure: excision 5th metatarsal head   excision 5th metatarsal ulcer ;  Surgeon: Morris Eldridge DPM;  Location: [facility] Main OR;  Service: Podiatry    ND ARTHDSIS POST/POSTEROLATRL/POSTINTERBODY LUMBAR N/A 2020    Procedure: MINIMALLY INVASIVE TRANSVERSE LUMBAR INTERBODY FUSION L5-S1 FROM LEFT-SIDED APPROACH;  Surgeon: Ninfa Montana MD;  Location: [facility] MA[facility] OR;  Service: Neurosurgery    ND REPLACEMENT/REVISION,CSF SHUNT Right 2020    Procedure: REVISION OF SCALP OVER VENTRICULAR CATHETER IN THE RIGHT CORONAL REGION;  Surgeon: Ninfa Montana MD;  Location: [facility] MA[facility] OR;  Service: Neurosurgery       FAMILY HISTORY:  Non-contributory    SOCIAL HISTORY:  Social History     Substance and Sexual Activity   Alcohol Use Not Currently    Frequency: Monthly or less    Drinks per session: 1 or 2     Social History     Substance and Sexual Activity   Drug Use Never     Social History     Tobacco Use   Smoking Status Never Smoker   Smokeless Tobacco Never Used       ALLERGIES:  Allergies   Allergen Reactions    Pollen Extract Allergic Rhinitis       MEDICATIONS:  All current active medications have been reviewed  PHYSICAL EXAM:  Vitals:  Temp:  [99 1 °F (37 3 °C)-102 8 °F (39 3 °C)] 102 2 °F (39 °C)  HR:  [72-97] 72  Resp:  [18-27] 20  BP: (116-132)/(59-71) 116/59  SpO2:  [92 %-100 %] 100 %  Temp (24hrs), Av 3 °F (38 5 °C), Min:99 1 °F (37 3 °C), Max:102 8 °F (39 3 °C)  Current: Temperature: (!) 102 2 °F (39 °C)     Physical Exam:  General:  Sedated on ventilator  Eyes:  Conjunctive clear with no hemorrhages or effusions  Oropharynx:  ET tube in place  Neck:  Supple, no lymphadenopathy  Lungs:  Ventilated breath sounds  Cardiac:  Regular rate and rhythm  Abdomen:  Obese, open abdomen with VAC in place  Extremities:  Left leg heavily dressed  Skin:  No rashes, no ulcers  Neurological:  Sedated    LABS, IMAGING, & OTHER STUDIES:  Lab Results:  I have personally reviewed pertinent labs    Results from last 7 days   Lab Units 20  1012 20  2325   POTASSIUM mmol/L 4 6 4 9   CHLORIDE mmol/L 108 104   CO2 mmol/L 21 26   BUN mg/dL 33* 32* CREATININE mg/dL 1 27 1 21   EGFR ml/min/1 73sq m 56 60   CALCIUM mg/dL 8 5 9 7   AST U/L  --  39   ALT U/L  --  19   ALK PHOS U/L  --  91     Results from last 7 days   Lab Units 07/31/20  1012 07/30/20  2325   WBC Thousand/uL 10 02 13 76*   HEMOGLOBIN g/dL 13 0 14 8   PLATELETS Thousands/uL 234 272     Results from last 7 days   Lab Units 07/31/20  1411 07/31/20  0622   GRAM STAIN RESULT  1+ Polys*  4+ Gram negative rods* No Polys or Bacteria seen         Imaging Studies:   I have personally reviewed pertinent imaging study reports and images in PACS  CT chest/abdomen/pelvis showed fluid-filled loops of small bowel with pneumatosis and bowel wall thickening suspicious for ischemic bowel  Nodular airspace opacities within right upper lobe  CT head showed minimal increase in size of ventricular system  EKG, Pathology, and Other Studies:   I have personally reviewed pertinent reports  Operative report reviewed

## 2020-07-31 NOTE — OP NOTE
OPERATIVE REPORT  PATIENT NAME: Jose Rose    :  1950  MRN: [id number]  Pt Location: [facility] HYBRID OR ROOM 02    SURGERY DATE: 2020    Surgeon(s) and Role:     * Diony Bingham MD - Primary     * Rigoberto Cormier MD - [id number] [facility], DO - Rosa Perez MD - Assisting    Preop Diagnosis:  Acute superficial gastritis with hemorrhage [K29 01]  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[facility] [address] ) [K55 9]    Post-Op Diagnosis Codes:     * Acute superficial gastritis with hemorrhage [K29 01]     * 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[facility] ) [K55 9]    Procedure(s) (LRB):  LAPAROTOMY EXPLORATORY (N/A)    Specimen(s):  ID Type Source Tests Collected by Time Destination   A :  Body Fluid Peritoneal Fluid ANAEROBIC CULTURE AND GRAM STAIN, BODY FLUID CULTURE AND GRAM STAIN Diony Bingham MD 2020 [id number]        Estimated Blood Loss:   50 mL    Drains:  Negative Pressure Wound Therapy (V A C ) Abdomen Anterior (Active)   Blue foam- # applied 2 2020 11:24 PM   Target Pressure (mmHg) 125 2020 11:24 PM   Dressing Status Clean 2020 12:00 AM   Number of days: 0       Urethral Catheter Latex 16 Fr  (Active)   Number of days: 0       [REMOVED] Urethral Catheter Latex 16 Fr  (Removed)   Number of days: 0       Anesthesia Type:   General    Operative Indications:  Acute superficial gastritis with hemorrhage [K29 01]  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[facility] [address] ) [K55 9]  Lactic Acidosis  Acute Abdomen with Peritonitis    Operative Findings:  Largely dilated and ischemic appearing bowel from Ligament of Treitz to mid ileum  Distal/Terminal ileum appeared decompressed and pink with a normal appearing colon throughout      Hazy/Fibrinous ascites upon entering abdomen  Fibrinous exudate on distended small bowel     shunt was externalized to abdominal wall at the point at which it entered the abdomen on the right costal margin, Clipped x3 and covered with Sterile Tegaderm and Abthera VAC sticky sheets    Open Abdomen w/ Rigoberto Steiner VAC placement at end of case for re-examination of bowel  Palpable Aortic pulse, Doppler mesenteric vessels in portion of ischemic appearing bowel    Vascular surgery to perform Femoral artery cannulation and potential Papaverine injection and Mesenteric Arteriogram    Likely a DELIA picture    Complications:   None    Procedure and Technique:  The patient was brought to the operative suite where he was identified both verbally and by wrist band  He was transferred to the table where anesthesia was induced via endotracheal intubation by the anesthesiologist  An arterial line was placed using left radial artery  A grover catheter was inserted in a sterile fashion  The patient was prepped from the anterior thigh's, groin and abdomen up to the nipples due to concern for a needed Vascular Intervention  The patient was then draped and Ioban tape was placed all in a sterile fashion  A time out was then held and all were in agreement  The patient's abdomen was slightly lilted to the right therefore an incision was made with a 10 blade from just below the xiphoid process in what was assumed to be midline  The patient was noted to have almost no abdominal wall musculature and the abdomen was entered shortly after with minimal bovie electrocautery  No enterotomies or damage to the bowel was made  Upon entering the abdomen some milky/fibrinous ascites was expelled  Cultures were decided to be obtained because of this however the ascites was at this time noted to be a darker brown/green color  The bowel was then eviscerated from the abdomen and was noted to be distended and dark beefy red with an ischemic appearance  The Ligament of Treitz was identified and the bowel was then run to the Terminal Ileum  The bowel remained distended and ischemic appearing from LoT to mid ileum at which time it then was decompressed and pink in appearance all the way to TI and a normal appearing colon   There were no serosal tears or mesenteric defects noted  Utilizing the doppler we were able to hear signals throughout the ischemic appearing bowel at the mesentery  Because of this we decided to locate the SMA at the root of the mesentery  The Transverse colon was elevated and the LoT was located  Beneath this the aorta was palpated with a strong pulse and confirmed with doppler  Unfortunately this team was not able to confirm a strong SMA signal or pulse and thus the Vascular Surgery team was called in to assist     Given the questionable presentation of the ascites fluid and the presence of a  shunt, discussion with Neurosurgery was held and it was decided to externalize the  shunt at this time  The  shunt was traced back to the RUQ to the point at which it enters the abdomen  Finding a sterile surface, a skin incision was made and dissected down over top of the surgeons finger using a Mosquito clamp  Once entering the most cephalad portion of the abdomen at the costal margin, the  shunt was then externalized and clipped 3x times with a Small Clip applier  This was then cleaned, the skin incision closed with staples and a sterile Tegaderm was placed over the externalized  shunt  Vascular surgery arrived to evaluate the vasculature  Based on their initial evaluation, it was thought that the SMA was patent however because of suspicion for DELIA they decided to perform a femoral cannulation and apply Papaverine/perform their intervention of choice  While they were setting up the small bowel was returned to the abdomen and an Nicky French was placed for the patient to return for a second look      At the end of the Acute Care Surgery portion of the case, the patient was still intubated and was about to undergo Vascular Intervention which will be dictated by that team     Dr Ritchie Tang was present for the entire procedure    Patient Disposition:  patient will return to OR for planned surgery as a staged procedure    SIGNATURE: Leon Cerrato, MD  DATE: July 31, 2020  TIME: 7:59 AM

## 2020-07-31 NOTE — ED ATTENDING ATTESTATION
Final Diagnosis:  1  Acute superficial gastritis with hemorrhage    2  Generalized weakness    3  Ambulatory dysfunction    4  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[facility] [address] )    5  Status post ventriculoperitoneal shunt      ED Course as of Jul 31 0322 Fri Jul 31, 2020   0007 EKG with new S1Q3T3 compared to previous EKG  0011 WBC(!): 13 02   0237 Case was discussed with Dr Di Real  Discussed with Dr Dayanna Roberson who will likely transition care to Dr Di Real  4830 Case discussed with Dr Nilsa Herrera who stated no specific management for the [facility] at this time  Addendum:  - Patient had been taking a lot of alleve  I think that this is NSAID induced gastritis  Edith Muro MD, saw and evaluated the patient  All available labs and X-rays were ordered by me or the resident and have been reviewed by myself  I discussed the patient with the resident / non-physician and agree with the resident's / non-physician practitioner's findings and plan as documented in the resident's / non-physician practicitioner's note, except where noted  At this point, I agree with the current assessment done in the ED  I was present during key portions of all procedures performed unless otherwise stated  Chief Complaint   Patient presents with    Weakness - Generalized     pt had back surgery 2 weeks ago going to OP PT at home now for 2 days pt has been unable to get out of bed to care for self, EMS reported 2-3 falls today, vomited at home, pt has dried stool over body     This is a 70 y/o M w/ hx of HTN, DM s/p L5-S1 back surgery w/ Nilsa Herrera a few weeks ago presenting BIBA for 2-3 days of b/l LE weakness + nausea/hematemesis x3 days, occurring 2-3x/day  Describes emesis as red  No hx of ulcers  Able to eat/drink  The weakness is clarified that it's been there for months, difficulty getting in/out of bed, poor support system  No pain  No f/ch/cp/sob   Denies any urinary tract infection symptoms (burning, itching, pain, blood, frequency)  Denies any upper respiratory tract infection symptoms (cough, congestion, rhinorrhea, sore throat)  No bowel/bladder incontinence  No saddle anesthesia  Baseline numbness/tingling/paresthesias due to neuropathy in hands and legs (chronic for years, unchanged)  Nothing focal weakness  +Lantus for diabetes  Hx of  shunt, placed 5 years ago, hx of revision  No headaches  No visual changes  No LOC  Slid out of bed today  PE: chronically ill appearing male, poor hygiene, stool in shorts, couldn't get up without assistance  2+ DPs 2+ radials  No wheezing  No tachypnea  Abrasions on the knees bilaterally  Obese abdomen  Exquisite diffuse Tenderness of belly  No midline vertebral tenderness  No flank tenderness  Heme positive stool  A/P:  1  Shunt series given the vomiting looking for acute differences  2  Lower GI bleed: hemoccult positive light brown stool  Will check hemoglobin given the bleeding  3  Weakness: Cardiac workup, IVF given poor oral intake  4  Likely admit given poor over-all status, lack of support system, likely needs rehab / NH placement  5  Urine for infection; post void residual as well  6  Given the epigastric pain, and hemoccult positive stool, will consider given protonix, GI to evaluate for gastritis, ulcers  7  New S1Q3T3 compared to 1 month ago: will do PE protocol  Imaging of belly too  8  If PE found will talk to neurosurgery given post-op 2 weeks with PE    PMH:   has a past medical history of Cancer ([facility] [address] ), Diabetes mellitus ([facility] [address] ), DM2 (diabetes mellitus, type 2) ([facility] [address] ), Gout, HTN (hypertension), Hydrocephalus ([facility] [address] ), Hypertension, Neuropathy, ZHOU on CPAP, Pressure injury of skin, and TIA (transient ischemic attack)      PSH:   has a past surgical history that includes CSF shunt; pr amputation foot,transmetatarsal (Left, 5/30/2020); pr arthdsis post/posterolatrl/postinterbody lumbar (N/A, 7/6/2020); and pr replacement/revision,csf shunt (Right, 7/6/2020)  Social:  Social History     Substance and Sexual Activity   Alcohol Use Not Currently    Frequency: Monthly or less    Drinks per session: 1 or 2     Social History     Tobacco Use   Smoking Status Never Smoker   Smokeless Tobacco Never Used     Social History     Substance and Sexual Activity   Drug Use Never     PE:  Vitals:    07/30/20 2314 07/31/20 0000 07/31/20 0205   BP: 132/65 130/71 127/65   BP Location: Left arm Left arm    Pulse: 97 94 90   Resp: 18 18    Temp: 99 1 °F (37 3 °C)  100 1 °F (37 8 °C)   TempSrc: Oral     SpO2: 94% 95% 92%   Weight: 122 kg (270 lb)     General: VSS, NAD, awake, alert  Well-nourished, well-developed  Appears stated age  Speaking normally in full sentences  Head: Normocephalic, atraumatic, nontender  Eyes: PERRL, EOM-I  No diplopia  No hyphema  No subconjunctival hemorrhages  Symmetrical lids  ENT: Atraumatic external nose and ears  Dry MM  No malocclusion  No stridor  Normal phonation  No drooling  Normal swallowing  Neck: Symmetric, trachea midline  No JVD  CV: RRR  +S1/S2  No murmurs or gallops  Peripheral pulses +2 throughout  No chest wall tenderness  Lungs:   Unlabored No retractions  CTAB, lungs sounds equal bilateral    No tachypnea  Abd: +BS, soft, diffuse belly tenderness  Bruising noted on the suprapubpic region and RLQ  There's no CVAT  ND    MSK:   FROM   Back:   No rashes  Skin: Dry, intact  Neuro: AAOx3, GCS 15, CN II-XII grossly intact  Motor grossly intact  Psychiatric/Behavioral: Appropriate mood and affect   Exam: deferred    - 13 point ROS was performed and all are normal unless stated in the history above  - Nursing note reviewed  Vitals reviewed  - Orders placed by myself and/or advanced practitioner / resident     - Previous chart was reviewed  - No language barrier    - History obtained from patient  - There are no limitations to the history obtained     - Critical care time: Not applicable for this patient  Code Status: Level 1 - Full Code  Advance Directive and Living Will:      Power of :    POLST:      Medications   allopurinol (ZYLOPRIM) tablet 300 mg (has no administration in time range)   methocarbamol (ROBAXIN) tablet 500 mg (has no administration in time range)   lisinopril (ZESTRIL) tablet 10 mg (has no administration in time range)   multivitamin (THERAGRAN) per tablet 1 tablet (has no administration in time range)   travoprost (TRAVATAN-Z) 0 004 % ophthalmic solution 1 drop ( Right Eye Canceled Entry 7/31/20 0223)   multi-electrolyte (PLASMALYTE-A/ISOLYTE-S PH 7 4) IV solution (75 mL/hr Intravenous New Bag 7/31/20 0224)   docusate sodium (COLACE) capsule 100 mg (has no administration in time range)   ondansetron (ZOFRAN) injection 4 mg (has no administration in time range)   aluminum-magnesium hydroxide-simethicone (MYLANTA) 200-200-20 mg/5 mL oral suspension 15 mL (has no administration in time range)   pantoprazole (PROTONIX) injection 40 mg (40 mg Intravenous Given 7/31/20 0229)   insulin lispro (HumaLOG) 100 units/mL subcutaneous injection 2-12 Units (2 Units Subcutaneous Not Given 7/31/20 0223)   piperacillin-tazobactam (ZOSYN) 4 5 g in sodium chloride 0 9 % 100 mL IVPB (has no administration in time range)   sodium chloride 0 9 % bolus 1,000 mL (1,000 mL Intravenous New Bag 7/31/20 0007)   pantoprazole (PROTONIX) injection 40 mg (40 mg Intravenous Given 7/31/20 0009)   iohexol (OMNIPAQUE) 350 MG/ML injection (MULTI-DOSE) 100 mL (100 mL Intravenous Given 7/31/20 0058)     CT head without contrast   ED Interpretation   CT read by radiologist; will discuss with medicine  Final Result      Minimal increase in size of ventricular system      The study was marked in EPIC for immediate notification                    Workstation performed: [id number]         PE Study with CT Abdomen and Pelvis with contrast   Final Result      Fluid-filled loops of small bowel with pneumatosis and bowel wall thickening  Findings are suspicious for ischemic bowel  Nodular airspace opacities within the right upper lobe  Findings may represent infection               I personally discussed this study with Zak Lucas on 7/31/2020 at 1:46 AM                               Workstation performed: [id number]           Orders Placed This Encounter   Procedures    CT head without contrast    PE Study with CT Abdomen and Pelvis with contrast    CBC and differential    Comprehensive metabolic panel    Troponin I    Lipase    Protime-INR    APTT    TSH, 3rd generation    Serial Hemoglobin, Q12hrs    Hemoglobin A1c w/EAG Estimation (Orders if not completed within the last 90 days)    Lactic acid, plasma    Blood gas, arterial    Diet NPO; Sips with meds    Insert peripheral IV    Continuous cardiac monitoring    Continuous pulse oximetry    Up with assistance    I/O    Stool record at bedside    Insert peripheral IV    Maintain IV access    Apply SCD or Foot pumps    Apply SCD only    Insulin Subcutaneous Notify Physician    Hypoglycemia Protocol    Insulin Subcutaneous Instruction    Fingerstick Glucose (POCT)    Fingerstick Glucose (POCT) Every 6 hours (Primarily for patients who are NPO)    Neuro checks    Vital Signs    Notify physician and Hold transfusion if:    Level 1-Full Code: all life saving measures are indicated    Inpatient Consult to Case Management    Inpatient consult to gastroenterology    Inpatient consult to Acute Care Surgery    Inpatient consult to Neurosurgery    Inpatient consult to Wound Care Provider    PT eval and treat    Cpap    POCT urinalysis dipstick    ECG 12 lead    ECG 12 lead    Type and screen    Type and screen    Prepare Leukoreduced RBC: 2 Units    Inpatient Admission     Labs Reviewed   CBC AND DIFFERENTIAL - Abnormal       Result Value Ref Range Status    WBC 13 76 (*) 4 31 - 10 16 Thousand/uL Final    RBC 5 32  3 88 - 5 62 Million/uL Final    Hemoglobin 14 8  12 0 - 17 0 g/dL Final    Hematocrit 46 0  36 5 - 49 3 % Final    MCV 87  82 - 98 fL Final    MCH 27 8  26 8 - 34 3 pg Final    MCHC 32 2  31 4 - 37 4 g/dL Final    RDW 14 5  11 6 - 15 1 % Final    MPV 10 0  8 9 - 12 7 fL Final    Platelets 252  387 - 390 Thousands/uL Final    nRBC 0  /100 WBCs Final    Neutrophils Relative 87 (*) 43 - 75 % Final    Immat GRANS % 1  0 - 2 % Final    Lymphocytes Relative 2 (*) 14 - 44 % Final    Monocytes Relative 10  4 - 12 % Final    Eosinophils Relative 0  0 - 6 % Final    Basophils Relative 0  0 - 1 % Final    Neutrophils Absolute 11 91 (*) 1 85 - 7 62 Thousands/µL Final    Immature Grans Absolute 0 14  0 00 - 0 20 Thousand/uL Final    Lymphocytes Absolute 0 33 (*) 0 60 - 4 47 Thousands/µL Final    Monocytes Absolute 1 33 (*) 0 17 - 1 22 Thousand/µL Final    Eosinophils Absolute 0 01  0 00 - 0 61 Thousand/µL Final    Basophils Absolute 0 04  0 00 - 0 10 Thousands/µL Final   COMPREHENSIVE METABOLIC PANEL - Abnormal    Sodium 138  136 - 145 mmol/L Final    Potassium 4 9  3 5 - 5 3 mmol/L Final    Comment: Slightly Hemolyzed; Results May be Affected    Chloride 104  100 - 108 mmol/L Final    CO2 26  21 - 32 mmol/L Final    ANION GAP 8  4 - 13 mmol/L Final    BUN 32 (*) 5 - 25 mg/dL Final    Creatinine 1 21  0 60 - 1 30 mg/dL Final    Comment: Standardized to IDMS reference method    Glucose 136  65 - 140 mg/dL Final    Comment:   If the patient is fasting, the ADA then defines impaired fasting glucose as > 100 mg/dL and diabetes as > or equal to 123 mg/dL  Specimen collection should occur prior to Sulfasalazine administration due to the potential for falsely depressed results  Specimen collection should occur prior to Sulfapyridine administration due to the potential for falsely elevated results  Calcium 9 7  8 3 - 10 1 mg/dL Final    AST 39  5 - 45 U/L Final    Comment: Slightly Hemolyzed;  Results May be Affected  Specimen collection should occur prior to Sulfasalazine administration due to the potential for falsely depressed results  ALT 19  12 - 78 U/L Final    Comment:   Specimen collection should occur prior to Sulfasalazine and/or Sulfapyridine administration due to the potential for falsely depressed results  Alkaline Phosphatase 91  46 - 116 U/L Final    Total Protein 7 1  6 4 - 8 2 g/dL Final    Albumin 2 7 (*) 3 5 - 5 0 g/dL Final    Total Bilirubin 0 82  0 20 - 1 00 mg/dL Final    Comment:   Use of this assay is not recommended for patients undergoing treatment with eltrombopag due to the potential for falsely elevated results  eGFR 60  ml/min/1 73sq m Final    Narrative:     National Kidney Disease Foundation guidelines for Chronic Kidney Disease (CKD):     Stage 1 with normal or high GFR (GFR > 90 mL/min/1 73 square meters)    Stage 2 Mild CKD (GFR = 60-89 mL/min/1 73 square meters)    Stage 3A Moderate CKD (GFR = 45-59 mL/min/1 73 square meters)    Stage 3B Moderate CKD (GFR = 30-44 mL/min/1 73 square meters)    Stage 4 Severe CKD (GFR = 15-29 mL/min/1 73 square meters)    Stage 5 End Stage CKD (GFR <15 mL/min/1 73 square meters)  Note: GFR calculation is accurate only with a steady state creatinine   PROTIME-INR - Abnormal    Protime 15 7 (*) 11 6 - 14 5 seconds Final    INR 1 25 (*) 0 84 - 1 19 Final   APTT - Abnormal    PTT 41 (*) 23 - 37 seconds Final    Comment: Therapeutic Heparin Range =  60-90 seconds   TROPONIN I - Normal    Troponin I <0 02  <=0 04 ng/mL Final    Comment:   [employer] Chemistry analyzer 99% cutoff is > 0 04 ng/mL in network labs     o cTnI 99% cutoff is useful only when applied to patients in the clinical setting of myocardial ischemia   o cTnI 99% cutoff should be interpreted in the context of clinical history, ECG findings and possibly cardiac imaging to establish correct diagnosis     o cTnI 99% cutoff may be suggestive but clearly not indicative of a coronary event without the clinical setting of myocardial ischemia  POCT URINALYSIS DIPSTICK     Time reflects when diagnosis was documented in both MDM as applicable and the Disposition within this note     Time User Action Codes Description Comment    7/31/2020 12:24 AM Rodena Papi S Add [K29 01] Acute superficial gastritis with hemorrhage     7/31/2020 12:24 AM Rodena Papi S Modify [K29 01] Acute superficial gastritis with hemorrhage     7/31/2020  1:27 AM Princella Andrew Modify [K29 01] Acute superficial gastritis with hemorrhage     7/31/2020  1:28 AM Princella Andrew Add [R53 1] Generalized weakness     7/31/2020  1:28 AM Princella Andrew Add [R26 2] Ambulatory dysfunction     7/31/2020  2:04 AM Rodena Papi S Add [K55 9] 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[address] )     7/31/2020  2:04 AM Rosa Mmelina Paula Add [Z98 2] Status post ventriculoperitoneal shunt       ED Disposition     ED Disposition Condition Date/Time Comment    Admit Stable Fri Jul 31, 2020  1:28 AM Case was discussed with Dr Brennen YANCEY, and the patient's admission status was agreed to be Admission Status: inpatient status to the service of Dr Brennen Rueda          Follow-up Information    None       Current Discharge Medication List      CONTINUE these medications which have NOT CHANGED    Details   allopurinol (ZYLOPRIM) 300 mg tablet 300 mg daily      aspirin 325 mg tablet Take 1 tablet (325 mg total) by mouth daily  Refills: 0    Associated Diagnoses: Essential hypertension      enalapril (VASOTEC) 5 mg tablet Take 1 tablet (5 mg total) by mouth daily  Qty: 30 tablet, Refills: 0    Associated Diagnoses: Essential hypertension      Lancets (ONETOUCH DELICA PLUS [id number]) MISC 2 (two) times a day      methocarbamol (ROBAXIN) 500 mg tablet Take 1 tablet (500 mg total) by mouth 2 (two) times a day as needed for muscle spasms (back pain)    Comments: Patient has at home  Associated Diagnoses: Pain      multivitamin (THERAGRAN) TABS Take 1 tablet by mouth daily      ONE TOUCH ULTRA TEST test strip daily      travoprost (Travatan Z) 0 004 % ophthalmic solution Administer 1 drop to the right eye daily at bedtime            No discharge procedures on file  Prior to Admission Medications   Prescriptions Last Dose Informant Patient Reported? Taking? Lancets ([address]) MISC   Yes No   Si (two) times a day   ONE TOUCH ULTRA TEST test strip   Yes No   Sig: daily   allopurinol (ZYLOPRIM) 300 mg tablet   Yes No   Si mg daily   aspirin 325 mg tablet   No No   Sig: Take 1 tablet (325 mg total) by mouth daily   enalapril (VASOTEC) 5 mg tablet   No No   Sig: Take 1 tablet (5 mg total) by mouth daily   methocarbamol (ROBAXIN) 500 mg tablet   No No   Sig: Take 1 tablet (500 mg total) by mouth 2 (two) times a day as needed for muscle spasms (back pain)   multivitamin (THERAGRAN) TABS   Yes No   Sig: Take 1 tablet by mouth daily   travoprost (Travatan Z) 0 004 % ophthalmic solution   Yes No   Sig: Administer 1 drop to the right eye daily at bedtime       Facility-Administered Medications: None       Portions of the record may have been created with voice recognition software  Occasional wrong word or "sound a like" substitutions may have occurred due to the inherent limitations of voice recognition software  Read the chart carefully and recognize, using context, where substitutions have occurred      Electronically signed by:  Kristan Winchester

## 2020-07-31 NOTE — RESPIRATORY THERAPY NOTE
RT Ventilator Management Note  Lenora Xiong 69 y o  male MRN: [id number]  Unit/Bed#: ICU 12 Encounter: [id number]      Daily Screen       7/31/2020  0936             Patient safety screen outcome[de-identified]  Failed    Not Ready for Weaning due to[de-identified]  Underline problem not resolved            Physical Exam:   Assessment Type: Assess only  General Appearance: Sedated  Respiratory Pattern: Assisted  Chest Assessment: Chest expansion symmetrical  Bilateral Breath Sounds: Diminished, Clear  R Breath Sounds: Diminished, Clear      Resp Comments: Pt arrived from OR and placed on vent, no weaning the vent today   Will go back to OR tommorow

## 2020-07-31 NOTE — QUICK NOTE
PGY4 Post-Op Check Note    S: Patient intubated/sedated in the ICU, papaverine/heparin drips running  Febrile to 102 8  O:   Vitals:    07/31/20 1200   BP:    Pulse: 78   Resp: (!) 27   Temp: (!) 102 8 °F (39 3 °C)   SpO2: 99%       I/O last 3 completed shifts:   In: 1000 [I V :1000]  Out: -   I/O this shift:  In: 1081 4 [I V :331 4; IV Piggyback:750]  Out: 500 [Urine:450; Blood:50]    PE:  Intubated/sedated  NAD  Normocephalic, atraumatic  MMM  Norm resp effort, AC ventilation  Abd soft, tender, ND, abthera VAC in place with SS output   shunt externalized   -rash/lesions      Lab Results   Component Value Date    WBC 10 02 07/31/2020    HGB 13 0 07/31/2020    HCT 39 7 07/31/2020    MCV 87 07/31/2020     07/31/2020     Lab Results   Component Value Date    CALCIUM 8 5 07/31/2020    K 4 6 07/31/2020    CO2 21 07/31/2020     07/31/2020    BUN 33 (H) 07/31/2020    CREATININE 1 27 07/31/2020         A/P: 69 y o  M w/ an acute abdomen s/p Ex lap, abthera VAC, L femoral artery cannulation with papaverine injection for ischemic appearing bowel/DELIA    - OR 8/1 for 2nd look laparotomy, possible abdominal closure   - vascular surgery for papaverine management, heparin gtt   - trend LA/endpoints  -  shunt externalized- neurosx managing  - f/u peritoneal cultures   - vent management/primary per ICU

## 2020-07-31 NOTE — ASSESSMENT & PLAN NOTE
Lab Results   Component Value Date    HGBA1C 5 9 (H) 06/16/2020   Currently not on any medications or insulin  However will place patient on Q 6 hours of Accu-Cheks with insulin sliding scale as per protocol  Patient currently NPO due to the problem gastritis  No results for input(s): POCGLU in the last 72 hours      Blood Sugar Average: Last 72 hrs:

## 2020-07-31 NOTE — ANESTHESIA PREPROCEDURE EVALUATION
Review of Systems/Medical History  Patient summary reviewed  Chart reviewed  No history of anesthetic complications     Cardiovascular  EKG reviewed, Hypertension controlled,    Pulmonary  Negative pulmonary ROS Sleep apnea Sleep Study completed and CPAP,        GI/Hepatic    GI bleeding , PUD,   Comment: + pneumatosis on CT concerning for ischemic bowel    Negative  ROS        Endo/Other  Diabetes well controlled type 2 Insulin,   Obesity    GYN       Hematology  Negative hematology ROS      Musculoskeletal  Negative musculoskeletal ROS        Neurology    TIA, Diabetic neuropathy,   Comment: NPH s/p VPS Psychology   Negative psychology ROS          Results for Yfn Jara (MRN [id number]) as of 7/31/2020 04:55   Ref  Range 7/30/2020 23:25   WBC Latest Ref Range: 4 31 - 10 16 Thousand/uL 13 76 (H)   Red Blood Cell Count Latest Ref Range: 3 88 - 5 62 Million/uL 5 32   Hemoglobin Latest Ref Range: 12 0 - 17 0 g/dL 14 8   HCT Latest Ref Range: 36 5 - 49 3 % 46 0   MCV Latest Ref Range: 82 - 98 fL 87   MCH Latest Ref Range: 26 8 - 34 3 pg 27 8   MCHC Latest Ref Range: 31 4 - 37 4 g/dL 32 2   RDW Latest Ref Range: 11 6 - 15 1 % 14 5   Platelet Count Latest Ref Range: 149 - 390 Thousands/uL 272   MPV Latest Ref Range: 8 9 - 12 7 fL 10 0   nRBC Latest Units: /100 WBCs 0     7/31/20  IMPRESSION:     Fluid-filled loops of small bowel with pneumatosis and bowel wall thickening  Findings are suspicious for ischemic bowel      Nodular airspace opacities within the right upper lobe  Findings may represent infection         Physical Exam    Airway    Mallampati score: II  TM Distance: >3 FB  Neck ROM: full     Dental   No notable dental hx upper dentures,     Cardiovascular  Rhythm: regular, Rate: normal, Cardiovascular exam normal    Pulmonary  Pulmonary exam normal Breath sounds clear to auscultation,     Other Findings        Anesthesia Plan  ASA Score- 4 Emergent    Anesthesia Type- general with ASA Monitors  Additional Monitors: arterial line  Airway Plan: ETT  Plan Factors-    Induction- intravenous and rapid sequence induction  Postoperative Plan- Plan for postoperative opioid use  Planned trial extubation    Informed Consent- Anesthetic plan and risks discussed with patient  I personally reviewed this patient with the CRNA  Discussed and agreed on the Anesthesia Plan with the CRNA  Guru Faria

## 2020-07-31 NOTE — CONSULTS
Vancomycin IV Pharmacy-to-Dose Consultation    Carlos Torres is a 69 y o  male who was receiving Vancomycin IV with management by the Pharmacy Consult service for treatment of MRSA suspected, CNS infection  The patients Vancomycin therapy has been discontinued due to growth of GNR  Thank you for allowing us to take part in this patient's care  Pharmacy will sign-off now; please call or re-consult if there are any questions        Beth Rojo, PharmD, [address] Clinical Pharmacist  [phone]

## 2020-07-31 NOTE — PHYSICAL THERAPY NOTE
Physical Therapy Cancellation Note    PT ORDERS RECEIVED, CHART REVIEWED  PATIENT IS CURRENT OFF OF THE UNIT AT OR FOR EXPLORATORY LAPAROTOMY SURGERY  PT WILL CONTINUE TO FOLLOW AND EVALUATE AS APPROPRIATE      Erika Lucero PT, DPT

## 2020-07-31 NOTE — CONSULTS
Consultation - Vascular Surgery  Camelia Toney 69 y o  male MRN: [id number]  Unit/Bed#: OR [place] Encounter: [id number]        Assessment/Plan     Assessment:  70M w/ hematemsis s/p lumbar surgery with ischemic bowel and pneumatosis seen on CT Abdomen Pelvis w/ Acute Abdomen and Peritonitis now s/p Ex-Lap, externalization of  Shunt, open abdomen with Abthera VAC placement by Acute Care surgery on 7/31/2020    - Consult to Vascular for concern of poor perfusion of small bowel   - now s/p Mesenteric Arteriogram w/ 5F sheath placed in L femoral artery with catheter at SMA running Papaverine @ 30mg/hr   - Side port of 5F sheath to be running Heparin gtt VTE protocol    Plan:  Care per Acute Care Surgery and Surgical Critical Care  Wean intubation as tolerated  Continue Papaverine gtt @ 30mg/hr   Continue heparin gtt VTE protocol  Monitor Neurovascular exam of lower extremity  Maintain Knee immobilizer on left    Rest of care per primary teams      History of Present Illness     HPI:  Camelia Toney is a 69 y o  male who presents after having some hematemesis from home after he had been using naproxen for the past few days due to his back pain s/p lumbar surgery  He denies fevers or chills, SOB or chest pain  He received a CT PE Ab/Pelvis which showed pneumatosis intestinalis and upon evaluation by the Acute Care Surgery team was noted to have cody peritonitis with a rigid abdomen and guarding  He was taken to the OR by Acute Care Surgery and an intra-operative consult was placed to Vascular Surgery       Review of Systems   Unable to perform ROS: Intubated       Historical Information   Past Medical History:   Diagnosis Date    Cancer [facility]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     Diabetes mellitus ([facility] )     DM2 (diabetes mellitus, type 2) ([facility] )     Gout     HTN (hypertension)     Hydrocephalus (HCC)     Hypertension     Neuropathy     ZHOU on CPAP     Pressure injury of skin     TIA (transient ischemic attack)      Past Surgical History:   Procedure Laterality Date    CSF SHUNT      x3 Op Reports, Dr Janessa Del Rosario in [facility] chart viewer    Micky 62 Left 2020    Procedure: excision 5th metatarsal head  excision 5th metatarsal ulcer ;  Surgeon: Alison Andrews DPM;  Location: [facility];  Service: Podiatry    GA ARTHDSIS POST/POSTEROLATRL/POSTINTERBODY LUMBAR N/A 2020    Procedure: MINIMALLY INVASIVE TRANSVERSE LUMBAR INTERBODY FUSION L5-S1 FROM LEFT-SIDED APPROACH;  Surgeon: Chacho Connor MD;  Location: [facility];  Service: Neurosurgery    GA REPLACEMENT/REVISION,CSF SHUNT Right 2020    Procedure: REVISION OF SCALP OVER VENTRICULAR CATHETER IN THE RIGHT CORONAL REGION;  Surgeon: Chacho Connor MD;  Location[facility];  Service: Neurosurgery     Social History   Social History     Substance and Sexual Activity   Alcohol Use Not Currently    Frequency: Monthly or less    Drinks per session: 1 or 2     Social History     Substance and Sexual Activity   Drug Use Never     Social History     Tobacco Use   Smoking Status Never Smoker   Smokeless Tobacco Never Used     Family History:   Family History   Problem Relation Age of Onset    Polymyositis Mother     Heart failure Father        Meds/Allergies   PTA meds:   Prior to Admission Medications   Prescriptions Last Dose Informant Patient Reported? Taking?    Lancets (Evelyn Schwab) MISC   Yes No   Si (two) times a day   ONE TOUCH ULTRA TEST test strip   Yes No   Sig: daily   allopurinol (ZYLOPRIM) 300 mg tablet   Yes No   Si mg daily   aspirin 325 mg tablet   No No   Sig: Take 1 tablet (325 mg total) by mouth daily   enalapril (VASOTEC) 5 mg tablet   No No   Sig: Take 1 tablet (5 mg total) by mouth daily   methocarbamol (ROBAXIN) 500 mg tablet   No No   Sig: Take 1 tablet (500 mg total) by mouth 2 (two) times a day as needed for muscle spasms (back pain) multivitamin (THERAGRAN) TABS   Yes No   Sig: Take 1 tablet by mouth daily   travoprost (Travatan Z) 0 004 % ophthalmic solution   Yes No   Sig: Administer 1 drop to the right eye daily at bedtime       Facility-Administered Medications: None     Allergies   Allergen Reactions    Pollen Extract Allergic Rhinitis       Objective   First Vitals:   Blood Pressure: 132/65 (07/30/20 2314)  Pulse: 97 (07/30/20 2314)  Temperature: 99 1 °F (37 3 °C) (07/30/20 2314)  Temp Source: Oral (07/30/20 2314)  Respirations: 18 (07/30/20 2314)  Height: 6' 3" (190 5 cm) (07/31/20 0205)  Weight - Scale: 122 kg (270 lb) (07/30/20 2314)  SpO2: 94 % (07/30/20 2314)    Current Vitals:   Blood Pressure: 127/65 (07/31/20 0205)  Pulse: 90 (07/31/20 0205)  Temperature: 100 1 °F (37 8 °C) (07/31/20 0205)  Temp Source: Oral (07/31/20 0205)  Respirations: 18 (07/31/20 0205)  Height: 6' 3" (190 5 cm) (07/31/20 0205)  Weight - Scale: 122 kg (268 lb 15 4 oz) (07/31/20 0205)  SpO2: 92 % (07/31/20 0205)      Intake/Output Summary (Last 24 hours) at 7/31/2020 0820  Last data filed at 7/31/2020 0803  Gross per 24 hour   Intake 1750 ml   Output 50 ml   Net 1700 ml       Invasive Devices     Peripheral Intravenous Line            Peripheral IV 07/30/20 Left Forearm less than 1 day    Peripheral IV 07/31/20 Right Hand less than 1 day          Arterial Line            Arterial Line 07/31/20 Left Radial less than 1 day          Drain            Negative Pressure Wound Therapy (V A C ) Abdomen Anterior less than 1 day    Urethral Catheter Latex 16 Fr  less than 1 day          Airway            ETT  less than 1 day                Physical Exam   Constitutional: He is oriented to person, place, and time  Intubated and sedated   HENT:   Head: Normocephalic and atraumatic  Eyes: No scleral icterus  Neck: Neck supple  No tracheal deviation present  Cardiovascular: Normal rate     Irregular rhythm, EKG showed PVC's not afib   Pulmonary/Chest:   Mechanical breath sounds   Abdominal: He exhibits distension  There is tenderness  There is rebound and guarding  Genitourinary:   Genitourinary Comments: Griffith in place   Musculoskeletal: He exhibits no tenderness  LLE with 5F sheath in place in the Femoral Artery   Neurological: He is alert and oriented to person, place, and time  Skin: Skin is warm  Psychiatric: He has a normal mood and affect  His behavior is normal      Vascular Exam:  Palpable DP b/l LE's    Lab Results:   I have personally reviewed pertinent lab results  , CBC:   Lab Results   Component Value Date    WBC 13 76 (H) 07/30/2020    HGB 14 8 07/30/2020    HCT 46 0 07/30/2020    MCV 87 07/30/2020     07/30/2020    MCH 27 8 07/30/2020    MCHC 32 2 07/30/2020    RDW 14 5 07/30/2020    MPV 10 0 07/30/2020    NRBC 0 07/30/2020   , CMP:   Lab Results   Component Value Date    SODIUM 138 07/30/2020    K 4 9 07/30/2020     07/30/2020    CO2 26 07/30/2020    BUN 32 (H) 07/30/2020    CREATININE 1 21 07/30/2020    CALCIUM 9 7 07/30/2020    AST 39 07/30/2020    ALT 19 07/30/2020    ALKPHOS 91 07/30/2020    EGFR 60 07/30/2020     Imaging: I have personally reviewed pertinent reports  and I have personally reviewed pertinent films in PACS  EKG, Pathology, and Other Studies: I have personally reviewed pertinent reports     and I have personally reviewed pertinent films in PACS    Code Status: Level 1 - Full Code  Advance Directive and Living Will:      Power of :    POLST:

## 2020-08-01 ENCOUNTER — ANESTHESIA (INPATIENT)
Dept: PERIOP | Facility: HOSPITAL | Age: 70
DRG: 031 | End: 2020-08-01
Payer: COMMERCIAL

## 2020-08-01 ENCOUNTER — APPOINTMENT (INPATIENT)
Dept: RADIOLOGY | Facility: HOSPITAL | Age: 70
DRG: 031 | End: 2020-08-01
Payer: COMMERCIAL

## 2020-08-01 PROBLEM — K55.059 NONOCCLUSIVE MESENTERIC ISCHEMIA (HCC): Status: ACTIVE | Noted: 2020-08-01

## 2020-08-01 LAB
ANION GAP SERPL CALCULATED.3IONS-SCNC: 8 MMOL/L (ref 4–13)
ANION GAP SERPL CALCULATED.3IONS-SCNC: 8 MMOL/L (ref 4–13)
APTT PPP: 141 SECONDS (ref 23–37)
APTT PPP: 38 SECONDS (ref 23–37)
APTT PPP: 42 SECONDS (ref 23–37)
BASOPHILS # BLD AUTO: 0.01 THOUSANDS/ΜL (ref 0–0.1)
BASOPHILS NFR BLD AUTO: 0 % (ref 0–1)
BUN SERPL-MCNC: 30 MG/DL (ref 5–25)
BUN SERPL-MCNC: 32 MG/DL (ref 5–25)
CA-I BLD-SCNC: 0.97 MMOL/L (ref 1.12–1.32)
CALCIUM SERPL-MCNC: 6.9 MG/DL (ref 8.3–10.1)
CALCIUM SERPL-MCNC: 7.6 MG/DL (ref 8.3–10.1)
CHLORIDE SERPL-SCNC: 111 MMOL/L (ref 100–108)
CHLORIDE SERPL-SCNC: 112 MMOL/L (ref 100–108)
CO2 SERPL-SCNC: 21 MMOL/L (ref 21–32)
CO2 SERPL-SCNC: 22 MMOL/L (ref 21–32)
CREAT SERPL-MCNC: 0.99 MG/DL (ref 0.6–1.3)
CREAT SERPL-MCNC: 1.06 MG/DL (ref 0.6–1.3)
EOSINOPHIL # BLD AUTO: 0.01 THOUSAND/ΜL (ref 0–0.61)
EOSINOPHIL NFR BLD AUTO: 0 % (ref 0–6)
ERYTHROCYTE [DISTWIDTH] IN BLOOD BY AUTOMATED COUNT: 14.6 % (ref 11.6–15.1)
ERYTHROCYTE [DISTWIDTH] IN BLOOD BY AUTOMATED COUNT: 14.8 % (ref 11.6–15.1)
GFR SERPL CREATININE-BSD FRML MDRD: 70 ML/MIN/1.73SQ M
GFR SERPL CREATININE-BSD FRML MDRD: 76 ML/MIN/1.73SQ M
GLUCOSE SERPL-MCNC: 126 MG/DL (ref 65–140)
GLUCOSE SERPL-MCNC: 135 MG/DL (ref 65–140)
GLUCOSE SERPL-MCNC: 138 MG/DL (ref 65–140)
GLUCOSE SERPL-MCNC: 141 MG/DL (ref 65–140)
GLUCOSE SERPL-MCNC: 145 MG/DL (ref 65–140)
GLUCOSE SERPL-MCNC: 146 MG/DL (ref 65–140)
GLUCOSE SERPL-MCNC: 150 MG/DL (ref 65–140)
HCT VFR BLD AUTO: 35.2 % (ref 36.5–49.3)
HCT VFR BLD AUTO: 39.7 % (ref 36.5–49.3)
HGB BLD-MCNC: 11.2 G/DL (ref 12–17)
HGB BLD-MCNC: 11.2 G/DL (ref 12–17)
HGB BLD-MCNC: 12.7 G/DL (ref 12–17)
IMM GRANULOCYTES # BLD AUTO: 0.06 THOUSAND/UL (ref 0–0.2)
IMM GRANULOCYTES NFR BLD AUTO: 1 % (ref 0–2)
LACTATE SERPL-SCNC: 1.7 MMOL/L (ref 0.5–2)
LACTATE SERPL-SCNC: 2 MMOL/L (ref 0.5–2)
LYMPHOCYTES # BLD AUTO: 0.39 THOUSANDS/ΜL (ref 0.6–4.47)
LYMPHOCYTES NFR BLD AUTO: 5 % (ref 14–44)
MAGNESIUM SERPL-MCNC: 2.2 MG/DL (ref 1.6–2.6)
MCH RBC QN AUTO: 27.9 PG (ref 26.8–34.3)
MCH RBC QN AUTO: 28.3 PG (ref 26.8–34.3)
MCHC RBC AUTO-ENTMCNC: 31.8 G/DL (ref 31.4–37.4)
MCHC RBC AUTO-ENTMCNC: 32 G/DL (ref 31.4–37.4)
MCV RBC AUTO: 88 FL (ref 82–98)
MCV RBC AUTO: 88 FL (ref 82–98)
MONOCYTES # BLD AUTO: 0.71 THOUSAND/ΜL (ref 0.17–1.22)
MONOCYTES NFR BLD AUTO: 9 % (ref 4–12)
NEUTROPHILS # BLD AUTO: 6.53 THOUSANDS/ΜL (ref 1.85–7.62)
NEUTS SEG NFR BLD AUTO: 85 % (ref 43–75)
NRBC BLD AUTO-RTO: 0 /100 WBCS
PHOSPHATE SERPL-MCNC: 3.4 MG/DL (ref 2.3–4.1)
PLATELET # BLD AUTO: 183 THOUSANDS/UL (ref 149–390)
PLATELET # BLD AUTO: 236 THOUSANDS/UL (ref 149–390)
PMV BLD AUTO: 9.8 FL (ref 8.9–12.7)
PMV BLD AUTO: 9.9 FL (ref 8.9–12.7)
POTASSIUM SERPL-SCNC: 4.2 MMOL/L (ref 3.5–5.3)
POTASSIUM SERPL-SCNC: 4.2 MMOL/L (ref 3.5–5.3)
RBC # BLD AUTO: 4.01 MILLION/UL (ref 3.88–5.62)
RBC # BLD AUTO: 4.49 MILLION/UL (ref 3.88–5.62)
SODIUM SERPL-SCNC: 141 MMOL/L (ref 136–145)
SODIUM SERPL-SCNC: 141 MMOL/L (ref 136–145)
WBC # BLD AUTO: 13 THOUSAND/UL (ref 4.31–10.16)
WBC # BLD AUTO: 7.71 THOUSAND/UL (ref 4.31–10.16)

## 2020-08-01 PROCEDURE — NC001 PR NO CHARGE: Performed by: SURGERY

## 2020-08-01 PROCEDURE — 82330 ASSAY OF CALCIUM: CPT | Performed by: EMERGENCY MEDICINE

## 2020-08-01 PROCEDURE — C1769 GUIDE WIRE: HCPCS | Performed by: SURGERY

## 2020-08-01 PROCEDURE — 0WPG33Z REMOVAL OF INFUSION DEVICE FROM PERITONEAL CAVITY, PERCUTANEOUS APPROACH: ICD-10-PCS | Performed by: SURGERY

## 2020-08-01 PROCEDURE — 85730 THROMBOPLASTIN TIME PARTIAL: CPT | Performed by: PHYSICIAN ASSISTANT

## 2020-08-01 PROCEDURE — 99024 POSTOP FOLLOW-UP VISIT: CPT | Performed by: SURGERY

## 2020-08-01 PROCEDURE — 83605 ASSAY OF LACTIC ACID: CPT | Performed by: SURGERY

## 2020-08-01 PROCEDURE — 85018 HEMOGLOBIN: CPT | Performed by: SURGERY

## 2020-08-01 PROCEDURE — 80048 BASIC METABOLIC PNL TOTAL CA: CPT | Performed by: SURGERY

## 2020-08-01 PROCEDURE — 99291 CRITICAL CARE FIRST HOUR: CPT | Performed by: PHYSICIAN ASSISTANT

## 2020-08-01 PROCEDURE — 99024 POSTOP FOLLOW-UP VISIT: CPT | Performed by: NEUROLOGICAL SURGERY

## 2020-08-01 PROCEDURE — 87106 FUNGI IDENTIFICATION YEAST: CPT | Performed by: PHYSICIAN ASSISTANT

## 2020-08-01 PROCEDURE — 94760 N-INVAS EAR/PLS OXIMETRY 1: CPT

## 2020-08-01 PROCEDURE — C9113 INJ PANTOPRAZOLE SODIUM, VIA: HCPCS | Performed by: SURGERY

## 2020-08-01 PROCEDURE — 99232 SBSQ HOSP IP/OBS MODERATE 35: CPT | Performed by: INTERNAL MEDICINE

## 2020-08-01 PROCEDURE — 0WJG0ZZ INSPECTION OF PERITONEAL CAVITY, OPEN APPROACH: ICD-10-PCS | Performed by: SURGERY

## 2020-08-01 PROCEDURE — 85730 THROMBOPLASTIN TIME PARTIAL: CPT | Performed by: SURGERY

## 2020-08-01 PROCEDURE — 87070 CULTURE OTHR SPECIMN AEROBIC: CPT | Performed by: PHYSICIAN ASSISTANT

## 2020-08-01 PROCEDURE — 49002 REOPENING OF ABDOMEN: CPT | Performed by: SURGERY

## 2020-08-01 PROCEDURE — 84100 ASSAY OF PHOSPHORUS: CPT | Performed by: EMERGENCY MEDICINE

## 2020-08-01 PROCEDURE — 85027 COMPLETE CBC AUTOMATED: CPT | Performed by: SURGERY

## 2020-08-01 PROCEDURE — 80048 BASIC METABOLIC PNL TOTAL CA: CPT | Performed by: EMERGENCY MEDICINE

## 2020-08-01 PROCEDURE — 83735 ASSAY OF MAGNESIUM: CPT | Performed by: EMERGENCY MEDICINE

## 2020-08-01 PROCEDURE — 82948 REAGENT STRIP/BLOOD GLUCOSE: CPT

## 2020-08-01 PROCEDURE — 0WPG0JZ REMOVAL OF SYNTHETIC SUBSTITUTE FROM PERITONEAL CAVITY, OPEN APPROACH: ICD-10-PCS | Performed by: SURGERY

## 2020-08-01 PROCEDURE — 85025 COMPLETE CBC W/AUTO DIFF WBC: CPT | Performed by: EMERGENCY MEDICINE

## 2020-08-01 PROCEDURE — C1894 INTRO/SHEATH, NON-LASER: HCPCS | Performed by: SURGERY

## 2020-08-01 PROCEDURE — 37799 UNLISTED PX VASCULAR SURGERY: CPT | Performed by: SURGERY

## 2020-08-01 PROCEDURE — 94003 VENT MGMT INPAT SUBQ DAY: CPT

## 2020-08-01 PROCEDURE — 87205 SMEAR GRAM STAIN: CPT | Performed by: PHYSICIAN ASSISTANT

## 2020-08-01 RX ORDER — ROCURONIUM BROMIDE 10 MG/ML
INJECTION, SOLUTION INTRAVENOUS AS NEEDED
Status: DISCONTINUED | OUTPATIENT
Start: 2020-08-01 | End: 2020-08-01 | Stop reason: SURG

## 2020-08-01 RX ORDER — FENTANYL CITRATE 50 UG/ML
INJECTION, SOLUTION INTRAMUSCULAR; INTRAVENOUS AS NEEDED
Status: DISCONTINUED | OUTPATIENT
Start: 2020-08-01 | End: 2020-08-01 | Stop reason: SURG

## 2020-08-01 RX ORDER — SODIUM CHLORIDE 9 MG/ML
INJECTION, SOLUTION INTRAVENOUS CONTINUOUS PRN
Status: DISCONTINUED | OUTPATIENT
Start: 2020-08-01 | End: 2020-08-01 | Stop reason: SURG

## 2020-08-01 RX ORDER — CALCIUM GLUCONATE 20 MG/ML
2 INJECTION, SOLUTION INTRAVENOUS ONCE
Status: COMPLETED | OUTPATIENT
Start: 2020-08-01 | End: 2020-08-01

## 2020-08-01 RX ORDER — PROTAMINE SULFATE 10 MG/ML
INJECTION, SOLUTION INTRAVENOUS AS NEEDED
Status: DISCONTINUED | OUTPATIENT
Start: 2020-08-01 | End: 2020-08-01 | Stop reason: SURG

## 2020-08-01 RX ORDER — MAGNESIUM HYDROXIDE 1200 MG/15ML
LIQUID ORAL AS NEEDED
Status: DISCONTINUED | OUTPATIENT
Start: 2020-08-01 | End: 2020-08-01 | Stop reason: HOSPADM

## 2020-08-01 RX ADMIN — FENTANYL CITRATE 50 MCG: 50 INJECTION, SOLUTION INTRAMUSCULAR; INTRAVENOUS at 10:48

## 2020-08-01 RX ADMIN — METRONIDAZOLE 500 MG: 500 INJECTION, SOLUTION INTRAVENOUS at 05:40

## 2020-08-01 RX ADMIN — PANTOPRAZOLE SODIUM 40 MG: 40 INJECTION, POWDER, FOR SOLUTION INTRAVENOUS at 03:00

## 2020-08-01 RX ADMIN — PAPAVERINE HYDROCHLORIDE: 30 INJECTION, SOLUTION INTRAVENOUS at 09:30

## 2020-08-01 RX ADMIN — Medication 50 MCG/HR: at 01:02

## 2020-08-01 RX ADMIN — FLUORESCEIN SODIUM 500 MG: 100 INJECTION INTRAVENOUS at 11:17

## 2020-08-01 RX ADMIN — CEFEPIME HYDROCHLORIDE 2000 MG: 2 INJECTION, POWDER, FOR SOLUTION INTRAVENOUS at 21:19

## 2020-08-01 RX ADMIN — PAPAVERINE HYDROCHLORIDE: 30 INJECTION, SOLUTION INTRAVENOUS at 03:04

## 2020-08-01 RX ADMIN — CALCIUM GLUCONATE 2 G: 20 INJECTION, SOLUTION INTRAVENOUS at 15:14

## 2020-08-01 RX ADMIN — ROCURONIUM BROMIDE 50 MG: 10 INJECTION, SOLUTION INTRAVENOUS at 10:52

## 2020-08-01 RX ADMIN — SODIUM CHLORIDE, SODIUM GLUCONATE, SODIUM ACETATE, POTASSIUM CHLORIDE, MAGNESIUM CHLORIDE, SODIUM PHOSPHATE, DIBASIC, AND POTASSIUM PHOSPHATE 100 ML/HR: .53; .5; .37; .037; .03; .012; .00082 INJECTION, SOLUTION INTRAVENOUS at 16:21

## 2020-08-01 RX ADMIN — PROPOFOL 15.57 MCG/KG/MIN: 10 INJECTION, EMULSION INTRAVENOUS at 06:14

## 2020-08-01 RX ADMIN — CHLORHEXIDINE GLUCONATE 0.12% ORAL RINSE 15 ML: 1.2 LIQUID ORAL at 13:02

## 2020-08-01 RX ADMIN — PROTAMINE SULFATE 20 MG: 10 INJECTION, SOLUTION INTRAVENOUS at 10:32

## 2020-08-01 RX ADMIN — PAPAVERINE HYDROCHLORIDE: 30 INJECTION, SOLUTION INTRAVENOUS at 05:14

## 2020-08-01 RX ADMIN — ROCURONIUM BROMIDE 50 MG: 10 INJECTION, SOLUTION INTRAVENOUS at 09:57

## 2020-08-01 RX ADMIN — HEPARIN SODIUM AND DEXTROSE 24 UNITS/KG/HR: 10000; 5 INJECTION INTRAVENOUS at 15:31

## 2020-08-01 RX ADMIN — CHLORHEXIDINE GLUCONATE 0.12% ORAL RINSE 15 ML: 1.2 LIQUID ORAL at 20:30

## 2020-08-01 RX ADMIN — SODIUM CHLORIDE: 0.9 INJECTION, SOLUTION INTRAVENOUS at 09:52

## 2020-08-01 RX ADMIN — PAPAVERINE HYDROCHLORIDE: 30 INJECTION, SOLUTION INTRAVENOUS at 00:58

## 2020-08-01 RX ADMIN — PANTOPRAZOLE SODIUM 40 MG: 40 INJECTION, POWDER, FOR SOLUTION INTRAVENOUS at 14:09

## 2020-08-01 RX ADMIN — METRONIDAZOLE 500 MG: 500 INJECTION, SOLUTION INTRAVENOUS at 20:30

## 2020-08-01 RX ADMIN — HEPARIN SODIUM AND DEXTROSE 24 UNITS/KG/HR: 10000; 5 INJECTION INTRAVENOUS at 07:40

## 2020-08-01 RX ADMIN — CEFEPIME HYDROCHLORIDE 2000 MG: 2 INJECTION, POWDER, FOR SOLUTION INTRAVENOUS at 06:14

## 2020-08-01 RX ADMIN — FENTANYL CITRATE 50 MCG: 50 INJECTION, SOLUTION INTRAMUSCULAR; INTRAVENOUS at 10:56

## 2020-08-01 RX ADMIN — PAPAVERINE HYDROCHLORIDE: 30 INJECTION, SOLUTION INTRAVENOUS at 07:20

## 2020-08-01 RX ADMIN — PROPOFOL 20 MCG/KG/MIN: 10 INJECTION, EMULSION INTRAVENOUS at 13:00

## 2020-08-01 NOTE — RESPIRATORY THERAPY NOTE
RT Ventilator Management Note  Vern Perez 69 y o  male MRN: [id number]  Unit/Bed#: ICU 12 Encounter: [id number]      Daily Screen       7/31/2020  0936 8/1/2020  0845          Patient safety screen outcome[de-identified]  Failed  Failed      Not Ready for Weaning due to[de-identified]  Underline problem not resolved  Underline problem not resolved              Physical Exam:   Assessment Type: (P) Assess only  General Appearance: (P) Awake  Respiratory Pattern: (P) Assisted  Chest Assessment: (P) Chest expansion symmetrical  Bilateral Breath Sounds: (P) Diminished  Suction: (P) ET Tube      Resp Comments: (P) Pt  resting comfortably on current vent settings  No changes at this time  Pt  is scheduled for the OR today

## 2020-08-01 NOTE — PROGRESS NOTES
Progress Note - Smiley Reyna 69 y o  male MRN: [id number]     Unit/Bed#: ICU 12 Encounter: [id number]        Assessment:  Pt is a 70 y/o M w DELIA s/p ex lap,   shunt externalization, abthera vac placement, and mesenteric agram wih SMA, papverine, heparin gtt  Now s/p take back to OR and abdominal wall closure earlier today       VSS  Afebrile  Extubated  Abdomen soft, mild tender, non distended  L groin dressing clean, dry, intact       Plan:  Keep NPO  Continue IVF  Continue heparin gtt  Continue abx  Serial neurovascular exams  Serial abdominal exams  Trend endpoints  Vascular surgery will sign off     Subjective:   Tired, saying name, but slow to respond  Shakes head no denying abdominal pain       Objective:      Vitals: Blood pressure 116/59, pulse 90, temperature 98 3 °F (36 8 °C), temperature source Oral, resp  rate (!) 28, height 6' 3" (1 905 m), weight 122 kg (268 lb 15 4 oz), SpO2 96 %  ,Body mass index is 33 62 kg/m²         Intake/Output Summary (Last 24 hours) at 8/1/2020 1723  Last data filed at 8/1/2020 1621      Gross per 24 hour   Intake 6739 51 ml   Output 3459 ml   Net 3280 51 ml         Physical Exam  General: NAD  HEENT: NC/AT  MMM  Cv: RRR     Lungs: normal effort  Ab: Soft, NT/ND  Ex: no CCE  Neuro: AAOx3     Scheduled Meds:  Current Facility-Administered Medications:  acetaminophen 650 mg Rectal Q4H PRN Amelia Serve, PA-C     cefepime 2,000 mg Intravenous Q8H Amelia Serve, PA-C Last Rate: 2,000 mg (08/1949)   chlorhexidine 15 mL Swish [employer] Spit Q12H [address], PA-C     heparin (porcine) 3-30 Units/kg/hr (Order-Specific) Intravenous Titrated Amelia Serve, PA-C Last Rate: 24 Units/kg/hr (08/01/20 4375)   heparin (porcine) 4,800 Units Intravenous Q1H PRN Amelia Serve, PA-C     heparin (porcine) 9,600 Units Intravenous Q1H PRN Amelia Serve, PA-C     HYDROmorphone 0 5 mg Intravenous Q4H PRN Amelia Serve, PA-C     HYDROmorphone 1 mg Intravenous Q4H PRN Amelia Serve, EVI     insulin lispro 2-12 Units Subcutaneous Q6H [address] EVI Alejandra     iodixanol 15 mL Intravenous Once in imaging MALISSA Hoffman-LAURA     metroNIDAZOLE 500 mg Intravenous Q8H Tompkinsshelley Silver PA-C Last Rate: 500 mg (08/01/20 0540)   multi-electrolyte 100 mL/hr Intravenous Continuous Hilario Adrianne, PA-C Last Rate: 100 mL/hr (08/01/20 1621)   pantoprazole 40 mg Intravenous Q12H Hilario Silver, PA-C        Continuous Infusions:  heparin (porcine) 3-30 Units/kg/hr (Order-Specific) Last Rate: 24 Units/kg/hr (08/01/20 1531)   multi-electrolyte 100 mL/hr Last Rate: 100 mL/hr (08/01/20 1621)      PRN Meds:   acetaminophen    heparin (porcine)    heparin (porcine)    HYDROmorphone    HYDROmorphone    iodixanol            Invasive Devices            Peripheral Intravenous Line                     Peripheral IV 07/30/20 Left Forearm 1 day      Peripheral IV 07/31/20 Right Antecubital 1 day      Peripheral IV 07/31/20 Right Hand 1 day                   Arterial Line                     Arterial Line 07/31/20 Left Radial 1 day                   Drain                     Urethral Catheter Latex 16 Fr  1 day      Ventriculostomy/Subdural Ventricular drainage catheter with ICP microsensor Right Other (Comment) 1 day      NG/OG/Enteral Tube Nasogastric 18 Fr Left nares less than 1 day                     Lab, Imaging and other studies: I have personally reviewed pertinent reports      VTE Pharmacologic Prophylaxis: Heparin  VTE Mechanical Prophylaxis: sequential compression device

## 2020-08-01 NOTE — PROGRESS NOTES
Progress Note - Infectious Disease   Leo Campbell 69 y o  male MRN: [id number]  Unit/Bed#: ICU 12 Encounter: [id number]    IMPRESSION & RECOMMENDATIONS:   Impression/Recommendations:  1  Severe sepsis, tachycardia, leukocytosis, elevated lactic acid  With early development of high fevers  Secondary to #2  Patient remains critically ill  Not on vasopressors  Fevers and WBC count now improving on IV antibiotic      -antibiotic plan as below  -monitor temperatures and hemodynamics  -follow-up blood cultures  -monitor CBC  -supportive care per Critical Care service     2   shunt infection  Fluid WBC count was 61 with neutrophil predominance  Gram stain from shunt fluid shows GNRs  Culture now shows Pseudomonas  Suspect secondary to peritonitis in the setting of #3  Status post externalization of  shunt on 07/30      -continue IV cefepime 2 g q 8 hours  -continue IV Flagyl 500 mg q 8 hours pending final cultures  -followup final CSF culture  -follow-up blood cultures  -neurosurgery follow-up ongoing     3  Ischemic bowel with peritonitis  Status post exploratory laparotomy, VAC placement  Status post femoral artery and SMA artery cannulation for paraverine by vascular surgery  Suspect intra-abdominal infection is etiology of #2  Status post second-look on 08/01 with no resection performed  Peritoneal fluid culture also suggests Pseudomonas      -antibiotic plan as above  -follow-up peritoneal fluid cultures  -vascular surgery/general surgical follow-up ongoing     4  NPH requiring  shunt placement in 2005, status post recent revision in July 2019       5  Lumbar spinal stenosis status post L5-S1 fusion on 07/06/2020      6  Diabetes mellitus with neuropathy      Antibiotics:  Cefepime/Flagyl 2        Subjective:  Remains on ventilator  Went back to OR for second-look with no resection needed  Fevers have come down  Not on vasopressors      Objective:  Vitals:  Temp:  [98 6 °F (37 °C)-102 2 °F (39 °C)] 98 6 °F (37 °C)  HR:  [54-80] 54  Resp:  [14-29] 16  BP: (116)/(59) 116/59  SpO2:  [93 %-100 %] 94 %  Temp (24hrs), Av 1 °F (37 8 °C), Min:98 6 °F (37 °C), Max:102 2 °F (39 °C)  Current: Temperature: 98 6 °F (37 °C)    Physical Exam:   General:  On ventilator  Eyes:  Conjunctiva clear with no hemorrhages or effusion  Oropharynx:  ET tube in place  Pulmonary:  Ventilated breath sounds  Abdomen:  Obese, VAC dressing in place  Extremities:  Left leg dressing intact  Skin:  No rashes    Lab Results:  I have personally reviewed pertinent labs  Results from last 7 days   Lab Units 20  0549 20  1012 20  0745 20  0555  20  2325   POTASSIUM mmol/L 4 2 4 6  --   --   --  4 9   CHLORIDE mmol/L 112* 108  --   --   --  104   CO2 mmol/L 21 21  --   --   --  26   CO2, I-STAT mmol/L  --   --  20* 19*   < >  --    BUN mg/dL 32* 33*  --   --   --  32*   CREATININE mg/dL 1 06 1 27  --   --   --  1 21   EGFR ml/min/1 73sq m 70 56  --   --   --  60   GLUCOSE, ISTAT mg/dl  --   --  132 101  --   --    CALCIUM mg/dL 7 6* 8 5  --   --   --  9 7   AST U/L  --   --   --   --   --  39   ALT U/L  --   --   --   --   --  19   ALK PHOS U/L  --   --   --   --   --  91    < > = values in this interval not displayed  Results from last 7 days   Lab Units 20  0549 20  2119 20  1012  20  2325   WBC Thousand/uL 7 71  --  10 02  --  13 76*   HEMOGLOBIN g/dL 11 2*  11 2* 11 0* 13 0  --  14 8   I STAT HEMOGLOBIN   --   --   --    < >  --    PLATELETS Thousands/uL 183  --  234  --  272    < > = values in this interval not displayed  Results from last 7 days   Lab Units 20  1411 20  1251 20  0622   BLOOD CULTURE   --  Received in Microbiology Lab  Culture in Progress  Received in Microbiology Lab  Culture in Progress    --    GRAM STAIN RESULT  1+ Polys*  4+ Gram negative rods*  --  No Polys or Bacteria seen   BODY FLUID CULTURE, STERILE   --   --  1+ Growth of Oxidase Positive gram negative evelin*       Imaging Studies:   I have personally reviewed pertinent imaging study reports and images in PACS  Repeat chest x-ray shows scattered stranding densities  EKG, Pathology, and Other Studies:   I have personally reviewed pertinent reports  Operative report reviewed

## 2020-08-01 NOTE — PROGRESS NOTES
Neurosurgery PA notified that externalized shunt reading negative numbers since return form OR  She came to evaluate pt  Told to maintain output 10mL/hr

## 2020-08-01 NOTE — PROGRESS NOTES
Progress Note - Neurosurgery   Viridiana Gay 69 y o  male MRN: [id number]  Unit/Bed#: ICU 12 Encounter: [id number]    Assessment:  68-year-old gentleman post exploratory laparotomy and externalization of  shunt which was placed for normal pressure hydrocephalus  Currently GCS 9 T  No focal deficit  Plan:  1  Continue to monitor neurological examination closely  Repeat CT head if decline in GCS of more than 2 points  2  Consider sending repeat CSF cultures in the next 1-2 days  3  Keep externalized shunt open at the level of the abdomen  Approximately 86 cc of yellow CSF over 24 hours  4  Heparin SCDs for DVT prophylaxis  5  Concern for shunt infection/contamination  CSF cultures are pending  Continue cefepime and metronidazole  6  Neurosurgery will continue to follow  VTE Prophylaxis: Sequential compression device (Venodyne)  and Heparin    Subjective/Objective   Chief Complaint:  None, intubated  Vitals: Blood pressure 116/59, pulse 56, temperature 98 6 °F (37 °C), temperature source Rectal, resp  rate 18, height 6' 3" (1 905 m), weight 122 kg (268 lb 15 4 oz), SpO2 97 %  ,Body mass index is 33 62 kg/m²  Hemodynamic Monitoring: MAP: Arterial Line MAP (mmHg): 66 mmHg    Physical Exam:   Physical Exam   Constitutional: He appears well-developed and well-nourished  No distress  Cardiovascular: Normal rate and regular rhythm  Neurological:   Sedation held for 5 minutes  Opens eyes to voice  Obeys in all 4 limbs  Skin: Skin is warm and dry  Shunt externalization site clean and dry  Vitals reviewed      Neurologic Exam  Intake/Output       08/01/20 0701 - 08/02/20 0700      [id number] Total       Intake    I V   1407 6  -- 1407 6    Total Intake 1407 6 -- 1407 6       Output    Urine  200  -- 200    Output (mL) (Urethral Catheter Latex 16 Fr ) 200 -- 200    Total Output 200 -- 200       Net I/O     1207 6 -- 1207 6        Invasive Devices     Peripheral Intravenous Line Peripheral IV 07/30/20 Left Forearm 1 day    Peripheral IV 07/31/20 Right Hand 1 day    Peripheral IV 07/31/20 Right Antecubital less than 1 day          Arterial Line            Arterial Line 07/31/20 Left Femoral 1 day    Arterial Line 07/31/20 Left Radial 1 day          Line            Arterial Sheath Left Femoral 1 day          Drain            Negative Pressure Wound Therapy (V A C ) Abdomen Anterior 1 day    Urethral Catheter Latex 16 Fr  1 day    Ventriculostomy/Subdural Ventricular drainage catheter with ICP microsensor Right Other (Comment) 1 day    NG/OG/Enteral Tube Orogastric Right mouth less than 1 day          Airway            ETT  1 day              Lab Results:   I have personally reviewed pertinent results  Lab Results   Component Value Date    WBC 7 71 08/01/2020    HGB 11 2 (L) 08/01/2020    HGB 11 2 (L) 08/01/2020    HCT 35 2 (L) 08/01/2020    MCV 88 08/01/2020     08/01/2020    MCH 27 9 08/01/2020    MCHC 31 8 08/01/2020    RDW 14 6 08/01/2020    MPV 9 9 08/01/2020    NRBC 0 08/01/2020    SODIUM 141 08/01/2020     (H) 08/01/2020    CO2 21 08/01/2020    BUN 32 (H) 08/01/2020    CREATININE 1 06 08/01/2020    CALCIUM 7 6 (L) 08/01/2020    EGFR 70 08/01/2020    COLORU Dk Yellow 07/31/2020    CLARITYU Clear 07/31/2020    SPECGRAV 1 032 (H) 07/31/2020    PHUR 5 5 07/31/2020    LEUKOCYTESUR Negative 07/31/2020    NITRITE Negative 07/31/2020    GLUCOSEU Negative 07/31/2020    KETONESU Negative 07/31/2020    BILIRUBINUR Negative 07/31/2020    BLOODU Small (A) 07/31/2020    INR 1 41 (H) 07/31/2020       Imaging Studies: I have personally reviewed pertinent reports  and I have personally reviewed pertinent films in PACS    Other Studies:  None

## 2020-08-01 NOTE — ANESTHESIA POSTPROCEDURE EVALUATION
Post-Op Assessment Note    CV Status:  Stable    Pain management: adequate     Mental Status:  Awake and sleepy (Pt sedated )   Hydration Status:  Stable   PONV Controlled:  Controlled   Airway Patency:  Patent  Airway: intubated   Post Op Vitals Reviewed: Yes      Staff: Anesthesiologist, CRNA           BP   128/78   Temp      Pulse  82   Resp   14   SpO2 98 % (08/01/20 1429)

## 2020-08-01 NOTE — RESPIRATORY THERAPY NOTE
RT Ventilator Management Note  Juliano Wolfe 69 y o  male MRN: [id number]  Unit/Bed#: ICU 12 Encounter: [id number]      Daily Screen       7/31/2020  0936             Patient safety screen outcome[de-identified]  Failed    Not Ready for Weaning due to[de-identified]  Underline problem not resolved            Physical Exam:   Assessment Type: (P) Assess only  General Appearance: (P) Sedated  Respiratory Pattern: (P) Assisted  Chest Assessment: (P) Chest expansion symmetrical  Bilateral Breath Sounds: (P) Clear, Diminished      Resp Comments: (P) Pt resting comfortably on current ac/vc with no changes at this time, will continue to monitor per respiratory protocol

## 2020-08-01 NOTE — PROGRESS NOTES
Daily Progress Note - Critical Care   Cyrus Mcburney 69 y o  male MRN: [id number]  Unit/Bed#: ICU 12 Encounter: [id number]        ----------------------------------------------------------------------------------------  HPI/24hr events: Patient went to the OR this AM with surgery and vascular surgery  No bowel resected  Abdomen was closed  Femoral sheath removed  Patient returned from the OR intubated    ---------------------------------------------------------------------------------------  SUBJECTIVE  Patient intubated and sedated    Review of Systems  Review of systems was unable to be performed secondary to intubated  ---------------------------------------------------------------------------------------  Assessment and Plan:    Neuro:    Diagnosis: Acute post operative pain  o Plan: Continue fentanyl infusion  o Wean off propofol infusion in attempt to wean off vent  o May change to dilaudid PCA    Diagnosis: Infected  shunt  o Plan: S/p shunt explantation from the abdomen  Continue to drain per NS  o CSF growing pseudomonas  o NS aware  o Continue Cefepime   Continue Flagyl  o May need shunt removed in future      CV:    Diagnosis: SMA stenosis  o Plan: Continue heparin gtt   Diagnosis: History of HTN  o Plan: hold home vasotec  o Monitor on telemetry  o Goal SBP>140      Pulm:   Diagnosis: Acute respiratory failure  o Plan: Placed on PSV 7/5 following surgery  o Will plan to wean in order to liberate from the vent today   o Continue respiratory protocol  o Will order airway clearance protocol once extubated       GI:    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  o Plan: LA 1 8 this AM  o There were some areas of dusky bowel in OR per surgery resident but nothing that was ischemic    o Continue heparin gtt  o Continue with permissive hypertension   o Will plan to restart ASA  o Continue OGT when extubated       :    Diagnosis: No acute issues  o Plan: Continue grover to monitor I/O  o Trend creatine  o Creatine 1 06      F/E/N:    Plan: Continue to trend electrolytes and replete as necessary   NPO   Continue Isolyte- Increase rate to 100ml/hr      Heme/Onc:    Diagnosis: Continue with therapeatic AC for SMA stenosis  o Plan: Continue heparin gtt   Diagnosis: Acute blood loss anemia from surgery   o Plan: Hgb 11      Endo:    Diagnosis: No acute issues  o Plan: Continue IS      ID:    Diagnosis: Infected  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 secondary to GI source from bowel necrosis   o Plan: Continue cefepime and fagyl  o Follow CSF cultures      MSK/Skin:    Diagnosis: Abdominal wound  o Plan: Continue local wound care   Diagnosis: ambulatory dysfucntion   o Plan: PT/OT      Disposition: Continue Critical Care   Code Status: Level 1 - Full Code  ---------------------------------------------------------------------------------------  ICU CORE MEASURES    Prophylaxis   VTE Pharmacologic Prophylaxis: Heparin Drip  VTE Mechanical Prophylaxis: sequential compression device  Stress Ulcer Prophylaxis: Pantoprazole IV     ABCDE Protocol (if indicated)  Plan to perform spontaneous awakening trial today? Yes  Plan to perform spontaneous breathing trial today? Yes  Obvious barriers to extubation? No  CAM-ICU: Negative    Invasive Devices Review  Invasive Devices     Peripheral Intravenous Line            Peripheral IV 07/30/20 Left Forearm 1 day    Peripheral IV 07/31/20 Right Antecubital 1 day    Peripheral IV 07/31/20 Right Hand 1 day          Arterial Line            Arterial Line 07/31/20 Left Radial 1 day          Drain            Urethral Catheter Latex 16 Fr  1 day    Ventriculostomy/Subdural Ventricular drainage catheter with ICP microsensor Right Other (Comment) 1 day    NG/OG/Enteral Tube Orogastric Right mouth less than 1 day          Airway            ETT  1 day              Can any invasive devices be discontinued today? Yes  ---------------------------------------------------------------------------------------  OBJECTIVE    Vitals   Vitals:    20 0900 20 1225 20 1259 20 1328   BP:       BP Location:       Pulse: (!) 54  60 87   Resp:    Temp:       TempSrc:       SpO2: 96% 94% 95% 94%   Weight:       Height:         Temp (24hrs), Av 1 °F (37 8 °C), Min:98 6 °F (37 °C), Max:102 2 °F (39 °C)  Current: Temperature: 98 6 °F (37 °C)      Respiratory:  SpO2: SpO2: 98 %, SpO2 Activity: SpO2 Activity: At Rest, SpO2 Device: O2 Device: Ventilator       Invasive/non-invasive ventilation settings   Respiratory    Lab Data (Last 4 hours)    None         O2/Vent Data (Last 4 hours)       1225           Vent Mode AC/VC       Resp Rate (BPM) (BPM) 14       Vt (mL) (mL) 500       FIO2 (%) (%) 40       PEEP (cmH2O) (cmH2O) 5       MV 7 3                   Physical Exam   Constitutional:   Elderly male lying in bed on the vent   HENT:   Head: Normocephalic  Eyes: Pupils are equal, round, and reactive to light  Neck: Neck supple  Cardiovascular: Normal rate and regular rhythm  Pulmonary/Chest: Breath sounds normal    Tolerating PSV 7/5   Abdominal:   Midline incision dressings dry and intact   Genitourinary:   Genitourinary Comments: Griffith catheter in place with clear yellow urine   Musculoskeletal: Normal range of motion  Neurological:   Patient opens eyes to verbal on propofol and fentanyl    Skin: Skin is warm and dry  Vitals reviewed        Laboratory and Diagnostics:  Results from last 7 days   Lab Units 20  0549 20  2119 20  1012 20  0745 20  0555 20  2325   WBC Thousand/uL 7 71  --  10 02  --   --  13 76*   HEMOGLOBIN g/dL 11 2*  11 2* 11 0* 13 0  --   --  14 8   I STAT HEMOGLOBIN g/dl  --   --   --  12 6 9 9*  --    HEMATOCRIT % 35 2*  --  39 7  --   --  46 0   HEMATOCRIT, ISTAT %  --   --   --  37 29*  --    PLATELETS Thousands/uL 183  --  234  -- --  272   NEUTROS PCT % 85*  --   --   --   --  87*   BANDS PCT %  --   --  8  --   --   --    MONOS PCT % 9  --   --   --   --  10   MONO PCT %  --   --  9  --   --   --      Results from last 7 days   Lab Units 08/01/20  0549 07/31/20  1012 07/31/20  0745 07/31/20  0555 07/30/20  2325   SODIUM mmol/L 141 139  --   --  138   POTASSIUM mmol/L 4 2 4 6  --   --  4 9   CHLORIDE mmol/L 112* 108  --   --  104   CO2 mmol/L 21 21  --   --  26   CO2, I-STAT mmol/L  --   --  20* 19*  --    ANION GAP mmol/L 8 10  --   --  8   BUN mg/dL 32* 33*  --   --  32*   CREATININE mg/dL 1 06 1 27  --   --  1 21   CALCIUM mg/dL 7 6* 8 5  --   --  9 7   GLUCOSE RANDOM mg/dL 145* 159*  --   --  136   ALT U/L  --   --   --   --  19   AST U/L  --   --   --   --  39   ALK PHOS U/L  --   --   --   --  91   ALBUMIN g/dL  --   --   --   --  2 7*   TOTAL BILIRUBIN mg/dL  --   --   --   --  0 82     Results from last 7 days   Lab Units 08/01/20  0549   MAGNESIUM mg/dL 2 2   PHOSPHORUS mg/dL 3 4      Results from last 7 days   Lab Units 08/01/20  0549 07/31/20  2119 07/31/20  1559 07/31/20  1012 07/31/20  0008   INR   --   --   --  1 41* 1 25*   PTT seconds 42* 53* 60* 72* 41*      Results from last 7 days   Lab Units 07/30/20  2325   TROPONIN I ng/mL <0 02     Results from last 7 days   Lab Units 07/31/20  1805 07/31/20  1559 07/31/20  1012 07/31/20  0258   LACTIC ACID mmol/L 1 6 2 3* 2 2* 2 2*     ABG:  Results from last 7 days   Lab Units 07/31/20  1151   PH ART  7 357   PCO2 ART mm Hg 32 3*   PO2 ART mm Hg 87 8   HCO3 ART mmol/L 17 7*   BASE EXC ART mmol/L -6 7   ABG SOURCE  Line, Arterial     VBG:  Results from last 7 days   Lab Units 07/31/20  1151   ABG SOURCE  Line, Arterial           Micro  Results from last 7 days   Lab Units 07/31/20  1411 07/31/20  1251 07/31/20  0622   BLOOD CULTURE   --  Received in Microbiology Lab  Culture in Progress  Received in Microbiology Lab  Culture in Progress    --    GRAM STAIN RESULT  1+ Polys*  4+ Gram negative rods*  --  No Polys or Bacteria seen   BODY FLUID CULTURE, STERILE   --   --  1+ Growth of Oxidase Positive gram negative evelin*       EKG: Sr  Imaging:  I have personally reviewed pertinent reports  and I have personally reviewed pertinent films in PACS    Intake and Output  I/O       07/30 0701 - 07/31 0700 07/31 0701 - 08/01 0700 08/01 0701 - 08/02 0700    I V  (mL/kg) 1000 (8 2) 4675 4 (38 3) 2444 8 (20)    NG/GT   30    IV Piggyback  1150     Total Intake(mL/kg) 1000 (8 2) 5825 4 (47 7) 2474 8 (20 3)    Urine (mL/kg/hr)  2240 (0 8) 325 (0 4)    Emesis/NG output  700     Drains  921 15    Blood  50     Total Output  3911 340    Net +1000 +1914 4 +2134 8                 Height and Weights   Height: 6' 3" (190 5 cm)  IBW: 84 5 kg  Body mass index is 33 62 kg/m²  Weight (last 2 days)     Date/Time   Weight    07/31/20 02:05:37   122 (268 96)    07/30/20 2314   122 (270)                Nutrition       Diet Orders   (From admission, onward)             Start     Ordered    07/31/20 0944  Diet NPO  Diet effective now     Question Answer Comment   Diet Type NPO    RD to adjust diet per protocol?  No        07/31/20 0944                Active Medications  Scheduled Meds:  Current Facility-Administered Medications:  [MAR Hold] acetaminophen 650 mg Rectal Q4H PRN Lisa Spittle, DO    [facility] Hold] cefepime 2,000 mg Intravenous Q8H Jannie Louis PA-C Last Rate: 2,000 mg (08/01/20 2895)   chlorhexidine 15 mL [employer]ish [employer] Spit Q12H [address] Lisa Spittle, DO    fentaNYL 50 mcg/hr Intravenous Continuous Lisa Spittle, DO Last Rate: 50 mcg/hr (08/01/20 3056)   heparin (porcine) 3-30 Units/kg/hr (Order-Specific) Intravenous Titrated Rishabh Tobin MD Last Rate: Stopped (08/01/20 1020)   [MAR Hold] heparin (porcine) 4,800 Units Intravenous Q1H PRN Rishabh Tobin MD    [facility] Hold] heparin (porcine) 9,600 Units Intravenous Q1H PRN Rishabh Tobin MD    [facility] Hold] HYDROmorphone 0 5 mg Intravenous Q4H PRN Lisa Lopez DO    [facility] Hold] HYDROmorphone 1 mg Intravenous Q4H PRN Lisa Lopez DO    insulin lispro 2-12 Units Subcutaneous Q6H [address] Teena Cast MD    iodixanol 15 mL Intravenous Once in imaging Lisa Lopez DO    [facility] Hold] metroNIDAZOLE 500 mg Intravenous Q8H Gi Rehman PA-C Last Rate: 500 mg (08/01/20 0540)   multi-electrolyte 50 mL/hr Intravenous Continuous Alexis Colin MD Last Rate: 50 mL/hr (08/01/20 0041)   pantoprazole 40 mg Intravenous Q12H Teena Cast MD    IV infusion builder  Intravenous Continuous Alexis Colin MD Last Rate: Stopped (08/01/20 1020)   papaverine 30 mg Intravenous Continuous Teena Cast MD    propofol 5-50 mcg/kg/min Intravenous Titrated Lisa Lopez DO Last Rate: 20 mcg/kg/min (08/01/20 1300)     Facility-Administered Medications Ordered in Other Encounters:  fentanyl citrate (PF)  Intravenous PRN Rosi Green, CRNA   Fluorescein Sodium   PRN Rosi Green, CRNA   protamine   PRN Rosi Green, CRNA   ROCuronium   PRN Rosi Green, CRNA   sodium chloride   Continuous PRN Rosi Green, CRNA     Continuous Infusions:    fentaNYL 50 mcg/hr Last Rate: 50 mcg/hr (08/01/20 0952)   heparin (porcine) 3-30 Units/kg/hr (Order-Specific) Last Rate: Stopped (08/01/20 1020)   multi-electrolyte 50 mL/hr Last Rate: 50 mL/hr (08/01/20 0041)   IV infusion builder  Last Rate: Stopped (08/01/20 1020)   papaverine 30 mg    propofol 5-50 mcg/kg/min Last Rate: 20 mcg/kg/min (08/01/20 1300)     PRN Meds:     [MAR Hold] acetaminophen 650 mg Q4H PRN   [MAR Hold] heparin (porcine) 4,800 Units Q1H PRN   [MAR Hold] heparin (porcine) 9,600 Units Q1H PRN   [MAR Hold] HYDROmorphone 0 5 mg Q4H PRN   [MAR Hold] HYDROmorphone 1 mg Q4H PRN   iodixanol 15 mL Once in imaging       Allergies   Allergies   Allergen Reactions    Pollen Extract Allergic Rhinitis     ---------------------------------------------------------------------------------------  Advance Directive and Living Will:      Power of :    POLST:    ---------------------------------------------------------------------------------------  Care Time Delivered:   Upon my evaluation, this patient had a high probability of imminent or life-threatening deterioration due to respiratory failure, which required my direct attention, intervention, and personal management  I have personally provided 30 minutes (1400 to 1430) of critical care time, exclusive of procedures, teaching, family meetings, and any prior time recorded by providers other than myself  Amelia Diana PA-C      Portions of the record may have been created with voice recognition software  Occasional wrong word or "sound a like" substitutions may have occurred due to the inherent limitations of voice recognition software    Read the chart carefully and recognize, using context, where substitutions have occurred

## 2020-08-01 NOTE — ANESTHESIA PREPROCEDURE EVALUATION
Review of Systems/Medical History  Patient summary reviewed  Chart reviewed  No history of anesthetic complications     Cardiovascular  EKG reviewed, Hypertension controlled,    Pulmonary  Negative pulmonary ROS Sleep apnea Sleep Study completed and CPAP,        GI/Hepatic    GI bleeding , PUD,   Comment: + pneumatosis on CT concerning for ischemic bowel    Negative  ROS        Endo/Other  Diabetes well controlled type 2 Insulin,   Obesity    GYN       Hematology  Negative hematology ROS      Musculoskeletal  Negative musculoskeletal ROS        Neurology    TIA, Diabetic neuropathy,   Comment: NPH s/p VPS Psychology   Negative psychology ROS            Lab Results   Component Value Date    WBC 7 71 08/01/2020    HGB 11 2 (L) 08/01/2020    HGB 11 2 (L) 08/01/2020     08/01/2020     Lab Results   Component Value Date    SODIUM 141 08/01/2020    K 4 2 08/01/2020    BUN 32 (H) 08/01/2020    CREATININE 1 06 08/01/2020    EGFR 70 08/01/2020    GLUCOSE 132 07/31/2020     Lab Results   Component Value Date    PTT 42 (H) 08/01/2020      Lab Results   Component Value Date    INR 1 41 (H) 07/31/2020       Blood type A    Lab Results   Component Value Date    HGBA1C 5 6 07/31/2020       Physical Exam    Airway       Dental       Cardiovascular      Pulmonary      Other Findings  ett in situ      Anesthesia Plan  ASA Score- 4     Anesthesia Type- general with ASA Monitors  Additional Monitors:   Airway Plan: ETT  Comment:  CATIA Phillips , have personally seen and evaluated the patient prior to anesthetic care  I have reviewed the pre-anesthetic record, and other medical records if appropriate to the anesthetic care  If a CRNA is involved in the case, I have reviewed the CRNA assessment, if present, and agree  Risks/benefits and alternatives discussed with patient including possible PONV, sore throat, and possibility of rare anesthetic and surgical emergencies          Plan Factors-    Induction- intravenous  Postoperative Plan-     Informed Consent- Anesthetic plan and risks discussed with sibling  I personally reviewed this patient with the CRNA  Discussed and agreed on the Anesthesia Plan with the CRNA  Jm Chin

## 2020-08-01 NOTE — PROGRESS NOTES
Progress Note - Michael Kidney 69 y o  male MRN: [id number]    Unit/Bed#: ICU 12 Encounter: [id number]      Assessment:  Pt is a 70 y/o M w DELIA s/p ex lap,   shunt externalization, abthera vac placement, and mesenteric agram wih SMA, papverine, heparin gtt  Now s/p take back to OR and abdominal wall closure earlier today  VSS  Afebrile  Extubated  Abdomen soft, mild tender, non distended  L groin dressing clean, dry, intact  Plan:  Keep NPO  Continue IVF  Continue heparin gtt  Continue abx  Serial neurovascular exams  Serial abdominal exams  Trend endpoints, serial lactates  Subjective:   Tired, saying name, but slow to respond  Shakes head no denying abdominal pain  Objective:     Vitals: Blood pressure 116/59, pulse 90, temperature 98 3 °F (36 8 °C), temperature source Oral, resp  rate (!) 28, height 6' 3" (1 905 m), weight 122 kg (268 lb 15 4 oz), SpO2 96 %  ,Body mass index is 33 62 kg/m²  Intake/Output Summary (Last 24 hours) at 8/1/2020 1723  Last data filed at 8/1/2020 1621  Gross per 24 hour   Intake 6739 51 ml   Output 3459 ml   Net 3280 51 ml       Physical Exam  General: NAD  HEENT: NC/AT  MMM  Cv: RRR     Lungs: normal effort  Ab: Soft, NT/ND  Ex: no CCE  Neuro: AAOx3    Scheduled Meds:  Current Facility-Administered Medications:  acetaminophen 650 mg Rectal Q4H PRN Sabina Raymundo PA-C    cefepime 2,000 mg Intravenous Q8H Sabina Raymundo PA-C Last Rate: 2,000 mg (08/01/20 1901)   chlorhexidine 15 mL [facility] [employer] [facility] Q[facility]H [address] [facility]    heparin (porcine) 3-30 Units/kg/hr (Order-Specific) Intravenous Titrated Sabina Raymundo PA-C Last Rate: 24 Units/kg/hr (08/01/20 2599)   heparin (porcine) 4,800 Units Intravenous Q1H PRN Sabina Raymundo PA-C    heparin (porcine) 9,600 Units Intravenous Q1H PRN Sabina Raymundo PA-C    HYDROmorphone 0 5 mg Intravenous Q4H PRN Sabina Raymundo PA-C    HYDROmorphone 1 mg Intravenous Q4H PRN Sabina Raymundo PA-C    insulin lispro 2-12 Units Subcutaneous Q6H [facility] Lv Alejandra PA-C    iodixanol 15 mL Intravenous Once in imaging Karole Laundry, PA-C    metroNIDAZOLE 500 mg Intravenous Q8H Karole Laundry, PA-C Last Rate: 500 mg (08/01/20 0540)   multi-electrolyte 100 mL/hr Intravenous Continuous Karole Laundry, PA-C Last Rate: 100 mL/hr (08/01/20 1621)   pantoprazole 40 mg Intravenous Q12H Karole Laundry, PA-C      Continuous Infusions:  heparin (porcine) 3-30 Units/kg/hr (Order-Specific) Last Rate: 24 Units/kg/hr (08/01/20 1531)   multi-electrolyte 100 mL/hr Last Rate: 100 mL/hr (08/01/20 1621)     PRN Meds:   acetaminophen    heparin (porcine)    heparin (porcine)    HYDROmorphone    HYDROmorphone    iodixanol      Invasive Devices     Peripheral Intravenous Line            Peripheral IV 07/30/20 Left Forearm 1 day    Peripheral IV 07/31/20 Right Antecubital 1 day    Peripheral IV 07/31/20 Right Hand 1 day          Arterial Line            Arterial Line 07/31/20 Left Radial 1 day          Drain            Urethral Catheter Latex 16 Fr  1 day    Ventriculostomy/Subdural Ventricular drainage catheter with ICP microsensor Right Other (Comment) 1 day    NG/OG/Enteral Tube Nasogastric 18 Fr Left nares less than 1 day                Lab, Imaging and other studies: I have personally reviewed pertinent reports      VTE Pharmacologic Prophylaxis: Heparin  VTE Mechanical Prophylaxis: sequential compression device

## 2020-08-01 NOTE — OR NURSING
Patient intubated  Anesthesia and respiratory at bedside  Patient bypassed holding area and taken to OR 2 for surgery

## 2020-08-01 NOTE — PROGRESS NOTES
Progress Note - Cyrus Mcburney 69 y o  male MRN: [id number]    Unit/Bed#: ICU 12 Encounter: [id number]    Assessment:  Pt is a 68 y/o M w possible non occlusive mesenteric ischemia, s/p ex lap,  shunt externalization, abthera vac placement, and mesenteric agram wih SMA, papverine, heparin gtt       Remains intubated  No vasopressor support  Minimal sedation, following commands  L groin sheath in place, b/l LE warm  dopplerable DP b/l  Abthera vac: 849cc serosang output  Lactate 2 3- 1 6  WBC: 10- 7 7      Plan:  Continue heparin gtt  Continue papaverine  Return to OR today for second look with vascular and red surgery     Subjective: Intubated and sedated  Following commands       Objective:      Vitals: Blood pressure 116/59, pulse 56, temperature 99 °F (37 2 °C), resp  rate 18, height 6' 3" (1 905 m), weight 122 kg (268 lb 15 4 oz), SpO2 98 %  ,Body mass index is 33 62 kg/m²         Intake/Output Summary (Last 24 hours) at 8/1/2020 0734  Last data filed at 8/1/2020 0304      Gross per 24 hour   Intake 4969 11 ml   Output 3539 ml   Net 1430 11 ml         Physical Exam  General: NAD  HEENT: NC/AT  MMM  Intubated  Cv: RRR  Lungs: normal effort  Ab: Soft, NT/ND  abthera vac in place     Ex: no CCE  Neuro: AAOx3     Scheduled Meds:  Current Facility-Administered Medications:  acetaminophen 650 mg Rectal Q4H PRN Carrie Valdez DO     cefepime 2,000 mg Intravenous Q8H Jannie Louis PA-C Last Rate: 2,000 mg (08/01/20 5200)   chlorhexidine 15 mL [facility] Carrie Valdez DO     fentaNYL 50 mcg/hr Intravenous Continuous Carrie Valdez DO Last Rate: 50 mcg/hr (08/01/20 0102)   heparin (porcine) 3-30 Units/kg/hr (Order-Specific) Intravenous Titrated Dayton Cooney MD Last Rate: 24 Units/kg/hr (08/01/20 0740)   heparin (porcine) 4,800 Units Intravenous Q1H PRN Dayton Cooney MD     heparin (porcine) 9,600 Units Intravenous Q1H PRN Dayton Cooney MD     HYDROmorphone 0 5 mg Intravenous Q4H PRN Refugia Brendan, DO     HYDROmorphone 1 mg Intravenous Q4H PRN Refugia Brendan, DO     insulin lispro 2-12 Units Subcutaneous Q6H [facility]     iodixanol 15 mL Intravenous Once in imaging Refugia Brendan, DO     metroNIDAZOLE 500 mg Intravenous Q8H Simon Murray PA-C Last Rate: 500 mg (08/01/20 0540)   multi-electrolyte 50 mL/hr Intravenous Continuous Dayana Oscar MD Last Rate: 50 mL/hr (08/01/20 0041)   pantoprazole 40 mg Intravenous Q12H Gianni Frances MD     IV infusion builder   Intravenous Continuous Dayana Oscar MD Last Rate: 125 mL/hr at 08/01/20 0720   papaverine 30 mg Intravenous Continuous Gianni Frances MD     propofol 5-50 mcg/kg/min Intravenous Titrated Refugia Brendan, DO Last Rate: 15 574 mcg/kg/min (08/01/20 8725)      Continuous Infusions:  fentaNYL 50 mcg/hr Last Rate: 50 mcg/hr (08/01/20 0102)   heparin (porcine) 3-30 Units/kg/hr (Order-Specific) Last Rate: 24 Units/kg/hr (08/01/20 0740)   multi-electrolyte 50 mL/hr Last Rate: 50 mL/hr (08/01/20 0041)   IV infusion builder   Last Rate: 125 mL/hr at 08/01/20 0720   papaverine 30 mg     propofol 5-50 mcg/kg/min Last Rate: 15 574 mcg/kg/min (08/01/20 0614)      PRN Meds:   acetaminophen    heparin (porcine)    heparin (porcine)    HYDROmorphone    HYDROmorphone    iodixanol            Invasive Devices            Peripheral Intravenous Line                     Peripheral IV 07/30/20 Left Forearm 1 day      Peripheral IV 07/31/20 Right Hand 1 day      Peripheral IV 07/31/20 Right Antecubital less than 1 day                   Arterial Line                     Arterial Line 07/31/20 Left Radial 1 day      Arterial Line 07/31/20 Left Femoral less than 1 day                   Line                     Arterial Sheath Left Femoral less than 1 day                   Drain                     Urethral Catheter Latex 16 Fr  1 day      NG/OG/Enteral Tube Orogastric Right mouth less than 1 day   Negative Pressure Wound Therapy (V A C ) Abdomen Anterior less than 1 day      Ventriculostomy/Subdural Ventricular drainage catheter with ICP microsensor Right Other (Comment) less than 1 day                   Airway                     ETT  1 day                     Lab, Imaging and other studies: I have personally reviewed pertinent reports      VTE Pharmacologic Prophylaxis: Heparin  VTE Mechanical Prophylaxis: sequential compression device

## 2020-08-01 NOTE — RESPIRATORY THERAPY NOTE
resp care      08/01/20 1555   Respiratory Assessment   Resp Comments Pt  extubated to 4L nc  No stridor  No resp  distress   Pt  talking without difficulty   Additional Assessments   SpO2 96 %

## 2020-08-01 NOTE — OP NOTE
OPERATIVE REPORT  PATIENT NAME: Yolanda Lozano    :  [id number]  MRN: [id number]  Pt Location: [facility] 02    SURGERY DATE: 2020    Surgeon(s) and Role:  Panel 1:     * Gisele Armenta DO - Primary     * Adelia Tong MD - Assisting     * Catarino Beckham MD - Assisting  Panel 2:     * Michele Arteaga MD - Primary     * Erik Madison DO - Assisting    Preop Diagnosis:  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[facility] [address] ) [K55 9]    Post-Op Diagnosis Codes:     * 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[facility] [address] ) [K55 9]    Procedure(s) (LRB):  LAPAROTOMY EXPLORATORY, (N/A)  abd washout (N/A)  ARTERIOGRAM; cath removal (Left)  CLOSURE WOUND ABDOMINAL/TRUNK (N/A)    Specimen(s):  * No specimens in log *    Estimated Blood Loss:   Minimal    Drains:  NG/OG/Enteral Tube Orogastric Right mouth (Active)   Placement Reverification Auscultation 2020  8:00 AM   Site Assessment Intact 2020  8:00 AM   Enteral feeding tube interventions Flushed 2020  7:16 PM   Status Suction-low continuous 2020  8:00 AM   Drainage Appearance Bile;Green; Thick 2020  8:00 AM   Intake (mL) 30 mL 2020  8:00 AM   Output (mL) 500 mL 2020 11:26 PM   Number of days: 1       Urethral Catheter Latex 16 Fr   (Active)   Reasons to continue Urinary Catheter  Accurate I&O assessment in critically ill patients (48 hr  max) 2020  8:00 AM   Goal for Removal Voiding trial when ambulation improves 2020  8:00 AM   Site Assessment Clean;Skin intact 2020  8:00 AM   Collection Container Standard drainage bag 2020  8:00 AM   Securement Method Securing device (Describe) 2020  8:00 AM   Output (mL) 125 mL 2020  9:30 AM   Number of days: 1       Ventriculostomy/Subdural Ventricular drainage catheter with ICP microsensor Right Other (Comment) (Active)   Drain Status Open/CSF collection chamber 2020  8:00 AM   Level Other (Comment) 2020  8:00 AM   Status Open to drain 2020  8:00 AM   Site Assessment Clean;Dry 2020  8:00 AM   Drainage No drainage 8/1/2020  8:00 AM   Output (mL) 15 mL 8/1/2020  9:30 AM   Drain Level (mmHg) 8 mmHg 8/1/2020  8:00 AM   CSF Color Yellow 8/1/2020  9:30 AM   Dressing Status Clean;Dry; Intact 8/1/2020  8:00 AM   Number of days: 1       [REMOVED] Negative Pressure Wound Therapy (V A C ) Abdomen Anterior (Removed)   Unit Type Abthera 8/1/2020  8:00 AM   Blue foam- # applied 2 7/30/2020 11:24 PM   Cycle Continuous 8/1/2020  8:00 AM   Target Pressure (mmHg) 125 8/1/2020  8:00 AM   Canister Changed No 8/1/2020  8:00 AM   Dressing Status Clean; Intact 8/1/2020  8:00 AM   Blue foam- # removed 3 8/1/2020 10:44 AM   Output (mL) 50 mL 8/1/2020  4:00 AM   Number of days: 1       [REMOVED] Urethral Catheter Latex 16 Fr  (Removed)   Number of days: 0       Anesthesia Type:   General    Operative Indications:  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[address] ) [K55 9]       Operative Findings:  Bowel was run with no areas of necrosis but had a 100cm loop of jejunum that still appeared threatened  Fluorescin and wood lamp evaluation showed perfused bowel through out and hence no resection was done   shunt was left externalized       Complications:   None    Procedure and Technique:  The patient was placed on the OR table in the supine position  The top layer of the abthera vac was removed  She was prepped and draped in the usual sterile fashion with betadine  A time out was held confirming the patient and procedure  All that were present were in agreement  Vascular surgery started the case, please refer to their note for this portion of the case  At the conclusion of their portion, the remainder of the vac was now removed  Once in the abdomen the bowel was eviscerated out of the body  We ran the bowel from LOT to the ileocecal junction  His questionable loop of bowel continued to appear viable but threatened  At this point we performed a woods lamp evaluation after anesthesia injected fluorescin 500mg IV   The questionable loop of bowel appeared to perfuse well and decision was made to not resect  We then irrigated with 2L of warm saline  At this point we began closing the fascia with 0 loop PDS and staples for skin  Sponge and instrument counts were correct at the end of the case  Dr Taylor Counts was present for the entire case       Patient Disposition:  Critical Care Unit    SIGNATURE: Boubacar Watson MD  DATE: August 1, 2020  TIME: 12:02 PM

## 2020-08-01 NOTE — PROGRESS NOTES
Progress Note - Jose Rose 69 y o  male MRN: [id number]    Unit/Bed#: ICU 12 Encounter: [id number]      Assessment:  Pt is a 68 y/o M w possible non occlusive mesenteric ischemia, s/p ex lap,  shunt externalization, abthera vac placement, and mesenteric agram wih SMA, papverine, heparin gtt  Remains intubated  No vasopressor support  Minimal sedation, following commands  L groin sheath in place, b/l LE warm  dopplerable DP b/l  Abthera vac: 849cc serosang output  Lactate 2 3- 1 6  WBC: 10- 7 7     Plan:  Continue heparin gtt  Continue papaverine  Return to OR today for second look with vascular and red surgery    Subjective: Intubated and sedated  Following commands  Objective:     Vitals: Blood pressure 116/59, pulse 56, temperature 99 °F (37 2 °C), resp  rate 18, height 6' 3" (1 905 m), weight 122 kg (268 lb 15 4 oz), SpO2 98 %  ,Body mass index is 33 62 kg/m²  Intake/Output Summary (Last 24 hours) at 8/1/2020 0734  Last data filed at 8/1/2020 0304  Gross per 24 hour   Intake 4969 11 ml   Output 3539 ml   Net 1430 11 ml       Physical Exam  General: NAD  HEENT: NC/AT  MMM  Intubated  Cv: RRR  Lungs: normal effort  Ab: Soft, NT/ND  abthera vac in place     Ex: no CCE  Neuro: AAOx3    Scheduled Meds:  Current Facility-Administered Medications:  acetaminophen 650 mg Rectal Q4H PRN Tim Maxcy, DO    cefepime 2,000 mg Intravenous Q8H Jannie Louis PA-C Last Rate: 2,000 mg (08/01/20 6180)   chlorhexidine 15 mL Swish & Spit Q12H [facility] Tim Maxcy, DO    fentaNYL 50 mcg/hr Intravenous Continuous [place] Maxcy, DO Last Rate: 50 mcg/hr (08/01/20 0102)   heparin (porcine) 3-30 Units/kg/hr (Order-Specific) Intravenous Titrated Rigoberto Cormier MD Last Rate: 24 Units/kg/hr (08/01/20 0740)   heparin (porcine) 4,800 Units Intravenous Q1H PRN Rigoberto Cormier MD    heparin (porcine) 9,600 Units Intravenous Q1H PRN Rigoberto Cormier MD    HYDROmorphone 0 5 mg Intravenous Q4H PRN Gearldine Craft, DO    HYDROmorphone 1 mg Intravenous Q4H PRN Gearldine Craft, DO    insulin lispro 2-12 Units Subcutaneous Q6H [address] Meño Manzanares MD    iodixanol 15 mL Intravenous Once in imaging Gearldine Craft, DO    metroNIDAZOLE 500 mg Intravenous Q8H Rody Lemus PA-C Last Rate: 500 mg (08/01/20 0540)   multi-electrolyte 50 mL/hr Intravenous Continuous Kam Reddy MD Last Rate: 50 mL/hr (08/01/20 0041)   pantoprazole 40 mg Intravenous Q12H Meño Manzanares MD    IV infusion builder  Intravenous Continuous Kam Reddy MD Last Rate: 125 mL/hr at 08/01/20 0720   papaverine 30 mg Intravenous Continuous Meño Manzanares MD    propofol 5-50 mcg/kg/min Intravenous Titrated Gearldine Craft, DO Last Rate: 15 574 mcg/kg/min (08/01/20 8390)     Continuous Infusions:  fentaNYL 50 mcg/hr Last Rate: 50 mcg/hr (08/01/20 0102)   heparin (porcine) 3-30 Units/kg/hr (Order-Specific) Last Rate: 24 Units/kg/hr (08/01/20 0740)   multi-electrolyte 50 mL/hr Last Rate: 50 mL/hr (08/01/20 0041)   IV infusion builder  Last Rate: 125 mL/hr at 08/01/20 0720   papaverine 30 mg    propofol 5-50 mcg/kg/min Last Rate: 15 574 mcg/kg/min (08/01/20 0614)     PRN Meds:   acetaminophen    heparin (porcine)    heparin (porcine)    HYDROmorphone    HYDROmorphone    iodixanol      Invasive Devices     Peripheral Intravenous Line            Peripheral IV 07/30/20 Left Forearm 1 day    Peripheral IV 07/31/20 Right Hand 1 day    Peripheral IV 07/31/20 Right Antecubital less than 1 day          Arterial Line            Arterial Line 07/31/20 Left Radial 1 day    Arterial Line 07/31/20 Left Femoral less than 1 day          Line            Arterial Sheath Left Femoral less than 1 day          Drain            Urethral Catheter Latex 16 Fr  1 day    NG/OG/Enteral Tube Orogastric Right mouth less than 1 day    Negative Pressure Wound Therapy (V A C ) Abdomen Anterior less than 1 day Ventriculostomy/Subdural Ventricular drainage catheter with ICP microsensor Right Other (Comment) less than 1 day          Airway            ETT  1 day                Lab, Imaging and other studies: I have personally reviewed pertinent reports      VTE Pharmacologic Prophylaxis: Heparin  VTE Mechanical Prophylaxis: sequential compression device

## 2020-08-02 LAB
ALBUMIN SERPL BCP-MCNC: 1.8 G/DL (ref 3.5–5)
ALP SERPL-CCNC: 54 U/L (ref 46–116)
ALT SERPL W P-5'-P-CCNC: 16 U/L (ref 12–78)
ANION GAP SERPL CALCULATED.3IONS-SCNC: 7 MMOL/L (ref 4–13)
APTT PPP: 140 SECONDS (ref 23–37)
AST SERPL W P-5'-P-CCNC: 24 U/L (ref 5–45)
BACTERIA CSF CULT: ABNORMAL
BACTERIA SPEC ANAEROBE CULT: NORMAL
BACTERIA SPEC BFLD CULT: ABNORMAL
BASOPHILS # BLD AUTO: 0.02 THOUSANDS/ΜL (ref 0–0.1)
BASOPHILS NFR BLD AUTO: 0 % (ref 0–1)
BILIRUB SERPL-MCNC: 0.54 MG/DL (ref 0.2–1)
BUN SERPL-MCNC: 26 MG/DL (ref 5–25)
CALCIUM SERPL-MCNC: 7.8 MG/DL (ref 8.3–10.1)
CHLORIDE SERPL-SCNC: 113 MMOL/L (ref 100–108)
CO2 SERPL-SCNC: 23 MMOL/L (ref 21–32)
CREAT SERPL-MCNC: 0.97 MG/DL (ref 0.6–1.3)
EOSINOPHIL # BLD AUTO: 0.2 THOUSAND/ΜL (ref 0–0.61)
EOSINOPHIL NFR BLD AUTO: 2 % (ref 0–6)
ERYTHROCYTE [DISTWIDTH] IN BLOOD BY AUTOMATED COUNT: 14.8 % (ref 11.6–15.1)
GFR SERPL CREATININE-BSD FRML MDRD: 78 ML/MIN/1.73SQ M
GLUCOSE SERPL-MCNC: 131 MG/DL (ref 65–140)
GLUCOSE SERPL-MCNC: 141 MG/DL (ref 65–140)
GRAM STN SPEC: ABNORMAL
HCT VFR BLD AUTO: 33.5 % (ref 36.5–49.3)
HGB BLD-MCNC: 10.8 G/DL (ref 12–17)
IMM GRANULOCYTES # BLD AUTO: 0.2 THOUSAND/UL (ref 0–0.2)
IMM GRANULOCYTES NFR BLD AUTO: 2 % (ref 0–2)
LACTATE SERPL-SCNC: 1.2 MMOL/L (ref 0.5–2)
LYMPHOCYTES # BLD AUTO: 0.54 THOUSANDS/ΜL (ref 0.6–4.47)
LYMPHOCYTES NFR BLD AUTO: 5 % (ref 14–44)
MAGNESIUM SERPL-MCNC: 2.4 MG/DL (ref 1.6–2.6)
MCH RBC QN AUTO: 28.2 PG (ref 26.8–34.3)
MCHC RBC AUTO-ENTMCNC: 32.2 G/DL (ref 31.4–37.4)
MCV RBC AUTO: 88 FL (ref 82–98)
MONOCYTES # BLD AUTO: 1.01 THOUSAND/ΜL (ref 0.17–1.22)
MONOCYTES NFR BLD AUTO: 9 % (ref 4–12)
NEUTROPHILS # BLD AUTO: 8.92 THOUSANDS/ΜL (ref 1.85–7.62)
NEUTS SEG NFR BLD AUTO: 82 % (ref 43–75)
NRBC BLD AUTO-RTO: 0 /100 WBCS
PHOSPHATE SERPL-MCNC: 1.7 MG/DL (ref 2.3–4.1)
PLATELET # BLD AUTO: 218 THOUSANDS/UL (ref 149–390)
PMV BLD AUTO: 10 FL (ref 8.9–12.7)
POTASSIUM SERPL-SCNC: 4.2 MMOL/L (ref 3.5–5.3)
PROT SERPL-MCNC: 4.6 G/DL (ref 6.4–8.2)
RBC # BLD AUTO: 3.83 MILLION/UL (ref 3.88–5.62)
SODIUM SERPL-SCNC: 143 MMOL/L (ref 136–145)
WBC # BLD AUTO: 10.89 THOUSAND/UL (ref 4.31–10.16)

## 2020-08-02 PROCEDURE — C9113 INJ PANTOPRAZOLE SODIUM, VIA: HCPCS | Performed by: PHYSICIAN ASSISTANT

## 2020-08-02 PROCEDURE — 85730 THROMBOPLASTIN TIME PARTIAL: CPT | Performed by: STUDENT IN AN ORGANIZED HEALTH CARE EDUCATION/TRAINING PROGRAM

## 2020-08-02 PROCEDURE — NC001 PR NO CHARGE: Performed by: SURGERY

## 2020-08-02 PROCEDURE — 99232 SBSQ HOSP IP/OBS MODERATE 35: CPT | Performed by: INTERNAL MEDICINE

## 2020-08-02 PROCEDURE — 84100 ASSAY OF PHOSPHORUS: CPT | Performed by: PHYSICIAN ASSISTANT

## 2020-08-02 PROCEDURE — 82948 REAGENT STRIP/BLOOD GLUCOSE: CPT

## 2020-08-02 PROCEDURE — NC001 PR NO CHARGE: Performed by: STUDENT IN AN ORGANIZED HEALTH CARE EDUCATION/TRAINING PROGRAM

## 2020-08-02 PROCEDURE — 83605 ASSAY OF LACTIC ACID: CPT | Performed by: PHYSICIAN ASSISTANT

## 2020-08-02 PROCEDURE — 94760 N-INVAS EAR/PLS OXIMETRY 1: CPT

## 2020-08-02 PROCEDURE — 85025 COMPLETE CBC W/AUTO DIFF WBC: CPT | Performed by: PHYSICIAN ASSISTANT

## 2020-08-02 PROCEDURE — 80053 COMPREHEN METABOLIC PANEL: CPT | Performed by: PHYSICIAN ASSISTANT

## 2020-08-02 PROCEDURE — 83735 ASSAY OF MAGNESIUM: CPT | Performed by: PHYSICIAN ASSISTANT

## 2020-08-02 PROCEDURE — 99024 POSTOP FOLLOW-UP VISIT: CPT | Performed by: NEUROLOGICAL SURGERY

## 2020-08-02 PROCEDURE — 99233 SBSQ HOSP IP/OBS HIGH 50: CPT | Performed by: SURGERY

## 2020-08-02 RX ORDER — FUROSEMIDE 10 MG/ML
20 INJECTION INTRAMUSCULAR; INTRAVENOUS ONCE
Status: COMPLETED | OUTPATIENT
Start: 2020-08-02 | End: 2020-08-02

## 2020-08-02 RX ADMIN — METRONIDAZOLE 500 MG: 500 INJECTION, SOLUTION INTRAVENOUS at 05:35

## 2020-08-02 RX ADMIN — CEFEPIME HYDROCHLORIDE 2000 MG: 2 INJECTION, POWDER, FOR SOLUTION INTRAVENOUS at 06:29

## 2020-08-02 RX ADMIN — FUROSEMIDE 20 MG: 10 INJECTION, SOLUTION INTRAMUSCULAR; INTRAVENOUS at 11:02

## 2020-08-02 RX ADMIN — CHLORHEXIDINE GLUCONATE 0.12% ORAL RINSE 15 ML: 1.2 LIQUID ORAL at 21:39

## 2020-08-02 RX ADMIN — SODIUM CHLORIDE, SODIUM GLUCONATE, SODIUM ACETATE, POTASSIUM CHLORIDE, MAGNESIUM CHLORIDE, SODIUM PHOSPHATE, DIBASIC, AND POTASSIUM PHOSPHATE 100 ML/HR: .53; .5; .37; .037; .03; .012; .00082 INJECTION, SOLUTION INTRAVENOUS at 04:54

## 2020-08-02 RX ADMIN — PANTOPRAZOLE SODIUM 40 MG: 40 INJECTION, POWDER, FOR SOLUTION INTRAVENOUS at 13:43

## 2020-08-02 RX ADMIN — HEPARIN SODIUM AND DEXTROSE 21 UNITS/KG/HR: 10000; 5 INJECTION INTRAVENOUS at 04:16

## 2020-08-02 RX ADMIN — CEFEPIME HYDROCHLORIDE 2000 MG: 2 INJECTION, POWDER, FOR SOLUTION INTRAVENOUS at 13:43

## 2020-08-02 RX ADMIN — CHLORHEXIDINE GLUCONATE 0.12% ORAL RINSE 15 ML: 1.2 LIQUID ORAL at 08:20

## 2020-08-02 RX ADMIN — PANTOPRAZOLE SODIUM 40 MG: 40 INJECTION, POWDER, FOR SOLUTION INTRAVENOUS at 04:17

## 2020-08-02 RX ADMIN — CEFEPIME HYDROCHLORIDE 2000 MG: 2 INJECTION, POWDER, FOR SOLUTION INTRAVENOUS at 22:00

## 2020-08-02 NOTE — PROGRESS NOTES
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  Shunt was externalized secondary to laparotomy for bowel ischemia for SMA occlusion and peritonitis  No focal neurological deficit with GCS 14  Plan:  1  Continue to monitor neurological examination  2  Yellow CSF draining at bedside  121 cc in 24 hours  3  CSF positive for Pseudomonas  Maintain cefepime and Flagyl as per Infectious Disease  Repeat CSF culture tomorrow  Consider revision of shunt or placement of new system once CSF clears  Note that ICPs transduced through the shunt will not be accurate as the system has a 1 way valve  4  Medical management as per Surgical Critical Care  5  Neurosurgery will continue to follow  VTE Prophylaxis: Sequential compression device [place])     Subjective/Objective   Chief Complaint:  None  Vitals: Blood pressure 116/59, pulse 68, temperature 98 9 °F (37 2 °C), temperature source Oral, resp  rate 22, height 6' 3", weight 122 kg (268 lb 15 4 oz), SpO2 94 %  ,Body mass index is 33 62 kg/m²  Hemodynamic Monitoring: MAP: Arterial Line MAP (mmHg): 84 mmHg    Physical Exam:   Physical Exam  Vitals signs reviewed  Constitutional:       General: He is not in acute distress  Appearance: He is well-developed and well-nourished  HENT:      Head: Atraumatic  Eyes:      Extraocular Movements: EOM normal    Cardiovascular:      Rate and Rhythm: Regular rhythm  Pulmonary:      Effort: Pulmonary effort is normal    Neurological:      Mental Status: He is alert  Comments: Awake alert  Speech clear  Oriented to person and place but not time  No pronator or parietal drift  No sensory extinction or neglect  Grossly full power in upper lower extremities         Neurologic Exam     Cranial Nerves     CN III, IV, VI Extraocular motions are normal      Intake/Output       08/02/20 0701 - 08/03/20 0700      [id number] 1969-2381 Total       Intake    I V   271 3  -- 271 3    Total Intake 271 3 -- 271 3       Output    Urine  120  -- 120    Output (mL) (Urethral Catheter Latex 16 Fr ) 120 -- 120    Drains  10  -- 10    Output (mL) (Ventriculostomy/Subdural Ventricular drainage catheter with ICP microsensor Right Other (Comment)) 10 -- 10    Total Output 130 -- 130       Net I/O     141 3 -- 141  3        Invasive Devices     Peripheral Intravenous Line            Peripheral IV 07/30/20 Left Forearm 2 days    Peripheral IV 07/31/20 Right Hand 2 days    Peripheral IV 07/31/20 Right Antecubital 1 day          Arterial Line            Arterial Line 07/31/20 Left Radial 2 days          Drain            Urethral Catheter Latex 16 Fr  2 days    Ventriculostomy/Subdural Ventricular drainage catheter with ICP microsensor Right Other (Comment) 1 day    NG/OG/Enteral Tube Nasogastric 18 Fr Left nares less than 1 day              Lab Results:   I have personally reviewed pertinent results  Lab Results   Component Value Date    WBC 10 89 (H) 08/02/2020    HGB 10 8 (L) 08/02/2020    HCT 33 5 (L) 08/02/2020    MCV 88 08/02/2020     08/02/2020    MCH 28 2 08/02/2020    MCHC 32 2 08/02/2020    RDW 14 8 08/02/2020    MPV 10 0 08/02/2020    NRBC 0 08/02/2020    SODIUM 141 08/01/2020     (H) 08/01/2020    CO2 22 08/01/2020    BUN 30 (H) 08/01/2020    CREATININE 0 99 08/01/2020    CALCIUM 6 9 (L) 08/01/2020    EGFR 76 08/01/2020       Imaging Studies: I have personally reviewed pertinent reports  and I have personally reviewed pertinent films in PACS    Other Studies:  None

## 2020-08-02 NOTE — OP NOTE
OPERATIVE REPORT  PATIENT NAME: Ralph Huitron    :  [age]  MRN: [id number]  Pt Location: [facility]OM 02    SURGERY DATE: 2020    Surgeon(s) and Role:  Panel 1:     * Cyndy Holloway DO - Primary     * Abrahan Santamaria MD - Garrett Weaver MD - Assisting     * Michaela Avendano MD - Assisting  Panel 2:     * Iris López MD - Primary     * Edgar Pimentel DO - Assisting    Preop Diagnosis:  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[address] ) [K55 9]    Post-Op Diagnosis Codes:     * 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[address] ) [K55 9]    Procedure(s) (LRB):  LAPAROTOMY EXPLORATORY, (N/A)  abd washout (N/A)  ARTERIOGRAM; cath removal (Left)  CLOSURE WOUND ABDOMINAL/TRUNK (N/A)    Specimen(s):  * No specimens in log *    Estimated Blood Loss:   Minimal    Drains:  NG/OG/Enteral Tube Nasogastric 18 Fr Left nares (Active)   Placement Reverification X-ray 20 0745   Site Assessment Clean;Dry; Intact 20 0745   Status Clamped 20 1357   Drainage Appearance Bile;Green; Thick 20 0400   Intake (mL) 30 mL 20 1621   Output (mL) 100 mL 20 1357   Number of days: 1       Urethral Catheter Latex 16 Fr   (Active)   Reasons to continue Urinary Catheter  Accurate I&O assessment in critically ill patients (48 hr  max) 20 0745   Goal for Removal Voiding trial when ambulation improves 20 0745   Site Assessment Clean;Skin intact 20 0745   Collection Container Standard drainage bag 20 0745   Securement Method Securing device (Describe) 20 0745   Output (mL) 850 mL 20 1357   Number of days: 2       Ventriculostomy/Subdural Ventricular drainage catheter with ICP microsensor Right Other (Comment) (Active)   Drain Status Open/CSF collection chamber 20 1200   Level Other (Comment) 20 1200   Status Open to drain 20 1200   Site Assessment Clean;Dry 20 1200   Drainage No drainage 20 1200   Output (mL) 2 mL 20 1401   Drain Level (mmHg) 8 mmHg 20 0745   CSF Color Yellow 08/02/20 1200   Dressing Status Clean;Dry; Intact 08/02/20 1200   Number of days: 2       [REMOVED] Negative Pressure Wound Therapy (V A C ) Abdomen Anterior (Removed)   Unit Type Abthera 08/01/20 0800   Blue foam- # applied 2 07/30/20 2324   Cycle Continuous 08/01/20 0800   Target Pressure (mmHg) 125 08/01/20 0800   Canister Changed No 08/01/20 0800   Dressing Status Clean; Intact 08/01/20 0800   Blue foam- # removed 3 08/01/20 1044   Output (mL) 50 mL 08/01/20 0400   Number of days: 1       [REMOVED] NG/OG/Enteral Tube Orogastric Right mouth (Removed)   Placement Reverification Auscultation 08/01/20 0800   Site Assessment Intact 08/01/20 0800   Enteral feeding tube interventions Flushed 07/31/20 1916   Status Suction-low continuous 08/01/20 0800   Drainage Appearance Bile;Green; Thick 08/01/20 0800   Intake (mL) 30 mL 08/01/20 0800   Output (mL) 500 mL 07/31/20 2326   Number of days: 1       [REMOVED] Urethral Catheter Latex 16 Fr  (Removed)   Number of days: 0       Anesthesia Type:   General    Operative Indications:  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[address] ) [K55 9]  Removal of SMA catheter    Operative Findings:  See dictation    Complications:   None    Procedure and Technique:  The patient was cleaned, prepped and draped in standard surgical fashion  The left femoral sheath and catheter were also cleaned in standard sterile fashion  Appropriate time-out was conducted with all involved parties  The papaverine infusion was discontinued  The heparin infusion was discontinued  The catheter was removed and noted to be intact without incident  The patient was then reversed with 20 mg protamine  The left common femoral artery 5 [place] of [place] sheath was then removed with manual compression to the left femoral artery for 20 minutes  Hemostasis was ensured  Please see dictation from trauma surgery for the remainder conduct of the case     I was present for all critical portions of the procedure    Patient Disposition:  PACU     SIGNATURE: Leandro Pimentel MD  DATE: August 2, 2020  TIME: 3:00 PM

## 2020-08-02 NOTE — PROGRESS NOTES
Progress Note - General Surgery   Larjustin Kidney 69 y o  male MRN: [id number]  Unit/Bed#: ICU 12 Encounter: [id number]    Assessment:  Pt is a 68 y/o M w DELIA s/p 7/31 ex lap,   shunt externalization, abthera vac placement, and mesenteric agram wih SMA, papverine, heparin gtt, s/p 7/1 2nd look laparotomy, abdominal wall closure, sheath removal     Plan:  NPO  [email or web address]  Heparin gtt  Cefepime/flagyl for  shunt infection per ID  Supportive care per ICU    Subjective/Objective   Chief Complaint:     Subjective: Afebrile  Extubated yesterday post OR  No bowel function  No N/V  Pain controlled  Objective:     Blood pressure 116/59, pulse 66, temperature 98 3 °F (36 8 °C), resp  rate 14, height 6' 3", weight 122 kg (268 lb 15 4 oz), SpO2 97 %  ,Body mass index is 33 62 kg/m²  Intake/Output Summary (Last 24 hours) at 8/2/2020 0345  Last data filed at 8/2/2020 0200  Gross per 24 hour   Intake 4892 31 ml   Output 1798 ml   Net 3094 31 ml       Invasive Devices     Peripheral Intravenous Line            Peripheral IV 07/30/20 Left Forearm 2 days    Peripheral IV 07/31/20 Right Antecubital 1 day    Peripheral IV 07/31/20 Right Hand 1 day          Arterial Line            Arterial Line 07/31/20 Left Radial 1 day          Drain            Urethral Catheter Latex 16 Fr  1 day    Ventriculostomy/Subdural Ventricular drainage catheter with ICP microsensor Right Other (Comment) 1 day    NG/OG/Enteral Tube Nasogastric 18 Fr Left nares less than 1 day                Physical Exam: NAD  Atraumatic/normocephalic  AAOX3  Normal mood and affect  Normal respiratory effort, NC  Soft, tender over incision, distended  Incisional dressing c/d/i  Groin puncture c/d/i  Skin warm/dry  Cap refil <2 sec      Lab, Imaging and other studies:  I have personally reviewed pertinent lab results    , CBC:   Lab Results   Component Value Date    WBC 13 00 (H) 08/01/2020    HGB 12 7 08/01/2020    HCT 39 7 08/01/2020    MCV 88 08/01/2020    PLT 236 08/01/2020    MCH 28 3 08/01/2020    MCHC 32 0 08/01/2020    RDW 14 8 08/01/2020    MPV 9 8 08/01/2020    NRBC 0 08/01/2020   , CMP:   Lab Results   Component Value Date    SODIUM 141 08/01/2020    K 4 2 08/01/2020     (H) 08/01/2020    CO2 22 08/01/2020    BUN 30 (H) 08/01/2020    CREATININE 0 99 08/01/2020    CALCIUM 6 9 (L) 08/01/2020    EGFR 76 08/01/2020     VTE Pharmacologic Prophylaxis: Heparin  VTE Mechanical Prophylaxis: sequential compression device

## 2020-08-02 NOTE — PROGRESS NOTES
Please note that patient was septic on admission and was started on abx and taken to the OR urgently for source control

## 2020-08-02 NOTE — PROGRESS NOTES
Daily Progress Note - Critical Care   Amy Álvarez 69 y o  male MRN: [id number]  Unit/Bed#: ICU 12 Encounter: [id number]        ----------------------------------------------------------------------------------------  HPI/24hr events: Patient went to the OR yesterday for second look, abdominal washout  No bowel necrosis identified  No bowel resected  Femoral sheath/SMA catheter removed  Patient remains on a heparin gtt    He was extubated to NC in the ICU     ---------------------------------------------------------------------------------------  SUBJECTIVE  Patient is slow to respond but denies pain  He states that he feels "alright"  He would like some water    Review of Systems  Review of systems was reviewed and negative unless stated above in HPI/24-hour events   ---------------------------------------------------------------------------------------  Assessment and Plan:    Neuro:    Diagnosis: Infected  shunt  o Plan: CSF growing pseudomonas   o  shunt has been externalized from the abdomen and is connected to CSF collection canister and open to the level of the abdomen  o Plan to repeat cultures per NS  o ID consulted   Plan to continue cefepime and flagyl    Diagnosis: Acute post operative pain  o Plan: Continue prn dilaudid       CV:    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  o Plan: Vascular surgery following s/p papaverine infusion  o Continue heparin gtt- Will discuss long term goal for [facility] and/or antiplatelet agents    Diagnosis: History of HTN   o Plan: Continue to hold vasotec  o Monitor on telemetry   o Goal SBP>140 to promote bowel perfusion   o Consider dose of lasix for volume overload if patient remains hypertensive       Pulm:   Diagnosis: Acute hypoxic respiratory failure  o Plan: Patient extubated to NC post operatively yesterday  o Continue to wean Fi02 for Sp02>92%  o Encourage IS and pulmonary exercises    Diagnosis: ZHOU  o Plan: Continue CPAP at HS       GI:  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   o Plan: LA 1 2  o Patient s/p ex lap and abdominal closure  o No bowel resection necessary   o Continue heparin per vascular surgery   o NPO  o Hold IVF secondary to volume overload  Will intermittently bolus if necessary       :    Diagnosis: No acute issues  o Plan: Continue grover secondary to monitor I/O and secondary to scrotal edema   o Trend creatine    F/E/N:    Plan: Continue to trend electrolytes and replete as necessary   Hold IVF secondary to volume overload    Remain NPO      Heme/Onc:    Diagnosis: Leukocytosis improving   o Plan: Continue to trend CBC   Diagnosis: Acute blood loss following surgery  o Plan: Trend CBC  o No evidence of ongoing bleeding      Endo:    Diagnosis: Hyperglycemia  o Plan: Continue ISS  o Goal -180      ID:    Diagnosis: Infected  shunt secondary to GI source from bowel ischemia  o Plan: Pseudomonas growing in CSF  o ID following  o Continue cefepime and flagyl   o Recheck CSF cultures  o NS following       MSK/Skin:    Diagnosis: S/p ex lap,  shunt externalization   o Plan: Will need PT/OT when cleared to ambulate per NS       Disposition: Continue Critical Care   Code Status: Level 1 - Full Code  ---------------------------------------------------------------------------------------  ICU CORE MEASURES    Prophylaxis   VTE Pharmacologic Prophylaxis: Heparin Drip  VTE Mechanical Prophylaxis: sequential compression device  Stress Ulcer Prophylaxis: Pantoprazole IV     ABCDE Protocol (if indicated)  Plan to perform spontaneous awakening trial today? Not applicable  Plan to perform spontaneous breathing trial today? Not applicable  Obvious barriers to extubation?  No  CAM-ICU: Negative    Invasive Devices Review  Invasive Devices     Peripheral Intravenous Line            Peripheral IV 07/30/20 Left Forearm 2 days    Peripheral IV 07/31/20 Right Hand 2 days    Peripheral IV 07/31/20 Right Antecubital 1 day          Arterial Line            Arterial Line 20 Left Radial 2 days          Drain            Urethral Catheter Latex 16 Fr  2 days    Ventriculostomy/Subdural Ventricular drainage catheter with ICP microsensor Right Other (Comment) 1 day    NG/OG/Enteral Tube Nasogastric 18 Fr Left nares less than 1 day              Can any invasive devices be discontinued today? No  ---------------------------------------------------------------------------------------  OBJECTIVE    Vitals   Vitals:    20 0200 20 0300 20 0400 20 0500   BP:       BP Location:       Pulse: 70 66 68 72   Resp: 19 14 22 21   Temp:   99 1 °F (37 3 °C)    TempSrc:       SpO2: 98% 97% 97% 95%   Weight:       Height:         Temp (24hrs), Av 6 °F (37 °C), Min:98 1 °F (36 7 °C), Max:99 1 °F (37 3 °C)  Current: Temperature: 99 1 °F (37 3 °C)  HR: 72  BP: 144/50  RR: 21  SpO2: 95%    Respiratory:  SpO2: SpO2: 95 %, SpO2 Activity: SpO2 Activity: At Rest, SpO2 Device: O2 Device: Nasal cannula  Nasal Cannula O2 Flow Rate (L/min): 4 L/min    Invasive/non-invasive ventilation settings   Respiratory    Lab Data (Last 4 hours)    None         O2/Vent Data (Last 4 hours)    None                Physical Exam  Vitals signs reviewed  Constitutional:       General: He is not in acute distress  HENT:      Head: Normocephalic  Eyes:      Pupils: Pupils are equal, round, and reactive to light  Neck:      Musculoskeletal: Neck supple  Cardiovascular:      Rate and Rhythm: Regular rhythm  Pulses: Intact distal pulses  Comments: 2+ peripheral edema  Pulmonary:      Effort: Pulmonary effort is normal  No respiratory distress  Abdominal:      Tenderness: There is abdominal tenderness  Comments: Midline dressings mostly dry  Some serous staining  L femoral access site dry and intact   Genitourinary:     Comments: Griffith catheter in place  Scrotal edema  Musculoskeletal: Normal range of motion     Skin:     General: Skin is warm and dry  Neurological:      Mental Status: He is alert and oriented to person, place, and time           Laboratory and Diagnostics:  Results from last 7 days   Lab Units 08/02/20  0533 08/01/20  1406 08/01/20  0549 07/31/20  2119 07/31/20  1012 07/31/20  0745 07/31/20  0555 07/30/20  2325   WBC Thousand/uL 10 89* 13 00* 7 71  --  10 02  --   --  13 76*   HEMOGLOBIN g/dL 10 8* 12 7 11 2*  11 2* 11 0* 13 0  --   --  14 8   I STAT HEMOGLOBIN g/dl  --   --   --   --   --  12 6 9 9*  --    HEMATOCRIT % 33 5* 39 7 35 2*  --  39 7  --   --  46 0   HEMATOCRIT, ISTAT %  --   --   --   --   --  37 29*  --    PLATELETS Thousands/uL 218 236 183  --  234  --   --  272   NEUTROS PCT % 82*  --  85*  --   --   --   --  87*   BANDS PCT %  --   --   --   --  8  --   --   --    MONOS PCT % 9  --  9  --   --   --   --  10   MONO PCT %  --   --   --   --  9  --   --   --      Results from last 7 days   Lab Units 08/01/20  1406 08/01/20  0549 07/31/20  1012 07/31/20  0745 07/31/20  0555 07/30/20  2325   SODIUM mmol/L 141 141 139  --   --  138   POTASSIUM mmol/L 4 2 4 2 4 6  --   --  4 9   CHLORIDE mmol/L 111* 112* 108  --   --  104   CO2 mmol/L 22 21 21  --   --  26   CO2, I-STAT mmol/L  --   --   --  20* 19*  --    ANION GAP mmol/L 8 8 10  --   --  8   BUN mg/dL 30* 32* 33*  --   --  32*   CREATININE mg/dL 0 99 1 06 1 27  --   --  1 21   CALCIUM mg/dL 6 9* 7 6* 8 5  --   --  9 7   GLUCOSE RANDOM mg/dL 150* 145* 159*  --   --  136   ALT U/L  --   --   --   --   --  19   AST U/L  --   --   --   --   --  39   ALK PHOS U/L  --   --   --   --   --  91   ALBUMIN g/dL  --   --   --   --   --  2 7*   TOTAL BILIRUBIN mg/dL  --   --   --   --   --  0 82     Results from last 7 days   Lab Units 08/01/20  0549   MAGNESIUM mg/dL 2 2   PHOSPHORUS mg/dL 3 4      Results from last 7 days   Lab Units 08/01/20  2118 08/01/20  1406 08/01/20  0549 07/31/20  2119 07/31/20  1559 07/31/20  1012 07/31/20  0008   INR   --   --   --   --   -- 1 41* 1 25*   PTT seconds 141* 38* 42* 53* 60* 72* 41*      Results from last 7 days   Lab Units 07/30/20  2325   TROPONIN I ng/mL <0 02     Results from last 7 days   Lab Units 08/02/20  0533 08/01/20  2118 08/01/20  1406 07/31/20  1805 07/31/20  1559 07/31/20  1012 07/31/20  0258   LACTIC ACID mmol/L 1 2 1 7 2 0 1 6 2 3* 2 2* 2 2*     ABG:  Results from last 7 days   Lab Units 07/31/20  1151   PH ART  7 357   PCO2 ART mm Hg 32 3*   PO2 ART mm Hg 87 8   HCO3 ART mmol/L 17 7*   BASE EXC ART mmol/L -6 7   ABG SOURCE  Line, Arterial     VBG:  Results from last 7 days   Lab Units 07/31/20  1151   ABG SOURCE  Line, Arterial           Micro  Results from last 7 days   Lab Units 08/01/20  0548 07/31/20  1411 07/31/20  1251 07/31/20  0622   BLOOD CULTURE   --   --  No Growth at 24 hrs  No Growth at 24 hrs   --    GRAM STAIN RESULT  2+ Polys  1+ Epithelial cells per low power field  No bacteria seen 1+ Polys*  4+ Gram negative rods*  --  No Polys or Bacteria seen   BODY FLUID CULTURE, STERILE   --   --   --  1+ Growth of Pseudomonas aeruginosa*       EKG: SR  Imaging:  I have personally reviewed pertinent reports  and I have personally reviewed pertinent films in PACS    Intake and Output  I/O       07/31 0701 - 08/01 0700 08/01 0701 - 08/02 0700    I V  (mL/kg) 4675 4 (38 3) 4474 7 (36 7)    NG/GT  60    IV Piggyback 1150 200    Total Intake(mL/kg) 5825 4 (47 7) 4734 7 (38 8)    Urine (mL/kg/hr) 2240 (0 8) 2015 (0 7)    Emesis/NG output 700 250    Drains 921 121    Blood 50 150    Total Output 3911 2536    Net +1914 4 +2198 7              UOP: 2L x 24h  +2 2L x 24h    Height and Weights   Height: 6' 3"  IBW: 84 5 kg  Body mass index is 33 62 kg/m²    Weight (last 2 days)     Date/Time   Weight    07/31/20 02:05:37   122 (268 96)                Nutrition       Diet Orders   (From admission, onward)             Start     Ordered    07/31/20 0932  Diet NPO  Diet effective now     Question Answer Comment   Diet Type NPO RD to adjust diet per protocol?  No        07/31/20 0944                Active Medications  Scheduled Meds:acetaminophen, 650 mg, Rectal, Q4H PRN, Jolinda Ocracoke, PA-C  cefepime, 2,000 mg, Intravenous, Q8H, Jolinda O[place]acoke, PA-C, Last Rate: 2,000 mg (08/02/20 0629)  chlorhexidine, 15 mL, Swish & Spit, Q12H [address], Jolinda O[place]acoke, PA-C  heparin (porcine), 3-30 Units/kg/hr (Order-Specific), Intravenous, Titrated, Jolinda O[place]oke, PA-C, Last Rate: 21 Units/kg/hr (08/02/20 0416)  heparin (porcine), 4,800 Units, Intravenous, Q1H PRN, Jolinda O[place]acoke, PA-C  heparin (porcine), 9,600 Units, Intravenous, Q1H PRN, Jolinda Ocracoke, PA-C  HYDROmorphone, 0 5 mg, Intravenous, Q4H PRN, Jolinda O[place]acoke, PA-C  HYDROmorphone, 1 mg, Intravenous, Q4H PRN, Jolinda O[place]acoke, PA-C  insulin lispro, 2-12 Units, Subcutaneous, Q6H [address], Lv Zavilla, PA-C  iodixanol, 15 mL, Intravenous, Once in imaging, Jolinda Ocracoke, PA-C  metroNIDAZOLE, 500 mg, Intravenous, Q8H, Jolinda Ocracoke, PA-C, Last Rate: 500 mg (08/02/20 0535)  multi-electrolyte, 100 mL/hr, Intravenous, Continuous, Jolinda Ocracoke, PA-C, Last Rate: 100 mL/hr (08/02/20 0454)  pantoprazole, 40 mg, Intravenous, Q12H, Lv Zavilla, PA-C      Continuous Infusions:  heparin (porcine), 3-30 Units/kg/hr (Order-Specific), Last Rate: 21 Units/kg/hr (08/02/20 0416)  multi-electrolyte, 100 mL/hr, Last Rate: 100 mL/hr (08/02/20 0454)      PRN Meds:   acetaminophen, 650 mg, Q4H PRN  heparin (porcine), 4,800 Units, Q1H PRN  heparin (porcine), 9,600 Units, Q1H PRN  HYDROmorphone, 0 5 mg, Q4H PRN  HYDROmorphone, 1 mg, Q4H PRN  iodixanol, 15 mL, Once in imaging        Allergies   Allergies   Allergen Reactions    Pollen Extract Allergic Rhinitis     ---------------------------------------------------------------------------------------  Advance Directive and Living Will:      Power of Attorney:    POLST:    ---------------------------------------------------------------------------------------  Care Time Delivered: No Critical Care time spent     Meron Becker PA-C      Portions of the record may have been created with voice recognition software  Occasional wrong word or "sound a like" substitutions may have occurred due to the inherent limitations of voice recognition software    Read the chart carefully and recognize, using context, where substitutions have occurred

## 2020-08-02 NOTE — PROGRESS NOTES
Progress Note - Infectious Disease   Smiley Reyna 69 y o  male MRN: [id number]  Unit/Bed#: ICU 12 Encounter: [id number]         IMPRESSION & RECOMMENDATIONS:   Impression/Recommendations:  1  Severe sepsis, tachycardia, leukocytosis, elevated lactic acid   With early development of high fevers   Secondary to #2  Not on vasopressors  Blood cultures are negative thus far  Fevers and WBC count now improving on IV antibiotic      -antibiotic plan as below  -monitor temperatures and hemodynamics  -follow-up blood cultures  -monitor CBC  -supportive care per Critical Care service     2   shunt infection  Fluid WBC count was 61 with neutrophil predominance  Gram stain from shunt fluid shows GNRs  Culture now shows Pseudomonas    Suspect secondary to peritonitis in the setting of #3   Status post externalization of  shunt on       -continue IV cefepime 2 g q 8 hours  -discontinue Flagyl   -plan for 6 week course of antibiotic  -neurosurgery follow-up ongoing     3  Ischemic bowel with peritonitis   Status post exploratory laparotomy, VAC placement   Status post femoral artery and SMA artery cannulation for paraverine by vascular surgery   Suspect intra-abdominal infection is etiology of #2  Status post second-look on  with no resection performed  Peritoneal fluid culture also shows Pseudomonas      -antibiotic plan as above  -vascular surgery/general surgical follow-up ongoing     4  NPH requiring  shunt placement in , status post recent revision in 2019       5  Lumbar spinal stenosis status post L5-S1 fusion on 2020      6  Diabetes mellitus with neuropathy      Antibiotics:  Cefepime/Flagyl 3         Subjective:  Extubated yesterday  O2 sats are stable on nasal cannula  Fevers have improved      Objective:  Vitals:  Temp:  [98 1 °F (36 7 °C)-99 1 °F (37 3 °C)] 98 9 °F (37 2 °C)  HR:  [60-90] 68  Resp:  [13-28] 22  SpO2:  [93 %-98 %] 94 %  Temp (24hrs), Av 5 °F (36 9 °C), Min:98 1 °F (36 7 °C), Max:99 1 °F (37 3 °C)  Current: Temperature: 98 9 °F (37 2 °C)    Physical Exam:   General:  No acute distress, resting comfortably, sleepy but arousable  Eyes:  Conjunctive clear with no hemorrhages or effusions  Oropharynx:  No ulcers, no lesions  Neck:  Supple, no lymphadenopathy  Lungs:  Decreased, no accessory muscle use  Cardiac:  Regular rate and rhythm, no murmurs  Abdomen:  Abdominal dressing intact, drain in place  Extremities:  Mild edema  Skin:  No rashes, no ulcers  Neurological:  Moves all four extremities spontaneously    Lab Results:  I have personally reviewed pertinent labs  Results from last 7 days   Lab Units 08/02/20  0533 08/01/20  1406 08/01/20  0549  07/31/20  0745 07/31/20  0555  07/30/20  2325   POTASSIUM mmol/L 4 2 4 2 4 2   < >  --   --   --  4 9   CHLORIDE mmol/L 113* 111* 112*   < >  --   --   --  104   CO2 mmol/L 23 22 21   < >  --   --   --  26   CO2, I-STAT mmol/L  --   --   --   --  20* 19*   < >  --    BUN mg/dL 26* 30* 32*   < >  --   --   --  32*   CREATININE mg/dL 0 97 0 99 1 06   < >  --   --   --  1 21   EGFR ml/min/1 73sq m 78 76 70   < >  --   --   --  60   GLUCOSE, ISTAT mg/dl  --   --   --   --  132 101  --   --    CALCIUM mg/dL 7 8* 6 9* 7 6*   < >  --   --   --  9 7   AST U/L 24  --   --   --   --   --   --  39   ALT U/L 16  --   --   --   --   --   --  19   ALK PHOS U/L 54  --   --   --   --   --   --  91    < > = values in this interval not displayed  Results from last 7 days   Lab Units 08/02/20  0533 08/01/20  1406 08/01/20  0549   WBC Thousand/uL 10 89* 13 00* 7 71   HEMOGLOBIN g/dL 10 8* 12 7 11 2*  11 2*   PLATELETS Thousands/uL 218 236 183     Results from last 7 days   Lab Units 08/01/20  0548 07/31/20  1411 07/31/20  1251 07/31/20  0622   BLOOD CULTURE   --   --  No Growth at 24 hrs    No Growth at 24 hrs   --    GRAM STAIN RESULT  2+ Polys  1+ Epithelial cells per low power field  No bacteria seen 1+ Polys*  4+ Gram negative rods*  --  No Polys or Bacteria seen   BODY FLUID CULTURE, STERILE   --   --   --  1+ Growth of Pseudomonas aeruginosa*       Imaging Studies:   I have personally reviewed pertinent imaging study reports and images in PACS  EKG, Pathology, and Other Studies:   I have personally reviewed pertinent reports

## 2020-08-03 LAB
ABO GROUP BLD BPU: NORMAL
ANION GAP SERPL CALCULATED.3IONS-SCNC: 3 MMOL/L (ref 4–13)
APPEARANCE CSF: ABNORMAL
APPEARANCE CSF: CLEAR
BACTERIA SPT RESP CULT: ABNORMAL
BPU ID: NORMAL
BUN SERPL-MCNC: 24 MG/DL (ref 5–25)
CALCIUM SERPL-MCNC: 8 MG/DL (ref 8.3–10.1)
CHLORIDE SERPL-SCNC: 112 MMOL/L (ref 100–108)
CO2 SERPL-SCNC: 26 MMOL/L (ref 21–32)
CREAT SERPL-MCNC: 0.86 MG/DL (ref 0.6–1.3)
CROSSMATCH: NORMAL
ERYTHROCYTE [DISTWIDTH] IN BLOOD BY AUTOMATED COUNT: 14.6 % (ref 11.6–15.1)
GFR SERPL CREATININE-BSD FRML MDRD: 87 ML/MIN/1.73SQ M
GLUCOSE CSF-MCNC: 74 MG/DL (ref 50–80)
GLUCOSE CSF-MCNC: 85 MG/DL (ref 50–80)
GLUCOSE SERPL-MCNC: 115 MG/DL (ref 65–140)
GLUCOSE SERPL-MCNC: 115 MG/DL (ref 65–140)
GLUCOSE SERPL-MCNC: 121 MG/DL (ref 65–140)
GLUCOSE SERPL-MCNC: 121 MG/DL (ref 65–140)
GLUCOSE SERPL-MCNC: 123 MG/DL (ref 65–140)
GLUCOSE SERPL-MCNC: 127 MG/DL (ref 65–140)
GLUCOSE SERPL-MCNC: 132 MG/DL (ref 65–140)
GLUCOSE SERPL-MCNC: 144 MG/DL (ref 65–140)
GLUCOSE SERPL-MCNC: 175 MG/DL (ref 65–140)
GRAM STN SPEC: ABNORMAL
HCT VFR BLD AUTO: 33.1 % (ref 36.5–49.3)
HGB BLD-MCNC: 10.9 G/DL (ref 12–17)
LYMPHOCYTES NFR CSF MANUAL: 3 %
LYMPHOCYTES NFR CSF MANUAL: 7 %
MAGNESIUM SERPL-MCNC: 2.3 MG/DL (ref 1.6–2.6)
MCH RBC QN AUTO: 28.3 PG (ref 26.8–34.3)
MCHC RBC AUTO-ENTMCNC: 32.9 G/DL (ref 31.4–37.4)
MCV RBC AUTO: 86 FL (ref 82–98)
MONOS+MACROS CSF MANUAL: 4 %
NEUTROPHILS NFR CSF MANUAL: 93 %
NEUTROPHILS NFR CSF MANUAL: 93 %
NRBC BLD AUTO-RTO: 0 /100 WBCS
PHOSPHATE SERPL-MCNC: 1.7 MG/DL (ref 2.3–4.1)
PLATELET # BLD AUTO: 229 THOUSANDS/UL (ref 149–390)
PMV BLD AUTO: 9.7 FL (ref 8.9–12.7)
POTASSIUM SERPL-SCNC: 3.7 MMOL/L (ref 3.5–5.3)
PROT CSF-MCNC: 46 MG/DL (ref 15–45)
PROT CSF-MCNC: 49 MG/DL (ref 15–45)
RBC # BLD AUTO: 3.85 MILLION/UL (ref 3.88–5.62)
RBC # CSF MANUAL: 0 UL (ref 0–10)
RBC # CSF MANUAL: 6 UL (ref 0–10)
SODIUM SERPL-SCNC: 141 MMOL/L (ref 136–145)
TOTAL CELLS COUNTED BLD: NO
TOTAL CELLS COUNTED BLD: NO
TOTAL CELLS COUNTED SPEC: 100
TOTAL CELLS COUNTED SPEC: 100
UNIT DISPENSE STATUS: NORMAL
UNIT PRODUCT CODE: NORMAL
UNIT RH: NORMAL
WBC # BLD AUTO: 8.97 THOUSAND/UL (ref 4.31–10.16)
WBC # CSF AUTO: 53 /UL (ref 0–5)
WBC # CSF AUTO: 82 /UL (ref 0–5)

## 2020-08-03 PROCEDURE — 94002 VENT MGMT INPAT INIT DAY: CPT

## 2020-08-03 PROCEDURE — 89051 BODY FLUID CELL COUNT: CPT | Performed by: NEUROLOGICAL SURGERY

## 2020-08-03 PROCEDURE — NC001 PR NO CHARGE: Performed by: SURGERY

## 2020-08-03 PROCEDURE — 87186 SC STD MICRODIL/AGAR DIL: CPT | Performed by: NEUROLOGICAL SURGERY

## 2020-08-03 PROCEDURE — NC001 PR NO CHARGE: Performed by: NURSE PRACTITIONER

## 2020-08-03 PROCEDURE — 82945 GLUCOSE OTHER FLUID: CPT | Performed by: NEUROLOGICAL SURGERY

## 2020-08-03 PROCEDURE — 84157 ASSAY OF PROTEIN OTHER: CPT | Performed by: NEUROLOGICAL SURGERY

## 2020-08-03 PROCEDURE — 97163 PT EVAL HIGH COMPLEX 45 MIN: CPT

## 2020-08-03 PROCEDURE — 94760 N-INVAS EAR/PLS OXIMETRY 1: CPT

## 2020-08-03 PROCEDURE — 99233 SBSQ HOSP IP/OBS HIGH 50: CPT | Performed by: SURGERY

## 2020-08-03 PROCEDURE — 89050 BODY FLUID CELL COUNT: CPT | Performed by: NEUROLOGICAL SURGERY

## 2020-08-03 PROCEDURE — 87070 CULTURE OTHR SPECIMN AEROBIC: CPT | Performed by: NEUROLOGICAL SURGERY

## 2020-08-03 PROCEDURE — 99233 SBSQ HOSP IP/OBS HIGH 50: CPT | Performed by: INTERNAL MEDICINE

## 2020-08-03 PROCEDURE — 82948 REAGENT STRIP/BLOOD GLUCOSE: CPT

## 2020-08-03 PROCEDURE — 80048 BASIC METABOLIC PNL TOTAL CA: CPT | Performed by: PHYSICIAN ASSISTANT

## 2020-08-03 PROCEDURE — 84100 ASSAY OF PHOSPHORUS: CPT | Performed by: PHYSICIAN ASSISTANT

## 2020-08-03 PROCEDURE — 83735 ASSAY OF MAGNESIUM: CPT | Performed by: PHYSICIAN ASSISTANT

## 2020-08-03 PROCEDURE — 85027 COMPLETE CBC AUTOMATED: CPT | Performed by: PHYSICIAN ASSISTANT

## 2020-08-03 PROCEDURE — 99024 POSTOP FOLLOW-UP VISIT: CPT | Performed by: NEUROLOGICAL SURGERY

## 2020-08-03 PROCEDURE — 87077 CULTURE AEROBIC IDENTIFY: CPT | Performed by: NEUROLOGICAL SURGERY

## 2020-08-03 PROCEDURE — C9113 INJ PANTOPRAZOLE SODIUM, VIA: HCPCS | Performed by: PHYSICIAN ASSISTANT

## 2020-08-03 PROCEDURE — 97167 OT EVAL HIGH COMPLEX 60 MIN: CPT

## 2020-08-03 RX ORDER — HEPARIN SODIUM 5000 [USP'U]/ML
5000 INJECTION, SOLUTION INTRAVENOUS; SUBCUTANEOUS EVERY 8 HOURS SCHEDULED
Status: DISCONTINUED | OUTPATIENT
Start: 2020-08-03 | End: 2020-08-05

## 2020-08-03 RX ORDER — ACETAMINOPHEN 325 MG/1
650 TABLET ORAL EVERY 6 HOURS PRN
Status: DISCONTINUED | OUTPATIENT
Start: 2020-08-03 | End: 2020-08-29 | Stop reason: HOSPADM

## 2020-08-03 RX ORDER — OXYCODONE HYDROCHLORIDE 5 MG/1
5 TABLET ORAL EVERY 4 HOURS PRN
Status: DISCONTINUED | OUTPATIENT
Start: 2020-08-03 | End: 2020-08-29 | Stop reason: HOSPADM

## 2020-08-03 RX ORDER — HYDROMORPHONE HCL/PF 1 MG/ML
0.5 SYRINGE (ML) INJECTION
Status: DISCONTINUED | OUTPATIENT
Start: 2020-08-03 | End: 2020-08-08

## 2020-08-03 RX ADMIN — CHLORHEXIDINE GLUCONATE 0.12% ORAL RINSE 15 ML: 1.2 LIQUID ORAL at 20:46

## 2020-08-03 RX ADMIN — CEFEPIME HYDROCHLORIDE 2000 MG: 2 INJECTION, POWDER, FOR SOLUTION INTRAVENOUS at 21:51

## 2020-08-03 RX ADMIN — PANTOPRAZOLE SODIUM 40 MG: 40 INJECTION, POWDER, FOR SOLUTION INTRAVENOUS at 01:53

## 2020-08-03 RX ADMIN — HEPARIN SODIUM 5000 UNITS: 5000 INJECTION INTRAVENOUS; SUBCUTANEOUS at 21:51

## 2020-08-03 RX ADMIN — CEFEPIME HYDROCHLORIDE 2000 MG: 2 INJECTION, POWDER, FOR SOLUTION INTRAVENOUS at 13:54

## 2020-08-03 RX ADMIN — HEPARIN SODIUM 5000 UNITS: 5000 INJECTION INTRAVENOUS; SUBCUTANEOUS at 08:55

## 2020-08-03 RX ADMIN — INSULIN LISPRO 2 UNITS: 100 INJECTION, SOLUTION INTRAVENOUS; SUBCUTANEOUS at 13:52

## 2020-08-03 RX ADMIN — CEFEPIME HYDROCHLORIDE 2000 MG: 2 INJECTION, POWDER, FOR SOLUTION INTRAVENOUS at 06:11

## 2020-08-03 RX ADMIN — HEPARIN SODIUM 5000 UNITS: 5000 INJECTION INTRAVENOUS; SUBCUTANEOUS at 13:54

## 2020-08-03 RX ADMIN — CHLORHEXIDINE GLUCONATE 0.12% ORAL RINSE 15 ML: 1.2 LIQUID ORAL at 08:55

## 2020-08-03 NOTE — PROGRESS NOTES
Progress Note - Juliano Wolfe 1950, 69 y o  male MRN: [id number]    Unit/Bed#: ICU 12 Encounter: [id number]    Primary Care Provider: Nolan Rodriguez   Date and time admitted to hospital: 7/30/2020 11:12 PM        Status post ventriculoperitoneal shunt  Assessment & Plan  Externalization 7/30 secondary to laparotomy for bowel ischemia/peritonitis  · S/p VPS placement for NPH 2005, revision 2011  · Shunt last adjusted to 100 cm of water 7/21/2020 for increasing size of bilateral subdural hygromas  · Sutures placed over about beginning in July for thinning skin  Imaging:   ·  CT head wo 7/30/20: Minimal increase size ventricular system  Encephalomalacia and right frontal lobe  Unchanged hypoattenuation the left testicle temporal lobe  Stable positioning of right ventriculostomy shunt  Unchanged bilateral subdural hygromas  · Skull x-ray 7/21/20:  Some valve has set to approximately 100 mm of water  · CT head wo 7/20/20:  Stabilization of a right ventriculostomy shunt  Minimal decrease in size ventricular system associated with minimal increased subdural hygroma overlying bilateral cerebral hemispheres now measuring to 7 mm in thickness previously 5 mm  Plan:    · Continue monitor neurological exam   Stable GCS 14  Awake alert disoriented to time  Follows commands with diffuse weakness  · Continue to monitor CSF - remains clear yellow  42ml/24H  · CSF sent from externalized ventricular catheter 7/31 4+ Pseudomonas, W 64, glucose 88, protein 88  · CSF sent today from proximal shunt reservoir results pending  · Plan to send CSF later today from externalized tubing  · Monitor output from externalized shunt with cool of 10 cc or less per hour  Maintain set at 8 mmHg above lateral full of the right atrium  · Continue monitor blood cultures 7/31 which were negative at 48 hours  · Id following    Continue IV antibiotics cefepime x 6 wks  · DVT prophylaxis:  SCDs and heparin  · Consideration for shunt revision versus placement of a new system once CSF clears  · Will continue to monitor  · Cause any questions or concerns    Ischemic bowel disease [facility])  Assessment & Plan  Ongoing management per general surgery/primary team  · Vascular surgery following  Anticoagulation plan for vascular  · Possible discontinuation of NG tube today    Spondylolisthesis of lumbosacral region  Assessment & Plan  Status post minimally invasive transverse lumbar interbody fusion L5/S1 7/6/20      Subjective/Objective   Chief Complaint: follow-up externalized shunt    Subjective:  Patient offered to no significant complaints of time  NG tube was clamped yesterday with 100 cc over 24 hours  Given dose of Lasix yesterday secondary to volume overloaded  Objective:  Sitting up in bed  NAD  I/O       08/01 0701 - 08/02 0700 08/02 0701 - 08/03 0700 08/03 0701 - 08/04 0700    I V  (mL/kg) 4474 7 (36 7) 258 7 (2)     NG/GT 60      IV Piggyback 350 50     Total Intake(mL/kg) 4884 7 (40) 308 7 (2 4)     Urine (mL/kg/hr) 2015 (0 7) 3945 (1 3)     Emesis/NG output 250 100     Drains 121 42     Blood 150      Total Output 2536 4087     Net +2348 7 -3778 3                  Invasive Devices     Peripheral Intravenous Line            Peripheral IV 07/30/20 Left Forearm 3 days    Peripheral IV 07/31/20 Right Hand 3 days    Peripheral IV 07/31/20 Right Antecubital 2 days          Arterial Line            Arterial Line 07/31/20 Left Radial 3 days          Drain            Urethral Catheter Latex 16 Fr  3 days    Ventriculostomy/Subdural Ventricular drainage catheter with ICP microsensor Right Other (Comment) 3 days                Physical Exam:  Vitals: Blood pressure 116/59, pulse 62, temperature 97 8 °F (36 6 °C), temperature source Oral, resp  rate (!) 25, height 6' 3", weight 127 kg (281 lb 1 4 oz), SpO2 92 %  ,Body mass index is 35 13 kg/m²      Hemodynamic Monitoring: MAP: Arterial Line MAP (mmHg): 80 mmHg    General appearance: alert, appears stated age, cooperative and no distress  Head: Normocephalic, without obvious abnormality, scabbing over right frontal shunt incision  No drainage  Eyes: EOMI, PERRL  Neck: supple, symmetrical, trachea midline   Lungs: non labored breathing  Heart: regular heart rate  Neurologic:   Mental status: Alert, oriented x2, disoriented to time, GCS 14  Cranial nerves: grossly intact (Cranial nerves II-XII)  Sensory: normal to LT  Motor: moving all extremities without focal weakness, diffuse LE weakness  Coordination: finger to nose normal bilaterally, no drift bilaterally    Lab Results:  Results from last 7 days   Lab Units 08/03/20  0609 08/02/20  0533 08/01/20  1406 08/01/20  0549  07/31/20  1012  07/30/20  2325   WBC Thousand/uL 8 97 10 89* 13 00* 7 71  --  10 02  --  13 76*   HEMOGLOBIN g/dL 10 9* 10 8* 12 7 11 2*  11 2*   < > 13 0  --  14 8   I STAT HEMOGLOBIN   --   --   --   --   --   --    < >  --    HEMATOCRIT % 33 1* 33 5* 39 7 35 2*  --  39 7  --  46 0   HEMATOCRIT, ISTAT   --   --   --   --   --   --    < >  --    PLATELETS Thousands/uL 229 218 236 183  --  234  --  272   NEUTROS PCT %  --  82*  --  85*  --   --   --  87*   MONOS PCT %  --  9  --  9  --   --   --  10   MONO PCT %  --   --   --   --   --  9  --   --     < > = values in this interval not displayed       Results from last 7 days   Lab Units 08/03/20  0609 08/02/20  0533 08/01/20  1406  07/31/20  0745 07/31/20  0555  07/30/20  2325   POTASSIUM mmol/L 3 7 4 2 4 2   < >  --   --   --  4 9   CHLORIDE mmol/L 112* 113* 111*   < >  --   --   --  104   CO2 mmol/L 26 23 22   < >  --   --   --  26   CO2, I-STAT mmol/L  --   --   --   --  20* 19*   < >  --    BUN mg/dL 24 26* 30*   < >  --   --   --  32*   CREATININE mg/dL 0 86 0 97 0 99   < >  --   --   --  1 21   CALCIUM mg/dL 8 0* 7 8* 6 9*   < >  --   --   --  9 7   ALK PHOS U/L  --  54  --   --   --   --   --  91   ALT U/L  --  16  --   --   --   --   --  19   AST U/L  --  24  --   -- --   --   --  39   GLUCOSE, ISTAT mg/dl  --   --   --   --  132 101  --   --     < > = values in this interval not displayed  Results from last 7 days   Lab Units 08/03/20  0609 08/02/20  0533 08/01/20  0549   MAGNESIUM mg/dL 2 3 2 4 2 2     Results from last 7 days   Lab Units 08/03/20  0609 08/02/20  0533 08/01/20  0549   PHOSPHORUS mg/dL 1 7* 1 7* 3 4     Results from last 7 days   Lab Units 08/02/20  0533 08/01/20  2118 08/01/20  1406  07/31/20  1012 07/31/20  0008   INR   --   --   --   --  1 41* 1 25*   PTT seconds 140* 141* 38*   < > 72* 41*    < > = values in this interval not displayed  No results found for: TROPONINT  ABG:  Lab Results   Component Value Date    PHART 7 357 07/31/2020    HBV2ZSD 32 3 (L) 07/31/2020    PO2ART 87 8 07/31/2020    ANN0YMJ 17 7 (L) 07/31/2020    BEART -6 7 07/31/2020    SOURCE Line, Arterial 07/31/2020       Imaging Studies: I have personally reviewed pertinent reports  and I have personally reviewed pertinent films in PACS    Xr Chest Portable    Result Date: 7/31/2020  Impression: 1  Interval intubation with coiling of the nasogastric tube in the midesophagus  Repositioning of the the nasogastric tube advised  2   Endotracheal tube noted with the tip well above the tye  3   Scattered strandy densities likely related to diminished degree of inspiration  Workstation performed: [id number]     Ct Head Without Contrast    Result Date: 7/31/2020  Impression: Minimal increase in size of ventricular system The study was marked in EPIC for immediate notification  Workstation performed: [id number]     Pe Study With Ct Abdomen And Pelvis With Contrast    Result Date: 7/31/2020  Impression: Fluid-filled loops of small bowel with pneumatosis and bowel wall thickening  Findings are suspicious for ischemic bowel  Nodular airspace opacities within the right upper lobe  Findings may represent infection    I personally discussed this study with Jose Eduardo House on 7/31/2020 at 1:46 AM  Workstation performed: [id number]       EKG, Pathology, and Other Studies: I have personally reviewed pertinent reports        VTE Pharmacologic Prophylaxis: Heparin    VTE Mechanical Prophylaxis: sequential compression device

## 2020-08-03 NOTE — PROGRESS NOTES
Daily Progress Note - Critical Care   Keyur Flynn 69 y o  male MRN: [id number]  Unit/Bed#: ICU 12 Encounter: [id number]        ----------------------------------------------------------------------------------------  HPI/24hr events: NGT clamped yesterday-100cc x 24h output    Patient given a dose of lasix yesterday secondary to volume overload- -3 7L x 24h    ---------------------------------------------------------------------------------------  SUBJECTIVE  No complaints per patient  Pain controlled    Review of Systems  Review of systems was reviewed and negative unless stated above in HPI/24-hour events   ---------------------------------------------------------------------------------------  Assessment and Plan:    Neuro:    Diagnosis: Infected  shunt  o Plan: CSF growing pseudomonas   o  shunt has been externalized and is connected to a CSF collection system and open at the level of the abdomen   o Follow up repeat CSF cultures  o ID consulted- Continue cefepime   Diagnosis: Acute post operative pain  o Plan: Continue IV dilaudid  o May start tylenol and oxy PO      CV:    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  o Plan: Vascular surgery following  o Heparin gtt DC'd yesterday   o AC plan per vascular surgery   o Will talk about restarting home ASA   Diagnosis: History of HTN   o Plan: Continue to hold home vasotec  o Monitor on telemetry   o Goa SBP >140 to promote bowel perfusion   o Hold lasix today and trend volume status       Pulm:   Diagnosis: Acute hypoxic respiratory failure  o Plan: Continue NC  o Wean Fi02 as tolerated   o Encourage IS and mobility    Diagnosis: ZHOU   o Plan: Continue CPAP at HS       GI:    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 s/p ex lap  o Plan: Patient did not require bowel resection  o Surgery following  o Plan to DC NGT today  o Start clears         :    Diagnosis: No acute issues  o Plan:  Will continue grover and give another dose of lasix  o Monitor I/O      F/E/N:    Plan: Continue to trend electrolytes and replete as necessary   Started on clears   Continue to hold VIF       Heme/Onc:    Diagnosis: Acute blood loss following surgery   o Plan: Continue to trend CBC       Endo:    Diagnosis: Hyperglycemia  o Plan: Continue ISS  Goal -180      ID:    Diagnosis: Infected  shunt  o Plan: ID following  o Continue cefepime- needs 6 week course      MSK/Skin:    Diagnosis: S/p ex lap  o Plan: Mobilize today   o PT/OT  o Continue local wound care      Disposition: Transfer to Stepdown Level 2  Code Status: Level 1 - Full Code  ---------------------------------------------------------------------------------------  ICU CORE MEASURES    Prophylaxis   VTE Pharmacologic Prophylaxis: Heparin  VTE Mechanical Prophylaxis: sequential compression device  Stress Ulcer Prophylaxis: Prophylaxis Not Indicated       Invasive Devices Review  Invasive Devices     Peripheral Intravenous Line            Peripheral IV 20 Left Forearm 3 days    Peripheral IV 20 Right Hand 3 days    Peripheral IV 20 Right Antecubital 2 days          Arterial Line            Arterial Line 20 Left Radial 3 days          Drain            Urethral Catheter Latex 16 Fr  3 days    Ventriculostomy/Subdural Ventricular drainage catheter with ICP microsensor Right Other (Comment) 2 days    NG/OG/Enteral Tube Nasogastric 18 Fr Left nares 1 day              Can any invasive devices be discontinued today?  No  ---------------------------------------------------------------------------------------  OBJECTIVE    Vitals   Vitals:    20 0300 20 0400 20 0500 20 0600   BP:       BP Location:       Pulse: 60 64 (!) 54 60   Resp: 16 (!) 9 22 (!) 23   Temp:  97 8 °F (36 6 °C)     TempSrc:  Oral     SpO2: 95% 96% 95% 96%   Weight:    127 kg (281 lb 1 4 oz)   Height:         Temp (24hrs), Av °F (36 7 °C), Min:97 3 °F (36 3 °C), Max:98 9 °F (37 2 °C)  Current: Temperature: 97 8 °F (36 6 °C)      Respiratory:  SpO2: SpO2: 94 %, SpO2 Activity: SpO2 Activity: At Rest, SpO2 Device: O2 Device: None (Room air)  Nasal Cannula O2 Flow Rate (L/min): 2 L/min    Invasive/non-invasive ventilation settings   Respiratory    Lab Data (Last 4 hours)    None         O2/Vent Data (Last 4 hours)    None                Physical Exam  Vitals signs reviewed  Constitutional:       General: He is not in acute distress  HENT:      Head: Normocephalic  Eyes:      Pupils: Pupils are equal, round, and reactive to light  Cardiovascular:      Rate and Rhythm: Regular rhythm  Pulmonary:      Effort: Pulmonary effort is normal    Abdominal:      Comments: Midline incision dressings dry and intact   Musculoskeletal: Normal range of motion  Skin:     General: Skin is warm and dry  Neurological:      Mental Status: He is alert  Comments: Slow to respond  CSF clear         Laboratory and Diagnostics:  Results from last 7 days   Lab Units 08/03/20  0609 08/02/20  0533 08/01/20  1406 08/01/20  0549 07/31/20  2119 07/31/20  1012 07/31/20  0745 07/31/20  0555  07/30/20  2325   WBC Thousand/uL 8 97 10 89* 13 00* 7 71  --  10 02  --   --   --  13 76*   HEMOGLOBIN g/dL 10 9* 10 8* 12 7 11 2*  11 2* 11 0* 13 0  --   --   --  14 8   I STAT HEMOGLOBIN g/dl  --   --   --   --   --   --  12 6 9 9*   < >  --    HEMATOCRIT % 33 1* 33 5* 39 7 35 2*  --  39 7  --   --   --  46 0   HEMATOCRIT, ISTAT %  --   --   --   --   --   --  37 29*  --   --    PLATELETS Thousands/uL 229 218 236 183  --  234  --   --   --  272   NEUTROS PCT %  --  82*  --  85*  --   --   --   --   --  87*   BANDS PCT %  --   --   --   --   --  8  --   --   --   --    MONOS PCT %  --  9  --  9  --   --   --   --   --  10   MONO PCT %  --   --   --   --   --  9  --   --   --   --     < > = values in this interval not displayed       Results from last 7 days   Lab Units 08/02/20  0533 08/01/20  1406 08/01/20  0549 07/31/20  1012 07/31/20  0745 07/31/20  0555 07/30/20  2325   SODIUM mmol/L 143 141 141 139  --   --  138   POTASSIUM mmol/L 4 2 4 2 4 2 4 6  --   --  4 9   CHLORIDE mmol/L 113* 111* 112* 108  --   --  104   CO2 mmol/L 23 22 21 21  --   --  26   CO2, I-STAT mmol/L  --   --   --   --  20* 19*  --    ANION GAP mmol/L 7 8 8 10  --   --  8   BUN mg/dL 26* 30* 32* 33*  --   --  32*   CREATININE mg/dL 0 97 0 99 1 06 1 27  --   --  1 21   CALCIUM mg/dL 7 8* 6 9* 7 6* 8 5  --   --  9 7   GLUCOSE RANDOM mg/dL 141* 150* 145* 159*  --   --  136   ALT U/L 16  --   --   --   --   --  19   AST U/L 24  --   --   --   --   --  39   ALK PHOS U/L 54  --   --   --   --   --  91   ALBUMIN g/dL 1 8*  --   --   --   --   --  2 7*   TOTAL BILIRUBIN mg/dL 0 54  --   --   --   --   --  0 82     Results from last 7 days   Lab Units 08/02/20  0533 08/01/20  0549   MAGNESIUM mg/dL 2 4 2 2   PHOSPHORUS mg/dL 1 7* 3 4      Results from last 7 days   Lab Units 08/02/20  0533 08/01/20 2118 08/01/20  1406 08/01/20  0549 07/31/20  2119 07/31/20  1559 07/31/20  1012 07/31/20  0008   INR   --   --   --   --   --   --  1 41* 1 25*   PTT seconds 140* 141* 38* 42* 53* 60* 72* 41*      Results from last 7 days   Lab Units 07/30/20  2325   TROPONIN I ng/mL <0 02     Results from last 7 days   Lab Units 08/02/20  0533 08/01/20 2118 08/01/20  1406 07/31/20  1805 07/31/20  1559 07/31/20  1012 07/31/20  0258   LACTIC ACID mmol/L 1 2 1 7 2 0 1 6 2 3* 2 2* 2 2*     ABG:  Results from last 7 days   Lab Units 07/31/20  1151   PH ART  7 357   PCO2 ART mm Hg 32 3*   PO2 ART mm Hg 87 8   HCO3 ART mmol/L 17 7*   BASE EXC ART mmol/L -6 7   ABG SOURCE  Line, Arterial     VBG:  Results from last 7 days   Lab Units 07/31/20  1151   ABG SOURCE  Line, Arterial           Micro  Results from last 7 days   Lab Units 08/01/20  0548 07/31/20  1411 07/31/20  1251 07/31/20  0622   BLOOD CULTURE   --   --  No Growth at 48 hrs  No Growth at 48 hrs    --    SPUTUM CULTURE  Culture too young- will reincubate  --   --   --    GRAM STAIN RESULT  2+ Polys  1+ Epithelial cells per low power field  No bacteria seen 1+ Polys*  4+ Gram negative rods*  --  No Polys or Bacteria seen   BODY FLUID CULTURE, STERILE   --   --   --  1+ Growth of Pseudomonas aeruginosa*       EKG: SR  Imaging:  I have personally reviewed pertinent reports  and I have personally reviewed pertinent films in PACS    Intake and Output  I/O       08/01 0701 - 08/02 0700 08/02 0701 - 08/03 0700    I V  (mL/kg) 4474 7 (36 7) 258 7 (2)    NG/GT 60     IV Piggyback 350 50    Total Intake(mL/kg) 4884 7 (40) 308 7 (2 4)    Urine (mL/kg/hr) 2015 (0 7) 3945 (1 3)    Emesis/NG output 250 100    Drains 121 42    Blood 150     Total Output 2536 4087    Net +2348 7 -3778 3                Height and Weights   Height: 6' 3"  IBW: 84 5 kg  Body mass index is 35 13 kg/m²  Weight (last 2 days)     Date/Time   Weight    08/03/20 0600   127 (281 09)                Nutrition       Diet Orders   (From admission, onward)             Start     Ordered    08/03/20 0649  Diet Clear Liquid  Diet effective now     Question Answer Comment   Diet Type Clear Liquid    RD to adjust diet per protocol?  No        08/03/20 0649                Active Medications  Scheduled Meds:acetaminophen, 650 mg, Rectal, Q4H PRN, Ara Guzman, PA-C  cefepime, 2,000 mg, Intravenous, Q8H, Ara Guzman, PA-C, Last Rate: 2,000 mg (08/03/20 5140)  chlorhexidine, 15 mL, Swish & Spit, Q12H [address], Ara Guzman, PA-C  HYDROmorphone, 0 5 mg, Intravenous, Q4H PRN, Ara Cazaresime, PA-C  HYDROmorphone, 1 mg, Intravenous, Q4H PRN, Ara Cazaresime, PA-C  insulin lispro, 2-12 Units, Subcutaneous, Q6H [address], MALISSA Goodman-C  iodixanol, 15 mL, Intravenous, Once in imaging, Ara Guzman PA-C  pantoprazole, 40 mg, Intravenous, Q12H, Lv Alejandra PA-C      Continuous Infusions:     PRN Meds:   acetaminophen, 650 mg, Q4H PRN  HYDROmorphone, 0 5 mg, Q4H PRN  HYDROmorphone, 1 mg, Q4H PRN  iodixanol, 15 mL, Once in imaging        Allergies   Allergies   Allergen Reactions    Pollen Extract Allergic Rhinitis     ---------------------------------------------------------------------------------------  Advance Directive and Living Will:      Power of :    POLST:    ---------------------------------------------------------------------------------------  Care Time Delivered:   No Critical Care time spent     Manuel Hewitt PA-C      Portions of the record may have been created with voice recognition software  Occasional wrong word or "sound a like" substitutions may have occurred due to the inherent limitations of voice recognition software    Read the chart carefully and recognize, using context, where substitutions have occurred

## 2020-08-03 NOTE — OCCUPATIONAL THERAPY NOTE
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[facility] )    Nonocclusive mesenteric ischemia (HCC)    Past Medical History  Past Medical History:   Diagnosis Date    Cancer [facility]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     Diabetes mellitus ([facility] )     DM2 (diabetes mellitus, type 2) ([facility] )     Gout     HTN (hypertension)     Hydrocephalus ([place] [address] )     Hypertension     Neuropathy     ZHOU on CPAP     Pressure injury of skin     TIA (transient ischemic attack)      Past Surgical History  Past Surgical History:   Procedure Laterality Date    ABDOMINAL WALL SURGERY N/A 8/1/2020    Procedure: CLOSURE WOUND ABDOMINAL/TRUNK;  Surgeon: Alireza Alvares DO;  [facility];  Service: General    ARTERIOGRAM N/A 7/31/2020    Procedure: ARTERIOGRAM Of SMA and placement of Papavarine onfusion arterial catheter;  Surgeon: Ralf Johnson MD;  [facility];  Service: Vascular    ARTERIOGRAM Left 8/1/2020    Procedure: ARTERIOGRAM; cath removal;  Surgeon: Ralf Johnson MD;  [facility];  Service: Vascular    CSF SHUNT      x3 Op Reports, Lizbeth Villalobos in MP[facility] chart viewer    LAPAROTOMY N/A 7/31/2020    Procedure: LAPAROTOMY EXPLORATORY: Externalizes  shunt [facility] application;  Surgeon: Kendell Palma MD;  [facility];  Service: General    LAPAROTOMY N/A 8/1/2020    Procedure: LAPAROTOMY EXPLORATORY,;  Surgeon: Alireza Alvares DO;  [facility];  Service: General    AZ AMPUTATION FOOT,TRANSMETATARSAL Left 5/30/2020    Procedure: excision 5th metatarsal head  excision 5th metatarsal ulcer ;  Surgeon: Katya Garcia DPM;  [facility];  Service: Podiatry    WI ARTHDSIS POST/POSTEROLATRL/POSTINTERBODY LUMBAR N/A 7/6/2020    Procedure: MINIMALLY INVASIVE TRANSVERSE LUMBAR INTERBODY FUSION L5-S1 FROM LEFT-SIDED APPROACH;  Surgeon: Emily Renee MD;  [facility];  Service: Neurosurgery    WI REPLACEMENT/REVISION,CSF SHUNT Right 7/6/2020    Procedure: REVISION OF SCALP OVER VENTRICULAR CATHETER IN THE RIGHT CORONAL REGION;  Surgeon: Emily Renee MD;  [facility];  Service: Neurosurgery    WOUND DEBRIDEMENT N/A 8/1/2020    Procedure: abd washout;  Surgeon: Holly Pablo DO;  [facility];  Service: General             08/03/20 1500   Note Type   Note type Eval/Treat   Restrictions/Precautions   Weight Bearing Precautions Per Order No   Braces or Orthoses MAFO;Other (Comment)  (per chart review had MAFO for LLE)   Other Precautions Cognitive; Bed Alarm;Multiple lines;Telemetry; Fall Risk;Pain;O2  (upper R abdominal drain (EVD))   Pain Assessment   Pain Assessment Tool Pain Assessment not indicated - pt denies pain   Pain Score No Pain   Home Living   Type of Home House   Home Layout Two level;Bed/bath upstairs; Other (Comment); Performs ADLs on one level  (3 SOBIA, no 1st floor setup)   Bathroom Shower/Tub Tub/shower unit   [employer] Raised   Bathroom Equipment Grab bars in shower;Commode;Grab bars around toilet   Home Equipment Other (Comment); Life alert  (rollator, CPAP)   Additional Comments Pt reports living in 2 SH, bed/bath 2nd floor, no 1st floor setup available, 3 SOBIA   Prior Function   Level of [place] Independent with ADLs and functional mobility   Lives With Friend(s); Other (Comment)  Mike Monzon)   Receives Help From Friend(s); Neighbor;Other (Comment)  (neighbor is an RN per chart review)   ADL Assistance Independent   IADLs Independent   Falls in the last 6 months   (atleast 2x/month; pt reports "too many") Vocational Retired   Comments Pt reports being I w/ ADLS/IADLS (except friend assists w/ cooking), was Mod I w/ transfers and functional mobility PTA w/ rollator   Lifestyle   Autonomy I ADLS, mainly I w/ IADLS except cooking, Mod I w/ transfers and functional mobility PTA   Reciprocal Relationships Pt lives w/ a friend, the friend works 6-8 hrs/day   Service to [employer] pt is retired   Intrinsic Gratification Enjoys yard work and reading   [address] X   Patient Behaviors/Mood Flat affect;Labile   ADL   Eating Assistance 3  Moderate Assistance   Grooming Assistance 2  Maximal Assistance   [address] 2  Maximal Assistance   LB [address] 1  Total Assistance   UB Dressing Assistance 2  Maximal Assistance   LB Dressing Assistance 1  Total Assistance   LB Dressing Deficit Don/doff L sock; Don/doff R sock   Toileting Assistance  2  Maximal Assistance   Functional Assistance 2  Maximal Assistance   Functional Deficit Steadying;Verbal cueing;Supervision/safety; Increased time to complete  (Ax2)   Bed Mobility   Supine to Sit 2  Maximal assistance   Additional items Assist x 2;HOB elevated; Increased time required;LE management;Verbal cues   Sit to Supine 2  Maximal assistance   Additional items Assist x 2; Increased time required;Verbal cues;LE management   Additional Comments Pt went from supine<>sit w/ Max Ax 2 for UB support and LE management, HOB elevated for assist  Pt sat EOB w/ Max A for sitting balance/trunk control  Presents w/ posterior lean, needs VC/TC for forward flexion but unable to maintain  Transfers   Sit to Stand 1  Dependent   Additional items Assist x 2; Increased time required;Verbal cues   Stand to Sit 1  Dependent   Additional items Assist x 2; Increased time required;Verbal cues   Additional Comments Attempted 2 STS transfers from EOB, required total A x2  HHA used  Pt unable to clear buttocks from EOB     Functional Mobility   Additional Comments Not appropriate @ this time   Balance   Static Sitting Poor   Dynamic Sitting Poor -   Static Standing Zero   Activity Tolerance   Activity Tolerance Patient limited by fatigue   Medical Staff Made Aware PT   Nurse Made Aware yes, Arielle   RUE Assessment   RUE Assessment X   RUE Overall AROM   R Mass Grasp poor   RUE Strength   RUE Overall Strength Deficits;Due to cognitive deficits   R Shoulder Flexion 2+/5   R Elbow Flexion 3-/5   R Elbow Extension 4-/5   LUE Assessment   LUE Assessment X   LUE Overall AROM   L Mass Grasp poor   LUE Strength   LUE Overall Strength Deficits;Due to cognitive deficits   L Shoulder Flexion 3/5   L Elbow Flexion 3-/5   L Elbow Extension 4/5   Hand Function   Gross Motor Coordination Impaired   Fine Motor Coordination Impaired  (given modified call bell; decreased  strength R>L)   Sensation   Light Touch No apparent deficits   Proprioception   Proprioception No apparent deficits   Vision - Complex Assessment   Head Position Chin down   Additional Comments Limited visual assessment due to pt minimally opening eyes on command  Would open w/ VC but unable to remain alert enough to assess  Demonstrates poor neck extension while seated upright   Cognition   Overall Cognitive Status Impaired   Arousal/Participation Arousable; Cooperative;Lethargic;Poorly responsive   Attention Difficulty attending to directions   Orientation Level Oriented to person;Disoriented to place; Disoriented to time;Disoriented to situation   Memory Decreased recall of precautions;Decreased recall of recent events;Decreased short term memory   Following Commands Follows one step commands inconsistently   Comments Pt is cooperative; very lethargic; slow to respond  Needs frequent re-direction to task  Has decreased insight into deficits and safety awareness  Has difficulty dividing attention to task  Assessment   Limitation Decreased ADL status; Decreased UE ROM; Decreased UE strength;Decreased Safe judgement during ADL;Decreased cognition;Decreased endurance;Visual deficit; Decreased fine motor control;Decreased self-care trans;Decreased high-level ADLs;Mood limitation   Prognosis Fair   Assessment Pt is a 70 y/o male seen for OT eval s/p adm to B w/ complaints of vomiting blood  Pt is dx'd w/ acute superficial gastritis w/ hemorrhage  Pt is s/p the following procedure "LAPAROTOMY EXPLORATORY (N/A)" performed on 7/31, s/p "LAPAROTOMY EXPLORATORY: Externalizes  shunt Abthera application (N/A); ARTERIOGRAM Of SMA and placement of Papavarine onfusion arterial catheter (N/A) ; Exploration of SMA; Angiogram SMA; Limited angiogram Left lower extremity" performed 7/31; s/p "LAPAROTOMY EXPLORATORY, (N/A)abd washout (N/A)ARTERIOGRAM; cath removal (Left)CLOSURE WOUNDABDOMINAL/TRUNK (N/A)" performed 8/1 and 8/2  Pt  has a past medical history of Cancer ([facility] ), [facility] DM2 (diabetes mellitus, type 2) ([facility] ), Gout, HTN (hypertension), Hydrocephalus ([facility] ), Hypertension, Neuropathy, ZHOU on CPAP, Pressure injury of skin, and TIA (transient ischemic attack)  Pt with active OT orders and up with assistance  orders  Pt lives with a friend in 2 SH, 3 SOBIA, bed/bath 2nd floor  Pt was I w/  ADLS and most IADLS, drove, & required use of DME PTA including rollator, BSC and grab bars  Pt is currently demonstrating the following occupational deficits: Max A UB ADLS, Total A LB ADLS, Max A x2 bed mobility, Total A x2 attempted STS transfer from B w/ HHA  These deficits that are impacting pt's baseline areas of occupation are a result of the following impairments: pain, endurance, activity tolerance, functional mobility, forward functional reach, balance, trunk control, functional standing tolerance, unsupportive home environment, decreased I w/ ADLS/IADLS, strength, ROM, visual deficits, cognitive impairments, decreased safety awareness, decreased insight into deficits, impaired fine motor skills and coordination deficits  The following Occupational Performance Areas to address include: eating, grooming, bathing/shower, toilet hygiene, dressing, medication management, health maintenance, functional mobility, community mobility, clothing management and household maintenance  Pt scored overall 20/100 on the Barthel Index  Based on the aforementioned OT evaluation, functional performance deficits, and assessments, pt has been identified as a high complexity evaluation  Recommend STR upon D/C, when medically stable  Pt to continue to benefit from acute immediate OT services to address the following goals 3-5x/week to  w/in 10-14 days:    Goals   Patient Goals to get stronger to be more independent   LTG Time Frame 10-   Long Term Goal #1 see below listed goals   Plan   Treatment Interventions ADL retraining;Visual perceptual retraining;Functional transfer training;UE strengthening/ROM; Endurance training;Cognitive reorientation;Patient/family training;Equipment evaluation/education; Fine motor coordination activities; Compensatory technique education;Continued evaluation; Energy conservation; Activityengagement   Goal Expiration Date 20   OT Frequency 3-5x/wk   Recommendation   OT Discharge Recommendation Post-Acute Rehabilitation Services   OT - OK to Discharge Yes  (when medically stable)   Barthel Index   Feeding 5   Bathing 0   Grooming Score 0   Dressing Score 0   Bladder Score 0   Bowels Score 5   Toilet Use Score 5   Transfers (Bed/Chair) Score 5   Mobility (Level Surface) Score 0   Stairs Score 0   Barthel Index Score 20     GOALS    1) Pt will complete rolling left/right in bed with Mod assist, as prerequisite for further engagement in ADLS   2) Pt will complete supine to sit transfer with Mod A using B/L UEs to initiate bed mobility   3) Pt will tolerate sitting at EOB 20 minutes with Min assist and stable vital signs, as prerequisite for further engagement in ADLS   4) Pt will complete grooming task with Min assist and increased time to increase independence in functional tasks  5) Pt will increase B/L UE ROM 1/2 MMT to increase functional UB use during ADLS   6) Pt will complete UB ADLS with Min A and use of AD/DME as needed to increase independence in functional tasks  7) Pt will complete LB ADLS with Mod A and use of AD/DME as needed to increase independence in functional tasks  8) Pt will complete sit to stand transfer / sit pivot transfer / stand pivot transfer with Max assist on/off all ADL surfaces   9) Pt will follow 100% simple one step verbal commands and be A/Ox4 consistently t/o use of external environmental cues to increase awareness for functional tasks  10) Pt will demonstrate 100% carryover of E C  techniques t/o fx'l I/ADL/ leisure tasks w/o cues s/p skilled education  11) Pt will engage in ongoing  assessments, screens, and activities t/o fx'l I/ADL/leisure tasks w/ G participation to A w/ adaptation and accomodations or rule out visual perceptual impairments  12) Pt will improve fine motor skills to [facility] through use of strengthening activities/ROM exercises s/p HEP w/ no more than 2 verbal cues for carryover for increased independence in functional tasks     Rosa Tipton MS, OTR/L

## 2020-08-03 NOTE — ASSESSMENT & PLAN NOTE
Externalization 7/30 secondary to laparotomy for bowel ischemia/peritonitis  · S/p VPS placement for NPH 2005, revision 2011  · Shunt last adjusted to 100 cm of water 7/21/2020 for increasing size of bilateral subdural hygromas  · Sutures placed over about beginning in July for thinning skin  Imaging:   ·  CT head wo 7/30/20: Minimal increase size ventricular system  Encephalomalacia and right frontal lobe  Unchanged hypoattenuation the left testicle temporal lobe  Stable positioning of right ventriculostomy shunt  Unchanged bilateral subdural hygromas  · Skull x-ray 7/21/20:  Some valve has set to approximately 100 mm of water  · CT head wo 7/20/20:  Stabilization of a right ventriculostomy shunt  Minimal decrease in size ventricular system associated with minimal increased subdural hygroma overlying bilateral cerebral hemispheres now measuring to 7 mm in thickness previously 5 mm  Plan:    · Continue monitor neurological exam   Stable GCS 14  Awake alert disoriented to time  Follows commands with diffuse weakness  · Continue to monitor CSF - remains clear yellow  42ml/24H  · CSF sent from externalized ventricular catheter 7/31 4+ Pseudomonas, W 64, glucose 88, protein 88  · CSF sent today from proximal shunt reservoir results pending  · Plan to send CSF later today from externalized tubing  · Monitor output from externalized shunt with cool of 10 cc or less per hour  Maintain set at 8 mmHg above lateral full of the right atrium  · Continue monitor blood cultures 7/31 which were negative at 48 hours  · Id following  Continue IV antibiotics cefepime x 6 wks  · DVT prophylaxis:  SCDs and heparin  · Consideration for shunt revision versus placement of a new system once CSF clears  · Will continue to monitor     · Cause any questions or concerns

## 2020-08-03 NOTE — PROGRESS NOTES
Progress Note - Michael Kidney 69 y o  male MRN: [id number]    Unit/Bed#: ICU 12 Encounter: [id number]      Assessment:  Pt is a 70 y/o M w DELIA s/p 7/31 ex lap,   shunt externalization, abthera vac placement, and mesenteric agram wih SMA, papverine, heparin gtt, s/p 7/1 2nd look laparotomy, abdominal wall closure, sheath removal     VSS  Afebrile  Saturating 95% on 2LNC  NGT clamped overnight  Only 100 cc output last 24 h  Abdomen soft/ nontender/ non distended  LE are sensory and motor intact with good cap refill and palp dp b/l  Wbc: 10- 8 9  Plan:  Remove ngt  Start clear liquids  Continue abx cefepime appreciate ID recs  D/c grover  Out of bed, ambulate  Continue to follow CSF shunt output, appreciate Neuro surgery  Rest of care per critical care    Subjective:   Feels well  Denied passing flatus  Denied chest pain or shortness of breath  Denied any nausea or vomiting  Objective:     Vitals: Blood pressure 116/59, pulse 60, temperature 97 8 °F (36 6 °C), temperature source Oral, resp  rate (!) 23, height 6' 3", weight 127 kg (281 lb 1 4 oz), SpO2 96 %  ,Body mass index is 35 13 kg/m²  Intake/Output Summary (Last 24 hours) at 8/3/2020 0624  Last data filed at 8/3/2020 7752  Gross per 24 hour   Intake 358 67 ml   Output 4087 ml   Net -3728 33 ml       Physical Exam  General: NAD  HEENT: NC/AT  MMM  Cv: RRR     Lungs: normal effort  Ab: Soft, NT/ND  Ex: no CCE  Neuro: AAOx3    Scheduled Meds:acetaminophen, 650 mg, Rectal, Q4H PRN, Sabina Raymundo PA-C  cefepime, 2,000 mg, Intravenous, Q8H, Sabina Raymundo PA-C, Last Rate: 2,000 mg (08/03/20 6238)  chlorhexidine, 15 mL, Swish & Spit, Q12H [address], Sabina Raymundo PA-C  HYDROmorphone, 0 5 mg, Intravenous, Q4H PRN, Sabina Raymundo PA-C  HYDROmorphone, 1 mg, Intravenous, Q4H PRN, Sabina Raymundo PA-C  insulin lispro, 2-12 Units, Subcutaneous, Q6H [address], Lv Alejandra PA-C  iodixanol, 15 mL, Intravenous, Once in imaging, Sabina Raymundo PA-C  pantoprazole, 40 mg, Intravenous, Q12H, Asa EVI Woodson      Continuous Infusions:   PRN Meds:   acetaminophen    HYDROmorphone    HYDROmorphone    iodixanol      Invasive Devices     Peripheral Intravenous Line            Peripheral IV 07/30/20 Left Forearm 3 days    Peripheral IV 07/31/20 Right Hand 3 days    Peripheral IV 07/31/20 Right Antecubital 2 days          Arterial Line            Arterial Line 07/31/20 Left Radial 2 days          Drain            Urethral Catheter Latex 16 Fr  3 days    Ventriculostomy/Subdural Ventricular drainage catheter with ICP microsensor Right Other (Comment) 2 days    NG/OG/Enteral Tube Nasogastric 18 Fr Left nares 1 day                Lab, Imaging and other studies: I have personally reviewed pertinent reports      VTE Pharmacologic Prophylaxis: Sequential compression device (Venodyne)   VTE Mechanical Prophylaxis: sequential compression device

## 2020-08-03 NOTE — SOCIAL WORK
Pt confused  TC to pr brother, Marie No  CM introduced self/role with dcp  Pt resides with a roommate, Katie Christina, in a 2 story home with 3 SOBIA  Pt independent with ADLs and using a RW for ambulation  Pt drives  On disability  Pt has hx of VNA  HX with [facility]  No hx of MH or drug/alcohol abuse  Pharmacy is [facility]  Marie No is unsure if pt has a POA or AD  Pt is not   No adult children  Marie No is his only brother  As per Marie Oneal pt roommate Katie Christina will be on Vacation next week - arrangements were made for pt dog  Katie Christina does work full time and unable to help pt during the day  CM to follow for dcp  CM reviewed d/c planning process including the following: identifying help at home, patient preference for d/c planning needs, Discharge Lounge, [facility] Meds to Bed program, availability of treatment team to discuss questions or concerns patient and/or family may have regarding understanding medications and recognizing signs and symptoms once discharged  CM also encouraged patient to follow up with all recommended appointments after discharge  Patient advised of importance for patient and family to participate in managing patients medical well being

## 2020-08-03 NOTE — PHYSICAL THERAPY NOTE
Physical Therapy Evaluation     Patient's Name: Daysi Carpenter    Admitting Diagnosis  Weakness [R53 1]  Generalized weakness [R53 1]  Ambulatory dysfunction [R26 2]  Acute superficial gastritis with hemorrhage [K29 01]    Problem List  Patient Active Problem List   Diagnosis    Status post ventriculoperitoneal shunt    Unspecified abnormalities of gait and mobility    Diabetic ulcer of left foot associated with type 2 diabetes mellitus ([facility]    Type 2 diabetes mellitus ([facility]    Normal pressure hydrocephalus ([facility]    Essential hypertension    Gout    ZHOU on CPAP    Spondylolisthesis of lumbosacral region    Impaired functional mobility, balance, gait, and endurance    Weakness of left foot    Pain    Scalp hematoma    Scalp laceration    Self-care deficit for hygiene    Acute superficial gastritis with hemorrhage    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[facility]    [facility]       Past Medical History  Past Medical History:   Diagnosis Date    Cancer [facility]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     Diabetes mellitus ([facility]     DM2 (diabetes mellitus, type 2) ([facility]     Gout     HTN (hypertension)     Hydrocephalus ([facility]     Hypertension     Neuropathy     ZHOU on CPAP     Pressure injury of skin     TIA (transient ischemic attack)        Past Surgical History  Past Surgical History:   Procedure Laterality Date    ABDOMINAL WALL SURGERY N/A 8/1/2020    Procedure: CLOSURE WOUND ABDOMINAL/TRUNK;  Surgeon: Ana Maria Sweeney DO;  [facility];  Service: General    ARTERIOGRAM N/A 7/31/2020    Procedure: ARTERIOGRAM Of SMA and placement of Papavarine onfusion arterial catheter;  Surgeon: Annika Posada MD;  [facility];  Service: Vascular    ARTERIOGRAM Left 8/1/2020    Procedure: ARTERIOGRAM; cath removal;  Surgeon: Annika Posada MD;  [facility];  Service: Vascular    CSF SHUNT      x3 Op Reports, Georgina Borden in MPF chart viewer    LAPAROTOMY N/A 7/31/2020    Procedure: LAPAROTOMY EXPLORATORY: Externalizes  shunt Abthera application;  Surgeon: Miky Griffin MD;  [facility];  Service: General    LAPAROTOMY N/A 8/1/2020    Procedure: LAPAROTOMY EXPLORATORY,;  Surgeon: Asuncion Rene DO;  [facility];  Service: General    WI AMPUTATION FOOT,TRANSMETATARSAL Left 5/30/2020    Procedure: excision 5th metatarsal head  excision 5th metatarsal ulcer ;  Surgeon: Flavia Martinez DPM;  [facility];  Service: Podiatry    WI ARTHDSIS POST/POSTEROLATRL/POSTINTERBODY LUMBAR N/A 7/6/2020    Procedure: MINIMALLY INVASIVE TRANSVERSE LUMBAR INTERBODY FUSION L5-S1 FROM LEFT-SIDED APPROACH;  Surgeon: Fransisco Rosario MD;  [facility];  Service: Neurosurgery    WI REPLACEMENT/REVISION,CSF SHUNT Right 7/6/2020    Procedure: REVISION OF SCALP OVER VENTRICULAR CATHETER IN THE RIGHT CORONAL REGION;  Surgeon: Fransisco Rosario MD;  [facility];  Service: Neurosurgery    WOUND DEBRIDEMENT N/A 8/1/2020    Procedure: abd washout;  Surgeon: Asuncion Rene DO;  [facility];  Service: General        08/03/20 1501   Note Type   Note type Eval/Treat   Pain Assessment   Pain Assessment Tool Pain Assessment not indicated - pt denies pain   Pain Score No Pain   Hospital Pain Intervention(s) Repositioned   Home Living   Type of [address] [place] [address] Two level;Bed/bath upstairs   Bathroom Shower/Tub Tub/shower unit   Bathroom Toilet Raised   Bathroom Equipment Grab bars around toilet   Home Equipment Other (Comment)  (rollator)   Prior Function   Level of [place] Independent with ADLs and functional mobility   Lives With Friend(s)   Receives Help From Friend(s); Neighbor   ADL Assistance Independent   IADLs Independent   Vocational Retired   Comments Pt reports that he is 1* home alone during day, friend and neighbor work   Restrictions/Precautions   [place] Bearing Precautions Per Order No   Braces or Orthoses MAFO   Other Precautions Cognitive; Chair Alarm; Bed Alarm; Fall Risk;Multiple lines;Telemetry   General   Family/Caregiver Present No   Cognition   Orientation Level Oriented to person   RLE Assessment   RLE Assessment X  (grossly 2-/5 with movement)   LLE Assessment   LLE Assessment X  (grossly 2-/5 with movement)   Coordination   Movements are Fluid and Coordinated 0   Coordination and Movement Description slow and gaurded, poor postural stability   Bed Mobility   Supine to Sit 2  Maximal assistance   Additional items Assist x 2   Sit to Supine 2  Maximal assistance   Additional items Assist x 2   Transfers   Sit to Stand 1  Dependent   Additional items Assist x 2   Stand to Sit 1  Dependent   Additional items Assist x 2   Ambulation/Elevation   Gait pattern Not appropriate; Not tested   Balance   Static Sitting Poor   Dynamic Sitting Poor -   Static Standing Zero   Endurance Deficit   Endurance Deficit Yes   Endurance Deficit Description limited by fatigue, decreased abillity to maintain alertness throughout session   Activity Tolerance   Activity Tolerance Patient limited by fatigue;Treatment limited secondary to medical complications (Comment)   Medical Staff Made Aware OT    Nurse Made Aware yes, nsg gave clearance to work with pt   Assessment   Prognosis Good   Problem List Decreased strength;Decreased range of motion;Decreased endurance; Impaired balance;Decreased mobility; Decreased safety awareness;Pain;Decreased coordination;Decreased cognition; Impaired judgement; Impaired tone   Assessment Pt is 69 y o  male seen for PT evaluation s/p admit to [facility] on 7/30/2020 w/ Acute superficial gastritis with hemorrhage s/p ex lap for ischemic bowel, s/p externalized  shunt  PT consulted to assess pt's functional mobility and d/c needs  Order placed for PT eval and tx   Comorbidities affecting pt's physical performance at time of assessment include:  has a past medical history of Cancer ([facility] DM2 (diabetes mellitus, type 2) ([facility] ), Gout, HTN (hypertension), Hydrocephalus ([facility] ), Hypertension, Neuropathy, ZHOU on CPAP, Pressure injury of skin, and TIA (transient ischemic attack)  PTA, pt was ambulates household distances and using rollator for mobility  Lives with roomate who works    Personal factors affecting pt at time of IE include: inaccessible home environment, ambulating w/ assistive device, inability to ambulate household distances, limited home support, positive fall history, decreased initiation and engagement, unable to perform physical activity, inability to perform IADLs and inability to perform ADLs  Please find objective findings from PT assessment regarding body systems outlined above with impairments and limitations including weakness, impaired balance, decreased endurance, impaired coordination, gait deviations, pain, decreased activity tolerance, decreased functional mobility tolerance, decreased safety awareness, fall risk, impaired tone and decreased cognition  Pt required LE management for bed mobility with decreased strength  Poor sitting balance requiring constant A to maintain upright despite verbal and tactile instruction  Attempted sit<>stand although unable to clear buttock with decreased strength and balance  The following objective measures performed on IE also reveal limitations: Barthel Index: 20/100  Pt's clinical presentation is currently unstable/unpredictable seen in pt's presentation of critical care monitoring  Pt to benefit from continued PT tx to address deficits as defined above and maximize level of functional independent mobility and consistency  From PT/mobility standpoint, recommendation at time of d/c would be IP rehab pending progress in order to facilitate return to PLOF     Barriers to Discharge Inaccessible home environment;Decreased caregiver support   Goals   Patient Goals To go home and read   STG Expiration Date 08/15/20   Short Term Goal #1 1  Complete bed mobility with Mod A to decrease caregiver burden  2  Tolerate sitting EOB x 20 min with fair balance to complete ADLs  3  Improve LE strength to 3+/5 to prepare for ambulation and transfers  4  PT to see for ambulation and transfers as appropriate  Plan   Treatment/Interventions OT; Spoke to case management;Spoke to nursing;Gait training;Bed mobility; Patient/family training; Endurance training;LE strengthening/ROM; Functional transfer training   PT Frequency Other (Comment)  (3-6x/wk)   Recommendation   PT Discharge Recommendation Post-Acute Rehabilitation Services   PT - OK to Discharge Yes   Barthel Index   Feeding 5   Bathing 0   Grooming Score 0   Dressing Score 0   Bladder Score 0   Bowels Score 5   Toilet Use Score 5   Transfers (Bed/Chair) Score 5   Mobility (Level Surface) Score 0   Stairs Score 0   Barthel Index Score 20         Bienvenido Govea, PT

## 2020-08-03 NOTE — ASSESSMENT & PLAN NOTE
Ongoing management per general surgery/primary team  · Vascular surgery following    Anticoagulation plan for vascular  · Possible discontinuation of NG tube today

## 2020-08-03 NOTE — PROGRESS NOTES
Progress Note - Infectious Disease   Ralph Huitron 69 y o  male MRN: [id number]  Unit/Bed#: ICU 12 Encounter: [id number]      Impression/Plan:  1  Severe sepsis, tachycardia, leukocytosis, elevated lactic acid   With early development of high fevers   Secondary to  shunt infection with meningitis and peritonitis  Not on vasopressors  Blood cultures are negative thus far    Fevers and WBC count now improving on IV antibiotic   -antibiotic plan as below  -monitor temperatures and hemodynamics  -follow-up blood cultures  -recheck CBC with diff and BMP  -supportive care per Critical Care service     2   shunt infection  Fluid WBC count was 61 with neutrophil predominance  Gram stain from shunt fluid shows GNRs and culture grew Pseudomonas    Suspect secondary to peritonitis in the setting of #3   Status post externalization of  shunt on 07/30  Repeat CSF analysis shows slightly decreased neutrophil predominant pleocytosis  -continue IV cefepime 2 g q 8 hours  -plan for 6 week course of antibiotic  -neurosurgery follow-up ongoing  -may need complete removal of the  shunt if unable to clear  -if attempt to salvage shunt, still risk of relapse after prolonged treatment course  -follow up repeat CSF culture     3   Ischemic bowel with peritonitis   Status post exploratory laparotomy, VAC placement   Status post femoral artery and SMA artery cannulation for paraverine by vascular surgery   Suspect intra-abdominal infection is etiology of #2   Status post second-look on 08/01 with no resection performed   Peritoneal fluid culture also shows Pseudomonas   -antibiotic plan as above  -vascular surgery/general surgical follow-up ongoing  -awaiting repeat analysis of the peritoneal fluid  -may need complete removal of shunt     4  NPH requiring  shunt placement in 2005, status post recent revision in July 2019       5  Lumbar spinal stenosis status post L5-S1 fusion on 07/06/2020      6  Diabetes mellitus with neuropathy  Examined the patient with an discussed the above management plan in detail with the neurosurgical service    Antibiotics:  Cefepime 4  Subjective:  Patient has no fever, chills, sweats; no reported vomiting, diarrhea; no cough, shortness of breath; no reported pain  No new symptoms  He had a new sample is CSF sent for analysis today    Objective:  Vitals:  Temp:  [97 3 °F (36 3 °C)-98 8 °F (37 1 °C)] 98 8 °F (37 1 °C)  HR:  [54-72] 60  Resp:  [8-25] 24  SpO2:  [92 %-97 %] 94 %  Temp (24hrs), Av 1 °F (36 7 °C), Min:97 3 °F (36 3 °C), Max:98 8 °F (37 1 °C)  Current: Temperature: 98 8 °F (37 1 °C)    Physical Exam:   General Appearance:  Somnolent, able to communicate shaking head, nontoxic, no acute distress  Throat: Oropharynx moist without lesions  Lungs:   Decreased breath sounds bilaterally; no wheezes, rhonchi or rales; respirations unlabored   Heart:  RRR; no murmur, rub or gallop   Abdomen:   Soft, non-tender, non-distended, positive bowel sounds  Extremities: No clubbing, cyanosis or edema   Skin: No new rashes or lesions  No draining wounds noted         Labs, Imaging, & Other studies:   All pertinent labs and imaging studies were personally reviewed  Results from last 7 days   Lab Units 20  0609 20  0533 20  1406   WBC Thousand/uL 8 97 10 89* 13 00*   HEMOGLOBIN g/dL 10 9* 10 8* 12 7   PLATELETS Thousands/uL 229 218 236     Results from last 7 days   Lab Units 20  0609 20  0533 20  1406  20  0745  20  2325   SODIUM mmol/L 141 143 141   < >  --   --  138   POTASSIUM mmol/L 3 7 4 2 4 2   < >  --   --  4 9   CHLORIDE mmol/L 112* 113* 111*   < >  --   --  104   CO2 mmol/L 26 23 22   < >  --   --  26   CO2, I-STAT mmol/L  --   --   --   --  20*   < >  --    BUN mg/dL 24 26* 30*   < >  --   --  32*   CREATININE mg/dL 0 86 0 97 0 99   < >  --   --  1 21   EGFR ml/min/1 73sq m 87 78 76   < >  --   --  60   GLUCOSE, ISTAT mg/dl  --   -- --   --  132   < >  --    CALCIUM mg/dL 8 0* 7 8* 6 9*   < >  --   --  9 7   AST U/L  --  24  --   --   --   --  39   ALT U/L  --  16  --   --   --   --  19   ALK PHOS U/L  --  54  --   --   --   --  91    < > = values in this interval not displayed  Results from last 7 days   Lab Units 08/03/20  0845 08/01/20  0548 07/31/20  1411 07/31/20  1251 07/31/20  0622   BLOOD CULTURE   --   --   --  No Growth at 48 hrs  No Growth at 48 hrs    --    SPUTUM CULTURE   --  1 colony Candida albicans*  --   --   --    GRAM STAIN RESULT  No Polys or Bacteria seen 2+ Polys  1+ Epithelial cells per low power field  No bacteria seen 1+ Polys*  4+ Gram negative rods*  --  No Polys or Bacteria seen   BODY FLUID CULTURE, STERILE   --   --   --   --  1+ Growth of Pseudomonas aeruginosa*

## 2020-08-03 NOTE — PLAN OF CARE
Problem: Potential for Falls  Goal: Patient will remain free of falls  Description: INTERVENTIONS:  - Assess patient frequently for physical needs  -  Identify cognitive and physical deficits and behaviors that affect risk of falls    -  [place] fall precautions as indicated by assessment   - Educate patient/family on patient safety including physical limitations  - Instruct patient to call for assistance with activity based on assessment  - Modify environment to reduce risk of injury  - Consider OT/PT consult to assist with strengthening/mobility  Outcome: Progressing     Problem: Prexisting or High Potential for Compromised Skin Integrity  Goal: Skin integrity is maintained or improved  Description: INTERVENTIONS:  - Identify patients at risk for skin breakdown  - Assess and monitor skin integrity  - Assess and monitor nutrition and hydration status  - Monitor labs   - Assess for incontinence   - Turn and reposition patient  - Assist with mobility/ambulation  - Relieve pressure over bony prominences  - Avoid friction and shearing  - Provide appropriate hygiene as needed including keeping skin clean and dry  - Evaluate need for skin moisturizer/barrier cream  - Collaborate with interdisciplinary team   - Patient/family teaching  - Consider wound care consult   Outcome: Progressing     Problem: PAIN - ADULT  Goal: Verbalizes/displays adequate comfort level or baseline comfort level  Description: Interventions:  - Encourage patient to monitor pain and request assistance  - Assess pain using appropriate pain scale  - Administer analgesics based on type and severity of pain and evaluate response  - Implement non-pharmacological measures as appropriate and evaluate response  - Consider cultural and social influences on pain and pain management  - Notify physician/advanced practitioner if interventions unsuccessful or patient reports new pain  Outcome: Progressing     Problem: SAFETY ADULT  Goal: Maintain or return to baseline ADL function  Description: INTERVENTIONS:  -  Assess patient's ability to carry out ADLs; assess patient's baseline for ADL function and identify physical deficits which impact ability to perform ADLs (bathing, care of mouth/teeth, toileting, grooming, dressing, etc )  - Assess/evaluate cause of self-care deficits   - Assess range of motion  - Assess patient's mobility; develop plan if impaired  - Assess patient's need for assistive devices and provide as appropriate  - Encourage maximum independence but intervene and supervise when necessary  - Involve family in performance of ADLs  - Assess for home care needs following discharge   - Consider OT consult to assist with ADL evaluation and planning for discharge  - Provide patient education as appropriate  Outcome: Progressing  Goal: Maintain or return mobility status to optimal level  Description: INTERVENTIONS:  - Assess patient's baseline mobility status (ambulation, transfers, stairs, etc )    - Identify cognitive and physical deficits and behaviors that affect mobility  - Identify mobility aids required to assist with transfers and/or ambulation (gait belt, sit-to-stand, lift, walker, cane, etc )  - [place] fall precautions as indicated by assessment  - Record patient progress and toleration of activity level on Mobility SBAR; progress patient to next Phase/Stage  - Instruct patient to call for assistance with activity based on assessment  - Consider rehabilitation consult to assist with strengthening/weightbearing, etc   Outcome: Progressing     Problem: GASTROINTESTINAL - ADULT  Goal: Minimal or absence of nausea and/or vomiting  Description: INTERVENTIONS:  - Administer IV fluids if ordered to ensure adequate hydration  - Maintain NPO status until nausea and vomiting are resolved  - Nasogastric tube if ordered  - Administer ordered antiemetic medications as needed  - Provide nonpharmacologic comfort measures as appropriate  - Advance diet as tolerated, if ordered  - Consider nutrition services referral to assist patient with adequate nutrition and appropriate food choices  Outcome: Progressing  Goal: Maintains or returns to baseline bowel function  Description: INTERVENTIONS:  - Assess bowel function  - Encourage oral fluids to ensure adequate hydration  - Administer IV fluids if ordered to ensure adequate hydration  - Administer ordered medications as needed  - Encourage mobilization and activity  - Consider nutritional services referral to assist patient with adequate nutrition and appropriate food choices  Outcome: Progressing  Goal: Maintains adequate nutritional intake  Description: INTERVENTIONS:  - Monitor percentage of each meal consumed  - Identify factors contributing to decreased intake, treat as appropriate  - Assist with meals as needed  - Monitor I&O, weight, and lab values if indicated  - Obtain nutrition services referral as needed  Outcome: Progressing  Goal: Establish and maintain optimal ostomy function  Description: INTERVENTIONS:  - Assess bowel function  - Encourage oral fluids to ensure adequate hydration  - Administer IV fluids if ordered to ensure adequate hydration   - Administer ordered medications as needed  - Encourage mobilization and activity  - Nutrition services referral to assist patient with appropriate food choices  - Assess stoma site  - Consider wound care consult   Outcome: Progressing     Problem: GENITOURINARY - ADULT  Goal: Maintains or returns to baseline urinary function  Description: INTERVENTIONS:  - Assess urinary function  - Encourage oral fluids to ensure adequate hydration if ordered  - Administer IV fluids as ordered to ensure adequate hydration  - Administer ordered medications as needed  - Offer frequent toileting  - Follow urinary retention protocol if ordered  Outcome: Progressing  Goal: Absence of urinary retention  Description: INTERVENTIONS:  - Assess patients ability to void and empty bladder  - Monitor I/O  - Bladder scan as needed  - Discuss with physician/AP medications to alleviate retention as needed  - Discuss catheterization for long term situations as appropriate  Outcome: Progressing  Goal: Urinary catheter remains patent  Description: INTERVENTIONS:  - Assess patency of urinary catheter  - If patient has a chronic grover, consider changing catheter if non-functioning  - Follow guidelines for intermittent irrigation of non-functioning urinary catheter  Outcome: Progressing     Problem: CONFUSION/THOUGHT DISTURBANCE  Goal: Thought disturbances (confusion, delirium, depression, dementia or psychosis) are managed to maintain or return to baseline mental status and functional level  Description: INTERVENTIONS:  - Assess for possible contributors to  thought disturbance, including but not limited to medications, infection, impaired vision or hearing, underlying metabolic abnormalities, dehydration, respiratory compromise,  psychiatric diagnoses and notify attending PHYSICAN/AP  - Monitor and intervene to maintain adequate nutrition, hydration, elimination, sleep and activity  - Decrease environmental stimuli, including noise as appropriate  - Provide frequent contacts to provide refocusing, direction and reassurance as needed  Approach patient calmly with eye contact and at their level    - [place] high risk fall precautions, aspiration precautions and other safety measures, as indicated  - If delirium suspected, notify physician/AP of change in condition and request immediate in-person evaluation  - Pursue consults as appropriate including Geriatric (campus dependent), OT for cognitive evaluation/activity planning, psychiatric, pastoral care, etc   Outcome: Progressing     Problem: Nutrition/Hydration-ADULT  Goal: Nutrient/Hydration intake appropriate for improving, restoring or maintaining nutritional needs  Description: Monitor and assess patient's nutrition/hydration status for malnutrition  Collaborate with interdisciplinary team and initiate plan and interventions as ordered  Monitor patient's weight and dietary intake as ordered or per policy  Utilize nutrition screening tool and intervene as necessary  Determine patient's food preferences and provide high-protein, high-caloric foods as appropriate       INTERVENTIONS:  - Monitor oral intake, urinary output, labs, and treatment plans  - Assess nutrition and hydration status and recommend course of action  - Evaluate amount of meals eaten  - Assist patient with eating if necessary   - Allow adequate time for meals  - Recommend/ encourage appropriate diets, oral nutritional supplements, and vitamin/mineral supplements  - Order, calculate, and assess calorie counts as needed  - Recommend, monitor, and adjust tube feedings and TPN/PPN based on assessed needs  - Assess need for intravenous fluids  - Provide specific nutrition/hydration education as appropriate  - Include patient/family/caregiver in decisions related to nutrition  Outcome: Progressing     Problem: RESPIRATORY - ADULT  Goal: Achieves optimal ventilation and oxygenation  Description: INTERVENTIONS:  - Assess for changes in respiratory status  - Assess for changes in mentation and behavior  - Position to facilitate oxygenation and minimize respiratory effort  - Oxygen administered by appropriate delivery if ordered  - Initiate smoking cessation education as indicated  - Encourage broncho-pulmonary hygiene including cough, deep breathe, Incentive Spirometry  - Assess the need for suctioning and aspirate as needed  - Assess and instruct to report SOB or any respiratory difficulty  - Respiratory Therapy support as indicated  Outcome: Progressing     Problem: HEMATOLOGIC - ADULT  Goal: Maintains hematologic stability  Description: INTERVENTIONS  - Assess for signs and symptoms of bleeding or hemorrhage  - Monitor labs  - Administer supportive blood products/factors as ordered and appropriate  Outcome: Progressing

## 2020-08-03 NOTE — PLAN OF CARE
Problem: Potential for Falls  Goal: Patient will remain free of falls  Description: INTERVENTIONS:  - Assess patient frequently for physical needs  -  Identify cognitive and physical deficits and behaviors that affect risk of falls    -  [place] fall precautions as indicated by assessment   - Educate patient/family on patient safety including physical limitations  - Instruct patient to call for assistance with activity based on assessment  - Modify environment to reduce risk of injury  - Consider OT/PT consult to assist with strengthening/mobility  Outcome: Progressing     Problem: Prexisting or High Potential for Compromised Skin Integrity  Goal: Skin integrity is maintained or improved  Description: INTERVENTIONS:  - Identify patients at risk for skin breakdown  - Assess and monitor skin integrity  - Assess and monitor nutrition and hydration status  - Monitor labs   - Assess for incontinence   - Turn and reposition patient  - Assist with mobility/ambulation  - Relieve pressure over bony prominences  - Avoid friction and shearing  - Provide appropriate hygiene as needed including keeping skin clean and dry  - Evaluate need for skin moisturizer/barrier cream  - Collaborate with interdisciplinary team   - Patient/family teaching  - Consider wound care consult   Outcome: Progressing     Problem: PAIN - ADULT  Goal: Verbalizes/displays adequate comfort level or baseline comfort level  Description: Interventions:  - Encourage patient to monitor pain and request assistance  - Assess pain using appropriate pain scale  - Administer analgesics based on type and severity of pain and evaluate response  - Implement non-pharmacological measures as appropriate and evaluate response  - Consider cultural and social influences on pain and pain management  - Notify physician/advanced practitioner if interventions unsuccessful or patient reports new pain  Outcome: Progressing     Problem: SAFETY ADULT  Goal: Maintain or return to baseline ADL function  Description: INTERVENTIONS:  -  Assess patient's ability to carry out ADLs; assess patient's baseline for ADL function and identify physical deficits which impact ability to perform ADLs (bathing, care of mouth/teeth, toileting, grooming, dressing, etc )  - Assess/evaluate cause of self-care deficits   - Assess range of motion  - Assess patient's mobility; develop plan if impaired  - Assess patient's need for assistive devices and provide as appropriate  - Encourage maximum independence but intervene and supervise when necessary  - Involve family in performance of ADLs  - Assess for home care needs following discharge   - Consider OT consult to assist with ADL evaluation and planning for discharge  - Provide patient education as appropriate  Outcome: Progressing  Goal: Maintain or return mobility status to optimal level  Description: INTERVENTIONS:  - Assess patient's baseline mobility status (ambulation, transfers, stairs, etc )    - Identify cognitive and physical deficits and behaviors that affect mobility  - Identify mobility aids required to assist with transfers and/or ambulation (gait belt, sit-to-stand, lift, walker, cane, etc )  - [place] fall precautions as indicated by assessment  - Record patient progress and toleration of activity level on Mobility SBAR; progress patient to next Phase/Stage  - Instruct patient to call for assistance with activity based on assessment  - Consider rehabilitation consult to assist with strengthening/weightbearing, etc   Outcome: Progressing     Problem: GASTROINTESTINAL - ADULT  Goal: Minimal or absence of nausea and/or vomiting  Description: INTERVENTIONS:  - Administer IV fluids if ordered to ensure adequate hydration  - Maintain NPO status until nausea and vomiting are resolved  - Nasogastric tube if ordered  - Administer ordered antiemetic medications as needed  - Provide nonpharmacologic comfort measures as appropriate  - Advance diet as tolerated, if ordered  - Consider nutrition services referral to assist patient with adequate nutrition and appropriate food choices  Outcome: Progressing  Goal: Maintains or returns to baseline bowel function  Description: INTERVENTIONS:  - Assess bowel function  - Encourage oral fluids to ensure adequate hydration  - Administer IV fluids if ordered to ensure adequate hydration  - Administer ordered medications as needed  - Encourage mobilization and activity  - Consider nutritional services referral to assist patient with adequate nutrition and appropriate food choices  Outcome: Progressing  Goal: Maintains adequate nutritional intake  Description: INTERVENTIONS:  - Monitor percentage of each meal consumed  - Identify factors contributing to decreased intake, treat as appropriate  - Assist with meals as needed  - Monitor I&O, weight, and lab values if indicated  - Obtain nutrition services referral as needed  Outcome: Progressing  Goal: Establish and maintain optimal ostomy function  Description: INTERVENTIONS:  - Assess bowel function  - Encourage oral fluids to ensure adequate hydration  - Administer IV fluids if ordered to ensure adequate hydration   - Administer ordered medications as needed  - Encourage mobilization and activity  - Nutrition services referral to assist patient with appropriate food choices  - Assess stoma site  - Consider wound care consult   Outcome: Progressing     Problem: GENITOURINARY - ADULT  Goal: Maintains or returns to baseline urinary function  Description: INTERVENTIONS:  - Assess urinary function  - Encourage oral fluids to ensure adequate hydration if ordered  - Administer IV fluids as ordered to ensure adequate hydration  - Administer ordered medications as needed  - Offer frequent toileting  - Follow urinary retention protocol if ordered  Outcome: Progressing  Goal: Absence of urinary retention  Description: INTERVENTIONS:  - Assess patients ability to void and empty bladder  - Monitor I/O  - Bladder scan as needed  - Discuss with physician/AP medications to alleviate retention as needed  - Discuss catheterization for long term situations as appropriate  Outcome: Progressing  Goal: Urinary catheter remains patent  Description: INTERVENTIONS:  - Assess patency of urinary catheter  - If patient has a chronic grover, consider changing catheter if non-functioning  - Follow guidelines for intermittent irrigation of non-functioning urinary catheter  Outcome: Progressing     Problem: CONFUSION/THOUGHT DISTURBANCE  Goal: Thought disturbances (confusion, delirium, depression, dementia or psychosis) are managed to maintain or return to baseline mental status and functional level  Description: INTERVENTIONS:  - Assess for possible contributors to  thought disturbance, including but not limited to medications, infection, impaired vision or hearing, underlying metabolic abnormalities, dehydration, respiratory compromise,  psychiatric diagnoses and notify attending PHYSICAN/AP  - Monitor and intervene to maintain adequate nutrition, hydration, elimination, sleep and activity  - Decrease environmental stimuli, including noise as appropriate  - Provide frequent contacts to provide refocusing, direction and reassurance as needed  Approach patient calmly with eye contact and at their level    - [place] high risk fall precautions, aspiration precautions and other safety measures, as indicated  - If delirium suspected, notify physician/AP of change in condition and request immediate in-person evaluation  - Pursue consults as appropriate including Geriatric (campus dependent), OT for cognitive evaluation/activity planning, psychiatric, pastoral care, etc   Outcome: Progressing     Problem: Nutrition/Hydration-ADULT  Goal: Nutrient/Hydration intake appropriate for improving, restoring or maintaining nutritional needs  Description: Monitor and assess patient's nutrition/hydration status for malnutrition  Collaborate with interdisciplinary team and initiate plan and interventions as ordered  Monitor patient's weight and dietary intake as ordered or per policy  Utilize nutrition screening tool and intervene as necessary  Determine patient's food preferences and provide high-protein, high-caloric foods as appropriate       INTERVENTIONS:  - Monitor oral intake, urinary output, labs, and treatment plans  - Assess nutrition and hydration status and recommend course of action  - Evaluate amount of meals eaten  - Assist patient with eating if necessary   - Allow adequate time for meals  - Recommend/ encourage appropriate diets, oral nutritional supplements, and vitamin/mineral supplements  - Order, calculate, and assess calorie counts as needed  - Recommend, monitor, and adjust tube feedings and TPN/PPN based on assessed needs  - Assess need for intravenous fluids  - Provide specific nutrition/hydration education as appropriate  - Include patient/family/caregiver in decisions related to nutrition  Outcome: Progressing     Problem: RESPIRATORY - ADULT  Goal: Achieves optimal ventilation and oxygenation  Description: INTERVENTIONS:  - Assess for changes in respiratory status  - Assess for changes in mentation and behavior  - Position to facilitate oxygenation and minimize respiratory effort  - Oxygen administered by appropriate delivery if ordered  - Initiate smoking cessation education as indicated  - Encourage broncho-pulmonary hygiene including cough, deep breathe, Incentive Spirometry  - Assess the need for suctioning and aspirate as needed  - Assess and instruct to report SOB or any respiratory difficulty  - Respiratory Therapy support as indicated  Outcome: Progressing     Problem: HEMATOLOGIC - ADULT  Goal: Maintains hematologic stability  Description: INTERVENTIONS  - Assess for signs and symptoms of bleeding or hemorrhage  - Monitor labs  - Administer supportive blood products/factors as ordered and appropriate  Outcome: Progressing

## 2020-08-03 NOTE — PLAN OF CARE
Problem: PHYSICAL THERAPY ADULT  Goal: Performs mobility at highest level of function for planned discharge setting  See evaluation for individualized goals  Description: Treatment/Interventions: OT, Spoke to case management, Spoke to nursing, Gait training, Bed mobility, Patient/family training, Endurance training, LE strengthening/ROM, Functional transfer training          See flowsheet documentation for full assessment, interventions and recommendations  Note: Prognosis: Good  Problem List: Decreased strength, Decreased range of motion, Decreased endurance, Impaired balance, Decreased mobility, Decreased safety awareness, Pain, Decreased coordination, Decreased cognition, Impaired judgement, Impaired tone  Assessment: Pt is 69 y o  male seen for PT evaluation s/p admit to [facility] on 7/30/2020 w/ Acute superficial gastritis with hemorrhage s/p ex lap for ischemic bowel, s/p externalized  shunt  PT consulted to assess pt's functional mobility and d/c needs  Order placed for PT eval and tx  Comorbidities affecting pt's physical performance at time of assessment include:  has a past medical history of Cancer ([facility] ), Diabetes mellitus ([facility] ), DM2 (diabetes mellitus, type 2) ([facility] ), Gout, HTN (hypertension), Hydrocephalus ([facility] ), Hypertension, Neuropathy, ZHOU on CPAP, Pressure injury of skin, and TIA (transient ischemic attack)  PTA, pt was ambulates household distances and using rollator for mobility  Lives with roomate who works    Personal factors affecting pt at time of IE include: inaccessible home environment, ambulating w/ assistive device, inability to ambulate household distances, limited home support, positive fall history, decreased initiation and engagement, unable to perform physical activity, inability to perform IADLs and inability to perform ADLs   Please find objective findings from PT assessment regarding body systems outlined above with impairments and limitations including weakness, impaired balance, decreased endurance, impaired coordination, gait deviations, pain, decreased activity tolerance, decreased functional mobility tolerance, decreased safety awareness, fall risk, impaired tone and decreased cognition  Pt required LE management for bed mobility with decreased strength  Poor sitting balance requiring constant A to maintain upright despite verbal and tactile instruction  Attempted sit<>stand although unable to clear buttock with decreased strength and balance  The following objective measures performed on IE also reveal limitations: Barthel Index: 20/100  Pt's clinical presentation is currently unstable/unpredictable seen in pt's presentation of critical care monitoring  Pt to benefit from continued PT tx to address deficits as defined above and maximize level of functional independent mobility and consistency  From PT/mobility standpoint, recommendation at time of d/c would be IP rehab pending progress in order to facilitate return to PLOF  Barriers to Discharge: Inaccessible home environment, Decreased caregiver support     PT Discharge Recommendation: [id number]     PT - OK to Discharge: Yes    See flowsheet documentation for full assessment

## 2020-08-03 NOTE — PLAN OF CARE
Problem: OCCUPATIONAL THERAPY ADULT  Goal: Performs self-care activities at highest level of function for planned discharge setting  See evaluation for individualized goals  Description: Treatment Interventions: ADL retraining, Visual perceptual retraining, Functional transfer training, UE strengthening/ROM, Endurance training, Cognitive reorientation, Patient/family training, Equipment evaluation/education, Fine motor coordination activities, Compensatory technique education, Continued evaluation, Energy conservation, Activityengagement          See flowsheet documentation for full assessment, interventions and recommendations  Note: Limitation: Decreased ADL status, Decreased UE ROM, Decreased UE strength, Decreased Safe judgement during ADL, Decreased cognition, Decreased endurance, Visual deficit, Decreased fine motor control, Decreased self-care trans, Decreased high-level ADLs, Mood limitation  Prognosis: Fair  Assessment: Pt is a 68 y/o male seen for OT eval s/p adm to [facility] w/ complaints of vomiting blood  Pt is dx'd w/ acute superficial gastritis w/ hemorrhage  Pt is s/p the following procedure "LAPAROTOMY EXPLORATORY (N/A)" performed on 7/31, s/p "LAPAROTOMY EXPLORATORY: Externalizes  shunt Abthera application (N/A); ARTERIOGRAM Of SMA and placement of Papavarine onfusion arterial catheter (N/A) ; Exploration of SMA; Angiogram SMA; Limited angiogram Left lower extremity" performed 7/31; s/p "LAPAROTOMY EXPLORATORY, (N/A)abd washout (N/A)ARTERIOGRAM; cath removal (Left)CLOSURE WOUNDABDOMINAL/TRUNK (N/A)" performed 8/1 and 8/2  Pt  has a past medical history of Cancer ([facility] ), Diabetes mellitus ([facility] ), DM2 (diabetes mellitus, type 2) ([facility] ), Gout, HTN (hypertension), Hydrocephalus ([facility] ), Hypertension, Neuropathy, ZHOU on CPAP, Pressure injury of skin, and TIA (transient ischemic attack)  Pt with active OT orders and up with assistance  orders  Pt lives with a friend in 2 , 3 [address], bed/bath 2nd floor   Pt was RONNY w/  ADLS and most IADLS, drove, & required use of DME PTA including rollator, BSC and grab bars  Pt is currently demonstrating the following occupational deficits: Max A UB ADLS, Total A LB ADLS, Max A x2 bed mobility, Total A x2 attempted STS transfer from EOB w/ HHA  These deficits that are impacting pt's baseline areas of occupation are a result of the following impairments: pain, endurance, activity tolerance, functional mobility, forward functional reach, balance, trunk control, functional standing tolerance, unsupportive home environment, decreased I w/ ADLS/IADLS, strength, ROM, visual deficits, cognitive impairments, decreased safety awareness, decreased insight into deficits, impaired fine motor skills and coordination deficits  The following Occupational Performance Areas to address include: eating, grooming, bathing/shower, toilet hygiene, dressing, medication management, health maintenance, functional mobility, community mobility, clothing management and household maintenance  Pt scored overall 20/100 on the Barthel Index  Based on the aforementioned OT evaluation, functional performance deficits, and assessments, pt has been identified as a high complexity evaluation  Recommend STR upon D/C, when medically stable   Pt to continue to benefit from acute immediate OT services to address the following goals 3-5x/week to  w/in 10-14 days:      OT Discharge Recommendation: Post-Acute Rehabilitation Services  OT - OK to Discharge: Yes(when medically stable)  Heather Nava MS, OTR/L

## 2020-08-04 LAB
ANION GAP SERPL CALCULATED.3IONS-SCNC: 4 MMOL/L (ref 4–13)
ANISOCYTOSIS BLD QL SMEAR: PRESENT
APPEARANCE CSF: ABNORMAL
BASOPHILS # BLD MANUAL: 0.09 THOUSAND/UL (ref 0–0.1)
BASOPHILS NFR MAR MANUAL: 1 % (ref 0–1)
BUN SERPL-MCNC: 19 MG/DL (ref 5–25)
CALCIUM SERPL-MCNC: 7.9 MG/DL (ref 8.3–10.1)
CHLORIDE SERPL-SCNC: 111 MMOL/L (ref 100–108)
CO2 SERPL-SCNC: 27 MMOL/L (ref 21–32)
CREAT SERPL-MCNC: 0.72 MG/DL (ref 0.6–1.3)
EOSINOPHIL # BLD MANUAL: 0.09 THOUSAND/UL (ref 0–0.4)
EOSINOPHIL NFR BLD MANUAL: 1 % (ref 0–6)
ERYTHROCYTE [DISTWIDTH] IN BLOOD BY AUTOMATED COUNT: 14.5 % (ref 11.6–15.1)
GFR SERPL CREATININE-BSD FRML MDRD: 94 ML/MIN/1.73SQ M
GLUCOSE CSF-MCNC: 80 MG/DL (ref 50–80)
GLUCOSE SERPL-MCNC: 124 MG/DL (ref 65–140)
GLUCOSE SERPL-MCNC: 126 MG/DL (ref 65–140)
GLUCOSE SERPL-MCNC: 127 MG/DL (ref 65–140)
GLUCOSE SERPL-MCNC: 128 MG/DL (ref 65–140)
GLUCOSE SERPL-MCNC: 143 MG/DL (ref 65–140)
GRAM STN SPEC: ABNORMAL
GRAM STN SPEC: ABNORMAL
GRAM STN SPEC: NORMAL
HCT VFR BLD AUTO: 33.5 % (ref 36.5–49.3)
HGB BLD-MCNC: 10.7 G/DL (ref 12–17)
LYMPHOCYTES # BLD AUTO: 0.51 THOUSAND/UL (ref 0.6–4.47)
LYMPHOCYTES # BLD AUTO: 6 % (ref 14–44)
LYMPHOCYTES NFR CSF MANUAL: 9 %
MAGNESIUM SERPL-MCNC: 2.2 MG/DL (ref 1.6–2.6)
MCH RBC QN AUTO: 27.9 PG (ref 26.8–34.3)
MCHC RBC AUTO-ENTMCNC: 31.9 G/DL (ref 31.4–37.4)
MCV RBC AUTO: 87 FL (ref 82–98)
METAMYELOCYTES NFR BLD MANUAL: 1 % (ref 0–1)
MONOCYTES # BLD AUTO: 0.26 THOUSAND/UL (ref 0–1.22)
MONOCYTES NFR BLD: 3 % (ref 4–12)
MONOS+MACROS CSF MANUAL: 15 %
NEUTROPHILS # BLD MANUAL: 7.06 THOUSAND/UL (ref 1.85–7.62)
NEUTROPHILS NFR CSF MANUAL: 76 %
NEUTS SEG NFR BLD AUTO: 83 % (ref 43–75)
NRBC BLD AUTO-RTO: 0 /100 WBCS
PHOSPHATE SERPL-MCNC: 2 MG/DL (ref 2.3–4.1)
PLATELET # BLD AUTO: 241 THOUSANDS/UL (ref 149–390)
PLATELET BLD QL SMEAR: ADEQUATE
PLATELET CLUMP BLD QL SMEAR: PRESENT
PMV BLD AUTO: 10 FL (ref 8.9–12.7)
POIKILOCYTOSIS BLD QL SMEAR: PRESENT
POLYCHROMASIA BLD QL SMEAR: PRESENT
POTASSIUM SERPL-SCNC: 3.4 MMOL/L (ref 3.5–5.3)
PROT CSF-MCNC: 50 MG/DL (ref 15–45)
RBC # BLD AUTO: 3.84 MILLION/UL (ref 3.88–5.62)
RBC # CSF MANUAL: 0 UL (ref 0–10)
RBC MORPH BLD: PRESENT
SODIUM SERPL-SCNC: 142 MMOL/L (ref 136–145)
TOTAL CELLS COUNTED BLD: NO
TOTAL CELLS COUNTED SPEC: 100
VARIANT LYMPHS # BLD AUTO: 5 %
WBC # BLD AUTO: 8.51 THOUSAND/UL (ref 4.31–10.16)
WBC # CSF AUTO: 19 /UL (ref 0–5)
WBC TOXIC VACUOLES BLD QL SMEAR: PRESENT

## 2020-08-04 PROCEDURE — 87070 CULTURE OTHR SPECIMN AEROBIC: CPT | Performed by: NEUROLOGICAL SURGERY

## 2020-08-04 PROCEDURE — 87186 SC STD MICRODIL/AGAR DIL: CPT | Performed by: NEUROLOGICAL SURGERY

## 2020-08-04 PROCEDURE — 99233 SBSQ HOSP IP/OBS HIGH 50: CPT | Performed by: SURGERY

## 2020-08-04 PROCEDURE — 89050 BODY FLUID CELL COUNT: CPT | Performed by: NEUROLOGICAL SURGERY

## 2020-08-04 PROCEDURE — 82948 REAGENT STRIP/BLOOD GLUCOSE: CPT

## 2020-08-04 PROCEDURE — 99233 SBSQ HOSP IP/OBS HIGH 50: CPT | Performed by: INTERNAL MEDICINE

## 2020-08-04 PROCEDURE — 99024 POSTOP FOLLOW-UP VISIT: CPT | Performed by: NEUROLOGICAL SURGERY

## 2020-08-04 PROCEDURE — 84100 ASSAY OF PHOSPHORUS: CPT | Performed by: REGISTERED NURSE

## 2020-08-04 PROCEDURE — 0JWT0JZ REVISION OF SYNTHETIC SUBSTITUTE IN TRUNK SUBCUTANEOUS TISSUE AND FASCIA, OPEN APPROACH: ICD-10-PCS | Performed by: NEUROLOGICAL SURGERY

## 2020-08-04 PROCEDURE — 82945 GLUCOSE OTHER FLUID: CPT | Performed by: NEUROLOGICAL SURGERY

## 2020-08-04 PROCEDURE — 94660 CPAP INITIATION&MGMT: CPT

## 2020-08-04 PROCEDURE — 84157 ASSAY OF PROTEIN OTHER: CPT | Performed by: NEUROLOGICAL SURGERY

## 2020-08-04 PROCEDURE — 62230 REPLACE/REVISE BRAIN SHUNT: CPT | Performed by: NEUROLOGICAL SURGERY

## 2020-08-04 PROCEDURE — 85007 BL SMEAR W/DIFF WBC COUNT: CPT | Performed by: REGISTERED NURSE

## 2020-08-04 PROCEDURE — 80048 BASIC METABOLIC PNL TOTAL CA: CPT | Performed by: REGISTERED NURSE

## 2020-08-04 PROCEDURE — 87077 CULTURE AEROBIC IDENTIFY: CPT | Performed by: NEUROLOGICAL SURGERY

## 2020-08-04 PROCEDURE — 94760 N-INVAS EAR/PLS OXIMETRY 1: CPT

## 2020-08-04 PROCEDURE — 89051 BODY FLUID CELL COUNT: CPT | Performed by: NEUROLOGICAL SURGERY

## 2020-08-04 PROCEDURE — NC001 PR NO CHARGE: Performed by: SURGERY

## 2020-08-04 PROCEDURE — 83735 ASSAY OF MAGNESIUM: CPT | Performed by: REGISTERED NURSE

## 2020-08-04 PROCEDURE — 85027 COMPLETE CBC AUTOMATED: CPT | Performed by: REGISTERED NURSE

## 2020-08-04 RX ORDER — SODIUM CHLORIDE, SODIUM GLUCONATE, SODIUM ACETATE, POTASSIUM CHLORIDE, MAGNESIUM CHLORIDE, SODIUM PHOSPHATE, DIBASIC, AND POTASSIUM PHOSPHATE .53; .5; .37; .037; .03; .012; .00082 G/100ML; G/100ML; G/100ML; G/100ML; G/100ML; G/100ML; G/100ML
100 INJECTION, SOLUTION INTRAVENOUS CONTINUOUS
Status: DISCONTINUED | OUTPATIENT
Start: 2020-08-05 | End: 2020-08-05

## 2020-08-04 RX ORDER — LIDOCAINE HYDROCHLORIDE 20 MG/ML
INJECTION, SOLUTION EPIDURAL; INFILTRATION; INTRACAUDAL; PERINEURAL
Status: COMPLETED
Start: 2020-08-04 | End: 2020-08-04

## 2020-08-04 RX ORDER — LIDOCAINE HYDROCHLORIDE 20 MG/ML
10 INJECTION, SOLUTION INFILTRATION; PERINEURAL ONCE
Status: DISCONTINUED | OUTPATIENT
Start: 2020-08-04 | End: 2020-08-04 | Stop reason: SDUPTHER

## 2020-08-04 RX ORDER — LIDOCAINE HYDROCHLORIDE 20 MG/ML
INJECTION, SOLUTION EPIDURAL; INFILTRATION; INTRACAUDAL; PERINEURAL
Status: DISPENSED
Start: 2020-08-04 | End: 2020-08-05

## 2020-08-04 RX ORDER — POTASSIUM CHLORIDE 20MEQ/15ML
40 LIQUID (ML) ORAL ONCE
Status: COMPLETED | OUTPATIENT
Start: 2020-08-04 | End: 2020-08-04

## 2020-08-04 RX ORDER — LIDOCAINE HYDROCHLORIDE 20 MG/ML
10 INJECTION, SOLUTION INFILTRATION; PERINEURAL ONCE
Status: COMPLETED | OUTPATIENT
Start: 2020-08-04 | End: 2020-08-04

## 2020-08-04 RX ADMIN — HEPARIN SODIUM 5000 UNITS: 5000 INJECTION INTRAVENOUS; SUBCUTANEOUS at 21:01

## 2020-08-04 RX ADMIN — CEFEPIME HYDROCHLORIDE 2000 MG: 2 INJECTION, POWDER, FOR SOLUTION INTRAVENOUS at 13:20

## 2020-08-04 RX ADMIN — POTASSIUM CHLORIDE 40 MEQ: 1.5 SOLUTION ORAL at 08:38

## 2020-08-04 RX ADMIN — LIDOCAINE HYDROCHLORIDE 5 ML: 20 INJECTION, SOLUTION EPIDURAL; INFILTRATION; INTRACAUDAL; PERINEURAL at 13:12

## 2020-08-04 RX ADMIN — CHLORHEXIDINE GLUCONATE 0.12% ORAL RINSE 15 ML: 1.2 LIQUID ORAL at 08:38

## 2020-08-04 RX ADMIN — HEPARIN SODIUM 5000 UNITS: 5000 INJECTION INTRAVENOUS; SUBCUTANEOUS at 05:44

## 2020-08-04 RX ADMIN — CEFEPIME HYDROCHLORIDE 2000 MG: 2 INJECTION, POWDER, FOR SOLUTION INTRAVENOUS at 05:00

## 2020-08-04 RX ADMIN — HEPARIN SODIUM 5000 UNITS: 5000 INJECTION INTRAVENOUS; SUBCUTANEOUS at 13:20

## 2020-08-04 RX ADMIN — LIDOCAINE HYDROCHLORIDE 10 ML: 20 INJECTION, SOLUTION EPIDURAL; INFILTRATION; INTRACAUDAL; PERINEURAL at 12:00

## 2020-08-04 RX ADMIN — POTASSIUM & SODIUM PHOSPHATES POWDER PACK 280-160-250 MG 2 PACKET: 280-160-250 PACK at 08:38

## 2020-08-04 RX ADMIN — CHLORHEXIDINE GLUCONATE 0.12% ORAL RINSE 15 ML: 1.2 LIQUID ORAL at 21:01

## 2020-08-04 RX ADMIN — LIDOCAINE HYDROCHLORIDE 10 ML: 20 INJECTION, SOLUTION INFILTRATION; PERINEURAL at 12:00

## 2020-08-04 RX ADMIN — LIDOCAINE HYDROCHLORIDE 5 ML: 20 INJECTION, SOLUTION INFILTRATION; PERINEURAL at 13:12

## 2020-08-04 RX ADMIN — POTASSIUM & SODIUM PHOSPHATES POWDER PACK 280-160-250 MG 2 PACKET: 280-160-250 PACK at 16:25

## 2020-08-04 RX ADMIN — CEFEPIME HYDROCHLORIDE 2000 MG: 2 INJECTION, POWDER, FOR SOLUTION INTRAVENOUS at 21:01

## 2020-08-04 NOTE — PROGRESS NOTES
Progress Note - Infectious Disease   Lexi Portillo 69 y o  male MRN: [id number]  Unit/Bed#: ICU 12 Encounter: [id number]      Impression/Plan:  1  Severe sepsis, tachycardia, leukocytosis, elevated lactic acid   With early development of high fevers   Secondary to  shunt infection with meningitis and peritonitis  Not on vasopressors   Blood cultures are negative thus far    Fevers and WBC count now improving on IV antibiotic   -antibiotic plan as below  -monitor temperatures and hemodynamics  -follow-up blood cultures  -recheck CBC with diff and BMP  -supportive care per Critical Care service     2   shunt infection  Fluid WBC count was 61 with neutrophil predominance  Gram stain from shunt fluid shows GNRs and culture grew Pseudomonas    Suspect secondary to peritonitis in the setting of #3   Status post externalization of  shunt on 07/30  Repeat CSF analysis shows slightly decreased neutrophil predominant pleocytosis  Repeat CSF fluid from the distal externalized shunt still with gram-negative rods  -continue IV cefepime 2 g q 8 hours  -plan for 6 week course of antibiotic  -neurosurgery follow-up ongoing  -patient will need externalization higher up at the 2nd or 3rd intercostal space and followup CSF surveillance as per Neurosurgery  -may need complete removal of the  shunt if unable to clear  -if attempt to salvage shunt, still risk of relapse after prolonged treatment course  -follow up repeat CSF culture     3  Ischemic bowel with peritonitis   Status post exploratory laparotomy, VAC placement   Status post femoral artery and SMA artery cannulation for paraverine by vascular surgery   Suspect intra-abdominal infection is etiology of #2   Status post second-look on 08/01 with no resection performed   Peritoneal fluid culture also shows Pseudomonas    Distal externalized shunt still has gram-negative rods  -antibiotic plan as above  -vascular surgery/general surgical follow-up ongoing  -may need complete removal of shunt     4  NPH requiring  shunt placement in , status post recent revision in 2019       5  Lumbar spinal stenosis status post L5-S1 fusion on 2020      6  Diabetes mellitus with neuropathy  Have discussed the above management plan with Neurosurgery    Antibiotics:  Cefepime 5    Subjective:  Patient has no fever, chills, sweats; no reported vomiting, diarrhea; no report cough, shortness of breath; no report pain  No new symptoms  Objective:  Vitals:  Temp:  [98 °F (36 7 °C)-98 8 °F (37 1 °C)] 98 °F (36 7 °C)  HR:  [50-70] 58  Resp:  [13-27] 27  BP: (135)/(62) 135/62  SpO2:  [90 %-96 %] 90 %  Temp (24hrs), Av 4 °F (36 9 °C), Min:98 °F (36 7 °C), Max:98 8 °F (37 1 °C)  Current: Temperature: 98 °F (36 7 °C)    Physical Exam:   General Appearance:  Somnolent, arousable, answers simple yes no questions, nontoxic, no acute distress  Throat: Oropharynx moist without lesions  Lungs:   Decreased breath sounds bilaterally; no wheezes, rhonchi or rales; respirations unlabored   Heart:  RRR; no murmur, rub or gallop   Abdomen:   Soft, non-tender, non-distended, positive bowel sounds  Extremities: No clubbing, cyanosis or edema   Skin: No new rashes or lesions  No draining wounds noted         Labs, Imaging, & Other studies:   All pertinent labs and imaging studies were personally reviewed  Results from last 7 days   Lab Units 20  0609 20  0533   WBC Thousand/uL 8 51 8 97 10 89*   HEMOGLOBIN g/dL 10 7* 10 9* 10 8*   PLATELETS Thousands/uL 241 229 218     Results from last 7 days   Lab Units 20  04220  0609 20  0533  20  0745  20  2325   SODIUM mmol/L 142 141 143   < >  --   --  138   POTASSIUM mmol/L 3 4* 3 7 4 2   < >  --   --  4 9   CHLORIDE mmol/L 111* 112* 113*   < >  --   --  104   CO2 mmol/L 27 26 23   < >  --   --  26   CO2, I-STAT mmol/L  --   --   --   --  20*   < >  --    BUN mg/dL 19 24 26*   < >  -- --  32*   CREATININE mg/dL 0 72 0 86 0 97   < >  --   --  1 21   EGFR ml/min/1 73sq m 94 87 78   < >  --   --  60   GLUCOSE, ISTAT mg/dl  --   --   --   --  132   < >  --    CALCIUM mg/dL 7 9* 8 0* 7 8*   < >  --   --  9 7   AST U/L  --   --  24  --   --   --  39   ALT U/L  --   --  16  --   --   --  19   ALK PHOS U/L  --   --  54  --   --   --  91    < > = values in this interval not displayed  Results from last 7 days   Lab Units 08/03/20  1738 08/03/20  0845 08/01/20  0548 07/31/20  1411 07/31/20  1251 07/31/20  0622   BLOOD CULTURE   --   --   --   --  No Growth at 72 hrs    No Growth at 72 hrs   --    SPUTUM CULTURE   --   --  1 colony Candida albicans*  --   --   --    GRAM STAIN RESULT  No polys seen*  3+ Gram negative rods*  Smear reviewed by Microbiology staff* No Polys or Bacteria seen 2+ Polys  1+ Epithelial cells per low power field  No bacteria seen 1+ Polys*  4+ Gram negative rods*  --  No Polys or Bacteria seen   BODY FLUID CULTURE, STERILE   --   --   --   --   --  1+ Growth of Pseudomonas aeruginosa*

## 2020-08-04 NOTE — PROGRESS NOTES
Progress Note - Bo Gage 69 y o  male MRN: [id number]    Unit/Bed#: ICU 12 Encounter: [id number]      Assessment:  Pt is a 70 y/o M w DELIA s/p 7/31 ex lap,   shunt externalization, abthera vac placement, and mesenteric agram wih SMA, papverine, heparin gtt, s/p 7/1 2nd look laparotomy, abdominal wall closure, sheath removal     Plan:  · Continue clear liquids until return of bowel function  · Continue abx cefepime appreciate ID recs, will need 6 week course  Blood cultures no growth to date times 72 hours, CSF cultures growing gram-negative rods  · Will discontinue grover today  · Discontinue arterial line  · Out of bed, ambulate  · Continue to follow CSF shunt output, appreciate Neurosurgery  · Rest of care per critical care    Subjective:   No acute events  Tolerating clears  Denies BM/flatus  Objective:     Vitals: Blood pressure 116/59, pulse (!) 50, temperature 98 6 °F (37 °C), temperature source Oral, resp  rate (!) 24, height 6' 3", weight 126 kg (277 lb 12 5 oz), SpO2 95 %  ,Body mass index is 34 72 kg/m²  Intake/Output Summary (Last 24 hours) at 8/4/2020 0557  Last data filed at 8/4/2020 0542  Gross per 24 hour   Intake 720 ml   Output 1760 ml   Net -1040 ml       Physical Exam  GEN: NAD  HEENT: MMM  CV:  Warm/well perfused  Lung: normal effort  Ab: Soft, NT/ND  Incision clean dry intact with staples in place  Extrem: No CCE  Neuro:  A+Ox3, motor and sensation grossly intact    Scheduled Meds:acetaminophen, 650 mg, Oral, Q6H PRN, Ya Spear PA-C  cefepime, 2,000 mg, Intravenous, Q8H, Ya Spear PA-C, Last Rate: Stopped (08/04/20 0542)  chlorhexidine, 15 mL, Swish & Spit, Q12H [address], Ya Spear PA-C  heparin (porcine), 5,000 Units, Subcutaneous, Q8H [address], Lv Alejandra PA-C  HYDROmorphone, 0 5 mg, Intravenous, Q3H PRN, Ya Spear PA-C  insulin lispro, 2-12 Units, Subcutaneous, 4x Daily (AC & HS), Lv Alejandra PA-C  iodixanol, 15 mL, Intravenous, Once in imaging, Heidy Moe EVI Alejandra  oxyCODONE, 5 mg, Oral, Q4H PRN, Marquise Contreras PA-C      Continuous Infusions:   PRN Meds:   acetaminophen    HYDROmorphone    iodixanol    oxyCODONE      Invasive Devices     Peripheral Intravenous Line            Peripheral IV 07/31/20 Right Hand 4 days    Peripheral IV 08/03/20 Left Arm less than 1 day          Arterial Line            Arterial Line 07/31/20 Left Radial 3 days          Drain            Urethral Catheter Latex 16 Fr  4 days    Ventriculostomy/Subdural Ventricular drainage catheter with ICP microsensor Right Other (Comment) 3 days                Lab, Imaging and other studies: I have personally reviewed pertinent reports      VTE Pharmacologic Prophylaxis: Sequential compression device (Venodyne)   VTE Mechanical Prophylaxis: sequential compression device

## 2020-08-04 NOTE — PROCEDURES
Procedures     Operative Note    08/04/20    Khloe Rangel    69 y o  male     Date of birth 1950     ICU 12    Preop Diagnosis:  1  Externalization of right-sided  shunt system through lateral anterior chest wall T9-10 rib level out laterally to anterior axillary line ribs 9- 10 level with Pudenz straight connector to Codman Bactiseal 20 cm interval length of EVD tubing to intra closed CSF buretrol monitoring system on 7/31/2020  2  S/p Peritonitis  3  Ischemic proximal small bowel  4  Status post emergency ex lap Dr Justin Montgomery , General Surgery  5  Midline VAC dressing  6  Status post femoral artery and SMA artery cannulation for papaverine vascular surgery   7  Peritoneal fluid growing out Pseudomonas aeruginosa  8  Externalized CSF fluid also growing pneumonia saw no 7/31/2020 and on 08/03/2022  9  CSF sampling from proximal right frontal reservoir of Codman Hakim  system Gram stain negative 8/3/2020 at 0845 a m , final cultures await  10  CSF surveillance from right externalized drain at  right lower anterior chest wall at ribs 9 and 10 level showing continued 3+ DNR positive from 1738 hours on 8/3/2020 - culture awaited, likely Pseudomonas    Postop Diagnosis:   same    Title of procedure:    1  Revision of  right-sided externalized shunt system:  Further repositioning at higher location in right anterior chest wall of externalized  Ventriculoperitoneal Shunt System  2  Removal of distal descending 30 cm segment  of contaminated  shunt peritoneal tube    Terminus exit site of re-tunneled externalized Codman Bactiseal distal peritoneal end tubing at right second intercostal space medially    Surgeon: Linn Christian MD    Assistants: None    No qualified resident was available to assist with this case       Indications:  39-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 has right-sided  shunt system that had to be externaliesed secondary to acute abdomen with peritoneal peritonitis      Proximal small bowel estimated had had evidence of ischemia  Abdomen was left open with VAC dressing     Second look surgery on 08/02/2020 showed viability so no resection carried out       Status post ex lap 7/31/2020     Distal end of tubing comes out anterior chest wall in anterior axillary line at ribs 9 - 10 level  Drains into closed Integra Monitorr system     Surveillance CSF sample yesterday of distal CSF coming out externalized shunt  via sterile access port  still shows CSF with 3+ GNR consistent with Pseudomonas viz     Results LATER IN DAY yesterday at 1738 hours from CSF sample for slightly yellow CSF from closed distal descending peritoneal tubing from externalized shunt show:  · CSF WBC 82 with 93%N and 7L   · CSF glucose 85  · CSF protein 49  · Gram stain no polys 3+ GNR  · CSF for culture awaited     So externalized shunt and lower anterior chest wall level with ribs 9 in 10 still has positive GNR    So needs further revision with a high level of externalization second intercostal space    So a high level of division of externalized shunt tubing and revision with bringing out through right second intercostal space is indicated  This is done to remove lower segment of tubing likely still contaminated by Pseudomonas in the soft tissue also may be within the tubing having ascended up while still in the peritoneal cavity when that was peritonitis  However no guarantee that this new higher level would be free from Pseudomonas in or around the descending tubing  Anesthesia:  Local 1% lidocaine 1 in 200,000 epinephrine    Procedure Details: The patient was positioned supine with 10° head of bed up  Body turned slightly to the left supporting measures right side         The shunt tubing was palpated over the medial aspect of the right carotid after careful inspection of the shunt survey xrays and/or Ct scan     The site chosen was right-sided second intercostal space medially    The externalization site chosen and surrounding region was prepped with Betadine in the usual sterile standard fashion and this was allowed to dry  A time-out was occasioned  with patient's primary nurses Ruben Rosenbaum and Steve Perez    The identity of the patient was confirmed  The imaging was reviewed  The procedure explained to the patient  He was in agreement  All were in agreement to proceed    The incision site lower down was exposed and dressings where the  lower externalized shunt was coming out from the subcutaneous tissues in the low right-sided chest roll at ribs level 9 and 10 were removed and the area prepped with Betadine also  The stabilizing retention silicone bone around the drainage tubing was freed and removed so the distal end of the drain could be readily divided and brought up through the anterior chest wall to the new proposed incision second intercostal space    The right-sided neck shunt tubing was palpated as it passed over the medial aspect of the right clavicle into the first intercostal space and then sewn down over the second rib    The area of the proposed incision site was infiltrated and laterally on either side using 1% lidocaine 1 in 100% epinephrine    Then, using a 15 blade a transverse skin incision was made in the right medial second intercostal space of where the shunt tubing was dipping down and palpable    The incision was deepened with sharp and blunt dissection to locate the pseudo sheath around the shunt tubing  This was then opened with a 15  Blade catheter to involve avoid incision into the Bactiseal shunt tubing passing through the sheath    The pseudo sheath was opened and incision was extended proximally and distally  Then using a fine mosquito the tubing was withdrawn into the incision  The distal end of the tubing was brought  piecemeal with mosquito of mosquito traction off to the distal and was divided cleanly with sterile scissors      The previously noted slightly yellow CSF was noted to be draining from the distal end of the tubing which should now being brought up through the second intercostal space  A small additional area of lidocaine infiltration was made about 5 cm lateral to the medial second intercostal space opening incision  A stab incision was made with a 15  Blade a straight mosquito passed to the second intercostal space incision   About 30 cm of the distal aspect of the peritoneal tubing was then divided from the proximal tubing was was capped sterile in sterile gauze  The shortened upper thoracic remnant of distal peritoneal tubing was then connected to a clear Pudenz straight shunt tubing connector which had been primed and connected to a 25 cm length of Bactiseal ventricular tubing with the greater internal diameter  The connector was tied securely to the tubing with 2 0 silk ties which were then brought together and tied to recheck limit chance of separation    The distal end of the Bactiseal tubing was connected via mail lower lock system to a 3 cc syringe  CSF could be gently aspirated from the    CSF surveillance sample - about 5 cc total - was then taken in 2 aliquots from this new shortened second space intercostal tubing plus additional add Bactiseal  straight ventricular to tubing to sterile 3 cc and then 5 cc syringes  CSF sent for 6 item panel analysis as you    The  30 cm distal segment of distal peritoneal tubing that had been  brought up from low exit site of the right ninth and tenth intercostal space was not sent for culture and likely contaminated outside and inside      About 30 cm of distal tubing was discarded    The proximal new shortened thoracic into the costal a segment of distal peritoneal tubing tubing was then brought out through the lateral aspect of the incision via  straight mosquito and connected up to an Integra MoniTorr closed external drainage system    The second intercostal space incision was closed with interrupted  3 0 Ethilon  to the skin     And a dry gauze dressing and Tegaderm was then placed over the incisions and the distal external drainage system tubing secured to the patient with additional Steri-Strips    The externalized system was set to drain at +8 mmHg level above the right atrium    Estimated Blood Loss:  na           Specimens:  From new externalized shortened peritoneal tube second intercostal space exit site CSF panel sent for  ·  CSF hematology with CSF white cell count 19 with 76% neutrophils 9% lymphocytes and 15% monos and   · CSF red cell count; 0   · CSF chemistry for CSF glucose 80 and protein 50  and   · CSF microbiology with Gram stain showing again 4+ gram-negative rods and culture    Drains: This right thoracic second intercostal space intercostal externalized drain                   Findings: As above  Complications:  None    He  tolerated the procedure well    8/4/2020 1:08 PM    Prashant Luna MD, Attending Neurological Surgeon

## 2020-08-04 NOTE — PROGRESS NOTES
Daily Progress Note - Critical Care   Yolanda Lozano 69 y o  male MRN: [id number]  Unit/Bed#: ICU 12 Encounter: [id number]        ----------------------------------------------------------------------------------------  HPI/24hr events: no acute overnight events    ---------------------------------------------------------------------------------------  SUBJECTIVE  Denies pain  Review of Systems   Constitutional: Negative for activity change, chills, diaphoresis, fatigue and fever  HENT: Negative for congestion and rhinorrhea  Eyes: Negative for pain  Respiratory: Negative for cough, chest tightness, shortness of breath and wheezing  Cardiovascular: Negative for chest pain and palpitations  Gastrointestinal: Positive for abdominal pain  Negative for abdominal distention, constipation, diarrhea, nausea and vomiting  Genitourinary: Negative for difficulty urinating and dysuria  Musculoskeletal: Negative for arthralgias and myalgias  Neurological: Negative for dizziness, weakness, light-headedness and headaches  Psychiatric/Behavioral: The patient is not nervous/anxious        Review of systems was reviewed and negative unless stated above in HPI/24-hour events   ---------------------------------------------------------------------------------------  Assessment and Plan:    Neuro:   ·  shunt infection  · F/u neurosurgery plan regarding shunt revision v replacement  · Will require surveillance CSF cultures   · Continue cefepime     CV:   · 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  · Vascular following   · AC plan per vascular  · Clarify timeline for shunt w nsgx to determine starting asa  · HTN  · Holding home vasotec  · Goal SBP > 140 to prevent ischemia       Pulm:  · Acute hypoxic respiratory failure   · Resolved  · Now off supplemental NC   · Continue mobility and pulmonary toilet   · ZHOU   · Continue home CPAP QHS    GI:   · SMA stenosis w bowel ishemia   · S/p ex lap and arteriogram w L fem sheath 7/31  · S/p papavarine   · S/p re-exploration and closure 8/1   · NGT removed 8/3   · Clarify plan to restart asa w nsgx / surgery / vascular       :  · Stable - no acute issues  · Making adequate urine   · Stable renal function    F/E/N:   · Fluid   · No mIVF   · Nutrition  · NGT removed 8/3  · Clears   · Lytes   · Monitor, trend and replete based on deficiencies         Heme/Onc:   · DVT ppx   · SQH    Endo:   · Hyperglyemia  · SSI Alg 4   · Serial accuchecks     ID:   ·  shunt infection  · Continue cefepime  · GNR in CSF culture w elevated WBC, glucose and protein  · Will need surveillance CSF culture  · Consider PICC for long term abx     MSK/Skin:   · Repetitive repositioning and off-loading to prevent skin break down and ulcerative formation    Disposition: Transfer to Stepdown Level 2  Code Status: Level 1 - Full Code  ---------------------------------------------------------------------------------------  ICU CORE MEASURES    Prophylaxis   VTE Pharmacologic Prophylaxis: Heparin  VTE Mechanical Prophylaxis: sequential compression device  Stress Ulcer Prophylaxis: Prophylaxis Not Indicated     ABCDE Protocol (if indicated)  Plan to perform spontaneous awakening trial today? Not applicable  Plan to perform spontaneous breathing trial today? Not applicable  Obvious barriers to extubation? Not applicable  CAM-ICU: Negative    Invasive Devices Review  Invasive Devices     Peripheral Intravenous Line            Peripheral IV 07/31/20 Right Hand 4 days    Peripheral IV 08/03/20 Left Arm less than 1 day          Drain            Ventriculostomy/Subdural Ventricular drainage catheter with ICP microsensor Right Other (Comment) 4 days              Can any invasive devices be discontinued today?  Yes  ---------------------------------------------------------------------------------------  OBJECTIVE    Vitals   Vitals:    08/04/20 0600 08/04/20 0700 08/04/20 0800 08/04/20 0900   BP:    135/62   BP Location: Left arm   Pulse: (!) 52 (!) 52 (!) 54 58   Resp: (!) 26 (!) 23 (!) 24 (!) 27   Temp:   98 °F (36 7 °C)    TempSrc:   Oral    SpO2: 95% 95% 95% 90%   Weight:       Height:         Temp (24hrs), Av 3 °F (36 8 °C), Min:98 °F (36 7 °C), Max:98 6 °F (37 °C)  Current: Temperature: 98 °F (36 7 °C)  Vitals:    20 0900   BP: 135/62   Pulse: 58   Resp: (!) 27   Temp:    SpO2: 90%       Respiratory:  SpO2: SpO2: 90 %  Nasal Cannula O2 Flow Rate (L/min): 2 L/min    Invasive/non-invasive ventilation settings   Respiratory    Lab Data (Last 4 hours)    None         O2/Vent Data (Last 4 hours)    None                Physical Exam  Vitals signs and nursing note reviewed  Constitutional:       General: He is not in acute distress  Appearance: He is well-developed and well-nourished  He is not diaphoretic  HENT:      Head: Normocephalic and atraumatic  Eyes:      Pupils: Pupils are equal, round, and reactive to light  Neck:      Musculoskeletal: Normal range of motion and neck supple  Trachea: No tracheal deviation  Cardiovascular:      Rate and Rhythm: Normal rate and regular rhythm  Pulses: Intact distal pulses  Heart sounds: No murmur  No friction rub  No gallop  Pulmonary:      Effort: Pulmonary effort is normal  No respiratory distress  Breath sounds: Normal breath sounds  No wheezing or rales  Abdominal:      General: Bowel sounds are normal  There is no distension  Palpations: Abdomen is soft  There is no mass  Tenderness: There is no abdominal tenderness  There is no guarding  Comments: Shunt site to abdomen is c/d/i, bandaged  Midline incision w staples, c/d/i    Genitourinary:     Comments: Griffith w clear UO   Musculoskeletal:         General: No deformity or edema  Skin:     General: Skin is warm and dry  Neurological:      Mental Status: He is alert and oriented to person, place, and time        Comments: Sleeping on initial eval, opens eyes to voice, follows all commands    Psychiatric:         Mood and Affect: Mood and affect normal          Behavior: Behavior normal          Laboratory and Diagnostics:  Results from last 7 days   Lab Units 08/04/20  0426 08/03/20  0609 08/02/20  0533 08/01/20  1406 08/01/20  0549 07/31/20  2119 07/31/20  1012 07/31/20  0745  07/30/20  2325   WBC Thousand/uL 8 51 8 97 10 89* 13 00* 7 71  --  10 02  --   --  13 76*   HEMOGLOBIN g/dL 10 7* 10 9* 10 8* 12 7 11 2*  11 2* 11 0* 13 0  --   --  14 8   I STAT HEMOGLOBIN g/dl  --   --   --   --   --   --   --  12 6   < >  --    HEMATOCRIT % 33 5* 33 1* 33 5* 39 7 35 2*  --  39 7  --   --  46 0   HEMATOCRIT, ISTAT %  --   --   --   --   --   --   --  37   < >  --    PLATELETS Thousands/uL 241 229 218 236 183  --  234  --   --  272   NEUTROS PCT %  --   --  82*  --  85*  --   --   --   --  87*   BANDS PCT %  --   --   --   --   --   --  8  --   --   --    MONOS PCT %  --   --  9  --  9  --   --   --   --  10   MONO PCT % 3*  --   --   --   --   --  9  --   --   --     < > = values in this interval not displayed       Results from last 7 days   Lab Units 08/04/20  0426 08/03/20  0609 08/02/20  0533 08/01/20  1406 08/01/20  0549 07/31/20  1012 07/31/20  0745  07/30/20  2325   SODIUM mmol/L 142 141 143 141 141 139  --   --  138   POTASSIUM mmol/L 3 4* 3 7 4 2 4 2 4 2 4 6  --   --  4 9   CHLORIDE mmol/L 111* 112* 113* 111* 112* 108  --   --  104   CO2 mmol/L 27 26 23 22 21 21  --   --  26   CO2, I-STAT mmol/L  --   --   --   --   --   --  20*   < >  --    ANION GAP mmol/L 4 3* 7 8 8 10  --   --  8   BUN mg/dL 19 24 26* 30* 32* 33*  --   --  32*   CREATININE mg/dL 0 72 0 86 0 97 0 99 1 06 1 27  --   --  1 21   CALCIUM mg/dL 7 9* 8 0* 7 8* 6 9* 7 6* 8 5  --   --  9 7   GLUCOSE RANDOM mg/dL 126 121 141* 150* 145* 159*  --   --  136   ALT U/L  --   --  16  --   --   --   --   --  19   AST U/L  --   --  24  --   --   --   --   --  39   ALK PHOS U/L  --   --  54  --   --   --   --   --  91 ALBUMIN g/dL  --   --  1 8*  --   --   --   --   --  2 7*   TOTAL BILIRUBIN mg/dL  --   --  0 54  --   --   --   --   --  0 82    < > = values in this interval not displayed  Results from last 7 days   Lab Units 08/04/20  0426 08/03/20  0609 08/02/20  0533 08/01/20  0549   MAGNESIUM mg/dL 2 2 2 3 2 4 2 2   PHOSPHORUS mg/dL 2 0* 1 7* 1 7* 3 4      Results from last 7 days   Lab Units 08/02/20  0533 08/01/20  2118 08/01/20  1406 08/01/20  0549 07/31/20  2119 07/31/20  1559 07/31/20  1012 07/31/20  0008   INR   --   --   --   --   --   --  1 41* 1 25*   PTT seconds 140* 141* 38* 42* 53* 60* 72* 41*      Results from last 7 days   Lab Units 07/30/20  2325   TROPONIN I ng/mL <0 02     Results from last 7 days   Lab Units 08/02/20  0533 08/01/20  2118 08/01/20  1406 07/31/20  1805 07/31/20  1559 07/31/20  1012 07/31/20  0258   LACTIC ACID mmol/L 1 2 1 7 2 0 1 6 2 3* 2 2* 2 2*     ABG:  Results from last 7 days   Lab Units 07/31/20  1151   PH ART  7 357   PCO2 ART mm Hg 32 3*   PO2 ART mm Hg 87 8   HCO3 ART mmol/L 17 7*   BASE EXC ART mmol/L -6 7   ABG SOURCE  Line, Arterial     VBG:  Results from last 7 days   Lab Units 07/31/20  1151   ABG SOURCE  Line, Arterial           Micro  Results from last 7 days   Lab Units 08/03/20  1738 08/03/20  0845 08/01/20  0548 07/31/20  1411 07/31/20  1251 07/31/20  0622   BLOOD CULTURE   --   --   --   --  No Growth at 72 hrs  No Growth at 72 hrs   --    SPUTUM CULTURE   --   --  1 colony Candida albicans*  --   --   --    GRAM STAIN RESULT  No polys seen*  3+ Gram negative rods*  Smear reviewed by Microbiology staff* No Polys or Bacteria seen 2+ Polys  1+ Epithelial cells per low power field  No bacteria seen 1+ Polys*  4+ Gram negative rods*  --  No Polys or Bacteria seen   BODY FLUID CULTURE, STERILE   --   --   --   --   --  1+ Growth of Pseudomonas aeruginosa*       EKG: as per tele NSR   Imaging:  I have personally reviewed pertinent reports     and I have personally reviewed pertinent films in PACS    Intake and Output  I/O       08/02 0701 - 08/03 0700 08/03 0701 - 08/04 0700 08/04 0701 - 08/05 0700    P  O   540 380    I V  (mL/kg) 258 7 (2) 30 (0 2)     NG/GT       IV Piggyback 50 150     Total Intake(mL/kg) 308 7 (2 4) 720 (5 7) 380 (3)    Urine (mL/kg/hr) 3945 (1 3) 1525 (0 5) 125 (0 2)    Emesis/NG output 100 50     Drains 42 58 19    Blood       Total Output 4087 1633 144    Net -3778 3 -913 +236                 Height and Weights   Height: 6' 3"  IBW: 84 5 kg  Body mass index is 34 72 kg/m²  Weight (last 2 days)     Date/Time   Weight    08/04/20 0554   126 (277 78)    08/03/20 0600   127 (281 09)                Nutrition       Diet Orders   (From admission, onward)             Start     Ordered    08/03/20 0649  Diet Clear Liquid  Diet effective now     Question Answer Comment   Diet Type Clear Liquid    RD to adjust diet per protocol?  No        08/03/20 0649                    Active Medications  Scheduled Meds:acetaminophen, 650 mg, Oral, Q6H PRN, Sheryl Mayo PA-C  cefepime, 2,000 mg, Intravenous, Q8H, Sheryl Mayo PA-C, Last Rate: Stopped (08/04/20 0542)  chlorhexidine, 15 mL, Swish & Spit, Q12H [address], Sheryl Mayo PA-C  heparin (porcine), 5,000 Units, Subcutaneous, Q8H [address], Lv Alejandra PA-C  HYDROmorphone, 0 5 mg, Intravenous, Q3H PRN, Sheryl Mayo PA-C  insulin lispro, 2-12 Units, Subcutaneous, 4x Daily (AC & HS), Lv Alejandra PA-C  iodixanol, 15 mL, Intravenous, Once in imaging, Sheryl Mayo PA-C  lidocaine (PF), , , ,   lidocaine (PF), , , ,   lidocaine, 10 mL, Infiltration, Once, Ventura Sotelo MD  oxyCODONE, 5 mg, Oral, Q4H PRN, Sheryl Mayo PA-C  potassium-sodium phosphates, 2 packet, Oral, BID With Meals, Walton PatchesEVI      Continuous Infusions:     PRN Meds:   acetaminophen, 650 mg, Q6H PRN  HYDROmorphone, 0 5 mg, Q3H PRN  iodixanol, 15 mL, Once in imaging  oxyCODONE, 5 mg, Q4H PRN        Allergies   Allergies   Allergen Reactions    Pollen Extract Allergic Rhinitis     ---------------------------------------------------------------------------------------  Advance Directive and Living Will:      Power of :    POLST:    ---------------------------------------------------------------------------------------  Care Time Delivered:   No Critical Care time spent     Sam Ortega PA-C      Portions of the record may have been created with voice recognition software  Occasional wrong word or "sound a like" substitutions may have occurred due to the inherent limitations of voice recognition software    Read the chart carefully and recognize, using context, where substitutions have occurred

## 2020-08-04 NOTE — PLAN OF CARE
Problem: Potential for Falls  Goal: Patient will remain free of falls  Description: INTERVENTIONS:  - Assess patient frequently for physical needs  -  Identify cognitive and physical deficits and behaviors that affect risk of falls    -  [place] fall precautions as indicated by assessment   - Educate patient/family on patient safety including physical limitations  - Instruct patient to call for assistance with activity based on assessment  - Modify environment to reduce risk of injury  - Consider OT/PT consult to assist with strengthening/mobility  Outcome: Progressing     Problem: Prexisting or High Potential for Compromised Skin Integrity  Goal: Skin integrity is maintained or improved  Description: INTERVENTIONS:  - Identify patients at risk for skin breakdown  - Assess and monitor skin integrity  - Assess and monitor nutrition and hydration status  - Monitor labs   - Assess for incontinence   - Turn and reposition patient  - Assist with mobility/ambulation  - Relieve pressure over bony prominences  - Avoid friction and shearing  - Provide appropriate hygiene as needed including keeping skin clean and dry  - Evaluate need for skin moisturizer/barrier cream  - Collaborate with interdisciplinary team   - Patient/family teaching  - Consider wound care consult   Outcome: Progressing     Problem: PAIN - ADULT  Goal: Verbalizes/displays adequate comfort level or baseline comfort level  Description: Interventions:  - Encourage patient to monitor pain and request assistance  - Assess pain using appropriate pain scale  - Administer analgesics based on type and severity of pain and evaluate response  - Implement non-pharmacological measures as appropriate and evaluate response  - Consider cultural and social influences on pain and pain management  - Notify physician/advanced practitioner if interventions unsuccessful or patient reports new pain  Outcome: Progressing     Problem: SAFETY ADULT  Goal: Maintain or return to baseline ADL function  Description: INTERVENTIONS:  -  Assess patient's ability to carry out ADLs; assess patient's baseline for ADL function and identify physical deficits which impact ability to perform ADLs (bathing, care of mouth/teeth, toileting, grooming, dressing, etc )  - Assess/evaluate cause of self-care deficits   - Assess range of motion  - Assess patient's mobility; develop plan if impaired  - Assess patient's need for assistive devices and provide as appropriate  - Encourage maximum independence but intervene and supervise when necessary  - Involve family in performance of ADLs  - Assess for home care needs following discharge   - Consider OT consult to assist with ADL evaluation and planning for discharge  - Provide patient education as appropriate  Outcome: Progressing  Goal: Maintain or return mobility status to optimal level  Description: INTERVENTIONS:  - Assess patient's baseline mobility status (ambulation, transfers, stairs, etc )    - Identify cognitive and physical deficits and behaviors that affect mobility  - Identify mobility aids required to assist with transfers and/or ambulation (gait belt, sit-to-stand, lift, walker, cane, etc )  - [place] fall precautions as indicated by assessment  - Record patient progress and toleration of activity level on Mobility SBAR; progress patient to next Phase/Stage  - Instruct patient to call for assistance with activity based on assessment  - Consider rehabilitation consult to assist with strengthening/weightbearing, etc   Outcome: Progressing     Problem: GASTROINTESTINAL - ADULT  Goal: Minimal or absence of nausea and/or vomiting  Description: INTERVENTIONS:  - Administer IV fluids if ordered to ensure adequate hydration  - Maintain NPO status until nausea and vomiting are resolved  - Nasogastric tube if ordered  - Administer ordered antiemetic medications as needed  - Provide nonpharmacologic comfort measures as appropriate  - Advance diet as tolerated, if ordered  - Consider nutrition services referral to assist patient with adequate nutrition and appropriate food choices  Outcome: Progressing  Goal: Maintains or returns to baseline bowel function  Description: INTERVENTIONS:  - Assess bowel function  - Encourage oral fluids to ensure adequate hydration  - Administer IV fluids if ordered to ensure adequate hydration  - Administer ordered medications as needed  - Encourage mobilization and activity  - Consider nutritional services referral to assist patient with adequate nutrition and appropriate food choices  Outcome: Progressing  Goal: Maintains adequate nutritional intake  Description: INTERVENTIONS:  - Monitor percentage of each meal consumed  - Identify factors contributing to decreased intake, treat as appropriate  - Assist with meals as needed  - Monitor I&O, weight, and lab values if indicated  - Obtain nutrition services referral as needed  Outcome: Progressing  Goal: Establish and maintain optimal ostomy function  Description: INTERVENTIONS:  - Assess bowel function  - Encourage oral fluids to ensure adequate hydration  - Administer IV fluids if ordered to ensure adequate hydration   - Administer ordered medications as needed  - Encourage mobilization and activity  - Nutrition services referral to assist patient with appropriate food choices  - Assess stoma site  - Consider wound care consult   Outcome: Progressing     Problem: GENITOURINARY - ADULT  Goal: Maintains or returns to baseline urinary function  Description: INTERVENTIONS:  - Assess urinary function  - Encourage oral fluids to ensure adequate hydration if ordered  - Administer IV fluids as ordered to ensure adequate hydration  - Administer ordered medications as needed  - Offer frequent toileting  - Follow urinary retention protocol if ordered  Outcome: Progressing  Goal: Absence of urinary retention  Description: INTERVENTIONS:  - Assess patients ability to void and empty bladder  - Monitor I/O  - Bladder scan as needed  - Discuss with physician/AP medications to alleviate retention as needed  - Discuss catheterization for long term situations as appropriate  Outcome: Progressing  Goal: Urinary catheter remains patent  Description: INTERVENTIONS:  - Assess patency of urinary catheter  - If patient has a chronic grover, consider changing catheter if non-functioning  - Follow guidelines for intermittent irrigation of non-functioning urinary catheter  Outcome: Progressing     Problem: CONFUSION/THOUGHT DISTURBANCE  Goal: Thought disturbances (confusion, delirium, depression, dementia or psychosis) are managed to maintain or return to baseline mental status and functional level  Description: INTERVENTIONS:  - Assess for possible contributors to  thought disturbance, including but not limited to medications, infection, impaired vision or hearing, underlying metabolic abnormalities, dehydration, respiratory compromise,  psychiatric diagnoses and notify attending PHYSICAN/AP  - Monitor and intervene to maintain adequate nutrition, hydration, elimination, sleep and activity  - Decrease environmental stimuli, including noise as appropriate  - Provide frequent contacts to provide refocusing, direction and reassurance as needed  Approach patient calmly with eye contact and at their level    - [place] high risk fall precautions, aspiration precautions and other safety measures, as indicated  - If delirium suspected, notify physician/AP of change in condition and request immediate in-person evaluation  - Pursue consults as appropriate including Geriatric (campus dependent), OT for cognitive evaluation/activity planning, psychiatric, pastoral care, etc   Outcome: Progressing     Problem: Nutrition/Hydration-ADULT  Goal: Nutrient/Hydration intake appropriate for improving, restoring or maintaining nutritional needs  Description: Monitor and assess patient's nutrition/hydration status for malnutrition  Collaborate with interdisciplinary team and initiate plan and interventions as ordered  Monitor patient's weight and dietary intake as ordered or per policy  Utilize nutrition screening tool and intervene as necessary  Determine patient's food preferences and provide high-protein, high-caloric foods as appropriate       INTERVENTIONS:  - Monitor oral intake, urinary output, labs, and treatment plans  - Assess nutrition and hydration status and recommend course of action  - Evaluate amount of meals eaten  - Assist patient with eating if necessary   - Allow adequate time for meals  - Recommend/ encourage appropriate diets, oral nutritional supplements, and vitamin/mineral supplements  - Order, calculate, and assess calorie counts as needed  - Recommend, monitor, and adjust tube feedings and TPN/PPN based on assessed needs  - Assess need for intravenous fluids  - Provide specific nutrition/hydration education as appropriate  - Include patient/family/caregiver in decisions related to nutrition  Outcome: Progressing     Problem: RESPIRATORY - ADULT  Goal: Achieves optimal ventilation and oxygenation  Description: INTERVENTIONS:  - Assess for changes in respiratory status  - Assess for changes in mentation and behavior  - Position to facilitate oxygenation and minimize respiratory effort  - Oxygen administered by appropriate delivery if ordered  - Initiate smoking cessation education as indicated  - Encourage broncho-pulmonary hygiene including cough, deep breathe, Incentive Spirometry  - Assess the need for suctioning and aspirate as needed  - Assess and instruct to report SOB or any respiratory difficulty  - Respiratory Therapy support as indicated  Outcome: Progressing     Problem: HEMATOLOGIC - ADULT  Goal: Maintains hematologic stability  Description: INTERVENTIONS  - Assess for signs and symptoms of bleeding or hemorrhage  - Monitor labs  - Administer supportive blood products/factors as ordered and appropriate  Outcome: Progressing

## 2020-08-04 NOTE — PLAN OF CARE
Problem: Potential for Falls  Goal: Patient will remain free of falls  Description: INTERVENTIONS:  - Assess patient frequently for physical needs  -  Identify cognitive and physical deficits and behaviors that affect risk of falls    -  [place] fall precautions as indicated by assessment   - Educate patient/family on patient safety including physical limitations  - Instruct patient to call for assistance with activity based on assessment  - Modify environment to reduce risk of injury  - Consider OT/PT consult to assist with strengthening/mobility  Outcome: Progressing     Problem: Prexisting or High Potential for Compromised Skin Integrity  Goal: Skin integrity is maintained or improved  Description: INTERVENTIONS:  - Identify patients at risk for skin breakdown  - Assess and monitor skin integrity  - Assess and monitor nutrition and hydration status  - Monitor labs   - Assess for incontinence   - Turn and reposition patient  - Assist with mobility/ambulation  - Relieve pressure over bony prominences  - Avoid friction and shearing  - Provide appropriate hygiene as needed including keeping skin clean and dry  - Evaluate need for skin moisturizer/barrier cream  - Collaborate with interdisciplinary team   - Patient/family teaching  - Consider wound care consult   Outcome: Progressing     Problem: PAIN - ADULT  Goal: Verbalizes/displays adequate comfort level or baseline comfort level  Description: Interventions:  - Encourage patient to monitor pain and request assistance  - Assess pain using appropriate pain scale  - Administer analgesics based on type and severity of pain and evaluate response  - Implement non-pharmacological measures as appropriate and evaluate response  - Consider cultural and social influences on pain and pain management  - Notify physician/advanced practitioner if interventions unsuccessful or patient reports new pain  Outcome: Progressing     Problem: SAFETY ADULT  Goal: Maintain or return to baseline ADL function  Description: INTERVENTIONS:  -  Assess patient's ability to carry out ADLs; assess patient's baseline for ADL function and identify physical deficits which impact ability to perform ADLs (bathing, care of mouth/teeth, toileting, grooming, dressing, etc )  - Assess/evaluate cause of self-care deficits   - Assess range of motion  - Assess patient's mobility; develop plan if impaired  - Assess patient's need for assistive devices and provide as appropriate  - Encourage maximum independence but intervene and supervise when necessary  - Involve family in performance of ADLs  - Assess for home care needs following discharge   - Consider OT consult to assist with ADL evaluation and planning for discharge  - Provide patient education as appropriate  Outcome: Progressing  Goal: Maintain or return mobility status to optimal level  Description: INTERVENTIONS:  - Assess patient's baseline mobility status (ambulation, transfers, stairs, etc )    - Identify cognitive and physical deficits and behaviors that affect mobility  - Identify mobility aids required to assist with transfers and/or ambulation (gait belt, sit-to-stand, lift, walker, cane, etc )  - [place] fall precautions as indicated by assessment  - Record patient progress and toleration of activity level on Mobility SBAR; progress patient to next Phase/Stage  - Instruct patient to call for assistance with activity based on assessment  - Consider rehabilitation consult to assist with strengthening/weightbearing, etc   Outcome: Progressing     Problem: GASTROINTESTINAL - ADULT  Goal: Minimal or absence of nausea and/or vomiting  Description: INTERVENTIONS:  - Administer IV fluids if ordered to ensure adequate hydration  - Maintain NPO status until nausea and vomiting are resolved  - Nasogastric tube if ordered  - Administer ordered antiemetic medications as needed  - Provide nonpharmacologic comfort measures as appropriate  - Advance diet as tolerated, if ordered  - Consider nutrition services referral to assist patient with adequate nutrition and appropriate food choices  Outcome: Progressing  Goal: Maintains or returns to baseline bowel function  Description: INTERVENTIONS:  - Assess bowel function  - Encourage oral fluids to ensure adequate hydration  - Administer IV fluids if ordered to ensure adequate hydration  - Administer ordered medications as needed  - Encourage mobilization and activity  - Consider nutritional services referral to assist patient with adequate nutrition and appropriate food choices  Outcome: Progressing  Goal: Maintains adequate nutritional intake  Description: INTERVENTIONS:  - Monitor percentage of each meal consumed  - Identify factors contributing to decreased intake, treat as appropriate  - Assist with meals as needed  - Monitor I&O, weight, and lab values if indicated  - Obtain nutrition services referral as needed  Outcome: Progressing  Goal: Establish and maintain optimal ostomy function  Description: INTERVENTIONS:  - Assess bowel function  - Encourage oral fluids to ensure adequate hydration  - Administer IV fluids if ordered to ensure adequate hydration   - Administer ordered medications as needed  - Encourage mobilization and activity  - Nutrition services referral to assist patient with appropriate food choices  - Assess stoma site  - Consider wound care consult   Outcome: Progressing     Problem: GENITOURINARY - ADULT  Goal: Maintains or returns to baseline urinary function  Description: INTERVENTIONS:  - Assess urinary function  - Encourage oral fluids to ensure adequate hydration if ordered  - Administer IV fluids as ordered to ensure adequate hydration  - Administer ordered medications as needed  - Offer frequent toileting  - Follow urinary retention protocol if ordered  Outcome: Progressing  Goal: Absence of urinary retention  Description: INTERVENTIONS:  - Assess patients ability to void and empty bladder  - Monitor I/O  - Bladder scan as needed  - Discuss with physician/AP medications to alleviate retention as needed  - Discuss catheterization for long term situations as appropriate  Outcome: Progressing  Goal: Urinary catheter remains patent  Description: INTERVENTIONS:  - Assess patency of urinary catheter  - If patient has a chronic grover, consider changing catheter if non-functioning  - Follow guidelines for intermittent irrigation of non-functioning urinary catheter  Outcome: Progressing     Problem: CONFUSION/THOUGHT DISTURBANCE  Goal: Thought disturbances (confusion, delirium, depression, dementia or psychosis) are managed to maintain or return to baseline mental status and functional level  Description: INTERVENTIONS:  - Assess for possible contributors to  thought disturbance, including but not limited to medications, infection, impaired vision or hearing, underlying metabolic abnormalities, dehydration, respiratory compromise,  psychiatric diagnoses and notify attending PHYSICAN/AP  - Monitor and intervene to maintain adequate nutrition, hydration, elimination, sleep and activity  - Decrease environmental stimuli, including noise as appropriate  - Provide frequent contacts to provide refocusing, direction and reassurance as needed  Approach patient calmly with eye contact and at their level    - [place] high risk fall precautions, aspiration precautions and other safety measures, as indicated  - If delirium suspected, notify physician/AP of change in condition and request immediate in-person evaluation  - Pursue consults as appropriate including Geriatric (campus dependent), OT for cognitive evaluation/activity planning, psychiatric, pastoral care, etc   Outcome: Progressing     Problem: Nutrition/Hydration-ADULT  Goal: Nutrient/Hydration intake appropriate for improving, restoring or maintaining nutritional needs  Description: Monitor and assess patient's nutrition/hydration status for malnutrition  Collaborate with interdisciplinary team and initiate plan and interventions as ordered  Monitor patient's weight and dietary intake as ordered or per policy  Utilize nutrition screening tool and intervene as necessary  Determine patient's food preferences and provide high-protein, high-caloric foods as appropriate       INTERVENTIONS:  - Monitor oral intake, urinary output, labs, and treatment plans  - Assess nutrition and hydration status and recommend course of action  - Evaluate amount of meals eaten  - Assist patient with eating if necessary   - Allow adequate time for meals  - Recommend/ encourage appropriate diets, oral nutritional supplements, and vitamin/mineral supplements  - Order, calculate, and assess calorie counts as needed  - Recommend, monitor, and adjust tube feedings and TPN/PPN based on assessed needs  - Assess need for intravenous fluids  - Provide specific nutrition/hydration education as appropriate  - Include patient/family/caregiver in decisions related to nutrition  Outcome: Progressing     Problem: RESPIRATORY - ADULT  Goal: Achieves optimal ventilation and oxygenation  Description: INTERVENTIONS:  - Assess for changes in respiratory status  - Assess for changes in mentation and behavior  - Position to facilitate oxygenation and minimize respiratory effort  - Oxygen administered by appropriate delivery if ordered  - Initiate smoking cessation education as indicated  - Encourage broncho-pulmonary hygiene including cough, deep breathe, Incentive Spirometry  - Assess the need for suctioning and aspirate as needed  - Assess and instruct to report SOB or any respiratory difficulty  - Respiratory Therapy support as indicated  Outcome: Progressing     Problem: HEMATOLOGIC - ADULT  Goal: Maintains hematologic stability  Description: INTERVENTIONS  - Assess for signs and symptoms of bleeding or hemorrhage  - Monitor labs  - Administer supportive blood products/factors as ordered and appropriate  Outcome: Progressing

## 2020-08-04 NOTE — ASSESSMENT & PLAN NOTE
Externalization 7/30 secondary to laparotomy for bowel ischemia/peritonitis  · S/p VPS placement for NPH 2005, revision 2011  · Shunt last adjusted to 100 cm of water 7/21/2020 for increasing size of bilateral subdural hygromas  · Sutures placed over about beginning in July for thinning skin  Imaging:   ·  CT head wo 7/30/20: Minimal increase size ventricular system  Encephalomalacia and right frontal lobe  Unchanged hypoattenuation the left testicle temporal lobe  Stable positioning of right ventriculostomy shunt  Unchanged bilateral subdural hygromas  · Skull x-ray 7/21/20:  Some valve has set to approximately 100 mm of water  · CT head wo 7/20/20:  Stabilization of a right ventriculostomy shunt  Minimal decrease in size ventricular system associated with minimal increased subdural hygroma overlying bilateral cerebral hemispheres now measuring to 7 mm in thickness previously 5 mm  Plan:    · Continue monitor neurological exam   Stable GCS 14  Awake alert disoriented to time  Follows commands with diffuse weakness  · Continue to monitor CSF - remains clear yellow  58ml/24H  · CSF sent from externalized ventricular catheter 8/3 with gram stain positive for 3+ GNR  Continue to follow cultures  Glucose 85, RBC 6, Protein 49, WBC 82    · CSF also sent from shunt valve tap  No bacteria seen on gram stain  Will follow cultures  Glucose 74, RBC 0, Protein 46, WBC 53    · Monitor output from externalized shunt wit 10 cc or less per hour  Maintain set at 8 mmHg above lateral full of the right atrium  · Continue monitor blood cultures 7/31 which were negative at 72 hours  · Id following  Continue IV antibiotics cefepime 2g Q8 x 6 wks  · DVT prophylaxis:  SCDs and heparin  · Consideration for shunt revision versus placement of a new system once CSF clears  · Will continue to monitor     · Call us with any questions or concerns

## 2020-08-04 NOTE — NUTRITION
Recommend Ensure Clear liquids TID  Monitor diet tolerance and % PO intake  Request  RD Diet Protocol please

## 2020-08-05 ENCOUNTER — APPOINTMENT (INPATIENT)
Dept: RADIOLOGY | Facility: HOSPITAL | Age: 70
DRG: 031 | End: 2020-08-05
Payer: COMMERCIAL

## 2020-08-05 PROBLEM — G91.9 HYDROCEPHALUS (HCC): Status: ACTIVE | Noted: 2020-08-05

## 2020-08-05 LAB
BACTERIA BLD CULT: NORMAL
BACTERIA BLD CULT: NORMAL
BACTERIA CSF CULT: ABNORMAL
BACTERIA CSF CULT: ABNORMAL
GLUCOSE SERPL-MCNC: 109 MG/DL (ref 65–140)
GLUCOSE SERPL-MCNC: 118 MG/DL (ref 65–140)
GLUCOSE SERPL-MCNC: 131 MG/DL (ref 65–140)
GLUCOSE SERPL-MCNC: 132 MG/DL (ref 65–140)
GLUCOSE SERPL-MCNC: 134 MG/DL (ref 65–140)

## 2020-08-05 PROCEDURE — 99024 POSTOP FOLLOW-UP VISIT: CPT | Performed by: NEUROLOGICAL SURGERY

## 2020-08-05 PROCEDURE — 97110 THERAPEUTIC EXERCISES: CPT

## 2020-08-05 PROCEDURE — 94760 N-INVAS EAR/PLS OXIMETRY 1: CPT

## 2020-08-05 PROCEDURE — 99232 SBSQ HOSP IP/OBS MODERATE 35: CPT | Performed by: INTERNAL MEDICINE

## 2020-08-05 PROCEDURE — 94660 CPAP INITIATION&MGMT: CPT

## 2020-08-05 PROCEDURE — 99233 SBSQ HOSP IP/OBS HIGH 50: CPT | Performed by: SURGERY

## 2020-08-05 PROCEDURE — NC001 PR NO CHARGE: Performed by: STUDENT IN AN ORGANIZED HEALTH CARE EDUCATION/TRAINING PROGRAM

## 2020-08-05 PROCEDURE — 82948 REAGENT STRIP/BLOOD GLUCOSE: CPT

## 2020-08-05 PROCEDURE — 70450 CT HEAD/BRAIN W/O DYE: CPT

## 2020-08-05 PROCEDURE — 97535 SELF CARE MNGMENT TRAINING: CPT

## 2020-08-05 PROCEDURE — G1004 CDSM NDSC: HCPCS

## 2020-08-05 RX ORDER — HEPARIN SODIUM 5000 [USP'U]/ML
5000 INJECTION, SOLUTION INTRAVENOUS; SUBCUTANEOUS EVERY 8 HOURS SCHEDULED
Status: COMPLETED | OUTPATIENT
Start: 2020-08-05 | End: 2020-08-06

## 2020-08-05 RX ADMIN — POTASSIUM & SODIUM PHOSPHATES POWDER PACK 280-160-250 MG 2 PACKET: 280-160-250 PACK at 16:33

## 2020-08-05 RX ADMIN — SODIUM CHLORIDE, SODIUM GLUCONATE, SODIUM ACETATE, POTASSIUM CHLORIDE, MAGNESIUM CHLORIDE, SODIUM PHOSPHATE, DIBASIC, AND POTASSIUM PHOSPHATE 100 ML/HR: .53; .5; .37; .037; .03; .012; .00082 INJECTION, SOLUTION INTRAVENOUS at 10:06

## 2020-08-05 RX ADMIN — CHLORHEXIDINE GLUCONATE 0.12% ORAL RINSE 15 ML: 1.2 LIQUID ORAL at 08:06

## 2020-08-05 RX ADMIN — CEFEPIME HYDROCHLORIDE 2000 MG: 2 INJECTION, POWDER, FOR SOLUTION INTRAVENOUS at 05:02

## 2020-08-05 RX ADMIN — CEFEPIME HYDROCHLORIDE 2000 MG: 2 INJECTION, POWDER, FOR SOLUTION INTRAVENOUS at 22:29

## 2020-08-05 RX ADMIN — HEPARIN SODIUM 5000 UNITS: 5000 INJECTION INTRAVENOUS; SUBCUTANEOUS at 05:02

## 2020-08-05 RX ADMIN — POTASSIUM & SODIUM PHOSPHATES POWDER PACK 280-160-250 MG 2 PACKET: 280-160-250 PACK at 08:06

## 2020-08-05 RX ADMIN — SODIUM CHLORIDE, SODIUM GLUCONATE, SODIUM ACETATE, POTASSIUM CHLORIDE, MAGNESIUM CHLORIDE, SODIUM PHOSPHATE, DIBASIC, AND POTASSIUM PHOSPHATE 100 ML/HR: .53; .5; .37; .037; .03; .012; .00082 INJECTION, SOLUTION INTRAVENOUS at 00:01

## 2020-08-05 RX ADMIN — HEPARIN SODIUM 5000 UNITS: 5000 INJECTION INTRAVENOUS; SUBCUTANEOUS at 22:29

## 2020-08-05 RX ADMIN — HEPARIN SODIUM 5000 UNITS: 5000 INJECTION INTRAVENOUS; SUBCUTANEOUS at 16:32

## 2020-08-05 RX ADMIN — CEFEPIME HYDROCHLORIDE 2000 MG: 2 INJECTION, POWDER, FOR SOLUTION INTRAVENOUS at 16:32

## 2020-08-05 NOTE — OCCUPATIONAL THERAPY NOTE
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[facility] )    Nonocclusive mesenteric ischemia (HCC)    Hydrocephalus ([facility] )      Past Medical History:   Diagnosis Date    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     Diabetes mellitus ([facility] )     DM2 (diabetes mellitus, type 2) ([facility] )     Gout     HTN (hypertension)     Hydrocephalus ([place] [id number] )     Hypertension     Neuropathy     ZHOU on CPAP     Pressure injury of skin     TIA (transient ischemic attack)        Past Surgical History:   Procedure Laterality Date    ABDOMINAL WALL SURGERY N/A 8/1/2020    Procedure: CLOSURE WOUND ABDOMINAL/TRUNK;  Surgeon: Gisele Armenta DO;  [facility];  Service: General    ARTERIOGRAM N/A 7/31/2020    Procedure: ARTERIOGRAM Of SMA and placement of Papavarine onfusion arterial catheter;  Surgeon: Michele Arteaga MD;  [facility];  Service: Vascular    ARTERIOGRAM Left 8/1/2020    Procedure: ARTERIOGRAM; cath removal;  Surgeon: Michele Arteaga MD;  [facility];  Service: Vascular    CSF SHUNT      x3 Op Reports, Dr Jj Raymond, Val Bailey in MPF chart viewer    LAPAROTOMY N/A 7/31/2020    Procedure: LAPAROTOMY EXPLORATORY: Externalizes  shunt Sherrel Catena application;  Surgeon: Kylee Light MD;  [facility];  Service: General    LAPAROTOMY N/A 8/1/2020    Procedure: LAPAROTOMY EXPLORATORY,;  Surgeon: Gisele Armenta DO;  [facility];  Service: General    ME AMPUTATION FOOT,TRANSMETATARSAL Left 5/30/2020    Procedure: excision 5th metatarsal head   excision 5th metatarsal ulcer ;  Surgeon: Wolf Persaud Cora Sampson DPM;  [facility];  Service: Podiatry    NE ARTHDSIS POST/POSTEROLATRL/POSTINTERBODY LUMBAR N/A 7/6/2020    Procedure: MINIMALLY INVASIVE TRANSVERSE LUMBAR INTERBODY FUSION L5-S1 FROM LEFT-SIDED APPROACH;  Surgeon: John Arrington MD;  [facility];  Service: Neurosurgery    NE REPLACEMENT/REVISION,CSF SHUNT Right 7/6/2020    Procedure: REVISION OF SCALP OVER VENTRICULAR CATHETER IN THE RIGHT CORONAL REGION;  Surgeon: John Arrington MD;  [facility];  Service: Neurosurgery    WOUND DEBRIDEMENT N/A 8/1/2020    Procedure: abd washout;  Surgeon: Rosanne Castillo DO;  [facility];  Service: General        08/05/20 1107   Restrictions/Precautions   Weight Bearing Precautions Per Order No   Braces or Orthoses MAFO   Other Precautions Cognitive; Bed Alarm;Multiple lines;Telemetry; Fall Risk;Pain  (EVD)   Lifestyle   Autonomy I ADLS, mainly I w/ IADLS except cooking, Mod I w/ transfers and functional mobility PTA   Reciprocal Relationships Pt lives w/ a friend, the friend works 6-8 hrs/day   Service to Others pt is retired   Intrinsic Gratification Enjoys yard work and reading   Pain Assessment   Pain Assessment Tool Pain Assessment not indicated - pt denies pain   Pain Score No Pain   ADL   Grooming Assistance 3  Moderate Assistance   Grooming Deficit Setup;Verbal cueing;Supervision/safety; Increased time to complete;Wash/dry face   Grooming Comments Pt completed grooming while seated EOB w/ setup  Pt required VC and hand over hand assist to grab rag w/ RUE  Able to bring to face and wash w/ increased time  Needs assist for thoroughness   UB Dressing Assistance 2  Maximal Assistance   UB Dressing Deficit Setup;Verbal cueing;Supervision/safety; Increased time to complete; Thread RUE; Thread LUE;Pull over head; Fasteners   UB Dressing Comments Pt required Max A to don/[facility] gown  Able to minimally elevate UE's off bed to assist w/ threading   Required assist to button and tie around backside   LB Dressing Assistance 1  Total Assistance   LB Dressing Deficit Don/doff L sock; Don/doff R sock   LB Dressing Comments Pt required total A to don socks while supine in bed   Toileting Assistance  1  Total Assistance   Toileting Deficit Perineal hygiene; Increased time to complete;Setup;Verbal cueing   Toileting Comments pt incontient of bowel while supine in bed  Required total A to manage perineal hygiene   Bed Mobility   Rolling R 2  Maximal assistance   Additional items Assist x 2; Increased time required;Verbal cues;LE management   Rolling L 2  Maximal assistance   Additional items Assist x 2; Increased time required;Verbal cues;LE management   Supine to Sit 2  Maximal assistance   Additional items Assist x 2; Increased time required;Verbal cues;LE management   Sit to Supine 1  Dependent   Additional items Assist x 2; Increased time required;Verbal cues;LE management   Additional Comments pt rolled L/R w/ Max A x2 for initiation into rolling, Max A x1 for maintaining side lying position  VC and hand over hand for assist to grab bed rails  Pt went from supine<>sit w/ Max A x2 for UB support and LE management, HOB elevated for assist   Pt sat EOB for approx 5 mins w/ flucuating Min-Mod A for sitting balance/trunk control  Cognition   Overall Cognitive Status Impaired   Arousal/Participation Arousable; Cooperative;Lethargic;Poorly responsive   Attention Difficulty attending to directions   Orientation Level Oriented to person;Oriented to place; Disoriented to time;Disoriented to situation   Memory Decreased recall of precautions;Decreased recall of recent events;Decreased short term memory   Following Commands Follows one step commands with increased time or repetition   Comments Pt is cooperative; very lethargic; difficult to arouse and remain alert  Has decreased understanding of deficits and safety awareness   Needs VC/TC for initiation/sequencing of tasks   Activity Tolerance   Activity Tolerance Patient limited by fatigue;Patient limited by pain   Medical Staff Made Aware PT, RN   Assessment   Assessment Patient participated in Skilled OT session 8/5/2020 with interventions consisting of ADL re training with the use of correct body mechnaics, Energy Conservation techniques, deep breathing technique, safety awareness and fall prevention techniques, therapeutic exercise to: increase functional use of BUEs, increase BUE muscle strength ,  therapeutic activities to: increase activity tolerance, increase dynamic sit/ stand balance during functional activity , increase postural control, increase trunk control and increase OOB/ sitting tolerance   Patient agreeable to OT treatment session, upon arrival patient was found supine in bed  In comparison to previous session, patient not demonstrating any significant improvements in occupational performance at this time; overall requiring Total/Max A for self care and Max A x2/total A for bed mobility  Increased lethargy today and difficult to arouse  Patient requiring frequent re direction, verbal cues for safety, verbal cues for correct technique, verbal cues for pacing thru activity steps, cognitive assistance to anticipate next step and frequent rest periods  Patient continues to be functioning below baseline level, occupational performance remains limited secondary to factors listed above and increased risk for falls and injury  From OT standpoint, recommendation at time of d/c would be Short Term Rehab when medically stable  Patient to benefit from continued Occupational Therapy treatment while in the hospital to address deficits as defined above and maximize level of functional independence with ADLs and functional mobility  Plan   Treatment Interventions ADL retraining;Functional transfer training;UE strengthening/ROM; Endurance training;Cognitive reorientation;Patient/family training;Equipment evaluation/education; Compensatory technique education;Continued evaluation; Energy conservation; Activityengagement   Goal Expiration Date 08/17/20   OT Treatment Day 1   OT Frequency 3-5x/wk   Recommendation   OT Discharge Recommendation Post-Acute Rehabilitation Services   OT - OK to Discharge Yes  (when medically stable)   Barthel Index   Feeding 5   Bathing 0   Grooming Score 0   Dressing Score 0   Bladder Score 0   Bowels Score 0   Toilet Use Score 5   Transfers (Bed/Chair) Score 5   Mobility (Level Surface) Score 0   Stairs Score 0   Barthel Index Score 15       Johana Christianson MS, OTR/L

## 2020-08-05 NOTE — ASSESSMENT & PLAN NOTE
Ongoing management per general surgery/primary team  · Vascular surgery following    No need for [facility] therapy as of 8/5

## 2020-08-05 NOTE — PROGRESS NOTES
Progress Note - General Surgery   Yasmin Valencia 69 y o  male MRN: [id number]  Unit/Bed#: ICU 12 Encounter: [id number]    Assessment:  69 M status post ex-lap for DELIA,  shunt externalization, ABthera VAC, mesenteric angiogram and papaverine infusion, subsequent second look laparotomy, abdominal wall closure, sheath removal, and subsequent shunt repositioning    Plan:  Advance diet  Antibiotics per ID  Does not need systemic anticoagulation per vascular surgery, discussed with vascular surgery attending  Shunt care per neurosurgery  Heparin prophylaxis  Ok for downgrade from a general surgery perspective    Subjective/Objective     Subjective: No acute events overnight  Tolerating diet and moving bowels  No complaints this morning  Objective:    Blood pressure 117/58, pulse (!) 54, temperature 98 3 °F (36 8 °C), temperature source Oral, resp  rate (!) 23, height 6' 3", weight 125 kg (276 lb 0 3 oz), SpO2 95 %  ,Body mass index is 34 5 kg/m²  Intake/Output Summary (Last 24 hours) at 8/5/2020 0903  Last data filed at 8/5/2020 0800  Gross per 24 hour   Intake 1008  33 ml   Output 741 ml   Net 267 33 ml       Invasive Devices     Peripheral Intravenous Line            Peripheral IV 08/03/20 Left Arm 1 day    Peripheral IV 08/04/20 Proximal;Right;Ventral (anterior) Forearm less than 1 day          Drain            Ventriculostomy/Subdural Ventricular drainage catheter with ICP microsensor Right Other (Comment) 5 days                Physical Exam:   General: NAD, awake and alert, responsive  Eyes: PERRL  ENT: moist mucous membranes  Neck: supple  CV: RRR +S1/S2  Chest: respirations non-labored  Abdomen: soft, mildly tender, non-distended, incision c/d/i  Extremities: atraumatic, no edema      Results from last 7 days   Lab Units 08/04/20  0426 08/03/20  0609 08/02/20  0533   WBC Thousand/uL 8 51 8 97 10 89*   HEMOGLOBIN g/dL 10 7* 10 9* 10 8*   HEMATOCRIT % 33 5* 33 1* 33 5*   PLATELETS Thousands/uL 241 229 218 Results from last 7 days   Lab Units 08/04/20  0426 08/03/20  0609 08/02/20  0533  07/31/20  0745   POTASSIUM mmol/L 3 4* 3 7 4 2   < >  --    CHLORIDE mmol/L 111* 112* 113*   < >  --    CO2 mmol/L 27 26 23   < >  --    CO2, I-STAT mmol/L  --   --   --   --  20*   BUN mg/dL 19 24 26*   < >  --    CREATININE mg/dL 0 72 0 86 0 97   < >  --    GLUCOSE, ISTAT mg/dl  --   --   --   --  132   CALCIUM mg/dL 7 9* 8 0* 7 8*   < >  --     < > = values in this interval not displayed  Results from last 7 days   Lab Units 08/02/20  0533 08/01/20  2118 08/01/20  1406  07/31/20  1012 07/31/20  0008   INR   --   --   --   --  1 41* 1 25*   PTT seconds 140* 141* 38*   < > 72* 41*    < > = values in this interval not displayed

## 2020-08-05 NOTE — QUICK NOTE
Surgery   Quick note    Spoke with vascular surgery fellow  No further anticoagulation required       Ham Perales MD  8/5/2020  10:24 AM

## 2020-08-05 NOTE — PLAN OF CARE
Problem: Potential for Falls  Goal: Patient will remain free of falls  Description: INTERVENTIONS:  - Assess patient frequently for physical needs  -  Identify cognitive and physical deficits and behaviors that affect risk of falls    -  [place] fall precautions as indicated by assessment   - Educate patient/family on patient safety including physical limitations  - Instruct patient to call for assistance with activity based on assessment  - Modify environment to reduce risk of injury  - Consider OT/PT consult to assist with strengthening/mobility  Outcome: Progressing     Problem: Prexisting or High Potential for Compromised Skin Integrity  Goal: Skin integrity is maintained or improved  Description: INTERVENTIONS:  - Identify patients at risk for skin breakdown  - Assess and monitor skin integrity  - Assess and monitor nutrition and hydration status  - Monitor labs   - Assess for incontinence   - Turn and reposition patient  - Assist with mobility/ambulation  - Relieve pressure over bony prominences  - Avoid friction and shearing  - Provide appropriate hygiene as needed including keeping skin clean and dry  - Evaluate need for skin moisturizer/barrier cream  - Collaborate with interdisciplinary team   - Patient/family teaching  - Consider wound care consult   Outcome: Progressing     Problem: PAIN - ADULT  Goal: Verbalizes/displays adequate comfort level or baseline comfort level  Description: Interventions:  - Encourage patient to monitor pain and request assistance  - Assess pain using appropriate pain scale  - Administer analgesics based on type and severity of pain and evaluate response  - Implement non-pharmacological measures as appropriate and evaluate response  - Consider cultural and social influences on pain and pain management  - Notify physician/advanced practitioner if interventions unsuccessful or patient reports new pain  Outcome: Progressing     Problem: SAFETY ADULT  Goal: Maintain or return to baseline ADL function  Description: INTERVENTIONS:  -  Assess patient's ability to carry out ADLs; assess patient's baseline for ADL function and identify physical deficits which impact ability to perform ADLs (bathing, care of mouth/teeth, toileting, grooming, dressing, etc )  - Assess/evaluate cause of self-care deficits   - Assess range of motion  - Assess patient's mobility; develop plan if impaired  - Assess patient's need for assistive devices and provide as appropriate  - Encourage maximum independence but intervene and supervise when necessary  - Involve family in performance of ADLs  - Assess for home care needs following discharge   - Consider OT consult to assist with ADL evaluation and planning for discharge  - Provide patient education as appropriate  Outcome: Progressing  Goal: Maintain or return mobility status to optimal level  Description: INTERVENTIONS:  - Assess patient's baseline mobility status (ambulation, transfers, stairs, etc )    - Identify cognitive and physical deficits and behaviors that affect mobility  - Identify mobility aids required to assist with transfers and/or ambulation (gait belt, sit-to-stand, lift, walker, cane, etc )  - [place] fall precautions as indicated by assessment  - Record patient progress and toleration of activity level on Mobility SBAR; progress patient to next Phase/Stage  - Instruct patient to call for assistance with activity based on assessment  - Consider rehabilitation consult to assist with strengthening/weightbearing, etc   Outcome: Progressing     Problem: GASTROINTESTINAL - ADULT  Goal: Minimal or absence of nausea and/or vomiting  Description: INTERVENTIONS:  - Administer IV fluids if ordered to ensure adequate hydration  - Maintain NPO status until nausea and vomiting are resolved  - Nasogastric tube if ordered  - Administer ordered antiemetic medications as needed  - Provide nonpharmacologic comfort measures as appropriate  - Advance diet as tolerated, if ordered  - Consider nutrition services referral to assist patient with adequate nutrition and appropriate food choices  Outcome: Progressing  Goal: Maintains or returns to baseline bowel function  Description: INTERVENTIONS:  - Assess bowel function  - Encourage oral fluids to ensure adequate hydration  - Administer IV fluids if ordered to ensure adequate hydration  - Administer ordered medications as needed  - Encourage mobilization and activity  - Consider nutritional services referral to assist patient with adequate nutrition and appropriate food choices  Outcome: Progressing  Goal: Maintains adequate nutritional intake  Description: INTERVENTIONS:  - Monitor percentage of each meal consumed  - Identify factors contributing to decreased intake, treat as appropriate  - Assist with meals as needed  - Monitor I&O, weight, and lab values if indicated  - Obtain nutrition services referral as needed  Outcome: Progressing     Problem: GENITOURINARY - ADULT  Goal: Maintains or returns to baseline urinary function  Description: INTERVENTIONS:  - Assess urinary function  - Encourage oral fluids to ensure adequate hydration if ordered  - Administer IV fluids as ordered to ensure adequate hydration  - Administer ordered medications as needed  - Offer frequent toileting  - Follow urinary retention protocol if ordered  Outcome: Progressing  Goal: Absence of urinary retention  Description: INTERVENTIONS:  - Assess patients ability to void and empty bladder  - Monitor I/O  - Bladder scan as needed  - Discuss with physician/AP medications to alleviate retention as needed  - Discuss catheterization for long term situations as appropriate  Outcome: Progressing     Problem: CONFUSION/THOUGHT DISTURBANCE  Goal: Thought disturbances (confusion, delirium, depression, dementia or psychosis) are managed to maintain or return to baseline mental status and functional level  Description: INTERVENTIONS:  - Assess for possible contributors to  thought disturbance, including but not limited to medications, infection, impaired vision or hearing, underlying metabolic abnormalities, dehydration, respiratory compromise,  psychiatric diagnoses and notify attending PHYSICAN/AP  - Monitor and intervene to maintain adequate nutrition, hydration, elimination, sleep and activity  - Decrease environmental stimuli, including noise as appropriate  - Provide frequent contacts to provide refocusing, direction and reassurance as needed  Approach patient calmly with eye contact and at their level  - [place] high risk fall precautions, aspiration precautions and other safety measures, as indicated  - If delirium suspected, notify physician/AP of change in condition and request immediate in-person evaluation  - Pursue consults as appropriate including Geriatric (campus dependent), OT for cognitive evaluation/activity planning, psychiatric, pastoral care, etc   Outcome: Progressing     Problem: Nutrition/Hydration-ADULT  Goal: Nutrient/Hydration intake appropriate for improving, restoring or maintaining nutritional needs  Description: Monitor and assess patient's nutrition/hydration status for malnutrition  Collaborate with interdisciplinary team and initiate plan and interventions as ordered  Monitor patient's weight and dietary intake as ordered or per policy  Utilize nutrition screening tool and intervene as necessary  Determine patient's food preferences and provide high-protein, high-caloric foods as appropriate       INTERVENTIONS:  - Monitor oral intake, urinary output, labs, and treatment plans  - Assess nutrition and hydration status and recommend course of action  - Evaluate amount of meals eaten  - Assist patient with eating if necessary   - Allow adequate time for meals  - Recommend/ encourage appropriate diets, oral nutritional supplements, and vitamin/mineral supplements  - Order, calculate, and assess calorie counts as needed  - Recommend, monitor, and adjust tube feedings and TPN/PPN based on assessed needs  - Assess need for intravenous fluids  - Provide specific nutrition/hydration education as appropriate  - Include patient/family/caregiver in decisions related to nutrition  Outcome: Progressing     Problem: RESPIRATORY - ADULT  Goal: Achieves optimal ventilation and oxygenation  Description: INTERVENTIONS:  - Assess for changes in respiratory status  - Assess for changes in mentation and behavior  - Position to facilitate oxygenation and minimize respiratory effort  - Oxygen administered by appropriate delivery if ordered  - Initiate smoking cessation education as indicated  - Encourage broncho-pulmonary hygiene including cough, deep breathe, Incentive Spirometry  - Assess the need for suctioning and aspirate as needed  - Assess and instruct to report SOB or any respiratory difficulty  - Respiratory Therapy support as indicated  Outcome: Progressing     Problem: HEMATOLOGIC - ADULT  Goal: Maintains hematologic stability  Description: INTERVENTIONS  - Assess for signs and symptoms of bleeding or hemorrhage  - Monitor labs  - Administer supportive blood products/factors as ordered and appropriate  Outcome: Progressing

## 2020-08-05 NOTE — PLAN OF CARE
Problem: OCCUPATIONAL THERAPY ADULT  Goal: Performs self-care activities at highest level of function for planned discharge setting  See evaluation for individualized goals  Description: Treatment Interventions: ADL retraining, Functional transfer training, UE strengthening/ROM, Endurance training, Cognitive reorientation, Patient/family training, Equipment evaluation/education, Compensatory technique education, Continued evaluation, Energy conservation, Activityengagement          See flowsheet documentation for full assessment, interventions and recommendations  Outcome: Not Progressing  Note: Limitation: Decreased ADL status, Decreased UE ROM, Decreased UE strength, Decreased Safe judgement during ADL, Decreased cognition, Decreased endurance, Visual deficit, Decreased fine motor control, Decreased self-care trans, Decreased high-level ADLs, Mood limitation  Prognosis: Fair  Assessment: Patient participated in Skilled OT session 8/5/2020 with interventions consisting of ADL re training with the use of correct body mechnaics, Energy Conservation techniques, deep breathing technique, safety awareness and fall prevention techniques, therapeutic exercise to: increase functional use of BUEs, increase BUE muscle strength ,  therapeutic activities to: increase activity tolerance, increase dynamic sit/ stand balance during functional activity , increase postural control, increase trunk control and increase OOB/ sitting tolerance   Patient agreeable to OT treatment session, upon arrival patient was found supine in bed  In comparison to previous session, patient not demonstrating any significant improvements in occupational performance at this time; overall requiring Total/Max A for self care and Max A x2/total A for bed mobility  Increased lethargy today and difficult to arouse   Patient requiring frequent re direction, verbal cues for safety, verbal cues for correct technique, verbal cues for pacing thru activity steps, cognitive assistance to anticipate next step and frequent rest periods  Patient continues to be functioning below baseline level, occupational performance remains limited secondary to factors listed above and increased risk for falls and injury  From OT standpoint, recommendation at time of d/c would be Short Term Rehab when medically stable  Patient to benefit from continued Occupational Therapy treatment while in the hospital to address deficits as defined above and maximize level of functional independence with ADLs and functional mobility       OT Discharge Recommendation: Post-Acute Rehabilitation Services  OT - OK to Discharge: Yes(when medically stable)     Bonny Ring MS, OTR/L

## 2020-08-05 NOTE — ASSESSMENT & PLAN NOTE
Externalization 7/30 secondary to laparotomy for bowel ischemia/peritonitis  · S/p VPS placement for NPH 2005, revision 2011  · Shunt last adjusted to 100 cm of water 7/21/2020 for increasing size of bilateral subdural hygromas  · Sutures placed over about beginning in July for thinning skin  Imaging:   · CT head wo contrast 7/31/2020: Minimal increase in size of ventricular system    Plan:    · Plan for tentative surgery on Friday for removal of shunt system  · Recommend CT head wo contrast to be completed today at noon  · Continue monitor neurological exam   GCS 13 today, confused to date, opens eyes to voice, generalized weakness throughout  · Continue to monitor CSF - yellow output, 63cc/24 hours  · CSF sent from externalized ventricular catheter 8/3 with gram stain positive for 3+ GNR  Continue to follow cultures  Glucose 85, RBC 6, Protein 49, WBC 82    · CSF also sent from shunt valve tap  No bacteria seen on gram stain  Will follow cultures  Glucose 74, RBC 0, Protein 46, WBC 53    · CSF 8/4 WBC 19, RBC 0, cultures 4+ gram negative rods, protein 50, glucose 80  · Monitor output from externalized shunt with 10 cc or less per hour  Maintain set at 8 mmHg level with the external portion at the chest  · Continue monitor blood cultures 7/31 which were negative at 4 days  · Id following  Continue IV antibiotics cefepime 2g Q8 x 6 wks  · DVT prophylaxis:  SCDs and heparin    Neurosurgery will continue to follow  Please call with questions or concerns

## 2020-08-05 NOTE — PROGRESS NOTES
Andrew Crouch met with patient and provided a blessing and Sacrament of the Sick       08/05/20 1200   Clinical Encounter Type   Visited With Patient   Routine Visit Introduction   Continue Visiting Yes   Oriental orthodox Encounters   Oriental orthodox Needs Prayer   Sacramental Encounters   Sacrament of Sick-Anointing Anointed

## 2020-08-05 NOTE — PROGRESS NOTES
Progress Note - Amy Sureshrot 1950, 69 y o  male MRN: [id number]    Unit/Bed#: ICU 12 Encounter: [id number]    Primary Care Provider: Gallito Gonzalez   Date and time admitted to hospital: 7/30/2020 11:12 PM        Status post ventriculoperitoneal shunt  Assessment & Plan  Externalization 7/30 secondary to laparotomy for bowel ischemia/peritonitis  · S/p VPS placement for NPH 2005, revision 2011  · Shunt last adjusted to 100 cm of water 7/21/2020 for increasing size of bilateral subdural hygromas  · Sutures placed over about beginning in July for thinning skin  Imaging:   · CT head wo contrast 7/31/2020: Minimal increase in size of ventricular system    Plan:    · Plan for tentative surgery on Friday for removal of shunt system  · Recommend CT head wo contrast to be completed today at noon  · Continue monitor neurological exam   GCS 13 today, confused to date, opens eyes to voice, generalized weakness throughout  · Continue to monitor CSF - yellow output, 63cc/24 hours  · CSF sent from externalized ventricular catheter 8/3 with gram stain positive for 3+ GNR  Continue to follow cultures  Glucose 85, RBC 6, Protein 49, WBC 82    · CSF also sent from shunt valve tap  No bacteria seen on gram stain  Will follow cultures  Glucose 74, RBC 0, Protein 46, WBC 53    · CSF 8/4 WBC 19, RBC 0, cultures 4+ gram negative rods, protein 50, glucose 80  · Monitor output from externalized shunt with 10 cc or less per hour  Maintain set at 8 mmHg level with the external portion at the chest  · Continue monitor blood cultures 7/31 which were negative at 4 days  · Id following  Continue IV antibiotics cefepime 2g Q8 x 6 wks  · DVT prophylaxis:  SCDs and heparin    Neurosurgery will continue to follow  Please call with questions or concerns      Spondylolisthesis of lumbosacral region  Assessment & Plan  · Status post minimally invasive transverse lumbar interbody fusion L5/S1 7/6/20    Ischemic bowel disease [facility])  Assessment & Plan  Ongoing management per general surgery/primary team  · Vascular surgery following  No need for [facility] therapy as of 8/5      Subjective/Objective   Chief Complaint: "so-so"    Subjective:  Patient denies all complaints  Appears tired and sleepy but opens eyes to voice  Objective:  Sleeping but arousable to voice, NAD    I/O       08/03 0701 - 08/04 0700 08/04 0701 - 08/05 0700 08/05 0701 - 08/06 0700    P  O  540 380     I V  (mL/kg) 30 (0 2) 623 3 (5) 405 (3 2)    IV Piggyback 150 180     Total Intake(mL/kg) 720 (5 7) 1183 3 (9 5) 405 (3 2)    Urine (mL/kg/hr) 1525 (0 5) 800 (0 3) 259 (0 6)    Emesis/NG output 50      Drains 58 63 12    Stool  0     Total Output 1633 863 271    Net -913 +320 3 +134           Unmeasured Urine Occurrence  2 x     Unmeasured Stool Occurrence  3 x           Invasive Devices     Peripheral Intravenous Line            Peripheral IV 08/03/20 Left Arm 1 day    Peripheral IV 08/04/20 Proximal;Right;Ventral (anterior) Forearm less than 1 day          Drain            Ventriculostomy/Subdural Ventricular drainage catheter with ICP microsensor Right Other (Comment) 5 days                Physical Exam:  Vitals: Blood pressure 116/57, pulse (!) 54, temperature 98 3 °F (36 8 °C), temperature source Oral, resp  rate (!) 24, height 6' 3", weight 125 kg (276 lb 0 3 oz), SpO2 95 %  ,Body mass index is 34 5 kg/m²        General appearance: sleepy, appears stated age, cooperative and no distress  Head: Normocephalic, without obvious abnormality, shunt valve incision intact  Eyes: EOMI  Neck: supple, symmetrical, trachea midline  Lungs: non labored breathing  Heart: regular heart rate  Neurologic:   Mental status: GCS 13 (E3, V4, M6), sleepy, not oriented to date, follows commands slowly  Cranial nerves: grossly intact (Cranial nerves II-XII) - seems to have right NLF droop  Sensory: normal to LT  Motor: moving all extremities with generalized weakness throughout   RUE:  4/5 /bicep/tricep, 2-3/5 deltoid   LUE:  4/5 /bicep/tricep, 3/5 deltoid   BLE:  4/5 HF/DF/PF  Coordination: finger to nose abnormal bilaterally unable to reach nose, no drift bilaterally      Lab Results:  Results from last 7 days   Lab Units 08/04/20  0426 08/03/20  0609 08/02/20  0533  08/01/20  0549  07/30/20  2325   WBC Thousand/uL 8 51 8 97 10 89*   < > 7 71   < > 13 76*   HEMOGLOBIN g/dL 10 7* 10 9* 10 8*   < > 11 2*  11 2*   < > 14 8   I STAT HEMOGLOBIN   --   --   --   --   --    < >  --    HEMATOCRIT % 33 5* 33 1* 33 5*   < > 35 2*   < > 46 0   HEMATOCRIT, ISTAT   --   --   --   --   --    < >  --    PLATELETS Thousands/uL 241 229 218   < > 183   < > 272   NEUTROS PCT %  --   --  82*  --  85*  --  87*   MONOS PCT %  --   --  9  --  9  --  10   MONO PCT % 3*  --   --   --   --    < >  --     < > = values in this interval not displayed  Results from last 7 days   Lab Units 08/04/20  0426 08/03/20  0609 08/02/20  0533  07/31/20  0745 07/31/20  0555  07/30/20  2325   POTASSIUM mmol/L 3 4* 3 7 4 2   < >  --   --   --  4 9   CHLORIDE mmol/L 111* 112* 113*   < >  --   --   --  104   CO2 mmol/L 27 26 23   < >  --   --   --  26   CO2, I-STAT mmol/L  --   --   --   --  20* 19*   < >  --    BUN mg/dL 19 24 26*   < >  --   --   --  32*   CREATININE mg/dL 0 72 0 86 0 97   < >  --   --   --  1 21   CALCIUM mg/dL 7 9* 8 0* 7 8*   < >  --   --   --  9 7   ALK PHOS U/L  --   --  54  --   --   --   --  91   ALT U/L  --   --  16  --   --   --   --  19   AST U/L  --   --  24  --   --   --   --  39   GLUCOSE, ISTAT mg/dl  --   --   --   --  132 101  --   --     < > = values in this interval not displayed       Results from last 7 days   Lab Units 08/04/20  0426 08/03/20  0609 08/02/20  0533   MAGNESIUM mg/dL 2 2 2 3 2 4     Results from last 7 days   Lab Units 08/04/20  0426 08/03/20  0609 08/02/20  0533   PHOSPHORUS mg/dL 2 0* 1 7* 1 7*     Results from last 7 days   Lab Units 08/02/20  0533 08/01/20  5472 08/01/20  1406  07/31/20  1012 07/31/20  0008   INR   --   --   --   --  1 41* 1 25*   PTT seconds 140* 141* 38*   < > 72* 41*    < > = values in this interval not displayed  No results found for: TROPONINT  ABG:  Lab Results   Component Value Date    PHART 7 357 07/31/2020    YHQ8KUW 32 3 (L) 07/31/2020    PO2ART 87 8 07/31/2020    LQB5RSH 17 7 (L) 07/31/2020    BEART -6 7 07/31/2020    SOURCE Line, Arterial 07/31/2020       Imaging Studies: I have personally reviewed pertinent reports  and I have personally reviewed pertinent films in PACS    Xr Chest Portable    Result Date: 7/31/2020  Impression: 1  Interval intubation with coiling of the nasogastric tube in the midesophagus  Repositioning of the the nasogastric tube advised  2   Endotracheal tube noted with the tip well above the tye  3   Scattered strandy densities likely related to diminished degree of inspiration  Workstation performed: [id number]     Ct Head Without Contrast    Result Date: 7/31/2020  Impression: Minimal increase in size of ventricular system The study was marked in EPIC for immediate notification  Workstation performed: [id number]     Pe Study With Ct Abdomen And Pelvis With Contrast    Result Date: 7/31/2020  Impression: Fluid-filled loops of small bowel with pneumatosis and bowel wall thickening  Findings are suspicious for ischemic bowel  Nodular airspace opacities within the right upper lobe  Findings may represent infection  I personally discussed this study with Melissa Harris on 7/31/2020 at 1:46 AM  Workstation performed: [id number]       EKG, Pathology, and Other Studies: I have personally reviewed pertinent reports        VTE Pharmacologic Prophylaxis: Heparin    VTE Mechanical Prophylaxis: sequential compression device

## 2020-08-05 NOTE — TREATMENT PLAN
Treatment plan    2020    Yasmin Valencia    Aged 69 year     1950    23-year-old man with right  shunt system for NPH had ex lap for acute peritonitis on 2020    Abdomen contamination +++     Small bowel ischemia  SMA patent  Had papaverine IA     Second look surgery - bowel viability appeared improved    No resection    Shunt tubing contaminated with ascending contamination with Pseudomonas    Externalization moved to higher location second intercostal space    However still with 4+ GNR  from surveillance aspiration of CSF from new externalization terminus right second intercostal space    Will plan for removal of right-sided  shunt system in relative near future    Awaiting culture    This Pseudomonas in his case is unusual resistant infection in multiple location that would be to be difficult to clear without removal of the shunt system    Concern for urological source    Exam:  Sleeps a lot  GCS 14    Generalized weakness 4/5 nonfocal     Output csf dainage low 63 cc overnight    Discussed with Dr Jayson Saenz    We will see if this might be able to be done today    Timing location TB D    He he does have scar tissue in right frontal region from prior revisions X 2  Could be bleeding issue if this procedure under local      Plan:  1  Possible right  shunt system today  2  Will assess OR situation  3  In meantime CT head this a m  without as sensitive to increase in subdural formation  4  Hold heparin subcu, dc  Last dose 0500 hours  5  Bedside removal under local and neural possibly afternoon   8 hours off heparin  6  No EVD  7  Continue IV antibiotics meropenem 6 weeks under id  8  Resume  heparin subcu hours after procedure unless new interim problems  We will discuss with brother in [place]    2020 9:01 AM    Wilma Auguste MD, Attending Neurological Surgeon

## 2020-08-05 NOTE — PHYSICAL THERAPY NOTE
Physical Therapy Treatment note     Patient Name: Manuel Melendez    Today's Date: 8/5/2020     Problem List  Principal Problem:    Acute superficial gastritis with hemorrhage  Active Problems:    Status post ventriculoperitoneal shunt    Unspecified abnormalities of gait and mobility    Type 2 diabetes mellitus ([facility] )    Essential hypertension    ZHOU on CPAP    Spondylolisthesis of lumbosacral region    Self-care deficit for hygiene    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[facility] )    Nonocclusive mesenteric ischemia (HCC)    Hydrocephalus (HCC)       Past Medical History  Past Medical History:   Diagnosis Date    Cancer 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     Diabetes mellitus ([facility] )     DM2 [facility]diabetes mellitus, type 2) ([facility] )     Gout     HTN (hypertension)     Hydrocephalus ([place] [address] )     Hypertension     Neuropathy     ZHOU on CPAP     Pressure injury of skin     TIA (transient ischemic attack)         Past Surgical History  Past Surgical History:   Procedure Laterality Date    ABDOMINAL WALL SURGERY N/A 8/1/2020    Procedure: CLOSURE WOUND ABDOMINAL/TRUNK;  Surgeon: Felicita Nicolas DO;  [facility];  Service: General    ARTERIOGRAM N/A 7/31/2020    Procedure: ARTERIOGRAM Of SMA and placement of Papavarine onfusion arterial catheter;  Surgeon: Abrahan Grimm MD;  [facility];  Service: Vascular    ARTERIOGRAM Left 8/1/2020    Procedure: ARTERIOGRAM; cath removal;  Surgeon: Abrahan Grimm MD;  [facility];  Service: Vascular    CSF SHUNT      x3 Op Reports, Mitesh Chen Patience in MPF chart viewer    LAPAROTOMY N/A 7/31/2020    Procedure: LAPAROTOMY EXPLORATORY: Externalizes  shunt Latasha Livers application;  Surgeon: Shari Schrader MD;  [facility];  Service: General    LAPAROTOMY N/A 8/1/2020    Procedure: LAPAROTOMY EXPLORATORY,;  Surgeon: Felicita Nicolas DO;  [facility];  Service: General    IA AMPUTATION FOOT,TRANSMETATARSAL Left 5/30/2020    Procedure: excision 5th metatarsal head  excision 5th metatarsal ulcer ;  Surgeon: Katya Garcia DPM;  [facility];  Service: Podiatry    OR ARTHDSIS POST/POSTEROLATRL/POSTINTERBODY LUMBAR N/A 7/6/2020    Procedure: MINIMALLY INVASIVE TRANSVERSE LUMBAR INTERBODY FUSION L5-S1 FROM LEFT-SIDED APPROACH;  Surgeon: Emily Renee MD;  [facility];  Service: Neurosurgery    OR REPLACEMENT/REVISION,CSF SHUNT Right 7/6/2020    Procedure: REVISION OF SCALP OVER VENTRICULAR CATHETER IN THE RIGHT CORONAL REGION;  Surgeon: Emily Renee MD;  [facility];  Service: Neurosurgery    WOUND DEBRIDEMENT N/A 8/1/2020    Procedure: abd washout;  Surgeon: Holly Pablo DO;  [facility];  Service: General           08/05/20 1106   Pain Assessment   Pain Assessment Tool Pain Assessment not indicated - pt denies pain   Pain Score No Pain   Restrictions/Precautions   Weight Bearing Precautions Per Order No   Braces or Orthoses MAFO   Other Precautions Cognitive   General   Chart Reviewed Yes   Family/Caregiver Present No   Cognition   Overall Cognitive Status Impaired   Arousal/Participation Lethargic   Attention Difficulty attending to directions   Bed Mobility   Rolling R 2  Maximal assistance   Additional items Assist x 2   Rolling L 2  Maximal assistance   Additional items Assist x 2   Supine to Sit 2  Maximal assistance   Additional items Assist x 2   Sit to Supine 1  Dependent   Additional items Assist x 2   Balance   Static Sitting Poor   Dynamic Sitting Poor -   Endurance Deficit   Endurance Deficit Yes   Activity Tolerance   Activity Tolerance Patient limited by fatigue   Medical Staff Made Aware OT   Nurse Made Aware nurse approved therapy session   Exercises   Balance training  sitting EOB at a min A to mod A,  for 10 mintues    Assessment   Prognosis Good   Problem List Decreased strength;Decreased endurance; Impaired balance;Decreased mobility   Assessment Pt presents to therapy today with reduced mobility, lethargy, high risk of falling, reduced activity tolerance  These impairments limit the patient by requiring increased assistance for mobility and places him at risk of falling  Pt would benefit from continued skilled therapy while in the hospital to improve overall mobility and work towards a safe d/c  Recommend rehab  At end of session patient was left supine with call bell within reach  Pt required hygiene due to being incontinent of a BM  Barriers to Discharge Inaccessible home environment;Decreased caregiver support   Goals   STG Expiration Date 08/15/20   Plan   Treatment/Interventions Functional transfer training;LE strengthening/ROM; Therapeutic exercise; Endurance training;Gait training;Bed mobility   Progress Progressing toward goals   PT Frequency Other (Comment)  (3-6xwk)   Recommendation   PT Discharge Recommendation Post-Acute Rehabilitation Services   PT - OK to Discharge Yes   Additional Comments if to rehab   Alvin Bowden, Pt, DPT

## 2020-08-05 NOTE — PROGRESS NOTES
Daily Progress Note - Critical Care   Khloe Rangel 69 y o  male MRN: [id number]  Unit/Bed#: ICU 12 Encounter: [id number]        ----------------------------------------------------------------------------------------  HPI/24hr events:  shunt repositioned from abdomen to chest, remains positive for GNR      ---------------------------------------------------------------------------------------  SUBJECTIVE  No pain  Review of Systems   Constitutional: Negative for activity change, chills, diaphoresis, fatigue and fever  HENT: Negative for congestion and rhinorrhea  Eyes: Negative for pain  Respiratory: Negative for cough, chest tightness, shortness of breath and wheezing  Cardiovascular: Negative for chest pain and palpitations  Gastrointestinal: Negative for abdominal distention, constipation, diarrhea, nausea and vomiting  Genitourinary: Negative for difficulty urinating and dysuria  Musculoskeletal: Negative for arthralgias and myalgias  Neurological: Negative for dizziness, weakness, light-headedness and headaches     Psychiatric/Behavioral: The patient is not nervous/anxious         ---------------------------------------------------------------------------------------  Assessment and Plan:    Neuro:   ·  shunt infection  · Shunt externalized, repositioned from abdomen to chest yesterday  · Repeat cultures from chest, still positive  · Likely will need EVD / re-culture from shunt tip  · Hold on asa while pending nsgx eval  · Neurochecks     CV:   · SMA stenosis   · Clarify when to start asa   · HTN  · Holding home vasotec  · BP remains normal  · Monitor cuff pressures  · A line out       Pulm:  · ZHOU   · Home CPAP     GI:   · SMA stenosis w bowel ischemia   · S/p ex lap / A gram and paparvarine on 7/31  · Re-exploration / closure on 8/1  · NGT removed 8/3  · Tolerating clears yesterday    :  · Stable - no acute issues     F/E/N:   · Fluids - no mIVF   · Nutrition - NPO per nsgx · Lytes - monitor / replete PRN       Heme/Onc:   · DVT ppx   · SQH    Endo:   · Hyperglycemia  · SSI Alg 4       ID:   ·  shunt infection  · Pseudomonas growing from abdominal and chest portion of shunt  · Continue cefepime   · Additional surveillance cx collection per nsgx   · PICC for long term abx     MSK/Skin:  · Repetitive repositioning and off-loading to prevent skin break down and ulcerative formation      Disposition: Continue Critical Care   Code Status: Level 1 - Full Code  ---------------------------------------------------------------------------------------  ICU CORE MEASURES    Prophylaxis   VTE Pharmacologic Prophylaxis: Heparin  VTE Mechanical Prophylaxis: sequential compression device  Stress Ulcer Prophylaxis: Prophylaxis Not Indicated     ABCDE Protocol (if indicated)  Plan to perform spontaneous awakening trial today? Not applicable  Plan to perform spontaneous breathing trial today? Not applicable  Obvious barriers to extubation? Not applicable  CAM-ICU: Negative    Invasive Devices Review  Invasive Devices     Peripheral Intravenous Line            Peripheral IV 20 Left Arm 1 day    Peripheral IV 20 Proximal;Right;Ventral (anterior) Forearm less than 1 day          Drain            Ventriculostomy/Subdural Ventricular drainage catheter with ICP microsensor Right Other (Comment) 4 days              Can any invasive devices be discontinued today?  Not applicable  ---------------------------------------------------------------------------------------  OBJECTIVE    Vitals   Vitals:    20 0320 20 0400 20 0500 20 0600   BP:  119/59 119/57 105/55   BP Location:  Left arm Left arm Left arm   Pulse:  (!) 54 60 (!) 50   Resp:  (!) 23 (!) 23 (!) 24   Temp:  98 °F (36 7 °C)     TempSrc:  Axillary     SpO2: 96% 95% 97% 95%   Weight:    125 kg (276 lb 0 3 oz)   Height:         Temp (24hrs), Av 3 °F (36 8 °C), Min:97 8 °F (36 6 °C), Max:99 3 °F (37 4 °C)  Current: Temperature: 98 °F (36 7 °C)  Vitals:    08/05/20 0600   BP: 105/55   Pulse: (!) 50   Resp: (!) 24   Temp:    SpO2: 95%       Respiratory:  SpO2: SpO2: 95 %  Nasal Cannula O2 Flow Rate (L/min): 2 L/min    Invasive/non-invasive ventilation settings   Respiratory    Lab Data (Last 4 hours)    None         O2/Vent Data (Last 4 hours)      08/05 0320          Non-Invasive Ventilation Mode CPAP                   Physical Exam  Vitals signs and nursing note reviewed  Constitutional:       General: He is not in acute distress  Appearance: He is well-developed and well-nourished  He is not diaphoretic  Comments: Comfortable, resting, non toxic    HENT:      Head: Normocephalic and atraumatic  Eyes:      Pupils: Pupils are equal, round, and reactive to light  Neck:      Musculoskeletal: Neck supple  Trachea: No tracheal deviation  Cardiovascular:      Rate and Rhythm: Normal rate and regular rhythm  Pulses: Intact distal pulses  Heart sounds: No murmur  No friction rub  No gallop  Comments: Shunt externalized to chest   No erythema / drainage from surrounding catheter   Hooked up to drainage system   Pulmonary:      Effort: Pulmonary effort is normal  No respiratory distress  Breath sounds: Normal breath sounds  No wheezing or rales  Abdominal:      General: Bowel sounds are normal  There is no distension  Palpations: Abdomen is soft  There is no mass  Tenderness: There is no abdominal tenderness  There is no guarding  Comments: Abdomen non tender  Soft  Incision c/d/i, no drains    Genitourinary:     Comments: No grover   Musculoskeletal:         General: No deformity or edema  Skin:     General: Skin is warm and dry  Neurological:      Mental Status: He is alert and oriented to person, place, and time        Comments: Sleeping but wakes up to voice, answers questions and follows commands    Psychiatric:         Mood and Affect: Mood and affect normal  Behavior: Behavior normal          Laboratory and Diagnostics:  Results from last 7 days   Lab Units 08/04/20  0426 08/03/20  0609 08/02/20  0533 08/01/20  1406 08/01/20  0549 07/31/20  2119 07/31/20  1012 07/31/20  0745  07/30/20  2325   WBC Thousand/uL 8 51 8 97 10 89* 13 00* 7 71  --  10 02  --   --  13 76*   HEMOGLOBIN g/dL 10 7* 10 9* 10 8* 12 7 11 2*  11 2* 11 0* 13 0  --   --  14 8   I STAT HEMOGLOBIN g/dl  --   --   --   --   --   --   --  12 6   < >  --    HEMATOCRIT % 33 5* 33 1* 33 5* 39 7 35 2*  --  39 7  --   --  46 0   HEMATOCRIT, ISTAT %  --   --   --   --   --   --   --  37   < >  --    PLATELETS Thousands/uL 241 229 218 236 183  --  234  --   --  272   NEUTROS PCT %  --   --  82*  --  85*  --   --   --   --  87*   BANDS PCT %  --   --   --   --   --   --  8  --   --   --    MONOS PCT %  --   --  9  --  9  --   --   --   --  10   MONO PCT % 3*  --   --   --   --   --  9  --   --   --     < > = values in this interval not displayed       Results from last 7 days   Lab Units 08/04/20  0426 08/03/20  0609 08/02/20  0533 08/01/20  1406 08/01/20  0549 07/31/20  1012 07/31/20  0745  07/30/20  2325   SODIUM mmol/L 142 141 143 141 141 139  --   --  138   POTASSIUM mmol/L 3 4* 3 7 4 2 4 2 4 2 4 6  --   --  4 9   CHLORIDE mmol/L 111* 112* 113* 111* 112* 108  --   --  104   CO2 mmol/L 27 26 23 22 21 21  --   --  26   CO2, I-STAT mmol/L  --   --   --   --   --   --  20*   < >  --    ANION GAP mmol/L 4 3* 7 8 8 10  --   --  8   BUN mg/dL 19 24 26* 30* 32* 33*  --   --  32*   CREATININE mg/dL 0 72 0 86 0 97 0 99 1 06 1 27  --   --  1 21   CALCIUM mg/dL 7 9* 8 0* 7 8* 6 9* 7 6* 8 5  --   --  9 7   GLUCOSE RANDOM mg/dL 126 121 141* 150* 145* 159*  --   --  136   ALT U/L  --   --  16  --   --   --   --   --  19   AST U/L  --   --  24  --   --   --   --   --  39   ALK PHOS U/L  --   --  54  --   --   --   --   --  91   ALBUMIN g/dL  --   --  1 8*  --   --   --   --   --  2 7*   TOTAL BILIRUBIN mg/dL  --   --  0 54  -- --   --   --   --  0 82    < > = values in this interval not displayed  Results from last 7 days   Lab Units 08/04/20  0426 08/03/20  0609 08/02/20  0533 08/01/20  0549   MAGNESIUM mg/dL 2 2 2 3 2 4 2 2   PHOSPHORUS mg/dL 2 0* 1 7* 1 7* 3 4      Results from last 7 days   Lab Units 08/02/20  0533 08/01/20  2118 08/01/20  1406 08/01/20  0549 07/31/20  2119 07/31/20  1559 07/31/20  1012 07/31/20  0008   INR   --   --   --   --   --   --  1 41* 1 25*   PTT seconds 140* 141* 38* 42* 53* 60* 72* 41*      Results from last 7 days   Lab Units 07/30/20  2325   TROPONIN I ng/mL <0 02     Results from last 7 days   Lab Units 08/02/20  0533 08/01/20  2118 08/01/20  1406 07/31/20  1805 07/31/20  1559 07/31/20  1012 07/31/20  0258   LACTIC ACID mmol/L 1 2 1 7 2 0 1 6 2 3* 2 2* 2 2*     ABG:  Results from last 7 days   Lab Units 07/31/20  1151   PH ART  7 357   PCO2 ART mm Hg 32 3*   PO2 ART mm Hg 87 8   HCO3 ART mmol/L 17 7*   BASE EXC ART mmol/L -6 7   ABG SOURCE  Line, Arterial     VBG:  Results from last 7 days   Lab Units 07/31/20  1151   ABG SOURCE  Line, Arterial           Micro  Results from last 7 days   Lab Units 08/04/20  1318 08/03/20  1738 08/03/20  0845 08/01/20  0548 07/31/20  1411 07/31/20  1251 07/31/20  0622   BLOOD CULTURE   --   --   --   --   --  No Growth After 4 Days  No Growth After 4 Days  --    SPUTUM CULTURE   --   --   --  1 colony Candida albicans*  --   --   --    GRAM STAIN RESULT  1+ Polys*  4+ Gram negative rods* No polys seen*  3+ Gram negative rods*  Smear reviewed by Microbiology staff* No Polys or Bacteria seen 2+ Polys  1+ Epithelial cells per low power field  No bacteria seen 1+ Polys*  4+ Gram negative rods*  --  No Polys or Bacteria seen   BODY FLUID CULTURE, STERILE   --   --   --   --   --   --  1+ Growth of Pseudomonas aeruginosa*       EKG: sinus godwin / NSR   Imaging:   XR chest portable   Final Result         1    Interval intubation with coiling of the nasogastric tube in the midesophagus  Repositioning of the the nasogastric tube advised  2   Endotracheal tube noted with the tip well above the tye  3   Scattered strandy densities likely related to diminished degree of inspiration  Workstation performed: [id number]         CT head without contrast   ED Interpretation   CT read by radiologist; will discuss with medicine  Final Result      Minimal increase in size of ventricular system      The study was marked in EPIC for immediate notification  Workstation performed: [id number]         PE Study with CT Abdomen and Pelvis with contrast   Final Result      Fluid-filled loops of small bowel with pneumatosis and bowel wall thickening  Findings are suspicious for ischemic bowel  Nodular airspace opacities within the right upper lobe  Findings may represent infection  I personally discussed this study with Mauri Roper on 7/31/2020 at 1:46 AM                               Workstation performed: [id number]         IR other    (Results Pending)      I have personally reviewed pertinent reports  and I have personally reviewed pertinent films in PACS    Intake and Output  I/O       08/03 0701 - 08/04 0700 08/04 0701 - 08/05 0700    P  O  540 380    I V  (mL/kg) 30 (0 2) 623 3 (5)    IV Piggyback 150 180    Total Intake(mL/kg) 720 (5 7) 1183 3 (9 5)    Urine (mL/kg/hr) 1525 (0 5) 800 (0 3)    Emesis/NG output 50     Drains 58 63    Stool  0    Total Output 1633 863    Net -913 +320 3          Unmeasured Urine Occurrence  2 x    Unmeasured Stool Occurrence  3 x            Height and Weights   Height: 6' 3"  IBW: 84 5 kg  Body mass index is 34 5 kg/m²    Weight (last 2 days)     Date/Time   Weight    08/05/20 0600   125 (276 02)    08/04/20 0554   126 (277 78)    08/03/20 0600   127 (281 09)                Nutrition       Diet Orders   (From admission, onward)             Start     Ordered    08/05/20 0001  Diet NPO; Sips with meds  Diet effective midnight     Question Answer Comment   Diet Type NPO    NPO Except: Sips with meds    RD to adjust diet per protocol? No        08/04/20 4521                    Active Medications  Scheduled Meds:acetaminophen, 650 mg, Oral, Q6H PRN, Misa Newby PA-C  cefepime, 2,000 mg, Intravenous, Q8H, Misa Newby PA-C, Last Rate: Stopped (08/05/20 0557)  chlorhexidine, 15 mL, Swish & Spit, Q12H [facility], Misa Newby PA-C  heparin (porcine), 5,000 Units, Subcutaneous, Q8H [facility], Lv Alejandra PA-C  HYDROmorphone, 0 5 mg, Intravenous, Q3H PRN, Misa Newby PA-C  insulin lispro, 2-12 Units, Subcutaneous, 4x Daily (AC & HS), Lv Alejandra PA-C  iodixanol, 15 mL, Intravenous, Once in imaging, Misa Newby PA-C  multi-electrolyte, 100 mL/hr, Intravenous, Continuous, Devika Ny, DO, Last Rate: 100 mL/hr (08/05/20 0001)  oxyCODONE, 5 mg, Oral, Q4H PRN, Misa Newby PA-C  potassium-sodium phosphates, 2 packet, Oral, BID With Meals, Afua Hines PA-C      Continuous Infusions:  multi-electrolyte, 100 mL/hr, Last Rate: 100 mL/hr (08/05/20 0001)      PRN Meds:   acetaminophen, 650 mg, Q6H PRN  HYDROmorphone, 0 5 mg, Q3H PRN  iodixanol, 15 mL, Once in imaging  oxyCODONE, 5 mg, Q4H PRN        Allergies   Allergies   Allergen Reactions    Pollen Extract Allergic Rhinitis     ---------------------------------------------------------------------------------------  Advance Directive and Living Will:      Power of :    POLST:    ---------------------------------------------------------------------------------------  Care Time Delivered:   No Critical Care time spent     Afua Hines PA-C      Portions of the record may have been created with voice recognition software  Occasional wrong word or "sound a like" substitutions may have occurred due to the inherent limitations of voice recognition software    Read the chart carefully and recognize, using context, where substitutions have occurred

## 2020-08-05 NOTE — PROGRESS NOTES
Progress Note - Infectious Disease   Camelia Toney 69 y o  male MRN: [id number]  Unit/Bed#: ICU 12 Encounter: [id number]      Impression/Plan:  1  Severe sepsis, tachycardia, leukocytosis, elevated lactic acid   With early development of high fevers  Secondary to  shunt infection with meningitis and peritonitis  Not on vasopressors   Blood cultures are negative thus far    Fevers and WBC count now improving on IV antibiotic   -antibiotic plan as below  -monitor temperatures and hemodynamics  -follow-up blood cultures  -recheck CBC with diff and BMP  -supportive care per Critical Care service     2   shunt infection  Fluid WBC count was 61 with neutrophil predominance  Gram stain from shunt fluid shows GNRs and culture grew Pseudomonas    Suspect secondary to peritonitis in the setting of #3   Status post externalization of  shunt on 07/30   Repeat CSF analysis shows slightly decreased neutrophil predominant pleocytosis  Repeat CSF fluid from the distal externalized shunt still with gram-negative rods despite external eyes in shunt more distally  -continue IV cefepime 2 g q 8 hours  -follow up sensitivities and adjust antibiotics as needed  -neurosurgery follow-up ongoing  -patient will need externalization higher up at the 2nd or 3rd intercostal space and followup CSF surveillance as per Neurosurgery  -patient for complete removal of the shunt system on Friday     3  Ischemic bowel with peritonitis   Status post exploratory laparotomy, VAC placement   Status post femoral artery and SMA artery cannulation for paraverine by vascular surgery   Suspect intra-abdominal infection is etiology of #2   Status post second-look on 08/01 with no resection performed   Peritoneal fluid culture also shows Pseudomonas    Distal externalized shunt still has gram-negative rods  -antibiotic plan as above  -vascular surgery/general surgical follow-up ongoing  -patient for removal of shunt on Friday     4  NPH requiring  shunt placement in , status post recent revision in 2019       5  Lumbar spinal stenosis status post L5-S1 fusion on 2020      6  Diabetes mellitus with neuropathy  Have discussed the above management plan with the neurosurgical service    Antibiotics:  Cefepime 6    Subjective:  Patient has no fever, chills, sweats; no nausea, vomiting, diarrhea; no cough, shortness of breath; no pain  No new symptoms  He underwent more distal externalization of the shunt yesterday    Objective:  Vitals:  Temp:  [97 8 °F (36 6 °C)-99 3 °F (37 4 °C)] 98 3 °F (36 8 °C)  HR:  [50-62] 54  Resp:  [16-28] 24  BP: ()/(46-99) 116/57  SpO2:  [93 %-97 %] 95 %  Temp (24hrs), Av 4 °F (36 9 °C), Min:97 8 °F (36 6 °C), Max:99 3 °F (37 4 °C)  Current: Temperature: 98 3 °F (36 8 °C)    Physical Exam:   General Appearance:  Somnolent, arousable, able to shake his head yes and no to answer simple questions, nontoxic and in no acute distress   Throat: Oropharynx moist without lesions  Lungs:   Clear to auscultation bilaterally; no wheezes, rhonchi or rales; respirations unlabored   Heart:  RRR; no murmur, rub or gallop   Abdomen:   Soft, non-tender, non-distended, positive bowel sounds  Extremities: No clubbing, cyanosis or edema   Skin: No new rashes or lesions  No draining wounds noted  Externalization of the  shunt over the chest wall on the right         Labs, Imaging, & Other studies:   All pertinent labs and imaging studies were personally reviewed  Results from last 7 days   Lab Units 20  0426 20  0609 20  0533   WBC Thousand/uL 8 51 8 97 10 89*   HEMOGLOBIN g/dL 10 7* 10 9* 10 8*   PLATELETS Thousands/uL 241 229 218     Results from last 7 days   Lab Units 20  0426 20  0609 20  0533  20  0745  20  2325   SODIUM mmol/L 142 141 143   < >  --   --  138   POTASSIUM mmol/L 3 4* 3 7 4 2   < >  --   --  4 9   CHLORIDE mmol/L 111* 112* 113*   < >  --   --  104   CO2 mmol/L 27 26 23   < >  --   --  26   CO2, I-STAT mmol/L  --   --   --   --  20*   < >  --    BUN mg/dL 19 24 26*   < >  --   --  32*   CREATININE mg/dL 0 72 0 86 0 97   < >  --   --  1 21   EGFR ml/min/1 73sq m 94 87 78   < >  --   --  60   GLUCOSE, ISTAT mg/dl  --   --   --   --  132   < >  --    CALCIUM mg/dL 7 9* 8 0* 7 8*   < >  --   --  9 7   AST U/L  --   --  24  --   --   --  39   ALT U/L  --   --  16  --   --   --  19   ALK PHOS U/L  --   --  54  --   --   --  91    < > = values in this interval not displayed  Results from last 7 days   Lab Units 08/04/20  1318 08/03/20  1738 08/03/20  0845 08/01/20  0548 07/31/20  1411 07/31/20  1251 07/31/20  0622   BLOOD CULTURE   --   --   --   --   --  No Growth After 4 Days  No Growth After 4 Days    --    SPUTUM CULTURE   --   --   --  1 colony Candida albicans*  --   --   --    GRAM STAIN RESULT  1+ Polys*  4+ Gram negative rods* No polys seen*  3+ Gram negative rods*  Smear reviewed by Microbiology staff* No Polys or Bacteria seen 2+ Polys  1+ Epithelial cells per low power field  No bacteria seen 1+ Polys*  4+ Gram negative rods*  --  No Polys or Bacteria seen   BODY FLUID CULTURE, STERILE   --   --   --   --   --   --  1+ Growth of Pseudomonas aeruginosa*

## 2020-08-05 NOTE — PLAN OF CARE
Problem: PHYSICAL THERAPY ADULT  Goal: Performs mobility at highest level of function for planned discharge setting  See evaluation for individualized goals  Description: Treatment/Interventions: OT, Spoke to case management, Spoke to nursing, Gait training, Bed mobility, Patient/family training, Endurance training, LE strengthening/ROM, Functional transfer training          See flowsheet documentation for full assessment, interventions and recommendations  Outcome: Progressing  Note: Prognosis: Good  Problem List: Decreased strength, Decreased endurance, Impaired balance, Decreased mobility  Assessment: Pt presents to therapy today with reduced mobility, lethargy, high risk of falling, reduced activity tolerance  These impairments limit the patient by requiring increased assistance for mobility and places him at risk of falling  Pt would benefit from continued skilled therapy while in the hospital to improve overall mobility and work towards a safe d/c  Recommend rehab  At end of session patient was left supine with call bell within reach  Pt required hygiene due to being incontinent of a BM  Barriers to Discharge: Inaccessible home environment, Decreased caregiver support     PT Discharge Recommendation: [id number]     PT - OK to Discharge: Yes    See flowsheet documentation for full assessment

## 2020-08-06 LAB
ANION GAP SERPL CALCULATED.3IONS-SCNC: 7 MMOL/L (ref 4–13)
APTT PPP: 32 SECONDS (ref 23–37)
BACTERIA CSF CULT: ABNORMAL
BUN SERPL-MCNC: 15 MG/DL (ref 5–25)
CALCIUM SERPL-MCNC: 7.8 MG/DL (ref 8.3–10.1)
CHLORIDE SERPL-SCNC: 109 MMOL/L (ref 100–108)
CO2 SERPL-SCNC: 25 MMOL/L (ref 21–32)
CREAT SERPL-MCNC: 0.67 MG/DL (ref 0.6–1.3)
ERYTHROCYTE [DISTWIDTH] IN BLOOD BY AUTOMATED COUNT: 14.9 % (ref 11.6–15.1)
GFR SERPL CREATININE-BSD FRML MDRD: 97 ML/MIN/1.73SQ M
GLUCOSE SERPL-MCNC: 125 MG/DL (ref 65–140)
GLUCOSE SERPL-MCNC: 149 MG/DL (ref 65–140)
GLUCOSE SERPL-MCNC: 167 MG/DL (ref 65–140)
HCT VFR BLD AUTO: 35.7 % (ref 36.5–49.3)
HGB BLD-MCNC: 11.2 G/DL (ref 12–17)
INR PPP: 1.1 (ref 0.84–1.19)
MAGNESIUM SERPL-MCNC: 2.4 MG/DL (ref 1.6–2.6)
MCH RBC QN AUTO: 27.7 PG (ref 26.8–34.3)
MCHC RBC AUTO-ENTMCNC: 31.4 G/DL (ref 31.4–37.4)
MCV RBC AUTO: 88 FL (ref 82–98)
NRBC BLD AUTO-RTO: 0 /100 WBCS
PHOSPHATE SERPL-MCNC: 2.9 MG/DL (ref 2.3–4.1)
PLATELET # BLD AUTO: 254 THOUSANDS/UL (ref 149–390)
PMV BLD AUTO: 9.8 FL (ref 8.9–12.7)
POTASSIUM SERPL-SCNC: 3.6 MMOL/L (ref 3.5–5.3)
PROTHROMBIN TIME: 14.2 SECONDS (ref 11.6–14.5)
RBC # BLD AUTO: 4.05 MILLION/UL (ref 3.88–5.62)
SODIUM SERPL-SCNC: 141 MMOL/L (ref 136–145)
WBC # BLD AUTO: 11.94 THOUSAND/UL (ref 4.31–10.16)

## 2020-08-06 PROCEDURE — 99233 SBSQ HOSP IP/OBS HIGH 50: CPT | Performed by: SURGERY

## 2020-08-06 PROCEDURE — 83735 ASSAY OF MAGNESIUM: CPT | Performed by: PHYSICIAN ASSISTANT

## 2020-08-06 PROCEDURE — 85610 PROTHROMBIN TIME: CPT | Performed by: PHYSICIAN ASSISTANT

## 2020-08-06 PROCEDURE — 94660 CPAP INITIATION&MGMT: CPT

## 2020-08-06 PROCEDURE — 84100 ASSAY OF PHOSPHORUS: CPT | Performed by: PHYSICIAN ASSISTANT

## 2020-08-06 PROCEDURE — 94760 N-INVAS EAR/PLS OXIMETRY 1: CPT

## 2020-08-06 PROCEDURE — 02HV33Z INSERTION OF INFUSION DEVICE INTO SUPERIOR VENA CAVA, PERCUTANEOUS APPROACH: ICD-10-PCS | Performed by: RADIOLOGY

## 2020-08-06 PROCEDURE — NC001 PR NO CHARGE: Performed by: STUDENT IN AN ORGANIZED HEALTH CARE EDUCATION/TRAINING PROGRAM

## 2020-08-06 PROCEDURE — 99024 POSTOP FOLLOW-UP VISIT: CPT | Performed by: NEUROLOGICAL SURGERY

## 2020-08-06 PROCEDURE — 99232 SBSQ HOSP IP/OBS MODERATE 35: CPT | Performed by: INTERNAL MEDICINE

## 2020-08-06 PROCEDURE — C1751 CATH, INF, PER/CENT/MIDLINE: HCPCS

## 2020-08-06 PROCEDURE — 85730 THROMBOPLASTIN TIME PARTIAL: CPT | Performed by: PHYSICIAN ASSISTANT

## 2020-08-06 PROCEDURE — 82948 REAGENT STRIP/BLOOD GLUCOSE: CPT

## 2020-08-06 PROCEDURE — 85027 COMPLETE CBC AUTOMATED: CPT | Performed by: PHYSICIAN ASSISTANT

## 2020-08-06 PROCEDURE — 80048 BASIC METABOLIC PNL TOTAL CA: CPT | Performed by: PHYSICIAN ASSISTANT

## 2020-08-06 PROCEDURE — 36569 INSJ PICC 5 YR+ W/O IMAGING: CPT

## 2020-08-06 RX ORDER — POTASSIUM CHLORIDE 20 MEQ/1
40 TABLET, EXTENDED RELEASE ORAL ONCE
Status: COMPLETED | OUTPATIENT
Start: 2020-08-06 | End: 2020-08-06

## 2020-08-06 RX ORDER — CHLORHEXIDINE GLUCONATE 0.12 MG/ML
15 RINSE ORAL ONCE
Status: COMPLETED | OUTPATIENT
Start: 2020-08-06 | End: 2020-08-06

## 2020-08-06 RX ADMIN — CEFEPIME HYDROCHLORIDE 2000 MG: 2 INJECTION, POWDER, FOR SOLUTION INTRAVENOUS at 21:18

## 2020-08-06 RX ADMIN — HEPARIN SODIUM 5000 UNITS: 5000 INJECTION INTRAVENOUS; SUBCUTANEOUS at 14:06

## 2020-08-06 RX ADMIN — CEFEPIME HYDROCHLORIDE 2000 MG: 2 INJECTION, POWDER, FOR SOLUTION INTRAVENOUS at 14:06

## 2020-08-06 RX ADMIN — CHLORHEXIDINE GLUCONATE 0.12% ORAL RINSE 15 ML: 1.2 LIQUID ORAL at 20:21

## 2020-08-06 RX ADMIN — HEPARIN SODIUM 5000 UNITS: 5000 INJECTION INTRAVENOUS; SUBCUTANEOUS at 21:21

## 2020-08-06 RX ADMIN — POTASSIUM & SODIUM PHOSPHATES POWDER PACK 280-160-250 MG 2 PACKET: 280-160-250 PACK at 16:58

## 2020-08-06 RX ADMIN — POTASSIUM CHLORIDE 40 MEQ: 1500 TABLET, EXTENDED RELEASE ORAL at 08:21

## 2020-08-06 RX ADMIN — INSULIN LISPRO 2 UNITS: 100 INJECTION, SOLUTION INTRAVENOUS; SUBCUTANEOUS at 12:31

## 2020-08-06 RX ADMIN — CEFEPIME HYDROCHLORIDE 2000 MG: 2 INJECTION, POWDER, FOR SOLUTION INTRAVENOUS at 05:37

## 2020-08-06 RX ADMIN — POTASSIUM & SODIUM PHOSPHATES POWDER PACK 280-160-250 MG 2 PACKET: 280-160-250 PACK at 08:21

## 2020-08-06 RX ADMIN — HEPARIN SODIUM 5000 UNITS: 5000 INJECTION INTRAVENOUS; SUBCUTANEOUS at 05:37

## 2020-08-06 NOTE — PROGRESS NOTES
Shunt level was lowered to 0 per neurosurgery earlier today  Increase in output  Dr Lj Heller notified  Level raised to 5mmHg

## 2020-08-06 NOTE — PROGRESS NOTES
Progress Note - Kady Fat 1950, 69 y o  male MRN: [id number]    Unit/Bed#: ICU 12 Encounter: [id number]    Primary Care Provider: Usha Gardner   Date and time admitted to hospital: 7/30/2020 11:12 PM        Status post ventriculoperitoneal shunt  Assessment & Plan  Externalization 7/30 secondary to laparotomy for bowel ischemia/peritonitis  · S/p VPS placement for NPH 2005, revision 2011  · Shunt last adjusted to 100 cm of water 7/21/2020 for increasing size of bilateral subdural hygromas  · Sutures placed over about beginning in July for thinning skin  Imaging:   · CT head wo contrast 7/31/2020: Minimal increase in size of ventricular system  · CT head without 8/5/20:  Continue Mobic enlargement of ventricular system which continues to gradually enlarged compared to prior studies  Stable right frontal left possible ablation consistent with old infarcts  Plan:    · Plan for tentative surgery on Friday for removal of shunt system  · Continue monitor neurological exam   GCS 13-14, for short term memory difficulties, opens eyes to voice, generalized weakness throughout  · Continue to monitor CSF - yellow output, 47cc/24 hours  · CSF sent from externalized ventricular catheter 8/3 with gram stain positive for 3+ GNR  Continue to follow cultures  Glucose 85, RBC 6, Protein 49, WBC 82    · CSF also sent from shunt valve tap  No bacteria seen on gram stain  Will follow cultures  Glucose 74, RBC 0, Protein 46, WBC 53    · CSF 8/4 WBC 19, RBC 0, cultures 4+ gram negative rods, protein 50, glucose 80, 3+ Pseudomonas aeruginosa  · Monitor output from externalized shunt with 10 cc or less per hour  Change setting to [facility] - level with the external portion at the chest  · Continue monitor blood cultures 7/31 which were negative a after five days  · Id following    Continue IV antibiotics cefepime 2g Q8 x 6 wks  · DVT prophylaxis:  SCDs and heparin - scheduled to stop at midnight  · Repeat coag studies anticipate surgery tomorrow  NPO after midnight  Neurosurgery will continue to follow  Please call with questions or concerns  Ischemic bowel disease [facility])  Assessment & Plan  Ongoing management per general surgery/primary team  · Vascular surgery following  [facility] as of [facility]/5    Spondylolisthesis of lumbosacral region  Assessment & Plan  · Status post minimally invasive transverse lumbar interbody fusion L5/S1 7/6/20      Subjective/Objective   Chief Complaint: I've been worse    Subjective:  Patient offers no complaints  He denies any headaches, vision or speech changes  His knee weakness or sensory deficits  Denies any chest pain, shortness breath, nausea or vomiting  Tolerating oral intake  Spoke with patient's brother who states patient with ongoing progressive short-term memory difficulties  Objective:  Sitting up in bed  NAD  I/O       08/04 0701 - 08/05 0700 08/05 0701 - 08/06 0700 08/06 0701 - 08/07 0700    P  O  380 360 560    I V  (mL/kg) 623 3 (5) 1468 3 (11 7) 30 (0 2)    IV Piggyback 180 210 50    Total Intake(mL/kg) 1183 3 (9 5) 2038 3 (16 3) 640 (5 1)    Urine (mL/kg/hr) 800 (0 3) 1104 (0 4) 76 (0 1)    Emesis/NG output       Drains 63 47 45    Stool 0 0 0    Total Output 863 1151 121    Net +320 3 +887 3 +519           Unmeasured Urine Occurrence 2 x  1 x    Unmeasured Stool Occurrence 3 x  1 x          Invasive Devices     Peripherally Inserted Central Catheter Line            PICC Line 26/63/70 Left Basilic less than 1 day          Drain            Ventriculostomy/Subdural Ventricular drainage catheter with ICP microsensor Right Other (Comment) 6 days    Rectal Tube With balloon less than 1 day                Physical Exam:  Vitals: Blood pressure 109/54, pulse 58, temperature 97 8 °F (36 6 °C), temperature source Oral, resp  rate (!) 24, height 6' 3" (1 905 m), weight 125 kg (276 lb 3 8 oz), SpO2 95 %  ,Body mass index is 34 53 kg/m²      Hemodynamic Monitoring: MAP: Arterial Line MAP (mmHg): 58 mmHg    General appearance: alert, appears stated age, cooperative and no distress  Head: Normocephalic, without obvious abnormality, shunt reservoir compressive and refills briskly  Eyes: EOMI with mild limitation to upgaze, PERRL  Neck: supple, symmetrical, trachea midline   Lungs: non labored breathing  Heart: regular heart rate  Neurologic:   Mental status: Alert, oriented, thought content appropriate  Cranial nerves: grossly intact (Cranial nerves II-XII)  Sensory: normal to LT  Motor: moving all extremities without focal weakness, generalized weakness 4-4+/5  Reflexes:  No clonus bilaterally  Coordination: finger to nose normal bilaterally, no drift bilaterally    Lab Results:  Results from last 7 days   Lab Units 08/06/20  0536 08/04/20  0426 08/03/20  0609 08/02/20  0533  08/01/20  0549  07/30/20  2325   WBC Thousand/uL 11 94* 8 51 8 97 10 89*   < > 7 71   < > 13 76*   HEMOGLOBIN g/dL 11 2* 10 7* 10 9* 10 8*   < > 11 2*  11 2*   < > 14 8   I STAT HEMOGLOBIN   --   --   --   --   --   --    < >  --    HEMATOCRIT % 35 7* 33 5* 33 1* 33 5*   < > 35 2*   < > 46 0   HEMATOCRIT, ISTAT   --   --   --   --   --   --    < >  --    PLATELETS Thousands/uL 254 241 229 218   < > 183   < > 272   NEUTROS PCT %  --   --   --  82*  --  85*  --  87*   MONOS PCT %  --   --   --  9  --  9  --  10   MONO PCT %  --  3*  --   --   --   --    < >  --     < > = values in this interval not displayed       Results from last 7 days   Lab Units 08/06/20  0536 08/04/20  0426 08/03/20  0609 08/02/20  0533  07/31/20  0745 07/31/20  0555  07/30/20  2325   POTASSIUM mmol/L 3 6 3 4* 3 7 4 2   < >  --   --   --  4 9   CHLORIDE mmol/L 109* 111* 112* 113*   < >  --   --   --  104   CO2 mmol/L 25 27 26 23   < >  --   --   --  26   CO2, I-STAT mmol/L  --   --   --   --   --  20* 19*   < >  --    BUN mg/dL 15 19 24 26*   < >  --   --   --  32*   CREATININE mg/dL 0 67 0 72 0 86 0 97   < >  --   --   --  1 21 CALCIUM mg/dL 7 8* 7 9* 8 0* 7 8*   < >  --   --   --  9 7   ALK PHOS U/L  --   --   --  54  --   --   --   --  91   ALT U/L  --   --   --  16  --   --   --   --  19   AST U/L  --   --   --  24  --   --   --   --  39   GLUCOSE, ISTAT mg/dl  --   --   --   --   --  132 101  --   --     < > = values in this interval not displayed  Results from last 7 days   Lab Units 08/06/20  0536 08/04/20  0426 08/03/20  0609   MAGNESIUM mg/dL 2 4 2 2 2 3     Results from last 7 days   Lab Units 08/06/20  0536 08/04/20  0426 08/03/20  0609   PHOSPHORUS mg/dL 2 9 2 0* 1 7*     Results from last 7 days   Lab Units 08/02/20  0533 08/01/20  2118 08/01/20  1406  07/31/20  1012 07/31/20  0008   INR   --   --   --   --  1 41* 1 25*   PTT seconds 140* 141* 38*   < > 72* 41*    < > = values in this interval not displayed  No results found for: TROPONINT  ABG:  Lab Results   Component Value Date    PHART 7 357 07/31/2020    KAV3OHW 32 3 (L) 07/31/2020    PO2ART 87 8 07/31/2020    FMY1BQA 17 7 (L) 07/31/2020    BEART -6 7 07/31/2020    SOURCE Line, Arterial 07/31/2020       Imaging Studies: I have personally reviewed pertinent reports  and I have personally reviewed pertinent films in PACS    Xr Chest Portable    Result Date: 7/31/2020  Impression: 1  Interval intubation with coiling of the nasogastric tube in the midesophagus  Repositioning of the the nasogastric tube advised  2   Endotracheal tube noted with the tip well above the tye  3   Scattered strandy densities likely related to diminished degree of inspiration  Workstation performed: [id number]     Ct Head Without Contrast    Result Date: 7/31/2020  Impression: Minimal increase in size of ventricular system The study was marked in EPIC for immediate notification  Workstation performed: [id number]     Pe Study With Ct Abdomen And Pelvis With Contrast    Result Date: 7/31/2020  Impression: Fluid-filled loops of small bowel with pneumatosis and bowel wall thickening  Findings are suspicious for ischemic bowel  Nodular airspace opacities within the right upper lobe  Findings may represent infection  I personally discussed this study with Carmen Lin on 7/31/2020 at 1:46 AM  Workstation performed: [id number]       EKG, Pathology, and Other Studies: I have personally reviewed pertinent reports        VTE Pharmacologic Prophylaxis: Heparin    VTE Mechanical Prophylaxis: sequential compression device

## 2020-08-06 NOTE — PROCEDURES
Insert PICC line    Date/Time: 8/6/2020 10:36 AM  Performed by: Shawn Conteh RN  Authorized by: Snow Hodge PA-C     Patient location:  Bedside  Other Assisting Provider: Yes (comment) (Sabine VENTURA )    Consent:     Consent obtained:  Verbal (Physcian obtained consent )    Consent given by: pts brother     Procedural risks discussed: by MD Martines protocol:     Procedure explained and questions answered to patient or proxy's satisfaction: yes      Relevant documents present and verified: yes      Test results available and properly labeled: yes      Radiology Images displayed and confirmed  If images not available, report reviewed: yes      Required blood products, implants, devices, and special equipment available: yes      Site/side marked: yes      Immediately prior to procedure, a time out was called: yes      Patient identity confirmed:  Verbally with patient and arm band  Pre-procedure details:     Hand hygiene: Hand hygiene performed prior to insertion      Sterile barrier technique: All elements of maximal sterile technique followed      Skin preparation:  ChloraPrep    Skin preparation agent: Skin preparation agent completely dried prior to procedure    Indications:     PICC line indications: long term antibiotics    Anesthesia (see MAR for exact dosages):      Anesthesia method:  Local infiltration    Local anesthetic:  Lidocaine 1% w/o epi (2ml)  Procedure details:     Location:  Basilic    Vessel type: vein      Laterality:  Left    Site selection rationale:  Right subclavian with a dressing     Approach: percutaneous technique used      Patient position:  Flat    Procedural supplies:  Double lumen    Catheter size:  5 Fr    Landmarks identified: yes      Ultrasound guidance: yes      Sterile ultrasound techniques: Sterile gel and sterile probe covers were used      Number of attempts:  1    Successful placement: yes      Vessel of catheter tip end:  Sherlock 3CG confirmed    Total catheter length (cm):  55    Catheter out on skin (cm):  0    Max flow rate:  999ml/hr     Arm circumference:  31  Post-procedure details:     Post-procedure:  Securement device placed and dressing applied    Assessment:  Blood return through all ports    Post-procedure complications: none      Patient tolerance of procedure:   Tolerated well, no immediate complications

## 2020-08-06 NOTE — PROGRESS NOTES
Progress Note - General Surgery   Bo Sports 69 y o  male MRN: [id number]  Unit/Bed#: ICU 12 Encounter: [id number]    Assessment:  69 M status post ex-lap for DELIA,  shunt externalization, ABthera VAC, mesenteric angiogram and papaverine infusion, subsequent second look laparotomy, abdominal wall closure, sheath removal, and subsequent shunt repositioning    Plan:  Diet as tolerated  Antibiotics per ID, will need PICC  Neurosurgical plans for shunt removal on Friday noted  Continue heparin prophylaxis    Subjective/Objective     Subjective: No acute events overnight  Tolerated regular food for dinner  Rectal tube placed for liquid bowel movements  Objective:    Blood pressure 113/55, pulse (!) 50, temperature 98 1 °F (36 7 °C), temperature source Axillary, resp  rate 22, height 6' 3" (1 905 m), weight 125 kg (276 lb 3 8 oz), SpO2 97 %  ,Body mass index is 34 53 kg/m²        Intake/Output Summary (Last 24 hours) at 8/6/2020 0896  Last data filed at 8/6/2020 0546  Gross per 24 hour   Intake 2038 34 ml   Output 1151 ml   Net 887 34 ml       Invasive Devices     Peripheral Intravenous Line            Peripheral IV 08/03/20 Left Arm 2 days    Peripheral IV 08/04/20 Proximal;Right;Ventral (anterior) Forearm 1 day          Drain            Ventriculostomy/Subdural Ventricular drainage catheter with ICP microsensor Right Other (Comment) 5 days    External Urinary Catheter Small less than 1 day    Rectal Tube With balloon less than 1 day                Physical Exam:   General: NAD, awake and alert  Eyes: PERRL  ENT: moist mucous membranes  Neck: supple  CV: RRR +S1/S2  Chest: respirations non-labored  Abdomen: soft, NT ND, incision c/d/i  Extremities: atraumatic, no edema      Results from last 7 days   Lab Units 08/06/20  0536 08/04/20  0426 08/03/20  0609   WBC Thousand/uL 11 94* 8 51 8 97   HEMOGLOBIN g/dL 11 2* 10 7* 10 9*   HEMATOCRIT % 35 7* 33 5* 33 1*   PLATELETS Thousands/uL 254 241 229     Results from last 7 days   Lab Units 08/06/20  0536 08/04/20  0426 08/03/20  0609  07/31/20  0745   POTASSIUM mmol/L 3 6 3 4* 3 7   < >  --    CHLORIDE mmol/L 109* 111* 112*   < >  --    CO2 mmol/L 25 27 26   < >  --    CO2, I-STAT mmol/L  --   --   --   --  20*   BUN mg/dL 15 19 24   < >  --    CREATININE mg/dL 0 67 0 72 0 86   < >  --    GLUCOSE, ISTAT mg/dl  --   --   --   --  132   CALCIUM mg/dL 7 8* 7 9* 8 0*   < >  --     < > = values in this interval not displayed  Results from last 7 days   Lab Units 08/02/20  0533 08/01/20  2118 08/01/20  1406  07/31/20  1012 07/31/20  0008   INR   --   --   --   --  1 41* 1 25*   PTT seconds 140* 141* 38*   < > 72* 41*    < > = values in this interval not displayed

## 2020-08-06 NOTE — ASSESSMENT & PLAN NOTE
Ongoing management per general surgery/primary team  · Vascular surgery following    No need for [facility] therapy as of 8/5

## 2020-08-06 NOTE — PROGRESS NOTES
Daily Progress Note - Critical Care   Izabel Soto 69 y o  male MRN: [id number]  Unit/Bed#: ICU 12 Encounter: [id number]        ----------------------------------------------------------------------------------------  HPI/24hr events: No significant events     ---------------------------------------------------------------------------------------  SUBJECTIVE  No complaints per patient     Review of Systems  Review of systems was reviewed and negative unless stated above in HPI/24-hour events   ---------------------------------------------------------------------------------------  Assessment and Plan:    Neuro:    Diagnosis:  shunt infection   o Plan: S/p externalization un to 2nd intercostal   o CSF continues to grown pseudomonas  o Plan to explantation of  shunt on Friday  No plan for EVD   o Continue Cefepime per ID   o Continue to monitor neuro checks    Diagnosis: Acute post operative pain  o Plan: Continue prn tylenol, oxy and dilaudid       CV:    Diagnosis: SMA stenosis   o Plan: Vascular surgery following  o No AC required per vascular surgery    Diagnosis: HTN  o Plan: Continue to hold home antihypertensive  o Monitor BP      Pulm:   Diagnosis: ZHOU  o Plan: CPAP HS      GI:    Diagnosis: SMA stenosis with resultant bowel ischemia s/p ex lap  No bowel resection   o Plan: Surgery following  o Continue to advance diet as tolerated  o Patient was having diarrhea- Rectal tube in place  No documented output  DC today        :    Diagnosis: No acute issues   Condom catheter in place      F/E/N:    Plan: Continue regular diet    Will replete K      Heme/Onc:    Diagnosis: No acute issues      Endo:    Diagnosis: No acute issues      ID:    Diagnosis:  shunt infection  o Plan: Cultures continue to grown pseudomonas  o ID following  o Continue Cefepime- Will need PICC  o Plan for shunt explantation tomorrow   o Trend WBC and fever curve       MSK/Skin:    Diagnosis: Ambulatory dysfunction o Plan: PT/OT following and recommend Post-acute rehab     Disposition: Continue Critical Care   Code Status: Level 1 - Full Code  ---------------------------------------------------------------------------------------  ICU CORE MEASURES    Prophylaxis   VTE Pharmacologic Prophylaxis: Heparin  VTE Mechanical Prophylaxis: sequential compression device  Stress Ulcer Prophylaxis: Prophylaxis Not Indicated     ABCDE Protocol (if indicated)  Plan to perform spontaneous awakening trial today? Not applicable  Plan to perform spontaneous breathing trial today? Not applicable  Obvious barriers to extubation? Not applicable  CAM-ICU: Negative    Invasive Devices Review  Invasive Devices     Peripheral Intravenous Line            Peripheral IV 20 Left Arm 2 days    Peripheral IV 20 Proximal;Right;Ventral (anterior) Forearm 1 day          Drain            Ventriculostomy/Subdural Ventricular drainage catheter with ICP microsensor Right Other (Comment) 5 days    External Urinary Catheter Small less than 1 day    Rectal Tube With balloon less than 1 day              Can any invasive devices be discontinued today?  No  ---------------------------------------------------------------------------------------  OBJECTIVE    Vitals   Vitals:    20 0316 20 0400 20 0500 20 0549   BP:  110/59 113/55    BP Location:       Pulse:  (!) 54 (!) 50    Resp:  (!) 23 22    Temp:  98 1 °F (36 7 °C)     TempSrc:  Axillary     SpO2: 95% 98% 97%    Weight:    125 kg (276 lb 3 8 oz)   Height:         Temp (24hrs), Av 2 °F (36 8 °C), Min:98 °F (36 7 °C), Max:98 4 °F (36 9 °C)  Current: Temperature: 98 1 °F (36 7 °C)  HR: 52  BP: 97/44  RR: 23  SpO2: 93%    Respiratory:  SpO2: SpO2: 93 %, SpO2 Activity: SpO2 Activity: At Rest, SpO2 Device: O2 Device: (CPAP)  Nasal Cannula O2 Flow Rate (L/min): 2 L/min    Invasive/non-invasive ventilation settings   Respiratory    Lab Data (Last 4 hours)    None         O2/Vent Data (Last 4 hours)      08/06 0316          Non-Invasive Ventilation Mode CPAP                   Physical Exam  Vitals signs reviewed  Constitutional:       General: He is not in acute distress  HENT:        Comments:  shunt- CSF clearEyes:      Pupils: Pupils are equal, round, and reactive to light  Neck:      Musculoskeletal: Neck supple  Cardiovascular:      Rate and Rhythm: Regular rhythm  Pulmonary:      Effort: Pulmonary effort is normal    Abdominal:      Palpations: Abdomen is soft  Comments: Midline incision dry and intact   Genitourinary:     Comments: Condom catheter in place  Musculoskeletal: Normal range of motion  Skin:     General: Skin is warm and dry  Neurological:      Comments: Patient will easily arouse but slow to respond         Laboratory and Diagnostics:  Results from last 7 days   Lab Units 08/06/20  0536 08/04/20  0426 08/03/20  0609 08/02/20  0533 08/01/20  1406 08/01/20  0549 07/31/20  2119 07/31/20  1012  07/30/20  2325   WBC Thousand/uL 11 94* 8 51 8 97 10 89* 13 00* 7 71  --  10 02  --  13 76*   HEMOGLOBIN g/dL 11 2* 10 7* 10 9* 10 8* 12 7 11 2*  11 2* 11 0* 13 0  --  14 8   I STAT HEMOGLOBIN   --   --   --   --   --   --   --   --    < >  --    HEMATOCRIT % 35 7* 33 5* 33 1* 33 5* 39 7 35 2*  --  39 7  --  46 0   HEMATOCRIT, ISTAT   --   --   --   --   --   --   --   --    < >  --    PLATELETS Thousands/uL 254 241 229 218 236 183  --  234  --  272   NEUTROS PCT %  --   --   --  82*  --  85*  --   --   --  87*   BANDS PCT %  --   --   --   --   --   --   --  8  --   --    MONOS PCT %  --   --   --  9  --  9  --   --   --  10   MONO PCT %  --  3*  --   --   --   --   --  9  --   --     < > = values in this interval not displayed       Results from last 7 days   Lab Units 08/06/20  0536 08/04/20  0426 08/03/20  0609 08/02/20  0533 08/01/20  1406 08/01/20  0549 07/31/20  1012  07/30/20  2325   SODIUM mmol/L 141 142 141 143 141 141 139  --  138   POTASSIUM mmol/L 3 6 3  4* 3 7 4 2 4 2 4 2 4 6  --  4 9   CHLORIDE mmol/L 109* 111* 112* 113* 111* 112* 108  --  104   CO2 mmol/L 25 27 26 23 22 21 21  --  26   CO2, I-STAT   --   --   --   --   --   --   --    < >  --    ANION GAP mmol/L 7 4 3* 7 8 8 10  --  8   BUN mg/dL 15 19 24 26* 30* 32* 33*  --  32*   CREATININE mg/dL 0 67 0 72 0 86 0 97 0 99 1 06 1 27  --  1 21   CALCIUM mg/dL 7 8* 7 9* 8 0* 7 8* 6 9* 7 6* 8 5  --  9 7   GLUCOSE RANDOM mg/dL 125 126 121 141* 150* 145* 159*  --  136   ALT U/L  --   --   --  16  --   --   --   --  19   AST U/L  --   --   --  24  --   --   --   --  39   ALK PHOS U/L  --   --   --  54  --   --   --   --  91   ALBUMIN g/dL  --   --   --  1 8*  --   --   --   --  2 7*   TOTAL BILIRUBIN mg/dL  --   --   --  0 54  --   --   --   --  0 82    < > = values in this interval not displayed       Results from last 7 days   Lab Units 08/06/20  0536 08/04/20  0426 08/03/20  0609 08/02/20  0533 08/01/20  0549   MAGNESIUM mg/dL 2 4 2 2 2 3 2 4 2 2   PHOSPHORUS mg/dL 2 9 2 0* 1 7* 1 7* 3 4      Results from last 7 days   Lab Units 08/02/20  0533 08/01/20  2118 08/01/20  1406 08/01/20  0549 07/31/20  2119 07/31/20  1559 07/31/20  1012 07/31/20  0008   INR   --   --   --   --   --   --  1 41* 1 25*   PTT seconds 140* 141* 38* 42* 53* 60* 72* 41*      Results from last 7 days   Lab Units 07/30/20  2325   TROPONIN I ng/mL <0 02     Results from last 7 days   Lab Units 08/02/20  0533 08/01/20  2118 08/01/20  1406 07/31/20  1805 07/31/20  1559 07/31/20  1012 07/31/20  0258   LACTIC ACID mmol/L 1 2 1 7 2 0 1 6 2 3* 2 2* 2 2*     ABG:  Results from last 7 days   Lab Units 07/31/20  1151   PH ART  7 357   PCO2 ART mm Hg 32 3*   PO2 ART mm Hg 87 8   HCO3 ART mmol/L 17 7*   BASE EXC ART mmol/L -6 7   ABG SOURCE  Line, Arterial     VBG:  Results from last 7 days   Lab Units 07/31/20  1151   ABG SOURCE  Line, Arterial           Micro  Results from last 7 days   Lab Units 08/04/20  1318 08/03/20  1738 08/03/20  0845 08/01/20  0548 07/31/20  1411 07/31/20  1251 07/31/20  0622   BLOOD CULTURE   --   --   --   --   --  No Growth After 5 Days  No Growth After 5 Days  --    SPUTUM CULTURE   --   --   --  1 colony Candida albicans*  --   --   --    GRAM STAIN RESULT  1+ Polys*  4+ Gram negative rods* No polys seen*  3+ Gram negative rods*  Smear reviewed by Microbiology staff* No Polys or Bacteria seen 2+ Polys  1+ Epithelial cells per low power field  No bacteria seen 1+ Polys*  4+ Gram negative rods*  --  No Polys or Bacteria seen   BODY FLUID CULTURE, STERILE   --   --   --   --   --   --  1+ Growth of Pseudomonas aeruginosa*       EKG: SR  Imaging:  I have personally reviewed pertinent reports  and I have personally reviewed pertinent films in PACS    Intake and Output  I/O       08/04 0701 - 08/05 0700 08/05 0701 - 08/06 0700    P  O  380 360    I V  (mL/kg) 623 3 (5) 1468 3 (11 7)    IV Piggyback 180 210    Total Intake(mL/kg) 1183 3 (9 5) 2038 3 (16 3)    Urine (mL/kg/hr) 800 (0 3) 1104 (0 4)    Drains 63 47    Stool 0 0    Total Output 863 1151    Net +320 3 +887 3          Unmeasured Urine Occurrence 2 x     Unmeasured Stool Occurrence 3 x           Height and Weights   Height: 6' 3" (190 5 cm)  IBW: 84 5 kg  Body mass index is 34 53 kg/m²  Weight (last 2 days)     Date/Time   Weight    08/06/20 0549   125 (276 24)    08/05/20 0600   125 (276 02)    08/04/20 0554   126 (277 78)                Nutrition       Diet Orders   (From admission, onward)             Start     Ordered    08/05/20 1334  Diet Regular; Regular House  Diet effective now     Question Answer Comment   Diet Type Regular    Regular Regular House    RD to adjust diet per protocol?  No        08/05/20 1334              Active Medications  Scheduled Meds:acetaminophen, 650 mg, Oral, Q6H PRN, Otila Hopper, PA-C  cefepime, 2,000 mg, Intravenous, Q8H, Otila Hopper, PA-C, Last Rate: Stopped (08/06/20 0546)  heparin (porcine), 5,000 Units, Subcutaneous, Q8H [address], Nadine Sadler PA-C  HYDROmorphone, 0 5 mg, Intravenous, Q3H PRN, Parisa Núñez PA-C  insulin lispro, 2-12 Units, Subcutaneous, 4x Daily (AC & HS), Lv Alejandra PA-C  iodixanol, 15 mL, Intravenous, Once in imaging, Parisa Núñez PA-C  oxyCODONE, 5 mg, Oral, Q4H PRN, Parisa Núñez PA-C  potassium-sodium phosphates, 2 packet, Oral, BID With Meals, Nicki Page PA-C      Continuous Infusions:     PRN Meds:   acetaminophen, 650 mg, Q6H PRN  HYDROmorphone, 0 5 mg, Q3H PRN  iodixanol, 15 mL, Once in imaging  oxyCODONE, 5 mg, Q4H PRN        Allergies   Allergies   Allergen Reactions    Pollen Extract Allergic Rhinitis     ---------------------------------------------------------------------------------------  Advance Directive and Living Will:      Power of :    POLST:    ---------------------------------------------------------------------------------------  Care Time Delivered:   No Critical Care time spent     Parisa Núñez PA-C      Portions of the record may have been created with voice recognition software  Occasional wrong word or "sound a like" substitutions may have occurred due to the inherent limitations of voice recognition software    Read the chart carefully and recognize, using context, where substitutions have occurred

## 2020-08-06 NOTE — ASSESSMENT & PLAN NOTE
Externalization 7/30 secondary to laparotomy for bowel ischemia/peritonitis  · S/p VPS placement for NPH 2005, revision 2011  · Shunt last adjusted to 100 cm of water 7/21/2020 for increasing size of bilateral subdural hygromas  · Sutures placed over about beginning in July for thinning skin  Imaging:   · CT head wo contrast 7/31/2020: Minimal increase in size of ventricular system  · CT head without 8/5/20:  Continue Mobic enlargement of ventricular system which continues to gradually enlarged compared to prior studies  Stable right frontal left possible ablation consistent with old infarcts  Plan:    · Plan for tentative surgery on Friday for removal of shunt system  · Continue monitor neurological exam   GCS 13-14, for short term memory difficulties, opens eyes to voice, generalized weakness throughout  · Continue to monitor CSF - yellow output, 47cc/24 hours  · CSF sent from externalized ventricular catheter 8/3 with gram stain positive for 3+ GNR  Continue to follow cultures  Glucose 85, RBC 6, Protein 49, WBC 82    · CSF also sent from shunt valve tap  No bacteria seen on gram stain  Will follow cultures  Glucose 74, RBC 0, Protein 46, WBC 53    · CSF 8/4 WBC 19, RBC 0, cultures 4+ gram negative rods, protein 50, glucose 80, 3+ Pseudomonas aeruginosa  · Monitor output from externalized shunt with 10 cc or less per hour  Change setting to [facility] - level with the external portion at the chest  · Continue monitor blood cultures 7/31 which were negative a after five days  · Id following  Continue IV antibiotics cefepime 2g Q8 x 6 wks  · DVT prophylaxis:  SCDs and heparin - scheduled to stop at midnight  · Repeat coag studies anticipate surgery tomorrow  NPO after midnight  Neurosurgery will continue to follow  Please call with questions or concerns

## 2020-08-06 NOTE — PROGRESS NOTES
Progress Note - Infectious Disease   Juliano Wolfe 69 y o  male MRN: [id number]  Unit/Bed#: ICU 12 Encounter: [id number]      Impression/Plan:  1  Severe sepsis, tachycardia, leukocytosis, elevated lactic acid   With early development of high fevers  Secondary to  shunt infection with meningitis and peritonitis  Not on vasopressors   Blood cultures are negative thus far    Fevers and WBC count now improving on IV antibiotic   -antibiotic plan as below  -monitor temperatures and hemodynamics  -follow-up blood cultures  -recheck CBC with diff and BMP  -supportive care per Critical Care service     2   shunt infection  Fluid WBC count was 61 with neutrophil predominance  Gram stain from shunt fluid shows GNRs and culture grew Pseudomonas    Suspect secondary to peritonitis in the setting of #3   Status post externalization of  shunt on 07/30   Repeat CSF analysis shows slightly decreased neutrophil predominant pleocytosis   Repeat CSF fluid from the distal externalized shunt still with gram-negative rods despite external eyes in shunt more distally  -continue IV cefepime 2 g q 8 hours  -follow up sensitivities and adjust antibiotics as needed  -neurosurgery follow-up ongoing  -patient for complete explantation of the shunt system tomorrow     3   Ischemic bowel with peritonitis   Status post exploratory laparotomy, VAC placement   Status post femoral artery and SMA artery cannulation for paraverine by vascular surgery   Suspect intra-abdominal infection is etiology of #2   Status post second-look on 08/01 with no resection performed   Peritoneal fluid culture also shows Pseudomonas   Distal externalized shunt still has gram-negative rods  -antibiotic plan as above  -vascular surgery/general surgical follow-up ongoing  -patient for removal of shunt tomorrow     4  NPH requiring  shunt placement in 2005, status post recent revision in July 2019       5  Lumbar spinal stenosis status post L5-S1 fusion on 2020      6  Diabetes mellitus with neuropathy  Antibiotics:  Cefepime 7    Subjective:  Patient has no fever, chills, sweats; no nausea, vomiting, diarrhea; no cough, shortness of breath; no pain  No new symptoms  He remains quite somnolent but is otherwise doing okay  Objective:  Vitals:  Temp:  [97 8 °F (36 6 °C)-99 °F (37 2 °C)] 97 8 °F (36 6 °C)  HR:  [48-70] 56  Resp:  [12-32] 24  BP: ()/(44-66) 118/57  SpO2:  [92 %-98 %] 95 %  Temp (24hrs), Av 2 °F (36 8 °C), Min:97 8 °F (36 6 °C), Max:99 °F (37 2 °C)  Current: Temperature: 97 8 °F (36 6 °C)    Physical Exam:   General Appearance:  Alert, interactive, nontoxic, no acute distress  Throat: Oropharynx moist without lesions  Lungs:   Clear to auscultation bilaterally; no wheezes, rhonchi or rales; respirations unlabored   Heart:  RRR; no murmur, rub or gallop   Abdomen:   Soft, non-tender, non-distended, positive bowel sounds  Extremities: No clubbing, cyanosis or edema   Skin: No new rashes or lesions  No draining wounds noted         Labs, Imaging, & Other studies:   All pertinent labs and imaging studies were personally reviewed  Results from last 7 days   Lab Units 20  0536 20  0426 20  0609   WBC Thousand/uL 11 94* 8 51 8 97   HEMOGLOBIN g/dL 11 2* 10 7* 10 9*   PLATELETS Thousands/uL 254 241 229     Results from last 7 days   Lab Units 20  0536 20  0426 20  0609 20  0533  20  0745  20  2325   SODIUM mmol/L 141 142 141 143   < >  --   --  138   POTASSIUM mmol/L 3 6 3 4* 3 7 4 2   < >  --   --  4 9   CHLORIDE mmol/L 109* 111* 112* 113*   < >  --   --  104   CO2 mmol/L 25 27 26 23   < >  --   --  26   CO2, I-STAT mmol/L  --   --   --   --   --  20*   < >  --    BUN mg/dL 15 19 24 26*   < >  --   --  32*   CREATININE mg/dL 0 67 0 72 0 86 0 97   < >  --   --  1 21   EGFR ml/min/1 73sq m 97 94 87 78   < >  --   --  60   GLUCOSE, ISTAT mg/dl  --   --   --   --   --  132   < >  -- CALCIUM mg/dL 7 8* 7 9* 8 0* 7 8*   < >  --   --  9 7   AST U/L  --   --   --  24  --   --   --  39   ALT U/L  --   --   --  16  --   --   --  19   ALK PHOS U/L  --   --   --  54  --   --   --  91    < > = values in this interval not displayed  Results from last 7 days   Lab Units 08/04/20  1318 08/03/20  1738 08/03/20  0845 08/01/20  0548 07/31/20  1411 07/31/20  1251 07/31/20  0622   BLOOD CULTURE   --   --   --   --   --  No Growth After 5 Days  No Growth After 5 Days    --    SPUTUM CULTURE   --   --   --  1 colony Candida albicans*  --   --   --    GRAM STAIN RESULT  1+ Polys*  4+ Gram negative rods* No polys seen*  3+ Gram negative rods*  Smear reviewed by Microbiology staff* No Polys or Bacteria seen 2+ Polys  1+ Epithelial cells per low power field  No bacteria seen 1+ Polys*  4+ Gram negative rods*  --  No Polys or Bacteria seen   BODY FLUID CULTURE, STERILE   --   --   --   --   --   --  1+ Growth of Pseudomonas aeruginosa*

## 2020-08-06 NOTE — PLAN OF CARE
Problem: Potential for Falls  Goal: Patient will remain free of falls  Description: INTERVENTIONS:  - Assess patient frequently for physical needs  -  Identify cognitive and physical deficits and behaviors that affect risk of falls    -  [place] fall precautions as indicated by assessment   - Educate patient/family on patient safety including physical limitations  - Instruct patient to call for assistance with activity based on assessment  - Modify environment to reduce risk of injury  - Consider OT/PT consult to assist with strengthening/mobility  Outcome: Progressing     Problem: Prexisting or High Potential for Compromised Skin Integrity  Goal: Skin integrity is maintained or improved  Description: INTERVENTIONS:  - Identify patients at risk for skin breakdown  - Assess and monitor skin integrity  - Assess and monitor nutrition and hydration status  - Monitor labs   - Assess for incontinence   - Turn and reposition patient  - Assist with mobility/ambulation  - Relieve pressure over bony prominences  - Avoid friction and shearing  - Provide appropriate hygiene as needed including keeping skin clean and dry  - Evaluate need for skin moisturizer/barrier cream  - Collaborate with interdisciplinary team   - Patient/family teaching  - Consider wound care consult   Outcome: Progressing     Problem: PAIN - ADULT  Goal: Verbalizes/displays adequate comfort level or baseline comfort level  Description: Interventions:  - Encourage patient to monitor pain and request assistance  - Assess pain using appropriate pain scale  - Administer analgesics based on type and severity of pain and evaluate response  - Implement non-pharmacological measures as appropriate and evaluate response  - Consider cultural and social influences on pain and pain management  - Notify physician/advanced practitioner if interventions unsuccessful or patient reports new pain  Outcome: Progressing     Problem: SAFETY ADULT  Goal: Maintain or return to baseline ADL function  Description: INTERVENTIONS:  -  Assess patient's ability to carry out ADLs; assess patient's baseline for ADL function and identify physical deficits which impact ability to perform ADLs (bathing, care of mouth/teeth, toileting, grooming, dressing, etc )  - Assess/evaluate cause of self-care deficits   - Assess range of motion  - Assess patient's mobility; develop plan if impaired  - Assess patient's need for assistive devices and provide as appropriate  - Encourage maximum independence but intervene and supervise when necessary  - Involve family in performance of ADLs  - Assess for home care needs following discharge   - Consider OT consult to assist with ADL evaluation and planning for discharge  - Provide patient education as appropriate  Outcome: Progressing  Goal: Maintain or return mobility status to optimal level  Description: INTERVENTIONS:  - Assess patient's baseline mobility status (ambulation, transfers, stairs, etc )    - Identify cognitive and physical deficits and behaviors that affect mobility  - Identify mobility aids required to assist with transfers and/or ambulation (gait belt, sit-to-stand, lift, walker, cane, etc )  - [place] fall precautions as indicated by assessment  - Record patient progress and toleration of activity level on Mobility SBAR; progress patient to next Phase/Stage  - Instruct patient to call for assistance with activity based on assessment  - Consider rehabilitation consult to assist with strengthening/weightbearing, etc   Outcome: Progressing     Problem: GASTROINTESTINAL - ADULT  Goal: Minimal or absence of nausea and/or vomiting  Description: INTERVENTIONS:  - Administer IV fluids if ordered to ensure adequate hydration  - Maintain NPO status until nausea and vomiting are resolved  - Nasogastric tube if ordered  - Administer ordered antiemetic medications as needed  - Provide nonpharmacologic comfort measures as appropriate  - Advance diet as tolerated, if ordered  - Consider nutrition services referral to assist patient with adequate nutrition and appropriate food choices  Outcome: Progressing  Goal: Maintains or returns to baseline bowel function  Description: INTERVENTIONS:  - Assess bowel function  - Encourage oral fluids to ensure adequate hydration  - Administer IV fluids if ordered to ensure adequate hydration  - Administer ordered medications as needed  - Encourage mobilization and activity  - Consider nutritional services referral to assist patient with adequate nutrition and appropriate food choices  Outcome: Progressing  Goal: Maintains adequate nutritional intake  Description: INTERVENTIONS:  - Monitor percentage of each meal consumed  - Identify factors contributing to decreased intake, treat as appropriate  - Assist with meals as needed  - Monitor I&O, weight, and lab values if indicated  - Obtain nutrition services referral as needed  Outcome: Progressing     Problem: GENITOURINARY - ADULT  Goal: Maintains or returns to baseline urinary function  Description: INTERVENTIONS:  - Assess urinary function  - Encourage oral fluids to ensure adequate hydration if ordered  - Administer IV fluids as ordered to ensure adequate hydration  - Administer ordered medications as needed  - Offer frequent toileting  - Follow urinary retention protocol if ordered  Outcome: Progressing  Goal: Absence of urinary retention  Description: INTERVENTIONS:  - Assess patients ability to void and empty bladder  - Monitor I/O  - Bladder scan as needed  - Discuss with physician/AP medications to alleviate retention as needed  - Discuss catheterization for long term situations as appropriate  Outcome: Progressing     Problem: CONFUSION/THOUGHT DISTURBANCE  Goal: Thought disturbances (confusion, delirium, depression, dementia or psychosis) are managed to maintain or return to baseline mental status and functional level  Description: INTERVENTIONS:  - Assess for possible contributors to  thought disturbance, including but not limited to medications, infection, impaired vision or hearing, underlying metabolic abnormalities, dehydration, respiratory compromise,  psychiatric diagnoses and notify attending PHYSICAN/AP  - Monitor and intervene to maintain adequate nutrition, hydration, elimination, sleep and activity  - Decrease environmental stimuli, including noise as appropriate  - Provide frequent contacts to provide refocusing, direction and reassurance as needed  Approach patient calmly with eye contact and at their level  - [place] high risk fall precautions, aspiration precautions and other safety measures, as indicated  - If delirium suspected, notify physician/AP of change in condition and request immediate in-person evaluation  - Pursue consults as appropriate including Geriatric (campus dependent), OT for cognitive evaluation/activity planning, psychiatric, pastoral care, etc   Outcome: Progressing     Problem: Nutrition/Hydration-ADULT  Goal: Nutrient/Hydration intake appropriate for improving, restoring or maintaining nutritional needs  Description: Monitor and assess patient's nutrition/hydration status for malnutrition  Collaborate with interdisciplinary team and initiate plan and interventions as ordered  Monitor patient's weight and dietary intake as ordered or per policy  Utilize nutrition screening tool and intervene as necessary  Determine patient's food preferences and provide high-protein, high-caloric foods as appropriate       INTERVENTIONS:  - Monitor oral intake, urinary output, labs, and treatment plans  - Assess nutrition and hydration status and recommend course of action  - Evaluate amount of meals eaten  - Assist patient with eating if necessary   - Allow adequate time for meals  - Recommend/ encourage appropriate diets, oral nutritional supplements, and vitamin/mineral supplements  - Order, calculate, and assess calorie counts as needed  - Recommend, monitor, and adjust tube feedings and TPN/PPN based on assessed needs  - Assess need for intravenous fluids  - Provide specific nutrition/hydration education as appropriate  - Include patient/family/caregiver in decisions related to nutrition  Outcome: Progressing     Problem: RESPIRATORY - ADULT  Goal: Achieves optimal ventilation and oxygenation  Description: INTERVENTIONS:  - Assess for changes in respiratory status  - Assess for changes in mentation and behavior  - Position to facilitate oxygenation and minimize respiratory effort  - Oxygen administered by appropriate delivery if ordered  - Initiate smoking cessation education as indicated  - Encourage broncho-pulmonary hygiene including cough, deep breathe, Incentive Spirometry  - Assess the need for suctioning and aspirate as needed  - Assess and instruct to report SOB or any respiratory difficulty  - Respiratory Therapy support as indicated  Outcome: Progressing     Problem: HEMATOLOGIC - ADULT  Goal: Maintains hematologic stability  Description: INTERVENTIONS  - Assess for signs and symptoms of bleeding or hemorrhage  - Monitor labs  - Administer supportive blood products/factors as ordered and appropriate  Outcome: Progressing

## 2020-08-07 ENCOUNTER — ANESTHESIA EVENT (INPATIENT)
Dept: PERIOP | Facility: HOSPITAL | Age: 70
DRG: 031 | End: 2020-08-07
Payer: COMMERCIAL

## 2020-08-07 ENCOUNTER — ANESTHESIA (INPATIENT)
Dept: PERIOP | Facility: HOSPITAL | Age: 70
DRG: 031 | End: 2020-08-07
Payer: COMMERCIAL

## 2020-08-07 DIAGNOSIS — I10 ESSENTIAL HYPERTENSION: ICD-10-CM

## 2020-08-07 LAB
ANION GAP SERPL CALCULATED.3IONS-SCNC: 5 MMOL/L (ref 4–13)
BUN SERPL-MCNC: 13 MG/DL (ref 5–25)
CALCIUM SERPL-MCNC: 8.3 MG/DL (ref 8.3–10.1)
CHLORIDE SERPL-SCNC: 110 MMOL/L (ref 100–108)
CO2 SERPL-SCNC: 27 MMOL/L (ref 21–32)
CREAT SERPL-MCNC: 0.69 MG/DL (ref 0.6–1.3)
ERYTHROCYTE [DISTWIDTH] IN BLOOD BY AUTOMATED COUNT: 14.9 % (ref 11.6–15.1)
GFR SERPL CREATININE-BSD FRML MDRD: 96 ML/MIN/1.73SQ M
GLUCOSE SERPL-MCNC: 103 MG/DL (ref 65–140)
GLUCOSE SERPL-MCNC: 104 MG/DL (ref 65–140)
GLUCOSE SERPL-MCNC: 108 MG/DL (ref 65–140)
GLUCOSE SERPL-MCNC: 118 MG/DL (ref 65–140)
GLUCOSE SERPL-MCNC: 133 MG/DL (ref 65–140)
GLUCOSE SERPL-MCNC: 98 MG/DL (ref 65–140)
HCT VFR BLD AUTO: 33.9 % (ref 36.5–49.3)
HGB BLD-MCNC: 10.9 G/DL (ref 12–17)
MAGNESIUM SERPL-MCNC: 2.3 MG/DL (ref 1.6–2.6)
MCH RBC QN AUTO: 28 PG (ref 26.8–34.3)
MCHC RBC AUTO-ENTMCNC: 32.2 G/DL (ref 31.4–37.4)
MCV RBC AUTO: 87 FL (ref 82–98)
PHOSPHATE SERPL-MCNC: 2.7 MG/DL (ref 2.3–4.1)
PLATELET # BLD AUTO: 271 THOUSANDS/UL (ref 149–390)
PMV BLD AUTO: 9.9 FL (ref 8.9–12.7)
POTASSIUM SERPL-SCNC: 3.9 MMOL/L (ref 3.5–5.3)
RBC # BLD AUTO: 3.89 MILLION/UL (ref 3.88–5.62)
SODIUM SERPL-SCNC: 142 MMOL/L (ref 136–145)
WBC # BLD AUTO: 12.3 THOUSAND/UL (ref 4.31–10.16)

## 2020-08-07 PROCEDURE — 97530 THERAPEUTIC ACTIVITIES: CPT

## 2020-08-07 PROCEDURE — 84100 ASSAY OF PHOSPHORUS: CPT | Performed by: PHYSICIAN ASSISTANT

## 2020-08-07 PROCEDURE — NC001 PR NO CHARGE: Performed by: STUDENT IN AN ORGANIZED HEALTH CARE EDUCATION/TRAINING PROGRAM

## 2020-08-07 PROCEDURE — NC001 PR NO CHARGE: Performed by: PHYSICIAN ASSISTANT

## 2020-08-07 PROCEDURE — 80048 BASIC METABOLIC PNL TOTAL CA: CPT | Performed by: PHYSICIAN ASSISTANT

## 2020-08-07 PROCEDURE — 97535 SELF CARE MNGMENT TRAINING: CPT

## 2020-08-07 PROCEDURE — 94760 N-INVAS EAR/PLS OXIMETRY 1: CPT

## 2020-08-07 PROCEDURE — 85027 COMPLETE CBC AUTOMATED: CPT | Performed by: PHYSICIAN ASSISTANT

## 2020-08-07 PROCEDURE — 99233 SBSQ HOSP IP/OBS HIGH 50: CPT | Performed by: INTERNAL MEDICINE

## 2020-08-07 PROCEDURE — 62256 REMOVE BRAIN CAVITY SHUNT: CPT | Performed by: NEUROLOGICAL SURGERY

## 2020-08-07 PROCEDURE — 99233 SBSQ HOSP IP/OBS HIGH 50: CPT | Performed by: SURGERY

## 2020-08-07 PROCEDURE — 83735 ASSAY OF MAGNESIUM: CPT | Performed by: PHYSICIAN ASSISTANT

## 2020-08-07 PROCEDURE — 97112 NEUROMUSCULAR REEDUCATION: CPT

## 2020-08-07 PROCEDURE — 94660 CPAP INITIATION&MGMT: CPT

## 2020-08-07 PROCEDURE — 82948 REAGENT STRIP/BLOOD GLUCOSE: CPT

## 2020-08-07 PROCEDURE — NC001 PR NO CHARGE

## 2020-08-07 RX ORDER — LIDOCAINE HYDROCHLORIDE AND EPINEPHRINE 10; 10 MG/ML; UG/ML
INJECTION, SOLUTION INFILTRATION; PERINEURAL AS NEEDED
Status: DISCONTINUED | OUTPATIENT
Start: 2020-08-07 | End: 2020-08-07 | Stop reason: HOSPADM

## 2020-08-07 RX ORDER — SODIUM CHLORIDE, SODIUM GLUCONATE, SODIUM ACETATE, POTASSIUM CHLORIDE, MAGNESIUM CHLORIDE, SODIUM PHOSPHATE, DIBASIC, AND POTASSIUM PHOSPHATE .53; .5; .37; .037; .03; .012; .00082 G/100ML; G/100ML; G/100ML; G/100ML; G/100ML; G/100ML; G/100ML
75 INJECTION, SOLUTION INTRAVENOUS CONTINUOUS
Status: DISCONTINUED | OUTPATIENT
Start: 2020-08-07 | End: 2020-08-09

## 2020-08-07 RX ORDER — CHLORHEXIDINE GLUCONATE 0.12 MG/ML
15 RINSE ORAL ONCE
Status: DISCONTINUED | OUTPATIENT
Start: 2020-08-07 | End: 2020-08-07 | Stop reason: HOSPADM

## 2020-08-07 RX ORDER — SODIUM CHLORIDE 9 MG/ML
INJECTION, SOLUTION INTRAVENOUS CONTINUOUS PRN
Status: DISCONTINUED | OUTPATIENT
Start: 2020-08-07 | End: 2020-08-07

## 2020-08-07 RX ORDER — HYDROMORPHONE HCL/PF 1 MG/ML
0.5 SYRINGE (ML) INJECTION
Status: DISCONTINUED | OUTPATIENT
Start: 2020-08-07 | End: 2020-08-07 | Stop reason: HOSPADM

## 2020-08-07 RX ORDER — FENTANYL CITRATE 50 UG/ML
INJECTION, SOLUTION INTRAMUSCULAR; INTRAVENOUS AS NEEDED
Status: DISCONTINUED | OUTPATIENT
Start: 2020-08-07 | End: 2020-08-07

## 2020-08-07 RX ORDER — ONDANSETRON 2 MG/ML
4 INJECTION INTRAMUSCULAR; INTRAVENOUS ONCE AS NEEDED
Status: DISCONTINUED | OUTPATIENT
Start: 2020-08-07 | End: 2020-08-07 | Stop reason: HOSPADM

## 2020-08-07 RX ORDER — PROPOFOL 10 MG/ML
INJECTION, EMULSION INTRAVENOUS CONTINUOUS PRN
Status: DISCONTINUED | OUTPATIENT
Start: 2020-08-07 | End: 2020-08-07

## 2020-08-07 RX ORDER — MIDAZOLAM HYDROCHLORIDE 2 MG/2ML
INJECTION, SOLUTION INTRAMUSCULAR; INTRAVENOUS AS NEEDED
Status: DISCONTINUED | OUTPATIENT
Start: 2020-08-07 | End: 2020-08-07

## 2020-08-07 RX ADMIN — PROPOFOL 10 MCG/KG/MIN: 10 INJECTION, EMULSION INTRAVENOUS at 14:26

## 2020-08-07 RX ADMIN — CEFEPIME HYDROCHLORIDE 2000 MG: 2 INJECTION, POWDER, FOR SOLUTION INTRAVENOUS at 22:59

## 2020-08-07 RX ADMIN — SODIUM CHLORIDE, SODIUM GLUCONATE, SODIUM ACETATE, POTASSIUM CHLORIDE, MAGNESIUM CHLORIDE, SODIUM PHOSPHATE, DIBASIC, AND POTASSIUM PHOSPHATE 75 ML/HR: .53; .5; .37; .037; .03; .012; .00082 INJECTION, SOLUTION INTRAVENOUS at 19:37

## 2020-08-07 RX ADMIN — SODIUM CHLORIDE: 0.9 INJECTION, SOLUTION INTRAVENOUS at 14:20

## 2020-08-07 RX ADMIN — CEFEPIME HYDROCHLORIDE 2000 MG: 2 INJECTION, POWDER, FOR SOLUTION INTRAVENOUS at 14:28

## 2020-08-07 RX ADMIN — CEFEPIME HYDROCHLORIDE 2000 MG: 2 INJECTION, POWDER, FOR SOLUTION INTRAVENOUS at 07:00

## 2020-08-07 RX ADMIN — MIDAZOLAM 1 MG: 1 INJECTION INTRAMUSCULAR; INTRAVENOUS at 14:26

## 2020-08-07 RX ADMIN — FENTANYL CITRATE 12.5 MCG: 50 INJECTION, SOLUTION INTRAMUSCULAR; INTRAVENOUS at 14:31

## 2020-08-07 RX ADMIN — POTASSIUM PHOSPHATE, MONOBASIC POTASSIUM PHOSPHATE, DIBASIC 12 MMOL: 224; 236 INJECTION, SOLUTION, CONCENTRATE INTRAVENOUS at 10:11

## 2020-08-07 RX ADMIN — POTASSIUM & SODIUM PHOSPHATES POWDER PACK 280-160-250 MG 2 PACKET: 280-160-250 PACK at 09:39

## 2020-08-07 NOTE — PHYSICAL THERAPY NOTE
PHYSICAL THERAPY NOTE          Patient Name: Aba Li  BFLCI'S Date: 8/7/2020 08/07/20 0854   Pain Assessment   Pain Assessment Tool 0-10   Pain Score No Pain   Restrictions/Precautions   Weight Bearing Precautions Per Order No   Braces or Orthoses MAFO   Other Precautions Cognitive; Chair Alarm; Bed Alarm; Fall Risk;Pain;Multiple lines;Spinal precautions   General   Chart Reviewed Yes   Family/Caregiver Present No   Cognition   Orientation Level Oriented to person;Oriented to place   Subjective   Subjective "I am very tired today"   Bed Mobility   Rolling R 2  Maximal assistance   Additional items Assist x 2   Rolling L 2  Maximal assistance   Additional items Assist x 2   Supine to Sit 2  Maximal assistance   Additional items Assist x 2   Sit to Supine 2  Maximal assistance   Additional items Assist x 2   Balance   Static Sitting Poor   Dynamic Sitting Poor -   Endurance Deficit   Endurance Deficit Yes   Endurance Deficit Description limited by fatigue   Activity Tolerance   Activity Tolerance Patient limited by fatigue;Patient limited by pain   Medical Staff Made Aware OT   Nurse Made Aware yes, nsg gave clearance to work with pt   Exercises   Balance training  Static sitting balance to complete OT activity   Assessment   Prognosis Good   Problem List Decreased strength;Decreased endurance; Impaired balance;Decreased mobility; Decreased coordination;Decreased cognition;Decreased safety awareness;Pain;Decreased skin integrity; Impaired judgement;Decreased range of motion   Assessment Pt required increased encouragement to complete mobility at this time with fatigue  Poor ability to maintain sitting balance without A at this time  Poor ability to recover LOB during sitting  Poor LE strength to complete exercise at this time  Pt required LE management for all bed mobility   Required A for rolling and to maintain sidelying to complete pericare  Pt will benefit from continued inpt skilled PT to maximize functional mobility & safety  Barriers to Discharge Decreased caregiver support; Inaccessible home environment   Goals   Patient Goals To go home   STG Expiration Date 08/19/20   Plan   Treatment/Interventions Spoke to case management;Spoke to nursing;Gait training;Bed mobility; Patient/family training; Endurance training;LE strengthening/ROM; Functional transfer training   Progress Progressing toward goals   PT Frequency Other (Comment)  (3-6x/wk)   Recommendation   PT Discharge Recommendation Post[facility]Acute Rehabilitation Services   PT - OK to Discharge Yes   Additional Comments to rehab when medically stable     Javon Hester, PT

## 2020-08-07 NOTE — PLAN OF CARE
Problem: Potential for Falls  Goal: Patient will remain free of falls  Description: INTERVENTIONS:  - Assess patient frequently for physical needs  -  Identify cognitive and physical deficits and behaviors that affect risk of falls    -  [place] fall precautions as indicated by assessment   - Educate patient/family on patient safety including physical limitations  - Instruct patient to call for assistance with activity based on assessment  - Modify environment to reduce risk of injury  - Consider OT/PT consult to assist with strengthening/mobility  Outcome: Progressing     Problem: Prexisting or High Potential for Compromised Skin Integrity  Goal: Skin integrity is maintained or improved  Description: INTERVENTIONS:  - Identify patients at risk for skin breakdown  - Assess and monitor skin integrity  - Assess and monitor nutrition and hydration status  - Monitor labs   - Assess for incontinence   - Turn and reposition patient  - Assist with mobility/ambulation  - Relieve pressure over bony prominences  - Avoid friction and shearing  - Provide appropriate hygiene as needed including keeping skin clean and dry  - Evaluate need for skin moisturizer/barrier cream  - Collaborate with interdisciplinary team   - Patient/family teaching  - Consider wound care consult   Outcome: Progressing     Problem: PAIN - ADULT  Goal: Verbalizes/displays adequate comfort level or baseline comfort level  Description: Interventions:  - Encourage patient to monitor pain and request assistance  - Assess pain using appropriate pain scale  - Administer analgesics based on type and severity of pain and evaluate response  - Implement non-pharmacological measures as appropriate and evaluate response  - Consider cultural and social influences on pain and pain management  - Notify physician/advanced practitioner if interventions unsuccessful or patient reports new pain  Outcome: Progressing     Problem: SAFETY ADULT  Goal: Maintain or return to baseline ADL function  Description: INTERVENTIONS:  -  Assess patient's ability to carry out ADLs; assess patient's baseline for ADL function and identify physical deficits which impact ability to perform ADLs (bathing, care of mouth/teeth, toileting, grooming, dressing, etc )  - Assess/evaluate cause of self-care deficits   - Assess range of motion  - Assess patient's mobility; develop plan if impaired  - Assess patient's need for assistive devices and provide as appropriate  - Encourage maximum independence but intervene and supervise when necessary  - Involve family in performance of ADLs  - Assess for home care needs following discharge   - Consider OT consult to assist with ADL evaluation and planning for discharge  - Provide patient education as appropriate  Outcome: Progressing  Goal: Maintain or return mobility status to optimal level  Description: INTERVENTIONS:  - Assess patient's baseline mobility status (ambulation, transfers, stairs, etc )    - Identify cognitive and physical deficits and behaviors that affect mobility  - Identify mobility aids required to assist with transfers and/or ambulation (gait belt, sit-to-stand, lift, walker, cane, etc )  - [place] fall precautions as indicated by assessment  - Record patient progress and toleration of activity level on Mobility SBAR; progress patient to next Phase/Stage  - Instruct patient to call for assistance with activity based on assessment  - Consider rehabilitation consult to assist with strengthening/weightbearing, etc   Outcome: Progressing     Problem: GASTROINTESTINAL - ADULT  Goal: Minimal or absence of nausea and/or vomiting  Description: INTERVENTIONS:  - Administer IV fluids if ordered to ensure adequate hydration  - Maintain NPO status until nausea and vomiting are resolved  - Nasogastric tube if ordered  - Administer ordered antiemetic medications as needed  - Provide nonpharmacologic comfort measures as appropriate  - Advance diet as tolerated, if ordered  - Consider nutrition services referral to assist patient with adequate nutrition and appropriate food choices  Outcome: Progressing  Goal: Maintains or returns to baseline bowel function  Description: INTERVENTIONS:  - Assess bowel function  - Encourage oral fluids to ensure adequate hydration  - Administer IV fluids if ordered to ensure adequate hydration  - Administer ordered medications as needed  - Encourage mobilization and activity  - Consider nutritional services referral to assist patient with adequate nutrition and appropriate food choices  Outcome: Progressing  Goal: Maintains adequate nutritional intake  Description: INTERVENTIONS:  - Monitor percentage of each meal consumed  - Identify factors contributing to decreased intake, treat as appropriate  - Assist with meals as needed  - Monitor I&O, weight, and lab values if indicated  - Obtain nutrition services referral as needed  Outcome: Progressing     Problem: GENITOURINARY - ADULT  Goal: Maintains or returns to baseline urinary function  Description: INTERVENTIONS:  - Assess urinary function  - Encourage oral fluids to ensure adequate hydration if ordered  - Administer IV fluids as ordered to ensure adequate hydration  - Administer ordered medications as needed  - Offer frequent toileting  - Follow urinary retention protocol if ordered  Outcome: Progressing  Goal: Absence of urinary retention  Description: INTERVENTIONS:  - Assess patients ability to void and empty bladder  - Monitor I/O  - Bladder scan as needed  - Discuss with physician/AP medications to alleviate retention as needed  - Discuss catheterization for long term situations as appropriate  Outcome: Progressing     Problem: CONFUSION/THOUGHT DISTURBANCE  Goal: Thought disturbances (confusion, delirium, depression, dementia or psychosis) are managed to maintain or return to baseline mental status and functional level  Description: INTERVENTIONS:  - Assess for possible contributors to  thought disturbance, including but not limited to medications, infection, impaired vision or hearing, underlying metabolic abnormalities, dehydration, respiratory compromise,  psychiatric diagnoses and notify attending PHYSICAN/AP  - Monitor and intervene to maintain adequate nutrition, hydration, elimination, sleep and activity  - Decrease environmental stimuli, including noise as appropriate  - Provide frequent contacts to provide refocusing, direction and reassurance as needed  Approach patient calmly with eye contact and at their level  - [place] high risk fall precautions, aspiration precautions and other safety measures, as indicated  - If delirium suspected, notify physician/AP of change in condition and request immediate in-person evaluation  - Pursue consults as appropriate including Geriatric (campus dependent), OT for cognitive evaluation/activity planning, psychiatric, pastoral care, etc   Outcome: Progressing     Problem: Nutrition/Hydration-ADULT  Goal: Nutrient/Hydration intake appropriate for improving, restoring or maintaining nutritional needs  Description: Monitor and assess patient's nutrition/hydration status for malnutrition  Collaborate with interdisciplinary team and initiate plan and interventions as ordered  Monitor patient's weight and dietary intake as ordered or per policy  Utilize nutrition screening tool and intervene as necessary  Determine patient's food preferences and provide high-protein, high-caloric foods as appropriate       INTERVENTIONS:  - Monitor oral intake, urinary output, labs, and treatment plans  - Assess nutrition and hydration status and recommend course of action  - Evaluate amount of meals eaten  - Assist patient with eating if necessary   - Allow adequate time for meals  - Recommend/ encourage appropriate diets, oral nutritional supplements, and vitamin/mineral supplements  - Order, calculate, and assess calorie counts as needed  - Recommend, monitor, and adjust tube feedings and TPN/PPN based on assessed needs  - Assess need for intravenous fluids  - Provide specific nutrition/hydration education as appropriate  - Include patient/family/caregiver in decisions related to nutrition  Outcome: Progressing     Problem: RESPIRATORY - ADULT  Goal: Achieves optimal ventilation and oxygenation  Description: INTERVENTIONS:  - Assess for changes in respiratory status  - Assess for changes in mentation and behavior  - Position to facilitate oxygenation and minimize respiratory effort  - Oxygen administered by appropriate delivery if ordered  - Initiate smoking cessation education as indicated  - Encourage broncho-pulmonary hygiene including cough, deep breathe, Incentive Spirometry  - Assess the need for suctioning and aspirate as needed  - Assess and instruct to report SOB or any respiratory difficulty  - Respiratory Therapy support as indicated  Outcome: Progressing     Problem: HEMATOLOGIC - ADULT  Goal: Maintains hematologic stability  Description: INTERVENTIONS  - Assess for signs and symptoms of bleeding or hemorrhage  - Monitor labs  - Administer supportive blood products/factors as ordered and appropriate  Outcome: Progressing

## 2020-08-07 NOTE — PROGRESS NOTES
Natalie Marques offered [employer] [facility] [employer] [facility]/anointing and offered a blessing       08/07/20 1100   Clinical Encounter Type   Visited With Patient   Routine Visit Follow-up   Continue Visiting Yes   Muslim Encounters   Muslim Needs Prayer   Sacramental Encounters   Sacrament of Sick-Anointing Anointed

## 2020-08-07 NOTE — PROGRESS NOTES
Daily Progress Note - Critical Care   Sotero Noe 69 y o  male MRN: [id number]  Unit/Bed#: ICU 12 Encounter: [id number]        ----------------------------------------------------------------------------------------  HPI/24hr events: No significant events overnight  Patient had PICC placed yesterday    He is for the OR with NS today     ---------------------------------------------------------------------------------------  SUBJECTIVE  Patient denies any pain     Review of Systems  Review of systems was reviewed and negative unless stated above in HPI/24-hour events   ---------------------------------------------------------------------------------------  Assessment and Plan:    Neuro:    Diagnosis: Hx of NPH with current  shunt infection  S/p externalization at 2nd IC space  o Plan: NS following and plans to take the patient to the OR today for explantation and possible EVD  o CSF growing pseudomonas   o ID following/ Plan for 6 weeks of cefepime  o Continue to monitor neuro check    Diagnosis: Acute post operative pain  o Plan: Continue prn tylenol, oxy and dilaudid       CV:    Diagnosis: SMA stenosis   o Plan: Vascular surgery following  o No AC per vascular surgery    Diagnosis: HTN   o Plan: Continue to hold home antihypertensive      Pulm:   Diagnosis: ZHOU  o Plan: CPAP at HS       GI:    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 s/p ex lap   No bowel resected   o Plan: Surgery following  o Patient NPO for NS today but was tolerating a general diet   o DC RECTAL TUBE- patient continues to have minimal output       :    Diagnosis: No acute issues   Condom cath in place      F/E/N:    Plan: NPO for OR   Will restart regular diet when appropriate   Trend electrolytes and replete as necessary       Heme/Onc:    Diagnosis: No acute issues      Endo:    Diagnosis: Hyperglycemia  o Plan: Continue ISS    ID:    Diagnosis:  shunt infection with pseudomonas   o Plan: Continue cefepime per ID- PICC placed yesterday  o Plan for  shunt explantation today     MSK/Skin:    Diagnosis: Ambulatory dysfunction   o Plan: PT/OT- Will need post acute rehab    Disposition: Continue Critical Care   Code Status: Level 1 - Full Code  ---------------------------------------------------------------------------------------  ICU CORE MEASURES    Prophylaxis   VTE Pharmacologic Prophylaxis: Heparin  VTE Mechanical Prophylaxis: sequential compression device  Stress Ulcer Prophylaxis: Prophylaxis Not Indicated     ABCDE Protocol (if indicated)  Plan to perform spontaneous awakening trial today? Not applicable  Plan to perform spontaneous breathing trial today? Not applicable  Obvious barriers to extubation? Not applicable  CAM-ICU: Negative    Invasive Devices Review  Invasive Devices     Peripherally Inserted Central Catheter Line            PICC Line  Left Basilic less than 1 day          Drain            Ventriculostomy/Subdural Ventricular drainage catheter with ICP microsensor Right Other (Comment) 6 days    Rectal Tube With balloon less than 1 day              Can any invasive devices be discontinued today?  Not applicable  ---------------------------------------------------------------------------------------  OBJECTIVE    Vitals   Vitals:    20 0347 20 0400 20 0500 20 0600   BP:  119/56 122/65 115/58   BP Location:  Right arm     Pulse:  56 60 62   Resp:  (!) 23 22 13   Temp:       TempSrc:       SpO2: 95% 96% 97% 97%   Weight:    126 kg (278 lb 3 5 oz)   Height:         Temp (24hrs), Av 6 °F (37 °C), Min:97 8 °F (36 6 °C), Max:99 2 °F (37 3 °C)  Current: Temperature: 99 °F (37 2 °C)  HR: 62  BP: 115/58  RR: 13  SpO2: 97%    Respiratory:  SpO2: SpO2: 97 %, SpO2 Activity: SpO2 Activity: At Rest, SpO2 Device: O2 Device: Other (comment)(cpap)  Nasal Cannula O2 Flow Rate (L/min): 2 L/min    Invasive/non-invasive ventilation settings   Respiratory    Lab Data (Last 4 hours) None         O2/Vent Data (Last 4 hours)      08/07 0347          Non-Invasive Ventilation Mode CPAP                   Physical Exam  Vitals signs reviewed  Constitutional:       General: He is not in acute distress  HENT:      Head: Normocephalic  Comments:  shunt externalized to 2 intercostal space and set to drain- CSF clearEyes:      Pupils: Pupils are equal, round, and reactive to light  Neck:      Musculoskeletal: Neck supple  Cardiovascular:      Rate and Rhythm: Regular rhythm  Pulmonary:      Effort: Pulmonary effort is normal    Abdominal:      Palpations: Abdomen is soft  Comments: Midline incision dry and intact   Genitourinary:     Comments: Griffith catheter in place  Scrotal edema  Rectal tube in place with minimal lose stool output   Musculoskeletal: Normal range of motion  Skin:     General: Skin is warm  Neurological:      Comments: Patient arouses easily  He answers questions appropriate but is slow to answer at times  He follows commands         Laboratory and Diagnostics:  Results from last 7 days   Lab Units 08/07/20  0506 08/06/20  0536 08/04/20  0426 08/03/20  0609 08/02/20  0533 08/01/20  1406 08/01/20  0549  07/31/20  1012   WBC Thousand/uL 12 30* 11 94* 8 51 8 97 10 89* 13 00* 7 71  --  10 02   HEMOGLOBIN g/dL 10 9* 11 2* 10 7* 10 9* 10 8* 12 7 11 2*  11 2*   < > 13 0   HEMATOCRIT % 33 9* 35 7* 33 5* 33 1* 33 5* 39 7 35 2*  --  39 7   PLATELETS Thousands/uL 271 254 241 229 218 236 183  --  234   NEUTROS PCT %  --   --   --   --  82*  --  85*  --   --    BANDS PCT %  --   --   --   --   --   --   --   --  8   MONOS PCT %  --   --   --   --  9  --  9  --   --    MONO PCT %  --   --  3*  --   --   --   --   --  9    < > = values in this interval not displayed       Results from last 7 days   Lab Units 08/07/20  0506 08/06/20  0536 08/04/20  0426 08/03/20  0609 08/02/20  0533 08/01/20  1406 08/01/20  0549   SODIUM mmol/L 142 141 142 141 143 141 141   POTASSIUM mmol/L 3 9 3 6 3 4* 3 7 4 2 4 2 4 2   CHLORIDE mmol/L 110* 109* 111* 112* 113* 111* 112*   CO2 mmol/L 27 25 27 26 23 22 21   ANION GAP mmol/L 5 7 4 3* 7 8 8   BUN mg/dL 13 15 19 24 26* 30* 32*   CREATININE mg/dL 0 69 0 67 0 72 0 86 0 97 0 99 1 06   CALCIUM mg/dL 8 3 7 8* 7 9* 8 0* 7 8* 6 9* 7 6*   GLUCOSE RANDOM mg/dL 118 125 126 121 141* 150* 145*   ALT U/L  --   --   --   --  16  --   --    AST U/L  --   --   --   --  24  --   --    ALK PHOS U/L  --   --   --   --  54  --   --    ALBUMIN g/dL  --   --   --   --  1 8*  --   --    TOTAL BILIRUBIN mg/dL  --   --   --   --  0 54  --   --      Results from last 7 days   Lab Units 08/07/20  0506 08/06/20  0536 08/04/20  0426 08/03/20  0609 08/02/20  0533 08/01/20  0549   MAGNESIUM mg/dL 2 3 2 4 2 2 2 3 2 4 2 2   PHOSPHORUS mg/dL 2 7 2 9 2 0* 1 7* 1 7* 3 4      Results from last 7 days   Lab Units 08/06/20  1945 08/02/20  0533 08/01/20 2118 08/01/20  1406 08/01/20  0549 07/31/20  2119 07/31/20  1559 07/31/20  1012   INR  1 10  --   --   --   --   --   --  1 41*   PTT seconds 32 140* 141* 38* 42* 53* 60* 72*          Results from last 7 days   Lab Units 08/02/20  0533 08/01/20  2118 08/01/20  1406 07/31/20  1805 07/31/20  1559 07/31/20  1012   LACTIC ACID mmol/L 1 2 1 7 2 0 1 6 2 3* 2 2*     ABG:  Results from last 7 days   Lab Units 07/31/20  1151   PH ART  7 357   PCO2 ART mm Hg 32 3*   PO2 ART mm Hg 87 8   HCO3 ART mmol/L 17 7*   BASE EXC ART mmol/L -6 7   ABG SOURCE  Line, Arterial     VBG:  Results from last 7 days   Lab Units 07/31/20  1151   ABG SOURCE  Line, Arterial           Micro  Results from last 7 days   Lab Units 08/04/20  1318 08/03/20  1738 08/03/20  0845 08/01/20  0548 07/31/20  1411 07/31/20  1251   BLOOD CULTURE   --   --   --   --   --  No Growth After 5 Days  No Growth After 5 Days     SPUTUM CULTURE   --   --   --  1 colony Candida albicans*  --   --    GRAM STAIN RESULT  1+ Polys*  4+ Gram negative rods* No polys seen*  3+ Gram negative rods*  Smear reviewed by Microbiology staff* No Polys or Bacteria seen 2+ Polys  1+ Epithelial cells per low power field  No bacteria seen 1+ Polys*  4+ Gram negative rods*  --        EKG: SR  Imaging:  I have personally reviewed pertinent reports  and I have personally reviewed pertinent films in PACS    Intake and Output  I/O       08/05 0701 - 08/06 0700 08/06 0701 - 08/07 0700    P  O  360 560    I V  (mL/kg) 1468 3 (11 7) 50 (0 4)    IV Piggyback 210 130    Total Intake(mL/kg) 2038 3 (16 3) 740 (5 9)    Urine (mL/kg/hr) 1104 (0 4) 1173 (0 4)    Drains 47 167    Stool 0 100    Total Output 1151 1440    Net +887 3 -700          Unmeasured Urine Occurrence  1 x    Unmeasured Stool Occurrence  1 x        UOP:1 1L x 24h  -700cc/24h    Height and Weights   Height: 6' 3" (190 5 cm)  IBW: 84 5 kg  Body mass index is 34 78 kg/m²  Weight (last 2 days)     Date/Time   Weight    08/07/20 0600   126 (278 22)    08/06/20 0549   125 (276 24)    08/05/20 0600   125 (276 02)                Nutrition       Diet Orders   (From admission, onward)             Start     Ordered    08/07/20 0001  Diet NPO; Sips with meds  Diet effective midnight     Question Answer Comment   Diet Type NPO    NPO Except:  Sips with meds        08/06/20 1632              Active Medications  Scheduled Meds:acetaminophen, 650 mg, Oral, Q6H PRN, Kalie Stalin PA-C  cefepime, 2,000 mg, Intravenous, Q8H, Kalie Stalin, PA-C, Last Rate: Stopped (08/06/20 2150)  HYDROmorphone, 0 5 mg, Intravenous, Q3H PRN, Kalie Gant, PA-C  insulin lispro, 2-12 Units, Subcutaneous, 4x Daily (AC & HS), MALISSA Goodman-LAURA  iodixanol, 15 mL, Intravenous, Once in imaging, Kalie Gant, PA-C  oxyCODONE, 5 mg, Oral, Q4H PRN, Kalie Stalin, PA-C  potassium-sodium phosphates, 2 packet, Oral, BID With Meals, Na Foyer, PA-C      Continuous Infusions:     PRN Meds:   acetaminophen, 650 mg, Q6H PRN  HYDROmorphone, 0 5 mg, Q3H PRN  iodixanol, 15 mL, Once in imaging  oxyCODONE, 5 mg, Q4H PRN        Allergies   Allergies   Allergen Reactions    Pollen Extract Allergic Rhinitis     ---------------------------------------------------------------------------------------  Advance Directive and Living Will:      Power of :    POLST:    ---------------------------------------------------------------------------------------  Care Time Delivered:   Upon my evaluation, this patient had a high probability of imminent or life-threatening deterioration due to Neurologic decline, sepsis, which required my direct attention, intervention, and personal management  I have personally provided 32 minutes (0315 to 4659) of critical care time, exclusive of procedures, teaching, family meetings, and any prior time recorded by providers other than myself  Manuel Hewitt PA-C      Portions of the record may have been created with voice recognition software  Occasional wrong word or "sound a like" substitutions may have occurred due to the inherent limitations of voice recognition software    Read the chart carefully and recognize, using context, where substitutions have occurred set-up required

## 2020-08-07 NOTE — ANESTHESIA POSTPROCEDURE EVALUATION
Post-Op Assessment Note    CV Status:  Stable  Pain Score: 0    Pain management: adequate     Mental Status:  Alert   Hydration Status:  Stable   PONV Controlled:  None   Airway Patency:  Patent      Post Op Vitals Reviewed: Yes      Staff: Anesthesiologist, CRNA         No complications documented      BP      Temp 97 7 °F (36 5 °C) (08/07/20 1518)    Pulse 58 (08/07/20 1518)   Resp 15 (08/07/20 1518)    SpO2 100 % (08/07/20 1518)

## 2020-08-07 NOTE — ANESTHESIA PREPROCEDURE EVALUATION
Procedure:  REMOVAL SHUNT VENTRICULAR PERITONEAL (Right Head)    Relevant Problems   CARDIO   (+) Essential hypertension      ENDO   (+) Type 2 diabetes mellitus (HCC)      MUSCULOSKELETAL   (+) Gout      PULMONARY   (+) ZHOU on CPAP      Other   (+) Hydrocephalus (HCC)   (+) Normal pressure hydrocephalus (HCC)   LEFT VENTRICLE:  Systolic Function: normal  Ejection Fraction: 50-55%  Cavity size: normal      Infected      Physical Exam    Airway    Mallampati score: II  TM Distance: >3 FB  Neck ROM: full     Dental   No notable dental hx     Cardiovascular  Cardiovascular exam normal    Pulmonary  Pulmonary exam normal     Other Findings        Anesthesia Plan  ASA Score- 3     Anesthesia Type- general with ASA Monitors  Additional Monitors:   Airway Plan: ETT  Plan Factors-    Induction- intravenous  Postoperative Plan-     Informed Consent- Anesthetic plan and risks discussed with patient  I personally reviewed this patient with the CRNA  Discussed and agreed on the Anesthesia Plan with the CRNA  Adele Weathers

## 2020-08-07 NOTE — OCCUPATIONAL THERAPY NOTE
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[address] )    Nonocclusive mesenteric ischemia (HCC)    Hydrocephalus (HCC)      Past Medical History:   Diagnosis Date    Cancer [facility]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     Diabetes mellitus ([place] [address] )     DM2 (diabetes mellitus, type 2) ([place] [address] )     Gout     HTN (hypertension)     Hydrocephalus ([place] [address] )     Hypertension     Neuropathy     ZHOU on CPAP     Pressure injury of skin     TIA (transient ischemic attack)        Past Surgical History:   Procedure Laterality Date    ABDOMINAL WALL SURGERY N/A 8/1/2020    Procedure: CLOSURE WOUND ABDOMINAL/TRUNK;  Surgeon: Alireza Alvares DO;  [facility];  Service: General    ARTERIOGRAM N/A 7/31/2020    Procedure: ARTERIOGRAM Of SMA and placement of Papavarine onfusion arterial catheter;  Surgeon: Ralf Johnson MD;  [facility];  Service: Vascular    ARTERIOGRAM Left 8/1/2020    Procedure: ARTERIOGRAM; cath removal;  Surgeon: Ralf Johnson MD;  [facility];  Service: Vascular    CSF SHUNT      x3 Op Reports, Dr Juliana Edmond, Lizbeth Ryder in [facility] chart viewer    LAPAROTOMY N/A 7/31/2020    Procedure: LAPAROTOMY EXPLORATORY: Externalizes  shunt Dafter Darrel application;  Surgeon: Kendell Palma MD;  [facility];  Service: General    LAPAROTOMY N/A 8/1/2020    Procedure: LAPAROTOMY EXPLORATORY,;  Surgeon: Alireza Alvares DO;  [facility];  Service: General    CA AMPUTATION FOOT,TRANSMETATARSAL Left 5/30/2020    Procedure: excision 5th metatarsal head   excision 5th metatarsal ulcer ;  Surgeon: Nikia Heller Maribeth Duron DPM;  [facility];  [address] POST/POSTEROLATRL/POSTINTERBODY LUMBAR N/A 7/6/2020    Procedure: MINIMALLY INVASIVE TRANSVERSE LUMBAR INTERBODY FUSION L5-S1 FROM LEFT-SIDED APPROACH;  Surgeon: Jayme Perez MD;  [facility];  Service: Neurosurgery    OK REPLACEMENT/REVISION,CSF SHUNT Right 7/6/2020    Procedure: REVISION OF SCALP OVER VENTRICULAR CATHETER IN THE RIGHT CORONAL REGION;  Surgeon: Jayme Perez MD;  [facility];  Service: Neurosurgery    WOUND DEBRIDEMENT N/A 8/1/2020    Procedure: abd washout;  Surgeon: Cyndy Holloway DO;  [facility];  Service: General        08/07/20 0853   Restrictions/Precautions   Weight Bearing Precautions Per Order No   Braces or Orthoses MAFO   Other Precautions Cognitive; Chair Alarm; Bed Alarm;Multiple lines;Telemetry; Fall Risk;Pain  (EVD)   Lifestyle   Autonomy I ADLS, mainly I w/ IADLS except cooking, Mod I w/ transfers and functional mobility PTA   Reciprocal Relationships Pt lives w/ a friend, the friend works 6-8 hrs/day   Service to Others pt is retired   Intrinsic Gratification Enjoys yard work and reading   Pain Assessment   Pain Assessment Tool Pain Assessment not indicated - pt denies pain   Pain Score No Pain   ADL   Grooming Assistance 3  Moderate Assistance   Grooming Deficit Setup;Steadying;Verbal cueing;Supervision/safety; Increased time to complete;Wash/dry hands   Grooming Comments Pt attempted grooming while seated EOB w/ setup  Pt needed VC/hand over hand to grab wash rag from basin w/ RUE  Pt presenting w/ decreased motivation to participate and lethargy  After setup w/ rag in R hand, pt able to bring to face and wash w/ increased time  Needs assist for thoroughness     Toileting Assistance  1  Total Assistance   Toileting Deficit Perineal hygiene   Toileting Comments Pt incontinent of bowel, required total A to manage perineal hygiene   Bed Mobility   Rolling R 2  Maximal assistance   Additional items Assist x 2; Increased time required;Verbal cues;LE management; Bedrails   Rolling L 2  Maximal assistance   Additional items Assist x 2; Increased time required;Verbal cues;LE management; Bedrails   Supine to Sit 2  Maximal assistance   Additional items Assist x 2; Increased time required;Verbal cues;LE management   Sit to Supine 2  Maximal assistance   Additional items Assist x 2; Increased time required;Verbal cues;LE management   Additional Comments Pt went from supine<>sit w/ Max A x2 for UB support and LE management, HOB elevated for assist  Pt sat EOB for approx 15 mins w/ Max A for sitting balance/trunk control  Pt presents w/ posterior lean; need VC/TC for positioning into midline  Presents w/ lethargy,decreased trunk control and poor motivation to participate in therapy  Once returned to supine, pt rolled L/R w/ Max A x2 for initiation into rolling and assist in side lying   Coordination   In Hand Manipulation Difficulty grasping rag in R hand; poor FMC, B/L    Cognition   Overall Cognitive Status Impaired   Arousal/Participation Arousable; Cooperative;Lethargic;Poorly responsive   Attention Attends with cues to redirect   Orientation Level Oriented to person;Oriented to place; Disoriented to time;Disoriented to situation   Memory Decreased recall of precautions;Decreased recall of recent events;Decreased short term memory   Following Commands Follows one step commands with increased time or repetition   Comments Pt is cooperative; overall remains very lethargic w/ decreased motivation to participate  Has decreased understanding of deficits/safety awareness   Needs VC for initiation of all tasks   Activity Tolerance   Activity Tolerance Patient limited by fatigue;Patient limited by pain   Medical Staff Made Aware PT, RN   Assessment   Assessment Patient participated in Skilled OT session 8/7/2020 with interventions consisting of ADL re training with the use of correct body mechnaics, Energy Conservation techniques, safety awareness and fall prevention techniques, therapeutic exercise to: increase functional use of BUEs, increase BUE muscle strength ,  therapeutic activities to: increase activity tolerance, neuromuscular re education to: facilitate volitional use of limbs, facilitate proximal cocontraction of limbs and core, elicit righting and equilibrium reactions for improved postural control and alignment during transitional movements, increase dynamic sit/ stand balance during functional activity , increase postural control, increase trunk control and increase OOB/ sitting tolerance   Patient agreeable to OT treatment session, upon arrival patient was found supine in bed  In comparison to previous session, patient with improvements in EOB sitting tolerance  Overall, pt presenting w/ poor motivation to participate in therapy  Repeating several times, "I just want this all to be over " When asked what pt's leisure interests are pt did report "dancing & reading" (specifically foot stomping)  Despite encouragement to participate w/ therapy to return to leisure activities and be independent again, pt still not receptive to engage in functional tasks  Patient requiring verbal cues for safety, verbal cues for correct technique, verbal cues for pacing thru activity steps, cognitive assistance to anticipate next step, one step directives and frequent rest periods  Patient continues to be functioning below baseline level, occupational performance remains limited secondary to factors listed above and increased risk for falls and injury  From OT standpoint, recommendation at time of d/c would be Short Term Rehab when medically stable   Patient to benefit from continued Occupational Therapy treatment while in the hospital to address deficits as defined above and maximize level of functional independence with ADLs and functional mobility      Plan   Treatment Interventions ADL retraining;Functional transfer training;UE strengthening/ROM; Endurance training;Cognitive reorientation;Patient/family training;Equipment evaluation/education; Compensatory technique education;Continued evaluation; Energy conservation; Activityengagement; Fine motor coordination activities   Goal Expiration Date 08/17/20   OT Treatment Day 2   OT Frequency 3-5x/wk   Recommendation   OT Discharge Recommendation Post-Acute Rehabilitation Services   OT - OK to Discharge Yes  (when medically stable)   Barthel Index   Feeding 5   Bathing 0   Grooming Score 0   Dressing Score 0   Bladder Score 0   Bowels Score 0   Toilet Use Score 5   Transfers (Bed/Chair) Score 5   Mobility (Level Surface) Score 0   Stairs Score 0   Barthel Index Score 15       Johana Christianson MS, OTR/L

## 2020-08-07 NOTE — PROGRESS NOTES
Critical Care Progress Note:  Patient seen and examined post operatively from his  shunt extraction  Patient returned from the OR to the ICU  He is on room air and Sp02 is 97%  HR is 55   /62  He is awake and alert but remains slow to respond  He moves all extremities but remains weak    Will advance diet when more awake   Continue to hold IVF secondary to volume overload  Will continue to monitor in the ICU closely for risk of neurologic decline following  shunt extraction for shunt infection with pseudomonas     ADAMS GuardadoC

## 2020-08-07 NOTE — OP NOTE
OPERATIVE REPORT  PATIENT NAME: Camelia Toney    :  1950  MRN: [id number]  Pt Location: [facility] ROOM 17    SURGERY DATE: 2020    Surgeon(s) and Role:     * John Arrington MD - Primary    Preop Diagnosis:  Status post ventriculoperitoneal shunt [Z98 2]  CNS infection [G96 8]    Post-Op Diagnosis Codes:     * Status post ventriculoperitoneal shunt [Z98 2]     * CNS infection [G96 8]    Procedure(s) (LRB):  REMOVAL SHUNT VENTRICULAR PERITONEAL (Right)    Specimen(s):  * No specimens in log *    Estimated Blood Loss:   Minimal    Drains:  * No LDAs found *    Anesthesia Type:   General    Operative Indications:  Status post ventriculoperitoneal shunt [Z98 2]  CNS infection [G96 8]    Operative Findings:  none    Complications:   None    Procedure and Technique:  After institution of monitored anesthesia control the right frontal region and the right chest were prepped with Betadine soap then DuraPrep  Double layer drapes were placed in normal fashion  Time-out was called and all parameters a time-out were followed  The skin in the right coronal region was injected with 1% lidocaine with 1 100,000 epinephrine dissection was carried out to the underlying shunt this was removed prior to pulling it out completely the catheter in the chest was pulled and then cut so as to allow no contaminated catheter to go beneath the skin  Shunt system was then pulled out from above  It is likely the some small portion of the catheter is retained  The coronal incision was closed with interrupted inverted 4 0 Vicryl suture and vertical mattress 4 0 nylon suture  More proximally over the shunt system the skin was debrided thoroughly irrigated and closed with interrupted inverted 4 0 Vicryl suture and vertical mattress nylon sutures  Bacitracin and clean sterile dressings were placed  The patient was taken to the PACU having tolerated the procedure well     I was present for the entire procedure    Patient Disposition:  PACU     SIGNATURE: Ayaz Arceo MD  DATE: August 7, 2020  TIME: 3:14 PM

## 2020-08-07 NOTE — WOUND OSTOMY CARE
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        2  unstageable pressure injury right lateral malleolus        3  Stage 1 pressure injury right foot--resolved      See flowsheets for details      Skin care plans:  1-Turn/reposition q2h or when medically stable for pressure re-distribution on skin--use positioning wedges  2-Ehob cushion in chair when out of bed  3-Moisturize skin daily with skin nourishing cream   4-Apply Allevyn Life foam dressing to bilateral heels and left medial thigh under immobilizer for prevention  Mik with P   Peel back at least daily for skin assessment and re-apply  Change dressing every 3 days and PRN  5-Apply Allevyn Life foam dressing to right lateral malleolus and midline sacrum  Mik with T   Change dressing every 3 days  6-Elevate heels and right hallux area (float off of pillows) to offload pressure  Call or tigertext with any questions  Wound Care will continue to follow    Wound 07/31/20 Pressure Injury Ankle Right;Lateral (Active)   Wound Image   08/07/20 1019   Wound Description Brown;Dry 08/07/20 1208   Frances-wound Assessment Pink 08/07/20 1208   Calculated Wound Volume (cm^3) 0 cm^3 07/31/20 1317   Drainage Amount None 08/07/20 1208   Treatments Cleansed;Site care;Elevated 08/07/20 0830   Dressing Open to air 08/07/20 1208   Patient Tolerance Tolerated well 07/31/20 1317   Dressing Status Clean;Dry; Intact 08/02/20 0400       Wound 07/31/20 Moisture associated skin damage Sacrum (Active)   Wound Image   08/07/20 1023   Wound Description Pink;Fragile; Intact 08/07/20 1208   Frances-wound Assessment Pink 08/07/20 1208   Calculated Wound Volume (cm^3) 0 04 cm^3 07/31/20 1331   Drainage Amount None 08/07/20 1208   Non-staged Wound Description Partial thickness 08/07/20 1030   Treatments Cleansed; Other (Comment) 08/07/20 0830   Dressing Open to air 08/07/20 [address] 07/31/20 1331   Patient Tolerance Tolerated well 07/31/20 1331   Dressing Status Clean;Dry; Intact 08/02/20 0400

## 2020-08-07 NOTE — NUTRITION
If unable to advance PO diet in next 48 hrs then consider an alternate means of nutrition support and reconsult RD for recs

## 2020-08-07 NOTE — PROGRESS NOTES
Progress Note - General Surgery   Cyrus Mcburney 69 y o  male MRN: [id number]  Unit/Bed#: ICU 12 Encounter: [id number]    Assessment:  69 M status post ex-lap for DELIA,  shunt externalization, ABthera VAC, mesenteric angiogram and papaverine infusion, subsequent second look laparotomy, abdominal wall closure, sheath removal, and subsequent shunt repositioning    Plan:  NPO for neurosurgery  May had diet as tolerated otherwise  Discontinue rectal tube  PICC placed yesterday, long-term antibiotics per ID  Resume heparin prophylaxis when cleared by neurosurgery    Subjective/Objective     Subjective: No acute events overnight  No complaints this morning  Objective:    Blood pressure 119/56, pulse 56, temperature 99 °F (37 2 °C), temperature source Axillary, resp  rate (!) 23, height 6' 3" (1 905 m), weight 125 kg (276 lb 3 8 oz), SpO2 96 %  ,Body mass index is 34 53 kg/m²        Intake/Output Summary (Last 24 hours) at 8/7/2020 0558  Last data filed at 8/7/2020 0400  Gross per 24 hour   Intake 740 ml   Output 1418 ml   Net -678 ml       Invasive Devices     Peripherally Inserted Central Catheter Line            PICC Line 71/37/62 Left Basilic less than 1 day          Drain            Ventriculostomy/Subdural Ventricular drainage catheter with ICP microsensor Right Other (Comment) 6 days    Rectal Tube With balloon less than 1 day                Physical Exam:   General: NAD, AAOx3  Eyes: PERRL  ENT: moist mucous membranes  Neck: supple  CV: RRR +S1/S2  Chest: respirations non-labored  Abdomen: soft, NT ND, incision c/d/i  Extremities: atraumatic, no edema      Results from last 7 days   Lab Units 08/07/20  0506 08/06/20  0536 08/04/20  0426   WBC Thousand/uL 12 30* 11 94* 8 51   HEMOGLOBIN g/dL 10 9* 11 2* 10 7*   HEMATOCRIT % 33 9* 35 7* 33 5*   PLATELETS Thousands/uL 271 254 241     Results from last 7 days   Lab Units 08/07/20  0506 08/06/20  0536 08/04/20  0426  07/31/20  0745   POTASSIUM mmol/L 3 9 3 6 3 4* < >  --    CHLORIDE mmol/L 110* 109* 111*   < >  --    CO2 mmol/L 27 25 27   < >  --    CO2, I-STAT mmol/L  --   --   --   --  20*   BUN mg/dL 13 15 19   < >  --    CREATININE mg/dL 0 69 0 67 0 72   < >  --    GLUCOSE, ISTAT mg/dl  --   --   --   --  132   CALCIUM mg/dL 8 3 7 8* 7 9*   < >  --     < > = values in this interval not displayed  Results from last 7 days   Lab Units 08/06/20  1945 08/02/20  0533 08/01/20 2118  07/31/20  1012   INR  1 10  --   --   --  1 41*   PTT seconds 32 140* 141*   < > 72*    < > = values in this interval not displayed

## 2020-08-07 NOTE — PLAN OF CARE
Problem: OCCUPATIONAL THERAPY ADULT  Goal: Performs self-care activities at highest level of function for planned discharge setting  See evaluation for individualized goals  Description: Treatment Interventions: ADL retraining, Functional transfer training, UE strengthening/ROM, Endurance training, Cognitive reorientation, Patient/family training, Equipment evaluation/education, Compensatory technique education, Continued evaluation, Energy conservation, Activityengagement, Fine motor coordination activities          See flowsheet documentation for full assessment, interventions and recommendations  Outcome: Progressing  Note: Limitation: Decreased ADL status, Decreased UE ROM, Decreased UE strength, Decreased Safe judgement during ADL, Decreased cognition, Decreased endurance, Visual deficit, Decreased fine motor control, Decreased self-care trans, Decreased high-level ADLs, Mood limitation  Prognosis: Fair  Assessment: Patient participated in Skilled OT session 8/7/2020 with interventions consisting of ADL re training with the use of correct body mechnaics, Energy Conservation techniques, safety awareness and fall prevention techniques, therapeutic exercise to: increase functional use of BUEs, increase BUE muscle strength ,  therapeutic activities to: increase activity tolerance, neuromuscular re education to: facilitate volitional use of limbs, facilitate proximal cocontraction of limbs and core, elicit righting and equilibrium reactions for improved postural control and alignment during transitional movements, increase dynamic sit/ stand balance during functional activity , increase postural control, increase trunk control and increase OOB/ sitting tolerance   Patient agreeable to OT treatment session, upon arrival patient was found supine in bed  In comparison to previous session, patient with improvements in EOB sitting tolerance  Overall, pt presenting w/ poor motivation to participate in therapy  Repeating several times, "I just want this all to be over " When asked what pt's leisure interests are pt did report "dancing & reading" (specifically foot stomping)  Despite encouragement to participate w/ therapy to return to leisure activities and be independent again, pt still not receptive to engage in functional tasks  Patient requiring verbal cues for safety, verbal cues for correct technique, verbal cues for pacing thru activity steps, cognitive assistance to anticipate next step, one step directives and frequent rest periods  Patient continues to be functioning below baseline level, occupational performance remains limited secondary to factors listed above and increased risk for falls and injury  From OT standpoint, recommendation at time of d/c would be Short Term Rehab when medically stable   Patient to benefit from continued Occupational Therapy treatment while in the hospital to address deficits as defined above and maximize level of functional independence with ADLs and functional mobility        OT Discharge Recommendation: Post-Acute Rehabilitation Services  OT - OK to Discharge: Yes(when medically stable)     Edward Chavez MS, OTR/L

## 2020-08-07 NOTE — PROGRESS NOTES
Progress Note - Infectious Disease   Yasmin Valencia 69 y o  male MRN: [id number]  Unit/Bed#: ICU 12 Encounter: [id number]      Impression/Plan:  1  Severe sepsis, tachycardia, leukocytosis, elevated lactic acid   With early development of high fevers  Secondary to  shunt infection with meningitis and peritonitis  Not on vasopressors   Blood cultures are negative thus far    Fevers and WBC count now improving on IV antibiotic   -antibiotic plan as below  -monitor temperatures and hemodynamics  -recheck CBC with diff and BMP  -supportive care per Critical Care service     2   shunt infection  Fluid WBC count was 61 with neutrophil predominance  Gram stain from shunt fluid shows GNRs and culture grew Pseudomonas    Suspect secondary to peritonitis in the setting of #3   Status post externalization of  shunt on 07/30   Repeat CSF analysis shows slightly decreased neutrophil predominant pleocytosis   Repeat CSF fluid from the distal externalized shunt still with Pseudomonas aeruginosa despite externalization of shunt  -continue IV cefepime 2 g q 8 hours  -neurosurgery follow-up ongoing  -patient for complete explantation of the shunt system today     3  Ischemic bowel with peritonitis   Status post exploratory laparotomy, VAC placement   Status post femoral artery and SMA artery cannulation for paraverine by vascular surgery   Suspect intra-abdominal infection is etiology of #2   Status post second-look on 08/01 with no resection performed   Peritoneal fluid culture also shows Pseudomonas   Distal externalized shunt still has Pseudomonas aeruginosa  -antibiotic plan as above  -vascular surgery/general surgical follow-up ongoing  -patient for removal of shunt today     4  NPH requiring  shunt placement in 2005, status post recent revision in July 2019   Patient to have entire  shunt apparatus removed today     5  Lumbar spinal stenosis status post L5-S1 fusion on 07/06/2020      6  Diabetes mellitus with neuropathy  Will see the patient again 8/10/2020  Please call if questions  Antibiotics:  Cefepime 8    Subjective:  Patient has no fever, chills, sweats; no reported vomiting, but still having some diarrhea; no cough, shortness of breath; no reported pain  He is sleepy but answers simple questions appropriately  Patient had a PICC line placed yesterday    Objective:  Vitals:  Temp:  [97 7 °F (36 5 °C)-99 2 °F (37 3 °C)] 97 7 °F (36 5 °C)  HR:  [52-70] 52  Resp:  [13-32] 20  BP: ()/(50-65) 95/54  SpO2:  [92 %-98 %] 92 %  Temp (24hrs), Av 3 °F (36 8 °C), Min:97 7 °F (36 5 °C), Max:99 2 °F (37 3 °C)  Current: Temperature: 97 7 °F (36 5 °C)    Physical Exam:   General Appearance:  Alert, answers very simple yes no questions, nontoxic, no acute distress  Throat: Oropharynx moist without lesions  Lungs:   Clear to auscultation bilaterally; no wheezes, rhonchi or rales; respirations unlabored   Heart:  Bradycardia; no murmur, rub or gallop   Abdomen:   Soft, non-tender, non-distended, positive bowel sounds  Extremities: No clubbing, cyanosis or edema   Skin: No new rashes or lesions  No draining wounds noted         Labs, Imaging, & Other studies:   All pertinent labs and imaging studies were personally reviewed  Results from last 7 days   Lab Units 20  0506 2036 20  0426   WBC Thousand/uL 12 30* 11 94* 8 51   HEMOGLOBIN g/dL 10 9* 11 2* 10 7*   PLATELETS Thousands/uL 271 254 241     Results from last 7 days   Lab Units 20  0506 20  0536 20  0426  20  0533   SODIUM mmol/L 142 141 142   < > 143   POTASSIUM mmol/L 3 9 3 6 3 4*   < > 4 2   CHLORIDE mmol/L 110* 109* 111*   < > 113*   CO2 mmol/L 27 25 27   < > 23   BUN mg/dL 13 15 19   < > 26*   CREATININE mg/dL 0 69 0 67 0 72   < > 0 97   EGFR ml/min/1 73sq m 96 97 94   < > 78   CALCIUM mg/dL 8 3 7 8* 7 9*   < > 7 8*   AST U/L  --   --   --   --  24   ALT U/L  --   --   --   --  16   ALK PHOS U/L  -- --   --   --  54    < > = values in this interval not displayed  Results from last 7 days   Lab Units 08/04/20  1318 08/03/20  1738 08/03/20  0845 08/01/20  0548 07/31/20  1411 07/31/20  1251   BLOOD CULTURE   --   --   --   --   --  No Growth After 5 Days  No Growth After 5 Days     SPUTUM CULTURE   --   --   --  1 colony Candida albicans*  --   --    GRAM STAIN RESULT  1+ Polys*  4+ Gram negative rods* No polys seen*  3+ Gram negative rods*  Smear reviewed by Microbiology staff* No Polys or Bacteria seen 2+ Polys  1+ Epithelial cells per low power field  No bacteria seen 1+ Polys*  4+ Gram negative rods*  --

## 2020-08-07 NOTE — PROGRESS NOTES
Daily Progress Note - Critical Care   Bo Gage 69 y o  male MRN: [id number]  Unit/Bed#: ICU 12 Encounter: [id number]        ----------------------------------------------------------------------------------------  HPI/24hr events: No acute events overnight  PICC line plane yesterday    ---------------------------------------------------------------------------------------  SUBJECTIVE  Patient has no complaints  Understands he is undergoing procedure today  Review of systems was reviewed and negative unless stated above in HPI/24-hour events   ---------------------------------------------------------------------------------------  Assessment and Plan:    Neuro:    Diagnosis: Hx of NPH with current  shunt infection with pseudomonas  o Plan: S/p shunt externalization to 2nd intercostal  o Plan to explant shunt today 8/7  Possible EVD placement  o ID monitoring patient  6 weeks of cefepime  o Continue neuro checks   Diagnosis: Acute post-op pain   o Plan: Continue PRN tylenol, dilaudid, oxycodone   CV:    Diagnosis: SMA stenosis  o Plan: Vascular surgery following   Diagnosis: HPTN  o Plan: Hold home antihypertensives and continue to monitor BP  Pulm:   Diagnosis: Obstructive sleep apnea  o Plan: Continue CPAP HS  GI:    Diagnosis: SMA stenosis with resultant bowel ischemia s/p ex lap  Bowel not resected  o Plan: Pt continues to be monitored by surgery team   Diagnosis: Rectal tube in place with minimal liquid stool output  o Plan: D/C rectal tube    :    Diagnosis: No acute issues  Marsa Manju catheter in place    F/E/N:    Fluids: No continuous fluids   Electrolytes: Continue to monitor/trend and replete as necessary   Nutrition: NPO for neurosurgery  Advance diet as tolerated after shunt explant    Heme/Onc:    Diagnosis: No acute issues  Hemoglobin stable  Endo:    Diagnosis: Type II Diabetes   o Plan: Continue to monitor blood glucose levels   Sliding scale  ID:    Diagnosis: Severe sepsis   o Plan: Continue IV cefepime 2g Q8 hours for 6 weeks, follow up sensitivities, explant shunt today   MSK/Skin:    Diagnosis: Ambulatory dysfunction  o Plan: PT/OT following  Post-acute rehab      Disposition: Continue Critical Care   Code Status: Level 1 - Full Code  ---------------------------------------------------------------------------------------  ICU CORE MEASURES    Prophylaxis   VTE Pharmacologic Prophylaxis: Heparin  VTE Mechanical Prophylaxis: sequential compression device  Stress Ulcer Prophylaxis: Prophylaxis Not Indicated     ABCDE Protocol (if indicated)  Plan to perform spontaneous awakening trial today? Not applicable  Plan to perform spontaneous breathing trial today? Not applicable  Obvious barriers to extubation? Not applicable  CAM-ICU: Negative    Invasive Devices Review  Invasive Devices     Peripherally Inserted Central Catheter Line            PICC Line  Left Basilic less than 1 day          Drain            Ventriculostomy/Subdural Ventricular drainage catheter with ICP microsensor Right Other (Comment) 6 days    Rectal Tube With balloon less than 1 day              Can any invasive devices be discontinued today?  Yes  ---------------------------------------------------------------------------------------  OBJECTIVE    Vitals   Vitals:    20 0200 20 0300 20 0347 20 0400   BP: 116/58 112/61  119/56   BP Location:    Right arm   Pulse: 58 58  56   Resp: 21 (!) 23  (!) 23   Temp:       TempSrc:       SpO2: 96% 97% 95% 96%   Weight:       Height:         Temp (24hrs), Av 6 °F (37 °C), Min:97 8 °F (36 6 °C), Max:99 2 °F (37 3 °C)  Current: Temperature: 99 °F (37 2 °C)  HR: 54-66  BP: /50-61  RR: 19-26  SpO2: 95-98      Invasive/non-invasive ventilation settings   Respiratory    Lab Data (Last 4 hours)    None         O2/Vent Data (Last 4 hours)       0347          Non-Invasive Ventilation Mode CPAP                   Physical Exam  HENT: Head: Normocephalic  Comments:  shunt 2nd R intercostal space  Set to drain  CSF is clear yellowEyes:      Extraocular Movements: Extraocular movements intact  Pupils: Pupils are equal, round, and reactive to light  Cardiovascular:      Rate and Rhythm: Normal rate and regular rhythm  Pulmonary:      Effort: Pulmonary effort is normal       Breath sounds: Normal breath sounds  Abdominal:      General: Bowel sounds are normal       Palpations: Abdomen is soft  Comments: Midline incision dry, clean, intact, non-tender   Genitourinary:     Comments: Wick catheter in place   Skin:     General: Skin is warm and dry  Capillary Refill: Capillary refill takes less than 2 seconds  Neurological:      Mental Status: He is alert and oriented to person, place, and time  Comments: 5/5 motor function in Upper and lower extremities  Intact sensation in upper and lower extremities  Slow to respond to questions         Laboratory and Diagnostics:  Results from last 7 days   Lab Units 08/07/20  0506 08/06/20  0536 08/04/20  0426 08/03/20  0609 08/02/20  0533 08/01/20  1406 08/01/20  0549  07/31/20  1012   WBC Thousand/uL 12 30* 11 94* 8 51 8 97 10 89* 13 00* 7 71  --  10 02   HEMOGLOBIN g/dL 10 9* 11 2* 10 7* 10 9* 10 8* 12 7 11 2*  11 2*   < > 13 0   HEMATOCRIT % 33 9* 35 7* 33 5* 33 1* 33 5* 39 7 35 2*  --  39 7   PLATELETS Thousands/uL 271 254 241 229 218 236 183  --  234   NEUTROS PCT %  --   --   --   --  82*  --  85*  --   --    BANDS PCT %  --   --   --   --   --   --   --   --  8   MONOS PCT %  --   --   --   --  9  --  9  --   --    MONO PCT %  --   --  3*  --   --   --   --   --  9    < > = values in this interval not displayed       Results from last 7 days   Lab Units 08/06/20  0536 08/04/20  0426 08/03/20  0609 08/02/20  0533 08/01/20  1406 08/01/20  0549 07/31/20  1012   SODIUM mmol/L 141 142 141 143 141 141 139   POTASSIUM mmol/L 3 6 3 4* 3 7 4 2 4 2 4 2 4 6   CHLORIDE mmol/L 109* 111* 112* 113* 111* 112* 108   CO2 mmol/L 25 27 26 23 22 21 21   ANION GAP mmol/L 7 4 3* 7 8 8 10   BUN mg/dL 15 19 24 26* 30* 32* 33*   CREATININE mg/dL 0 67 0 72 0 86 0 97 0 99 1 06 1 27   CALCIUM mg/dL 7 8* 7 9* 8 0* 7 8* 6 9* 7 6* 8 5   GLUCOSE RANDOM mg/dL 125 126 121 141* 150* 145* 159*   ALT U/L  --   --   --  16  --   --   --    AST U/L  --   --   --  24  --   --   --    ALK PHOS U/L  --   --   --  54  --   --   --    ALBUMIN g/dL  --   --   --  1 8*  --   --   --    TOTAL BILIRUBIN mg/dL  --   --   --  0 54  --   --   --      Results from last 7 days   Lab Units 08/06/20  0536 08/04/20  0426 08/03/20  0609 08/02/20  0533 08/01/20  0549   MAGNESIUM mg/dL 2 4 2 2 2 3 2 4 2 2   PHOSPHORUS mg/dL 2 9 2 0* 1 7* 1 7* 3 4      Results from last 7 days   Lab Units 08/06/20  1945 08/02/20  0533 08/01/20 2118 08/01/20  1406 08/01/20  0549 07/31/20  2119 07/31/20  1559 07/31/20  1012   INR  1 10  --   --   --   --   --   --  1 41*   PTT seconds 32 140* 141* 38* 42* 53* 60* 72*          Results from last 7 days   Lab Units 08/02/20  0533 08/01/20 2118 08/01/20  1406 07/31/20  1805 07/31/20  1559 07/31/20  1012   LACTIC ACID mmol/L 1 2 1 7 2 0 1 6 2 3* 2 2*     ABG:  Results from last 7 days   Lab Units 07/31/20  1151   PH ART  7 357   PCO2 ART mm Hg 32 3*   PO2 ART mm Hg 87 8   HCO3 ART mmol/L 17 7*   BASE EXC ART mmol/L -6 7   ABG SOURCE  Line, Arterial     VBG:  Results from last 7 days   Lab Units 07/31/20  1151   ABG SOURCE  Line, Arterial           Micro  Results from last 7 days   Lab Units 08/04/20  1318 08/03/20  1738 08/03/20  0845 08/01/20  0548 07/31/20  1411 07/31/20  1251 07/31/20  0622   BLOOD CULTURE   --   --   --   --   --  No Growth After 5 Days  No Growth After 5 Days    --    SPUTUM CULTURE   --   --   --  1 colony Candida albicans*  --   --   --    GRAM STAIN RESULT  1+ Polys*  4+ Gram negative rods* No polys seen*  3+ Gram negative rods*  Smear reviewed by Microbiology staff* No Polys or Bacteria seen 2+ Polys  1+ Epithelial cells per low power field  No bacteria seen 1+ Polys*  4+ Gram negative rods*  --  No Polys or Bacteria seen   BODY FLUID CULTURE, STERILE   --   --   --   --   --   --  1+ Growth of Pseudomonas aeruginosa*       EKG: SR  Imaging: No new imaging I have personally reviewed pertinent films in PACS    Intake and Output  I/O       08/05 0701 - 08/06 0700 08/06 0701 - 08/07 0700    P  O  360 560    I V  (mL/kg) 1468 3 (11 7) 50 (0 4)    IV Piggyback 210 130    Total Intake(mL/kg) 2038 3 (16 3) 740 (5 9)    Urine (mL/kg/hr) 1104 (0 4) 1173 (0 4)    Drains 47 145    Stool 0 100    Total Output 1151 1418    Net +887 3 -678          Unmeasured Urine Occurrence  1 x    Unmeasured Stool Occurrence  1 x        UOP: 49 ml/hr     Height and Weights   Height: 6' 3" (190 5 cm)  IBW: 84 5 kg  Body mass index is 34 53 kg/m²  Weight (last 2 days)     Date/Time   Weight    08/06/20 0549   125 (276 24)    08/05/20 0600   125 (276 02)                Nutrition       Diet Orders   (From admission, onward)             Start     Ordered    08/07/20 0001  Diet NPO; Sips with meds  Diet effective midnight     Question Answer Comment   Diet Type NPO    NPO Except:  Sips with meds        08/06/20 1632                    Active Medications  Scheduled Meds:acetaminophen, 650 mg, Oral, Q6H PRN, Primo Seymour PA-C  cefepime, 2,000 mg, Intravenous, Q8H, Primo Seymuor PA-C, Last Rate: Stopped (08/06/20 2150)  HYDROmorphone, 0 5 mg, Intravenous, Q3H PRN, Primo Seymour PA-C  insulin lispro, 2-12 Units, Subcutaneous, 4x Daily (AC & HS), Lv Alejandra PA-C  iodixanol, 15 mL, Intravenous, Once in imaging, Primo Seymour PA-C  oxyCODONE, 5 mg, Oral, Q4H PRN, Primo Seymour PA-C  potassium-sodium phosphates, 2 packet, Oral, BID With Meals, Afua Hines PA-C      Continuous Infusions:     PRN Meds:   acetaminophen, 650 mg, Q6H PRN  HYDROmorphone, 0 5 mg, Q3H PRN  iodixanol, 15 mL, Once in imaging  oxyCODONE, 5 mg, Q4H PRN        Allergies   Allergies   Allergen Reactions    Pollen Extract Allergic Rhinitis         Darinel Mario      Portions of the record may have been created with voice recognition software  Occasional wrong word or "sound a like" substitutions may have occurred due to the inherent limitations of voice recognition software    Read the chart carefully and recognize, using context, where substitutions have occurred

## 2020-08-08 LAB
ANION GAP SERPL CALCULATED.3IONS-SCNC: 4 MMOL/L (ref 4–13)
BUN SERPL-MCNC: 12 MG/DL (ref 5–25)
CALCIUM SERPL-MCNC: 8.2 MG/DL (ref 8.3–10.1)
CHLORIDE SERPL-SCNC: 106 MMOL/L (ref 100–108)
CO2 SERPL-SCNC: 28 MMOL/L (ref 21–32)
CREAT SERPL-MCNC: 0.73 MG/DL (ref 0.6–1.3)
ERYTHROCYTE [DISTWIDTH] IN BLOOD BY AUTOMATED COUNT: 14.7 % (ref 11.6–15.1)
GFR SERPL CREATININE-BSD FRML MDRD: 93 ML/MIN/1.73SQ M
GLUCOSE SERPL-MCNC: 113 MG/DL (ref 65–140)
GLUCOSE SERPL-MCNC: 116 MG/DL (ref 65–140)
GLUCOSE SERPL-MCNC: 117 MG/DL (ref 65–140)
GLUCOSE SERPL-MCNC: 126 MG/DL (ref 65–140)
HCT VFR BLD AUTO: 35.1 % (ref 36.5–49.3)
HGB BLD-MCNC: 11.2 G/DL (ref 12–17)
MAGNESIUM SERPL-MCNC: 2.2 MG/DL (ref 1.6–2.6)
MCH RBC QN AUTO: 27.9 PG (ref 26.8–34.3)
MCHC RBC AUTO-ENTMCNC: 31.9 G/DL (ref 31.4–37.4)
MCV RBC AUTO: 87 FL (ref 82–98)
PHOSPHATE SERPL-MCNC: 3.2 MG/DL (ref 2.3–4.1)
PLATELET # BLD AUTO: 316 THOUSANDS/UL (ref 149–390)
PMV BLD AUTO: 10 FL (ref 8.9–12.7)
POTASSIUM SERPL-SCNC: 4 MMOL/L (ref 3.5–5.3)
RBC # BLD AUTO: 4.02 MILLION/UL (ref 3.88–5.62)
SODIUM SERPL-SCNC: 138 MMOL/L (ref 136–145)
WBC # BLD AUTO: 15.46 THOUSAND/UL (ref 4.31–10.16)

## 2020-08-08 PROCEDURE — 85027 COMPLETE CBC AUTOMATED: CPT | Performed by: EMERGENCY MEDICINE

## 2020-08-08 PROCEDURE — 80048 BASIC METABOLIC PNL TOTAL CA: CPT | Performed by: EMERGENCY MEDICINE

## 2020-08-08 PROCEDURE — 99233 SBSQ HOSP IP/OBS HIGH 50: CPT | Performed by: SURGERY

## 2020-08-08 PROCEDURE — NC001 PR NO CHARGE: Performed by: SURGERY

## 2020-08-08 PROCEDURE — 94760 N-INVAS EAR/PLS OXIMETRY 1: CPT

## 2020-08-08 PROCEDURE — 84100 ASSAY OF PHOSPHORUS: CPT | Performed by: EMERGENCY MEDICINE

## 2020-08-08 PROCEDURE — 99024 POSTOP FOLLOW-UP VISIT: CPT | Performed by: NEUROLOGICAL SURGERY

## 2020-08-08 PROCEDURE — 82948 REAGENT STRIP/BLOOD GLUCOSE: CPT

## 2020-08-08 PROCEDURE — 83735 ASSAY OF MAGNESIUM: CPT | Performed by: EMERGENCY MEDICINE

## 2020-08-08 PROCEDURE — 94660 CPAP INITIATION&MGMT: CPT

## 2020-08-08 RX ORDER — ACETAMINOPHEN 650 MG/1
325 SUPPOSITORY RECTAL EVERY 6 HOURS PRN
Status: DISCONTINUED | OUTPATIENT
Start: 2020-08-08 | End: 2020-08-26

## 2020-08-08 RX ADMIN — ACETAMINOPHEN 325 MG: 650 SUPPOSITORY RECTAL at 20:50

## 2020-08-08 RX ADMIN — ACETAMINOPHEN 325 MG: 650 SUPPOSITORY RECTAL at 03:20

## 2020-08-08 RX ADMIN — CEFEPIME HYDROCHLORIDE 2000 MG: 2 INJECTION, POWDER, FOR SOLUTION INTRAVENOUS at 05:23

## 2020-08-08 RX ADMIN — ACETAMINOPHEN 325 MG: 650 SUPPOSITORY RECTAL at 10:11

## 2020-08-08 RX ADMIN — CEFEPIME HYDROCHLORIDE 2000 MG: 2 INJECTION, POWDER, FOR SOLUTION INTRAVENOUS at 13:34

## 2020-08-08 RX ADMIN — CEFEPIME HYDROCHLORIDE 2000 MG: 2 INJECTION, POWDER, FOR SOLUTION INTRAVENOUS at 21:57

## 2020-08-08 NOTE — PROGRESS NOTES
Daily Progress Note - Critical Care   Yasmin Valencia 69 y o  male MRN: [id number]  Unit/Bed#: ICU 12 Encounter: [id number]        ----------------------------------------------------------------------------------------  HPI/24hr events:  shunt explantation with neurosurgery yesterday    ---------------------------------------------------------------------------------------  SUBJECTIVE  Unable to provide    Review of Systems  Review of systems was unable to be performed secondary to mental status  ---------------------------------------------------------------------------------------  Assessment and Plan:    Neuro:    Hx of NPH with  shunt infection, 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  o Trend neuro exam, antibiotics as below  Frequent neuro checks  - Daily CAM-ICU, delirium precautions  Regulate sleep/wake cycle   Acute post-operative pain  o Prn tylenol, oxycodone  Discontinue dilaudid given mental status      CV:    SMA stenosis  o No AC per vascular surgery    HTN  o Holding home antihypertensives      Pulm:   ZHOU  o Home CPAP      GI:    SMA stenosis, bowel ischemia s/p ex lap  o Surgery following, appreciate recommendations  Advance diet as tolerated, if unable to tolerate PO today will reinsert NGT      :    Monitor I/Os      F/E/N:    Isolyte at 75cc/hr   Replete electrolytes as needed for goal Mag > 2 0, Phos >3 0, K >4 0   NPO, advance diet as tolerated  Had been tolerating regular diet prior to surgery yesterday      Heme/Onc:    No active issues  Restart SQH  Endo:    DM2  o SSI      ID:     shunt infection with pseudomonas  o Cefepime x 6 weeks per ID, PICC in place   Monitor fever curve/white count      MSK/Skin:    PT/OT   Will need rehab placement      Disposition: Continue Critical Care   Code Status: Level 1 - Full Code  ---------------------------------------------------------------------------------------  ICU CORE MEASURES    Prophylaxis   VTE Pharmacologic Prophylaxis: Heparin  VTE Mechanical Prophylaxis: sequential compression device  Stress Ulcer Prophylaxis: Prophylaxis Not Indicated     Invasive Devices Review  Invasive Devices     Peripherally Inserted Central Catheter Line            PICC Line 76/10/73 Left Basilic 1 day              Can any invasive devices be discontinued today? Not applicable  ---------------------------------------------------------------------------------------  OBJECTIVE    Vitals   Vitals:    20 0335 20 0400 20 0500 20 0600   BP:  112/55 120/58 119/59   Pulse:  72 72 60   Resp:  (!) 25 (!) 24 (!) 23   Temp:  (!) 101 3 °F (38 5 °C)     TempSrc:  Axillary     SpO2: 97% 98% 98% 100%   Weight:    125 kg (274 lb 11 1 oz)   Height:         Temp (24hrs), Av 9 °F (37 2 °C), Min:97 3 °F (36 3 °C), Max:101 8 °F (38 8 °C)  Current: Temperature: (!) 101 3 °F (38 5 °C)      Respiratory:  SpO2: SpO2: 100 %  Nasal Cannula O2 Flow Rate (L/min): 2 L/min    Invasive/non-invasive ventilation settings   Respiratory    Lab Data (Last 4 hours)    None         O2/Vent Data (Last 4 hours)    None                Physical Exam  Constitutional:       General: He is not in acute distress  Appearance: He is not diaphoretic  Comments: CPAP in place   HENT:      Head: Normocephalic and atraumatic  Eyes:      Pupils: Pupils are equal, round, and reactive to light  Comments: PERRL   Neck:      Musculoskeletal: Neck supple  Cardiovascular:      Rate and Rhythm: Normal rate and regular rhythm  Heart sounds: Normal heart sounds  Pulmonary:      Effort: Pulmonary effort is normal       Breath sounds: Normal breath sounds  No decreased breath sounds, wheezing, rhonchi or rales  Abdominal:      Palpations: Abdomen is soft  Tenderness: There is no abdominal tenderness  Comments: Midline incision C/D/I with staples  Small rash around incision   Skin:     General: Skin is warm     Neurological:      Mental Status: He is lethargic  GCS: GCS eye subscore is 3  GCS verbal subscore is 1  GCS motor subscore is 4           Laboratory and Diagnostics:  Results from last 7 days   Lab Units 08/08/20  0523 08/07/20  0506 08/06/20  0536 08/04/20  0426 08/03/20  0609 08/02/20  0533 08/01/20  1406   WBC Thousand/uL 15 46* 12 30* 11 94* 8 51 8 97 10 89* 13 00*   HEMOGLOBIN g/dL 11 2* 10 9* 11 2* 10 7* 10 9* 10 8* 12 7   HEMATOCRIT % 35 1* 33 9* 35 7* 33 5* 33 1* 33 5* 39 7   PLATELETS Thousands/uL 316 271 254 241 229 218 236   NEUTROS PCT %  --   --   --   --   --  82*  --    MONOS PCT %  --   --   --   --   --  9  --    MONO PCT %  --   --   --  3*  --   --   --      Results from last 7 days   Lab Units 08/08/20  0523 08/07/20  0506 08/06/20  0536 08/04/20  0426 08/03/20  0609 08/02/20  0533 08/01/20  1406   SODIUM mmol/L 138 142 141 142 141 143 141   POTASSIUM mmol/L 4 0 3 9 3 6 3 4* 3 7 4 2 4 2   CHLORIDE mmol/L 106 110* 109* 111* 112* 113* 111*   CO2 mmol/L 28 27 25 27 26 23 22   ANION GAP mmol/L 4 5 7 4 3* 7 8   BUN mg/dL 12 13 15 19 24 26* 30*   CREATININE mg/dL 0 73 0 69 0 67 0 72 0 86 0 97 0 99   CALCIUM mg/dL 8 2* 8 3 7 8* 7 9* 8 0* 7 8* 6 9*   GLUCOSE RANDOM mg/dL 126 118 125 126 121 141* 150*   ALT U/L  --   --   --   --   --  16  --    AST U/L  --   --   --   --   --  24  --    ALK PHOS U/L  --   --   --   --   --  54  --    ALBUMIN g/dL  --   --   --   --   --  1 8*  --    TOTAL BILIRUBIN mg/dL  --   --   --   --   --  0 54  --      Results from last 7 days   Lab Units 08/08/20  0523 08/07/20  0506 08/06/20  0536 08/04/20  0426 08/03/20  0609 08/02/20  0533   MAGNESIUM mg/dL 2 2 2 3 2 4 2 2 2 3 2 4   PHOSPHORUS mg/dL 3 2 2 7 2 9 2 0* 1 7* 1 7*      Results from last 7 days   Lab Units 08/06/20  1945 08/02/20  0533 08/01/20 2118 08/01/20  1406   INR  1 10  --   --   --    PTT seconds 32 140* 141* 38*          Results from last 7 days   Lab Units 08/02/20  0533 08/01/20 2118 08/01/20  1406   LACTIC ACID mmol/L 1 2 1 7 2 0 ABG:    VBG:          Micro  Results from last 7 days   Lab Units 08/04/20  1318 08/03/20  1738 08/03/20  0845   GRAM STAIN RESULT  1+ Polys*  4+ Gram negative rods* No polys seen*  3+ Gram negative rods*  Smear reviewed by Microbiology staff* No Polys or Bacteria seen         Imaging: No new imaging I have personally reviewed pertinent reports  Intake and Output  I/O       08/06 0701 - 08/07 0700 08/07 0701 - 08/08 0700    P  O  560     I V  (mL/kg) 50 (0 4) 690 (5 5)    IV Piggyback 130 350    Total Intake(mL/kg) 740 (5 9) 1040 (8 3)    Urine (mL/kg/hr) 1173 (0 4) 360 (0 1)    Drains 185 61    Stool 100 0    Total Output 1458 421    Net -718 +619          Unmeasured Urine Occurrence 1 x 4 x    Unmeasured Stool Occurrence 1 x 6 x          Height and Weights   Height: 6' 3" (190 5 cm)  IBW: 84 5 kg  Body mass index is 34 33 kg/m²  Weight (last 2 days)     Date/Time   Weight    08/08/20 0600   125 (274 69)    08/07/20 0600   126 (278 22)    08/06/20 0549   125 (276 24)                Nutrition       Diet Orders   (From admission, onward)             Start     Ordered    08/08/20 0740  Diet NPO  Diet effective now     Question Answer Comment   Diet Type NPO    RD to adjust diet per protocol?  No        08/08/20 0740                  Active Medications  Scheduled Meds:  Current Facility-Administered Medications   Medication Dose Route Frequency Provider Last Rate    acetaminophen  325 mg Rectal Q6H PRN Seb Javier MD      acetaminophen  650 mg Oral Q6H PRN Bradford Perez PA-C      cefepime  2,000 mg Intravenous Q8H Bradford Perez PA-C 2,000 mg (08/08/20 0523)    insulin lispro  2-12 Units Subcutaneous 4x Daily (AC & HS) Bradford Perez PA-C      iodixanol  15 mL Intravenous Once in imaging Bradford Perez PA-C      multi-electrolyte  75 mL/hr Intravenous Continuous Seb Javier MD 75 mL/hr (08/07/20 1937)    oxyCODONE  5 mg Oral Q4H PRN Bradford Perez PA-C       Continuous Infusions:  multi-electrolyte, 75 mL/hr, Last Rate: 75 mL/hr (08/07/20 1937)      PRN Meds:   acetaminophen, 325 mg, Q6H PRN  acetaminophen, 650 mg, Q6H PRN  iodixanol, 15 mL, Once in imaging  oxyCODONE, 5 mg, Q4H PRN        Allergies   Allergies   Allergen Reactions    Pollen Extract Allergic Rhinitis     ---------------------------------------------------------------------------------------  Advance Directive and Living Will:      Power of :    POLST:    ---------------------------------------------------------------------------------------  Care Time Delivered:   No Critical Care time spent     Nicholas Blevins PA-C

## 2020-08-08 NOTE — PROGRESS NOTES
Progress Note - Neurosurgery   Aba Li 69 y o  male MRN: [id number]  Unit/Bed#: ICU 12 Encounter: [id number]    Assessment/Plan:    · POD #1 from Procedure(s): REMOVAL SHUNT VENTRICULAR PERITONEAL  · No active neurosurgical issues  · CT head with GCS decrease of 2 pts or more  · Continue Abx management per ID  · Plan for POV at 2 weeks for incision checks  · Call with questions      Subjective:     CPAP  Objective:     Vitals: Blood pressure 122/61, pulse 60, temperature (!) 101 3 °F (38 5 °C), temperature source Axillary, resp  rate 21, height 6' 3" (1 905 m), weight 125 kg (274 lb 11 1 oz), SpO2 98 %  ,Body mass index is 34 33 kg/m²      GCS 12  OE to voice  NAD  CDI  AVERY non-focal

## 2020-08-08 NOTE — PROGRESS NOTES
Progress Note - General Surgery   Lexi Portillo 69 y o  male MRN: [id number]  Unit/Bed#: ICU 12 Encounter: [id number]    Assessment:  69 M status post ex-lap for DELIA,  shunt externalization, ABthera VAC, mesenteric angiogram and papaverine infusion, subsequent second look laparotomy, abdominal wall closure, sheath removal, and subsequent shunt repositioning and removal    Plan:  Diet as tolerated  Discontinue rectal tube  Continue antibiotics  Will discuss neurosurgical plan     Subjective/Objective     Subjective: Febrile overnight  Mental status somewhat somnolent  Rising leukocytosis    Objective:    Blood pressure 122/61, pulse 60, temperature (!) 101 3 °F (38 5 °C), temperature source Axillary, resp  rate 21, height 6' 3" (1 905 m), weight 125 kg (274 lb 11 1 oz), SpO2 98 %  ,Body mass index is 34 33 kg/m²        Intake/Output Summary (Last 24 hours) at 8/8/2020 0826  Last data filed at 8/8/2020 0601  Gross per 24 hour   Intake 1110 ml   Output 421 ml   Net 689 ml       Invasive Devices     Peripherally Inserted Central Catheter Line            PICC Line 45/33/02 Left Basilic 1 day                Physical Exam:   General: NAD, difficult to arouse  Eyes: open to voice  ENT: moist mucous membranes  Neck: supple  CV: RRR +S1/S2  Chest: respirations non-labored  Abdomen: soft, NT ND, incision c/d/i  Mild maculopapular rash L flank, abdomen  Extremities: atraumatic, no edema      Results from last 7 days   Lab Units 08/08/20  0523 08/07/20  0506 08/06/20  0536   WBC Thousand/uL 15 46* 12 30* 11 94*   HEMOGLOBIN g/dL 11 2* 10 9* 11 2*   HEMATOCRIT % 35 1* 33 9* 35 7*   PLATELETS Thousands/uL 316 271 254     Results from last 7 days   Lab Units 08/08/20  0523 08/07/20  0506 08/06/20  0536   POTASSIUM mmol/L 4 0 3 9 3 6   CHLORIDE mmol/L 106 110* 109*   CO2 mmol/L 28 27 25   BUN mg/dL 12 13 15   CREATININE mg/dL 0 73 0 69 0 67   CALCIUM mg/dL 8 2* 8 3 7 8*     Results from last 7 days   Lab Units 08/06/20  1945 08/02/20  0533 08/01/20  2118   INR  1 10  --   --    PTT seconds 32 140* 141*

## 2020-08-09 ENCOUNTER — APPOINTMENT (INPATIENT)
Dept: RADIOLOGY | Facility: HOSPITAL | Age: 70
DRG: 031 | End: 2020-08-09
Payer: COMMERCIAL

## 2020-08-09 LAB
ANION GAP SERPL CALCULATED.3IONS-SCNC: 7 MMOL/L (ref 4–13)
BASOPHILS # BLD AUTO: 0.08 THOUSANDS/ΜL (ref 0–0.1)
BASOPHILS NFR BLD AUTO: 0 % (ref 0–1)
BUN SERPL-MCNC: 14 MG/DL (ref 5–25)
CALCIUM SERPL-MCNC: 7.9 MG/DL (ref 8.3–10.1)
CHLORIDE SERPL-SCNC: 102 MMOL/L (ref 100–108)
CO2 SERPL-SCNC: 26 MMOL/L (ref 21–32)
CREAT SERPL-MCNC: 0.66 MG/DL (ref 0.6–1.3)
EOSINOPHIL # BLD AUTO: 0.1 THOUSAND/ΜL (ref 0–0.61)
EOSINOPHIL NFR BLD AUTO: 1 % (ref 0–6)
ERYTHROCYTE [DISTWIDTH] IN BLOOD BY AUTOMATED COUNT: 14.6 % (ref 11.6–15.1)
GFR SERPL CREATININE-BSD FRML MDRD: 97 ML/MIN/1.73SQ M
GLUCOSE SERPL-MCNC: 103 MG/DL (ref 65–140)
GLUCOSE SERPL-MCNC: 114 MG/DL (ref 65–140)
GLUCOSE SERPL-MCNC: 117 MG/DL (ref 65–140)
GLUCOSE SERPL-MCNC: 90 MG/DL (ref 65–140)
HCT VFR BLD AUTO: 35.3 % (ref 36.5–49.3)
HGB BLD-MCNC: 11.4 G/DL (ref 12–17)
IMM GRANULOCYTES # BLD AUTO: 0.43 THOUSAND/UL (ref 0–0.2)
IMM GRANULOCYTES NFR BLD AUTO: 2 % (ref 0–2)
LYMPHOCYTES # BLD AUTO: 0.69 THOUSANDS/ΜL (ref 0.6–4.47)
LYMPHOCYTES NFR BLD AUTO: 4 % (ref 14–44)
MAGNESIUM SERPL-MCNC: 2.4 MG/DL (ref 1.6–2.6)
MCH RBC QN AUTO: 27.9 PG (ref 26.8–34.3)
MCHC RBC AUTO-ENTMCNC: 32.3 G/DL (ref 31.4–37.4)
MCV RBC AUTO: 87 FL (ref 82–98)
MONOCYTES # BLD AUTO: 0.97 THOUSAND/ΜL (ref 0.17–1.22)
MONOCYTES NFR BLD AUTO: 5 % (ref 4–12)
NEUTROPHILS # BLD AUTO: 15.57 THOUSANDS/ΜL (ref 1.85–7.62)
NEUTS SEG NFR BLD AUTO: 88 % (ref 43–75)
NRBC BLD AUTO-RTO: 0 /100 WBCS
PHOSPHATE SERPL-MCNC: 3 MG/DL (ref 2.3–4.1)
PLATELET # BLD AUTO: 294 THOUSANDS/UL (ref 149–390)
PMV BLD AUTO: 10 FL (ref 8.9–12.7)
POTASSIUM SERPL-SCNC: 4.2 MMOL/L (ref 3.5–5.3)
RBC # BLD AUTO: 4.08 MILLION/UL (ref 3.88–5.62)
SODIUM SERPL-SCNC: 135 MMOL/L (ref 136–145)
WBC # BLD AUTO: 17.84 THOUSAND/UL (ref 4.31–10.16)

## 2020-08-09 PROCEDURE — 82948 REAGENT STRIP/BLOOD GLUCOSE: CPT

## 2020-08-09 PROCEDURE — 80048 BASIC METABOLIC PNL TOTAL CA: CPT | Performed by: PHYSICIAN ASSISTANT

## 2020-08-09 PROCEDURE — 84100 ASSAY OF PHOSPHORUS: CPT | Performed by: PHYSICIAN ASSISTANT

## 2020-08-09 PROCEDURE — 94760 N-INVAS EAR/PLS OXIMETRY 1: CPT

## 2020-08-09 PROCEDURE — 70450 CT HEAD/BRAIN W/O DYE: CPT

## 2020-08-09 PROCEDURE — 83735 ASSAY OF MAGNESIUM: CPT | Performed by: PHYSICIAN ASSISTANT

## 2020-08-09 PROCEDURE — G1004 CDSM NDSC: HCPCS

## 2020-08-09 PROCEDURE — 92610 EVALUATE SWALLOWING FUNCTION: CPT

## 2020-08-09 PROCEDURE — NC001 PR NO CHARGE: Performed by: STUDENT IN AN ORGANIZED HEALTH CARE EDUCATION/TRAINING PROGRAM

## 2020-08-09 PROCEDURE — 99233 SBSQ HOSP IP/OBS HIGH 50: CPT | Performed by: SURGERY

## 2020-08-09 PROCEDURE — 85025 COMPLETE CBC W/AUTO DIFF WBC: CPT | Performed by: PHYSICIAN ASSISTANT

## 2020-08-09 RX ORDER — DIAPER,BRIEF,INFANT-TODD,DISP
EACH MISCELLANEOUS 4 TIMES DAILY PRN
Status: DISCONTINUED | OUTPATIENT
Start: 2020-08-09 | End: 2020-08-29 | Stop reason: HOSPADM

## 2020-08-09 RX ORDER — DIPHENHYDRAMINE HYDROCHLORIDE 50 MG/ML
25 INJECTION INTRAMUSCULAR; INTRAVENOUS EVERY 6 HOURS PRN
Status: DISCONTINUED | OUTPATIENT
Start: 2020-08-09 | End: 2020-08-26

## 2020-08-09 RX ORDER — DIPHENHYDRAMINE HYDROCHLORIDE, ZINC ACETATE 2; .1 G/100G; G/100G
CREAM TOPICAL 3 TIMES DAILY PRN
Status: DISCONTINUED | OUTPATIENT
Start: 2020-08-09 | End: 2020-08-09

## 2020-08-09 RX ADMIN — CEFEPIME HYDROCHLORIDE 2000 MG: 2 INJECTION, POWDER, FOR SOLUTION INTRAVENOUS at 06:03

## 2020-08-09 RX ADMIN — CEFEPIME HYDROCHLORIDE 2000 MG: 2 INJECTION, POWDER, FOR SOLUTION INTRAVENOUS at 14:00

## 2020-08-09 RX ADMIN — DIPHENHYDRAMINE HYDROCHLORIDE, ZINC ACETATE 1 APPLICATION: 2; .1 CREAM TOPICAL at 11:32

## 2020-08-09 RX ADMIN — DIPHENHYDRAMINE HYDROCHLORIDE 25 MG: 50 INJECTION, SOLUTION INTRAMUSCULAR; INTRAVENOUS at 22:25

## 2020-08-09 RX ADMIN — CEFEPIME HYDROCHLORIDE 2000 MG: 2 INJECTION, POWDER, FOR SOLUTION INTRAVENOUS at 22:25

## 2020-08-09 NOTE — PLAN OF CARE
Problem: Potential for Falls  Goal: Patient will remain free of falls  Description: INTERVENTIONS:  - Assess patient frequently for physical needs  -  Identify cognitive and physical deficits and behaviors that affect risk of falls    -  [place] fall precautions as indicated by assessment   - Educate patient/family on patient safety including physical limitations  - Instruct patient to call for assistance with activity based on assessment  - Modify environment to reduce risk of injury  - Consider OT/PT consult to assist with strengthening/mobility  Outcome: Progressing     Problem: Prexisting or High Potential for Compromised Skin Integrity  Goal: Skin integrity is maintained or improved  Description: INTERVENTIONS:  - Identify patients at risk for skin breakdown  - Assess and monitor skin integrity  - Assess and monitor nutrition and hydration status  - Monitor labs   - Assess for incontinence   - Turn and reposition patient  - Assist with mobility/ambulation  - Relieve pressure over bony prominences  - Avoid friction and shearing  - Provide appropriate hygiene as needed including keeping skin clean and dry  - Evaluate need for skin moisturizer/barrier cream  - Collaborate with interdisciplinary team   - Patient/family teaching  - Consider wound care consult   Outcome: Progressing     Problem: PAIN - ADULT  Goal: Verbalizes/displays adequate comfort level or baseline comfort level  Description: Interventions:  - Encourage patient to monitor pain and request assistance  - Assess pain using appropriate pain scale  - Administer analgesics based on type and severity of pain and evaluate response  - Implement non-pharmacological measures as appropriate and evaluate response  - Consider cultural and social influences on pain and pain management  - Notify physician/advanced practitioner if interventions unsuccessful or patient reports new pain  Outcome: Progressing     Problem: SAFETY ADULT  Goal: Maintain or return to baseline ADL function  Description: INTERVENTIONS:  -  Assess patient's ability to carry out ADLs; assess patient's baseline for ADL function and identify physical deficits which impact ability to perform ADLs (bathing, care of mouth/teeth, toileting, grooming, dressing, etc )  - Assess/evaluate cause of self-care deficits   - Assess range of motion  - Assess patient's mobility; develop plan if impaired  - Assess patient's need for assistive devices and provide as appropriate  - Encourage maximum independence but intervene and supervise when necessary  - Involve family in performance of ADLs  - Assess for home care needs following discharge   - Consider OT consult to assist with ADL evaluation and planning for discharge  - Provide patient education as appropriate  Outcome: Progressing  Goal: Maintain or return mobility status to optimal level  Description: INTERVENTIONS:  - Assess patient's baseline mobility status (ambulation, transfers, stairs, etc )    - Identify cognitive and physical deficits and behaviors that affect mobility  - Identify mobility aids required to assist with transfers and/or ambulation (gait belt, sit-to-stand, lift, walker, cane, etc )  - [place] fall precautions as indicated by assessment  - Record patient progress and toleration of activity level on Mobility SBAR; progress patient to next Phase/Stage  - Instruct patient to call for assistance with activity based on assessment  - Consider rehabilitation consult to assist with strengthening/weightbearing, etc   Outcome: Progressing     Problem: GASTROINTESTINAL - ADULT  Goal: Minimal or absence of nausea and/or vomiting  Description: INTERVENTIONS:  - Administer IV fluids if ordered to ensure adequate hydration  - Maintain NPO status until nausea and vomiting are resolved  - Nasogastric tube if ordered  - Administer ordered antiemetic medications as needed  - Provide nonpharmacologic comfort measures as appropriate  - Advance diet as tolerated, if ordered  - Consider nutrition services referral to assist patient with adequate nutrition and appropriate food choices  Outcome: Progressing  Goal: Maintains or returns to baseline bowel function  Description: INTERVENTIONS:  - Assess bowel function  - Encourage oral fluids to ensure adequate hydration  - Administer IV fluids if ordered to ensure adequate hydration  - Administer ordered medications as needed  - Encourage mobilization and activity  - Consider nutritional services referral to assist patient with adequate nutrition and appropriate food choices  Outcome: Progressing  Goal: Maintains adequate nutritional intake  Description: INTERVENTIONS:  - Monitor percentage of each meal consumed  - Identify factors contributing to decreased intake, treat as appropriate  - Assist with meals as needed  - Monitor I&O, weight, and lab values if indicated  - Obtain nutrition services referral as needed  Outcome: Progressing     Problem: GENITOURINARY - ADULT  Goal: Maintains or returns to baseline urinary function  Description: INTERVENTIONS:  - Assess urinary function  - Encourage oral fluids to ensure adequate hydration if ordered  - Administer IV fluids as ordered to ensure adequate hydration  - Administer ordered medications as needed  - Offer frequent toileting  - Follow urinary retention protocol if ordered  Outcome: Progressing  Goal: Absence of urinary retention  Description: INTERVENTIONS:  - Assess patients ability to void and empty bladder  - Monitor I/O  - Bladder scan as needed  - Discuss with physician/AP medications to alleviate retention as needed  - Discuss catheterization for long term situations as appropriate  Outcome: Progressing     Problem: RESPIRATORY - ADULT  Goal: Achieves optimal ventilation and oxygenation  Description: INTERVENTIONS:  - Assess for changes in respiratory status  - Assess for changes in mentation and behavior  - Position to facilitate oxygenation and minimize respiratory effort  - Oxygen administered by appropriate delivery if ordered  - Initiate smoking cessation education as indicated  - Encourage broncho-pulmonary hygiene including cough, deep breathe, Incentive Spirometry  - Assess the need for suctioning and aspirate as needed  - Assess and instruct to report SOB or any respiratory difficulty  - Respiratory Therapy support as indicated  Outcome: Progressing     Problem: HEMATOLOGIC - ADULT  Goal: Maintains hematologic stability  Description: INTERVENTIONS  - Assess for signs and symptoms of bleeding or hemorrhage  - Monitor labs  - Administer supportive blood products/factors as ordered and appropriate  Outcome: Progressing     Problem: CONFUSION/THOUGHT DISTURBANCE  Goal: Thought disturbances (confusion, delirium, depression, dementia or psychosis) are managed to maintain or return to baseline mental status and functional level  Description: INTERVENTIONS:  - Assess for possible contributors to  thought disturbance, including but not limited to medications, infection, impaired vision or hearing, underlying metabolic abnormalities, dehydration, respiratory compromise,  psychiatric diagnoses and notify attending PHYSICAN/AP  - Monitor and intervene to maintain adequate nutrition, hydration, elimination, sleep and activity  - Decrease environmental stimuli, including noise as appropriate  - Provide frequent contacts to provide refocusing, direction and reassurance as needed  Approach patient calmly with eye contact and at their level    - [place] high risk fall precautions, aspiration precautions and other safety measures, as indicated  - If delirium suspected, notify physician/AP of change in condition and request immediate in-person evaluation  - Pursue consults as appropriate including Geriatric (campus dependent), OT for cognitive evaluation/activity planning, psychiatric, pastoral care, etc   Outcome: Progressing     Problem: Nutrition/Hydration-ADULT  Goal: Nutrient/Hydration intake appropriate for improving, restoring or maintaining nutritional needs  Description: Monitor and assess patient's nutrition/hydration status for malnutrition  Collaborate with interdisciplinary team and initiate plan and interventions as ordered  Monitor patient's weight and dietary intake as ordered or per policy  Utilize nutrition screening tool and intervene as necessary  Determine patient's food preferences and provide high-protein, high-caloric foods as appropriate       INTERVENTIONS:  - Monitor oral intake, urinary output, labs, and treatment plans  - Assess nutrition and hydration status and recommend course of action  - Evaluate amount of meals eaten  - Assist patient with eating if necessary   - Allow adequate time for meals  - Recommend/ encourage appropriate diets, oral nutritional supplements, and vitamin/mineral supplements  - Order, calculate, and assess calorie counts as needed  - Recommend, monitor, and adjust tube feedings and TPN/PPN based on assessed needs  - Assess need for intravenous fluids  - Provide specific nutrition/hydration education as appropriate  - Include patient/family/caregiver in decisions related to nutrition  Outcome: Progressing

## 2020-08-09 NOTE — PROGRESS NOTES
Progress Note - General Surgery   Khloe Rangel 69 y o  male MRN: [id number]  Unit/Bed#: ICU 12 Encounter: [id number]    Assessment:  69 M s/p ex-lap for DELIA,  shunt externalization, ABthera, mesenteric angiogram and papaverine infusion with subsequent second look laparotomy, abdominal wall closure, and subsequent ventriculostomy removal    Plan:  Diet as tolerated  Continue antibiotics, consider switching/re-broadening  CT CAP today  DVT prophylaxis  Supportive care per ICU    Subjective/Objective     Subjective: No acute events overnight  Objective:    Blood pressure 126/58, pulse (!) 52, temperature 99 1 °F (37 3 °C), temperature source Axillary, resp  rate 19, height 6' 3" (1 905 m), weight 125 kg (274 lb 11 1 oz), SpO2 96 %  ,Body mass index is 34 33 kg/m²        Intake/Output Summary (Last 24 hours) at 8/9/2020 0658  Last data filed at 8/9/2020 8368  Gross per 24 hour   Intake 1948 75 ml   Output    Net 1948 75 ml       Invasive Devices     Peripherally Inserted Central Catheter Line            PICC Line 20/84/38 Left Basilic 2 days                Physical Exam:   General: NAD, arousable  Eyes: opens to voice  ENT: moist mucous membranes  Neck: supple  CV: RRR +S1/S2  Chest: respirations non-labored  Abdomen: soft, NT ND, incision c/d/i  Maculopapular rash, mid-abdomen and flank  Extremities: atraumatic      Results from last 7 days   Lab Units 08/09/20  0603 08/08/20  0523 08/07/20  0506   WBC Thousand/uL 17 84* 15 46* 12 30*   HEMOGLOBIN g/dL 11 4* 11 2* 10 9*   HEMATOCRIT % 35 3* 35 1* 33 9*   PLATELETS Thousands/uL 294 316 271     Results from last 7 days   Lab Units 08/08/20  0523 08/07/20  0506 08/06/20  0536   POTASSIUM mmol/L 4 0 3 9 3 6   CHLORIDE mmol/L 106 110* 109*   CO2 mmol/L 28 27 25   BUN mg/dL 12 13 15   CREATININE mg/dL 0 73 0 69 0 67   CALCIUM mg/dL 8 2* 8 3 7 8*     Results from last 7 days   Lab Units 08/06/20  1945   INR  1 10   PTT seconds 32

## 2020-08-09 NOTE — PROGRESS NOTES
Daily Progress Note - Critical Care   Cyrus Mcburney 69 y o  male MRN: [id number]  Unit/Bed#: ICU 12 Encounter: [id number]        ----------------------------------------------------------------------------------------  HPI/24hr events: No acute overnight events  Remains lethargic  Fever of 102 1 overnight   ---------------------------------------------------------------------------------------  SUBJECTIVE  Unable to provide    Review of Systems  Review of systems was unable to be performed secondary to altered mental status  ---------------------------------------------------------------------------------------  Assessment and Plan:    Neuro:    Hx of NPH with  shunt infection, POD 2 s/p removal  o Trend neuro exam, mildly improved today  Frequent neuro checks  Daily CAM-ICU, delirium precautions  Regulate sleep/wake cycle  Consider CT head if plan for CT C/A/P per general surgery   Acute post-operative pain  o Prn tylenol and oxycodone      CV:    SMA stenosis  o No AC per vascular surgery   HTN  o Holding home antihypertensives        Pulm:   ZHOU  o Home CPAP      GI:    SMA stenosis, bowel ischemia s/p ex lap  o Surgery following, appreciate recommendations  Advance diet as tolerated  If unable to tolerate PO today would insert NGT      :    Monitor I/Os      F/E/N:    Isolyte at 75cc/hr while NPO   Replete electrolytes as needed for goal Mag > 2 0, Phos >3 0, K >4 0   NPO - speech/swallow today       Heme/Onc:    No active issues  Consider restarting SQH today       Endo:    DM2  o SSI      ID:     shunt infection with pseudomonas  o Cefepime x 6 weeks per ID, PICC in place  Fevers increased over past 24 hours, white count up this morning  Re-engage ID  Appreciate recommendations         MSK/Skin:    Frequent turns/repositioning   PT/OT   Will need rehab placement      Disposition: Continue Critical Care   Code Status: Level 1 - Full Code  ---------------------------------------------------------------------------------------  ICU CORE MEASURES    Prophylaxis   VTE Pharmacologic Prophylaxis: On hold from  shunt explantation  VTE Mechanical Prophylaxis: sequential compression device  Stress Ulcer Prophylaxis: Prophylaxis Not Indicated     Invasive Devices Review  Invasive Devices     Peripherally Inserted Central Catheter Line            PICC Line  Left Basilic 2 days              Can any invasive devices be discontinued today? Not applicable  ---------------------------------------------------------------------------------------  OBJECTIVE    Vitals   Vitals:    20 0310 20 0400 20 0500 20 0600   BP:  117/59 113/55 126/58   Pulse:  (!) 54 (!) 50 (!) 52   Resp:  18 20 19   Temp:  99 1 °F (37 3 °C)     TempSrc:  Axillary     SpO2: 96% 99% 99% 96%   Weight:       Height:         Temp (24hrs), Av 7 °F (38 2 °C), Min:99 1 °F (37 3 °C), Max:102 1 °F (38 9 °C)  Current: Temperature: 99 1 °F (37 3 °C)    Respiratory:  SpO2: SpO2: 96 %  Nasal Cannula O2 Flow Rate (L/min): 2 L/min    Invasive/non-invasive ventilation settings   Respiratory    Lab Data (Last 4 hours)    None         O2/Vent Data (Last 4 hours)    None                Physical Exam  Constitutional:       General: He is sleeping  He is not in acute distress  Appearance: He is not diaphoretic  Comments: CPAP in place   HENT:      Head: Normocephalic and atraumatic  Eyes:      Pupils: Pupils are equal, round, and reactive to light  Neck:      Musculoskeletal: Neck supple  Cardiovascular:      Rate and Rhythm: Regular rhythm  Bradycardia present  Heart sounds: Normal heart sounds  Heart sounds not distant  No murmur  No friction rub  No gallop  Pulmonary:      Effort: Pulmonary effort is normal  No tachypnea  Breath sounds: Normal breath sounds  No decreased breath sounds, wheezing, rhonchi or rales     Abdominal:      Palpations: Abdomen is soft  Comments: Midline incision with staples C/D/I   Musculoskeletal:      Right lower le+ Edema present  Left lower le+ Edema present  Skin:     General: Skin is warm and dry  Neurological:      Mental Status: He is easily aroused  GCS: GCS eye subscore is 3  GCS verbal subscore is 1  GCS motor subscore is 6  Comments: Weakly follows commands in all four extremities  Opens eyes to voice            Laboratory and Diagnostics:  Results from last 7 days   Lab Units 20  0603 20  0523 20  0506 20  0536 20  0426 20  0609   WBC Thousand/uL 17 84* 15 46* 12 30* 11 94* 8 51 8 97   HEMOGLOBIN g/dL 11 4* 11 2* 10 9* 11 2* 10 7* 10 9*   HEMATOCRIT % 35 3* 35 1* 33 9* 35 7* 33 5* 33 1*   PLATELETS Thousands/uL 294 316 271 254 241 229   NEUTROS PCT % 88*  --   --   --   --   --    MONOS PCT % 5  --   --   --   --   --    MONO PCT %  --   --   --   --  3*  --      Results from last 7 days   Lab Units 20  0603 20  0523 20  0506 20  0536 20  0426 20  0609   SODIUM mmol/L 135* 138 142 141 142 141   POTASSIUM mmol/L 4 2 4 0 3 9 3 6 3 4* 3 7   CHLORIDE mmol/L 102 106 110* 109* 111* 112*   CO2 mmol/L 26 28 27 25 27 26   ANION GAP mmol/L 7 4 5 7 4 3*   BUN mg/dL 14 12 13 15 19 24   CREATININE mg/dL 0 66 0 73 0 69 0 67 0 72 0 86   CALCIUM mg/dL 7 9* 8 2* 8 3 7 8* 7 9* 8 0*   GLUCOSE RANDOM mg/dL 114 126 118 125 126 121     Results from last 7 days   Lab Units 20  0603 20  0523 20  0506 20  0536 20  0426 20  0609   MAGNESIUM mg/dL 2 4 2 2 2 3 2 4 2 2 2 3   PHOSPHORUS mg/dL 3 0 3 2 2 7 2 9 2 0* 1 7*      Results from last 7 days   Lab Units 20  1945   INR  1 10   PTT seconds 32              ABG:    VBG:          Micro  Results from last 7 days   Lab Units 20  1318 20  1738 20  0845   GRAM STAIN RESULT  1+ Polys*  4+ Gram negative rods* No polys seen*  3+ Gram negative rods*  Smear reviewed by Microbiology staff* No Polys or Bacteria seen         Imaging: No new imaging I have personally reviewed pertinent reports  Intake and Output  I/O       08/07 0701 - 08/08 0700 08/08 0701 - 08/09 0700    I V  (mL/kg) 840 (6 7) 1648 8 (13 2)    IV Piggyback 350 100    Total Intake(mL/kg) 1190 (9 5) 1748 8 (14)    Urine (mL/kg/hr) 360 (0 1)     Drains 61     Stool 0     Total Output 421     Net +769 +1748 8          Unmeasured Urine Occurrence 5 x 7 x    Unmeasured Stool Occurrence 7 x 2 x          Height and Weights   Height: 6' 3" (190 5 cm)  IBW: 84 5 kg  Body mass index is 34 33 kg/m²  Weight (last 2 days)     Date/Time   Weight    08/08/20 0600   125 (274 69)    08/07/20 0600   126 (278 22)                Nutrition       Diet Orders   (From admission, onward)             Start     Ordered    08/08/20 0740  Diet NPO  Diet effective now     Question Answer Comment   Diet Type NPO    RD to adjust diet per protocol?  No        08/08/20 0740              Active Medications  Scheduled Meds:  Current Facility-Administered Medications   Medication Dose Route Frequency Provider Last Rate    acetaminophen  325 mg Rectal Q6H PRN Alexander Cabello MD      acetaminophen  650 mg Oral Q6H PRN Radha Fenton PA-C      cefepime  2,000 mg Intravenous Q8H Radha Fenton PA-C Stopped (08/09/20 4818)    insulin lispro  2-12 Units Subcutaneous 4x Daily (AC & HS) Radha Fenton PA-C      iodixanol  15 mL Intravenous Once in imaging Radha Fenton PA-C      multi-electrolyte  75 mL/hr Intravenous Continuous Alexander Cabello MD 75 mL/hr (08/07/20 1937)    oxyCODONE  5 mg Oral Q4H PRN Radha Fenton PA-C       Continuous Infusions:  multi-electrolyte, 75 mL/hr, Last Rate: 75 mL/hr (08/07/20 1937)      PRN Meds:   acetaminophen, 325 mg, Q6H PRN  acetaminophen, 650 mg, Q6H PRN  iodixanol, 15 mL, Once in imaging  oxyCODONE, 5 mg, Q4H PRN        Allergies   Allergies   Allergen Reactions    Pollen Extract Allergic Rhinitis     ---------------------------------------------------------------------------------------  Advance Directive and Living Will:      Power of :    POLST:    ---------------------------------------------------------------------------------------  Care Time Delivered:   No Critical Care time spent     Cr Clark PA-C

## 2020-08-09 NOTE — SPEECH THERAPY NOTE
Speech-Language Pathology Bedside Swallow Evaluation      Patient Name: Susannah Eric    Today's Date: 8/9/2020     Problem List  Principal Problem:    Acute superficial gastritis with hemorrhage  Active Problems:    Status post ventriculoperitoneal shunt    Unspecified abnormalities of gait and mobility    Type 2 diabetes mellitus ([facility] )    Essential hypertension    ZHOU on CPAP    Spondylolisthesis of lumbosacral region    Self-care deficit for hygiene    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[facility] )    Nonocclusive mesenteric ischemia (HCC)    Hydrocephalus ([facility] )      Past Medical History  Past Medical History:   Diagnosis Date    Cancer [facility]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     Diabetes mellitus ([facility] )     DM2 (diabetes mellitus, type 2) ([place] [address] )     Gout     HTN (hypertension)     Hydrocephalus ([place] [address] )     Hypertension     Neuropathy     ZHOU on CPAP     Pressure injury of skin     TIA (transient ischemic attack)        Past Surgical History  Past Surgical History:   Procedure Laterality Date    ABDOMINAL WALL SURGERY N/A 8/1/2020    Procedure: CLOSURE WOUND ABDOMINAL/TRUNK;  Surgeon: Lokesh Mejia DO;  [facility];  Service: General    ARTERIOGRAM N/A 7/31/2020    Procedure: ARTERIOGRAM Of SMA and placement of Papavarine onfusion arterial catheter;  Surgeon: Katerina Santoyo MD;  [facility];  Service: Vascular    ARTERIOGRAM Left 8/1/2020    Procedure: ARTERIOGRAM; cath removal;  Surgeon: Katerina Santoyo MD;  [facility];  Service: Vascular    CSF SHUNT      x3 Op Reports, Ashlee Choi in [facility] chart viewer    LAPAROTOMY N/A 7/31/2020    Procedure: LAPAROTOMY EXPLORATORY: Externalizes  shunt Denis Jones application;  Surgeon: Danita Berry MD;  [facility];  Service: General    LAPAROTOMY N/A 8/1/2020    Procedure: LAPAROTOMY EXPLORATORY,;  Surgeon: Lokesh Mejia DO;  [facility];  Service: General    KS AMPUTATION FOOT,TRANSMETATARSAL Left 5/30/2020    Procedure: excision 5th metatarsal head  excision 5th metatarsal ulcer ;  Surgeon: Antonette Chua DPM;  [facility];  Service: Podiatry    TN ARTHDSIS POST/POSTEROLATRL/POSTINTERBODY LUMBAR N/A 7/6/2020    Procedure: MINIMALLY INVASIVE TRANSVERSE LUMBAR INTERBODY FUSION L5-S1 FROM LEFT-SIDED APPROACH;  Surgeon: Reji Hyde MD;  [facility];  Service: Neurosurgery    TN REPLACEMENT/REVISION,CSF SHUNT Right 7/6/2020    Procedure: REVISION OF SCALP OVER VENTRICULAR CATHETER IN THE RIGHT CORONAL REGION;  Surgeon: Reji Hyde MD;  [facility];  Service: Neurosurgery    WOUND DEBRIDEMENT N/A 8/1/2020    Procedure: abd washout;  Surgeon: Reyna Paige DO;  [facility];  Service: General       Summary   Pt presented with no s/s oral/pharyngeal dysphagia with puree or thick clear liquids but with s/s suggestive of mild pharyngeal dysphagia with thin liquid  He was not assess with more textured food (given extent of lethargy and as he declined same)  Recommended Diet: puree/level 1 diet and nectar thick liquids   Recommended Form of Meds: whole with puree and crushed with puree   Aspiration precautions and swallowing strategies: upright posture, only feed when fully alert and slow rate of feeding        Current Medical Status  Nikole Nolasco is a 70 yo M with PMH significant for obesity with obstructive sleep apnea on CPAP, hypertension, gout, diabetes mellitus type 2, normal pressure hydrocephalus (s/p  shunt in 2005 with revision in July) and chronic back pain with spondylolisthesis of the lumbosacral region with spinal stenosis (s/p L5-S1 transverse lumbar interbody fusion and deformity correction at L5-S1 in July)  Patient was readmitted 7/30 with c/o vomiting blood    DX:   1  Acute Peritonitis four-day history of increasing abdominal pain  2  Acute gastritis  3  Ischemic proximal small bowel with peritonitis  4   Severe sepsis  5   shunt infection  6  Status post emergency ex lap Dr Rakan Head , General Surgery  7  SMA was spite patent time of surgery - explored with vascular surgery  8  Status post femoral artery and SMA artery cannulation for continued papaverine per  vascular surgery  9  NPH  10  Right-sided  shunt system since 2005, revision in 2011  11  8/4/20 Revision of R sided externalized shunt system:  Further repositioning at higher location in right anterior chest wall of externalized   Shunt System and Removal of distal descending 30 cm segment  of contaminated  shunt peritoneal tube  11  Self care deficits (SNF being pursued)    SLP Swallowing Evaluation requested at this time (and pt cleared for more than just clear liquid)     Current Precautions:  Fall  And Delirium    Allergies:  Pollen extract    Past medical history:  Please see H&P for details    Social/Education/Vocational Hx:  Pt lived with roommate [facility]  Swallow Information   Current Risks for Dysphagia & Aspiration: AMS  Current Diet: NPO   Baseline Diet: regular diet and thin liquids      Baseline Assessment   Behavior/Cognition: lethargic  Speech/Language Status: able to follow commands inconsistently, limited verbal output and minimal voicing when speech attempted  Patient Positioning: upright in bed  Pain Status/Interventions/Response to Interventions:  No report of or nonverbal indications of pain         Swallow Mechanism Exam  Labial and Facial: no gross asymmetry at rest but min volitional Lingual: minimal effort  Velum: symmetrical and with limited elevation with attempt at phonation  Dentition: limited dentition (some natural dentition on lower gum ridge, no upper dentition or dentures present  Vocal quality:weak, aphonic and dysphonic   Volitional Cough: unable to initiate volitional cough   Respiratory Status: on RA with baseline sat of 97%      Consistencies Assessed and Performance   Consistencies Administered: ice chips, thin liquids, nectar thick and honey thick and tsps of puree    Oral Stage: Appeared to be [facility] with limited textures and amounts    Bolus formation and transfer were functional with no significant oral residue noted  No overt s/s reduced oral control  Pharyngeal Stage: suspect mild impairment with limited textures and amounts    Swallow Mechanics:  Swallowing initiation appeared prompt  Laryngeal rise was palpated and judged to be within functional limits  No coughing, throat clearing, change in vocal quality or respiratory status noted today with puree, thick clear liq or limited ice chips  Cough x2 noted with 4ozs thin water    Esophageal Concerns: unknown at this time    Summary and Recommendations (see above)    Results Reviewed with: patient and RN     Treatment Recommended: yes min of 3x weekly     Patient Stated Goal: Unable to state at this time    Dysphagia LTG per SLP  -Patient will demonstrate optimum safety and efficacy of oral intake and swallowing function without overt s/sx of penetration or aspiration for the highest appropriate diet level       Short Term Goals:  -Pt will tolerate Dysphagia 1/pureed diet and nectar thick thin liquid with no significant s/s oral or pharyngeal dysphagia across 1-3 diagnostic session/s    -Patient will tolerate trials of upgraded food and/or liquid texture with no significant s/s of oral or pharyngeal dysphagia including aspiration across 1-3 diagnostic sessions for each upgrade    -

## 2020-08-10 ENCOUNTER — APPOINTMENT (INPATIENT)
Dept: RADIOLOGY | Facility: HOSPITAL | Age: 70
DRG: 031 | End: 2020-08-10
Attending: INTERNAL MEDICINE
Payer: COMMERCIAL

## 2020-08-10 LAB
ANION GAP SERPL CALCULATED.3IONS-SCNC: 6 MMOL/L (ref 4–13)
BASOPHILS # BLD AUTO: 0.09 THOUSANDS/ΜL (ref 0–0.1)
BASOPHILS NFR BLD AUTO: 0 % (ref 0–1)
BUN SERPL-MCNC: 19 MG/DL (ref 5–25)
CALCIUM SERPL-MCNC: 8 MG/DL (ref 8.3–10.1)
CHLORIDE SERPL-SCNC: 100 MMOL/L (ref 100–108)
CO2 SERPL-SCNC: 26 MMOL/L (ref 21–32)
CREAT SERPL-MCNC: 0.78 MG/DL (ref 0.6–1.3)
EOSINOPHIL # BLD AUTO: 0.17 THOUSAND/ΜL (ref 0–0.61)
EOSINOPHIL NFR BLD AUTO: 1 % (ref 0–6)
ERYTHROCYTE [DISTWIDTH] IN BLOOD BY AUTOMATED COUNT: 14.7 % (ref 11.6–15.1)
GFR SERPL CREATININE-BSD FRML MDRD: 91 ML/MIN/1.73SQ M
GLUCOSE SERPL-MCNC: 110 MG/DL (ref 65–140)
GLUCOSE SERPL-MCNC: 110 MG/DL (ref 65–140)
GLUCOSE SERPL-MCNC: 117 MG/DL (ref 65–140)
GLUCOSE SERPL-MCNC: 129 MG/DL (ref 65–140)
GLUCOSE SERPL-MCNC: 130 MG/DL (ref 65–140)
HCT VFR BLD AUTO: 38 % (ref 36.5–49.3)
HGB BLD-MCNC: 12.3 G/DL (ref 12–17)
IMM GRANULOCYTES # BLD AUTO: 0.46 THOUSAND/UL (ref 0–0.2)
IMM GRANULOCYTES NFR BLD AUTO: 2 % (ref 0–2)
LYMPHOCYTES # BLD AUTO: 0.81 THOUSANDS/ΜL (ref 0.6–4.47)
LYMPHOCYTES NFR BLD AUTO: 3 % (ref 14–44)
MAGNESIUM SERPL-MCNC: 2.3 MG/DL (ref 1.6–2.6)
MCH RBC QN AUTO: 27.7 PG (ref 26.8–34.3)
MCHC RBC AUTO-ENTMCNC: 32.4 G/DL (ref 31.4–37.4)
MCV RBC AUTO: 86 FL (ref 82–98)
MONOCYTES # BLD AUTO: 1.41 THOUSAND/ΜL (ref 0.17–1.22)
MONOCYTES NFR BLD AUTO: 6 % (ref 4–12)
NEUTROPHILS # BLD AUTO: 22.64 THOUSANDS/ΜL (ref 1.85–7.62)
NEUTS SEG NFR BLD AUTO: 88 % (ref 43–75)
NRBC BLD AUTO-RTO: 0 /100 WBCS
PLATELET # BLD AUTO: 346 THOUSANDS/UL (ref 149–390)
PMV BLD AUTO: 9.7 FL (ref 8.9–12.7)
POTASSIUM SERPL-SCNC: 4 MMOL/L (ref 3.5–5.3)
PROCALCITONIN SERPL-MCNC: <0.05 NG/ML
RBC # BLD AUTO: 4.44 MILLION/UL (ref 3.88–5.62)
SODIUM SERPL-SCNC: 132 MMOL/L (ref 136–145)
WBC # BLD AUTO: 25.58 THOUSAND/UL (ref 4.31–10.16)

## 2020-08-10 PROCEDURE — 94660 CPAP INITIATION&MGMT: CPT

## 2020-08-10 PROCEDURE — 85025 COMPLETE CBC W/AUTO DIFF WBC: CPT | Performed by: PHYSICIAN ASSISTANT

## 2020-08-10 PROCEDURE — 84145 PROCALCITONIN (PCT): CPT | Performed by: INTERNAL MEDICINE

## 2020-08-10 PROCEDURE — 99233 SBSQ HOSP IP/OBS HIGH 50: CPT | Performed by: INTERNAL MEDICINE

## 2020-08-10 PROCEDURE — 92526 ORAL FUNCTION THERAPY: CPT

## 2020-08-10 PROCEDURE — 83735 ASSAY OF MAGNESIUM: CPT | Performed by: PHYSICIAN ASSISTANT

## 2020-08-10 PROCEDURE — 82948 REAGENT STRIP/BLOOD GLUCOSE: CPT

## 2020-08-10 PROCEDURE — NC001 PR NO CHARGE

## 2020-08-10 PROCEDURE — 87040 BLOOD CULTURE FOR BACTERIA: CPT | Performed by: INTERNAL MEDICINE

## 2020-08-10 PROCEDURE — 94760 N-INVAS EAR/PLS OXIMETRY 1: CPT

## 2020-08-10 PROCEDURE — 80048 BASIC METABOLIC PNL TOTAL CA: CPT | Performed by: PHYSICIAN ASSISTANT

## 2020-08-10 PROCEDURE — 99024 POSTOP FOLLOW-UP VISIT: CPT | Performed by: SURGERY

## 2020-08-10 PROCEDURE — 71045 X-RAY EXAM CHEST 1 VIEW: CPT

## 2020-08-10 RX ORDER — HEPARIN SODIUM 5000 [USP'U]/ML
5000 INJECTION, SOLUTION INTRAVENOUS; SUBCUTANEOUS EVERY 8 HOURS SCHEDULED
Status: DISCONTINUED | OUTPATIENT
Start: 2020-08-10 | End: 2020-08-11

## 2020-08-10 RX ADMIN — CEFEPIME HYDROCHLORIDE 2000 MG: 2 INJECTION, POWDER, FOR SOLUTION INTRAVENOUS at 13:58

## 2020-08-10 RX ADMIN — CEFEPIME HYDROCHLORIDE 2000 MG: 2 INJECTION, POWDER, FOR SOLUTION INTRAVENOUS at 05:05

## 2020-08-10 RX ADMIN — ACETAMINOPHEN 325 MG: 650 SUPPOSITORY RECTAL at 01:15

## 2020-08-10 RX ADMIN — HYDROCORTISONE 1 APPLICATION: 1 CREAM TOPICAL at 01:08

## 2020-08-10 RX ADMIN — HEPARIN SODIUM 5000 UNITS: 5000 INJECTION INTRAVENOUS; SUBCUTANEOUS at 13:58

## 2020-08-10 RX ADMIN — HEPARIN SODIUM 5000 UNITS: 5000 INJECTION INTRAVENOUS; SUBCUTANEOUS at 21:53

## 2020-08-10 RX ADMIN — CEFEPIME HYDROCHLORIDE 2000 MG: 2 INJECTION, POWDER, FOR SOLUTION INTRAVENOUS at 21:58

## 2020-08-10 RX ADMIN — HYDROCORTISONE: 1 CREAM TOPICAL at 16:18

## 2020-08-10 RX ADMIN — OXYCODONE HYDROCHLORIDE 5 MG: 5 TABLET ORAL at 17:25

## 2020-08-10 NOTE — NUTRITION
08/10/20 4483   Recommendations/Interventions   Nutrition Recommendations Other (specify)  (Request RD protocol   Consider adding HoneyShake or magic Cup supplements)

## 2020-08-10 NOTE — SPEECH THERAPY NOTE
Speech Language/Pathology    Speech/Language Pathology Progress Note    Patient Name: Yasmin Valencia  Today's Date: 8/10/2020     Problem List  Principal Problem:    Acute superficial gastritis with hemorrhage  Active Problems:    Status post ventriculoperitoneal shunt    Unspecified abnormalities of gait and mobility    Type 2 diabetes mellitus ([facility] )    Essential hypertension    ZHOU on CPAP    Spondylolisthesis of lumbosacral region    Self-care deficit for hygiene    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[facility] )    Nonocclusive mesenteric ischemia (HCC)    Hydrocephalus (HCC)       Past Medical History  Past Medical History:   Diagnosis Date    Cancer [facility]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     [facility]     DM[facility]diabetes mellitus, type 2) [facility] )     Gout     HTN (hypertension)     Hydrocephalus ([facility] )     Hypertension     Neuropathy     ZHOU on CPAP     Pressure injury of skin     TIA (transient ischemic attack)         Past Surgical History  Past Surgical History:   Procedure Laterality Date    ABDOMINAL WALL SURGERY N/A 8/1/2020    Procedure: CLOSURE WOUND ABDOMINAL/TRUNK;  Surgeon: Gretel Noe DO;  [facility];  Service: General    ARTERIOGRAM N/A 7/31/2020    Procedure: ARTERIOGRAM Of SMA and placement of Papavarine onfusion arterial catheter;  Surgeon: Christine Dai MD;  [facility];  Service: Vascular    ARTERIOGRAM Left 8/1/2020    Procedure: ARTERIOGRAM; cath removal;  Surgeon: Christine Dai MD;  [facility];  Service: Vascular    CSF SHUNT      x3 Op Reports, Dr Eda Auguste, Alena Najjar in [facility] chart viewer    LAPAROTOMY N/A 7/31/2020    Procedure: LAPAROTOMY EXPLORATORY: Externalizes  shunt Chelsie reynolds;  Surgeon: Kayla Paz MD;  [facility];  Service: General    LAPAROTOMY N/A 8/1/2020    Procedure: LAPAROTOMY EXPLORATORY,;  Surgeon: Gretel Noe DO;  [facility];  Service: General    [place] AMPUTATION FOOT,TRANSMETATARSAL Left 5/30/2020    Procedure: excision 5th metatarsal head  excision 5th metatarsal ulcer ;  Surgeon: Tavo García DPM;  [facility];  Service: Podiatry    MS ARTHDSIS POST/POSTEROLATRL/POSTINTERBODY LUMBAR N/A 7/6/2020    Procedure: MINIMALLY INVASIVE TRANSVERSE LUMBAR INTERBODY FUSION L5-S1 FROM LEFT-SIDED APPROACH;  Surgeon: Alma Rosa Laboy MD;  [facility];  Service: Neurosurgery    MS Jessie Heritage CSF SHUNT,W/O REPLACE Right 8/7/2020    Procedure: REMOVAL SHUNT VENTRICULAR PERITONEAL;  Surgeon: Alma Rosa Laboy MD;  [facility];  Service: Neurosurgery    MS REPLACEMENT/REVISION,CSF SHUNT Right 7/6/2020    Procedure: REVISION OF SCALP OVER VENTRICULAR CATHETER IN THE RIGHT CORONAL REGION;  Surgeon: Alma Rosa Laboy MD;  [facility];  Service: Neurosurgery    WOUND DEBRIDEMENT N/A 8/1/2020    Procedure: abd washout;  Surgeon: Gretel Noe DO;  [facility];  Service: General         Subjective:  Pt positioned fully upright in bed for po intake  Per RN, no reported difficulties w/ breakfast this AM  Pt lethargic w/ min verbalizations  Weak/breathy voicing  SpO2 100% baseline on RA  Objective:  Pt seen for dysphagia tx to assess tolerance of current diet  Assessed at lunch w/ pureed foods (mashed potatoes and turkey), NTL via tsp/straw, and HTL via tsp/straw  Mildly reduced labial seal around tsp/straw on right-side w/ trace anterior loss at times (drooling from right-side noted at rest - pt's head leaning towards this side)  Functional manipulation and transfer of puree w/ mild oral residue, which cleared w/ second swallow or liquid wash  Swallow initiation appeared timely and complete  Occasional delayed cough noted following both NTL and HTL - Per RN, pt has had this cough throughout the day  Noted cough when SLP leaving room  No immediate coughing, throat clearing or wet vocal quality       Assessment:  Pt presented w/ occasional delayed cough following po trials however RN reports cough has been present throughout the day unrelated to po intake  No immediate coughing noted w/ NTL  Recommend to cont current diet for now using aspiration precautions/strategies listed below       Plan/Recommendations:  -Cont puree and NTL via small single sips  -meds crushed in puree  -Aspiration precautions, full feed, only feed when fully awake/alert, slow rate, small bites/sips  -Will cont to follow 3-5x/week

## 2020-08-10 NOTE — PROGRESS NOTES
Progress Note - Infectious Disease   Carlos Torres 69 y o  male MRN: [id number]  Unit/Bed#: ICU 12 Encounter: [id number]      Impression/Plan:  1  Severe sepsis, tachycardia, leukocytosis, elevated lactic acid   With early development of high fevers  Secondary to  shunt infection with meningitis and peritonitis  Not on vasopressors   Blood cultures are negative thus far  Patient's fever and leukocytosis had improved but now he is having fever and rising white cell count once again  Unclear source  All the  shunt apparatus has been removed  -antibiotic plan as below  -monitor temperatures and hemodynamics  -recheck CBC with diff and and CMP  -check blood cultures x2 sets  -check procalcitonin level now and tomorrow a m   -check chest x-ray  -supportive care per Critical Care service     2   shunt infection  Fluid WBC count was 61 with neutrophil predominance  Gram stain from shunt fluid shows GNRs and culture grew Pseudomonas    Suspect secondary to peritonitis in the setting of #3   Status post externalization of  shunt on 07/30   Repeat CSF analysis shows slightly decreased neutrophil predominant pleocytosis   Repeat CSF fluid from the distal externalized shunt still with Pseudomonas aeruginosa despite externalization of shunt  Now status post removal of the entire  shunt apparatus  Having fever and leukocytosis  -continue IV cefepime 2 g q 8 hours  -low threshold to read tap CSF  -neurosurgery follow-up ongoing     3   Ischemic bowel with peritonitis   Status post exploratory laparotomy, VAC placement   Status post femoral artery and SMA artery cannulation for paraverine by vascular surgery   Suspect intra-abdominal infection is etiology of #2   Status post second-look on 08/01 with no resection performed   Peritoneal fluid culture also shows Pseudomonas   Distal externalized shunt still has Pseudomonas aeruginosa  -antibiotic plan as above  -vascular surgery/general surgical follow-up ongoing  -may need repeat abdominal imaging if fever persists, or white cell count continues to rise without source     4  NPH requiring  shunt placement in , status post recent revision in 2019  Patient to have entire  shunt apparatus removed today     5  Lumbar spinal stenosis status post L5-S1 fusion on 2020      6  Diabetes mellitus with neuropathy  Discussed the above management plan in detail with Neurosurgery    Antibiotics:  Cefepime 11  Postop day 3  shunt removal    Subjective:  Patient developed fever overnight; no reported vomiting, diarrhea but does have occasional loose stool; no reported cough, shortness of breath; not requiring any vasopressor support  Objective:  Vitals:  Temp:  [98 4 °F (36 9 °C)-101 2 °F (38 4 °C)] 99 2 °F (37 3 °C)  HR:  [56-74] 74  Resp:  [21-25] 21  BP: ()/(53-71) 107/56  SpO2:  [96 %-100 %] 98 %  Temp (24hrs), Av 4 °F (37 4 °C), Min:98 4 °F (36 9 °C), Max:101 2 °F (38 4 °C)  Current: Temperature: 99 2 °F (37 3 °C)    Physical Exam:   General Appearance:  Somnolent, nonverbal, nontoxic, no acute distress  Throat: Oropharynx moist without lesions  Lungs:   Decreased breath sounds bilaterally; no wheezes, rhonchi or rales; respirations unlabored   Heart:  RRR; no murmur, rub or gallop   Abdomen:   Soft, non-tender, non-distended, positive bowel sounds  Extremities: No clubbing, cyanosis or edema   Skin: No new rashes or lesions  No draining wounds noted         Labs, Imaging, & Other studies:   All pertinent labs and imaging studies were personally reviewed  Results from last 7 days   Lab Units 08/10/20  0501 20  0603 20  0523   WBC Thousand/uL 25 58* 17 84* 15 46*   HEMOGLOBIN g/dL 12 3 11 4* 11 2*   PLATELETS Thousands/uL 346 294 316     Results from last 7 days   Lab Units 08/10/20  0501 20  0603 20  0523   SODIUM mmol/L 132* 135* 138   POTASSIUM mmol/L 4 0 4 2 4 0   CHLORIDE mmol/L 100 102 106   CO2 mmol/L 26 26 28   BUN mg/dL 19 14 12   CREATININE mg/dL 0 78 0 66 0 73   EGFR ml/min/1 73sq m 91 97 93   CALCIUM mg/dL 8 0* 7 9* 8 2*     Results from last 7 days   Lab Units 08/04/20  1318 08/03/20  1738   GRAM STAIN RESULT  1+ Polys*  4+ Gram negative rods* No polys seen*  3+ Gram negative rods*  Smear reviewed by Microbiology staff*

## 2020-08-10 NOTE — SOCIAL WORK
TC received from pt brother, Dr Franklin Fernandez  CM discussed pt need for STR  Dr Franklin Fernandez agreeable  CM reviewed difference between acute and SNF - Dr Franklin Fernandez aware pt may need SNF level of rehab and requested list emailed to him: [email or web address]  List emailed

## 2020-08-10 NOTE — PROGRESS NOTES
Progress Note - General Surgery   Pj Trujillo 69 y o  male MRN: [id number]  Unit/Bed#: ICU 12 Encounter: [id number]    Assessment:  69 M s/p ex-lap for DELIA,  shunt externalization, ABthera, mesenteric angiogram and papaverine infusion with subsequent second look laparotomy, abdominal wall closure, and subsequent ventriculostomy removal    Plan:  Diet as tolerated  Continued leukocytosis - trend  Continue antibiotics - consider switching  Supportive care per ICU    Subjective/Objective     Subjective: No acute events overnight  Tolerating meals and having bowel function  Denies abdominal pain  Objective:    Blood pressure 103/55, pulse 60, temperature 99 2 °F (37 3 °C), temperature source Rectal, resp  rate 21, height 6' 3" (1 905 m), weight 124 kg (274 lb 4 oz), SpO2 100 %  ,Body mass index is 34 28 kg/m²        Intake/Output Summary (Last 24 hours) at 8/10/2020 0912  Last data filed at 8/9/2020 2201  Gross per 24 hour   Intake 1165 ml   Output 1160 ml   Net 5 ml       Invasive Devices     Peripherally Inserted Central Catheter Line            PICC Line 87/47/61 Left Basilic 3 days                Physical Exam:   General: NAD, arousable  Eyes: PERRL  ENT: moist mucous membranes  Neck: supple  CV: RRR +S1/S2  Chest: respirations non-labored  Abdomen: soft, non-distended, non-tender, diffuse maculopapular rash  Extremities: atraumatic      Results from last 7 days   Lab Units 08/10/20  0501 08/09/20  0603 08/08/20  0523   WBC Thousand/uL 25 58* 17 84* 15 46*   HEMOGLOBIN g/dL 12 3 11 4* 11 2*   HEMATOCRIT % 38 0 35 3* 35 1*   PLATELETS Thousands/uL 346 294 316     Results from last 7 days   Lab Units 08/10/20  0501 08/09/20  0603 08/08/20  0523   POTASSIUM mmol/L 4 0 4 2 4 0   CHLORIDE mmol/L 100 102 106   CO2 mmol/L 26 26 28   BUN mg/dL 19 14 12   CREATININE mg/dL 0 78 0 66 0 73   CALCIUM mg/dL 8 0* 7 9* 8 2*     Results from last 7 days   Lab Units 08/06/20  1945   INR  1 10   PTT seconds 32

## 2020-08-10 NOTE — PROGRESS NOTES
Daily Progress Note - Critical Care   All Jacob 69 y o  male MRN: [id number]  Unit/Bed#: ICU 12 Encounter: [id number]        ----------------------------------------------------------------------------------------  HPI/24hr events: 69 M s/p ex-lap for DELIA,  shunt externalization, Abthera, mesenteric angiogram and papaverine infusion with subsequent second look laparotomy, abdominal wall closure, sheath removal and subsequent ventriculostomy removal   No acute events overnight  Temperature 101 2 overnight    ---------------------------------------------------------------------------------------  SUBJECTIVE  Patient has no complaints  Review of Systems  Review of systems was reviewed and negative unless stated above in HPI/24-hour events   ---------------------------------------------------------------------------------------  Assessment and Plan:    Neuro:    Diagnosis: Hx of NPH with  shunt infection, POD 3 s/p removal  o Plan: Continue to trend neuro exam  Patient continues to be lethargic  Continue Q2 neuro checks  Daily CAM-ICU, delirium precautions  Regulate sleep/wake cycle   Diagnosis: Acute post-operative pain   o Plan: Acetaminophen Rectal suppository 325 Q6, Oxycodone 5mg Q4    CV:    Diagnosis: SMA stenosis  o Plan: No AC per vascular surgery   Diagnosis: HPTN  o Plan: Holding home antihypertensives    Pulm:   Diagnosis: ZHOU  o Plan: Continue home CPAP    GI:    Diagnosis: SMA stenosis, bowel ischemia s/p ex lap  o Plan: Surgery following  o Advance diet as tolerated  :    Continue to monitor Is/Os    F/E/N:    F: No continuous fluids   E: Electrolytes stable  Continue to monitor    N: S/S consulted yesterday  Cleared for puree/level 1 diet and nectar thick liquids     Heme/Onc:    Diagnosis: No active issues  Hemoglobin stable     Endo:    Diagnosis: Type II Diabetes  o Plan: Continue to monitor blood glucose   Continue Humalog sliding scale insulin  ID:    Diagnosis:  shunt infection with pseudomonas  o Plan: ID previously recommended patient to be on Cefepime for 6 weeks  WBC 25 6 from 17 8  Temperature remains elevated  Re-engage ID to determine if antibiotics need to be re-broadened  MSK/Skin:    Frequent turns/repositioning   Diffuse maculopapular rash   o Hydrocortisone cream Q4 + diphenhydramine injection 25 mg Q6   PT/OT   Work with case management for rehab placement     [place] [facility] [place] Status: Level 1 - Full Code  ---------------------------------------------------------------------------------------  ICU CORE MEASURES    Prophylaxis   VTE Pharmacologic Prophylaxis: SubQ Heparin  VTE Mechanical Prophylaxis: sequential compression device  Stress Ulcer Prophylaxis: Prophylaxis Not Indicated     ABCDE Protocol (if indicated)  Plan to perform spontaneous awakening trial today? Not applicable  Plan to perform spontaneous breathing trial today? Not applicable  Obvious barriers to extubation? Not applicable      Invasive Devices Review  Invasive Devices     Peripherally Inserted Central Catheter Line            PICC Line 16/14/15 Left Basilic 3 days              Can any invasive devices be discontinued today?  Not applicable  ---------------------------------------------------------------------------------------  OBJECTIVE    Vitals   Vitals:    08/10/20 0403 08/10/20 0503 08/10/20 0600 08/10/20 0603   BP: 134/65 119/71  106/58   Pulse: 66 68  62   Resp: 21 (!) 23  22   Temp: 99 °F (37 2 °C)      TempSrc: Oral      SpO2: 98% 98%  98%   Weight:   124 kg (274 lb 4 oz)    Height:         Temp (24hrs), Av 3 °F (37 4 °C), Min:98 4 °F (36 9 °C), Max:101 2 °F (38 4 °C)  Current: Temperature: 99 2 °F (37 3 °C)  HR: 50-74  BP: /45-68  RR: 20-34  SpO2: 96-99    Respiratory:    Invasive/non-invasive ventilation settings   Respiratory    Lab Data (Last 4 hours)    None         O2/Vent Data (Last 4 hours)    None                Physical Exam  HENT: Head: Normocephalic  Eyes:      Extraocular Movements: EOM normal       Pupils: Pupils are equal, round, and reactive to light  Cardiovascular:      Rate and Rhythm: Normal rate and regular rhythm  Comments: Episodes of bradycardia    Pulmonary:      Effort: Pulmonary effort is normal    Abdominal:      General: Bowel sounds are normal  There is no distension  Palpations: Abdomen is soft  Tenderness: There is no abdominal tenderness  There is no guarding or rebound  Comments: Ventral midline incision clear, dry, intact   Genitourinary:     Comments: Excoriated from frequent bowel movements    Skin:     General: Skin is warm and dry  Capillary Refill: Capillary refill takes less than 2 seconds  Findings: Rash present  Comments: Diffuse maculopapular rash   Neurological:      Mental Status: He is oriented to person, place, and time  GCS: GCS eye subscore is 4  GCS verbal subscore is 4  GCS motor subscore is 6  Cranial Nerves: No cranial nerve deficit  Sensory: No sensory deficit        Comments: Lethargic  Motor function 3/5 in all extremities          Laboratory and Diagnostics:  Results from last 7 days   Lab Units 08/10/20  0501 08/09/20  0603 08/08/20  0523 08/07/20  0506 08/06/20  0536 08/04/20  0426 08/03/20  0609   WBC Thousand/uL 25 58* 17 84* 15 46* 12 30* 11 94* 8 51 8 97   HEMOGLOBIN g/dL 12 3 11 4* 11 2* 10 9* 11 2* 10 7* 10 9*   HEMATOCRIT % 38 0 35 3* 35 1* 33 9* 35 7* 33 5* 33 1*   PLATELETS Thousands/uL 346 294 316 271 254 241 229   NEUTROS PCT % 88* 88*  --   --   --   --   --    MONOS PCT % 6 5  --   --   --   --   --    MONO PCT %  --   --   --   --   --  3*  --      Results from last 7 days   Lab Units 08/10/20  0501 08/09/20  0603 08/08/20  0523 08/07/20  0506 08/06/20  0536 08/04/20  0426 08/03/20  0609   SODIUM mmol/L 132* 135* 138 142 141 142 141   POTASSIUM mmol/L 4 0 4 2 4 0 3 9 3 6 3 4* 3 7   CHLORIDE mmol/L 100 102 106 110* 109* 111* 112*   CO2 mmol/L 26 26 28 27 25 27 26   ANION GAP mmol/L 6 7 4 5 7 4 3*   BUN mg/dL 19 14 12 13 15 19 24   CREATININE mg/dL 0 78 0 66 0 73 0 69 0 67 0 72 0 86   CALCIUM mg/dL 8 0* 7 9* 8 2* 8 3 7 8* 7 9* 8 0*   GLUCOSE RANDOM mg/dL 129 114 126 118 125 126 121     Results from last 7 days   Lab Units 08/10/20  0501 08/09/20  0603 08/08/20  0523 08/07/20  0506 08/06/20  0536 08/04/20  0426 08/03/20  0609   MAGNESIUM mg/dL 2 3 2 4 2 2 2 3 2 4 2 2 2 3   PHOSPHORUS mg/dL  --  3 0 3 2 2 7 2 9 2 0* 1 7*      Results from last 7 days   Lab Units 08/06/20  1945   INR  1 10   PTT seconds 32              ABG:    VBG:          Micro  Results from last 7 days   Lab Units 08/04/20  1318 08/03/20  1738 08/03/20  0845   GRAM STAIN RESULT  1+ Polys*  4+ Gram negative rods* No polys seen*  3+ Gram negative rods*  Smear reviewed by Microbiology staff* No Polys or Bacteria seen       EKG: SR  Imaging: Stable size and configuration of prominent ventricular system  Stable encephalomalacia in the right superior frontal gyrus and left occipitotemporal region    Intake and Output  I/O       08/08 0701 - 08/09 0700 08/09 0701 - 08/10 0700    P  O   480    I V  (mL/kg) 1798 8 (14 4) 735 (5 9)    IV Piggyback 150 100    Total Intake(mL/kg) 1948 8 (15 6) 1315 (10 6)    Urine (mL/kg/hr)  1603 (0 5)    Stool  0    Total Output  1603    Net +1948 8 -288          Unmeasured Urine Occurrence 8 x 1 x    Unmeasured Stool Occurrence 2 x 2 x        UOP: 67 ml/hr     Height and Weights   Height: 6' 3" (190 5 cm)  IBW: 84 5 kg  Body mass index is 34 31 kg/m²  Weight (last 2 days)     Date/Time   Weight    08/09/20 0800   124 (274 47)    08/08/20 0600   125 (274 69)                Nutrition       Diet Orders   (From admission, onward)             Start     Ordered    08/09/20 1407  Diet Dysphagia/Modified Consistency; Dysphagia 1-Pureed;  Nectar Thick Liquid  Diet effective now     Question Answer Comment   Diet Type Dysphagia/Modified Consistency Dysphagia/Modified Consistency Dysphagia 1-Pureed    Liquid Modifier Nectar Thick Liquid    RD to adjust diet per protocol? No        08/09/20 2736                    Active Medications  Scheduled Meds:  Current Facility-Administered Medications   Medication Dose Route Frequency Provider Last Rate    acetaminophen  325 mg Rectal Q6H PRN Marquita Lewis MD      acetaminophen  650 mg Oral Q6H PRN Mlmian Ortega PA-C      cefepime  2,000 mg Intravenous Q8H Mliss MALISSA Ortega-C 2,000 mg (08/10/20 7626)    diphenhydrAMINE  25 mg Intravenous Q6H PRN Turner Mcintyre MD      hydrocortisone   Topical 4x Daily PRN Turner Mcintyre MD      insulin lispro  2-12 Units Subcutaneous 4x Daily (AC & HS) Sam Ortega PA-C      iodixanol  15 mL Intravenous Once in imaging Mlmian Ortega PA-C      oxyCODONE  5 mg Oral Q4H PRN Mlmian Ortega PA-C       Continuous Infusions:     PRN Meds:   acetaminophen, 325 mg, Q6H PRN  acetaminophen, 650 mg, Q6H PRN  diphenhydrAMINE, 25 mg, Q6H PRN  hydrocortisone, , 4x Daily PRN  iodixanol, 15 mL, Once in imaging  oxyCODONE, 5 mg, Q4H PRN        Allergies   Allergies   Allergen Reactions    Pollen Extract Allergic Rhinitis     ---------------------------------------------------------------------------------------  Advance Directive and Living Will:      Power of :    POLST:    ---------------------------------------------------------------------------------------  Care Time Delivered:   No Critical Care time spent     Darinel Mario      Portions of the record may have been created with voice recognition software  Occasional wrong word or "sound a like" substitutions may have occurred due to the inherent limitations of voice recognition software    Read the chart carefully and recognize, using context, where substitutions have occurred

## 2020-08-11 ENCOUNTER — TELEPHONE (OUTPATIENT)
Dept: NEUROSURGERY | Facility: CLINIC | Age: 70
End: 2020-08-11

## 2020-08-11 ENCOUNTER — APPOINTMENT (INPATIENT)
Dept: RADIOLOGY | Facility: HOSPITAL | Age: 70
DRG: 031 | End: 2020-08-11
Payer: COMMERCIAL

## 2020-08-11 LAB
ALBUMIN SERPL BCP-MCNC: 2 G/DL (ref 3.5–5)
ALP SERPL-CCNC: 151 U/L (ref 46–116)
ALT SERPL W P-5'-P-CCNC: 32 U/L (ref 12–78)
ANION GAP SERPL CALCULATED.3IONS-SCNC: 7 MMOL/L (ref 4–13)
ANION GAP SERPL CALCULATED.3IONS-SCNC: 7 MMOL/L (ref 4–13)
APPEARANCE CSF: CLEAR
APTT PPP: 34 SECONDS (ref 23–37)
AST SERPL W P-5'-P-CCNC: 21 U/L (ref 5–45)
BASOPHILS # BLD AUTO: 0.11 THOUSANDS/ΜL (ref 0–0.1)
BASOPHILS NFR BLD AUTO: 0 % (ref 0–1)
BASOPHILS NFR CSF MANUAL: 1 %
BILIRUB SERPL-MCNC: 0.61 MG/DL (ref 0.2–1)
BUN SERPL-MCNC: 19 MG/DL (ref 5–25)
BUN SERPL-MCNC: 19 MG/DL (ref 5–25)
CA-I BLD-SCNC: 1.09 MMOL/L (ref 1.12–1.32)
CALCIUM SERPL-MCNC: 7.5 MG/DL (ref 8.3–10.1)
CALCIUM SERPL-MCNC: 7.5 MG/DL (ref 8.3–10.1)
CHLORIDE SERPL-SCNC: 103 MMOL/L (ref 100–108)
CHLORIDE SERPL-SCNC: 103 MMOL/L (ref 100–108)
CO2 SERPL-SCNC: 26 MMOL/L (ref 21–32)
CO2 SERPL-SCNC: 26 MMOL/L (ref 21–32)
CREAT SERPL-MCNC: 0.9 MG/DL (ref 0.6–1.3)
CREAT SERPL-MCNC: 0.9 MG/DL (ref 0.6–1.3)
EOSINOPHIL # BLD AUTO: 0.17 THOUSAND/ΜL (ref 0–0.61)
EOSINOPHIL NFR BLD AUTO: 1 % (ref 0–6)
ERYTHROCYTE [DISTWIDTH] IN BLOOD BY AUTOMATED COUNT: 14.6 % (ref 11.6–15.1)
GFR SERPL CREATININE-BSD FRML MDRD: 86 ML/MIN/1.73SQ M
GFR SERPL CREATININE-BSD FRML MDRD: 86 ML/MIN/1.73SQ M
GLUCOSE CSF-MCNC: 50 MG/DL (ref 50–80)
GLUCOSE SERPL-MCNC: 112 MG/DL (ref 65–140)
GLUCOSE SERPL-MCNC: 127 MG/DL (ref 65–140)
GLUCOSE SERPL-MCNC: 128 MG/DL (ref 65–140)
GLUCOSE SERPL-MCNC: 134 MG/DL (ref 65–140)
GLUCOSE SERPL-MCNC: 134 MG/DL (ref 65–140)
GLUCOSE SERPL-MCNC: 93 MG/DL (ref 65–140)
GRAM STN SPEC: NORMAL
GRAM STN SPEC: NORMAL
HCT VFR BLD AUTO: 37.7 % (ref 36.5–49.3)
HGB BLD-MCNC: 12.1 G/DL (ref 12–17)
HISTIOCYTES NFR CSF: 1 %
IMM GRANULOCYTES # BLD AUTO: 0.5 THOUSAND/UL (ref 0–0.2)
IMM GRANULOCYTES NFR BLD AUTO: 2 % (ref 0–2)
INR PPP: 1.21 (ref 0.84–1.19)
LYMPHOCYTES # BLD AUTO: 0.76 THOUSANDS/ΜL (ref 0.6–4.47)
LYMPHOCYTES NFR BLD AUTO: 3 % (ref 14–44)
LYMPHOCYTES NFR CSF MANUAL: 26 %
MAGNESIUM SERPL-MCNC: 2.4 MG/DL (ref 1.6–2.6)
MCH RBC QN AUTO: 27.6 PG (ref 26.8–34.3)
MCHC RBC AUTO-ENTMCNC: 32.1 G/DL (ref 31.4–37.4)
MCV RBC AUTO: 86 FL (ref 82–98)
MONOCYTES # BLD AUTO: 1.55 THOUSAND/ΜL (ref 0.17–1.22)
MONOCYTES NFR BLD AUTO: 6 % (ref 4–12)
MONOS+MACROS CSF MANUAL: 33 %
NEUTROPHILS # BLD AUTO: 23.15 THOUSANDS/ΜL (ref 1.85–7.62)
NEUTROPHILS NFR CSF MANUAL: 39 %
NEUTS SEG NFR BLD AUTO: 88 % (ref 43–75)
NRBC BLD AUTO-RTO: 0 /100 WBCS
PHOSPHATE SERPL-MCNC: 3.3 MG/DL (ref 2.3–4.1)
PLATELET # BLD AUTO: 286 THOUSANDS/UL (ref 149–390)
PMV BLD AUTO: 10.3 FL (ref 8.9–12.7)
POTASSIUM SERPL-SCNC: 4.2 MMOL/L (ref 3.5–5.3)
POTASSIUM SERPL-SCNC: 4.2 MMOL/L (ref 3.5–5.3)
PROCALCITONIN SERPL-MCNC: 0.76 NG/ML
PROT CSF-MCNC: 40 MG/DL (ref 15–45)
PROT SERPL-MCNC: 6 G/DL (ref 6.4–8.2)
PROTHROMBIN TIME: 15.3 SECONDS (ref 11.6–14.5)
RBC # BLD AUTO: 4.39 MILLION/UL (ref 3.88–5.62)
RBC # CSF MANUAL: 4.4 UL (ref 0–10)
SODIUM SERPL-SCNC: 136 MMOL/L (ref 136–145)
SODIUM SERPL-SCNC: 136 MMOL/L (ref 136–145)
TOTAL CELLS COUNTED BLD: NO
TOTAL CELLS COUNTED SPEC: 100
TUBE # CSF: 4
WBC # BLD AUTO: 26.24 THOUSAND/UL (ref 4.31–10.16)
WBC # CSF AUTO: 28.6 /UL (ref 0–5)

## 2020-08-11 PROCEDURE — 82330 ASSAY OF CALCIUM: CPT | Performed by: REGISTERED NURSE

## 2020-08-11 PROCEDURE — 82945 GLUCOSE OTHER FLUID: CPT | Performed by: PHYSICIAN ASSISTANT

## 2020-08-11 PROCEDURE — 99024 POSTOP FOLLOW-UP VISIT: CPT | Performed by: SURGERY

## 2020-08-11 PROCEDURE — 89050 BODY FLUID CELL COUNT: CPT | Performed by: PHYSICIAN ASSISTANT

## 2020-08-11 PROCEDURE — 009U3ZX DRAINAGE OF SPINAL CANAL, PERCUTANEOUS APPROACH, DIAGNOSTIC: ICD-10-PCS | Performed by: NEUROLOGICAL SURGERY

## 2020-08-11 PROCEDURE — 80048 BASIC METABOLIC PNL TOTAL CA: CPT | Performed by: REGISTERED NURSE

## 2020-08-11 PROCEDURE — 80053 COMPREHEN METABOLIC PANEL: CPT | Performed by: REGISTERED NURSE

## 2020-08-11 PROCEDURE — 99024 POSTOP FOLLOW-UP VISIT: CPT | Performed by: NEUROLOGICAL SURGERY

## 2020-08-11 PROCEDURE — 74177 CT ABD & PELVIS W/CONTRAST: CPT

## 2020-08-11 PROCEDURE — 89051 BODY FLUID CELL COUNT: CPT | Performed by: PHYSICIAN ASSISTANT

## 2020-08-11 PROCEDURE — 62328 DX LMBR SPI PNXR W/FLUOR/CT: CPT

## 2020-08-11 PROCEDURE — B01B1ZZ FLUOROSCOPY OF SPINAL CORD USING LOW OSMOLAR CONTRAST: ICD-10-PCS | Performed by: NEUROLOGICAL SURGERY

## 2020-08-11 PROCEDURE — 84145 PROCALCITONIN (PCT): CPT | Performed by: REGISTERED NURSE

## 2020-08-11 PROCEDURE — 92526 ORAL FUNCTION THERAPY: CPT

## 2020-08-11 PROCEDURE — 85025 COMPLETE CBC W/AUTO DIFF WBC: CPT | Performed by: REGISTERED NURSE

## 2020-08-11 PROCEDURE — 83735 ASSAY OF MAGNESIUM: CPT | Performed by: REGISTERED NURSE

## 2020-08-11 PROCEDURE — 84157 ASSAY OF PROTEIN OTHER: CPT | Performed by: PHYSICIAN ASSISTANT

## 2020-08-11 PROCEDURE — G1004 CDSM NDSC: HCPCS

## 2020-08-11 PROCEDURE — 87070 CULTURE OTHR SPECIMN AEROBIC: CPT | Performed by: PHYSICIAN ASSISTANT

## 2020-08-11 PROCEDURE — 99233 SBSQ HOSP IP/OBS HIGH 50: CPT | Performed by: INTERNAL MEDICINE

## 2020-08-11 PROCEDURE — 84100 ASSAY OF PHOSPHORUS: CPT | Performed by: REGISTERED NURSE

## 2020-08-11 PROCEDURE — 85610 PROTHROMBIN TIME: CPT | Performed by: PHYSICIAN ASSISTANT

## 2020-08-11 PROCEDURE — 94760 N-INVAS EAR/PLS OXIMETRY 1: CPT

## 2020-08-11 PROCEDURE — 85730 THROMBOPLASTIN TIME PARTIAL: CPT | Performed by: PHYSICIAN ASSISTANT

## 2020-08-11 PROCEDURE — 82948 REAGENT STRIP/BLOOD GLUCOSE: CPT

## 2020-08-11 RX ORDER — LIDOCAINE HYDROCHLORIDE 10 MG/ML
5 INJECTION, SOLUTION EPIDURAL; INFILTRATION; INTRACAUDAL; PERINEURAL
Status: COMPLETED | OUTPATIENT
Start: 2020-08-11 | End: 2020-08-11

## 2020-08-11 RX ORDER — ENALAPRIL MALEATE 5 MG/1
5 TABLET ORAL DAILY
Qty: 30 TABLET | Refills: 0 | OUTPATIENT
Start: 2020-08-11

## 2020-08-11 RX ADMIN — HEPARIN SODIUM 5000 UNITS: 5000 INJECTION INTRAVENOUS; SUBCUTANEOUS at 05:12

## 2020-08-11 RX ADMIN — HYDROCORTISONE: 1 CREAM TOPICAL at 16:55

## 2020-08-11 RX ADMIN — HYDROCORTISONE: 1 CREAM TOPICAL at 12:45

## 2020-08-11 RX ADMIN — LIDOCAINE HYDROCHLORIDE 5 ML: 10 INJECTION, SOLUTION EPIDURAL; INFILTRATION; INTRACAUDAL; PERINEURAL at 16:24

## 2020-08-11 RX ADMIN — ALTEPLASE 2 MG: 2.2 INJECTION, POWDER, LYOPHILIZED, FOR SOLUTION INTRAVENOUS at 12:36

## 2020-08-11 RX ADMIN — MEROPENEM 2000 MG: 1 INJECTION, POWDER, FOR SOLUTION INTRAVENOUS at 10:35

## 2020-08-11 RX ADMIN — CEFEPIME HYDROCHLORIDE 2000 MG: 2 INJECTION, POWDER, FOR SOLUTION INTRAVENOUS at 05:12

## 2020-08-11 RX ADMIN — MEROPENEM 2000 MG: 1 INJECTION, POWDER, FOR SOLUTION INTRAVENOUS at 18:28

## 2020-08-11 RX ADMIN — IOHEXOL 100 ML: 350 INJECTION, SOLUTION INTRAVENOUS at 14:20

## 2020-08-11 RX ADMIN — DIPHENHYDRAMINE HYDROCHLORIDE 25 MG: 50 INJECTION, SOLUTION INTRAMUSCULAR; INTRAVENOUS at 20:48

## 2020-08-11 RX ADMIN — HYDROCORTISONE 1 APPLICATION: 1 CREAM TOPICAL at 20:51

## 2020-08-11 NOTE — TELEPHONE ENCOUNTER
09/02/2020-CT SCHEDULED ON 09/04/2020 09/01/2020-WAITING FOR CT TO BE SCHEDULED PRIOR TO 9/08 APT  PLEASE SEE BELIA'S 08/31/2020 APT    08/28/2020-PT STILL IN HOSPITAL    08/27/2020-PT STILL IN HOSPITAL    08/26/2020-PT STILL IN HOSPITAL    IN BASKET MESSAGE FROM STEPHAN:  patient is [facility]p [facility] with DKO 8/24  Needs 2 and 6 week follow up  Sorry to clarify his appointments need to be with PA/DKO  PER STEPHAN, 2 WK W/PA (FROM 08/24 SX) W/CT HEAD & 6 WK POV W/DKO  PER STEPHAN ALSO NEEDS 3 MONTH POV WITH DKO FROM 7/06 SX, WHICH IS OKAY TO GO OVER ON SAME DAY AS 6 WK POV FOR 8/24 SX    09/08/2020-2 WK POV W/CT HEAD  10/01/2020-3 MONTH POV (FROM 7/06 SX W/DKO) & 6 WK POV (FROM 8/24/2020 SX) PER STEPHAN        08/24/2020-PT STILL IN HOSPITAL AND HAVING SX TODAY W/DKO     08/21/2020-PT STILL IN HOSPITAL    08/20/2020-PT STILL IN HOSPITAL    08/19/2020-PT STILL IN HOSPITAL,  08/20-6 [address] POV CX-Hospitalized (Patient currently inpt, will be seen by inpt team)    08/17/2020-PT [address]    08/14/2020-PT [address]    08/13/2020-PT [address]    08/12/2020-PT [address]    08/11/2020-PT [address]  08/20/2020-6 WK POV    08/09/2020-(MOULDING)SIGN OFF Saturday   WILL NEED F/U PLAN, e g  POV WITH DKO

## 2020-08-11 NOTE — PROGRESS NOTES
Progress Note - Luly Nava 1950, 69 y o  male MRN: [id number]    Unit/Bed#: [facility] 629-01 Encounter: [id number]    Primary Care Provider: Conor Orellana   Date and time admitted to hospital: 7/30/2020 11:12 PM    Status post ventriculoperitoneal shunt  Assessment & Plan    POD 4 from  shunt removal    · Externalization 7/30 secondary to laparotomy for bowel ischemia/peritonitis  · S/p VPS placement for NPH 2005, revision 2011  · Shunt last adjusted to 100 cm of water 7/21/2020 for increasing size of bilateral subdural hygromas  · Sutures placed over about beginning in July for thinning skin  Imaging:   · CT head without 8/9/2020: Interval removal of right frontal  shunt catheter  Grossly stable size and configuration of prominent ventricular system  Stabe encephalomalacia in the right superior frontal gyrus and occipitotemporal region  Plan:    · Continue monitor neurological exam     · ID following  Patient with fevers and up trending WBC  Spoke with ID  ABX chnaged to meropenem after patient developed drug rash  · Defer UA per ID at this time  · Order placed for patient to have LP  CSF panel ordered  Want to ensure negative cultures and measure pressure during tap to assist with hydrocephalus evaluation  · Follow and trend labs  · DVT prophylaxis:  SCDs and heparin  Discontinued at this time in prep for LP  Neurosurgery will continue to follow with CSF results from LP  Please call with questions or concerns  Spondylolisthesis of lumbosacral region  Assessment & Plan  · Status post minimally invasive transverse lumbar interbody fusion L5/S1 7/6/20    Ischemic bowel disease [facility])  Assessment & Plan  Ongoing management per general surgery/primary team  · Vascular surgery following  No need for [facility] therapy as of 8/5      Subjective/Objective   Chief Complaint: None     Subjective: Paitient minimally responsive and whispering answers periodically at this time   Patient participates inconsistently during exam  Does require frequent stimulation to maintain patients attentiveness  Objective: Sitting upright in bed  I/O       08/09 0701 - 08/10 0700 08/10 0701 - 08/11 0700 08/11 0701 - 08/12 0700    P  O  480 120 240    I V  (mL/kg) 735 (5 9)      IV Piggyback 100 100     Total Intake(mL/kg) 1315 (10 6) 220 (1 8) 240 (1 9)    Urine (mL/kg/hr) 1603 (0 5) 7945 (2 7) 665 (1 1)    Stool 0 0     Total Output 1603 7945 665    Net -288 -7725 -425           Unmeasured Urine Occurrence 2 x 4 x     Unmeasured Stool Occurrence 2 x 3 x           Invasive Devices     Peripherally Inserted Central Catheter Line            PICC Line 45/68/59 Left Basilic 5 days                Physical Exam:  Vitals: Blood pressure 122/63, pulse 72, temperature 99 7 °F (37 6 °C), resp  rate 19, height 6' 3" (1 905 m), weight 124 kg (274 lb 4 oz), SpO2 99 %  ,Body mass index is 34 28 kg/m²  General appearance: patient lethargic and very deconditioned  Patchy red rash likely related to previous cephalosporin ABX  Head: Normocephalic, without obvious abnormality  Eyes: pupils appear symmetrical  Able to track with vision grossly intact  Mild decrease in patients upgaze  Lungs: non labored breathing  Heart: regular heart rate  Neurologic:   Mental status: GCS 14 with confusion  Patient follows some commands x4  Generalized weakness but able to move antigravity briefly before fatiguing  Patient does answer some simple questions and identifies objects, but at other times patient does not verbalize  Very soft spoken and whispering answers at times  Coordination: no drift bilaterally, but patient not able to full lift arms off bed for a longer duration  Fatigues easily       Lab Results:  Results from last 7 days   Lab Units 08/11/20  0521 08/10/20  0501 08/09/20  0603   WBC Thousand/uL 26 24* 25 58* 17 84*   HEMOGLOBIN g/dL 12 1 12 3 11 4*   HEMATOCRIT % 37 7 38 0 35 3*   PLATELETS Thousands/uL 286 346 294   NEUTROS PCT % 88* 88* 88*   MONOS PCT % 6 6 5     Results from last 7 days   Lab Units 08/11/20  0521 08/10/20  0501 08/09/20  0603   POTASSIUM mmol/L 4 2  4 2 4 0 4 2   CHLORIDE mmol/L 103  103 100 102   CO2 mmol/L 26  26 26 26   BUN mg/dL 19  19 19 14   CREATININE mg/dL 0 90  0 90 0 78 0 66   CALCIUM mg/dL 7 5*  7 5* 8 0* 7 9*   ALK PHOS U/L 151*  --   --    ALT U/L 32  --   --    AST U/L 21  --   --      Results from last 7 days   Lab Units 08/11/20  0521 08/10/20  0501 08/09/20  0603   MAGNESIUM mg/dL 2 4 2 3 2 4     Results from last 7 days   Lab Units 08/11/20  0521 08/09/20  0603 08/08/20  0523   PHOSPHORUS mg/dL 3 3 3 0 3 2     Results from last 7 days   Lab Units 08/06/20  1945   INR  1 10   PTT seconds 32     No results found for: TROPONINT  ABG:  Lab Results   Component Value Date    PHART 7 357 07/31/2020    [id number] 32 3 (L) 07/31/2020    PO2ART 87 8 07/31/2020    [id number] 17 7 (L) 07/31/2020    BEART -6 7 07/31/2020    SOURCE Line, Arterial 07/31/2020       Imaging Studies: I have personally reviewed pertinent reports  and I have personally reviewed pertinent films in PACS  Xr Chest Portable    Result Date: 7/31/2020  Impression: 1  Interval intubation with coiling of the nasogastric tube in the midesophagus  Repositioning of the the nasogastric tube advised  2   Endotracheal tube noted with the tip well above the tye  3   Scattered strandy densities likely related to diminished degree of inspiration  Workstation performed: [id number]     Ct Head Without Contrast    Result Date: 7/31/2020  Impression: Minimal increase in size of ventricular system The study was marked in EPIC for immediate notification  Workstation performed: [id number]     Pe Study With Ct Abdomen And Pelvis With Contrast    Result Date: 7/31/2020  Impression: Fluid-filled loops of small bowel with pneumatosis and bowel wall thickening  Findings are suspicious for ischemic bowel  Nodular airspace opacities within the right upper lobe  Findings may represent infection  I personally discussed this study with Pascual Finley on 7/31/2020 at 1:46 AM  Workstation performed: [id number]       EKG, Pathology, and Other Studies: I have personally reviewed pertinent reports        VTE Pharmacologic Prophylaxis: Heparin    VTE Mechanical Prophylaxis: sequential compression device

## 2020-08-11 NOTE — PROGRESS NOTES
Progress Note - Infectious Disease   Cyrus Mcburney 69 y o  male MRN: [id number]  Unit/Bed#: [facility] 629-01 Encounter: [id number]      Impression/Plan:  1  Severe sepsis, tachycardia, leukocytosis, elevated lactic acid   With early development of high fevers  Secondary to  shunt infection with meningitis and peritonitis  Not on vasopressors   Blood cultures are negative thus far  Patient's fever and leukocytosis had improved but now he is having fever and rising white cell count once again  May all be secondary to the developing drug eruption as below  All the  shunt apparatus has been removed  -antibiotic plan as below  -monitor temperatures and hemodynamics  -recheck CBC with diff and and CMP  -follow-up blood cultures  -be check procalcitonin level  -supportive care per Critical Care service     2   shunt infection  Fluid WBC count was 61 with neutrophil predominance  Gram stain from shunt fluid shows GNRs and culture grew Pseudomonas    Suspect secondary to peritonitis in the setting of #3   Status post externalization of  shunt on 07/30   Repeat CSF analysis shows slightly decreased neutrophil predominant pleocytosis   Repeat CSF fluid from the distal externalized shunt still with Pseudomonas aeruginosa despite externalization of shunt  Now status post removal of the entire  shunt apparatus  Having fever and leukocytosis  -discontinue cefepime  -begin meropenem 2 g IV q 8 hours  -low threshold to read tap CSF  -neurosurgery follow-up ongoing     3   Ischemic bowel with peritonitis   Status post exploratory laparotomy, VAC placement   Status post femoral artery and SMA artery cannulation for paraverine by vascular surgery   Suspect intra-abdominal infection is etiology of #2   Status post second-look on 08/01 with no resection performed   Peritoneal fluid culture also shows Pseudomonas   Distal externalized shunt still has Pseudomonas aeruginosa  -antibiotic plan as above  -vascular surgery/general surgical follow-up ongoing  -may need repeat abdominal imaging if fever persists, or white cell count continues to rise without source     4  NPH requiring  shunt placement in , status post recent revision in 2019  Patient to have entire  shunt apparatus removed today     5  Lumbar spinal stenosis status post L5-S1 fusion on 2020      6  Diabetes mellitus with neuropathy  7  Rash-appears consistent with a drug eruption  Suspect secondary to the cefepime  -discontinue cefepime  -antibiotics as above  -serial exams    Discussed the above management plan in detail with neurosurgery    Antibiotics:  Cefepime 12  Postop day 4  shunt removal    Subjective:  Patient continues to have intermittent fever; no reported vomiting, diarrhea; no cough, shortness of breath; no pain  He has developed diffuse erythematous rash on his torso and extremities    Objective:  Vitals:  Temp:  [99 4 °F (37 4 °C)-101 2 °F (38 4 °C)] 99 5 °F (37 5 °C)  HR:  [64-79] 71  Resp:  [16-23] 20  BP: (105-126)/(48-58) 109/52  SpO2:  [95 %-100 %] 100 %  Temp (24hrs), Av 2 °F (37 9 °C), Min:99 4 °F (37 4 °C), Max:101 2 °F (38 4 °C)  Current: Temperature: 99 5 °F (37 5 °C)    Physical Exam:   General Appearance:  Somnolent, arousable, able to answer simple questions, nontoxic, no acute distress  Throat: Oropharynx moist without lesions  Lungs:   Decreased breath sounds bilaterally; no wheezes, rhonchi or rales; respirations unlabored   Heart:  RRR; no murmur, rub or gallop   Abdomen:   Soft, non-tender, non-distended, positive bowel sounds       Extremities: No clubbing, cyanosis or edema   Skin: Diffuse maculopapular eruption       Labs, Imaging, & Other studies:   All pertinent labs and imaging studies were personally reviewed  Results from last 7 days   Lab Units 20  0521 08/10/20  0501 20  0603   WBC Thousand/uL 26 24* 25 58* 17 84*   HEMOGLOBIN g/dL 12 1 12 3 11 4*   PLATELETS Thousands/uL 286 346 294 Results from last 7 days   Lab Units 08/11/20  0521 08/10/20  0501 08/09/20  0603   SODIUM mmol/L 136  136 132* 135*   POTASSIUM mmol/L 4 2  4 2 4 0 4 2   CHLORIDE mmol/L 103  103 100 102   CO2 mmol/L 26  26 26 26   BUN mg/dL 19  19 19 14   CREATININE mg/dL 0 90  0 90 0 78 0 66   EGFR ml/min/1 73sq m 86  86 91 97   CALCIUM mg/dL 7 5*  7 5* 8 0* 7 9*   AST U/L 21  --   --    ALT U/L 32  --   --    ALK PHOS U/L 151*  --   --      Results from last 7 days   Lab Units 08/10/20  1405 08/10/20  1356 08/04/20  1318   BLOOD CULTURE  Received in Microbiology Lab  Culture in Progress  Received in Microbiology Lab  Culture in Progress    --    GRAM STAIN RESULT   --   --  1+ Polys*  4+ Gram negative rods*     Results from last 7 days   Lab Units 08/11/20  0521 08/10/20  1355   PROCALCITONIN ng/ml 0 76* <0 05        chest x-ray-clear lung fields    Images personally reviewed by me in PACS

## 2020-08-11 NOTE — ASSESSMENT & PLAN NOTE
Ongoing management per general surgery/primary team  · Vascular surgery following    No need for [facility] therapy as of 8/5

## 2020-08-11 NOTE — QUICK NOTE
Quick note and discussion     And plan for LP    2012    Carlos Torres    Age 69 yr     2019    Location [facility]    Diagnoses:  1  NPH  with gait dysfunction, incontinence, cognitive impairment  2  Status post first  shunt system   3  Status post revision   4  Had right frontal Hakim Codman system with set opening pressure of 100 cm of water status post last adjustment 2020  5  Status post recent T LIF  L5-S1 2020 Dr Theron Taveras  6  Acute peritonitis status post ex lap 2020  7  Grew out heavy growth  Pseudomonas aeruginosa  8  Externalization of right-sided  shunt system at time of ex lap  9  Proximal small bowel ischemia without SMA occlusion  10  Status post second look surgery 2020 bowel viability improved  No bowel resection  11  Bowel left in situ and recovery after IA Papaverine  12  Externalization exit site moved up to second intercostal space 2020  13  Still persistent growth of Pseudomonas in surveillance CSF from externalized peritoneal tubing at chest level 2020  14  CSF from reservoir [facility] valve system negative for infection although white count elevated 2020  15  Trial of different elevations of drainage  16  Repeat head CT scan 2020 with high a elevation of external drain setting limiting him to about 40 cc showed ventricles had increased in size with oblong widened third ventricle as well as prominence of temporal horns  17  Cognitively he does best with CSF draining rate of 8 to 10 cc CSF/hr  18  So he is  shunt dependent  19  S/p removal of right-sided  shunt system 2020  20  No exit surveillance sample taken  21  Ventricles more prominent with hydrocephalus on surveillance CT 2022  22  Slow thought processing and whispered monosyllabic replies likely secondary to increased size of ventricles  23   Fever up to 101 with florid confluent think red fine maculopapular rash over prior incision sites and under axillary consistent with drug fever  24  Cefepime  Dc per ID Dr Krista Umanzor  25  Meropenem IV substituted to cover Pseudomonas soft tissue infection and VPS distal shunt contamination below reservoir and valve unit in right frontal region  26  Deconditioning with tight ilio psoas and hamstrings  27  Status post bilateral knee replacement surgery      Stable neuro status  But cognitively slow likely secondary to slowly increased size of ventricles    It looks as a also has a florid drug rash to Cefepime  Which may be accounting for his fever and raised white count to 24 k    Now stopped  Switched to meropenem and pair Dr Helms Jump:  1  Recommend LP with measurement of opening pressure and reducing opening pressure by 2/3 to 1/3 opening pressure  2  Surveillance CSF panel  3  Hold heparin subcu last dose 0530 hours  4  Recheck coags pending LP  5  Consider re-internalization of shunt system next week depending on progress  query [facility] verses V pleural terminus    Discussed with Dr Torin Jeff from neuro Radiology    Next of kin update with his brother,   Ashli Sherman, in [facility]  I spoke to Dr Hortencia Lane by cell phone  The rationale and  need for LP explained:    1  Primarily  check opening pressure and reduce it by 2/3 to 1/3 of opening level    2  secondary to check CSF panel    He is in agreement  He asked us to proceed with arrangements for LP  Cell is [address] number is [phone]    8/11/2020 11:09 AM    Mika Badillo MD, Attending Neurological Surgeon

## 2020-08-11 NOTE — PROGRESS NOTES
Progress Note - General Surgery   Luly Nava 69 y o  male MRN: [id number]  Unit/Bed#: [facility] 629-01 Encounter: [id number]    Assessment:  69 M s/p ex-lap for DELIA,  shunt externalization, ABthera, mesenteric angiogram and papaverine infusion with subsequent second look laparotomy, abdominal wall closure, and subsequent ventriculostomy removal    Plan:  · Dysphagia diet as tolerated  · Continued leukocytosis - trend  Will discuss re-imaging  BCxs from 8/10 NGTD  Follow up ID recommendations  · Continue Cefepime, appreciate ID input  · DVT ppx    Subjective/Objective     Subjective: No acute events  Remains febrile over last 24hrs  Objective:    Blood pressure 109/52, pulse 71, temperature 99 5 °F (37 5 °C), resp  rate 20, height 6' 3" (1 905 m), weight 124 kg (274 lb 4 oz), SpO2 100 %  ,Body mass index is 34 28 kg/m²  Intake/Output Summary (Last 24 hours) at 8/11/2020 0824  Last data filed at 8/11/2020 0700  Gross per 24 hour   Intake 220 ml   Output 7945 ml   Net -7725 ml       Invasive Devices     Peripherally Inserted Central Catheter Line            PICC Line 74/57/94 Left Basilic 4 days                Physical Exam:   GEN: NAD  CV: warm/well perfused  Lung: normal effort  Ab: Soft, NT/ND  Incision c/d/i  Urticarial rash predominantly involving chest/abdomen  Neuro: Somnolent  Follows commands and answers simple questions non-verbally      Results from last 7 days   Lab Units 08/11/20  0521 08/10/20  0501 08/09/20  0603   WBC Thousand/uL 26 24* 25 58* 17 84*   HEMOGLOBIN g/dL 12 1 12 3 11 4*   HEMATOCRIT % 37 7 38 0 35 3*   PLATELETS Thousands/uL 286 346 294     Results from last 7 days   Lab Units 08/11/20  0521 08/10/20  0501 08/09/20  0603   POTASSIUM mmol/L 4 2  4 2 4 0 4 2   CHLORIDE mmol/L 103  103 100 102   CO2 mmol/L 26  26 26 26   BUN mg/dL 19  19 19 14   CREATININE mg/dL 0 90  0 90 0 78 0 66   CALCIUM mg/dL 7 5*  7 5* 8 0* 7 9*     Results from last 7 days   Lab Units 08/06/20  1945 INR  1 10   PTT seconds 32

## 2020-08-11 NOTE — PHYSICAL THERAPY NOTE
Physical Therapy Cancellation Note      Orders received and chart reviewed  Per RN, Pt off of floor for lumbar puncture and will return on bed rest precautions  Will Cancel PT session at this time, will continue to follow and attempt to see as willing and able thank you for the consultation       Magdalene Martínez, PT, DPT

## 2020-08-11 NOTE — SPEECH THERAPY NOTE
Speech Language/Pathology    Speech/Language Pathology Progress Note    Patient Name: Michael Whittington  Today's Date: 8/11/2020       Subjective:  Pt repositioned fully upright in bed w/ PCA prior to arrival  Awake/alert, though appeared somnolent  Contacted by RN, pt to drink 2 bottles of barium contrast for CT scan later today - RN unsure of how to thicken barium - plan to re-assess swallow function prior to prep  Pt able to respond to basic questions - told me his full name, he was at [facility], able to state year given choice of 2  Voice breathy/weak  Objective:  Pt seen for dysphagia tx to assess tolerance of current diet  Per RN, pt has not had anything to eat today and pt ate 0% of dinner last evening per chart review  Assessed w/ ~4oz HTL via tsp and straw,2 tsp NTL, and 2oz puree  Dependent for feeding  Adequate retrieval and good draw from straw  No anterior loss noted  Functional manipulation with timely transfers  Swallow initiation appeared timely and complete  Cough noted x1 w/ NTL via tsp today (?increased sensation),  x1 out of ~3oz HTL and delayed cough x1 w/ HTL via tsp  ?reduced airway closure given breathy/weak voicing  Assessment:  Pt cont w/ occasional cough w/ trials of NTL via tsp and HTL via tsp/straw- cough was not noted at rest prior to any po intake  VBS warranted to formally assess swallow function and determine safety of oral intake        Plan/Recommendations:  -Recommend to keep NPO except for meds crushed in puree pending results of VBS   -Spoke w/ RN and PA - VBS scheduled for tomorrow at 10:30am as pt scheduled for CT (w/ IV contrast) and LP today

## 2020-08-11 NOTE — ASSESSMENT & PLAN NOTE
POD 4 from  shunt removal    · Externalization 7/30 secondary to laparotomy for bowel ischemia/peritonitis  · S/p VPS placement for NPH 2005, revision 2011  · Shunt last adjusted to 100 cm of water 7/21/2020 for increasing size of bilateral subdural hygromas  · Sutures placed over about beginning in July for thinning skin  Imaging:   · CT head without 8/9/2020: Interval removal of right frontal  shunt catheter  Grossly stable size and configuration of prominent ventricular system  Stabe encephalomalacia in the right superior frontal gyrus and occipitotemporal region  Plan:    · Continue monitor neurological exam     · ID following  Patient with fevers and up trending WBC  Spoke with ID  ABX chnaged to meropenem after patient developed drug rash  · Defer UA per ID at this time  · Order placed for patient to have LP  CSF panel ordered  Want to ensure negative cultures and measure pressure during tap to assist with hydrocephalus evaluation  · Follow and trend labs  · DVT prophylaxis:  SCDs and heparin  Discontinued at this time in prep for LP  Neurosurgery will continue to follow with CSF results from LP  Please call with questions or concerns

## 2020-08-11 NOTE — QUICK NOTE
Nurse-Patient-Provider rounds were completed with the patient's nurse today, Tay Just  We discussed the plan is to   - CTAP - speech therapy with concerns with swallowing and do not feel he is safe for PO at this time  Will proceed with CTAP IV only, no PO  Then proceed with video barium swallow to determine if he can advance  - LP today per Neurosurgery  - ID following    We reviewed all of the invasive devices/lines/telemetry orders   - PICC    DVT prophylaxis:  - heparin held due to LP    Diet:  - NPO except meds crushed in puree, pending swallow study    Pain Assessment / Plan:  - Continue current pain management regimen    Mobility Assessment / Plan:  - Activity as tolerated        Valente Oden PA-C

## 2020-08-12 ENCOUNTER — APPOINTMENT (INPATIENT)
Dept: RADIOLOGY | Facility: HOSPITAL | Age: 70
DRG: 031 | End: 2020-08-12
Payer: COMMERCIAL

## 2020-08-12 LAB
ALBUMIN SERPL BCP-MCNC: 2 G/DL (ref 3.5–5)
ALP SERPL-CCNC: 104 U/L (ref 46–116)
ALT SERPL W P-5'-P-CCNC: 25 U/L (ref 12–78)
ANION GAP SERPL CALCULATED.3IONS-SCNC: 8 MMOL/L (ref 4–13)
AST SERPL W P-5'-P-CCNC: 16 U/L (ref 5–45)
BASOPHILS # BLD MANUAL: 0 THOUSAND/UL (ref 0–0.1)
BASOPHILS NFR MAR MANUAL: 0 % (ref 0–1)
BILIRUB SERPL-MCNC: 0.61 MG/DL (ref 0.2–1)
BUN SERPL-MCNC: 17 MG/DL (ref 5–25)
CALCIUM SERPL-MCNC: 8.1 MG/DL (ref 8.3–10.1)
CHLORIDE SERPL-SCNC: 103 MMOL/L (ref 100–108)
CO2 SERPL-SCNC: 26 MMOL/L (ref 21–32)
CREAT SERPL-MCNC: 0.78 MG/DL (ref 0.6–1.3)
EOSINOPHIL # BLD MANUAL: 0.79 THOUSAND/UL (ref 0–0.4)
EOSINOPHIL NFR BLD MANUAL: 3 % (ref 0–6)
ERYTHROCYTE [DISTWIDTH] IN BLOOD BY AUTOMATED COUNT: 14.7 % (ref 11.6–15.1)
GFR SERPL CREATININE-BSD FRML MDRD: 91 ML/MIN/1.73SQ M
GLUCOSE SERPL-MCNC: 112 MG/DL (ref 65–140)
GLUCOSE SERPL-MCNC: 112 MG/DL (ref 65–140)
GLUCOSE SERPL-MCNC: 117 MG/DL (ref 65–140)
GLUCOSE SERPL-MCNC: 119 MG/DL (ref 65–140)
GLUCOSE SERPL-MCNC: 141 MG/DL (ref 65–140)
HCT VFR BLD AUTO: 37.7 % (ref 36.5–49.3)
HGB BLD-MCNC: 12.3 G/DL (ref 12–17)
LYMPHOCYTES # BLD AUTO: 1.05 THOUSAND/UL (ref 0.6–4.47)
LYMPHOCYTES # BLD AUTO: 4 % (ref 14–44)
MCH RBC QN AUTO: 27.9 PG (ref 26.8–34.3)
MCHC RBC AUTO-ENTMCNC: 32.6 G/DL (ref 31.4–37.4)
MCV RBC AUTO: 86 FL (ref 82–98)
MONOCYTES # BLD AUTO: 1.32 THOUSAND/UL (ref 0–1.22)
MONOCYTES NFR BLD: 5 % (ref 4–12)
NEUTROPHILS # BLD MANUAL: 23.19 THOUSAND/UL (ref 1.85–7.62)
NEUTS BAND NFR BLD MANUAL: 4 % (ref 0–8)
NEUTS SEG NFR BLD AUTO: 84 % (ref 43–75)
NRBC BLD AUTO-RTO: 0 /100 WBCS
PLATELET # BLD AUTO: 348 THOUSANDS/UL (ref 149–390)
PLATELET BLD QL SMEAR: ADEQUATE
PMV BLD AUTO: 10 FL (ref 8.9–12.7)
POTASSIUM SERPL-SCNC: 4.3 MMOL/L (ref 3.5–5.3)
PROCALCITONIN SERPL-MCNC: 0.31 NG/ML
PROT SERPL-MCNC: 5.9 G/DL (ref 6.4–8.2)
RBC # BLD AUTO: 4.41 MILLION/UL (ref 3.88–5.62)
RBC MORPH BLD: NORMAL
SODIUM SERPL-SCNC: 137 MMOL/L (ref 136–145)
WBC # BLD AUTO: 26.35 THOUSAND/UL (ref 4.31–10.16)

## 2020-08-12 PROCEDURE — 92611 MOTION FLUOROSCOPY/SWALLOW: CPT

## 2020-08-12 PROCEDURE — 99024 POSTOP FOLLOW-UP VISIT: CPT | Performed by: SURGERY

## 2020-08-12 PROCEDURE — 82948 REAGENT STRIP/BLOOD GLUCOSE: CPT

## 2020-08-12 PROCEDURE — 99024 POSTOP FOLLOW-UP VISIT: CPT | Performed by: NEUROLOGICAL SURGERY

## 2020-08-12 PROCEDURE — 99232 SBSQ HOSP IP/OBS MODERATE 35: CPT | Performed by: INTERNAL MEDICINE

## 2020-08-12 PROCEDURE — 85027 COMPLETE CBC AUTOMATED: CPT | Performed by: SURGERY

## 2020-08-12 PROCEDURE — 85007 BL SMEAR W/DIFF WBC COUNT: CPT | Performed by: SURGERY

## 2020-08-12 PROCEDURE — 84145 PROCALCITONIN (PCT): CPT | Performed by: REGISTERED NURSE

## 2020-08-12 PROCEDURE — 80053 COMPREHEN METABOLIC PANEL: CPT | Performed by: SURGERY

## 2020-08-12 PROCEDURE — 74230 X-RAY XM SWLNG FUNCJ C+: CPT

## 2020-08-12 RX ORDER — HEPARIN SODIUM 5000 [USP'U]/ML
5000 INJECTION, SOLUTION INTRAVENOUS; SUBCUTANEOUS EVERY 8 HOURS SCHEDULED
Status: COMPLETED | OUTPATIENT
Start: 2020-08-12 | End: 2020-08-23

## 2020-08-12 RX ADMIN — HYDROCORTISONE: 1 CREAM TOPICAL at 08:51

## 2020-08-12 RX ADMIN — DIPHENHYDRAMINE HYDROCHLORIDE 25 MG: 50 INJECTION, SOLUTION INTRAMUSCULAR; INTRAVENOUS at 05:17

## 2020-08-12 RX ADMIN — HEPARIN SODIUM 5000 UNITS: 5000 INJECTION INTRAVENOUS; SUBCUTANEOUS at 15:36

## 2020-08-12 RX ADMIN — DIPHENHYDRAMINE HYDROCHLORIDE 25 MG: 50 INJECTION, SOLUTION INTRAMUSCULAR; INTRAVENOUS at 21:52

## 2020-08-12 RX ADMIN — HEPARIN SODIUM 5000 UNITS: 5000 INJECTION INTRAVENOUS; SUBCUTANEOUS at 21:52

## 2020-08-12 RX ADMIN — MEROPENEM 2000 MG: 1 INJECTION, POWDER, FOR SOLUTION INTRAVENOUS at 17:15

## 2020-08-12 RX ADMIN — MEROPENEM 2000 MG: 1 INJECTION, POWDER, FOR SOLUTION INTRAVENOUS at 08:50

## 2020-08-12 RX ADMIN — MEROPENEM 2000 MG: 1 INJECTION, POWDER, FOR SOLUTION INTRAVENOUS at 02:20

## 2020-08-12 RX ADMIN — HYDROCORTISONE: 1 CREAM TOPICAL at 04:48

## 2020-08-12 NOTE — PROGRESS NOTES
Progress Note - Infectious Disease   Yasmin Valencia 69 y o  male MRN: [id number]  [facility] Encounter: [id number]      Impression/Plan:  1  Severe sepsis, tachycardia, leukocytosis, elevated lactic acid   With early development of high fevers  Secondary to  shunt infection with meningitis and peritonitis  Not on vasopressors   Blood cultures are negative thus far   Patient's fever and leukocytosis had improved but now he is having fever and rising white cell count once again  May all be secondary to the developing drug eruption as below   All the  shunt apparatus has been removed  Patient's temperatures come down since changing the antibiotics  Suspect this was a drug fever in addition to the patient's previous infection  Repeat blood cultures negative thus far  -antibiotic plan as below  -monitor temperatures and hemodynamics  -recheck CBC with diff and and CMP  -follow-up repeat blood cultures  -recheck procalcitonin level  -supportive care per Critical Care service     2   shunt infection  Fluid WBC count was 61 with neutrophil predominance  Gram stain from shunt fluid shows GNRs and culture grew Pseudomonas    Suspect secondary to peritonitis in the setting of #3   Status post externalization of  shunt on 07/30   Repeat CSF analysis shows slightly decreased neutrophil predominant pleocytosis   Repeat CSF fluid from the distal externalized shunt still with Pseudomonas aeruginosa despite externalization of shunt   Now status post removal of the entire  shunt apparatus   Having fever and leukocytosis  Patient's temperatures come down since discontinuing the cefepime and starting the meropenem  Leukocytosis persists  Repeat CSF analysis looks fairly bland  -continue meropenem  -followup CSF culture  -recheck CBC with diff  -neurosurgery follow-up ongoing  -no replacement of  shunt apparatus until confirm cultures negative     3   Ischemic bowel with peritonitis   Status post exploratory laparotomy, VAC placement   Status post femoral artery and SMA artery cannulation for paraverine by vascular surgery   Suspect intra-abdominal infection is etiology of #2   Status post second-look on  with no resection performed   Peritoneal fluid culture also shows Pseudomonas   Distal externalized shunt still has Pseudomonas aeruginosa  Now status post remove the entire  shunt apparatus  -antibiotic plan as above  -vascular surgery/general surgical follow-up ongoing  -recheck CT the abdomen pelvis if the leukocytosis does not start improving soon     4  NPH requiring  shunt placement in , status post recent revision in 2019  Now status post removal of entire shunt apparatus  Possible eventual replacement of the apparatus     5  Lumbar spinal stenosis status post L5-S1 fusion on 2020      6  Diabetes mellitus with neuropathy      7  Rash-appears consistent with a drug eruption  Suspect secondary to the cefepime  Improved since discontinuing the cefepime   -no additional cefepime  -antibiotics as above  -serial exams    Discussed the above management plan with neurosurgical service    Will see the patient again 2020  Please call if questions  Antibiotics:  Meropenem 2  Antibiotics 13  Postop day 5  shunt removal    Subjective:  Patient has no fever, chills, sweats; no nausea, vomiting, diarrhea; no cough, shortness of breath; no pain  No new symptoms  His rash is very pruritic  Objective:  Vitals:  Temp:  [97 2 °F (36 2 °C)-100 °F (37 8 °C)] 99 2 °F (37 3 °C)  HR:  [72-79] 72  Resp:  [16-19] 19  BP: (110-130)/(54-58) 118/58  SpO2:  [98 %-100 %] 98 %  Temp (24hrs), Av °F (37 2 °C), Min:97 2 °F (36 2 °C), Max:100 °F (37 8 °C)  Current: Temperature: 99 2 °F (37 3 °C)    Physical Exam:   General Appearance:  Alert, interactive, nontoxic, no acute distress  Throat: Oropharynx moist without lesions      Lungs:   Clear to auscultation bilaterally; no wheezes, rhonchi or rales; respirations unlabored   Heart:  RRR; no murmur, rub or gallop   Abdomen:   Soft, non-tender, non-distended, positive bowel sounds  Extremities: No clubbing, cyanosis or edema   Skin: No new rashes or lesions  No draining wounds noted  Maculopapular rash is fading     Labs, Imaging, & Other studies:   All pertinent labs and imaging studies were personally reviewed  Results from last 7 days   Lab Units 08/12/20  0516 08/11/20  0521 08/10/20  0501   WBC Thousand/uL 26 35* 26 24* 25 58*   HEMOGLOBIN g/dL 12 3 12 1 12 3   PLATELETS Thousands/uL 348 286 346     Results from last 7 days   Lab Units 08/12/20  0516 08/11/20  0521  08/10/20  0501   SODIUM mmol/L 137 136  136  --  132*   POTASSIUM mmol/L 4 3 4 2  4 2  --  4 0   CHLORIDE mmol/L 103 103  103  --  100   CO2 mmol/L 26 26  26  --  26   BUN mg/dL 17 19  19  --  19   CREATININE mg/dL 0 78 0 90  0 90  --  0 78   EGFR ml/min/1 73sq m 91 86  86  --  91   CALCIUM mg/dL 8 1* 7 5*  7 5*  --  8 0*   AST U/L 16 21  --   --    ALT U/L 25 32   < >  --    ALK PHOS U/L 104 151*   < >  --     < > = values in this interval not displayed  Results from last 7 days   Lab Units 08/11/20  1627 08/10/20  1405 08/10/20  1356   BLOOD CULTURE   --  No Growth at 24 hrs  No Growth at 24 hrs     GRAM STAIN RESULT  No bacteria seen  3+ Polys  --   --      Results from last 7 days   Lab Units 08/12/20  0516 08/11/20  0521 08/10/20  1355   PROCALCITONIN ng/ml 0 31* 0 76* <0 05        CSF WBC 26  G stain without organisms, glucose 50, protein 40, opening pressure 15 cm

## 2020-08-12 NOTE — PHYSICAL THERAPY NOTE
Physical Therapy Cancellation Note    PT session cancelled as TRACIE Salguero reported patient is due to leave for barium swallow test shortly  PT session cancelled for now and will continue to follow as able

## 2020-08-12 NOTE — PROGRESS NOTES
Progress Note - Viridiana Gay 69 y o  male MRN: [id number]    Unit/Bed#: [facility] 629-01 Encounter: [id number]      Assessment:  69 M s/p ex-lap for DELIA,  shunt externalization, ABthera, mesenteric angiogram and papaverine infusion with subsequent second look laparotomy, abdominal wall closure, and subsequent ventriculostomy removal    S/p LP on 8/11: WBC CSF 28 6    8/10 blood cx: NGx24h  Wbc: 26-> 26    Vss  Afebrile  Saturating 99% on room air  Still with diffuse rash  Abdomen is soft, nontender, non distended and incision is clean dry, intact  Plan:  Follow up video barium w speech, pt needs nutrition  If fails will need keofed for nutrition  Continue benadryl and hydrocortisone cream for rash and urticaria   Daily pt/ot  Continue abx, meropenem, appreciate recs    Subjective:   Slow to respond, but denied any complaints  No chest pain or shortness of breath  Only complaints of itching  Objective:     Vitals: Blood pressure 113/57, pulse 78, temperature 100 °F (37 8 °C), resp  rate 18, height 6' 3" (1 905 m), weight 124 kg (274 lb 4 oz), SpO2 98 %  ,Body mass index is 34 28 kg/m²  Intake/Output Summary (Last 24 hours) at 8/12/2020 0141  Last data filed at 8/11/2020 2028  Gross per 24 hour   Intake 440 ml   Output 1441 ml   Net -1001 ml       Physical Exam  General: NAD  HEENT: NC/AT  MMM  Cv: RRR  Lungs: normal effort  Ab: Soft, NT/ND  Ex: no CCE  Urticarial diffuse rash     Neuro: AAOx1 person    Scheduled Meds:  Current Facility-Administered Medications   Medication Dose Route Frequency Provider Last Rate    acetaminophen  325 mg Rectal Q6H PRN Prachi Bemary anne, CRNP      acetaminophen  650 mg Oral Q6H PRN Prachi Bemary anne, CRNP      barium  900 mL Oral 90 min pre-procedure Shakila Morales PA-C      diphenhydrAMINE  25 mg Intravenous Q6H PRN Prachi Bejordony, CRNP      hydrocortisone   Topical 4x Daily PRN Prachi Cardenas, CRNP      insulin lispro  2-12 Units Subcutaneous 4x Daily (AC & HS) Prachi AR Cardenas      iodixanol  15 mL Intravenous Once in imaging AR Valenzuela      meropenem  2,000 mg Intravenous Q8H Nica Allen MD Stopped (08/11/20 2028)    oxyCODONE  5 mg Oral Q4H PRN AR Valenzuela       Continuous Infusions:   PRN Meds:   acetaminophen    acetaminophen    diphenhydrAMINE    hydrocortisone    iodixanol    oxyCODONE      Invasive Devices     Peripherally Inserted Central Catheter Line            PICC Line 24/02/70 Left Basilic 5 days                Lab, Imaging and other studies: I have personally reviewed pertinent reports      VTE Pharmacologic Prophylaxis: Sequential compression device (Venodyne)   VTE Mechanical Prophylaxis: sequential compression device

## 2020-08-12 NOTE — PROGRESS NOTES
Progress Note - Daysi Carpenter 1950, 69 y o  male MRN: [id number]    Unit/Bed#: [facility] 629-01 Encounter: [id number]    Primary Care Provider: Henrique Rodriguez   Date and time admitted to hospital: 7/30/2020 11:12 PM    Status post ventriculoperitoneal shunt  Assessment & Plan  POD 5 from  shunt removal    · Externalization 7/30 secondary to laparotomy for bowel ischemia/peritonitis  · S/p VPS placement for NPH 2005, revision 2011  · Shunt last adjusted to 100 cm of water 7/21/2020 for increasing size of bilateral subdural hygromas  · Sutures placed over about beginning in July for thinning skin  Imaging:   · CT head without 8/9/2020: Interval removal of right frontal  shunt catheter  Grossly stable size and configuration of prominent ventricular system  Stabe encephalomalacia in the right superior frontal gyrus and occipitotemporal region  Plan:    · Continue monitor neurological exam     · ID following  Patient with fevers and up trending WBC  Spoke with ID  ABX chnaged to meropenem after patient developed drug rash  · Fevers and rash starting to improve  · Defer UA per ID at this time  · LP completed yesterday  Will continue to follow cultures  · Gram stain no bacteria seen, total protein 40, WBC 28 6, glucose 50, RBC 4 4    · Culture pending  · Follow and trend labs  · DVT prophylaxis:  SCDs and heparin  Neurosurgery will follow from periphery and monitor CSF culture results while we determine need for further surgical intervention  Please call with questions or concerns  Spondylolisthesis of lumbosacral region  Assessment & Plan  · Status post minimally invasive transverse lumbar interbody fusion L5/S1 7/6/20    Ischemic bowel disease [facility])  Assessment & Plan  Ongoing management per general surgery/primary team  · Vascular surgery was following    No need for [facility] therapy as of 8/5      Subjective/Objective   Chief Complaint: "so-so"    Subjective: Patient appears to be more alert today, he was watching television upon entering the room  Patient states he does not feel significant change in his condition compared to yesterday  When asked how the procedure when yesterday states he was fine but his "back is not feeling as great as it should"  Patient had VBS this morning  During exam patient is again closing his eyes frequently, but when leaving the room he again opened spontaneously and was watching TV very intently  Objective: laying in bed in NAD  I/O       08/10 0701 - 08/11 0700 08/11 0701 - 08/12 0700 08/12 0701 - 08/13 0700    P  O  120 240 0    I V  (mL/kg)       IV Piggyback 100 300     Total Intake(mL/kg) 220 (1 8) 540 (4 4) 0 (0)    Urine (mL/kg/hr) 7945 (2 7) 1042 (0 4) 620 (0 7)    Stool 0 0     Total Output 7945 1042 620    Net -7725 -502 -620           Unmeasured Urine Occurrence 4 x 2 x 2 x    Unmeasured Stool Occurrence 3 x 0 x           Invasive Devices     Peripherally Inserted Central Catheter Line            PICC Line 65/27/06 Left Basilic 6 days                Physical Exam:  Vitals: Blood pressure 121/59, pulse 76, temperature 99 °F (37 2 °C), resp  rate 18, height 6' 3" (1 905 m), weight 124 kg (274 lb 4 oz), SpO2 97 %  ,Body mass index is 34 28 kg/m²  General appearance: alert, appears stated age, cooperative and no distress  Head: Normocephalic  Eyes: tracks appropriately across room and can open eyes spontaneously  Lungs: non labored breathing  Heart: regular heart rate  Neurologic:   Mental status: Alert, oriented x2  Not oriented to time  GCS 14  Patient opens eyes spontaneously  Converses with simple sentences, but interactive with appropriate answers  AVERY and following commands x4  Cranial nerves: grossly intact (Cranial nerves II-XII)  Sensory: normal to light touch   Motor: moving all extremities without focal weakness 4-/5 strength throughout BUE and DF in BLE  3/5 HF, KF, KE, PF in BLE  Reflexes: 1+ and symmetric in BLE  No clonus      Lab Results:  Results from last 7 days   Lab Units 08/12/20  0516 08/11/20  0521 08/10/20  0501 08/09/20  0603   WBC Thousand/uL 26 35* 26 24* 25 58* 17 84*   HEMOGLOBIN g/dL 12 3 12 1 12 3 11 4*   HEMATOCRIT % 37 7 37 7 38 0 35 3*   PLATELETS Thousands/uL 348 286 346 294   NEUTROS PCT %  --  88* 88* 88*   MONOS PCT %  --  6 6 5   MONO PCT % 5  --   --   --      Results from last 7 days   Lab Units 08/12/20  0516 08/11/20  0521 08/10/20  0501   POTASSIUM mmol/L 4 3 4 2  4 2 4 0   CHLORIDE mmol/L 103 103  103 100   CO2 mmol/L 26 26  26 26   BUN mg/dL 17 19  19 19   CREATININE mg/dL 0 78 0 90  0 90 0 78   CALCIUM mg/dL 8 1* 7 5*  7 5* 8 0*   ALK PHOS U/L 104 151*  --    ALT U/L 25 32  --    AST U/L 16 21  --      Results from last 7 days   Lab Units 08/11/20  0521 08/10/20  0501 08/09/20  0603   MAGNESIUM mg/dL 2 4 2 3 2 4     Results from last 7 days   Lab Units 08/11/20  0521 08/09/20  0603 08/08/20  0523   PHOSPHORUS mg/dL 3 3 3 0 3 2     Results from last 7 days   Lab Units 08/11/20  1154 08/06/20  1945   INR  1 21* 1 10   PTT seconds 34 32     No results found for: TROPONINT  ABG:  Lab Results   Component Value Date    PHART 7 357 07/31/2020    QYU8LTV 32 3 (L) 07/31/2020    PO2ART 87 8 07/31/2020    VSV2XOZ 17 7 (L) 07/31/2020    BEART -6 7 07/31/2020    SOURCE Line, Arterial 07/31/2020       Imaging Studies: I have personally reviewed pertinent reports  and I have personally reviewed pertinent films in PACS  Xr Chest Portable    Result Date: 7/31/2020  Impression: 1  Interval intubation with coiling of the nasogastric tube in the midesophagus  Repositioning of the the nasogastric tube advised  2   Endotracheal tube noted with the tip well above the tye  3   Scattered strandy densities likely related to diminished degree of inspiration   Workstation performed: [id number]     Ct Head Without Contrast    Result Date: 7/31/2020  Impression: Minimal increase in size of ventricular system The study was marked in EPIC for immediate notification  Workstation performed: [id number]     Pe Study With Ct Abdomen And Pelvis With Contrast    Result Date: 7/31/2020  Impression: Fluid-filled loops of small bowel with pneumatosis and bowel wall thickening  Findings are suspicious for ischemic bowel  Nodular airspace opacities within the right upper lobe  Findings may represent infection  I personally discussed this study with Danica Salazar on 7/31/2020 at 1:46 AM  Workstation performed: [id number]       EKG, Pathology, and Other Studies: I have personally reviewed pertinent reports        VTE Pharmacologic Prophylaxis: Heparin reordered per primary team      VTE Mechanical Prophylaxis: sequential compression device

## 2020-08-12 NOTE — ASSESSMENT & PLAN NOTE
POD 5 from  shunt removal    · Externalization 7/30 secondary to laparotomy for bowel ischemia/peritonitis  · S/p VPS placement for NPH 2005, revision 2011  · Shunt last adjusted to 100 cm of water 7/21/2020 for increasing size of bilateral subdural hygromas  · Sutures placed over about beginning in July for thinning skin  Imaging:   · CT head without 8/9/2020: Interval removal of right frontal  shunt catheter  Grossly stable size and configuration of prominent ventricular system  Stabe encephalomalacia in the right superior frontal gyrus and occipitotemporal region  Plan:    · Continue monitor neurological exam     · ID following  Patient with fevers and up trending WBC  Spoke with ID  ABX chnaged to meropenem after patient developed drug rash  · Fevers and rash starting to improve  · Defer UA per ID at this time  · LP completed yesterday  Will continue to follow cultures  · Gram stain no bacteria seen, total protein 40, WBC 28 6, glucose 50, RBC 4 4    · Culture pending  · Follow and trend labs  · DVT prophylaxis:  SCDs and heparin  Neurosurgery will follow from periphery and monitor CSF culture results while we determine need for further surgical intervention  Please call with questions or concerns

## 2020-08-12 NOTE — PROCEDURES
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     [facility]     DM[facility]di[facility] mellitus, type 2[facility]     Gout     HTN (hypertension)     Hydrocephalus ([place] [address] )     Hypertension     Neuropathy     ZHOU on CPAP     Pressure injury of skin     TIA (transient ischemic attack)         Past Surgical History  Past Surgical History:   Procedure Laterality Date    ABDOMINAL WALL SURGERY N/A 8/1/2020    Procedure: CLOSURE WOUND ABDOMINAL/TRUNK;  Surgeon: Gretel Noe DO;  [facility];  Service: General    ARTERIOGRAM N/A 7/31/2020    Procedure: ARTERIOGRAM Of SMA and placement of Papavarine onfusion arterial catheter;  Surgeon: Christine Dai MD;  [facility];  Service: Vascular    ARTERIOGRAM Left 8/1/2020    Procedure: ARTERIOGRAM; cath removal;  Surgeon: Christine Dai MD;  [facility];  Service: Vascular    CSF SHUNT      x3 Op Reports, Dr Eda Augutse, Alena Najjar in [facility] chart viewer    FL LUMBAR PUNCTURE DIAGNOSTIC  8/11/2020    LAPAROTOMY N/A 7/31/2020    Procedure: LAPAROTOMY EXPLORATORY: Externalizes  shunt Chelsie reynolds;  Surgeon: Kayla Paz MD;  [facility];  Service: General    LAPAROTOMY N/A 8/1/2020    Procedure: LAPAROTOMY EXPLORATORY,;  Surgeon: Gretel Noe DO;  [facility];  Service: General    ID AMPUTATION FOOT,TRANSMETATARSAL Left 5/30/2020    Procedure: excision 5th metatarsal head   excision 5th metatarsal ulcer ;  Surgeon: Tavo García DPM;  [facility];  Service: Podiatry    ID ARTHDSIS POST/POSTEROLATRL/POSTINTERBODY LUMBAR N/A 7/6/2020    Procedure: MINIMALLY INVASIVE TRANSVERSE LUMBAR INTERBODY FUSION L5-S1 FROM LEFT-SIDED APPROACH;  Surgeon: Alma Rosa Laboy MD;  [facility];  Service: Neurosurgery    MT REMOVAL,COMPLETE CSF SHUNT,W/O REPLACE Right 8/7/2020    Procedure: REMOVAL SHUNT VENTRICULAR PERITONEAL;  Surgeon: Chacho Connor MD;  [facility];  Service: Neurosurgery    MT REPLACEMENT/REVISION,CSF SHUNT Right 7/6/2020    Procedure: REVISION OF SCALP OVER VENTRICULAR CATHETER IN THE RIGHT CORONAL REGION;  Surgeon: Chacho Connor MD;  [facility];  Service: Neurosurgery    WOUND DEBRIDEMENT N/A 8/1/2020    Procedure: abd washout;  Surgeon: Rasheed Varela DO;  [facility];  Service: General     Video Barium Swallow Study    Summary:  Images are on PACS for review  Pt presents w/ functional oral stage for items offered today,mild/ moderate pharyngeal dysphagia characterized by mild vallecular retention, epiglottic undercoat w/ nt, transient aspiration w/ nt following solids only, and silent aspiration w/ thins  The pt is lethargic, may increase during meals  Though the pt did not aspirate on the study I am not certain he will meet his nutritional needs w po  Per gross esophageal screen, the esophageal stage is WNL  Recommendations:  Diet: Puree  Liquids: Nectar  Meds: Crushed in puree  Upright position  Full Supervision  Aspiration Precautions    F/u ST tx:  As able  Therapy Prognosis: Guarded  Prognosis considerations: Ongoing lethargy    Results reviewed with: pt, nursing  Aspiration precautions posted  If a dedicated assessment of the esophagus is desired, consider esophagram/barium swallow or EGD  Patient's goal:  None stated    Goals:  Pt will tolerate least restrictive diet w/out s/s aspiration or oral/pharyngeal difficulties      Current Medical Status:  "Linda Wans a 70 yo M with PMH significant for obesity with obstructive sleep apnea on CPAP, hypertension, gout, diabetes mellitus type 2, normal pressure hydrocephalus (s/p  shunt in 2005 with revision in July) and chronic back pain with spondylolisthesis of the lumbosacral region with spinal stenosis (s/p L5-S1 transverse lumbar interbody fusion and deformity correction at L5-S1 in July)   Patient was readmitted 7/30 with c/o vomiting blood      DX:   1  Acute Peritonitis four-day history of increasing abdominal pain  2  Acute gastritis  3  Ischemic proximal small bowel with peritonitis  4  Severe sepsis  5   shunt infection  6  Status post emergency ex lap Dr Carrol Moe , General Surgery  7  SMA was spite patent time of surgery - explored with vascular surgery  8  Status post femoral artery and SMA artery cannulation for continued papaverine per  vascular surgery  9  NPH  10  Right-sided  shunt system since 2005, revision in 2011  11  8/4/20 Revision of R sided externalized shunt system:  Further repositioning at higher location in right anterior chest wall of externalized   Shunt System and Removal of distal descending 30 cm segment  of contaminated  shunt peritoneal tube  11  Self care deficits (SNF being pursued)"    Previous VBS:  -    Current Diet:  NPO, sips w/ meds    Premorbid diet:  Regular    Dentition:  Limited dentition - some natural on bottom, no upper dentition or dentures  O2 requirement:  RA    Oral mech:  Strength and ROM: WNL    Vocal Quality/Speech:  Mild/mod hoarseness    Cognitive status:  Pt awake, lethargic  Consistencies administered: Barium laden applesauce, chicken, honey thick, nectar thick, thin liquids  Liquids were administered by cup and straw  Pt was seated laterally at 90 degrees  Oral stage:  Fair, mild  Lip closure: Adequate  Mastication: mildly prolonged w/ the solid  Bolus formation: Adequate  Bolus control: Adequate  Transfer: Adequate with puree, liquids    Mild piecemeal w/ solids  Residue: trace coating, 2* swallow clears    Pharyngeal stage:  Mild/Moderate  Swallow promptness: Adequate  Spill to valleculae: None noted  Spill to pyriforms: None noted  Epiglottic inversion: Adequate  Laryngeal excursion: Functional Pharyngeal constriction: Adequate  Vallecular retention: Mild w/ ht, nt   Pyriform retention: None noted  PPW coating: None noted  Osteophytes: N/A  CP prominence: N/A  Retropulsion from prominence: N/A  Transient penetration:-  Epiglottic undercoat: W/ nectar thin  Penetration: None  Aspiration: Silent aspiration w/ thins  Trace aspiration w/ nt following the solids in attempt to clear  Strategies: smaller sips  Pt lethargic to complete any  Response to aspiration: No coughing  Screening of Esophageal stage:   WNL

## 2020-08-12 NOTE — OCCUPATIONAL THERAPY NOTE
Occupational Therapy Cancel Note    Chart reviewed, pt currently off floor at [facility], not available at this time, will continue to follow with POC as able       Aaliyah Gloss

## 2020-08-12 NOTE — QUICK NOTE
Nurse-Patient-Provider rounds were completed with the patient's nurse today, Mary Hogue  We discussed the plan is to   - video swallow - patient cleared for pureed, nectar thick  - abx per ID  - Neurosurgery following - d/w Minnie Bashir PA-C - ok to restart DVT prophylaxis    We reviewed all of the invasive devices/lines/telemetry orders  DVT prophylaxis:  - restart heparin    Diet:  - pureed, nectar thick    Pain Assessment / Plan:  - Continue current pain management regimen    Mobility Assessment / Plan:  - Activity as tolerated        Shakila Morales PA-C

## 2020-08-13 LAB
ANION GAP SERPL CALCULATED.3IONS-SCNC: 3 MMOL/L (ref 4–13)
BUN SERPL-MCNC: 18 MG/DL (ref 5–25)
CALCIUM SERPL-MCNC: 7.6 MG/DL (ref 8.3–10.1)
CHLORIDE SERPL-SCNC: 105 MMOL/L (ref 100–108)
CO2 SERPL-SCNC: 29 MMOL/L (ref 21–32)
CREAT SERPL-MCNC: 0.77 MG/DL (ref 0.6–1.3)
ERYTHROCYTE [DISTWIDTH] IN BLOOD BY AUTOMATED COUNT: 14.8 % (ref 11.6–15.1)
GFR SERPL CREATININE-BSD FRML MDRD: 91 ML/MIN/1.73SQ M
GLUCOSE SERPL-MCNC: 105 MG/DL (ref 65–140)
GLUCOSE SERPL-MCNC: 112 MG/DL (ref 65–140)
GLUCOSE SERPL-MCNC: 116 MG/DL (ref 65–140)
GLUCOSE SERPL-MCNC: 155 MG/DL (ref 65–140)
GLUCOSE SERPL-MCNC: 166 MG/DL (ref 65–140)
HCT VFR BLD AUTO: 36.4 % (ref 36.5–49.3)
HGB BLD-MCNC: 11.7 G/DL (ref 12–17)
MCH RBC QN AUTO: 27.5 PG (ref 26.8–34.3)
MCHC RBC AUTO-ENTMCNC: 32.1 G/DL (ref 31.4–37.4)
MCV RBC AUTO: 85 FL (ref 82–98)
PLATELET # BLD AUTO: 324 THOUSANDS/UL (ref 149–390)
PMV BLD AUTO: 10.1 FL (ref 8.9–12.7)
POTASSIUM SERPL-SCNC: 4.1 MMOL/L (ref 3.5–5.3)
RBC # BLD AUTO: 4.26 MILLION/UL (ref 3.88–5.62)
SODIUM SERPL-SCNC: 137 MMOL/L (ref 136–145)
WBC # BLD AUTO: 17.48 THOUSAND/UL (ref 4.31–10.16)

## 2020-08-13 PROCEDURE — 97110 THERAPEUTIC EXERCISES: CPT

## 2020-08-13 PROCEDURE — 97530 THERAPEUTIC ACTIVITIES: CPT

## 2020-08-13 PROCEDURE — 94760 N-INVAS EAR/PLS OXIMETRY 1: CPT

## 2020-08-13 PROCEDURE — 99024 POSTOP FOLLOW-UP VISIT: CPT | Performed by: SURGERY

## 2020-08-13 PROCEDURE — 80048 BASIC METABOLIC PNL TOTAL CA: CPT | Performed by: SURGERY

## 2020-08-13 PROCEDURE — 97535 SELF CARE MNGMENT TRAINING: CPT

## 2020-08-13 PROCEDURE — 82948 REAGENT STRIP/BLOOD GLUCOSE: CPT

## 2020-08-13 PROCEDURE — 85027 COMPLETE CBC AUTOMATED: CPT | Performed by: SURGERY

## 2020-08-13 RX ORDER — MICONAZOLE NITRATE 20 MG/G
CREAM TOPICAL 2 TIMES DAILY
Status: DISCONTINUED | OUTPATIENT
Start: 2020-08-13 | End: 2020-08-29 | Stop reason: HOSPADM

## 2020-08-13 RX ADMIN — HEPARIN SODIUM 5000 UNITS: 5000 INJECTION INTRAVENOUS; SUBCUTANEOUS at 23:20

## 2020-08-13 RX ADMIN — DIPHENHYDRAMINE HYDROCHLORIDE 25 MG: 50 INJECTION, SOLUTION INTRAMUSCULAR; INTRAVENOUS at 23:20

## 2020-08-13 RX ADMIN — MEROPENEM 2000 MG: 1 INJECTION, POWDER, FOR SOLUTION INTRAVENOUS at 16:32

## 2020-08-13 RX ADMIN — INSULIN LISPRO 2 UNITS: 100 INJECTION, SOLUTION INTRAVENOUS; SUBCUTANEOUS at 23:20

## 2020-08-13 RX ADMIN — MEROPENEM 2000 MG: 1 INJECTION, POWDER, FOR SOLUTION INTRAVENOUS at 01:42

## 2020-08-13 RX ADMIN — HEPARIN SODIUM 5000 UNITS: 5000 INJECTION INTRAVENOUS; SUBCUTANEOUS at 05:26

## 2020-08-13 RX ADMIN — MEROPENEM 2000 MG: 1 INJECTION, POWDER, FOR SOLUTION INTRAVENOUS at 08:57

## 2020-08-13 RX ADMIN — MICONAZOLE NITRATE: 20 CREAM TOPICAL at 23:21

## 2020-08-13 RX ADMIN — HEPARIN SODIUM 5000 UNITS: 5000 INJECTION INTRAVENOUS; SUBCUTANEOUS at 16:29

## 2020-08-13 NOTE — PLAN OF CARE
Problem: PHYSICAL THERAPY ADULT  Goal: Performs mobility at highest level of function for planned discharge setting  See evaluation for individualized goals  Description: Treatment/Interventions: OT, Spoke to case management, Spoke to nursing, Gait training, Bed mobility, Patient/family training, Endurance training, LE strengthening/ROM, Functional transfer training          See flowsheet documentation for full assessment, interventions and recommendations  Outcome: Progressing  Note: Prognosis: Fair  Problem List: Decreased strength, Decreased range of motion, Decreased endurance, Impaired balance, Decreased mobility, Decreased coordination, Decreased skin integrity, Obesity  Assessment: Pt  able to participate in mobilizing LEs while perfoming Sara in supine  However not much participation or initialtion noted with supine to sit and back transfers and while seated at EOB  Pt  noted with severe R side lean  Pt  needed max support to remain seated at EOB initially which progressed to unsupported sitting occ  with support of table  Pt  given repeated cues for postural correction however patient cooperated only very minimally  Max A x 3 for sit to supine transfer  Pt  became more conversational ath the end of session  WIll continue to progress patient's mobility per current POC  Pt  continues with extensive assist for all levels of mobility and actively participates only minimally  Barriers to Discharge: Decreased caregiver support, Inaccessible home environment     PT Discharge Recommendation: [id number]     PT - OK to Discharge: Yes    See flowsheet documentation for full assessment

## 2020-08-13 NOTE — PROGRESS NOTES
Progress Note - Mo Guzmán 69 y o  male MRN: [id number]    Unit/Bed#: [facility] 629-01 Encounter: [id number]      Assessment:  69 M s/p ex-lap for DELIA,  shunt externalization, ABthera, mesenteric angiogram and papaverine infusion with subsequent second look laparotomy, abdominal wall closure, and subsequent ventriculostomy removal    VSS  tmax 100 2  Saturating 93% on room air  Abdomen soft/ nontender/ non distended  Incision clean, dry, intact  Diffuse rash is much improved  Mental status improving  Now with skin breakdown over sacrum  Wbc: 26- 17  Cr: 0 7- 0 7    Plan:  Continue pureed diet  Aspiration precautions  Follow up calorie count  Continue meropenem, appreciate ID  Pt/ot  Ambulate    Subjective:   Denied any abdominal pain  Pruritis has improved  Denied any chest pain or shortness of breath  Objective:     Vitals: Blood pressure (!) 107/47, pulse 70, temperature 98 6 °F (37 °C), resp  rate 20, height 6' 3" (1 905 m), weight 124 kg (274 lb 4 oz), SpO2 93 %  ,Body mass index is 34 28 kg/m²  Intake/Output Summary (Last 24 hours) at 8/13/2020 0552  Last data filed at 8/13/2020 0242  Gross per 24 hour   Intake 336 ml   Output 1043 ml   Net -707 ml       Physical Exam  General: NAD  HEENT: NC/AT  MMM  Cv: RRR  Lungs: normal effort  Ab: Soft, NT/ND  Ex: no CCE  Diffuse rash improved from yesterday     Neuro: AAOx3    Scheduled Meds:  Current Facility-Administered Medications   Medication Dose Route Frequency Provider Last Rate    acetaminophen  325 mg Rectal Q6H PRN AR Valenzuela      acetaminophen  650 mg Oral Q6H PRN AR Valenzuela      barium  900 mL Oral 90 min pre-procedure Ginnapelon Gross PA-C      diphenhydrAMINE  25 mg Intravenous Q6H PRN AR Valenzuela      heparin (porcine)  5,000 Units Subcutaneous [facility] Ginna Gross PA-C      hydrocortisone   Topical 4x Daily PRN AR Valenzuela      insulin lispro  2-12 Units Subcutaneous 4x Daily (AC & HS) Prachi Cardenas AR      iodixanol  15 mL Intravenous Once in imaging AR Valenzuela      meropenem  2,000 mg Intravenous Q8H Ofelia Lucia MD Stopped (08/13/20 0242)    oxyCODONE  5 mg Oral Q4H PRN AR Valenzuela       Continuous Infusions:   PRN Meds:   acetaminophen    acetaminophen    diphenhydrAMINE    hydrocortisone    iodixanol    oxyCODONE      Invasive Devices     Peripherally Inserted Central Catheter Line            PICC Line 64/94/98 Left Basilic 6 days                Lab, Imaging and other studies: I have personally reviewed pertinent reports      VTE Pharmacologic Prophylaxis: Heparin  VTE Mechanical Prophylaxis: sequential compression device

## 2020-08-13 NOTE — WOUND OSTOMY CARE
Progress Note - Wound   Amy Parrot 69 y o  male MRN: [id number]  Unit/Bed#: [facility] 629-01 Encounter: [id number]      Assessment: Patient is seen for weekly wound care assessment of the skin   The patient is non verbal with his eyes closed for the assessment of the skin   He is incontinent of bowel and bladder   Noted diffuse red skin rash with peeling skin   The RN reports that the patient had a allergic drug reaction   He is a Max A of 3 for rolling in the bed   Dependent for all care needs   Assessment Findings   1  Bilateral heels dry and intact   2  Right ankle dry stable unstageable area   Brown stable eschar   3  MASD partial thickness along the gluteal cleft area - 100% pink fragile tissue   4  Bridge of the nose - [facility] hospital acquired area related to bipap   Are is intact and slight purple in appearance   RN reports not using bipap now hydrocolloid is removed  5  MASD - on the right ischial  thigh area 100% pink fragile pink tissue with noted MASD     Discussed the plan of care with the RN   Will change to the Azael antifungal and place on a mattress         Skin care plans:  1-Turn/reposition q2h or when medically stable for pressure re-distribution on skin--use positioning wedges  2-Ehob cushion in chair when out of bed  3-Moisturize skin daily with skin nourishing cream   4-Apply Allevyn Life foam dressing to bilateral heels and left medial thigh under immobilizer for prevention   Mik with P   Peel back at least daily for skin assessment and re-apply   Change dressing every 3 days and PRN  5-Apply Allevyn Life foam dressing to right lateral malleolus and midline sacrum   Mik with T   Change dressing every 3 days  6-Elevate heels and right hallux area (float off of pillows) to offload pressure  7  Azael Antifungal cream bid and prn to sacral buttocks area bid and prn   8  P- 500 low air loss mattress   9  Apply 3 M no sting daily to the bridge of the nose    If placing bipap apply hydrocolloid then the bipap             Objective:    Vitals: Blood pressure 115/51, pulse 65, temperature 98 4 °F (36 9 °C), resp  rate 18, height 6' 3" (1 905 m), weight 125 kg (275 lb), SpO2 100 %  ,Body mass index is 34 37 kg/m²  Wound 07/31/20 Pressure Injury Ankle Right;Lateral (Active)   Wound Image   08/13/20 0950   Wound Description Clean;Dry;Brown 08/13/20 1516   Pressure Injury Stage U 08/13/20 1516   Frances-wound Assessment Clean;Dry; Intact 08/13/20 1516   Wound Length (cm) 1 cm 08/13/20 0950   Wound Width (cm) 0 8 cm 08/13/20 0950   Wound Depth (cm) 0 cm 08/13/20 0950   Wound Surface Area (cm^2) 0 8 cm^2 08/13/20 0950   Wound Volume (cm^3) 0 cm^3 08/13/20 0950   Calculated Wound Volume (cm^3) 0 cm^3 08/13/20 0950   Closure Open to air 08/12/20 2000   Drainage Amount None 08/13/20 1516   Non-staged Wound Description Not applicable 70/62/02 3671   Treatments Elevated 08/13/20 1516   Dressing Foam, Silicon (eg  Allevyn, etc) 08/13/20 1516   Patient Tolerance Tolerated well 08/13/20 1516   Dressing Status Clean;Dry; Intact 08/12/20 1500       Wound 07/31/20 Moisture associated skin damage Sacrum (Active)   Wound Image   08/13/20 0956   Wound Description Fragile;Pink 08/13/20 1516   Pressure Injury Stage 2 08/09/20 1600   Frances-wound Assessment Clean;Fragile;Pink 08/13/20 1516   Wound Length (cm) 9 cm 08/13/20 0956   Wound Width (cm) 7 cm 08/13/20 0956   Wound Depth (cm) 0 1 cm 08/13/20 0956   Wound Surface Area (cm^2) 63 cm^2 08/13/20 0956   Wound Volume (cm^3) 6 3 cm^3 08/13/20 0956   Calculated Wound Volume (cm^3) 6 3 cm^3 08/13/20 0956   Change in Wound Size % -[id number] 08/13/20 0956   Drainage Amount Scant 08/13/20 1516   Drainage Description Serosanguineous 08/13/20 1516   Non-staged Wound Description Partial thickness 08/13/20 1516   Treatments Cleansed;Site care 08/13/20 1516   Dressing Moisture barrier 08/13/20 1516   Dressing Changed New 07/31/20 1331   Patient Tolerance Tolerated well 08/13/20 1516   Dressing Status Clean;Dry; Intact 08/12/20 1500           Wound 08/13/20 Nose (Active)   Wound Image   08/13/20 0950   Wound Description Clean;Fragile;Light purple 08/13/20 1516   Pressure Injury Stage DTPI 08/13/20 1516   Frances-wound Assessment Clean;Dry; Intact 08/13/20 1516   Wound Length (cm) 0 5 cm 08/13/20 0950   Wound Width (cm) 0 5 cm 08/13/20 0950   Wound Depth (cm) 0 cm 08/13/20 0950   Wound Surface Area (cm^2) 0 25 cm^2 08/13/20 0950   Wound Volume (cm^3) 0 cm^3 08/13/20 0950   Calculated Wound Volume (cm^3) 0 cm^3 08/13/20 0950   Drainage Amount None 08/13/20 1516   Non-staged Wound Description Not applicable 80/87/82 0885   Treatments Site care 08/13/20 1516   Patient Tolerance Tolerated well 08/13/20 1516       Wound 08/13/20 Thigh Posterior;Proximal;Right (Active)   Wound Image   08/13/20 1001   Wound Description Clean;Fragile;Pink; Other (Comment) 08/13/20 1516   Frances-wound Assessment Clean;Dry; Intact 08/13/20 1516   Wound Length (cm) 3 cm 08/13/20 1001   Wound Width (cm) 5 cm 08/13/20 1001   Wound Depth (cm) 0 1 cm 08/13/20 1001   Wound Surface Area (cm^2) 15 cm^2 08/13/20 1001   Wound Volume (cm^3) 1 5 cm^3 08/13/20 1001   Calculated Wound Volume (cm^3) 1 5 cm^3 08/13/20 1001   Drainage Amount Scant 08/13/20 1516   Drainage Description Serosanguineous 08/13/20 1516   Non-staged Wound Description Partial thickness 08/13/20 1516   Treatments Cleansed;Site care 08/13/20 1516   Dressing Protective barrier 08/13/20 1516   Patient Tolerance Tolerated well 08/13/20 1516         Wound care will follow weekly call with questions or concerns     Vini Gonzalez RN BSN CWOCN

## 2020-08-13 NOTE — QUICK NOTE
Nurse-Patient-Provider rounds were completed with the patient's nurse today, Yue Dudley  We discussed the plan is to   - continue IV Abx  - Neurosurgery to determine timing of additional surgical intervention this admission, potentially next week    We reviewed all of the invasive devices/lines/telemetry orders  DVT prophylaxis:  - heparin    Diet:  - pureed, nectar thick    Pain Assessment / Plan:  - Continue current pain management regimen    Mobility Assessment / Plan:  - Activity as tolerated            Kristin Potter PA-C

## 2020-08-13 NOTE — SOCIAL WORK
CM spoke to pt's brother Dr Tonya Leonard [phone] and [phone] to discuss d/c plan as well as answer any questions he may have  Pt's brother was interested in [facility], [facility], and [facility]   CM placed referrals and will follow up

## 2020-08-13 NOTE — PHYSICAL THERAPY NOTE
Physical Therapy Treatment Note     08/13/20 1047   Pain Assessment   Pain Assessment Tool FLACC   Restrictions/Precautions   Weight Bearing Precautions Per Order No   Braces or Orthoses MAFO   Other Precautions Spinal precautions; Fall Risk;Cognitive; Bed Alarm;Telemetry   General   Chart Reviewed Yes   Family/Caregiver Present No   Subjective   Subjective Pt  agreeable to PT non verbally  Pt  needed encouragement to participate in conversation  Speech is very slow and inaudible occ  Bed Mobility   Rolling R 2  Maximal assistance   Additional items Assist x 2   Rolling L 2  Maximal assistance   Additional items Assist x 2   Supine to Sit 2  Maximal assistance   Additional items Assist x 2   Sit to Supine 2  Maximal assistance   Additional items Assist x 3   Additional Comments Seated EOB ~15 min with max support progressed to unsupported short periods with table support   Balance   Static Sitting Poor   Endurance Deficit   Endurance Deficit Yes   Endurance Deficit Description Fatigue   Activity Tolerance   Activity Tolerance Patient tolerated treatment well   Nurse Made Aware Yes   Exercises   TKR Supine;Bilateral;AAROM;10 reps  (Almost PROM)   Assessment   Prognosis Fair   Problem List Decreased strength;Decreased range of motion;Decreased endurance; Impaired balance;Decreased mobility; Decreased coordination;Decreased skin integrity;Obesity   Assessment Pt  able to participate in mobilizing LEs while perfoming Sara in supine  However not much participation or initialtion noted with supine to sit and back transfers and while seated at EOB  Pt  noted with severe R side lean  Pt  needed max support to remain seated at EOB initially which progressed to unsupported sitting occ  with support of table  Pt  given repeated cues for postural correction however patient cooperated only very minimally  Max A x 3 for sit to supine transfer  Pt  became more conversational ath the end of session   WIll continue to progress patient's mobility per current POC  Pt  continues with extensive assist for all levels of mobility and actively participates only minimally  Barriers to Discharge Decreased caregiver support; Inaccessible home environment   Goals   Patient Goals  need to fight   STG Expiration Date 08/19/20   PT Treatment Day 1   Plan   Treatment/Interventions Functional transfer training;LE strengthening/ROM; Therapeutic exercise;Cognitive reorientation;Patient/family training;Bed mobility;Spoke to nursing;OT   Progress No functional improvements   PT Frequency Other (Comment)  (3-6x/week)   Recommendation   PT Discharge Recommendation Post-Acute Rehabilitation Services   Equipment Recommended   (TBD')   PT - OK to Discharge Yes         Bob Mcnally, PTA

## 2020-08-14 LAB
ANION GAP SERPL CALCULATED.3IONS-SCNC: 5 MMOL/L (ref 4–13)
BACTERIA CSF CULT: NO GROWTH
BUN SERPL-MCNC: 18 MG/DL (ref 5–25)
CALCIUM SERPL-MCNC: 7.8 MG/DL (ref 8.3–10.1)
CHLORIDE SERPL-SCNC: 107 MMOL/L (ref 100–108)
CO2 SERPL-SCNC: 28 MMOL/L (ref 21–32)
CREAT SERPL-MCNC: 0.73 MG/DL (ref 0.6–1.3)
ERYTHROCYTE [DISTWIDTH] IN BLOOD BY AUTOMATED COUNT: 14.8 % (ref 11.6–15.1)
GFR SERPL CREATININE-BSD FRML MDRD: 93 ML/MIN/1.73SQ M
GLUCOSE SERPL-MCNC: 108 MG/DL (ref 65–140)
GLUCOSE SERPL-MCNC: 119 MG/DL (ref 65–140)
GLUCOSE SERPL-MCNC: 128 MG/DL (ref 65–140)
GLUCOSE SERPL-MCNC: 128 MG/DL (ref 65–140)
GLUCOSE SERPL-MCNC: 145 MG/DL (ref 65–140)
HCT VFR BLD AUTO: 37.5 % (ref 36.5–49.3)
HGB BLD-MCNC: 11.6 G/DL (ref 12–17)
MCH RBC QN AUTO: 27.2 PG (ref 26.8–34.3)
MCHC RBC AUTO-ENTMCNC: 30.9 G/DL (ref 31.4–37.4)
MCV RBC AUTO: 88 FL (ref 82–98)
NRBC BLD AUTO-RTO: 0 /100 WBCS
PLATELET # BLD AUTO: 310 THOUSANDS/UL (ref 149–390)
PMV BLD AUTO: 10.2 FL (ref 8.9–12.7)
POTASSIUM SERPL-SCNC: 4 MMOL/L (ref 3.5–5.3)
PROCALCITONIN SERPL-MCNC: 0.11 NG/ML
RBC # BLD AUTO: 4.27 MILLION/UL (ref 3.88–5.62)
SODIUM SERPL-SCNC: 140 MMOL/L (ref 136–145)
WBC # BLD AUTO: 13.13 THOUSAND/UL (ref 4.31–10.16)

## 2020-08-14 PROCEDURE — 80048 BASIC METABOLIC PNL TOTAL CA: CPT | Performed by: SURGERY

## 2020-08-14 PROCEDURE — 84145 PROCALCITONIN (PCT): CPT | Performed by: INTERNAL MEDICINE

## 2020-08-14 PROCEDURE — 82948 REAGENT STRIP/BLOOD GLUCOSE: CPT

## 2020-08-14 PROCEDURE — 99024 POSTOP FOLLOW-UP VISIT: CPT | Performed by: SURGERY

## 2020-08-14 PROCEDURE — 99232 SBSQ HOSP IP/OBS MODERATE 35: CPT | Performed by: INTERNAL MEDICINE

## 2020-08-14 PROCEDURE — 94760 N-INVAS EAR/PLS OXIMETRY 1: CPT

## 2020-08-14 PROCEDURE — 85027 COMPLETE CBC AUTOMATED: CPT | Performed by: SURGERY

## 2020-08-14 RX ADMIN — MEROPENEM 2000 MG: 1 INJECTION, POWDER, FOR SOLUTION INTRAVENOUS at 09:12

## 2020-08-14 RX ADMIN — HEPARIN SODIUM 5000 UNITS: 5000 INJECTION INTRAVENOUS; SUBCUTANEOUS at 05:45

## 2020-08-14 RX ADMIN — MEROPENEM 2000 MG: 1 INJECTION, POWDER, FOR SOLUTION INTRAVENOUS at 01:43

## 2020-08-14 RX ADMIN — MICONAZOLE NITRATE: 20 CREAM TOPICAL at 20:46

## 2020-08-14 RX ADMIN — MEROPENEM 2000 MG: 1 INJECTION, POWDER, FOR SOLUTION INTRAVENOUS at 17:47

## 2020-08-14 RX ADMIN — MICONAZOLE NITRATE: 20 CREAM TOPICAL at 09:20

## 2020-08-14 RX ADMIN — HEPARIN SODIUM 5000 UNITS: 5000 INJECTION INTRAVENOUS; SUBCUTANEOUS at 14:00

## 2020-08-14 RX ADMIN — HEPARIN SODIUM 5000 UNITS: 5000 INJECTION INTRAVENOUS; SUBCUTANEOUS at 21:06

## 2020-08-14 NOTE — PROGRESS NOTES
Progress Note - Izabel Soto 69 y o  male MRN: [id number]    Unit/Bed#: [facility] 629-01 Encounter: [id number]      Assessment:  69 M s/p ex-lap for DELIA,  shunt externalization, ABthera, mesenteric angiogram and papaverine infusion with subsequent second look laparotomy, abdominal wall closure, and subsequent ventriculostomy removal    Plan:  · Continue dysphagia diet as tolerated, continue to work with speech  · Aspiration precautions  · Continue calorie count  · Continue meropenem, appreciate ID input - WBC improving/afebrile  Most recent CSF/BCx NGTD  · Dispo planning    Subjective:   No acute events  Tolerating diet  Denies abdominal pain or fever/chills  Objective:     Vitals: Blood pressure 112/50, pulse 62, temperature 99 2 °F (37 3 °C), resp  rate 16, height 6' 3" (1 905 m), weight 127 kg (279 lb 15 8 oz), SpO2 98 %  ,Body mass index is 35 kg/m²  Intake/Output Summary (Last 24 hours) at 8/14/2020 0836  Last data filed at 8/14/2020 0243  Gross per 24 hour   Intake 442 ml   Output 430 ml   Net 12 ml       Physical Exam  GEN: NAD  HEENT: MMM  CV: warm/well perfused  Lung: normal effort  Ab: Soft, NT/ND    Extrem: No CCE  Neuro: Awake/alert, slow to respond but answering questions appropriately    Scheduled Meds:  Current Facility-Administered Medications   Medication Dose Route Frequency Provider Last Rate    acetaminophen  325 mg Rectal Q6H PRN AR Valenzuela      acetaminophen  650 mg Oral Q6H PRN AR Valenzuela      barium  900 mL Oral 90 min pre-procedure Emile Arteaga PA-C      diphenhydrAMINE  25 mg Intravenous Q6H PRN AR Valenzuela      heparin (porcine)  5,000 Units Subcutaneous [facility] Emile Arteaga PA-C      hydrocortisone   Topical 4x Daily PRN AR Valenzuela      insulin lispro  2-12 Units Subcutaneous 4x Daily (AC & HS) AR Valenzuela      iodixanol  15 mL Intravenous Once in imaging AR Valenzuela      meropenem  2,000 mg Intravenous Krissy Deluca MD Stopped (08/14/20 0243)   Len MARTIN ANTIFUNGAL   Topical BID Valente Oden PA-C      oxyCODONE  5 mg Oral Q4H PRN AR Valenzuela       Continuous Infusions:   PRN Meds:   acetaminophen    acetaminophen    diphenhydrAMINE    hydrocortisone    iodixanol    oxyCODONE      Invasive Devices     Peripherally Inserted Central Catheter Line            PICC Line 68/74/21 Left Basilic 7 days                Lab, Imaging and other studies: I have personally reviewed pertinent reports      VTE Pharmacologic Prophylaxis: Heparin  VTE Mechanical Prophylaxis: sequential compression device

## 2020-08-14 NOTE — PROGRESS NOTES
Progress Note - Infectious Disease   Cass Flowers 69 y o  male MRN: [id number]  [facility] Encounter: [id number]      Impression/Plan:  1  Severe sepsis, tachycardia, leukocytosis, elevated lactic acid   With early development of high fevers  Secondary to  shunt infection with meningitis and peritonitis  Not on vasopressors   Blood cultures are negative thus far   Patient's fever and leukocytosis had improved but now he is having fever and rising white cell count once again   May all be secondary to the developing drug eruption as below   All the  shunt apparatus has been removed  Repeat blood cultures negative thus far  Repeat CSF cultures negative  The white cell count has come down and the temperature has decreased  -antibiotic plan as below  -monitor temperatures and hemodynamics  -recheck CBC with diff and and CMP  -follow-up repeat blood cultures  -supportive care per Critical Care service     2   shunt infection  Fluid WBC count was 61 with neutrophil predominance  Gram stain from shunt fluid shows GNRs and culture grew Pseudomonas    Suspect secondary to peritonitis in the setting of #3   Status post externalization of  shunt on 07/30   Repeat CSF analysis shows slightly decreased neutrophil predominant pleocytosis   Repeat CSF fluid from the distal externalized shunt still with Pseudomonas aeruginosa despite externalization of shunt   Now status post removal of the entire  shunt apparatus   Having fever and leukocytosis  Patient's temperatures come down since discontinuing the cefepime and starting the meropenem  Leukocytosis improved  Repeat CSF analysis looks fairly bland and the follow-up cultures are negative  -continue meropenem  -followup CSF culture  -recheck CBC with diff  -neurosurgery follow-up ongoing  -await repeat placement of  shunt apparatus next week     3   Ischemic bowel with peritonitis   Status post exploratory laparotomy, VAC placement   Status post femoral artery and SMA artery cannulation for paraverine by vascular surgery   Suspect intra-abdominal infection is etiology of #2   Status post second-look on  with no resection performed   Peritoneal fluid culture also shows Pseudomonas   Distal externalized shunt still has Pseudomonas aeruginosa  Now status post remove the entire  shunt apparatus  -antibiotic plan as above  -surgery follow-up     4  NPH requiring  shunt placement in , status post recent revision in 2019  Now status post removal of entire shunt apparatus  Possible eventual replacement of the apparatus     5  Lumbar spinal stenosis status post L5-S1 fusion on 2020      6  Diabetes mellitus with neuropathy      7  Rash-appears consistent with a drug eruption   Suspect secondary to the cefepime  Improved since discontinuing the cefepime   -no additional cefepime  -antibiotics as above  -serial exams    Discussed the above management plan in detail with the primary service    Antibiotics:  Meropenem 4  Antibiotics 15   shunt removal 7    Subjective:  Patient has no fever, chills, sweats; no nausea, vomiting, diarrhea; no cough, shortness of breath; no pain  No new symptoms  His pruritus has improved  Objective:  Vitals:  Temp:  [98 4 °F (36 9 °C)-99 2 °F (37 3 °C)] 99 2 °F (37 3 °C)  HR:  [62-73] 62  Resp:  [16-18] 16  BP: ()/(50-69) 112/50  SpO2:  [97 %-100 %] 98 %  Temp (24hrs), Av 9 °F (37 2 °C), Min:98 4 °F (36 9 °C), Max:99 2 °F (37 3 °C)  Current: Temperature: 99 2 °F (37 3 °C)    Physical Exam:   General Appearance:  Somnolent, arousable, able answer simple yes no questions, nontoxic, no acute distress  Throat: Oropharynx moist without lesions  Lungs:   Clear to auscultation bilaterally; no wheezes, rhonchi or rales; respirations unlabored   Heart:  RRR; no murmur, rub or gallop   Abdomen:   Soft, non-tender, non-distended, positive bowel sounds       Extremities: No clubbing, cyanosis or edema Skin: No new rashes or lesions  No draining wounds noted  Fading erythematous maculopapular rash       Labs, Imaging, & Other studies:   All pertinent labs and imaging studies were personally reviewed  Results from last 7 days   Lab Units 08/14/20  0554 08/13/20  0447 08/12/20  0516   WBC Thousand/uL 13 13* 17 48* 26 35*   HEMOGLOBIN g/dL 11 6* 11 7* 12 3   PLATELETS Thousands/uL 310 324 348     Results from last 7 days   Lab Units 08/14/20  0554 08/13/20  0447 08/12/20  0516 08/11/20  0521   SODIUM mmol/L 140 137 137 136  136   POTASSIUM mmol/L 4 0 4 1 4 3 4 2  4 2   CHLORIDE mmol/L 107 105 103 103  103   CO2 mmol/L 28 29 26 26  26   BUN mg/dL 18 18 17 19  19   CREATININE mg/dL 0 73 0 77 0 78 0 90  0 90   EGFR ml/min/1 73sq m 93 91 91 86  86   CALCIUM mg/dL 7 8* 7 6* 8 1* 7 5*  7 5*   AST U/L  --   --  16 21   ALT U/L  --   --  25 32   ALK PHOS U/L  --   --  104 151*     Results from last 7 days   Lab Units 08/11/20  1627 08/10/20  1405 08/10/20  1356   BLOOD CULTURE   --  No Growth at 72 hrs  No Growth at 72 hrs     GRAM STAIN RESULT  No bacteria seen  3+ Polys  --   --      Results from last 7 days   Lab Units 08/14/20  0554 08/12/20  0516 08/11/20  0521 08/10/20  1355   PROCALCITONIN ng/ml 0 11 0 31* 0 76* <0 05

## 2020-08-14 NOTE — UTILIZATION REVIEW
Continued Stay Review    Date: 8/14/20                       Current Patient Class: Inpatient Current Level of Care: Level 2 Stepdown    HPI:70 y o  male initially admitted on 7/31/20 with GI bleed  Found to have ischemic bowel with peritonitis  S/P exploratory lap, VAC placement  Status post exploratory laparotomy, VAC placement   Status post femoral artery and SMA artery cannulation for paraverine by vascular surgery     Status post second-look on 08/01 with no resection performed   Peritoneal fluid culture also shows Pseudomonas   Distal externalized shunt still has Pseudomonas aeruginosa   Now status post removal of the entire  shunt apparatus  8/12 Neurosurgery: POD 5 from  shunt removal   Needs  6 weeks IV antibiotics as aggressive Pseudomonas soft tissue infection contaminated right-sided   shunt system requiring removal 8/7/2020    Anticipate re-internalization next week  TBD  Cannot afford any contamination of any new system       8/14 General Surgery:   Continue dysphagia diet as tolerated, continue to work with speech  Aspiration precautions  Continue calorie count  Continue meropenem, appreciate ID input - WBC improving/afebrile  Most recent CSF/BCx NGTD  Dispo planning  Infectious Disease: Severe sepsis, tachycardia, leukocytosis, elevated lactic acid   With early development of high fevers  Secondary to  shunt infection with meningitis and peritonitis  Repeat CSF cultures negative  WBC and temperature decreased  Antiobiotics Day 15, meropenem Day 4  Changed from cefepime to meropenem due to rash  Continue meropenem  Await replacement of  shunt apparatus next week  Physical exam: somnolent, arousable, able to answer simple yes or no questions  Fading erythematous maculopapular rash  Case Management noted [facility] cannot accept   [address], [address] are reviewing for bed availability         Pertinent Labs/Diagnostic Results:       Results from last 7 days   Lab Units 08/14/20  0554 08/13/20  0447 08/12/20  0516 08/11/20  0521 08/10/20  0501 08/09/20  0603   WBC Thousand/uL 13 13* 17 48* 26 35* 26 24* 25 58* 17 84*   HEMOGLOBIN g/dL 11 6* 11 7* 12 3 12 1 12 3 11 4*   HEMATOCRIT % 37 5 36 4* 37 7 37 7 38 0 35 3*   PLATELETS Thousands/uL 310 324 348 286 346 294   NEUTROS ABS Thousands/µL  --   --   --  23 15* 22 64* 15 57*   BANDS PCT %  --   --  4  --   --   --          Results from last 7 days   Lab Units 08/14/20  0554 08/13/20  0447 08/12/20  0516 08/11/20  0521 08/10/20  0501 08/09/20  0603 08/08/20  0523   SODIUM mmol/L 140 137 137 136  136 132* 135* 138   POTASSIUM mmol/L 4 0 4 1 4 3 4 2  4 2 4 0 4 2 4 0   CHLORIDE mmol/L 107 105 103 103  103 100 102 106   CO2 mmol/L 28 29 26 26  26 26 26 28   ANION GAP mmol/L 5 3* 8 7  7 6 7 4   BUN mg/dL 18 18 17 19  19 19 14 12   CREATININE mg/dL 0 73 0 77 0 78 0 90  0 90 0 78 0 66 0 73   EGFR ml/min/1 73sq m 93 91 91 86  86 91 97 93   CALCIUM mg/dL 7 8* 7 6* 8 1* 7 5*  7 5* 8 0* 7 9* 8 2*   CALCIUM, IONIZED mmol/L  --   --   --  1 09*  --   --   --    MAGNESIUM mg/dL  --   --   --  2 4 2 3 2 4 2 2   PHOSPHORUS mg/dL  --   --   --  3 3  --  3 0 3 2     Results from last 7 days   Lab Units 08/12/20  0516 08/11/20  0521   AST U/L 16 21   ALT U/L 25 32   ALK PHOS U/L 104 151*   TOTAL PROTEIN g/dL 5 9* 6 0*   ALBUMIN g/dL 2 0* 2 0*   TOTAL BILIRUBIN mg/dL 0 61 0 61     Results from last 7 days   Lab Units 08/14/20  1141 08/14/20  0721 08/13/20  2118 08/13/20  1710 08/13/20  1139 08/13/20  0738 08/12/20  2115 08/12/20  1711 08/12/20  1135 08/12/20  0712 08/11/20  2133 08/11/20  1734   POC GLUCOSE mg/dl 128 128 155* 112 166* 116 141* 112 119 117 93 112     Results from last 7 days   Lab Units 08/14/20  0554 08/13/20  0447 08/12/20  0516 08/11/20  0521 08/10/20  0501 08/09/20  0603 08/08/20  0523   GLUCOSE RANDOM mg/dL 119 105 112 134  134 129 114 126             Results from last 7 days   Lab Units 08/11/20  1154 PROTIME seconds 15 3*   INR  1 21*   PTT seconds 34         Results from last 7 days   Lab Units 08/14/20  0554 08/12/20  0516 08/11/20  0521 08/10/20  1355   PROCALCITONIN ng/ml 0 11 0 31* 0 76* <0 05           Results from last 7 days   Lab Units 08/11/20  1627 08/10/20  1405 08/10/20  1356   BLOOD CULTURE   --  No Growth at 72 hrs  No Growth at 72 hrs  GRAM STAIN RESULT  No bacteria seen  3+ Polys  --   --      Results from last 7 days   Lab Units 08/11/20  1627   TOTAL COUNTED  100     Results from last 7 days   Lab Units 08/11/20  1627 08/11/20  1626   APPEARANCE CSF  clear  --    TUBE NUM CSF  4  --    WBC CSF /uL 28 6*  --    XANTHOCHROMIA  No  --    NEUTROPHILS % (CSF) % 39  --    LYMPHS % (CSF) % 26  --    MONOCYTES % (CSF) % 33  --    GLUCOSE CSF mg/dL 50  --    PROTEIN CSF mg/dL 40  --    RBC CSF uL 4 4  --    CSF CULTURE   --  No growth       Vital Signs: Currently on room air         Date and Time  Temp  Pulse  SpO2  Resp  BP    08/14/20 1052  97 7 °F (36 5 °C)  64  100 %  16  113/56    08/14/20 0721  99 2 °F (37 3 °C)  62  98 %  16  112/50    08/14/20 0253  99 °F (37 2 °C)  69  97 %  18  114/51    08/14/20 0141  --  --  --  18  --    08/14/20 0141  --  68  98 %  --  113/51    08/13/20 2313  --  73  98 %  --  90/52    08/13/20 2013  --  --  100 %  --  --    08/13/20 1905  98 9 °F (37 2 °C)  66  99 %  --  121/57    08/13/20 1516  98 4 °F (36 9 °C)  65  100 %  --  115/51    08/13/20 1141  --  72  100 %  18  123/69    08/13/20 0953  --  --  --  --  --    08/13/20 0717  98 6 °F (37 °C)  65  99 %  18  118/62    08/13/20 0245  98 6 °F (37 °C)  70  93 %  20  107/47     08/12/20 2354  99 2 °F (37 3 °C)  73  99 %  17  131/60    08/12/20 2000  --  --  98 %  --  --    08/12/20 1911  99 7 °F (37 6 °C)  80  98 %  18  135/60    08/12/20 1536  100 2 °F (37 9 °C)  --  --  --  --    08/12/20 1536  --  71  99 %  18  117/58    08/12/20 1206  --  --  --  --  --    08/12/20 1140  99 °F (37 2 °C)  76  97 %  18  121/59 08/12/20 0710  99 2 °F (37 3 °C)  72  98 %  19  118/58    08/12/20 0523  98 7 °F (37 1 °C)  79  99 %  --  --    08/12/20 0328  97 2 °F (36 2 °C)   77  98 %  17  113/57    08/11/20 2327  100 °F (37 8 °C)  78  98 %  18  113/57    08/11/20 2104  --  --  98 %  --  --    08/11/20 1952  --  --  99 %  --  --    08/11/20 1920  99 4 °F (37 4 °C)  73  100 %  18  130/58    08/11/20 1657  --  74  99 %  --  --    08/11/20 1657  99 6 °F (37 6 °C)  --  --  16  110/54    08/11/20 1645  --  --  --  --  --    08/11/20 1057  99 7 °F (37 6 °C)  72  99 %  19  122/63    08/11/20 0830  --  --  --  --  --    08/11/20 0709  99 5 °F (37 5 °C)  71  100 %  20  109/52    08/11/20 0330  99 7 °F (37 6 °C)  77  97 %  23   108/53    08/11/20 0324  --  --  97 %  --  --    08/11/20 0016  --  --  98 %  --  --    08/10/20 2243  100 8 °F (38 2 °C)   79  97 %  16  106/51    08/10/20 1915  100 1 °F (37 8 °C)  --  --  --  --    08/10/20 1915  --  74  98 %  16  105/48     08/10/20 1725  --  --  --  --  --    08/10/20 1651  100 5 °F (38 1 °C)  --  --  --  --    08/10/20 1640  101 2 °F (38 4 °C)   69  96 %  16  126/56    08/10/20 1215  99 4 °F (37 4 °C)  66  95 %  18  108/58    08/10/20 1115  --  64  98 %  23   105/55    08/10/20 1015  --  74  98 %  21  107/56    08/10/20 0915  --  70  99 %  23   118/53    08/10/20 0815  99 2 °F (37 3 °C)  60  100 %  21  103/55    08/10/20 0800  --  58  100 %  21  --    08/10/20 0603  --  62  98 %  22  106/58    08/10/20 0503  --  68  98 %  23   119/71    08/10/20 0403  99 °F (37 2 °C)  66  98 %  21  134/65    08/10/20 0303  --  64  96 %  24   104/55    08/10/20 0203  --  64  99 %  23   113/62    08/10/20 0103  --  62  98 %  23   108/53    08/10/20 0003  101 2 °F (38 4 °C)   64  98 %  23             Medications:   Scheduled Medications:    barium, 900 mL, Oral, 90 min pre-procedure  heparin (porcine), 5,000 Units, Subcutaneous, Q8H LEONORA  insulin lispro, 2-12 Units, Subcutaneous, 4x Daily (AC & HS)  meropenem, 2,000 mg, Intravenous, Q8H  VERONICA ANTIFUNGAL, , Topical, BID      Continuous IV Infusions:     PRN Meds:  acetaminophen, 325 mg, Rectal, Q6H PRN  acetaminophen, 650 mg, Oral, Q6H PRN  diphenhydrAMINE, 25 mg, Intravenous, Q6H PRN  hydrocortisone, , Topical, 4x Daily PRN  iodixanol, 15 mL, Intravenous, Once in imaging  oxyCODONE, 5 mg, Oral, Q4H PRN        Discharge Plan: [facility]        Network Utilization Review Department  [email or web address]  ATTENTION: Please call with any questions or concerns to [phone] and carefully listen to the prompts so that you are directed to the right person  All voicemails are confidential   [facility] all requests for admission clinical reviews, approved or denied determinations and any other requests to dedicated fax number below belonging to the campus where the patient is receiving treatment   List of dedicated fax numbers for the Facilities:  [address] [place] [address] DENIALS (Administrative/Medical Necessity) [phone]   [address] (Maternity/NICU/Pediatrics) [phone]   Rebeca King [phone]   [place] [phone]   Marcelino Sweeney [phone]   Sushila Rosales [phone]   12[address] [facility] 15[address] [facility] [phone]   [facility]  [phone]   [address] [facility] [address] [facility]  2401 [facility] [address] [facility] [phone]

## 2020-08-14 NOTE — RESPIRATORY THERAPY NOTE
resp care      08/14/20 0700   Non-Invasive Information   Resp Comments Pt has been refusing cpap hs   Will d/c machine from room and d/c order if pt refuses again 8/14 pm

## 2020-08-14 NOTE — SOCIAL WORK
CM spoke to pt's brother regarding d/c plan  Pt will likely remain in hospital over the weekend  Pt's brother in agreement  Pt's brother interested in having the pt have "soothing" music play in the patient's room as well as having the pt's SCD placed on the patient  In terms of SNF locations, [facility] accept  [facility] (first choice), [facility] and [facility] are reviewing for bed availability

## 2020-08-15 LAB
ANION GAP SERPL CALCULATED.3IONS-SCNC: 6 MMOL/L (ref 4–13)
BACTERIA BLD CULT: NORMAL
BACTERIA BLD CULT: NORMAL
BUN SERPL-MCNC: 15 MG/DL (ref 5–25)
CALCIUM SERPL-MCNC: 7.9 MG/DL (ref 8.3–10.1)
CHLORIDE SERPL-SCNC: 106 MMOL/L (ref 100–108)
CO2 SERPL-SCNC: 28 MMOL/L (ref 21–32)
CREAT SERPL-MCNC: 0.73 MG/DL (ref 0.6–1.3)
ERYTHROCYTE [DISTWIDTH] IN BLOOD BY AUTOMATED COUNT: 15 % (ref 11.6–15.1)
GFR SERPL CREATININE-BSD FRML MDRD: 93 ML/MIN/1.73SQ M
GLUCOSE SERPL-MCNC: 108 MG/DL (ref 65–140)
GLUCOSE SERPL-MCNC: 110 MG/DL (ref 65–140)
GLUCOSE SERPL-MCNC: 113 MG/DL (ref 65–140)
GLUCOSE SERPL-MCNC: 123 MG/DL (ref 65–140)
GLUCOSE SERPL-MCNC: 154 MG/DL (ref 65–140)
HCT VFR BLD AUTO: 35.9 % (ref 36.5–49.3)
HGB BLD-MCNC: 11.3 G/DL (ref 12–17)
MCH RBC QN AUTO: 27.5 PG (ref 26.8–34.3)
MCHC RBC AUTO-ENTMCNC: 31.5 G/DL (ref 31.4–37.4)
MCV RBC AUTO: 87 FL (ref 82–98)
PLATELET # BLD AUTO: 253 THOUSANDS/UL (ref 149–390)
PMV BLD AUTO: 10 FL (ref 8.9–12.7)
POTASSIUM SERPL-SCNC: 4.2 MMOL/L (ref 3.5–5.3)
RBC # BLD AUTO: 4.11 MILLION/UL (ref 3.88–5.62)
SODIUM SERPL-SCNC: 140 MMOL/L (ref 136–145)
WBC # BLD AUTO: 9.88 THOUSAND/UL (ref 4.31–10.16)

## 2020-08-15 PROCEDURE — 82948 REAGENT STRIP/BLOOD GLUCOSE: CPT

## 2020-08-15 PROCEDURE — 99024 POSTOP FOLLOW-UP VISIT: CPT | Performed by: SURGERY

## 2020-08-15 PROCEDURE — 99232 SBSQ HOSP IP/OBS MODERATE 35: CPT | Performed by: INTERNAL MEDICINE

## 2020-08-15 PROCEDURE — 85027 COMPLETE CBC AUTOMATED: CPT | Performed by: SURGERY

## 2020-08-15 PROCEDURE — 80048 BASIC METABOLIC PNL TOTAL CA: CPT | Performed by: SURGERY

## 2020-08-15 RX ADMIN — HEPARIN SODIUM 5000 UNITS: 5000 INJECTION INTRAVENOUS; SUBCUTANEOUS at 06:01

## 2020-08-15 RX ADMIN — HEPARIN SODIUM 5000 UNITS: 5000 INJECTION INTRAVENOUS; SUBCUTANEOUS at 21:52

## 2020-08-15 RX ADMIN — INSULIN LISPRO 2 UNITS: 100 INJECTION, SOLUTION INTRAVENOUS; SUBCUTANEOUS at 12:23

## 2020-08-15 RX ADMIN — HEPARIN SODIUM 5000 UNITS: 5000 INJECTION INTRAVENOUS; SUBCUTANEOUS at 13:43

## 2020-08-15 RX ADMIN — MEROPENEM 2000 MG: 1 INJECTION, POWDER, FOR SOLUTION INTRAVENOUS at 08:39

## 2020-08-15 RX ADMIN — MEROPENEM 2000 MG: 1 INJECTION, POWDER, FOR SOLUTION INTRAVENOUS at 17:24

## 2020-08-15 RX ADMIN — MICONAZOLE NITRATE: 20 CREAM TOPICAL at 08:39

## 2020-08-15 RX ADMIN — MEROPENEM 2000 MG: 1 INJECTION, POWDER, FOR SOLUTION INTRAVENOUS at 01:25

## 2020-08-15 RX ADMIN — MICONAZOLE NITRATE: 20 CREAM TOPICAL at 17:24

## 2020-08-15 NOTE — PROGRESS NOTES
Progress Note - Pj Trujillo 69 y o  male MRN: [id number]    Unit/Bed#: [facility] 629-01 Encounter: [id number]      Assessment:  69 M s/p ex-lap for DELIA,  shunt externalization, ABthera, mesenteric angiogram and papaverine infusion with subsequent second look laparotomy, abdominal wall closure, and subsequent ventriculostomy removal    Plan:  · Continue dysphagia diet as tolerated, continue to work with speech  · Aspiration precautions  · Continue calorie count  · Continue meropenem, appreciate ID input  ·  shunt replacement next week per neurosx  · Dispo planning      Subjective: Tolerating diet and having bowel function  Otherwise denies complaints    Objective:     Vitals: Blood pressure 123/64, pulse 58, temperature 98 1 °F (36 7 °C), resp  rate 19, height 6' 3" (1 905 m), weight 116 kg (256 lb 4 8 oz), SpO2 100 %  ,Body mass index is 32 04 kg/m²        Intake/Output Summary (Last 24 hours) at 8/15/2020 0815  Last data filed at 8/15/2020 0249  Gross per 24 hour   Intake 1072 ml   Output 1176 ml   Net -104 ml       Physical Exam  NAD, obeys commands  Normocephalic, atraumatic  MMM, EOMI, PERRLA  Norm resp effort on RA  RRR  Abd soft, NT/ND, incision cdi  No calf tenderness or peripheral edema  Motor/sensation intact in distal extremities  CN grossly intact  -rash/lesions      Scheduled Meds:  Current Facility-Administered Medications   Medication Dose Route Frequency Provider Last Rate    acetaminophen  325 mg Rectal Q6H PRN AR Valenzuela      acetaminophen  650 mg Oral Q6H PRN AR Valenzuela      barium  900 mL Oral 90 min pre-procedure Ruma Samano PA-C      diphenhydrAMINE  25 mg Intravenous Q6H PRN AR Valenzuela      heparin (porcine)  5,000 Units Subcutaneous Q8H [address] Ruma Samano PA-C      hydrocortisone   Topical 4x Daily PRN AR Valenzuela      insulin lispro  2-12 Units Subcutaneous 4x Daily (AC & HS) AR Valenzeula      iodixanol  15 mL Intravenous Once in imaging AR Valenzuela      meropenem  2,000 mg Intravenous Q8H Han Macedo MD Stopped (08/15/20 4784)   Socorro Sheikh ANTIFUNGAL   Topical BID Alicia Rojas PA-C      oxyCODONE  5 mg Oral Q4H PRN AR Valenzuela       Continuous Infusions:   PRN Meds:   acetaminophen    acetaminophen    diphenhydrAMINE    hydrocortisone    iodixanol    oxyCODONE      Invasive Devices     Peripherally Inserted Central Catheter Line            PICC Line 75/25/18 Left Basilic 8 days                Lab, Imaging and other studies: I have personally reviewed pertinent reports      VTE Pharmacologic Prophylaxis: Heparin  VTE Mechanical Prophylaxis: sequential compression device

## 2020-08-15 NOTE — PLAN OF CARE
Problem: Nutrition/Hydration-ADULT  Goal: Nutrient/Hydration intake appropriate for improving, restoring or maintaining nutritional needs  Description: Monitor and assess patient's nutrition/hydration status for malnutrition  Collaborate with interdisciplinary team and initiate plan and interventions as ordered  Monitor patient's weight and dietary intake as ordered or per policy  Utilize nutrition screening tool and intervene as necessary  Determine patient's food preferences and provide high-protein, high-caloric foods as appropriate       INTERVENTIONS:  - Monitor oral intake, urinary output, labs, and treatment plans  - Assess nutrition and hydration status and recommend course of action  - Evaluate amount of meals eaten  - Assist patient with eating if necessary   - Allow adequate time for meals  - Recommend/ encourage appropriate diets, oral nutritional supplements, and vitamin/mineral supplements  - Order, calculate, and assess calorie counts as needed  - Recommend, monitor, and adjust tube feedings and TPN/PPN based on assessed needs  - Assess need for intravenous fluids  - Provide specific nutrition/hydration education as appropriate  - Include patient/family/caregiver in decisions related to nutrition  Outcome: Progressing  Note: PO intake seems improved; 2 day calorie count in progress; nutrition supplement magic cups scheduled  TID by LESLEY

## 2020-08-15 NOTE — PROGRESS NOTES
Progress Note - Infectious Disease   Keyur Flynn 69 y o  male MRN: [id number]  [facility] Encounter: [id number]      Impression/Plan:  1  Severe sepsis, tachycardia, leukocytosis, elevated lactic acid   With early development of high fevers  Secondary to  shunt infection with meningitis and peritonitis  Not on vasopressors   Blood cultures are negative thus far   Patient's fever and leukocytosis had improved but now he is having fever and rising white cell count once again   May all be secondary to the developing drug eruption as below   All the  shunt apparatus has been removed   Repeat blood cultures negative thus far  Repeat CSF cultures negative  The white cell count has come down and the temperature has decreased  -antibiotic plan as below  -monitor temperatures and hemodynamics  -monitor CBC with diff and and CMP  -follow-up repeat blood cultures  -supportive care per Critical Care service     2   shunt infection  Fluid WBC count was 61 with neutrophil predominance  Gram stain from shunt fluid shows GNRs and culture grew Pseudomonas    Suspect secondary to peritonitis in the setting of #3   Status post externalization of  shunt on 07/30   Repeat CSF analysis shows slightly decreased neutrophil predominant pleocytosis   Repeat CSF fluid from the distal externalized shunt still with Pseudomonas aeruginosa despite externalization of shunt   Now status post removal of the entire  shunt apparatus   Having fever and leukocytosis   Patient's temperatures come down since discontinuing the cefepime and starting the meropenem   Leukocytosis improved   Repeat CSF analysis looks fairly bland and the follow-up cultures are negative  -continue meropenem  -followup CSF culture  -recheck CBC with diff  -neurosurgery follow-up ongoing  -await repeat placement of  shunt apparatus next week     3   Ischemic bowel with peritonitis   Status post exploratory laparotomy, VAC placement   Status post femoral artery and SMA artery cannulation for paraverine by vascular surgery   Suspect intra-abdominal infection is etiology of #2   Status post second-look on  with no resection performed   Peritoneal fluid culture also shows Pseudomonas   Distal externalized shunt still has Pseudomonas aeruginosa   Now status post remove the entire  shunt apparatus  -antibiotic plan as above  -surgery follow-up     4  NPH requiring  shunt placement in , status post recent revision in 2019  Now status post removal of entire shunt apparatus   Possible eventual replacement of the apparatus     5  Lumbar spinal stenosis status post L5-S1 fusion on 2020      6  Diabetes mellitus with neuropathy      7  Rash-appears consistent with a drug eruption   Suspect secondary to the cefepime   Much improved since discontinuing the cefepime   -no additional cefepime  -antibiotics as above  -serial exams    Antibiotics:  Meropenem 5  Antibiotic 16   shunt removal 8    Subjective:  Patient has no fever, chills, sweats; no nausea, vomiting, diarrhea; no cough, shortness of breath; no pain  No new symptoms  He is quite sleepy although answer simple yes no questions by shaking his head  Objective:  Vitals:  Temp:  [97 7 °F (36 5 °C)-99 2 °F (37 3 °C)] 98 1 °F (36 7 °C)  HR:  [58-78] 58  Resp:  [16-19] 19  BP: (108-123)/(56-66) 123/64  SpO2:  [99 %-100 %] 100 %  Temp (24hrs), Av 4 °F (36 9 °C), Min:97 7 °F (36 5 °C), Max:99 2 °F (37 3 °C)  Current: Temperature: 98 1 °F (36 7 °C)    Physical Exam:   General Appearance:  Somnolent, arousable, answer simple yes no questions by shaking head, nontoxic, no acute distress  Throat: Oropharynx dry without lesions  Lungs:   Clear to auscultation bilaterally; no wheezes, rhonchi or rales; respirations unlabored   Heart:  RRR; no murmur, rub or gallop   Abdomen:   Soft, non-tender, non-distended, positive bowel sounds       Extremities: No clubbing, cyanosis or edema   Skin: No new rashes or lesions  No draining wounds noted  Rash almost completely resolved       Labs, Imaging, & Other studies:   All pertinent labs and imaging studies were personally reviewed  Results from last 7 days   Lab Units 08/15/20  0551 08/14/20  0554 08/13/20  0447   WBC Thousand/uL 9 88 13 13* 17 48*   HEMOGLOBIN g/dL 11 3* 11 6* 11 7*   PLATELETS Thousands/uL 253 310 324     Results from last 7 days   Lab Units 08/15/20  0551 08/14/20  0554 08/13/20  0447 08/12/20  0516 08/11/20  0521   SODIUM mmol/L 140 140 137 137 136  136   POTASSIUM mmol/L 4 2 4 0 4 1 4 3 4 2  4 2   CHLORIDE mmol/L 106 107 105 103 103  103   CO2 mmol/L 28 28 29 26 26  26   BUN mg/dL 15 18 18 17 19  19   CREATININE mg/dL 0 73 0 73 0 77 0 78 0 90  0 90   EGFR ml/min/1 73sq m 93 93 91 91 86  86   CALCIUM mg/dL 7 9* 7 8* 7 6* 8 1* 7 5*  7 5*   AST U/L  --   --   --  16 21   ALT U/L  --   --   --  25 32   ALK PHOS U/L  --   --   --  104 151*     Results from last 7 days   Lab Units 08/11/20  1627 08/10/20  1405 08/10/20  1356   BLOOD CULTURE   --  No Growth After 4 Days  No Growth After 4 Days     GRAM STAIN RESULT  No bacteria seen  3+ Polys  --   --      Results from last 7 days   Lab Units 08/14/20  0554 08/12/20  0516 08/11/20  0521 08/10/20  1355   PROCALCITONIN ng/ml 0 11 0 31* 0 76* <0 05

## 2020-08-15 NOTE — PLAN OF CARE
Problem: Potential for Falls  Goal: Patient will remain free of falls  Description: INTERVENTIONS:  - Assess patient frequently for physical needs  -  Identify cognitive and physical deficits and behaviors that affect risk of falls    -  [place] fall precautions as indicated by assessment   - Educate patient/family on patient safety including physical limitations  - Instruct patient to call for assistance with activity based on assessment  - Modify environment to reduce risk of injury  - Consider OT/PT consult to assist with strengthening/mobility  8/15/2020 1423 by Laura Odonnell RN  Outcome: Progressing  8/15/2020 1423 by Laura Odonnell RN  Outcome: Progressing     Problem: Prexisting or High Potential for Compromised Skin Integrity  Goal: Skin integrity is maintained or improved  Description: INTERVENTIONS:  - Identify patients at risk for skin breakdown  - Assess and monitor skin integrity  - Assess and monitor nutrition and hydration status  - Monitor labs   - Assess for incontinence   - Turn and reposition patient  - Assist with mobility/ambulation  - Relieve pressure over bony prominences  - Avoid friction and shearing  - Provide appropriate hygiene as needed including keeping skin clean and dry  - Evaluate need for skin moisturizer/barrier cream  - Collaborate with interdisciplinary team   - Patient/family teaching  - Consider wound care consult   8/15/2020 1423 by Laura Odonnell RN  Outcome: Progressing  8/15/2020 1423 by Laura Odonnell RN  Outcome: Progressing     Problem: PAIN - ADULT  Goal: Verbalizes/displays adequate comfort level or baseline comfort level  Description: Interventions:  - Encourage patient to monitor pain and request assistance  - Assess pain using appropriate pain scale  - Administer analgesics based on type and severity of pain and evaluate response  - Implement non-pharmacological measures as appropriate and evaluate response  - Consider cultural and social influences on pain and pain management  - Notify physician/advanced practitioner if interventions unsuccessful or patient reports new pain  8/15/2020 1423 by Ramona Mckee RN  Outcome: Progressing  8/15/2020 1423 by Ramona Mckee RN  Outcome: Progressing     Problem: SAFETY ADULT  Goal: Maintain or return to baseline ADL function  Description: INTERVENTIONS:  -  Assess patient's ability to carry out ADLs; assess patient's baseline for ADL function and identify physical deficits which impact ability to perform ADLs (bathing, care of mouth/teeth, toileting, grooming, dressing, etc )  - Assess/evaluate cause of self-care deficits   - Assess range of motion  - Assess patient's mobility; develop plan if impaired  - Assess patient's need for assistive devices and provide as appropriate  - Encourage maximum independence but intervene and supervise when necessary  - Involve family in performance of ADLs  - Assess for home care needs following discharge   - Consider OT consult to assist with ADL evaluation and planning for discharge  - Provide patient education as appropriate  8/15/2020 1423 by Ramona Mckee RN  Outcome: Progressing  8/15/2020 1423 by Ramona Mckee RN  Outcome: Progressing  Goal: Maintain or return mobility status to optimal level  Description: INTERVENTIONS:  - Assess patient's baseline mobility status (ambulation, transfers, stairs, etc )    - Identify cognitive and physical deficits and behaviors that affect mobility  - Identify mobility aids required to assist with transfers and/or ambulation (gait belt, sit-to-stand, lift, walker, cane, etc )  - [place] fall precautions as indicated by assessment  - Record patient progress and toleration of activity level on Mobility SBAR; progress patient to next Phase/Stage  - Instruct patient to call for assistance with activity based on assessment  - Consider rehabilitation consult to assist with strengthening/weightbearing, etc   8/15/2020 1423 by Ramona Mckee RN  Outcome: Progressing  8/15/2020 1423 by Luz Roach RN  Outcome: Progressing     Problem: GASTROINTESTINAL - ADULT  Goal: Minimal or absence of nausea and/or vomiting  Description: INTERVENTIONS:  - Administer IV fluids if ordered to ensure adequate hydration  - Maintain NPO status until nausea and vomiting are resolved  - Nasogastric tube if ordered  - Administer ordered antiemetic medications as needed  - Provide nonpharmacologic comfort measures as appropriate  - Advance diet as tolerated, if ordered  - Consider nutrition services referral to assist patient with adequate nutrition and appropriate food choices  8/15/2020 1423 by Luz Roach RN  Outcome: Progressing  8/15/2020 1423 by Luz Roach RN  Outcome: Progressing  Goal: Maintains or returns to baseline bowel function  Description: INTERVENTIONS:  - Assess bowel function  - Encourage oral fluids to ensure adequate hydration  - Administer IV fluids if ordered to ensure adequate hydration  - Administer ordered medications as needed  - Encourage mobilization and activity  - Consider nutritional services referral to assist patient with adequate nutrition and appropriate food choices  8/15/2020 1423 by Luz Roach RN  Outcome: Progressing  8/15/2020 1423 by Luz Roach RN  Outcome: Progressing  Goal: Maintains adequate nutritional intake  Description: INTERVENTIONS:  - Monitor percentage of each meal consumed  - Identify factors contributing to decreased intake, treat as appropriate  - Assist with meals as needed  - Monitor I&O, weight, and lab values if indicated  - Obtain nutrition services referral as needed  8/15/2020 1423 by Luz Roach RN  Outcome: Progressing  8/15/2020 1423 by Luz Roach RN  Outcome: Progressing     Problem: GENITOURINARY - ADULT  Goal: Maintains or returns to baseline urinary function  Description: INTERVENTIONS:  - Assess urinary function  - Encourage oral fluids to ensure adequate hydration if ordered  - Administer IV fluids as ordered to ensure adequate hydration  - Administer ordered medications as needed  - Offer frequent toileting  - Follow urinary retention protocol if ordered  8/15/2020 1423 by Migel Duron RN  Outcome: Progressing  8/15/2020 1423 by Migel Duron RN  Outcome: Progressing  Goal: Absence of urinary retention  Description: INTERVENTIONS:  - Assess patients ability to void and empty bladder  - Monitor I/O  - Bladder scan as needed  - Discuss with physician/AP medications to alleviate retention as needed  - Discuss catheterization for long term situations as appropriate  8/15/2020 1423 by Migel Duron RN  Outcome: Progressing  8/15/2020 1423 by Migel Duron RN  Outcome: Progressing     Problem: CONFUSION/THOUGHT DISTURBANCE  Goal: Thought disturbances (confusion, delirium, depression, dementia or psychosis) are managed to maintain or return to baseline mental status and functional level  Description: INTERVENTIONS:  - Assess for possible contributors to  thought disturbance, including but not limited to medications, infection, impaired vision or hearing, underlying metabolic abnormalities, dehydration, respiratory compromise,  psychiatric diagnoses and notify attending PHYSICAN/AP  - Monitor and intervene to maintain adequate nutrition, hydration, elimination, sleep and activity  - Decrease environmental stimuli, including noise as appropriate  - Provide frequent contacts to provide refocusing, direction and reassurance as needed  Approach patient calmly with eye contact and at their level    - [place] high risk fall precautions, aspiration precautions and other safety measures, as indicated  - If delirium suspected, notify physician/AP of change in condition and request immediate in-person evaluation  - Pursue consults as appropriate including Geriatric (campus dependent), OT for cognitive evaluation/activity planning, psychiatric, pastoral care, etc   8/15/2020 1423 by Migel Duron RN  Outcome: Progressing  8/15/2020 1423 by Migel Duron RN  Outcome: Progressing     Problem: Nutrition/Hydration-ADULT  Goal: Nutrient/Hydration intake appropriate for improving, restoring or maintaining nutritional needs  Description: Monitor and assess patient's nutrition/hydration status for malnutrition  Collaborate with interdisciplinary team and initiate plan and interventions as ordered  Monitor patient's weight and dietary intake as ordered or per policy  Utilize nutrition screening tool and intervene as necessary  Determine patient's food preferences and provide high-protein, high-caloric foods as appropriate       INTERVENTIONS:  - Monitor oral intake, urinary output, labs, and treatment plans  - Assess nutrition and hydration status and recommend course of action  - Evaluate amount of meals eaten  - Assist patient with eating if necessary   - Allow adequate time for meals  - Recommend/ encourage appropriate diets, oral nutritional supplements, and vitamin/mineral supplements  - Order, calculate, and assess calorie counts as needed  - Recommend, monitor, and adjust tube feedings and TPN/PPN based on assessed needs  - Assess need for intravenous fluids  - Provide specific nutrition/hydration education as appropriate  - Include patient/family/caregiver in decisions related to nutrition  8/15/2020 1423 by Migel Duron RN  Outcome: Progressing  8/15/2020 1423 by Migel Duron RN  Outcome: Progressing     Problem: RESPIRATORY - ADULT  Goal: Achieves optimal ventilation and oxygenation  Description: INTERVENTIONS:  - Assess for changes in respiratory status  - Assess for changes in mentation and behavior  - Position to facilitate oxygenation and minimize respiratory effort  - Oxygen administered by appropriate delivery if ordered  - Initiate smoking cessation education as indicated  - Encourage broncho-pulmonary hygiene including cough, deep breathe, Incentive Spirometry  - Assess the need for suctioning and aspirate as needed  - Assess and instruct to report SOB or any respiratory difficulty  - Respiratory Therapy support as indicated  8/15/2020 1423 by Roro Andersen RN  Outcome: Progressing  8/15/2020 1423 by Roro Andersen, RN  Outcome: Progressing     Problem: HEMATOLOGIC - ADULT  Goal: Maintains hematologic stability  Description: INTERVENTIONS  - Assess for signs and symptoms of bleeding or hemorrhage  - Monitor labs  - Administer supportive blood products/factors as ordered and appropriate  8/15/2020 1423 by Roro Andersen RN  Outcome: Progressing  8/15/2020 1423 by Roro Andersen, RN  Outcome: Progressing

## 2020-08-16 PROBLEM — T07.XXXA MULTIPLE WOUNDS: Status: ACTIVE | Noted: 2020-08-16

## 2020-08-16 PROBLEM — T85.730A INFECTION OF VP (VENTRICULOPERITONEAL) SHUNT (HCC): Status: ACTIVE | Noted: 2020-08-16

## 2020-08-16 PROBLEM — M48.061 LUMBAR STENOSIS: Status: ACTIVE | Noted: 2020-08-16

## 2020-08-16 PROBLEM — L27.0 DRUG RASH: Status: ACTIVE | Noted: 2020-08-16

## 2020-08-16 PROBLEM — R65.20 SEVERE SEPSIS (HCC): Status: ACTIVE | Noted: 2020-08-16

## 2020-08-16 PROBLEM — A41.9 SEVERE SEPSIS (HCC): Status: ACTIVE | Noted: 2020-08-16

## 2020-08-16 PROBLEM — R13.10 DYSPHAGIA: Status: ACTIVE | Noted: 2020-08-16

## 2020-08-16 LAB
GLUCOSE SERPL-MCNC: 110 MG/DL (ref 65–140)
GLUCOSE SERPL-MCNC: 119 MG/DL (ref 65–140)
GLUCOSE SERPL-MCNC: 137 MG/DL (ref 65–140)
GLUCOSE SERPL-MCNC: 152 MG/DL (ref 65–140)

## 2020-08-16 PROCEDURE — 82948 REAGENT STRIP/BLOOD GLUCOSE: CPT

## 2020-08-16 PROCEDURE — 99232 SBSQ HOSP IP/OBS MODERATE 35: CPT | Performed by: INTERNAL MEDICINE

## 2020-08-16 PROCEDURE — 99232 SBSQ HOSP IP/OBS MODERATE 35: CPT | Performed by: PHYSICIAN ASSISTANT

## 2020-08-16 RX ADMIN — MEROPENEM 2000 MG: 1 INJECTION, POWDER, FOR SOLUTION INTRAVENOUS at 17:35

## 2020-08-16 RX ADMIN — MICONAZOLE NITRATE: 20 CREAM TOPICAL at 17:35

## 2020-08-16 RX ADMIN — MEROPENEM 2000 MG: 1 INJECTION, POWDER, FOR SOLUTION INTRAVENOUS at 08:36

## 2020-08-16 RX ADMIN — HEPARIN SODIUM 5000 UNITS: 5000 INJECTION INTRAVENOUS; SUBCUTANEOUS at 05:31

## 2020-08-16 RX ADMIN — HEPARIN SODIUM 5000 UNITS: 5000 INJECTION INTRAVENOUS; SUBCUTANEOUS at 22:23

## 2020-08-16 RX ADMIN — HEPARIN SODIUM 5000 UNITS: 5000 INJECTION INTRAVENOUS; SUBCUTANEOUS at 15:09

## 2020-08-16 RX ADMIN — MEROPENEM 2000 MG: 1 INJECTION, POWDER, FOR SOLUTION INTRAVENOUS at 02:11

## 2020-08-16 RX ADMIN — MICONAZOLE NITRATE: 20 CREAM TOPICAL at 08:36

## 2020-08-16 NOTE — ASSESSMENT & PLAN NOTE
· As evidenced by tachycardia, leukocytosis, elevated lactic acidosis, and fevers  · Secondary to  shunt infection with meningitis and peritonitis  CSF culture grew Pseudomonas   · ID following - I discussed with Dr Chinedu Goodman today  · Now  shunt has been removed  · Repeat CSF cultures negative  · On meropenem   Had been on cefepime but this was discontinued due to development of rash

## 2020-08-16 NOTE — PROGRESS NOTES
Progress Note - Boone Ruffin 1950, 69 y o  male MRN: [id number]    Unit/Bed#: [facility] 629-01 Encounter: [id number]    Primary Care Provider: Addis Ortega   Date and time admitted to hospital: 7/30/2020 11:12 PM        * Severe sepsis [facility])  Assessment & Plan  · As evidenced by tachycardia, leukocytosis, elevated lactic acidosis, and fevers  · Secondary to  shunt infection with meningitis and peritonitis  CSF culture grew Pseudomonas   · ID following - I discussed with Dr Ben De Dios today  · Now  shunt has been removed  · Repeat CSF cultures negative  · On meropenem  Had been on cefepime but this was discontinued due to development of rash    Infection of  (ventriculoperitoneal) shunt (HCC)  Assessment & Plan  · CSF culture grew Pseudomonas   ·  shunt was externalized, however repeat CSF still grew pseudomonas    shunt was later removed 8/7/2020  · Repeat CSF cultures negative  · ID following  · On IV Meropenem  · Neurosurgery following peripherally - planning for replacement of  shunt this week    Nonocclusive mesenteric ischemia (HCC)  Assessment & Plan  · S/p ex lap, arteriogram of SMA, papavarine infusion, exploration of SMA, angiogram SMA on 7/31/2020 and repeat ex lap 8/1/2020  · Per General Surgery    Normal pressure hydrocephalus ([address] )  Assessment & Plan  · S/p  shunt placement in 2005  · Now s/p removal of shunt apparatus given the above     Lumbar stenosis  Assessment & Plan  · S/p L5-S1 fusion by Neurosurgery on 7/6/2020    Drug rash  Assessment & Plan  · Developed prior, was suspected to be 2/2 cefepime which has since been discontinued     Type 2 diabetes mellitus [facility])  Assessment & Plan  Lab Results   Component Value Date    HGBA1C 5 6 07/31/2020       Recent Labs     08/15/20  1709 08/15/20  2127 08/16/20  0738 08/16/20  1131   POCGLU 123 113 119 152*       Blood Sugar Average: Last 72 hrs:  (P) 130 5     · Continue SSI coverage    Essential hypertension  Assessment & Plan  · BP controlled now off antihypertensives     Multiple wounds  Assessment & Plan  · Wound care per Wound Care team recommendations     ZHOU on CPAP  Assessment & Plan  · Was discontinued by RT given patient's refusal     Dysphagia  Assessment & Plan  · Continue modified diet per most recent SLP recs: pureed with NTL      VTE Pharmacologic Prophylaxis:   Pharmacologic: Heparin  Mechanical VTE Prophylaxis in Place: Yes    Patient Centered Rounds: I have performed bedside rounds with nursing staff today  Discussions with Specialists or Other Care Team Provider: neurosurgery AP    Education and Discussions with Family / Patient: patient and his brother, Marlene Mcintyre, over the phone     Time Spent for Care: 45 minutes  More than 50% of total time spent on counseling and coordination of care as described above  Current Length of Stay: 16 day(s)    Current Patient Status: Inpatient   Certification Statement: The patient will continue to require additional inpatient hospital stay due to continued IV abx, pending neurosurgical procedure    Discharge Plan: pending neurosurgical procedure, patient remains on IV abx, not medically stable for d/c    Code Status: Level 1 - Full Code      Subjective:   Mr David Katz is soft spoken and largely speaks in one word answers  He reports feeling tired  He denies pain    Objective:     Vitals:   Temp (24hrs), Av 6 °F (37 °C), Min:97 7 °F (36 5 °C), Max:99 2 °F (37 3 °C)    Temp:  [97 7 °F (36 5 °C)-99 2 °F (37 3 °C)] 98 4 °F (36 9 °C)  HR:  [57-68] 62  Resp:  [16-18] 18  BP: (115-125)/(50-67) 116/61  SpO2:  [98 %-100 %] 100 %  Body mass index is 32 04 kg/m²  Input and Output Summary (last 24 hours): Intake/Output Summary (Last 24 hours) at 2020 1509  Last data filed at 2020 1414  Gross per 24 hour   Intake 785 ml   Output 668 ml   Net 117 ml       Physical Exam:     Physical Exam  Vitals signs reviewed  Constitutional:       General: He is not in acute distress  Appearance: He is not diaphoretic  Comments: Patient seen lying in bed sleeping when I got to the room and he awoke to a knock on his door   Cardiovascular:      Rate and Rhythm: Normal rate and regular rhythm  Pulmonary:      Effort: Pulmonary effort is normal  No respiratory distress  Breath sounds: Normal breath sounds  No wheezing  Abdominal:      Palpations: Abdomen is soft  Tenderness: There is no abdominal tenderness  Musculoskeletal:         General: No edema  Skin:     General: Skin is warm  Neurological:      Mental Status: He is alert  Comments: Soft spoken  Oriented to person and place but states the year is "two zero    I don't know"   Psychiatric:         Behavior: Behavior normal          Additional Data:     Labs:    Results from last 7 days   Lab Units 08/15/20  0551  08/12/20  0516 08/11/20  0521   WBC Thousand/uL 9 88   < > 26 35* 26 24*   HEMOGLOBIN g/dL 11 3*   < > 12 3 12 1   HEMATOCRIT % 35 9*   < > 37 7 37 7   PLATELETS Thousands/uL 253   < > 348 286   BANDS PCT %  --   --  4  --    NEUTROS PCT %  --   --   --  88*   LYMPHS PCT %  --   --   --  3*   LYMPHO PCT %  --   --  4*  --    MONOS PCT %  --   --   --  6   MONO PCT %  --   --  5  --    EOS PCT %  --   --  3 1    < > = values in this interval not displayed  Results from last 7 days   Lab Units 08/15/20  0551  08/12/20  0516   SODIUM mmol/L 140   < > 137   POTASSIUM mmol/L 4 2   < > 4 3   CHLORIDE mmol/L 106   < > 103   CO2 mmol/L 28   < > 26   BUN mg/dL 15   < > 17   CREATININE mg/dL 0 73   < > 0 78   ANION GAP mmol/L 6   < > 8   CALCIUM mg/dL 7 9*   < > 8 1*   ALBUMIN g/dL  --   --  2 0*   TOTAL BILIRUBIN mg/dL  --   --  0 61   ALK PHOS U/L  --   --  104   ALT U/L  --   --  25   AST U/L  --   --  16   GLUCOSE RANDOM mg/dL 110   < > 112    < > = values in this interval not displayed       Results from last 7 days   Lab Units 08/11/20  1154   INR  1 21*     Results from last 7 days   Lab Units 08/16/20  1131 08/16/20  0738 08/15/20  2127 08/15/20  1709 08/15/20  1112 08/15/20  0742 08/14/20  2111 08/14/20  1714 08/14/20  1141 08/14/20  0721 08/13/20  2118 08/13/20  1710   POC GLUCOSE mg/dl 152* 119 113 123 154* 108 145* 108 128 128 155* 112         Results from last 7 days   Lab Units 08/14/20  0554 08/12/20  0516 08/11/20  0521 08/10/20  1355   PROCALCITONIN ng/ml 0 11 0 31* 0 76* <0 05           * I Have Reviewed All Lab Data Listed Above  * Additional Pertinent Lab Tests Reviewed: All Labs Within Last 24 Hours Reviewed    Imaging:    Imaging Reports Reviewed Today Include: CT ab/pelv, CT head  Imaging Personally Reviewed by Myself Includes:  none    Recent Cultures (last 7 days):     Results from last 7 days   Lab Units 08/11/20  1627 08/10/20  1405 08/10/20  1356   BLOOD CULTURE   --  No Growth After 5 Days  No Growth After 5 Days     GRAM STAIN RESULT  No bacteria seen  3+ Polys  --   --        Last 24 Hours Medication List:   Current Facility-Administered Medications   Medication Dose Route Frequency Provider Last Rate    acetaminophen  325 mg Rectal Q6H PRN AR Valenzuela      acetaminophen  650 mg Oral Q6H PRN RA Valenzuela      barium  900 mL Oral 90 min pre-procedure Lucky Mortimer, PA-C      diphenhydrAMINE  25 mg Intravenous Q6H PRN AR Valenzuela      heparin (porcine)  5,000 Units Subcutaneous Q8H [address] Lucky Mortimer, PA-C      hydrocortisone   Topical 4x Daily PRN AR Valenzuela      insulin lispro  2-12 Units Subcutaneous 4x Daily (AC & HS) AR Valenzuela      iodixanol  15 mL Intravenous Once in imaging AR Valenzuela      meropenem  2,000 mg Intravenous Q8H Orlando Hicks MD 2,000 mg (08/16/20 0836)   [place] VERONICA ANTIFUNGAL   Topical BID Lucky Mortimer, PA-C      oxyCODONE  5 mg Oral Q4H PRN AR Simons          Today, Patient Was Seen By: Shoshana Coto PA-C    ** Please Note: Dictation voice to text software may have been used in the creation of this document   **

## 2020-08-16 NOTE — PLAN OF CARE
Problem: Potential for Falls  Goal: Patient will remain free of falls  Description: INTERVENTIONS:  - Assess patient frequently for physical needs  -  Identify cognitive and physical deficits and behaviors that affect risk of falls    -  [place] fall precautions as indicated by assessment   - Educate patient/family on patient safety including physical limitations  - Instruct patient to call for assistance with activity based on assessment  - Modify environment to reduce risk of injury  - Consider OT/PT consult to assist with strengthening/mobility  Outcome: Progressing     Problem: Prexisting or High Potential for Compromised Skin Integrity  Goal: Skin integrity is maintained or improved  Description: INTERVENTIONS:  - Identify patients at risk for skin breakdown  - Assess and monitor skin integrity  - Assess and monitor nutrition and hydration status  - Monitor labs   - Assess for incontinence   - Turn and reposition patient  - Assist with mobility/ambulation  - Relieve pressure over bony prominences  - Avoid friction and shearing  - Provide appropriate hygiene as needed including keeping skin clean and dry  - Evaluate need for skin moisturizer/barrier cream  - Collaborate with interdisciplinary team   - Patient/family teaching  - Consider wound care consult   Outcome: Progressing     Problem: PAIN - ADULT  Goal: Verbalizes/displays adequate comfort level or baseline comfort level  Description: Interventions:  - Encourage patient to monitor pain and request assistance  - Assess pain using appropriate pain scale  - Administer analgesics based on type and severity of pain and evaluate response  - Implement non-pharmacological measures as appropriate and evaluate response  - Consider cultural and social influences on pain and pain management  - Notify physician/advanced practitioner if interventions unsuccessful or patient reports new pain  Outcome: Progressing     Problem: SAFETY ADULT  Goal: Maintain or return to baseline ADL function  Description: INTERVENTIONS:  -  Assess patient's ability to carry out ADLs; assess patient's baseline for ADL function and identify physical deficits which impact ability to perform ADLs (bathing, care of mouth/teeth, toileting, grooming, dressing, etc )  - Assess/evaluate cause of self-care deficits   - Assess range of motion  - Assess patient's mobility; develop plan if impaired  - Assess patient's need for assistive devices and provide as appropriate  - Encourage maximum independence but intervene and supervise when necessary  - Involve family in performance of ADLs  - Assess for home care needs following discharge   - Consider OT consult to assist with ADL evaluation and planning for discharge  - Provide patient education as appropriate  Outcome: Progressing  Goal: Maintain or return mobility status to optimal level  Description: INTERVENTIONS:  - Assess patient's baseline mobility status (ambulation, transfers, stairs, etc )    - Identify cognitive and physical deficits and behaviors that affect mobility  - Identify mobility aids required to assist with transfers and/or ambulation (gait belt, sit-to-stand, lift, walker, cane, etc )  - [place] fall precautions as indicated by assessment  - Record patient progress and toleration of activity level on Mobility SBAR; progress patient to next Phase/Stage  - Instruct patient to call for assistance with activity based on assessment  - Consider rehabilitation consult to assist with strengthening/weightbearing, etc   Outcome: Progressing     Problem: GASTROINTESTINAL - ADULT  Goal: Minimal or absence of nausea and/or vomiting  Description: INTERVENTIONS:  - Administer IV fluids if ordered to ensure adequate hydration  - Maintain NPO status until nausea and vomiting are resolved  - Nasogastric tube if ordered  - Administer ordered antiemetic medications as needed  - Provide nonpharmacologic comfort measures as appropriate  - Advance diet as tolerated, if ordered  - Consider nutrition services referral to assist patient with adequate nutrition and appropriate food choices  Outcome: Progressing  Goal: Maintains or returns to baseline bowel function  Description: INTERVENTIONS:  - Assess bowel function  - Encourage oral fluids to ensure adequate hydration  - Administer IV fluids if ordered to ensure adequate hydration  - Administer ordered medications as needed  - Encourage mobilization and activity  - Consider nutritional services referral to assist patient with adequate nutrition and appropriate food choices  Outcome: Progressing  Goal: Maintains adequate nutritional intake  Description: INTERVENTIONS:  - Monitor percentage of each meal consumed  - Identify factors contributing to decreased intake, treat as appropriate  - Assist with meals as needed  - Monitor I&O, weight, and lab values if indicated  - Obtain nutrition services referral as needed  Outcome: Progressing     Problem: GENITOURINARY - ADULT  Goal: Maintains or returns to baseline urinary function  Description: INTERVENTIONS:  - Assess urinary function  - Encourage oral fluids to ensure adequate hydration if ordered  - Administer IV fluids as ordered to ensure adequate hydration  - Administer ordered medications as needed  - Offer frequent toileting  - Follow urinary retention protocol if ordered  Outcome: Progressing  Goal: Absence of urinary retention  Description: INTERVENTIONS:  - Assess patients ability to void and empty bladder  - Monitor I/O  - Bladder scan as needed  - Discuss with physician/AP medications to alleviate retention as needed  - Discuss catheterization for long term situations as appropriate  Outcome: Progressing     Problem: CONFUSION/THOUGHT DISTURBANCE  Goal: Thought disturbances (confusion, delirium, depression, dementia or psychosis) are managed to maintain or return to baseline mental status and functional level  Description: INTERVENTIONS:  - Assess for possible contributors to  thought disturbance, including but not limited to medications, infection, impaired vision or hearing, underlying metabolic abnormalities, dehydration, respiratory compromise,  psychiatric diagnoses and notify attending PHYSICAN/AP  - Monitor and intervene to maintain adequate nutrition, hydration, elimination, sleep and activity  - Decrease environmental stimuli, including noise as appropriate  - Provide frequent contacts to provide refocusing, direction and reassurance as needed  Approach patient calmly with eye contact and at their level  - [place] high risk fall precautions, aspiration precautions and other safety measures, as indicated  - If delirium suspected, notify physician/AP of change in condition and request immediate in-person evaluation  - Pursue consults as appropriate including Geriatric (campus dependent), OT for cognitive evaluation/activity planning, psychiatric, pastoral care, etc   Outcome: Progressing     Problem: Nutrition/Hydration-ADULT  Goal: Nutrient/Hydration intake appropriate for improving, restoring or maintaining nutritional needs  Description: Monitor and assess patient's nutrition/hydration status for malnutrition  Collaborate with interdisciplinary team and initiate plan and interventions as ordered  Monitor patient's weight and dietary intake as ordered or per policy  Utilize nutrition screening tool and intervene as necessary  Determine patient's food preferences and provide high-protein, high-caloric foods as appropriate       INTERVENTIONS:  - Monitor oral intake, urinary output, labs, and treatment plans  - Assess nutrition and hydration status and recommend course of action  - Evaluate amount of meals eaten  - Assist patient with eating if necessary   - Allow adequate time for meals  - Recommend/ encourage appropriate diets, oral nutritional supplements, and vitamin/mineral supplements  - Order, calculate, and assess calorie counts as needed  - Recommend, monitor, and adjust tube feedings and TPN/PPN based on assessed needs  - Assess need for intravenous fluids  - Provide specific nutrition/hydration education as appropriate  - Include patient/family/caregiver in decisions related to nutrition  Outcome: Progressing     Problem: RESPIRATORY - ADULT  Goal: Achieves optimal ventilation and oxygenation  Description: INTERVENTIONS:  - Assess for changes in respiratory status  - Assess for changes in mentation and behavior  - Position to facilitate oxygenation and minimize respiratory effort  - Oxygen administered by appropriate delivery if ordered  - Initiate smoking cessation education as indicated  - Encourage broncho-pulmonary hygiene including cough, deep breathe, Incentive Spirometry  - Assess the need for suctioning and aspirate as needed  - Assess and instruct to report SOB or any respiratory difficulty  - Respiratory Therapy support as indicated  Outcome: Progressing     Problem: HEMATOLOGIC - ADULT  Goal: Maintains hematologic stability  Description: INTERVENTIONS  - Assess for signs and symptoms of bleeding or hemorrhage  - Monitor labs  - Administer supportive blood products/factors as ordered and appropriate  Outcome: Progressing

## 2020-08-16 NOTE — ASSESSMENT & PLAN NOTE
· S/p ex lap, arteriogram of SMA, papavarine infusion, exploration of SMA, angiogram SMA on 7/31/2020 and repeat ex lap 8/1/2020  · Per General Surgery

## 2020-08-16 NOTE — NUTRITION
08/16/20 1300   Meal/Snack Calories (Kcals)   Meal Breakfast;Lunch;Dinner  (Day 1 calorie count results from 8/15/20)   Meal/Snack Calories (Kcals) 1067 kcals   Meal/Snack Protein (g) 30 g   Calorie Count Supplements   Supplement Calories (Kcals) 0 Kcals   Supplement Protein (g) 0 g   24 HR Nutrition Analysis Totals   24 HR Total Calories (kcals) 1067 kcals   24 Hr Total Protein (g) 30 g   Patient meeting 48% kcal needs, 28% Pro needs

## 2020-08-16 NOTE — ASSESSMENT & PLAN NOTE
· CSF culture grew Pseudomonas   ·  shunt was externalized, however repeat CSF still grew pseudomonas    shunt was later removed 8/7/2020  · Repeat CSF cultures negative  · ID following  · On IV Meropenem  · Neurosurgery following peripherally - planning for replacement of  shunt this week

## 2020-08-16 NOTE — PROGRESS NOTES
Progress Note - Infectious Disease   All Jacob 69 y o  male MRN: [id number]  [facility]: [id number]      Impression/Plan:  1  Severe sepsis, tachycardia, leukocytosis, elevated lactic acid   With early development of high fevers  Secondary to  shunt infection with meningitis and peritonitis  Not on vasopressors   Blood cultures are negative thus far   Patient's fever and leukocytosis had improved but now he is having fever and rising white cell count once again   May all be secondary to the developing drug eruption as below   All the  shunt apparatus has been removed   Repeat blood cultures negative thus far  Repeat CSF cultures negative  The white cell count has come down and the temperature has decreased  -antibiotic plan as below  -monitor temperatures and hemodynamics  -recheck CBC with diff and and CMP  -follow-up repeat blood cultures  -supportive care      2   shunt infection  Fluid WBC count was 61 with neutrophil predominance  Gram stain from shunt fluid shows GNRs and culture grew Pseudomonas    Suspect secondary to peritonitis in the setting of #3   Status post externalization of  shunt on 07/30   Repeat CSF analysis shows slightly decreased neutrophil predominant pleocytosis   Repeat CSF fluid from the distal externalized shunt still with Pseudomonas aeruginosa despite externalization of shunt   Now status post removal of the entire  shunt apparatus   Having fever and leukocytosis   Patient's temperatures come down since discontinuing the cefepime and starting the meropenem   Leukocytosis improved   Repeat CSF analysis looks fairly bland and the follow-up cultures are negative  -continue meropenem  -followup CSF culture  -recheck CBC with diff  -neurosurgery follow-up ongoing  -await repeat placement of  shunt apparatus later this week     3   Ischemic bowel with peritonitis   Status post exploratory laparotomy, VAC placement   Status post femoral artery and SMA artery cannulation for paraverine by vascular surgery   Suspect intra-abdominal infection is etiology of #2   Status post second-look on  with no resection performed   Peritoneal fluid culture also shows Pseudomonas   Distal externalized shunt still has Pseudomonas aeruginosa   Now status post remove the entire  shunt apparatus  -antibiotic plan as above  -surgery follow-up     4  NPH requiring  shunt placement in , status post recent revision in 2019  Now status post removal of entire shunt apparatus   Patient for replacement of apparatus this week by report     5  Lumbar spinal stenosis status post L5-S1 fusion on 2020      6  Diabetes mellitus with neuropathy      7  Rash-appears consistent with a drug eruption   Suspect secondary to the cefepime  Resolving since discontinuing the cefepime  -no additional cefepime  -antibiotics as above  -serial exams    Discussed the above management plan with the primary service    Antibiotics:  Meropenem 5  Antibiotic 16   shunt removal 8    Subjective:  Patient has no fever, chills, sweats; no nausea, vomiting, diarrhea; no cough, shortness of breath; no pain  No new symptoms  He seems a bit more interactive today    Objective:  Vitals:  Temp:  [97 7 °F (36 5 °C)-99 2 °F (37 3 °C)] 98 2 °F (36 8 °C)  HR:  [57-68] 57  Resp:  [16-18] 16  BP: (115-125)/(50-67) 115/59  SpO2:  [98 %-100 %] 100 %  Temp (24hrs), Av 5 °F (36 9 °C), Min:97 7 °F (36 5 °C), Max:99 2 °F (37 3 °C)  Current: Temperature: 98 2 °F (36 8 °C)    Physical Exam:   General Appearance:  Somnolent, easily arousable, nontoxic, no acute distress  Throat: Oropharynx moist without lesions  Lungs:   Decreased breath sounds bilaterally; no wheezes, rhonchi or rales; respirations unlabored   Heart:  Bradycardia; no murmur, rub or gallop   Abdomen:   Soft, non-tender, non-distended, positive bowel sounds    Incision without erythema or drainage     Extremities: No clubbing, cyanosis or edema   Skin: No new rashes or lesions  No draining wounds noted  Labs, Imaging, & Other studies:   All pertinent labs and imaging studies were personally reviewed  Results from last 7 days   Lab Units 08/15/20  0551 08/14/20  0554 08/13/20  0447   WBC Thousand/uL 9 88 13 13* 17 48*   HEMOGLOBIN g/dL 11 3* 11 6* 11 7*   PLATELETS Thousands/uL 253 310 324     Results from last 7 days   Lab Units 08/15/20  0551 08/14/20  0554 08/13/20  0447 08/12/20  0516 08/11/20  0521   SODIUM mmol/L 140 140 137 137 136  136   POTASSIUM mmol/L 4 2 4 0 4 1 4 3 4 2  4 2   CHLORIDE mmol/L 106 107 105 103 103  103   CO2 mmol/L 28 28 29 26 26  26   BUN mg/dL 15 18 18 17 19  19   CREATININE mg/dL 0 73 0 73 0 77 0 78 0 90  0 90   EGFR ml/min/1 73sq m 93 93 91 91 86  86   CALCIUM mg/dL 7 9* 7 8* 7 6* 8 1* 7 5*  7 5*   AST U/L  --   --   --  16 21   ALT U/L  --   --   --  25 32   ALK PHOS U/L  --   --   --  104 151*     Results from last 7 days   Lab Units 08/11/20  1627 08/10/20  1405 08/10/20  1356   BLOOD CULTURE   --  No Growth After 5 Days  No Growth After 5 Days     GRAM STAIN RESULT  No bacteria seen  3+ Polys  --   --      Results from last 7 days   Lab Units 08/14/20  0554 08/12/20  0516 08/11/20  0521 08/10/20  1355   PROCALCITONIN ng/ml 0 11 0 31* 0 76* <0 05

## 2020-08-16 NOTE — ASSESSMENT & PLAN NOTE
Lab Results   Component Value Date    HGBA1C 5 6 07/31/2020       Recent Labs     08/15/20  1709 08/15/20  2127 08/16/20  0738 08/16/20  1131   POCGLU 123 113 119 152*       Blood Sugar Average: Last 72 hrs:  (P) 130 5     · Continue SSI coverage

## 2020-08-17 LAB
ANION GAP SERPL CALCULATED.3IONS-SCNC: 4 MMOL/L (ref 4–13)
BUN SERPL-MCNC: 14 MG/DL (ref 5–25)
CALCIUM SERPL-MCNC: 8.4 MG/DL (ref 8.3–10.1)
CHLORIDE SERPL-SCNC: 103 MMOL/L (ref 100–108)
CO2 SERPL-SCNC: 31 MMOL/L (ref 21–32)
CREAT SERPL-MCNC: 0.64 MG/DL (ref 0.6–1.3)
ERYTHROCYTE [DISTWIDTH] IN BLOOD BY AUTOMATED COUNT: 14.7 % (ref 11.6–15.1)
GFR SERPL CREATININE-BSD FRML MDRD: 99 ML/MIN/1.73SQ M
GLUCOSE SERPL-MCNC: 111 MG/DL (ref 65–140)
GLUCOSE SERPL-MCNC: 119 MG/DL (ref 65–140)
GLUCOSE SERPL-MCNC: 120 MG/DL (ref 65–140)
GLUCOSE SERPL-MCNC: 122 MG/DL (ref 65–140)
GLUCOSE SERPL-MCNC: 123 MG/DL (ref 65–140)
HCT VFR BLD AUTO: 39.3 % (ref 36.5–49.3)
HGB BLD-MCNC: 12.1 G/DL (ref 12–17)
MCH RBC QN AUTO: 27.1 PG (ref 26.8–34.3)
MCHC RBC AUTO-ENTMCNC: 30.8 G/DL (ref 31.4–37.4)
MCV RBC AUTO: 88 FL (ref 82–98)
PLATELET # BLD AUTO: 248 THOUSANDS/UL (ref 149–390)
PMV BLD AUTO: 10.3 FL (ref 8.9–12.7)
POTASSIUM SERPL-SCNC: 4.2 MMOL/L (ref 3.5–5.3)
RBC # BLD AUTO: 4.46 MILLION/UL (ref 3.88–5.62)
SODIUM SERPL-SCNC: 138 MMOL/L (ref 136–145)
WBC # BLD AUTO: 9.49 THOUSAND/UL (ref 4.31–10.16)

## 2020-08-17 PROCEDURE — 97530 THERAPEUTIC ACTIVITIES: CPT

## 2020-08-17 PROCEDURE — 85027 COMPLETE CBC AUTOMATED: CPT | Performed by: PHYSICIAN ASSISTANT

## 2020-08-17 PROCEDURE — 80048 BASIC METABOLIC PNL TOTAL CA: CPT | Performed by: PHYSICIAN ASSISTANT

## 2020-08-17 PROCEDURE — 97535 SELF CARE MNGMENT TRAINING: CPT

## 2020-08-17 PROCEDURE — 99232 SBSQ HOSP IP/OBS MODERATE 35: CPT | Performed by: PHYSICIAN ASSISTANT

## 2020-08-17 PROCEDURE — 99232 SBSQ HOSP IP/OBS MODERATE 35: CPT | Performed by: INTERNAL MEDICINE

## 2020-08-17 PROCEDURE — 97110 THERAPEUTIC EXERCISES: CPT

## 2020-08-17 PROCEDURE — 92526 ORAL FUNCTION THERAPY: CPT

## 2020-08-17 PROCEDURE — 82948 REAGENT STRIP/BLOOD GLUCOSE: CPT

## 2020-08-17 RX ADMIN — MEROPENEM 2000 MG: 1 INJECTION, POWDER, FOR SOLUTION INTRAVENOUS at 18:00

## 2020-08-17 RX ADMIN — HEPARIN SODIUM 5000 UNITS: 5000 INJECTION INTRAVENOUS; SUBCUTANEOUS at 14:17

## 2020-08-17 RX ADMIN — MEROPENEM 2000 MG: 1 INJECTION, POWDER, FOR SOLUTION INTRAVENOUS at 02:38

## 2020-08-17 RX ADMIN — OXYCODONE HYDROCHLORIDE 5 MG: 5 TABLET ORAL at 20:53

## 2020-08-17 RX ADMIN — MEROPENEM 2000 MG: 1 INJECTION, POWDER, FOR SOLUTION INTRAVENOUS at 09:50

## 2020-08-17 RX ADMIN — HEPARIN SODIUM 5000 UNITS: 5000 INJECTION INTRAVENOUS; SUBCUTANEOUS at 05:14

## 2020-08-17 RX ADMIN — MICONAZOLE NITRATE: 20 CREAM TOPICAL at 09:50

## 2020-08-17 RX ADMIN — MICONAZOLE NITRATE: 20 CREAM TOPICAL at 18:01

## 2020-08-17 RX ADMIN — HEPARIN SODIUM 5000 UNITS: 5000 INJECTION INTRAVENOUS; SUBCUTANEOUS at 21:05

## 2020-08-17 RX ADMIN — OXYCODONE HYDROCHLORIDE 5 MG: 5 TABLET ORAL at 14:24

## 2020-08-17 NOTE — ASSESSMENT & PLAN NOTE
· As evidenced by tachycardia, leukocytosis, elevated lactic acidosis, and fevers  · Secondary to  shunt infection with meningitis and peritonitis  CSF culture grew Pseudomonas   · ID following - I discussed with Dr Jean Brewer today  · Now  shunt has been removed  · Repeat CSF cultures negative  · On meropenem   Had been on cefepime but this was discontinued due to development of rash

## 2020-08-17 NOTE — NUTRITION
08/17/20 1409   Recommendations/Interventions   Summary Day 2 calorie count results x3 meals: 690 calories (31% needs) and 16 grams protein (15% needs)  Patient's calorie count results are poor, appetite remains suboptimal  Patient is agreeable to mightyshake supplements and continuing magic cup ice cream  Multiple wounds noted  Malnutrition/BMI Present No  (Patient does not meet 2 criteria at this time)   Interventions Diet: continued as ordered; Supplement adjust  (Honeyshake TID ordered )   Nutrition Recommendations Continue diet as ordered; Other (specify)  (If appetite remains <50% over the next 48 hours & aggressive nutrition measures indicated, initiate nocturnal EN to provide 50% nutritional needs  Rec: Jevity 1 2 kcal @ 80 ml/hr x12 hours (7p-7a)   Obtain weekly weight )

## 2020-08-17 NOTE — ASSESSMENT & PLAN NOTE
· Continue modified diet per most recent SLP recs: pureed with NTL   OK for diet today per Neurosurgery  · Aspiration precautions

## 2020-08-17 NOTE — PROGRESS NOTES
Progress Note - Luly Nava 1950, 69 y o  male MRN: [id number]    Unit/Bed#: [facility] 629-01 Encounter: [id number]    Primary Care Provider: Conor Orellana   Date and time admitted to hospital: 7/30/2020 11:12 PM        * Severe sepsis [facility])  Assessment & Plan  · As evidenced by tachycardia, leukocytosis, elevated lactic acidosis, and fevers  · Secondary to  shunt infection with meningitis and peritonitis  CSF culture grew Pseudomonas   · ID following - I discussed with Dr Chinedu Goodman today  · Now  shunt has been removed  · Repeat CSF cultures negative  · On meropenem  Had been on cefepime but this was discontinued due to development of rash    Infection of  (ventriculoperitoneal) shunt (HCC)  Assessment & Plan  · CSF culture grew Pseudomonas   ·  shunt was externalized, however repeat CSF still grew pseudomonas    shunt was later removed 8/7/2020  · Repeat CSF cultures negative  · ID following  · On IV Meropenem  · Neurosurgery following, I discussed with them today - planning for replacement of  shunt this week, OK for diet today    Nonocclusive mesenteric ischemia (HCC)  Assessment & Plan  · S/p ex lap, arteriogram of SMA, papavarine infusion, exploration of SMA, angiogram SMA on 7/31/2020 and repeat ex lap 8/1/2020  · Per General Surgery    Normal pressure hydrocephalus ([address] )  Assessment & Plan  · S/p  shunt placement in 2005  · Now s/p removal of shunt apparatus given the above     Lumbar stenosis  Assessment & Plan  · S/p L5-S1 fusion by Neurosurgery on 7/6/2020    Drug rash  Assessment & Plan  · Developed prior, was suspected to be 2/2 cefepime which has since been discontinued     Type 2 diabetes mellitus [facility])  Assessment & Plan  Lab Results   Component Value Date    HGBA1C 5 6 07/31/2020       Recent Labs     08/16/20  1131 08/16/20  1659 08/16/20  2108 08/17/20  0832   POCGLU 152* 110 137 123       Blood Sugar Average: Last 72 hrs:  (P) 126 [id number]     · Continue SSI coverage    Essential hypertension  Assessment & Plan  · BP controlled now off antihypertensives     Multiple wounds  Assessment & Plan  · Wound care per Wound Care team recommendations     ZHOU on CPAP  Assessment & Plan  · Was discontinued by RT given patient's refusal     Dysphagia  Assessment & Plan  · Continue modified diet per most recent SLP recs: pureed with NTL  OK for diet today per Neurosurgery  · Aspiration precautions       VTE Pharmacologic Prophylaxis:   Pharmacologic: Heparin  Mechanical VTE Prophylaxis in Place: Yes    Patient Centered Rounds: I have performed bedside rounds with nursing staff today  Discussions with Specialists or Other Care Team Provider: neurosurgery    Education and Discussions with Family / Patient: patient; was informed that neurosurgeon would be calling brother today to update regarding upcoming surgery     Time Spent for Care: 20 minutes  More than 50% of total time spent on counseling and coordination of care as described above  Current Length of Stay: 17 day(s)    Current Patient Status: Inpatient   Certification Statement: The patient will continue to require additional inpatient hospital stay due to continued IV abx, pending neurosurgery    Discharge Plan: not medically stable for d/c  On iv abx pending neurosurgery    Code Status: Level 1 - Full Code      Subjective:   Mr Esther Parra reports feeling "fair" today  He denies any pain whatsoever  Objective:     Vitals:   Temp (24hrs), Av 3 °F (36 8 °C), Min:97 5 °F (36 4 °C), Max:99 3 °F (37 4 °C)    Temp:  [97 5 °F (36 4 °C)-99 3 °F (37 4 °C)] 97 9 °F (36 6 °C)  HR:  [53-71] 53  Resp:  [16-18] 17  BP: (116-131)/(61-65) 122/64  SpO2:  [95 %-100 %] 100 %  Body mass index is 32 02 kg/m²  Input and Output Summary (last 24 hours):        Intake/Output Summary (Last 24 hours) at 2020 0949  Last data filed at 2020 0909  Gross per 24 hour   Intake 505 ml   Output 1089 ml   Net -584 ml       Physical Exam: Physical Exam  Vitals signs reviewed  Constitutional:       General: He is not in acute distress  Appearance: He is not diaphoretic  Comments: Patient seen lying in bed comfortably resting  He was sleeping when I got to the room and awoke easily to knock on door   Cardiovascular:      Rate and Rhythm: Normal rate and regular rhythm  Pulmonary:      Effort: Pulmonary effort is normal  No respiratory distress  Breath sounds: Normal breath sounds  No wheezing  Comments: % on room air  Abdominal:      General: Bowel sounds are normal       Palpations: Abdomen is soft  Tenderness: There is no abdominal tenderness  Comments: Midline abdominal incision with staples well healing appearing  Right upper quadrant incisions well healing appearing   Musculoskeletal:         General: No edema  Skin:     General: Skin is warm  Neurological:      Mental Status: He is alert  Comments: Oriented to person and Chester Michelle Amador's but does not know the year  Psychiatric:         Mood and Affect: Mood and affect normal          Behavior: Behavior normal          Additional Data:     Labs:    Results from last 7 days   Lab Units 08/17/20 0519 08/12/20  0516 08/11/20  0521   WBC Thousand/uL 9 49   < > 26 35* 26 24*   HEMOGLOBIN g/dL 12 1   < > 12 3 12 1   HEMATOCRIT % 39 3   < > 37 7 37 7   PLATELETS Thousands/uL 248   < > 348 286   BANDS PCT %  --   --  4  --    NEUTROS PCT %  --   --   --  88*   LYMPHS PCT %  --   --   --  3*   LYMPHO PCT %  --   --  4*  --    MONOS PCT %  --   --   --  6   MONO PCT %  --   --  5  --    EOS PCT %  --   --  3 1    < > = values in this interval not displayed       Results from last 7 days   Lab Units 08/17/20 0519 08/12/20  0516   SODIUM mmol/L 138   < > 137   POTASSIUM mmol/L 4 2   < > 4 3   CHLORIDE mmol/L 103   < > 103   CO2 mmol/L 31   < > 26   BUN mg/dL 14   < > 17   CREATININE mg/dL 0 64   < > 0 78   ANION GAP mmol/L 4   < > 8   CALCIUM mg/dL 8 4 < > 8 1*   ALBUMIN g/dL  --   --  2 0*   TOTAL BILIRUBIN mg/dL  --   --  0 61   ALK PHOS U/L  --   --  104   ALT U/L  --   --  25   AST U/L  --   --  16   GLUCOSE RANDOM mg/dL 111   < > 112    < > = values in this interval not displayed  Results from last 7 days   Lab Units 08/11/20  1154   INR  1 21*     Results from last 7 days   Lab Units 08/17/20  0832 08/16/20  2108 08/16/20  1659 08/16/20  1131 08/16/20  0738 08/15/20  2127 08/15/20  1709 08/15/20  1112 08/15/20  0742 08/14/20  2111 08/14/20  1714 08/14/20  1141   POC GLUCOSE mg/dl 123 137 110 152* 119 113 123 154* 108 145* 108 128         Results from last 7 days   Lab Units 08/14/20  0554 08/12/20  0516 08/11/20  0521 08/10/20  1355   PROCALCITONIN ng/ml 0 11 0 31* 0 76* <0 05           * I Have Reviewed All Lab Data Listed Above  * Additional Pertinent Lab Tests Reviewed: All Labs Within Last 24 Hours Reviewed    Imaging:    Imaging Reports Reviewed Today Include: None  Imaging Personally Reviewed by Myself Includes:  None    Recent Cultures (last 7 days):     Results from last 7 days   Lab Units 08/11/20  1627 08/10/20  1405 08/10/20  1356   BLOOD CULTURE   --  No Growth After 5 Days  No Growth After 5 Days     GRAM STAIN RESULT  No bacteria seen  3+ Polys  --   --        Last 24 Hours Medication List:   Current Facility-Administered Medications   Medication Dose Route Frequency Provider Last Rate    acetaminophen  325 mg Rectal Q6H PRN AR Valenzuela      acetaminophen  650 mg Oral Q6H PRN AR Valenzuela      barium  900 mL Oral 90 min pre-procedure Shakila Morales PA-C      diphenhydrAMINE  25 mg Intravenous Q6H PRN AR Valenzuela      heparin (porcine)  5,000 Units Subcutaneous [facility] Shakila Morales PA-C      hydrocortisone   Topical 4x Daily PRN AR Valenzuela      insulin lispro  2-12 Units Subcutaneous 4x Daily (AC & HS) AR Valenzuela      iodixanol  15 mL Intravenous Once in imaging Boneta AR Arias      meropenem  2,000 mg Intravenous Q8H Ofelia Lucia MD 2,000 mg (08/17/20 0238)   [facility] VERONICA ANTIFUNGAL   Topical BID Heather Desai PA-C      oxyCODONE  5 mg Oral Q4H PRN AR Jones          Today, Patient Was Seen By: Star Zaman PA-C    ** Please Note: Dictation voice to text software may have been used in the creation of this document   **

## 2020-08-17 NOTE — SPEECH THERAPY NOTE
SLP Progress Note    Patient Name: Viridinaa Gay  Today's Date: 8/17/2020     Problem List  Principal Problem:    Severe sepsis [facility])  Active Problems:    Type 2 diabetes mellitus ([facility] [address] )    Normal pressure hydrocephalus ([facility] [address] )    Essential hypertension    ZHOU on CPAP    Nonocclusive mesenteric ischemia (HCC)    Infection of  (ventriculoperitoneal) shunt (HCC)    Lumbar stenosis    Drug rash    Multiple wounds    Dysphagia    Subjective:  "'I know I've lost weight "    Objective:  Pt was seen for diagnostic dysphagia therapy to ensure tolerance of current diet (puree/NTL  ) and to assess potential for safe diet  upgrade after review of records  Noted VBS results  Pt was alert and positioned upright  He was slow to process and respond but able to engage in simple conversation (lives in [place], lived in [place] [id number] all of his life, lives with friend who does the cookies, has cousins nearby--card in room from them/the abunde's)    Pt was assessed with puree and sips of NTL (small and large sips)  Bolus formation and transfer were mild to mod slowed but effective  Swallow initiation was prompt appearing  Laryngeal rise was good by palpation  No coughing, throat clearing, c/o stasis, multiple swallows, change in vocal quality noted c po intake today c puree or small sips of NTL; cough with 1 of 3 larger sips  t declined trial of "chewy food" and water  I met with pt & RD re: current diet & supplements  Assessment:  MS appears to be slowly improving  Current diet appears to be most appropriate diet (safe most efficiently tolerated diet c greatest likelihood of maximizing amt of intake  Plan/Recommendations:  Continue current diet (puree, NTL by small sips)  Plan f/u 3x weekly

## 2020-08-17 NOTE — PLAN OF CARE
Problem: OCCUPATIONAL THERAPY ADULT  Goal: Performs self-care activities at highest level of function for planned discharge setting  See evaluation for individualized goals  Description: Treatment Interventions: ADL retraining, Functional transfer training, Endurance training, Patient/family training, Equipment evaluation/education, Activityengagement          See flowsheet documentation for full assessment, interventions and recommendations  Outcome: Progressing  Note: Limitation: Decreased ADL status, Decreased UE ROM, Decreased UE strength, Decreased Safe judgement during ADL, Decreased cognition, Decreased endurance, Visual deficit, Decreased fine motor control, Decreased self-care trans, Decreased high-level ADLs, Mood limitation  Prognosis: Fair  Assessment: pt participated in pm ot session and was seen focusing on  grooming of face, ub dressing and call bell useage  pt with + rigidity in ue's and weakenss in pinch/ grasp  pt was able to use "plum" call bell  after instruction and nsg was notified  OT Discharge Recommendation: Post-Acute Rehabilitation Services  OT - OK to Discharge: Yes  April A Storm, HOPPER   Problem: OCCUPATIONAL THERAPY ADULT  Goal: Performs self-care activities at highest level of function for planned discharge setting  See evaluation for individualized goals  Description: Treatment Interventions: ADL retraining, Functional transfer training, Endurance training, Patient/family training, Equipment evaluation/education, Activityengagement          See flowsheet documentation for full assessment, interventions and recommendations     Outcome: Progressing  Note: Limitation: Decreased ADL status, Decreased UE ROM, Decreased UE strength, Decreased Safe judgement during ADL, Decreased cognition, Decreased endurance, Visual deficit, Decreased fine motor control, Decreased self-care trans, Decreased high-level ADLs, Mood limitation  Prognosis: Fair  Assessment: pt participated in pm ot session and was seen focusing on  grooming of face, ub dressing and call bell useage  pt with + rigidity in ue's and weakenss in pinch/ grasp  pt was able to use "plum" call bell  after instruction and nsg was notified  OT Discharge Recommendation: Post-Acute Rehabilitation Services  OT - OK to Discharge:  Yes     April A StormWARD

## 2020-08-17 NOTE — PLAN OF CARE
Problem: Potential for Falls  Goal: Patient will remain free of falls  Description: INTERVENTIONS:  - Assess patient frequently for physical needs  -  Identify cognitive and physical deficits and behaviors that affect risk of falls    -  [place] fall precautions as indicated by assessment   - Educate patient/family on patient safety including physical limitations  - Instruct patient to call for assistance with activity based on assessment  - Modify environment to reduce risk of injury  - Consider OT/PT consult to assist with strengthening/mobility  Outcome: Progressing     Problem: Prexisting or High Potential for Compromised Skin Integrity  Goal: Skin integrity is maintained or improved  Description: INTERVENTIONS:  - Identify patients at risk for skin breakdown  - Assess and monitor skin integrity  - Assess and monitor nutrition and hydration status  - Monitor labs   - Assess for incontinence   - Turn and reposition patient  - Assist with mobility/ambulation  - Relieve pressure over bony prominences  - Avoid friction and shearing  - Provide appropriate hygiene as needed including keeping skin clean and dry  - Evaluate need for skin moisturizer/barrier cream  - Collaborate with interdisciplinary team   - Patient/family teaching  - Consider wound care consult   Outcome: Progressing     Problem: PAIN - ADULT  Goal: Verbalizes/displays adequate comfort level or baseline comfort level  Description: Interventions:  - Encourage patient to monitor pain and request assistance  - Assess pain using appropriate pain scale  - Administer analgesics based on type and severity of pain and evaluate response  - Implement non-pharmacological measures as appropriate and evaluate response  - Consider cultural and social influences on pain and pain management  - Notify physician/advanced practitioner if interventions unsuccessful or patient reports new pain  Outcome: Progressing     Problem: SAFETY ADULT  Goal: Maintain or return to baseline ADL function  Description: INTERVENTIONS:  -  Assess patient's ability to carry out ADLs; assess patient's baseline for ADL function and identify physical deficits which impact ability to perform ADLs (bathing, care of mouth/teeth, toileting, grooming, dressing, etc )  - Assess/evaluate cause of self-care deficits   - Assess range of motion  - Assess patient's mobility; develop plan if impaired  - Assess patient's need for assistive devices and provide as appropriate  - Encourage maximum independence but intervene and supervise when necessary  - Involve family in performance of ADLs  - Assess for home care needs following discharge   - Consider OT consult to assist with ADL evaluation and planning for discharge  - Provide patient education as appropriate  Outcome: Progressing  Goal: Maintain or return mobility status to optimal level  Description: INTERVENTIONS:  - Assess patient's baseline mobility status (ambulation, transfers, stairs, etc )    - Identify cognitive and physical deficits and behaviors that affect mobility  - Identify mobility aids required to assist with transfers and/or ambulation (gait belt, sit-to-stand, lift, walker, cane, etc )  - [place] fall precautions as indicated by assessment  - Record patient progress and toleration of activity level on Mobility SBAR; progress patient to next Phase/Stage  - Instruct patient to call for assistance with activity based on assessment  - Consider rehabilitation consult to assist with strengthening/weightbearing, etc   Outcome: Progressing     Problem: GASTROINTESTINAL - ADULT  Goal: Minimal or absence of nausea and/or vomiting  Description: INTERVENTIONS:  - Administer IV fluids if ordered to ensure adequate hydration  - Maintain NPO status until nausea and vomiting are resolved  - Nasogastric tube if ordered  - Administer ordered antiemetic medications as needed  - Provide nonpharmacologic comfort measures as appropriate  - Advance diet as tolerated, if ordered  - Consider nutrition services referral to assist patient with adequate nutrition and appropriate food choices  Outcome: Progressing  Goal: Maintains or returns to baseline bowel function  Description: INTERVENTIONS:  - Assess bowel function  - Encourage oral fluids to ensure adequate hydration  - Administer IV fluids if ordered to ensure adequate hydration  - Administer ordered medications as needed  - Encourage mobilization and activity  - Consider nutritional services referral to assist patient with adequate nutrition and appropriate food choices  Outcome: Progressing  Goal: Maintains adequate nutritional intake  Description: INTERVENTIONS:  - Monitor percentage of each meal consumed  - Identify factors contributing to decreased intake, treat as appropriate  - Assist with meals as needed  - Monitor I&O, weight, and lab values if indicated  - Obtain nutrition services referral as needed  Outcome: Progressing     Problem: Nutrition/Hydration-ADULT  Goal: Nutrient/Hydration intake appropriate for improving, restoring or maintaining nutritional needs  Description: Monitor and assess patient's nutrition/hydration status for malnutrition  Collaborate with interdisciplinary team and initiate plan and interventions as ordered  Monitor patient's weight and dietary intake as ordered or per policy  Utilize nutrition screening tool and intervene as necessary  Determine patient's food preferences and provide high-protein, high-caloric foods as appropriate       INTERVENTIONS:  - Monitor oral intake, urinary output, labs, and treatment plans  - Assess nutrition and hydration status and recommend course of action  - Evaluate amount of meals eaten  - Assist patient with eating if necessary   - Allow adequate time for meals  - Recommend/ encourage appropriate diets, oral nutritional supplements, and vitamin/mineral supplements  - Order, calculate, and assess calorie counts as needed  - Recommend, monitor, and adjust tube feedings and TPN/PPN based on assessed needs  - Assess need for intravenous fluids  - Provide specific nutrition/hydration education as appropriate  - Include patient/family/caregiver in decisions related to nutrition  Outcome: Progressing     Problem: RESPIRATORY - ADULT  Goal: Achieves optimal ventilation and oxygenation  Description: INTERVENTIONS:  - Assess for changes in respiratory status  - Assess for changes in mentation and behavior  - Position to facilitate oxygenation and minimize respiratory effort  - Oxygen administered by appropriate delivery if ordered  - Initiate smoking cessation education as indicated  - Encourage broncho-pulmonary hygiene including cough, deep breathe, Incentive Spirometry  - Assess the need for suctioning and aspirate as needed  - Assess and instruct to report SOB or any respiratory difficulty  - Respiratory Therapy support as indicated  Outcome: Progressing

## 2020-08-17 NOTE — PHYSICAL THERAPY NOTE
Physical Therapy Progress Note     08/17/20 1414   Pain Assessment   Pain Assessment Tool FLACC   Pain Rating: FLACC (Rest) - Face 0   Pain Rating: FLACC (Rest) - Legs 0   Pain Rating: FLACC (Rest) - Activity 0   Pain Rating: FLACC (Rest) - Cry 0   Pain Rating: FLACC (Rest) - Consolability 0   Score: FLACC (Rest) 0   Pain Rating: FLACC (Activity) - Face 1   Pain Rating: FLACC (Activity) - Legs 1   Pain Rating: FLACC (Activity) - Activity 1   Pain Rating: FLACC (Activity) - Cry 1   Pain Rating: FLACC (Activity) - Consolability 0   Score: FLACC (Activity) 4   Restrictions/Precautions   Braces or Orthoses MAFO   Other Precautions Spinal precautions;Cognitive; Bed Alarm; Fall Risk;Pain   Subjective   Subjective Pt encountered supine in bed, requesting "Help  I can't figure out how this works "  in reference to call bell  Pt perseverated on this until after HOPPER gave him different call bell to utilize  Pt easily agitated when performing exercises  "I want to move, but what good is it?"   Bed Mobility   Rolling R 2  Maximal assistance   Additional items Assist x 2   Rolling L 2  Maximal assistance   Additional items Assist x 2   Endurance Deficit   Endurance Deficit Yes   Endurance Deficit Description fatigue   Activity Tolerance   Activity Tolerance Patient tolerated treatment well;Patient limited by fatigue   Nurse [address], RN   Exercises   Heelslides Supine;10 reps;AAROM; Bilateral   Hip Abduction Supine;10 reps;AAROM; Bilateral   Ankle Pumps Supine;10 reps;AAROM; Bilateral   Assessment   Prognosis Fair   Problem List Decreased strength;Decreased range of motion;Decreased endurance; Impaired balance;Decreased mobility; Decreased coordination;Decreased skin integrity;Obesity   Assessment Pt continues to require max A for all bed mobitily tasks this session, but was able to particiapte with instructions for HHA to reach for bed rail despite impaired  strength & spastic movments    Does require max A1-2 to remain sidelying to change bed linens & wash after noted incontinence  Pt repositioned after, then particiapted in LE exercises as noted above  pt able to participate inconsistantly in these exercises, becoming easily distracted & agiatated as noted above  Noted increased spasticity & resistance to active assist with each exercise  Pt remained in bed with all needs in reach & new call bell in hand  Will continue to benefit from therapy services to maximize participation in functional mobility tasks & reduce burden of care at this time  Goals   Patient Goals to move more & figure out how this thing operates   STG Expiration Date 08/19/20   PT Treatment Day 2   Plan   Treatment/Interventions Functional transfer training;LE strengthening/ROM; Therapeutic exercise; Endurance training;Cognitive reorientation;Patient/family training;Equipment eval/education; Bed mobility   Progress Slow progress, decreased activity tolerance   PT Frequency   (3-6x/week)   Recommendation   PT Discharge Recommendation Post-Acute Rehabilitation Services     [place], [place]

## 2020-08-17 NOTE — ASSESSMENT & PLAN NOTE
Lab Results   Component Value Date    HGBA1C 5 6 07/31/2020       Recent Labs     08/16/20  1131 08/16/20  1659 08/16/20  2108 08/17/20  0832   POCGLU 152* 110 137 123       Blood Sugar Average: Last 72 hrs:  (P) 126 [id number]     · Continue SSI coverage

## 2020-08-17 NOTE — OCCUPATIONAL THERAPY NOTE
Occupational Therapy Treatment Note:       08/17/20 1410   Restrictions/Precautions   Braces or Orthoses MAFO   ADL   Grooming Assistance 2  Maximal Assistance   Grooming Comments question full effort from pt   UB Dressing Assistance 2  Maximal Assistance   UB Dressing Comments to change hosp gown   Bed Mobility   Rolling R 2  Maximal assistance   Rolling L 2  Maximal assistance   Coordination   Gross Motor pt was able to reach ue's through sleeves  and assist with aarom b ue's pt not observed using ue's functionally other than using them to squeeze  call bell and to lionel shirt   Cognition   Overall Cognitive Status Impaired   Comments repeatedly asking for instruction  with remote  pt also unaware of his incontinence of urine  Activity Tolerance   Activity Tolerance Patient tolerated treatment well   Assessment   Assessment pt participated in pm ot session and was seen focusing on  grooming of face, ub dressing and call bell useage  pt with + rigidity in ue's and weakenss in pinch/ grasp  pt was able to use "plum" call bell  after instruction and nsg was notified  Plan   Treatment Interventions ADL retraining;Functional transfer training;UE strengthening/ROM; Endurance training;Cognitive reorientation;Patient/family training;Equipment evaluation/education; Activityengagement   Goal Expiration Date 08/17/20   OT Treatment Day   (4)   OT Frequency 3-5x/wk   Recommendation   OT Discharge Recommendation Post-Acute Rehabilitation Services   OT - OK to Discharge Yes   April A Sheri Ma

## 2020-08-17 NOTE — PLAN OF CARE
Problem: PHYSICAL THERAPY ADULT  Goal: Performs mobility at highest level of function for planned discharge setting  See evaluation for individualized goals  Description: Treatment/Interventions: OT, Spoke to case management, Spoke to nursing, Gait training, Bed mobility, Patient/family training, Endurance training, LE strengthening/ROM, Functional transfer training          See flowsheet documentation for full assessment, interventions and recommendations  8/17/2020 1535 by Bryant Fernandez PTA  Outcome: Progressing  Note: Prognosis: Fair  Problem List: Decreased strength, Decreased range of motion, Decreased endurance, Impaired balance, Decreased mobility, Decreased coordination, Decreased skin integrity, Obesity  Assessment: Pt continues to require max A for all bed mobitily tasks this session, but was able to particiapte with instructions for HHA to reach for bed rail despite impaired  strength & spastic movments  Does require max A1-2 to remain sidelying to change bed linens & wash after noted incontinence  Pt repositioned after, then particiapted in LE exercises as noted above  pt able to participate inconsistantly in these exercises, becoming easily distracted & agiatated as noted above  Noted increased spasticity & resistance to active assist with each exercise  Pt remained in bed with all needs in reach & new call bell in hand  Will continue to benefit from therapy services to maximize participation in functional mobility tasks & reduce burden of care at this time  Barriers to Discharge: Decreased caregiver support, Inaccessible home environment     PT Discharge Recommendation: [address]     PT - OK to Discharge: Yes    See flowsheet documentation for full assessment       8/17/2020 0957 by Bryant Fernandez PTA  Note: Prognosis: Fair  Problem List: Decreased strength, Decreased range of motion, Decreased endurance, Impaired balance, Decreased mobility, Decreased coordination, Decreased skin integrity, Obesity  Assessment: Pt  able to participate in mobilizing LEs while perfoming Sara in supine  However not much participation or initialtion noted with supine to sit and back transfers and while seated at EOB  Pt  noted with severe R side lean  Pt  needed max support to remain seated at EOB initially which progressed to unsupported sitting occ  with support of table  Pt  given repeated cues for postural correction however patient cooperated only very minimally  Max A x 3 for sit to supine transfer  Pt  became more conversational ath the end of session  WIll continue to progress patient's mobility per current POC  Pt  continues with extensive assist for all levels of mobility and actively participates only minimally  Barriers to Discharge: Decreased caregiver support, Inaccessible home environment     PT Discharge Recommendation: [address]     PT - OK to Discharge: Yes    See flowsheet documentation for full assessment

## 2020-08-17 NOTE — SOCIAL WORK
Patient here with severe sepsis Secondary to  shunt infection with meningitis and peritonitis  Patient consulted by neurosurgery  Patient needs rehab, referrals to [facility], [facility] and [place] pending in [address]  Patient scheduled for OR later this week for shunt  CM will continue to follow

## 2020-08-17 NOTE — ASSESSMENT & PLAN NOTE
· CSF culture grew Pseudomonas   ·  shunt was externalized, however repeat CSF still grew pseudomonas    shunt was later removed 8/7/2020  · Repeat CSF cultures negative  · ID following  · On IV Meropenem  · Neurosurgery following, I discussed with them today - planning for replacement of  shunt this week, OK for diet today

## 2020-08-18 ENCOUNTER — TELEPHONE (OUTPATIENT)
Dept: NEUROSURGERY | Facility: CLINIC | Age: 70
End: 2020-08-18

## 2020-08-18 LAB
GLUCOSE SERPL-MCNC: 113 MG/DL (ref 65–140)
GLUCOSE SERPL-MCNC: 127 MG/DL (ref 65–140)
GLUCOSE SERPL-MCNC: 142 MG/DL (ref 65–140)
GLUCOSE SERPL-MCNC: 144 MG/DL (ref 65–140)
GLUCOSE SERPL-MCNC: 147 MG/DL (ref 65–140)

## 2020-08-18 PROCEDURE — 99232 SBSQ HOSP IP/OBS MODERATE 35: CPT | Performed by: NURSE PRACTITIONER

## 2020-08-18 PROCEDURE — 99232 SBSQ HOSP IP/OBS MODERATE 35: CPT | Performed by: INTERNAL MEDICINE

## 2020-08-18 PROCEDURE — 82948 REAGENT STRIP/BLOOD GLUCOSE: CPT

## 2020-08-18 RX ADMIN — OXYCODONE HYDROCHLORIDE 5 MG: 5 TABLET ORAL at 03:49

## 2020-08-18 RX ADMIN — MICONAZOLE NITRATE: 20 CREAM TOPICAL at 09:55

## 2020-08-18 RX ADMIN — HEPARIN SODIUM 5000 UNITS: 5000 INJECTION INTRAVENOUS; SUBCUTANEOUS at 05:26

## 2020-08-18 RX ADMIN — MEROPENEM 2000 MG: 1 INJECTION, POWDER, FOR SOLUTION INTRAVENOUS at 09:55

## 2020-08-18 RX ADMIN — HEPARIN SODIUM 5000 UNITS: 5000 INJECTION INTRAVENOUS; SUBCUTANEOUS at 21:20

## 2020-08-18 RX ADMIN — MICONAZOLE NITRATE: 20 CREAM TOPICAL at 18:08

## 2020-08-18 RX ADMIN — MEROPENEM 2000 MG: 1 INJECTION, POWDER, FOR SOLUTION INTRAVENOUS at 18:08

## 2020-08-18 RX ADMIN — MEROPENEM 2000 MG: 1 INJECTION, POWDER, FOR SOLUTION INTRAVENOUS at 01:47

## 2020-08-18 RX ADMIN — HEPARIN SODIUM 5000 UNITS: 5000 INJECTION INTRAVENOUS; SUBCUTANEOUS at 14:46

## 2020-08-18 NOTE — PROGRESS NOTES
[facility] Internal Medicine    Progress Note - Boone Ruffni 1950, 69 y o  male MRN: [id number]    Unit/Bed#: [facility] 629-01 Encounter: [id number]    Primary Care Provider: Addis Ortega   Date and time admitted to hospital: 7/30/2020 11:12 PM    * Severe sepsis [facility])  Assessment & Plan  · As evidenced by tachycardia, leukocytosis, elevated lactic acidosis, and fevers  · Secondary to  shunt infection with meningitis and peritonitis  CSF culture grew Pseudomonas  · Status post  shunt externalization 7/30  · ID following, continue meropenem  Was on cefepime but developed a rash  · Repeat CSF cultures negative  Infection of  (ventriculoperitoneal) shunt (HCC)  Assessment & Plan  · CSF culture grew Pseudomonas   ·  shunt was externalized, however repeat CSF still grew pseudomonas   shunt was later removed 8/7/2020  · Repeat CSF cultures negative  · ID following  On IV Meropenem  · Neurosurgery following, planning for replacement of  shunt later this week  Timing to be determined  · PT/OT recommending rehab  CM following  Normal pressure hydrocephalus (HCC)  Assessment & Plan  · S/p  shunt placement in 2005  · Now s/p removal of shunt apparatus given infection as above  Planning for repeat placement of  shunt later this week  Nonocclusive mesenteric ischemia (HCC)  Assessment & Plan  · S/p ex lap, arteriogram of SMA, papavarine infusion, exploration of SMA, angiogram SMA on 7/31/2020 and repeat ex lap 8/1/2020  · General surgery signed off  Will have them re-eval today for potential staple removal      Multiple wounds  Assessment & Plan  · Wound care per Wound Care team recommendations     Type 2 diabetes mellitus [facility])  Assessment & Plan  Lab Results   Component Value Date    HGBA1C 5 6 07/31/2020       Recent Labs     08/17/20  1722 08/17/20  2145 08/18/20  0748 08/18/20  1129   POCGLU 119 122 113 144*           · Diet controlled    · Can continue SSI however has not required any insulin  · Diabetic diet  · Monitor Accu-Cheks    Lumbar stenosis  Assessment & Plan  · S/p L5-S1 fusion by Neurosurgery on 2020    Essential hypertension  Assessment & Plan  · BP controlled now off antihypertensives  Previously taking Vasotec  · Monitor    Dysphagia  Assessment & Plan  · Continue modified diet per most recent SLP recs: pureed with NTL  · Aspiration precautions     Drug rash  Assessment & Plan  · Developed prior, was suspected to be 2/2 cefepime which has since been discontinued     ZHOU on CPAP  Assessment & Plan  · Was discontinued by RT given patient's refusal     Pharmacologic: Heparin  Mechanical VTE Prophylaxis in Place: Yes    Patient Centered Rounds: I have performed bedside rounds with nursing staff today  Discussions with Specialists or Other Care Team Provider: nursing, case management  Neurosurgery     Education and Discussions with Family / Patient: patient  Attempted to call brother, unable to reach  Time Spent for Care: 30 minutes  More than 50% of total time spent on counseling and coordination of care as described above  Current Length of Stay: 18 day(s)    Current Patient Status: Inpatient     Certification Statement: The patient will continue to require additional inpatient hospital stay due to  shunt replacement, IV abx    Discharge Plan / Estimated Discharge Date:  Not medically stable      Code Status: Level 1 - Full Code      Subjective:   Patient offers no acute complaints  Minimally conversive  Objective:     Vitals:   Temp (24hrs), Av °F (36 7 °C), Min:97 6 °F (36 4 °C), Max:98 7 °F (37 1 °C)    Temp:  [97 6 °F (36 4 °C)-98 7 °F (37 1 °C)] 98 1 °F (36 7 °C)  HR:  [61-71] 71  Resp:  [16-18] 16  BP: (117-140)/(54-70) 117/54  SpO2:  [99 %-100 %] 99 %  Body mass index is 31 25 kg/m²  Input and Output Summary (last 24 hours):        Intake/Output Summary (Last 24 hours) at 2020 1221  Last data filed at 2020 0934  Gross per 24 hour Intake 1080 ml   Output 890 ml   Net 190 ml       Physical Exam:     Physical Exam  Vitals signs and nursing note reviewed  Constitutional:       General: He is not in acute distress  HENT:      Head: Normocephalic  Eyes:      Conjunctiva/sclera: Conjunctivae normal    Neck:      Musculoskeletal: Normal range of motion  Cardiovascular:      Rate and Rhythm: Normal rate and regular rhythm  Pulmonary:      Breath sounds: Normal breath sounds  Abdominal:      General: Bowel sounds are normal       Comments: Midline abdominal staples noted   Genitourinary:     Comments: Condom catheter in place  Musculoskeletal:         General: No edema  Skin:     General: Skin is warm  Coloration: Skin is pale  Neurological:      Mental Status: He is alert  Comments: Oriented to person and hospital only, minimally conversive but answers when spoken to   Psychiatric:         Cognition and Memory: Cognition is impaired  Additional Data:     Labs:    Results from last 7 days   Lab Units 08/17/20  0519  08/12/20  0516   WBC Thousand/uL 9 49   < > 26 35*   HEMOGLOBIN g/dL 12 1   < > 12 3   HEMATOCRIT % 39 3   < > 37 7   PLATELETS Thousands/uL 248   < > 348   LYMPHO PCT %  --   --  4*   MONO PCT %  --   --  5   EOS PCT %  --   --  3    < > = values in this interval not displayed  Results from last 7 days   Lab Units 08/17/20  0519  08/12/20  0516   POTASSIUM mmol/L 4 2   < > 4 3   CHLORIDE mmol/L 103   < > 103   CO2 mmol/L 31   < > 26   BUN mg/dL 14   < > 17   CREATININE mg/dL 0 64   < > 0 78   CALCIUM mg/dL 8 4   < > 8 1*   ALK PHOS U/L  --   --  104   ALT U/L  --   --  25   AST U/L  --   --  16    < > = values in this interval not displayed               Recent Cultures (last 7 days):     Results from last 7 days   Lab Units 08/11/20  1627   GRAM STAIN RESULT  No bacteria seen  3+ Polys       Last 24 Hours Medication List:   Current Facility-Administered Medications   Medication Dose Route Frequency Provider Last Rate    acetaminophen  325 mg Rectal Q6H PRN AR Valenzuela      acetaminophen  650 mg Oral Q6H PRN AR Valenzuela      barium  900 mL Oral 90 min pre-procedure Kali Corley PA-C      diphenhydrAMINE  25 mg Intravenous Q6H PRN AR Valenzuela      heparin (porcine)  5,000 Units Subcutaneous Q8H [address] Kali Corley PA-C      hydrocortisone   Topical 4x Daily PRN AR Valenzuela      insulin lispro  2-12 Units Subcutaneous 4x Daily (AC & HS) AR Valenzuela      iodixanol  15 mL Intravenous Once in imaging AR Valenzuela      meropenem  2,000 mg Intravenous Q8H Yuly Herrmann MD 2,000 mg (08/18/20 0955)   [facility] ANTIFUNGAL   Topical BID Kali Corley PA-C      oxyCODONE  5 mg Oral Q4H PRN AR Lay          Today, Patient Was Seen By: AR Erazo    ** Please Note: Dragon 360 Dictation voice to text software may have been used in the creation of this document   **

## 2020-08-18 NOTE — TELEPHONE ENCOUNTER
Called patient at [phone] regarding appointment on 8/20/20  Patient still admitted  Mari Callahan is aware and will speak with the PAs tomorrow

## 2020-08-18 NOTE — ASSESSMENT & PLAN NOTE
· S/p ex lap, arteriogram of SMA, papavarine infusion, exploration of SMA, angiogram SMA on 7/31/2020 and repeat ex lap 8/1/2020  · General surgery signed off   Will have them re-eval today for potential staple removal

## 2020-08-18 NOTE — SOCIAL WORK
Patient here with severe sepsis Secondary to  shunt infection with meningitis and peritonitis  Patient consulted by neurosurgery  Patient needs rehab, referrals to [facility], [facility] and [place] pending in [place]  Patient scheduled for OR later this week for shunt  CM will continue to follow

## 2020-08-18 NOTE — PLAN OF CARE
Problem: Potential for Falls  Goal: Patient will remain free of falls  Description: INTERVENTIONS:  - Assess patient frequently for physical needs  -  Identify cognitive and physical deficits and behaviors that affect risk of falls    -  [place] fall precautions as indicated by assessment   - Educate patient/family on patient safety including physical limitations  - Instruct patient to call for assistance with activity based on assessment  - Modify environment to reduce risk of injury  - Consider OT/PT consult to assist with strengthening/mobility  Outcome: Progressing     Problem: Prexisting or High Potential for Compromised Skin Integrity  Goal: Skin integrity is maintained or improved  Description: INTERVENTIONS:  - Identify patients at risk for skin breakdown  - Assess and monitor skin integrity  - Assess and monitor nutrition and hydration status  - Monitor labs   - Assess for incontinence   - Turn and reposition patient  - Assist with mobility/ambulation  - Relieve pressure over bony prominences  - Avoid friction and shearing  - Provide appropriate hygiene as needed including keeping skin clean and dry  - Evaluate need for skin moisturizer/barrier cream  - Collaborate with interdisciplinary team   - Patient/family teaching  - Consider wound care consult   Outcome: Progressing     Problem: PAIN - ADULT  Goal: Verbalizes/displays adequate comfort level or baseline comfort level  Description: Interventions:  - Encourage patient to monitor pain and request assistance  - Assess pain using appropriate pain scale  - Administer analgesics based on type and severity of pain and evaluate response  - Implement non-pharmacological measures as appropriate and evaluate response  - Consider cultural and social influences on pain and pain management  - Notify physician/advanced practitioner if interventions unsuccessful or patient reports new pain  Outcome: Progressing     Problem: SAFETY ADULT  Goal: Maintain or return to baseline ADL function  Description: INTERVENTIONS:  -  Assess patient's ability to carry out ADLs; assess patient's baseline for ADL function and identify physical deficits which impact ability to perform ADLs (bathing, care of mouth/teeth, toileting, grooming, dressing, etc )  - Assess/evaluate cause of self-care deficits   - Assess range of motion  - Assess patient's mobility; develop plan if impaired  - Assess patient's need for assistive devices and provide as appropriate  - Encourage maximum independence but intervene and supervise when necessary  - Involve family in performance of ADLs  - Assess for home care needs following discharge   - Consider OT consult to assist with ADL evaluation and planning for discharge  - Provide patient education as appropriate  Outcome: Progressing  Goal: Maintain or return mobility status to optimal level  Description: INTERVENTIONS:  - Assess patient's baseline mobility status (ambulation, transfers, stairs, etc )    - Identify cognitive and physical deficits and behaviors that affect mobility  - Identify mobility aids required to assist with transfers and/or ambulation (gait belt, sit-to-stand, lift, walker, cane, etc )  - [place] fall precautions as indicated by assessment  - Record patient progress and toleration of activity level on Mobility SBAR; progress patient to next Phase/Stage  - Instruct patient to call for assistance with activity based on assessment  - Consider rehabilitation consult to assist with strengthening/weightbearing, etc   Outcome: Progressing     Problem: GASTROINTESTINAL - ADULT  Goal: Minimal or absence of nausea and/or vomiting  Description: INTERVENTIONS:  - Administer IV fluids if ordered to ensure adequate hydration  - Maintain NPO status until nausea and vomiting are resolved  - Nasogastric tube if ordered  - Administer ordered antiemetic medications as needed  - Provide nonpharmacologic comfort measures as appropriate  - Advance diet as tolerated, if ordered  - Consider nutrition services referral to assist patient with adequate nutrition and appropriate food choices  Outcome: Progressing  Goal: Maintains or returns to baseline bowel function  Description: INTERVENTIONS:  - Assess bowel function  - Encourage oral fluids to ensure adequate hydration  - Administer IV fluids if ordered to ensure adequate hydration  - Administer ordered medications as needed  - Encourage mobilization and activity  - Consider nutritional services referral to assist patient with adequate nutrition and appropriate food choices  Outcome: Progressing  Goal: Maintains adequate nutritional intake  Description: INTERVENTIONS:  - Monitor percentage of each meal consumed  - Identify factors contributing to decreased intake, treat as appropriate  - Assist with meals as needed  - Monitor I&O, weight, and lab values if indicated  - Obtain nutrition services referral as needed  Outcome: Progressing     Problem: Nutrition/Hydration-ADULT  Goal: Nutrient/Hydration intake appropriate for improving, restoring or maintaining nutritional needs  Description: Monitor and assess patient's nutrition/hydration status for malnutrition  Collaborate with interdisciplinary team and initiate plan and interventions as ordered  Monitor patient's weight and dietary intake as ordered or per policy  Utilize nutrition screening tool and intervene as necessary  Determine patient's food preferences and provide high-protein, high-caloric foods as appropriate       INTERVENTIONS:  - Monitor oral intake, urinary output, labs, and treatment plans  - Assess nutrition and hydration status and recommend course of action  - Evaluate amount of meals eaten  - Assist patient with eating if necessary   - Allow adequate time for meals  - Recommend/ encourage appropriate diets, oral nutritional supplements, and vitamin/mineral supplements  - Order, calculate, and assess calorie counts as needed  - Recommend, monitor, and adjust tube feedings and TPN/PPN based on assessed needs  - Assess need for intravenous fluids  - Provide specific nutrition/hydration education as appropriate  - Include patient/family/caregiver in decisions related to nutrition  Outcome: Progressing

## 2020-08-18 NOTE — ASSESSMENT & PLAN NOTE
· As evidenced by tachycardia, leukocytosis, elevated lactic acidosis, and fevers  · Secondary to  shunt infection with meningitis and peritonitis  CSF culture grew Pseudomonas  · Status post  shunt externalization 7/30  · ID following, continue meropenem  Was on cefepime but developed a rash  · Repeat CSF cultures negative

## 2020-08-18 NOTE — ASSESSMENT & PLAN NOTE
· CSF culture grew Pseudomonas   ·  shunt was externalized, however repeat CSF still grew pseudomonas   shunt was later removed 8/7/2020  · Repeat CSF cultures negative  · ID following  On IV Meropenem  · Neurosurgery following, planning for replacement of  shunt later this week  Timing to be determined  · PT/OT recommending rehab  CM following

## 2020-08-18 NOTE — ASSESSMENT & PLAN NOTE
Lab Results   Component Value Date    HGBA1C 5 6 07/31/2020       Recent Labs     08/17/20  1722 08/17/20  2145 08/18/20  0748 08/18/20  1129   POCGLU 119 122 113 144*           · Diet controlled  · Can continue SSI however has not required any insulin    · Diabetic diet  · Monitor Accu-Cheks

## 2020-08-18 NOTE — SOCIAL WORK
CM spoke to pt's brother via telephone  He was requesting to be the pt's POA  CM explained that if pt has capacity, then the patient can complete the POA paperwork in front of a notary which would give the brother POA  If pt doesn't have capacity, then as per PA  it would fall to this brother as he has no spouse, no biological children and no living parents  Pt's brother states he will come to PA if need be for any assistance  Initial (On Arrival)

## 2020-08-18 NOTE — ASSESSMENT & PLAN NOTE
· S/p  shunt placement in 2005  · Now s/p removal of shunt apparatus given infection as above  Planning for repeat placement of  shunt later this week

## 2020-08-18 NOTE — PROGRESS NOTES
Progress Note - Infectious Disease   Carlos Soto 69 y o  male MRN: [id number]  Unit/Bed#: [facility] 629-01 Encounter: [id number]      Impression/Plan:  1  Severe sepsis, tachycardia, leukocytosis, elevated lactic acid   With early development of high fevers  Secondary to  shunt infection with meningitis and peritonitis  Not on vasopressors   Blood cultures are negative thus far   Patient's fever and leukocytosis had improved but now he is having fever and rising white cell count once again   May all be secondary to the developing drug eruption as below   All the  shunt apparatus has been removed   Repeat blood cultures negative thus far   Repeat CSF cultures negative   The white cell count has come down and the temperature has decreased  -antibiotic plan as below  -monitor temperatures and hemodynamics  -recheck CBC with diff and and CMP  tomorrow  -supportive care      2   shunt infection  Fluid WBC count was 61 with neutrophil predominance  Gram stain from shunt fluid shows GNRs and culture grew Pseudomonas    Suspect secondary to peritonitis in the setting of #3   Status post externalization of  shunt on 07/30   Repeat CSF analysis shows slightly decreased neutrophil predominant pleocytosis   Repeat CSF fluid from the distal externalized shunt still with Pseudomonas aeruginosa despite externalization of shunt   Now status post removal of the entire  shunt apparatus   Having fever and leukocytosis   Patient's temperatures come down since discontinuing the cefepime and starting the meropenem   Leukocytosis improved   Repeat CSF analysis looks fairly bland and the follow-up cultures are negative  Seems to be tolerating the antibiotics without difficulty  -continue meropenem  -recheck CBC with diff and CMP tomorrow  -neurosurgery follow-up ongoing  -await repeat placement of  shunt apparatus later this week     3   Ischemic bowel with peritonitis   Status post exploratory laparotomy, VAC placement   Status post femoral artery and SMA artery cannulation for paraverine by vascular surgery   Suspect intra-abdominal infection is etiology of #2   Status post second-look on  with no resection performed   Peritoneal fluid culture also shows Pseudomonas   Now status post remove the entire  shunt apparatus  -antibiotic plan as above  -surgery follow-up     4  NPH requiring  shunt placement in , status post recent revision in 2019  Now status post removal of entire shunt apparatus   Patient for replacement of apparatus this week     5  Lumbar spinal stenosis status post L5-S1 fusion on 2020      6  Diabetes mellitus with neuropathy      7  Rash-appears consistent with a drug eruption   Suspect secondary to the cefepime   Resolving since discontinuing the cefepime  -no additional cefepime  -antibiotics as above  -serial exams    Antibiotics:  Meropenem 7  Antibiotics 18   shunt removal 10    Subjective:  Patient has no fever, chills, sweats; no nausea, vomiting, diarrhea; no cough, shortness of breath; no pain  No new symptoms  Objective:  Vitals:  Temp:  [97 6 °F (36 4 °C)-98 7 °F (37 1 °C)] 97 6 °F (36 4 °C)  HR:  [61-64] 61  Resp:  [16-18] 16  BP: (120-140)/(57-70) 134/66  SpO2:  [99 %-100 %] 99 %  Temp (24hrs), Av °F (36 7 °C), Min:97 6 °F (36 4 °C), Max:98 7 °F (37 1 °C)  Current: Temperature: 97 6 °F (36 4 °C)    Physical Exam:   General Appearance:  Somnolent, arousable, nontoxic, no acute distress  Throat: Oropharynx moist without lesions  Lungs:   Decreased breath sounds bilaterally; no wheezes, rhonchi or rales; respirations unlabored   Heart:  RRR; no murmur, rub or gallop   Abdomen:   Soft, non-tender, non-distended, positive bowel sounds  Extremities: No clubbing, cyanosis or edema   Skin: No new rashes or lesions  No draining wounds noted         Labs, Imaging, & Other studies:   All pertinent labs and imaging studies were personally reviewed  Results from last 7 days   Lab Units 08/17/20  0519 08/15/20  0551 08/14/20  0554   WBC Thousand/uL 9 49 9 88 13 13*   HEMOGLOBIN g/dL 12 1 11 3* 11 6*   PLATELETS Thousands/uL 248 253 310     Results from last 7 days   Lab Units 08/17/20  0519 08/15/20  0551 08/14/20  0554  08/12/20  0516   SODIUM mmol/L 138 140 140   < > 137   POTASSIUM mmol/L 4 2 4 2 4 0   < > 4 3   CHLORIDE mmol/L 103 106 107   < > 103   CO2 mmol/L 31 28 28   < > 26   BUN mg/dL 14 15 18   < > 17   CREATININE mg/dL 0 64 0 73 0 73   < > 0 78   EGFR ml/min/1 73sq m 99 93 93   < > 91   CALCIUM mg/dL 8 4 7 9* 7 8*   < > 8 1*   AST U/L  --   --   --   --  16   ALT U/L  --   --   --   --  25   ALK PHOS U/L  --   --   --   --  104    < > = values in this interval not displayed       Results from last 7 days   Lab Units 08/11/20  1627   GRAM STAIN RESULT  No bacteria seen  3+ Polys     Results from last 7 days   Lab Units 08/14/20  0554 08/12/20  0516   PROCALCITONIN ng/ml 0 11 0 31*

## 2020-08-19 ENCOUNTER — APPOINTMENT (INPATIENT)
Dept: RADIOLOGY | Facility: HOSPITAL | Age: 70
DRG: 031 | End: 2020-08-19
Payer: COMMERCIAL

## 2020-08-19 PROBLEM — G93.41 METABOLIC ENCEPHALOPATHY: Status: ACTIVE | Noted: 2020-08-19

## 2020-08-19 LAB
ALBUMIN SERPL BCP-MCNC: 2.2 G/DL (ref 3.5–5)
ALP SERPL-CCNC: 94 U/L (ref 46–116)
ALT SERPL W P-5'-P-CCNC: 21 U/L (ref 12–78)
ANION GAP SERPL CALCULATED.3IONS-SCNC: 5 MMOL/L (ref 4–13)
AST SERPL W P-5'-P-CCNC: 16 U/L (ref 5–45)
BASOPHILS # BLD AUTO: 0.07 THOUSANDS/ΜL (ref 0–0.1)
BASOPHILS NFR BLD AUTO: 1 % (ref 0–1)
BILIRUB SERPL-MCNC: 0.36 MG/DL (ref 0.2–1)
BUN SERPL-MCNC: 17 MG/DL (ref 5–25)
CALCIUM SERPL-MCNC: 8.4 MG/DL (ref 8.3–10.1)
CHLORIDE SERPL-SCNC: 103 MMOL/L (ref 100–108)
CO2 SERPL-SCNC: 31 MMOL/L (ref 21–32)
CREAT SERPL-MCNC: 0.73 MG/DL (ref 0.6–1.3)
EOSINOPHIL # BLD AUTO: 0.15 THOUSAND/ΜL (ref 0–0.61)
EOSINOPHIL NFR BLD AUTO: 2 % (ref 0–6)
ERYTHROCYTE [DISTWIDTH] IN BLOOD BY AUTOMATED COUNT: 14.6 % (ref 11.6–15.1)
GFR SERPL CREATININE-BSD FRML MDRD: 93 ML/MIN/1.73SQ M
GLUCOSE SERPL-MCNC: 103 MG/DL (ref 65–140)
GLUCOSE SERPL-MCNC: 108 MG/DL (ref 65–140)
GLUCOSE SERPL-MCNC: 112 MG/DL (ref 65–140)
GLUCOSE SERPL-MCNC: 113 MG/DL (ref 65–140)
GLUCOSE SERPL-MCNC: 128 MG/DL (ref 65–140)
HCT VFR BLD AUTO: 38.8 % (ref 36.5–49.3)
HGB BLD-MCNC: 12.2 G/DL (ref 12–17)
IMM GRANULOCYTES # BLD AUTO: 0.18 THOUSAND/UL (ref 0–0.2)
IMM GRANULOCYTES NFR BLD AUTO: 2 % (ref 0–2)
LYMPHOCYTES # BLD AUTO: 0.83 THOUSANDS/ΜL (ref 0.6–4.47)
LYMPHOCYTES NFR BLD AUTO: 10 % (ref 14–44)
MCH RBC QN AUTO: 27 PG (ref 26.8–34.3)
MCHC RBC AUTO-ENTMCNC: 31.4 G/DL (ref 31.4–37.4)
MCV RBC AUTO: 86 FL (ref 82–98)
MONOCYTES # BLD AUTO: 0.69 THOUSAND/ΜL (ref 0.17–1.22)
MONOCYTES NFR BLD AUTO: 9 % (ref 4–12)
NEUTROPHILS # BLD AUTO: 6.18 THOUSANDS/ΜL (ref 1.85–7.62)
NEUTS SEG NFR BLD AUTO: 76 % (ref 43–75)
NRBC BLD AUTO-RTO: 0 /100 WBCS
PLATELET # BLD AUTO: 234 THOUSANDS/UL (ref 149–390)
PMV BLD AUTO: 10.2 FL (ref 8.9–12.7)
POTASSIUM SERPL-SCNC: 4.3 MMOL/L (ref 3.5–5.3)
PROT SERPL-MCNC: 6.6 G/DL (ref 6.4–8.2)
RBC # BLD AUTO: 4.52 MILLION/UL (ref 3.88–5.62)
SODIUM SERPL-SCNC: 139 MMOL/L (ref 136–145)
WBC # BLD AUTO: 8.1 THOUSAND/UL (ref 4.31–10.16)

## 2020-08-19 PROCEDURE — 85025 COMPLETE CBC W/AUTO DIFF WBC: CPT | Performed by: INTERNAL MEDICINE

## 2020-08-19 PROCEDURE — 99232 SBSQ HOSP IP/OBS MODERATE 35: CPT | Performed by: INTERNAL MEDICINE

## 2020-08-19 PROCEDURE — 99232 SBSQ HOSP IP/OBS MODERATE 35: CPT | Performed by: NURSE PRACTITIONER

## 2020-08-19 PROCEDURE — 82948 REAGENT STRIP/BLOOD GLUCOSE: CPT

## 2020-08-19 PROCEDURE — 80053 COMPREHEN METABOLIC PANEL: CPT | Performed by: INTERNAL MEDICINE

## 2020-08-19 PROCEDURE — 72100 X-RAY EXAM L-S SPINE 2/3 VWS: CPT

## 2020-08-19 PROCEDURE — 97168 OT RE-EVAL EST PLAN CARE: CPT

## 2020-08-19 RX ADMIN — MICONAZOLE NITRATE: 20 CREAM TOPICAL at 09:55

## 2020-08-19 RX ADMIN — MEROPENEM 2000 MG: 1 INJECTION, POWDER, FOR SOLUTION INTRAVENOUS at 11:09

## 2020-08-19 RX ADMIN — HEPARIN SODIUM 5000 UNITS: 5000 INJECTION INTRAVENOUS; SUBCUTANEOUS at 21:43

## 2020-08-19 RX ADMIN — HEPARIN SODIUM 5000 UNITS: 5000 INJECTION INTRAVENOUS; SUBCUTANEOUS at 17:39

## 2020-08-19 RX ADMIN — MEROPENEM 2000 MG: 1 INJECTION, POWDER, FOR SOLUTION INTRAVENOUS at 19:23

## 2020-08-19 RX ADMIN — ACETAMINOPHEN 650 MG: 325 TABLET, FILM COATED ORAL at 22:19

## 2020-08-19 RX ADMIN — MICONAZOLE NITRATE: 20 CREAM TOPICAL at 17:40

## 2020-08-19 RX ADMIN — MEROPENEM 2000 MG: 1 INJECTION, POWDER, FOR SOLUTION INTRAVENOUS at 02:58

## 2020-08-19 RX ADMIN — HEPARIN SODIUM 5000 UNITS: 5000 INJECTION INTRAVENOUS; SUBCUTANEOUS at 04:53

## 2020-08-19 RX ADMIN — HYDROCORTISONE: 1 CREAM TOPICAL at 09:55

## 2020-08-19 NOTE — PROGRESS NOTES
[address] Internal Medicine    Progress Note - Keyur Flynn 1950, 69 y o  male MRN: [id number]    Unit/Bed#: [facility] 629-01 Encounter: [id number]    Primary Care Provider: Drake Herman   Date and time admitted to hospital: 7/30/2020 11:12 PM    * Severe sepsis [facility])  Assessment & Plan  · As evidenced by tachycardia, leukocytosis, elevated lactic acidosis, and fevers  · Secondary to  shunt infection with meningitis and peritonitis  CSF culture grew Pseudomonas  · Status post  shunt externalization 7/30  · ID following, continue meropenem  Was on cefepime but developed a rash  · Repeat CSF cultures negative  Infection of  (ventriculoperitoneal) shunt (HCC)  Assessment & Plan  · CSF culture grew Pseudomonas   ·  shunt was externalized, however repeat CSF still grew pseudomonas   shunt was later removed 8/7/2020  · Repeat CSF cultures negative  · ID following  On IV Meropenem  · Neurosurgery following, planning for replacement of  shunt later this week  Timing to be determined  · PT/OT recommending rehab  CM following  Metabolic encephalopathy  Assessment & Plan  · Multifactorial  Metabolic encephalopathy in the setting of severe sepsis as evidenced by  shunt infection with meningitis and peritonitis requiring multiple surgical procedures  · Patient alert oriented baseline, currently oriented to person and place  Much more awake and alert today, waxes and wanes  · For  shunt replacement later this week  · Continue neuro checks  · Fall/safety precautions  Normal pressure hydrocephalus (HCC)  Assessment & Plan  · S/p  shunt placement in 2005  · Now s/p removal of shunt apparatus given infection as above  Planning for repeat placement of  shunt later this week, date TBD       Nonocclusive mesenteric ischemia (HCC)  Assessment & Plan  · S/p ex lap, arteriogram of SMA, papavarine infusion, exploration of SMA, angiogram SMA on 7/31/2020 and repeat ex lap 8/1/2020  · General surgery signed off  · Abdominal staples removed 8/18    Multiple wounds  Assessment & Plan  · Wound care per Wound Care team recommendations     Type 2 diabetes mellitus [facility])  Assessment & Plan  Lab Results   Component Value Date    HGBA1C 5 6 07/31/2020       Recent Labs     08/18/20  1700 08/18/20  2119 08/18/20  2209 08/19/20  0736   POCGLU 147* 142* 127 113           · Diet controlled  · Can continue SSI however has not required any insulin  · Diabetic diet  · Monitor Accu-Cheks    Lumbar stenosis  Assessment & Plan  · S/p L5-S1 fusion by Neurosurgery on 7/6/2020  · Due for f/u xrays today, pending  To be reviewed by neurosx  Essential hypertension  Assessment & Plan  · BP controlled now off antihypertensives  Previously taking Vasotec  · Monitor    Dysphagia  Assessment & Plan  · Continue modified diet per most recent SLP recs: pureed with NTL  · Aspiration precautions     Drug rash  Assessment & Plan  · Developed prior, was suspected to be 2/2 cefepime which has since been discontinued     ZHOU on CPAP  Assessment & Plan  · Was discontinued by RT given patient's refusal     Pharmacologic: Heparin     Mechanical VTE Prophylaxis in Place: Yes    Patient Centered Rounds: I have performed bedside rounds with nursing staff today  Discussions with Specialists or Other Care Team Provider: nursing, case management, neurosurgery     Education and Discussions with Family / Patient: patient and brother over phone     Time Spent for Care: 30 minutes  More than 50% of total time spent on counseling and coordination of care as described above  Current Length of Stay: 19 day(s)    Current Patient Status: Inpatient     Certification Statement: The patient will continue to require additional inpatient hospital stay due to await neurosurgery plan regarding  shunt    Discharge Plan / Estimated Discharge Date: Await neurosurgery plan regarding re-insertion of  shunt   Eventual plan will be for rehab when medically stable  Code Status: Level 1 - Full Code      Subjective:   Patient offers no acute complaints  Denies any pain  Much more awake and alert today  Oriented to person and place  Objective:     Vitals:   Temp (24hrs), Av 4 °F (36 9 °C), Min:97 7 °F (36 5 °C), Max:99 2 °F (37 3 °C)    Temp:  [97 7 °F (36 5 °C)-99 2 °F (37 3 °C)] 98 5 °F (36 9 °C)  HR:  [60-75] 60  Resp:  [16-18] 16  BP: (117-134)/(54-70) 123/60  SpO2:  [95 %-99 %] 96 %  Body mass index is 31 94 kg/m²  Input and Output Summary (last 24 hours): Intake/Output Summary (Last 24 hours) at 2020 0941  Last data filed at 2020 0648  Gross per 24 hour   Intake 840 ml   Output 1325 ml   Net -485 ml       Physical Exam:     Physical Exam  Vitals signs and nursing note reviewed  Constitutional:       General: He is not in acute distress  Eyes:      Conjunctiva/sclera: Conjunctivae normal    Neck:      Musculoskeletal: Normal range of motion  Cardiovascular:      Rate and Rhythm: Normal rate and regular rhythm  Pulmonary:      Breath sounds: Normal breath sounds  Abdominal:      General: Bowel sounds are normal       Comments: Midline abdominal incision now with staples out   Musculoskeletal: Normal range of motion  General: No edema  Skin:     General: Skin is warm  Coloration: Skin is pale  Neurological:      Mental Status: He is alert  Comments: Oriented to person and place   Psychiatric:         Mood and Affect: Mood and affect normal          Behavior: Behavior is cooperative  Cognition and Memory: Cognition is impaired           Additional Data:     Labs:    Results from last 7 days   Lab Units 20  0449   WBC Thousand/uL 8 10   HEMOGLOBIN g/dL 12 2   HEMATOCRIT % 38 8   PLATELETS Thousands/uL 234   NEUTROS PCT % 76*   LYMPHS PCT % 10*   MONOS PCT % 9   EOS PCT % 2     Results from last 7 days   Lab Units 20  0449   POTASSIUM mmol/L 4 3   CHLORIDE mmol/L 103   CO2 mmol/L 31   BUN mg/dL 17   CREATININE mg/dL 0 73   CALCIUM mg/dL 8 4   ALK PHOS U/L 94   ALT U/L 21   AST U/L 16             Recent Cultures (last 7 days):           Last 24 Hours Medication List:   Current Facility-Administered Medications   Medication Dose Route Frequency Provider Last Rate    acetaminophen  325 mg Rectal Q6H PRN AR Valenzuela      acetaminophen  650 mg Oral Q6H PRN AR Valenzuela      diphenhydrAMINE  25 mg Intravenous Q6H PRN AR Valenzuela      heparin (porcine)  5,000 Units Subcutaneous Q8H [address] Kaylen Aguilar PA-C      hydrocortisone   Topical 4x Daily PRN AR Valenzuela      insulin lispro  2-12 Units Subcutaneous 4x Daily (AC & HS) AR Valenzuela      iodixanol  15 mL Intravenous Once in imaging AR Valenzuela      meropenem  2,000 mg Intravenous Q8H Gregoria Salcido MD Stopped (08/19/20 0358)   [facility] VERONICA ANTIFUNGAL   Topical BID Kaylen Aguilar PA-C      oxyCODONE  5 mg Oral Q4H PRN AR Macdonald          Today, Patient Was Seen By: AR Danielle    ** Please Note: Dragon 360 Dictation voice to text software may have been used in the creation of this document   **

## 2020-08-19 NOTE — OCCUPATIONAL THERAPY NOTE
Occupational Therapy Re-Evaluation     Patient Name: Viridiana Gay  Today's Date: 8/19/2020  Problem List  Principal Problem:    Severe sepsis [facility])  Active Problems:    Type 2 diabetes mellitus ([place] [id number] )    Normal pressure hydrocephalus ([place] [address] )    Essential hypertension    ZHOU on CPAP    Nonocclusive mesenteric ischemia (HCC)    Infection of  (ventriculoperitoneal) shunt (HCC)    Lumbar stenosis    Drug rash    Multiple wounds    Dysphagia    Metabolic encephalopathy    Past Medical History  Past Medical History:   Diagnosis Date    Cancer [facility]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     Diabetes mellitus ([place] [address] )     DM2 (diabetes mellitus, type 2) ([place] [address] )     Gout     HTN (hypertension)     Hydrocephalus ([place] [address] )     Hypertension     Neuropathy     ZHOU on CPAP     Pressure injury of skin     TIA (transient ischemic attack)      Past Surgical History  Past Surgical History:   Procedure Laterality Date    ABDOMINAL WALL SURGERY N/A 8/1/2020    Procedure: CLOSURE WOUND ABDOMINAL/TRUNK;  Surgeon: Tarsha Cates DO;  [facility];  Service: General    ARTERIOGRAM N/A 7/31/2020    Procedure: ARTERIOGRAM Of SMA and placement of Papavarine onfusion arterial catheter;  Surgeon: Tiffany Arndt MD;  [facility];  Service: Vascular    ARTERIOGRAM Left 8/1/2020    Procedure: ARTERIOGRAM; cath removal;  Surgeon: Tiffany Arndt MD;  [facility];  Service: Vascular    CSF SHUNT      x3 Op Reports, Dr Isai Cameron, Ruy Vargas in [facility] chart viewer    FL LUMBAR PUNCTURE DIAGNOSTIC  8/11/2020    LAPAROTOMY N/A 7/31/2020    Procedure: LAPAROTOMY EXPLORATORY: Externalizes  shunt Jeneal Denise application;  Surgeon: Jamaal Bonilla MD;  [facility];  Service: General    LAPAROTOMY N/A 8/1/2020    Procedure: LAPAROTOMY EXPLORATORY,;  Surgeon: Tarsha Cates DO;  [facility];  Service: General    WA AMPUTATION FOOT,TRANSMETATARSAL Left 5/30/2020    Procedure: excision 5th metatarsal head  excision 5th metatarsal ulcer ;  Surgeon: Garland Bence, DPM;  [facility];  Service: Podiatry    AR ARTHDSIS POST/POSTEROLATRL/POSTINTERBODY LUMBAR N/A 7/6/2020    Procedure: MINIMALLY INVASIVE TRANSVERSE LUMBAR INTERBODY FUSION L5-S1 FROM LEFT-SIDED APPROACH;  Surgeon: Diana Olvera MD;  [facility];  Service: Neurosurgery    AR Raya Mariluz CSF SHUNT,W/O REPLACE Right 8/7/2020    Procedure: REMOVAL SHUNT VENTRICULAR PERITONEAL;  Surgeon: Dinaa Olvera MD;  [facility];  Service: Neurosurgery    AR REPLACEMENT/REVISION,CSF SHUNT Right 7/6/2020    Procedure: REVISION OF SCALP OVER VENTRICULAR CATHETER IN THE RIGHT CORONAL REGION;  Surgeon: Diana Olvera MD;  [facility];  Service: Neurosurgery    WOUND DEBRIDEMENT N/A 8/1/2020    Procedure: abd washout;  Surgeon: Troy Jim DO;  [facility];  Service: General        08/19/20 0809   Note Type   Note type Re-eval   Restrictions/Precautions   Weight Bearing Precautions Per Order No   Braces or Orthoses MAFO  (Per chart, pt with LLE MOFO)   Other Precautions Cognitive; Bed Alarm;Multiple lines; Fall Risk;Pain   Pain Assessment   Pain Assessment Tool Pain Assessment not indicated - pt denies pain   Pain Score No Pain   Home Living   Additional Comments See intial eval   Prior Function   Comments See intial eval   Lifestyle   Autonomy I ADLS, mainly I w/ IADLS except cooking, Mod I w/ transfers and functional mobility PTA   Reciprocal Relationships Pt lives w/ a friend, the friend works 6-8 hrs/day   Service to [employer] pt is retired   Intrinsic Gratification Enjoys yard work and reading   Psychosocial   Psychosocial (WDL) X   Patient Behaviors/Mood Flat affect   Subjective   Subjective "Ethan" When asked what he likes to go by    ADL   Where Assessed Supine, bed   Eating Assistance 3  Moderate Assistance   Grooming Assistance 2  Maximal Assistance   UB Bathing Assistance 2  Maximal Assistance   LB Bathing Assistance 1  Total Assistance   UB Dressing Assistance 2  Maximal Assistance   LB Dressing Assistance 1  Total Assistance   Toileting Assistance  2  Maximal Assistance   Bed Mobility   Rolling R 2  Maximal assistance   Additional items Assist x 2; Increased time required;Verbal cues   Supine to Sit 2  Maximal assistance   Additional items Assist x 2; Increased time required;Verbal cues;LE management   Sit to Supine 2  Maximal assistance   Additional items Assist x 2; Increased time required;Verbal cues;LE management   Balance   Static Sitting Poor   Dynamic Sitting Poor -   Activity Tolerance   Activity Tolerance Patient limited by fatigue   Medical Staff Made Aware OT Chise   Nurse Made Aware TRACIE Hicks   RUE Assessment   RUE Assessment X   LUE Assessment   LUE Assessment X   Hand Function   Gross Motor Coordination Impaired   Fine Motor Coordination Impaired   Cognition   Overall Cognitive Status Impaired   Arousal/Participation Lethargic   Attention Difficulty attending to directions   Orientation Level Oriented to person;Disoriented to person;Disoriented to place; Disoriented to time   Memory Decreased short term memory;Decreased recall of recent events;Decreased recall of precautions   Following Commands Follows one step commands inconsistently   Comments Pt was lethargic t/o session and required max cueing to attend to demads; Pt unresponsive when asked orientation place, situation and time questions; Pt able to say full name, nickname and    Assessment   Limitation Decreased ADL status; Decreased UE ROM; Decreased UE strength;Decreased Safe judgement during ADL;Decreased cognition;Decreased endurance;Decreased fine motor control;Decreased self-care trans;Decreased high-level ADLs   Prognosis Good   Assessment Pt seen for re-evaluation on this date for further assessment and goal expiration  Refer to initial eval for pt's history   Pt is currently MAX A assist level with UB ADLs and TOTAL A for LB ADLs  Pt requires MAX Ax2 assist for R rolling, supine to sit and sit to supine bed mobility  Pt sat EOB for ~5 mins with Mod A  Pt not appropriate for further assessment of transfers at this time  Pt is limited at this time 2* activity tolerance, balance, trunk control, functional standing tolerance, unsupportive home environment, decreased I in ADLs and IADLs, strength, endurance, ROM, visual deficit, cognitive impairments, decreased safety awareness, decreased insight in deficit, impaired fine motor, coordination  These impair baseline function in grooming, oral hygiene, bathing, toilet hygiene, dressing, health maintenance, functional mobility, household maintenance  From OT standpoint anticipate d/c to short term rehab pending progress  OT will continue to follow 3-5x/wk in the next 10-14 days to meet INITIAL EVAL GOALS  Goals   Patient Goals none stated   LTG Time Frame 10-14   Plan   Treatment Interventions ADL retraining;Functional transfer training;UE strengthening/ROM; Endurance training;Cognitive reorientation;Patient/family training;Fine motor coordination activities; Compensatory technique education; Energy conservation; Activityengagement   Goal Expiration Date 09/02/20   OT Treatment Day 5   OT Frequency 3-5x/wk   Recommendation   OT Discharge Recommendation Post-Acute Rehabilitation Services   OT - OK to Discharge Yes   Modified Stonewall Scale   Modified Stonewall Scale 5     SEE INITIAL EVAL FOR GOALS    STANISLAV De La Garza

## 2020-08-19 NOTE — ASSESSMENT & PLAN NOTE
· Multifactorial  Metabolic encephalopathy in the setting of severe sepsis as evidenced by  shunt infection with meningitis and peritonitis requiring multiple surgical procedures  · Patient alert oriented baseline, currently oriented to person and place  Much more awake and alert today, waxes and wanes  · For  shunt replacement later this week  · Continue neuro checks  · Fall/safety precautions

## 2020-08-19 NOTE — ASSESSMENT & PLAN NOTE
Lab Results   Component Value Date    HGBA1C 5 6 07/31/2020       Recent Labs     08/18/20  1700 08/18/20  2119 08/18/20  2209 08/19/20  0736   POCGLU 147* 142* 127 113           · Diet controlled  · Can continue SSI however has not required any insulin    · Diabetic diet  · Monitor Accu-Cheks

## 2020-08-19 NOTE — PLAN OF CARE
Problem: OCCUPATIONAL THERAPY ADULT  Goal: Performs self-care activities at highest level of function for planned discharge setting  See evaluation for individualized goals  Description: Treatment Interventions: ADL retraining, Functional transfer training, Endurance training, Patient/family training, Equipment evaluation/education, Activityengagement          See flowsheet documentation for full assessment, interventions and recommendations  8/19/2020 1239 by Halle Reddy  Outcome: Progressing  Note: Limitation: Decreased ADL status, Decreased UE ROM, Decreased UE strength, Decreased Safe judgement during ADL, Decreased cognition, Decreased endurance, Decreased fine motor control, Decreased self-care trans, Decreased high-level ADLs  Prognosis: Good  Assessment: Pt seen for re-evaluation on this date for further assessment and goal expiration  Refer to initial eval for pt's history  Pt is currently MAX A assist level with UB ADLs and TOTAL A for LB ADLs  Pt requires MAX Ax2 assist for R rolling, supine to sit and sit to supine bed mobility  Pt sat EOB for ~5 mins with Mod A  Pt not appropriate for further assessment of transfers at this time  Pt is limited at this time 2* activity tolerance, balance, trunk control, functional standing tolerance, unsupportive home environment, decreased I in ADLs and IADLs, strength, endurance, ROM, visual deficit, cognitive impairments, decreased safety awareness, decreased insight in deficit, impaired fine motor, coordination  These impair baseline function in grooming, oral hygiene, bathing, toilet hygiene, dressing, health maintenance, functional mobility, household maintenance  From OT standpoint anticipate d/c to short term rehab pending progress  OT will continue to follow 3-5x/wk in the next 10-14 days to meet INITIAL EVAL GOALS       OT Discharge Recommendation: Post-Acute Rehabilitation Services  OT - OK to Discharge: Yes    8/19/2020 1238 by Jodi Bowman Angy Caro  Outcome: Progressing  Note: Limitation: Decreased ADL status, Decreased UE ROM, Decreased UE strength, Decreased Safe judgement during ADL, Decreased cognition, Decreased endurance, Decreased fine motor control, Decreased self-care trans, Decreased high-level ADLs  Prognosis: Good  Assessment: Pt seen for re-evaluation on this date for further assessment and goal expiration  Refer to initial eval for pt's history  Pt is currently MAX A assist level with UB ADLs and TOTAL A for LB ADLs  Pt requires MAX Ax2 assist for R rolling, supine to sit and sit to supine bed mobility  Pt sat EOB for ~5 mins with Mod A  Pt not appropriate for further assessment of transfers at this time  Pt is limited at this time 2* activity tolerance, balance, trunk control, functional standing tolerance, unsupportive home environment, decreased I in ADLs and IADLs, strength, endurance, ROM, visual deficit, cognitive impairments, decreased safety awareness, decreased insight in deficit, impaired fine motor, coordination  These impair baseline function in grooming, oral hygiene, bathing, toilet hygiene, dressing, health maintenance, functional mobility, household maintenance  From OT standpoint anticipate d/c to short term rehab pending progress  OT will continue to follow 3-5x/wk in the next 10-14 days to meet INITIAL EVAL GOALS  OT Discharge Recommendation: Post-Acute Rehabilitation Services  OT - OK to Discharge:  Yes

## 2020-08-19 NOTE — ASSESSMENT & PLAN NOTE
· S/p ex lap, arteriogram of SMA, papavarine infusion, exploration of SMA, angiogram SMA on 7/31/2020 and repeat ex lap 8/1/2020  · General surgery signed off     · Abdominal staples removed 8/18

## 2020-08-19 NOTE — ASSESSMENT & PLAN NOTE
· S/p  shunt placement in 2005  · Now s/p removal of shunt apparatus given infection as above  Planning for repeat placement of  shunt later this week, date TBD

## 2020-08-19 NOTE — PROGRESS NOTES
Progress Note - Infectious Disease   Lexi Portillo 69 y o  male MRN: [id number]  [facility] Encounter: [id number]      Impression/Plan:  1  Severe sepsis, tachycardia, leukocytosis, elevated lactic acid   With early development of high fevers  Secondary to  shunt infection with meningitis and peritonitis  Not on vasopressors   Blood cultures are negative thus far   Patient's fever and leukocytosis had improved but now he is having fever and rising white cell count once again   May all be secondary to the developing drug eruption as below   All the  shunt apparatus has been removed   Repeat blood cultures negative thus far   Repeat CSF cultures negative   The white cell count has come down and the temperature has decreased  -antibiotic plan as below  -monitor temperatures and hemodynamics  -monitor CBC with diff and and CMP weekly while on the meropenem  -supportive care      2   shunt infection  Fluid WBC count was 61 with neutrophil predominance  Gram stain from shunt fluid shows GNRs and culture grew Pseudomonas    Suspect secondary to peritonitis in the setting of #3   Status post externalization of  shunt on 07/30   Repeat CSF analysis shows slightly decreased neutrophil predominant pleocytosis   Repeat CSF fluid from the distal externalized shunt still with Pseudomonas aeruginosa despite externalization of shunt   Now status post removal of the entire  shunt apparatus   Having fever and leukocytosis   Patient's temperatures come down since discontinuing the cefepime and starting the meropenem   Leukocytosis improved   Repeat CSF analysis looks fairly bland and the follow-up cultures are negative    Seems to be tolerating the antibiotics without difficulty  -continue meropenem  -monitor CBC with diff and CMP  weekly while on meropenem  -neurosurgery follow-up ongoing  -await repeat placement of  shunt apparatus later this week  -plan 10-14 days of IV antibiotics after new shunt placed     3  Ischemic bowel with peritonitis   Status post exploratory laparotomy, VAC placement   Status post femoral artery and SMA artery cannulation for paraverine by vascular surgery   Suspect intra-abdominal infection is etiology of #2   Status post second-look on  with no resection performed   Peritoneal fluid culture also shows Pseudomonas   Now status post remove the entire  shunt apparatus  -antibiotic plan as above  -surgery follow-up     4  NPH requiring  shunt placement in , status post recent revision in 2019  Now status post removal of entire shunt apparatus   Patient for replacement of apparatus this week     5  Lumbar spinal stenosis status post L5-S1 fusion on 2020      6  Diabetes mellitus with neuropathy      7  Rash-appears consistent with a drug eruption   Suspect secondary to the cefepime   Resolving since discontinuing the cefepime  -no additional cefepime  -antibiotics as above  -serial exams    Antibiotics:  Meropenem 8  Antibiotics 19   shunt removal 11    Subjective:  Patient has no fever, chills, sweats; no nausea, vomiting, diarrhea; no cough, shortness of breath; no pain  No new symptoms  He is a bit more interactive today and is sitting up on the bed with the assistance of other    Objective:  Vitals:  Temp:  [97 7 °F (36 5 °C)-99 2 °F (37 3 °C)] 98 5 °F (36 9 °C)  HR:  [60-75] 60  Resp:  [16-18] 16  BP: (117-134)/(54-70) 123/60  SpO2:  [95 %-99 %] 96 %  Temp (24hrs), Av 4 °F (36 9 °C), Min:97 7 °F (36 5 °C), Max:99 2 °F (37 3 °C)  Current: Temperature: 98 5 °F (36 9 °C)    Physical Exam:   General Appearance:  Alert, interactive, nontoxic, no acute distress  Sitting up in bed with the assistance other   Throat: Oropharynx moist without lesions  Lungs:   Clear to auscultation bilaterally; no wheezes, rhonchi or rales; respirations unlabored   Heart:  RRR; no murmur, rub or gallop   Abdomen:   Soft, non-tender, non-distended, positive bowel sounds  Extremities: No clubbing, cyanosis or edema   Skin: No new rashes or lesions  No draining wounds noted         Labs, Imaging, & Other studies:   All pertinent labs and imaging studies were personally reviewed  Results from last 7 days   Lab Units 08/19/20  0449 08/17/20  0519 08/15/20  0551   WBC Thousand/uL 8 10 9 49 9 88   HEMOGLOBIN g/dL 12 2 12 1 11 3*   PLATELETS Thousands/uL 234 248 253     Results from last 7 days   Lab Units 08/19/20 0449 08/17/20 0519 08/15/20  0551   SODIUM mmol/L 139 138 140   POTASSIUM mmol/L 4 3 4 2 4 2   CHLORIDE mmol/L 103 103 106   CO2 mmol/L 31 31 28   BUN mg/dL 17 14 15   CREATININE mg/dL 0 73 0 64 0 73   EGFR ml/min/1 73sq m 93 99 93   CALCIUM mg/dL 8 4 8 4 7 9*   AST U/L 16  --   --    ALT U/L 21  --   --    ALK PHOS U/L 94  --   --          Results from last 7 days   Lab Units 08/14/20  0554   PROCALCITONIN ng/ml 0 11

## 2020-08-19 NOTE — ASSESSMENT & PLAN NOTE
· S/p L5-S1 fusion by Neurosurgery on 7/6/2020  · Due for f/u xrays today, pending  To be reviewed by neurosx

## 2020-08-20 ENCOUNTER — APPOINTMENT (INPATIENT)
Dept: RADIOLOGY | Facility: HOSPITAL | Age: 70
DRG: 031 | End: 2020-08-20
Payer: COMMERCIAL

## 2020-08-20 LAB
GLUCOSE SERPL-MCNC: 120 MG/DL (ref 65–140)
GLUCOSE SERPL-MCNC: 149 MG/DL (ref 65–140)
GLUCOSE SERPL-MCNC: 156 MG/DL (ref 65–140)
GLUCOSE SERPL-MCNC: 97 MG/DL (ref 65–140)

## 2020-08-20 PROCEDURE — 97110 THERAPEUTIC EXERCISES: CPT

## 2020-08-20 PROCEDURE — 97535 SELF CARE MNGMENT TRAINING: CPT

## 2020-08-20 PROCEDURE — 97164 PT RE-EVAL EST PLAN CARE: CPT

## 2020-08-20 PROCEDURE — 70450 CT HEAD/BRAIN W/O DYE: CPT

## 2020-08-20 PROCEDURE — 82948 REAGENT STRIP/BLOOD GLUCOSE: CPT

## 2020-08-20 PROCEDURE — 99232 SBSQ HOSP IP/OBS MODERATE 35: CPT | Performed by: PHYSICIAN ASSISTANT

## 2020-08-20 PROCEDURE — G1004 CDSM NDSC: HCPCS

## 2020-08-20 PROCEDURE — 99024 POSTOP FOLLOW-UP VISIT: CPT | Performed by: PHYSICIAN ASSISTANT

## 2020-08-20 PROCEDURE — 99232 SBSQ HOSP IP/OBS MODERATE 35: CPT | Performed by: INTERNAL MEDICINE

## 2020-08-20 RX ADMIN — INSULIN LISPRO 2 UNITS: 100 INJECTION, SOLUTION INTRAVENOUS; SUBCUTANEOUS at 17:59

## 2020-08-20 RX ADMIN — MEROPENEM 2000 MG: 1 INJECTION, POWDER, FOR SOLUTION INTRAVENOUS at 03:24

## 2020-08-20 RX ADMIN — MEROPENEM 2000 MG: 1 INJECTION, POWDER, FOR SOLUTION INTRAVENOUS at 10:48

## 2020-08-20 RX ADMIN — HEPARIN SODIUM 5000 UNITS: 5000 INJECTION INTRAVENOUS; SUBCUTANEOUS at 05:42

## 2020-08-20 RX ADMIN — MICONAZOLE NITRATE: 20 CREAM TOPICAL at 08:31

## 2020-08-20 RX ADMIN — HEPARIN SODIUM 5000 UNITS: 5000 INJECTION INTRAVENOUS; SUBCUTANEOUS at 21:12

## 2020-08-20 RX ADMIN — HEPARIN SODIUM 5000 UNITS: 5000 INJECTION INTRAVENOUS; SUBCUTANEOUS at 13:30

## 2020-08-20 RX ADMIN — MICONAZOLE NITRATE 1 APPLICATION: 20 CREAM TOPICAL at 17:58

## 2020-08-20 RX ADMIN — MEROPENEM 2000 MG: 1 INJECTION, POWDER, FOR SOLUTION INTRAVENOUS at 18:36

## 2020-08-20 NOTE — ASSESSMENT & PLAN NOTE
· Status post minimally invasive transverse lumbar interbody fusion L5/S1 7/6/20  · Seen today for 6 week follow up today  · No issues with incision noted  · Patient with diffuse weakness throughout, GCS 14  · Will likely have outpatient follow up in 6 weeks with new upright XR lumbar spine imaging  · No further inpatient needs regarding this diagnosis

## 2020-08-20 NOTE — ASSESSMENT & PLAN NOTE
· Status post minimally invasive transverse lumbar interbody fusion L5/S1 7/6/20  · No issues with incision noted  · Patient with diffuse weakness throughout, GCS 14  · Will likely have outpatient follow up in 6 weeks with new upright XR lumbar spine imaging  · No further inpatient needs regarding this diagnosis

## 2020-08-20 NOTE — PROGRESS NOTES
Progress Note - Susannah Eric 1950, 69 y o  male MRN: [id number]    Unit/Bed#: [facility] 629-01 Encounter: [id number]    Primary Care Provider: Rio Phillips   Date and time admitted to hospital: 7/30/2020 11:12 PM        Lumbar stenosis  Assessment & Plan  · Status post minimally invasive transverse lumbar interbody fusion L5/S1 7/6/20  · Seen today for 6 week follow up today  · No issues with incision noted  · Patient with diffuse weakness throughout, GCS 14  · Will likely have outpatient follow up in 6 weeks with new upright XR lumbar spine imaging  · No further inpatient needs regarding this diagnosis    Normal pressure hydrocephalus ([address] )  Assessment & Plan  POD 13 from  shunt removal - seen for 2 week appointment  · Externalization 7/30 secondary to laparotomy for bowel ischemia/peritonitis  · S/p VPS placement for NPH 2005, revision 2011  · Shunt last adjusted to 100 cm of water 7/21/2020 for increasing size of bilateral subdural hygromas  · Sutures placed over about beginning in July for thinning skin  Imaging:   · CT head without 8/9/2020: Interval removal of right frontal  shunt catheter  Grossly stable size and configuration of prominent ventricular system  Stabe encephalomalacia in the right superior frontal gyrus and occipitotemporal region  Plan:    · Plan for replacement of shunt on Monday, likely ventriculo-atrial approach given recent abdominal issues, with Dr Alejandra Wilson  · Will order repeat CT head for evaluation of NPH   · Will likely remove staples tomorrow  · Continue monitor neurological exam     · ID following  · All repeat BC and CSF have been negative  · Afebrile at this time  · Continues on meropenem, cefepime dc due to drug reaction  · Will likely need 10-14 days of IV abx once new shunt is placed  · Follow and trend labs  · DVT prophylaxis:  SCDs and heparin  Neurosurgery will follow from periphery and review imaging once completed    Plan for surgery this upcoming Monday  Please call with questions or concerns  Type 2 diabetes mellitus [facility])  Assessment & Plan  Lab Results   Component Value Date    HGBA1C 5 6 07/31/2020       Recent Labs     08/19/20  1722 08/19/20  2122 08/20/20  0754 08/20/20  1144   POCGLU 108 128 120 149*       Blood Sugar Average: Last 72 hrs:  (P) 125 2     Continue medical management per primary team      Subjective/Objective   Chief Complaint: "so-so"    Subjective:  Patient denies headache, dizziness, chest pain, shortness of breath, abdominal pain, nausea or vomiting, new numbness/tingling/weakness  Objective:  Sitting in bed, NAD, tired appearing    I/O       08/18 0701 - 08/19 0700 08/19 0701 - 08/20 0700 08/20 0701 - 08/21 0700    P  O  900 690 240    I V  (mL/kg)  45 (0 4)     IV Piggyback 300 100     Total Intake(mL/kg) 1200 (10 3) 835 (7 8) 240 (2 2)    Urine (mL/kg/hr) 1325 (0 5) 600 (0 2)     Stool 0 0     Total Output 1325 600     Net -125 +235 +240           Unmeasured Urine Occurrence 3 x 3 x     Unmeasured Stool Occurrence 1 x 4 x           Invasive Devices     Peripherally Inserted Central Catheter Line            PICC Line 85/87/29 Left Basilic 14 days          Drain            External Urinary Catheter 1 day    External Urinary Catheter Small 1 day                Physical Exam:  Vitals: Blood pressure 131/63, pulse 65, temperature 97 9 °F (36 6 °C), resp  rate 18, height 6' 3" (1 905 m), weight 107 kg (235 lb 3 7 oz), SpO2 99 %  ,Body mass index is 29 4 kg/m²        General appearance: alert, appears stated age, cooperative and no distress  Head: incision clean dry intact, sutures still in place  Eyes: conjugate gaze  Neck: supple, symmetrical, trachea midline  Back:  Incision appears well approximated, difficult to see due to body habitus  Lungs: non labored breathing  Heart: regular heart rate  Neurologic:   Mental status: tired appearing, GCS 14, not oriented to date, follows commands, very soft spoken  Cranial nerves: grossly intact (Cranial nerves II-XII)  Sensory: intact to crude touch  Motor: moving all extremities with generalized weakness throughout, 4-4+/5 throughout  Reflexes:  Reflexes not well appreciated bilateral patella, no Jensen or clonus noted  Coordination:  Grossly no drift appreciated, finger to nose abnormal bilaterally with dysmetria      Lab Results:  Results from last 7 days   Lab Units 08/19/20  0449 08/17/20  0519 08/15/20  0551   WBC Thousand/uL 8 10 9 49 9 88   HEMOGLOBIN g/dL 12 2 12 1 11 3*   HEMATOCRIT % 38 8 39 3 35 9*   PLATELETS Thousands/uL 234 248 253   NEUTROS PCT % 76*  --   --    MONOS PCT % 9  --   --      Results from last 7 days   Lab Units 08/19/20  0449 08/17/20  0519 08/15/20  0551   POTASSIUM mmol/L 4 3 4 2 4 2   CHLORIDE mmol/L 103 103 106   CO2 mmol/L 31 31 28   BUN mg/dL 17 14 15   CREATININE mg/dL 0 73 0 64 0 73   CALCIUM mg/dL 8 4 8 4 7 9*   ALK PHOS U/L 94  --   --    ALT U/L 21  --   --    AST U/L 16  --   --                  No results found for: TROPONINT  ABG:  Lab Results   Component Value Date    PHART 7 357 07/31/2020    LSO6RKK 32 3 (L) 07/31/2020    PO2ART 87 8 07/31/2020    CWG4ELP 17 7 (L) 07/31/2020    BEART -6 7 07/31/2020    SOURCE Line, Arterial 07/31/2020       Imaging Studies: I have personally reviewed pertinent reports  and I have personally reviewed pertinent films in PACS    Xr Chest Portable    Result Date: 7/31/2020  Impression: 1  Interval intubation with coiling of the nasogastric tube in the midesophagus  Repositioning of the the nasogastric tube advised  2   Endotracheal tube noted with the tip well above the tye  3   Scattered strandy densities likely related to diminished degree of inspiration  Workstation performed: [id number]     Ct Head Without Contrast    Result Date: 7/31/2020  Impression: Minimal increase in size of ventricular system The study was marked in EPIC for immediate notification   Workstation performed: [id number]     Pe Study With Ct Abdomen And Pelvis With Contrast    Result Date: 7/31/2020  Impression: Fluid-filled loops of small bowel with pneumatosis and bowel wall thickening  Findings are suspicious for ischemic bowel  Nodular airspace opacities within the right upper lobe  Findings may represent infection  I personally discussed this study with Melissa Harris on 7/31/2020 at 1:46 AM  Workstation performed: [id number]       EKG, Pathology, and Other Studies: I have personally reviewed pertinent reports        VTE Pharmacologic Prophylaxis: Heparin    VTE Mechanical Prophylaxis: sequential compression device

## 2020-08-20 NOTE — PHYSICAL THERAPY NOTE
PHYSICAL THERAPY RE-EVALUATION    Patient Name: Carlos Torres  [id number]'[id number] Date: 8/20/2020 08/20/20 1320   Note Type   Note type Re-eval   Pain Assessment   Pain Assessment Tool FLACC   Pain Rating: FLACC (Rest) - Face 0   Pain Rating: FLACC (Rest) - Legs 0   Pain Rating: FLACC (Rest) - Activity 0   Pain Rating: FLACC (Rest) - Cry 0   Pain Rating: FLACC (Rest) - Consolability 0   Score: FLACC (Rest) 0   Pain Rating: FLACC (Activity) - Face 0   Pain Rating: FLACC (Activity) - Legs 0   Pain Rating: FLACC (Activity) - Activity 0   Pain Rating: FLACC (Activity) - Cry 0   Pain Rating: FLACC (Activity) - Consolability 0   Score: FLACC (Activity) 0   Home Living   Additional Comments see intial PT evaluation   Prior Function   Comments see initiate PT evaluation   Restrictions/Precautions   Weight Bearing Precautions Per Order No   Braces or Orthoses MAFO  (LLE MAFO)   Other Precautions Cognitive; Bed Alarm;Multiple lines; Fall Risk;Pain   General   Family/Caregiver Present No   Cognition   Overall Cognitive Status Impaired   Arousal/Participation Alert   Attention Attends with cues to redirect   Orientation Level Oriented to person;Oriented to place; Disoriented to time;Disoriented to situation   Memory Decreased recall of precautions   Following Commands Follows one step commands with increased time or repetition   Comments Pt oriented to person and place  Pt requires VCs and TCs to inititate participation in therapy   RLE Assessment   RLE Assessment   (grossly 2+/5)   LLE Assessment   LLE Assessment   (grossly 2+/5)   Coordination   Movements are Fluid and Coordinated 0   Coordination and Movement Description slow, guarded, bradykinetic   Bed Mobility   Rolling R 2  Maximal assistance   Additional items Assist x 2; Increased time required;Verbal cues   Rolling L 2  Maximal assistance   Additional items Assist x 2; Increased time required;Verbal cues   Supine to Sit 2  Maximal assistance   Additional items Assist x 2; Increased time required;Verbal cues   Sit to Supine 2  Maximal assistance   Additional items Assist x 2; Increased time required;Verbal cues   Additional Comments Pt lying supine upon PT arrival  Pt sat EOB ~8 minutes at Max A  Pt was able to particiapte in LE therex program  Pt able to follow all instructions for LE therex program  Pt required Max A to maintain upright position when sitting EOB- pt presents w/ posterior and R lean  Pt returned lying supine at end of session  Pt performed rolling R and L x3 for linen change, pericare, and appropriate positioning in bed   Transfers   Sit to Stand Unable to assess   Ambulation/Elevation   Gait pattern Not appropriate   Balance   Static Sitting Poor -   Endurance Deficit   Endurance Deficit Yes   Endurance Deficit Description fatigue, weakness   Activity Tolerance   Activity Tolerance Patient limited by fatigue   Medical Staff Made Aware rehab aide   Nurse Made Aware yes   Assessment   Prognosis Fair   Problem List Decreased strength;Decreased range of motion;Decreased endurance; Impaired balance;Decreased mobility; Decreased cognition   Assessment Pt seen for skilled PT re-evaluation 2* skilled PT goal expiration  Please see PT IE for PLOF and home set-up  Pt is 69 y o  male seen for PT evaluation s/p admit to [facility] on 7/30/2020 w/ Severe sepsis ([facility] [address] )  PT consulted to assess pt's functional mobility and d/c needs  Order placed for PT eval and tx, w/ up w/ A order  Comorbidities affecting pt's physical performance at time of assessment include: Type 2 DM, HTN, infection of  shunt, lumbar stenosis, dysphagia, encephalopathy, gout, spondylosis lumbosacral region   Please find objective findings from PT assessment regarding body systems outlined above with impairments and limitations including weakness, decreased ROM, impaired balance, decreased endurance, gait deviations, pain, decreased activity tolerance, decreased functional mobility tolerance and fall risk  Pt performed bed mobility at Max Ax2  Pt sat EOB ~8 minutes at Max A 2' posterior and R lean  Pt participated in LE therex program seated EOB  Pt returned lying supine  Pt noted to have BM  Pt performed rolling R and L x3 at Max Ax2 for pericare, linen change, and repositioning in bed  The following objective measures performed on IE also reveal limitations: Modified Yonny: 4 (moderate/severe disability)  Pt's clinical presentation is currently unstable/unpredictable seen in pt's presentation of recent admission for sepsis requiring medical attention, recent decline in function as compared to baseline, multiple lines, fall risk, pain  Pt to benefit from continued PT tx to address deficits as defined above and maximize level of functional independent mobility and consistency  From PT/mobility standpoint, recommendation at time of d/c would be STR pending progress in order to facilitate return to PLOF  All goals from PT IE remain appropriate  Goals   Patient Goals to return to bed   STG Expiration Date 09/03/20   Short Term Goal #2 continue w/ previous established goals   PT Treatment Day 3   Plan   Treatment/Interventions Functional transfer training;LE strengthening/ROM; Therapeutic exercise; Endurance training;Patient/family training;Equipment eval/education; Bed mobility;Spoke to nursing   PT Frequency   (3-6x/week)   Recommendation   PT Discharge Recommendation Post-Acute Rehabilitation Services   Equipment Recommended   (TBD)   PT - OK to Discharge Yes  (when medically cleared to rehab)   Modified Yonny Scale   Modified Yonny Scale 5     Cee Patel PT, DPT    Physical Therapy Treatment Note  Time In: 13:12  Time Out: 13:20  Total Time: 8 min    S: Pt sitting EOB and agreeable to perform LE therex    O: (B) LE therex performed sitting EOB as followed: (B) AP x10 and (B) LAQ X5 for 2 sets   Pt returned lying supine at end of session and all needs within reach    A: Additional follow up consecutive session performed to initiate LE therex in order to maximize strength and to faciliate progression w/ functional mobility and maximize functional independence  Pt was able to complete therex program w/ rest breaks as needed  Recommend rehab upon D/C pending progress and when medically cleared       P: Continue with 3-5x/week for LE therex, functional mobility training, endurance/activity tolerance, and pt education per POC to maximize functional independence  Sarah Blankenship, PT, DPT

## 2020-08-20 NOTE — ASSESSMENT & PLAN NOTE
Lab Results   Component Value Date    HGBA1C 5 6 07/31/2020       Recent Labs     08/19/20  1722 08/19/20  2122 08/20/20  0754 08/20/20  1144   POCGLU 108 128 120 149*           · Diet controlled  · Can continue SSI however has not required any insulin    · Diabetic diet  · Monitor Accu-Cheks

## 2020-08-20 NOTE — PROGRESS NOTES
Progress Note - Infectious Disease   Ralph Huitron 69 y o  male MRN: [id number]  Unit/Bed#: [facility] 629-01 Encounter: [id number]      Impression/Plan:  1  Severe sepsis, tachycardia, leukocytosis, elevated lactic acid   With early development of high fevers  Secondary to  shunt infection with meningitis and peritonitis   Repeat blood cultures negative  All the  shunt apparatus has been removed  Repeat CSF cultures negative   The white cell count has come down and the temperature has decreased  -antibiotic plan as below  -monitor temperatures and hemodynamics  -monitor CBC with diff and and CMP weekly while on the meropenem  -supportive care      2   shunt infection  Fluid WBC count was 61 with neutrophil predominance  Pseudomonas aeruginosa  Suspect secondary to peritonitis in the setting of #3    Now status post removal of the entire  shunt apparatus    Repeat CSF analysis looks fairly bland and the follow-up cultures are negative   Seems to be tolerating the antibiotics without difficulty  -continue meropenem  -monitor CBC with diff and CMP  weekly while on meropenem  -neurosurgery follow-up ongoing  -await repeat placement of  shunt apparatus later this week  -plan 10-14 days of IV antibiotics after new shunt placed     3   Ischemic bowel with peritonitis   Status post exploratory laparotomy, VAC placement   Status post femoral artery and SMA artery cannulation for paraverine by vascular surgery   Suspect intra-abdominal infection is etiology of #2   Status post second-look on 08/01 with no resection performed   Peritoneal fluid culture also shows Pseudomonas   Now status post remove the entire  shunt apparatus  -antibiotic plan as above  -surgery follow-up     4  NPH requiring  shunt placement in 2005, status post recent revision in July 2019  Now status post removal of entire shunt apparatus   Patient for replacement of apparatus this week     5  Lumbar spinal stenosis status post L5-S1 fusion on 2020      6  Diabetes mellitus with neuropathy      7  Rash-appears consistent with a drug eruption   Suspect secondary to the cefepime   Resolved since discontinuing the cefepime  -no additional cefepime  -antibiotics as above  -serial exams    Antibiotics:  Meropenem 9  Antibiotics 20   shunt removal 12    Subjective:  Patient has no fever, chills, sweats; no nausea, vomiting, diarrhea; no cough, shortness of breath; no pain  No new symptoms  Objective:  Vitals:  Temp:  [97 7 °F (36 5 °C)-98 5 °F (36 9 °C)] 97 7 °F (36 5 °C)  HR:  [62-69] 63  Resp:  [16-18] 18  BP: (120-130)/(66-94) 130/90  SpO2:  [95 %-100 %] 96 %  Temp (24hrs), Av 1 °F (36 7 °C), Min:97 7 °F (36 5 °C), Max:98 5 °F (36 9 °C)  Current: Temperature: 97 7 °F (36 5 °C)    Physical Exam:   General Appearance:  Somnolent, easily arousable, nontoxic, no acute distress  Throat: Oropharynx moist without lesions  Lungs:   Clear to auscultation bilaterally; no wheezes, rhonchi or rales; respirations unlabored   Heart:  RRR; no murmur, rub or gallop   Abdomen:   Soft, non-tender, non-distended, positive bowel sounds  Extremities: No clubbing, cyanosis or edema   Skin: No new rashes or lesions  No draining wounds noted         Labs, Imaging, & Other studies:   All pertinent labs and imaging studies were personally reviewed  Results from last 7 days   Lab Units 08/19/20  0449 08/17/20  0519 08/15/20  0551   WBC Thousand/uL 8 10 9 49 9 88   HEMOGLOBIN g/dL 12 2 12 1 11 3*   PLATELETS Thousands/uL 234 248 253     Results from last 7 days   Lab Units 08/19/20  0449 08/17/20  0519 08/15/20  0551   SODIUM mmol/L 139 138 140   POTASSIUM mmol/L 4 3 4 2 4 2   CHLORIDE mmol/L 103 103 106   CO2 mmol/L 31 31 28   BUN mg/dL 17 14 15   CREATININE mg/dL 0 73 0 64 0 73   EGFR ml/min/1 73sq m 93 99 93   CALCIUM mg/dL 8 4 8 4 7 9*   AST U/L 16  --   --    ALT U/L 21  --   --    ALK PHOS U/L 94  --   --          Results from last 7 days   Lab Units 08/14/20  0554   PROCALCITONIN ng/ml 0 11

## 2020-08-20 NOTE — ASSESSMENT & PLAN NOTE
Lab Results   Component Value Date    HGBA1C 5 6 07/31/2020       Recent Labs     08/19/20  1722 08/19/20  2122 08/20/20  0754 08/20/20  1144   POCGLU 108 128 120 149*       Blood Sugar Average: Last 72 hrs:  (P) 125 2     Continue medical management per primary team

## 2020-08-20 NOTE — ASSESSMENT & PLAN NOTE
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    Plan:    · Plan for replacement of shunt on Monday, likely ventriculo-atrial approach given recent abdominal issues, with Dr Destiny Blackwell  · Pre op orders placed, NPO after MN, [facility] d/c after 2200 dose tonight  · Sutures and staples to be removed in OR  · Continue monitor neurological exam     · ID following  · All repeat BC and CSF have been negative  · Afebrile at this time  · Continues on meropenem, cefepime dc due to drug reaction  · Will likely need 10-14 days of IV abx once new shunt is placed  · Follow and trend labs  · DVT prophylaxis:  SCDs and heparin  Neurosurgery will continue to follow  Plan for surgery Monday  Please call with questions or concerns

## 2020-08-20 NOTE — PLAN OF CARE
Problem: OCCUPATIONAL THERAPY ADULT  Goal: Performs self-care activities at highest level of function for planned discharge setting  See evaluation for individualized goals  Description: Treatment Interventions: ADL retraining, Functional transfer training, Endurance training, Patient/family training, Equipment evaluation/education, Activityengagement          See flowsheet documentation for full assessment, interventions and recommendations  Outcome: Progressing  Note: Limitation: Decreased ADL status, Decreased UE ROM, Decreased UE strength, Decreased Safe judgement during ADL, Decreased cognition, Decreased endurance, Decreased fine motor control, Decreased self-care trans, Decreased high-level ADLs  Prognosis: Good  Assessment: pt participated in am ot session and was seen focusing on ub groominh and adls whil in bed in chair position pt was lethargic with eyes closed 90 percent of  time  pt rrequired hand over hand support inorder to maintain r ue against grqvity  pt with poor thoroughness and completion of  grooming adl task  pt required max asst over all for face washing hand wshing ub bathing and shirt donning  with guiding for arom b ue's opt was able to assuist with pushing arm through shirt sleeve  pt required max asst to drink from straw       OT Discharge Recommendation: Post-Acute Rehabilitation Services  OT - OK to Discharge:  Yes     WARD Browning

## 2020-08-20 NOTE — WOUND OSTOMY CARE
Progress Note - Wound   Vern Ana 69 y o  male MRN: [id number]  Unit/Bed#: [facility] 629-01 Encounter: [id number]        Assessment:   Patient seen this morning for continued skin and wound care, he is in bed somnolent, does not respond to verbal communication and remained asleep through entire assessment  Findings  1- R lateral malleolus/ankle healed, residual discolored skin remains, no open areas  2- Mid sacrum with linear PTW secondary to moisture, wound connects to a small mixed etiology stage 2   3-L Sacrum with small HA stage 2 pressure injury  Wound bed is greater than 60% red  And less than 40% yellow with periwound erythema small stage 2 to R buttock  3-Bridge of nose with healing pressure injury, wound bed is a 100% dried stable black eschar, no periwound erythema or drainage  4-R ischium with small PTW wound, wound bed blanching with periwound maceration  No other wounds or skin issues seen, abdominal fold with  Mild maceration and resolved candidiasis  Patient has no self mobility in bed, does no respond or engage in conversation, needs extensive assist of two for turns and is grossly incontinent of stool and urine  Continue with following wound/Skin care plans:   1-Turn/reposition q2h or when medically stable for pressure re-distribution on skin--use positioning wedges  2-Ehob cushion in chair when out of bed  3-Moisturize skin daily with skin nourishing cream    4-Apply Allevyn Life foam dressing to bilateral heels and left medial thigh under immobilizer for prevention   Mik with P   Peel back at least daily for skin assessment and re-apply   Change dressing every 3 days and PRN  5-Apply Allevyn Life foam dressing to right lateral malleolus and midline sacrum   Mik with T   Change dressing every 3 days  6-Elevate heels and right hallux area (float off of pillows) to offload pressure     7  Azael Antifungal cream bid and prn to sacral buttocks area bid and prn 8  P- 500 low air loss mattress   9  Apply 3 M no sting daily to the bridge of the nose   If placing bipap apply hydrocolloid then the bipap       Vitals: Blood pressure 131/63, pulse 65, temperature 97 9 °F (36 6 °C), resp  rate 18, height 6' 3" (1 905 m), weight 107 kg (235 lb 3 7 oz), SpO2 99 %  ,Body mass index is 29 4 kg/m²  @[place]     Wound 07/31/20 Moisture associated skin damage Sacrum (Active)   Wound Description Intact; Beefy red;Fragile 08/19/20 1500   Pressure Injury Stage 2 08/19/20 1100   Frances-wound Assessment Intact; Erythema;Fragile 08/18/20 1800   Wound Length (cm) 3 cm 08/20/20 1024   Wound Width (cm) 0 5 cm 08/20/20 1024   Wound Depth (cm) 0 1 cm 08/20/20 1024   Wound Surface Area (cm^2) 1 5 cm^2 08/20/20 1024   Wound Volume (cm^3) 0 15 cm^3 08/20/20 1024   Calculated Wound Volume (cm^3) 0 15 cm^3 08/20/20 1024   Change in Wound Size % -275 08/20/20 1024   Drainage Amount Scant 08/19/20 1500   Drainage Description Serosanguineous 08/19/20 1500   Non-staged Wound Description Partial thickness 08/13/20 1516   Treatments Cleansed 08/17/20 0950   Dressing Protective barrier 08/18/20 1800   Dressing Changed New 08/17/20 0950   Patient Tolerance Tolerated well 08/19/20 1100   Dressing Status Clean;Dry; Intact 08/19/20 1500       Wound 08/01/20 Incision Abdomen N/A (Active)   Wound Description Clean;Dry; Intact;Fragile 08/20/20 0400   Frances-wound Assessment Clean;Dry; Intact; Excoriated 08/20/20 0400   Closure None 08/20/20 0400   Drainage Amount None 08/20/20 0400   Drainage Description Serosanguineous 08/03/20 1600   Treatments Cleansed 08/17/20 0950   Dressing Open to air 08/20/20 0400   Dressing Status Clean;Dry; Intact 08/19/20 2100       Wound 08/07/20 Head Right (Active)   Wound Description Beefy red; Intact 08/19/20 2100   Frances-wound Assessment Clean;Dry; Intact 08/19/20 2100   Closure Open to air 08/19/20 2100   Drainage Amount None 08/19/20 2100   Dressing Open to air 08/19/20 2100   Dressing Status Intact 08/19/20 2100       Wound 08/07/20 Chest Right (Active)   Wound Description Clean;Dry; Intact 08/19/20 2100   Frances-wound Assessment Clean;Dry; Intact; [place] 08/19/20 2100   Closure Sutures 08/19/20 2100   Drainage Amount None 08/19/20 2100   Dressing Open to air 08/19/20 2100   Dressing Status Clean;Dry; Intact 08/19/20 2100       Wound 08/13/20 Nose (Active)   Wound Image   08/20/20 1008   Wound Description Dry; Intact 08/19/20 1500   Pressure Injury Stage DTPI 08/19/20 1100   Frances-wound Assessment Clean;Dry; Intact 08/19/20 1500   Wound Length (cm) 0 4 cm 08/20/20 1008   Wound Width (cm) 0 7 cm 08/20/20 1008   Wound Depth (cm) 0 cm 08/13/20 0950   Wound Surface Area (cm^2) 0 28 cm^2 08/20/20 1008   Wound Volume (cm^3) 0 cm^3 08/13/20 0950   Calculated Wound Volume (cm^3) 0 cm^3 08/13/20 0950   Drainage Amount None 08/18/20 1800   Non-staged Wound Description Not applicable 33/27/97 8974   Treatments Site care 08/15/20 0800   Dressing Open to air 08/19/20 1500   Patient Tolerance Tolerated well 08/19/20 1100       Wound 08/13/20 Thigh Posterior;Proximal;Right (Active)   Wound Image   08/20/20 1018   Wound Description Intact; Beefy red;Fragile 08/19/20 1500   Frances-wound Assessment Intact; Excoriated;Fragile 08/18/20 1800   Wound Length (cm) 0 7 cm 08/20/20 1025   Wound Width (cm) 2 7 cm 08/20/20 1025   Wound Depth (cm) 0 1 cm 08/20/20 1025   Wound Surface Area (cm^2) 1 89 cm^2 08/20/20 1025   Wound Volume (cm^3) 0 19 cm^3 08/20/20 1025   Calculated Wound Volume (cm^3) 0 19 cm^3 08/20/20 1025   Change in Wound Size % 87 33 08/20/20 1025   Drainage Amount Scant 08/18/20 1800   Drainage Description Serosanguineous 08/18/20 1800   Non-staged Wound Description Partial thickness 08/13/20 1516   Treatments Cleansed 08/17/20 0950   Dressing Protective barrier 08/18/20 1800   Dressing Changed New 08/17/20 0950   Patient Tolerance Tolerated well 08/18/20 1800   Dressing Status Clean;Dry; Intact 08/18/20 0950       Wound 08/16/20 Sacrum Left (Active)   Wound Image   08/20/20 1018   Wound Description Pink 08/20/20 1024   Pressure Injury Stage 2 08/20/20 1024   Frances-wound Assessment Pink 08/20/20 1024   Wound Length (cm) 3 cm 08/20/20 1024   Wound Width (cm) 4 5 cm 08/20/20 1024   Wound Depth (cm) 0 1 cm 08/20/20 1024   Wound Surface Area (cm^2) 13 5 cm^2 08/20/20 1024   Wound Volume (cm^3) 1 35 cm^3 08/20/20 1024   Calculated Wound Volume (cm^3) 1 35 cm^3 08/20/20 1024   Treatments Cleansed 08/17/20 0950   Dressing Protective barrier 08/19/20 2100   Dressing Changed New 08/17/20 0950   Patient Tolerance Tolerated well 08/17/20 0950   Dressing Status Clean;Dry; Intact 08/19/20 2100       Our skin care recommendations remains as nursing orders, wound care to continue following, please call ext [phone] with questions or concerns        Crystal Cuevas, RN, BSN, [facility]

## 2020-08-20 NOTE — OCCUPATIONAL THERAPY NOTE
Occupational Therapy Treatment Note:       08/20/20 1145   Pain Assessment   Pain Assessment Tool Pain Assessment not indicated - pt denies pain   ADL   Eating Assistance 2  Maximal Assistance   Eating Comments to drink nectar thick apple juice from straw   Grooming Assistance 2  Maximal Assistance   UB Bathing Assistance 2  Maximal Assistance   UB Dressing Assistance 2  Maximal Assistance   Transfers   Additional Comments pt placed into chair position for grooming and ub adl task  Cognition   Overall Cognitive Status Impaired   Arousal/Participation Lethargic   Attention Difficulty attending to directions   Following Commands Follows one step commands inconsistently   Activity Tolerance   Activity Tolerance Patient tolerated treatment well   Assessment   Assessment pt participated in am ot session and was seen focusing on ub groominh and adls whil in bed in chair position pt was lethargic with eyes closed 90 percent of  time  pt rrequired hand over hand support inorder to maintain r ue against grqvity  pt with poor thoroughness and completion of  grooming adl task  pt required max asst over all for face washing hand wshing ub bathing and shirt donning  with guiding for arom b ue's opt was able to assuist with pushing arm through shirt sleeve  pt required max asst to drink from straw     Plan   Treatment Interventions ADL retraining;Functional transfer training; Endurance training;Patient/family training;Equipment evaluation/education; Activityengagement   Goal Expiration Date 09/02/20   OT Treatment Day   (6)   OT Frequency 3-5x/wk   Recommendation   OT Discharge Recommendation Post-Acute Rehabilitation Services   OT - OK to Discharge Yes   Aisha Crook

## 2020-08-20 NOTE — SOCIAL WORK
Patient here with severe sepsis Secondary to  shunt infection with meningitis and peritonitis  Patient consulted by neurosurgery  Patient needs rehab, referrals to [facility], [facility] and [place] pending in [facility] scheduled for OR next week  CM will continue to follow

## 2020-08-20 NOTE — ASSESSMENT & PLAN NOTE
· S/p  shunt placement in 2005  · Now s/p removal of shunt apparatus given infection as above      · Planning for repeat placement of  shunt anticipated for 08/24 "likely ventriculo-atrial approach given recent abdominal issues

## 2020-08-20 NOTE — ASSESSMENT & PLAN NOTE
POD 13 from  shunt removal - seen for 2 week appointment  · Externalization 7/30 secondary to laparotomy for bowel ischemia/peritonitis  · S/p VPS placement for NPH 2005, revision 2011  · Shunt last adjusted to 100 cm of water 7/21/2020 for increasing size of bilateral subdural hygromas  · Sutures placed over about beginning in July for thinning skin  Imaging:   · CT head without 8/9/2020: Interval removal of right frontal  shunt catheter  Grossly stable size and configuration of prominent ventricular system  Stabe encephalomalacia in the right superior frontal gyrus and occipitotemporal region  Plan:    · Will order repeat CT head for evaluation of NPH   · Will likely remove staples tomorrow  · Continue monitor neurological exam     · ID following  · All repeat BC and CSF have been negative  · Afebrile at this time  · Continues on meropenem, cefepime dc due to drug reaction  · Will likely need 10-14 days of IV abx once new shunt is placed  · Follow and trend labs  · DVT prophylaxis:  SCDs and heparin  Neurosurgery will follow from periphery  Ongoing discussion regarding timing of replacement of  shunt  Will likely be completed sometime next week as no OR availability this week  Please call with questions or concerns

## 2020-08-20 NOTE — PROGRESS NOTES
Progress Note - Cass Flowers 1950, 69 y o  male MRN: [id number]    Unit/Bed#: [facility] 629-01 Encounter: [id number]    Primary Care Provider: Gil Sierra   Date and time admitted to hospital: 7/30/2020 39:38 PM    Metabolic encephalopathy  Assessment & Plan  · Multifactorial  Metabolic encephalopathy in the setting of severe sepsis as evidenced by  shunt infection with meningitis and peritonitis requiring multiple surgical procedures  · Patient alert oriented baseline, currently oriented to person and place  Much more awake and alert today, waxes and wanes  · For  shunt replacement later this week  · Continue neuro checks  · Fall/safety precautions  Normal pressure hydrocephalus (HCC)  Assessment & Plan  · S/p  shunt placement in 2005  · Now s/p removal of shunt apparatus given infection as above  · Planning for repeat placement of  shunt anticipated for 08/24 "likely ventriculo-atrial approach given recent abdominal issues    Dysphagia  Assessment & Plan  · Continue modified diet per most recent SLP recs: pureed with NTL  · Aspiration precautions     Nonocclusive mesenteric ischemia (HCC)  Assessment & Plan  · S/p ex lap, arteriogram of SMA, papavarine infusion, exploration of SMA, angiogram SMA on 7/31/2020 and repeat ex lap 8/1/2020  · General surgery signed off  · Abdominal staples removed 8/18    * Severe sepsis (HCC)  Assessment & Plan  · As evidenced by tachycardia, leukocytosis, elevated lactic acidosis, and fevers  · Secondary to  shunt infection with meningitis and peritonitis  CSF culture grew Pseudomonas  · Status post  shunt externalization 7/30  · ID following, continue meropenem  Was on cefepime but developed a rash  · Repeat CSF cultures negative       Multiple wounds  Assessment & Plan  · Wound care per Wound Care team recommendations     Drug rash  Assessment & Plan  · Developed prior, was suspected to be 2/2 cefepime which has since been discontinued Lumbar stenosis  Assessment & Plan  · S/p L5-S1 fusion by Neurosurgery on 7/6/2020  · Due for f/u xrays today, pending  To be reviewed by neurosx  Infection of  (ventriculoperitoneal) shunt (HCC)  Assessment & Plan  · CSF culture grew Pseudomonas   ·  shunt was externalized, however repeat CSF still grew pseudomonas   shunt was later removed 8/7/2020  · Repeat CSF cultures negative  · ID following  On IV Meropenem  · Neurosurgery following, planning for replacement of  shunt later this week  Timing to be determined  · PT/OT recommending rehab  CM following  ZHOU on CPAP  Assessment & Plan  · Was discontinued by RT given patient's refusal     Essential hypertension  Assessment & Plan  · BP controlled now off antihypertensives  Previously taking Vasotec  · Monitor    Type 2 diabetes mellitus [facility])  Assessment & Plan  Lab Results   Component Value Date    HGBA1C 5 6 07/31/2020       Recent Labs     08/19/20  1722 08/19/20  2122 08/20/20  0754 08/20/20  1144   POCGLU 108 128 120 149*           · Diet controlled  · Can continue SSI however has not required any insulin  · Diabetic diet  · Monitor Accu-Cheks      VTE Pharmacologic Prophylaxis:   Pharmacologic: Heparin  Mechanical VTE Prophylaxis in Place: Yes    Patient Centered Rounds: I have performed bedside rounds with nursing staff today  Discussions with Specialists or Other Care Team Provider:  Neurosurgery, Infectious Disease    Education and Discussions with Family / Patient:  Cloteal Hamburger - brother updated via phone     Time Spent for Care: 30 minutes  More than 50% of total time spent on counseling and coordination of care as described above      Current Length of Stay: 20 day(s)    Current Patient Status: Inpatient   Certification Statement: The patient will continue to require additional inpatient hospital stay due to Anticipated surgery for  shunt replacement    Discharge Plan:  Need placement following checked replacement    Code Status: Level 1 - Full Code      Subjective:   Patient is resting comfortably  Patient is easily arousable but quickly falls back to sleep  Patient expresses no complaints  Objective:     Vitals:   Temp (24hrs), Av 2 °F (36 8 °C), Min:97 7 °F (36 5 °C), Max:99 1 °F (37 3 °C)    Temp:  [97 7 °F (36 5 °C)-99 1 °F (37 3 °C)] 99 1 °F (37 3 °C)  HR:  [63-71] 71  Resp:  [18] 18  BP: (115-131)/(56-94) 115/56  SpO2:  [95 %-100 %] 95 %  Body mass index is 29 4 kg/m²  Input and Output Summary (last 24 hours): Intake/Output Summary (Last 24 hours) at 2020 1612  Last data filed at 2020 1100  Gross per 24 hour   Intake 930 ml   Output 600 ml   Net 330 ml       Physical Exam:     Physical Exam  Constitutional:       Appearance: He is well-developed and well-nourished  HENT:      Head: Normocephalic and atraumatic  Eyes:      Extraocular Movements: EOM normal       Conjunctiva/sclera: Conjunctivae normal    Neck:      Musculoskeletal: Normal range of motion and neck supple  Cardiovascular:      Rate and Rhythm: Normal rate and regular rhythm  Heart sounds: Normal heart sounds  Pulmonary:      Effort: Pulmonary effort is normal       Breath sounds: Normal breath sounds  Abdominal:      General: Bowel sounds are normal  There is no distension  Palpations: Abdomen is soft  Tenderness: There is no abdominal tenderness  There is no guarding  Musculoskeletal: Normal range of motion  Skin:     General: Skin is warm and dry  Neurological:      Mental Status: He is alert and oriented to person, place, and time     Psychiatric:         Mood and Affect: Mood and affect normal          Behavior: Behavior normal          Additional Data:     Labs:    Results from last 7 days   Lab Units 20  0449   WBC Thousand/uL 8 10   HEMOGLOBIN g/dL 12 2   HEMATOCRIT % 38 8   PLATELETS Thousands/uL 234   NEUTROS PCT % 76*   LYMPHS PCT % 10*   MONOS PCT % 9   EOS PCT % 2     Results from last 7 days   Lab Units 08/19/20  0449   SODIUM mmol/L 139   POTASSIUM mmol/L 4 3   CHLORIDE mmol/L 103   CO2 mmol/L 31   BUN mg/dL 17   CREATININE mg/dL 0 73   ANION GAP mmol/L 5   CALCIUM mg/dL 8 4   ALBUMIN g/dL 2 2*   TOTAL BILIRUBIN mg/dL 0 36   ALK PHOS U/L 94   ALT U/L 21   AST U/L 16   GLUCOSE RANDOM mg/dL 112         Results from last 7 days   Lab Units 08/20/20  1144 08/20/20  0754 08/19/20  2122 08/19/20  1722 08/19/20  1149 08/19/20  0736 08/18/20  2209 08/18/20  2119 08/18/20  1700 08/18/20  1129 08/18/20  0748 08/17/20  2145   POC GLUCOSE mg/dl 149* 120 128 108 103 113 127 142* 147* 144* 113 122         Results from last 7 days   Lab Units 08/14/20  0554   PROCALCITONIN ng/ml 0 11           * I Have Reviewed All Lab Data Listed Above  * Additional Pertinent Lab Tests Reviewed:  [address] Admission Reviewed    Imaging:    Imaging Reports Reviewed Today Include:  CT head  Imaging Personally Reviewed by Myself Includes:  CT head    Recent Cultures (last 7 days):           Last 24 Hours Medication List:   Current Facility-Administered Medications   Medication Dose Route Frequency Provider Last Rate    acetaminophen  325 mg Rectal Q6H PRN AR Valenzuela      acetaminophen  650 mg Oral Q6H PRN AR Valenzuela      diphenhydrAMINE  25 mg Intravenous Q6H PRN AR Valenzuela      heparin (porcine)  5,000 Units Subcutaneous Q8H [address] Ruma Samano PA-C      hydrocortisone   Topical 4x Daily PRN AR Valenzuela      insulin lispro  2-12 Units Subcutaneous 4x Daily (AC & HS) AR Valenzuela      iodixanol  15 mL Intravenous Once in imaging AR Valenzuela      meropenem  2,000 mg Intravenous Q8H Marci Johnson MD 2,000 mg (08/20/20 1048)    VERONICA ANTIFUNGAL   Topical BID Ruma Samano PA-C      oxyCODONE  5 mg Oral Q4H PRN AR Javier          Today, Patient Was Seen By: Perico Valdivia PA-C    ** Please Note: Dictation voice to text software may have been used in the creation of this document   **

## 2020-08-20 NOTE — PLAN OF CARE
Problem: PHYSICAL THERAPY ADULT  Goal: Performs mobility at highest level of function for planned discharge setting  See evaluation for individualized goals  Description: Treatment/Interventions: OT, Spoke to case management, Spoke to nursing, Gait training, Bed mobility, Patient/family training, Endurance training, LE strengthening/ROM, Functional transfer training          See flowsheet documentation for full assessment, interventions and recommendations  Outcome: Progressing  Note: Prognosis: Fair  Problem List: Decreased strength, Decreased range of motion, Decreased endurance, Impaired balance, Decreased mobility, Decreased cognition  Assessment: Pt seen for skilled PT re-evaluation 2* skilled PT goal expiration  Please see PT IE for PLOF and home set-up  Pt is 69 y o  male seen for PT evaluation s/p admit to [facility] on 7/30/2020 w/ Severe sepsis ([address] )  PT consulted to assess pt's functional mobility and d/c needs  Order placed for PT eval and tx, w/ up w/ A order  Comorbidities affecting pt's physical performance at time of assessment include: Type 2 DM, HTN, infection of  shunt, lumbar stenosis, dysphagia, encephalopathy, gout, spondylosis lumbosacral region  Please find objective findings from PT assessment regarding body systems outlined above with impairments and limitations including weakness, decreased ROM, impaired balance, decreased endurance, gait deviations, pain, decreased activity tolerance, decreased functional mobility tolerance and fall risk  Pt performed bed mobility at Max Ax2  Pt sat EOB ~8 minutes at Max A 2' posterior and R lean  Pt participated in LE therex program seated EOB  Pt returned lying supine  Pt noted to have BM  Pt performed rolling R and L x3 at Max Ax2 for pericare, linen change, and repositioning in bed  The following objective measures performed on IE also reveal limitations: Modified [place]: 4 (moderate/severe disability)   Pt's clinical presentation is currently unstable/unpredictable seen in pt's presentation of recent admission for sepsis requiring medical attention, recent decline in function as compared to baseline, multiple lines, fall risk, pain  Pt to benefit from continued PT tx to address deficits as defined above and maximize level of functional independent mobility and consistency  From PT/mobility standpoint, recommendation at time of d/c would be STR pending progress in order to facilitate return to PLOF  All goals from PT IE remain appropriate  Barriers to Discharge: Decreased caregiver support, Inaccessible home environment     PT Discharge Recommendation: [address]     PT - OK to Discharge: Yes(when medically cleared to rehab)    See flowsheet documentation for full assessment

## 2020-08-21 ENCOUNTER — DOCUMENTATION (OUTPATIENT)
Dept: NEUROSURGERY | Facility: CLINIC | Age: 70
End: 2020-08-21

## 2020-08-21 LAB
ALBUMIN SERPL BCP-MCNC: 2.5 G/DL (ref 3.5–5)
ALP SERPL-CCNC: 106 U/L (ref 46–116)
ALT SERPL W P-5'-P-CCNC: 20 U/L (ref 12–78)
ANION GAP SERPL CALCULATED.3IONS-SCNC: 3 MMOL/L (ref 4–13)
AST SERPL W P-5'-P-CCNC: 16 U/L (ref 5–45)
BILIRUB SERPL-MCNC: 0.55 MG/DL (ref 0.2–1)
BUN SERPL-MCNC: 15 MG/DL (ref 5–25)
CALCIUM SERPL-MCNC: 8.7 MG/DL (ref 8.3–10.1)
CHLORIDE SERPL-SCNC: 102 MMOL/L (ref 100–108)
CO2 SERPL-SCNC: 32 MMOL/L (ref 21–32)
CREAT SERPL-MCNC: 0.71 MG/DL (ref 0.6–1.3)
ERYTHROCYTE [DISTWIDTH] IN BLOOD BY AUTOMATED COUNT: 14.6 % (ref 11.6–15.1)
GFR SERPL CREATININE-BSD FRML MDRD: 94 ML/MIN/1.73SQ M
GLUCOSE SERPL-MCNC: 111 MG/DL (ref 65–140)
GLUCOSE SERPL-MCNC: 111 MG/DL (ref 65–140)
GLUCOSE SERPL-MCNC: 120 MG/DL (ref 65–140)
GLUCOSE SERPL-MCNC: 142 MG/DL (ref 65–140)
GLUCOSE SERPL-MCNC: 156 MG/DL (ref 65–140)
HCT VFR BLD AUTO: 43.3 % (ref 36.5–49.3)
HGB BLD-MCNC: 13.3 G/DL (ref 12–17)
MCH RBC QN AUTO: 26.6 PG (ref 26.8–34.3)
MCHC RBC AUTO-ENTMCNC: 30.7 G/DL (ref 31.4–37.4)
MCV RBC AUTO: 87 FL (ref 82–98)
PLATELET # BLD AUTO: 241 THOUSANDS/UL (ref 149–390)
PMV BLD AUTO: 9.5 FL (ref 8.9–12.7)
POTASSIUM SERPL-SCNC: 4.4 MMOL/L (ref 3.5–5.3)
PROT SERPL-MCNC: 6.9 G/DL (ref 6.4–8.2)
RBC # BLD AUTO: 5 MILLION/UL (ref 3.88–5.62)
SODIUM SERPL-SCNC: 137 MMOL/L (ref 136–145)
WBC # BLD AUTO: 11.66 THOUSAND/UL (ref 4.31–10.16)

## 2020-08-21 PROCEDURE — 82948 REAGENT STRIP/BLOOD GLUCOSE: CPT

## 2020-08-21 PROCEDURE — 80053 COMPREHEN METABOLIC PANEL: CPT | Performed by: PHYSICIAN ASSISTANT

## 2020-08-21 PROCEDURE — 99232 SBSQ HOSP IP/OBS MODERATE 35: CPT | Performed by: NURSE PRACTITIONER

## 2020-08-21 PROCEDURE — 85027 COMPLETE CBC AUTOMATED: CPT | Performed by: PHYSICIAN ASSISTANT

## 2020-08-21 PROCEDURE — 99232 SBSQ HOSP IP/OBS MODERATE 35: CPT | Performed by: INTERNAL MEDICINE

## 2020-08-21 RX ADMIN — MEROPENEM 2000 MG: 1 INJECTION, POWDER, FOR SOLUTION INTRAVENOUS at 11:57

## 2020-08-21 RX ADMIN — MICONAZOLE NITRATE: 20 CREAM TOPICAL at 18:11

## 2020-08-21 RX ADMIN — HEPARIN SODIUM 5000 UNITS: 5000 INJECTION INTRAVENOUS; SUBCUTANEOUS at 13:37

## 2020-08-21 RX ADMIN — HEPARIN SODIUM 5000 UNITS: 5000 INJECTION INTRAVENOUS; SUBCUTANEOUS at 21:49

## 2020-08-21 RX ADMIN — MEROPENEM 2000 MG: 1 INJECTION, POWDER, FOR SOLUTION INTRAVENOUS at 20:02

## 2020-08-21 RX ADMIN — MEROPENEM 2000 MG: 1 INJECTION, POWDER, FOR SOLUTION INTRAVENOUS at 02:45

## 2020-08-21 RX ADMIN — INSULIN LISPRO 2 UNITS: 100 INJECTION, SOLUTION INTRAVENOUS; SUBCUTANEOUS at 18:11

## 2020-08-21 RX ADMIN — MICONAZOLE NITRATE: 20 CREAM TOPICAL at 08:31

## 2020-08-21 RX ADMIN — HEPARIN SODIUM 5000 UNITS: 5000 INJECTION INTRAVENOUS; SUBCUTANEOUS at 05:04

## 2020-08-21 NOTE — ASSESSMENT & PLAN NOTE
· S/p  shunt placement in 2005  · Now s/p removal of shunt apparatus given infection as above      · Planning for repeat placement of  shunt anticipated for 08/24 Monday "likely ventriculo-atrial approach given recent abdominal issues

## 2020-08-21 NOTE — TREATMENT PLAN
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

## 2020-08-21 NOTE — PROGRESS NOTES
Progress Note - Sotero Noe 1950, 69 y o  male MRN: [id number]    Unit/Bed#: [facility] 629-01 Encounter: [id number]    Primary Care Provider: Sola Torres   Date and time admitted to hospital: 7/30/2020 11:12 PM        * Severe sepsis [facility])  Assessment & Plan  · As evidenced by tachycardia, leukocytosis, elevated lactic acidosis, and fevers  · Secondary to  shunt infection with meningitis and peritonitis  CSF culture grew Pseudomonas  · Status post  shunt externalization 7/30  · ID following, continue meropenem  Was on cefepime but developed a rash  · Repeat CSF cultures negative  Metabolic encephalopathy  Assessment & Plan  · Multifactorial  Metabolic encephalopathy in the setting of severe sepsis as evidenced by  shunt infection with meningitis and peritonitis requiring multiple surgical procedures  · Patient alert oriented baseline, currently oriented to person and place  Much more awake and alert today, waxes and wanes  · For  shunt replacement later this week  · Continue neuro checks  · Fall/safety precautions  Infection of  (ventriculoperitoneal) shunt (HCC)  Assessment & Plan  · CSF culture grew Pseudomonas   ·  shunt was externalized, however repeat CSF still grew pseudomonas   shunt was later removed 8/7/2020  · Repeat CSF cultures negative  · ID following  On IV Meropenem  · Neurosurgery following, planning for replacement of  shunt later this week  Timing to be determined  · PT/OT recommending rehab  CM following  Nonocclusive mesenteric ischemia (HCC)  Assessment & Plan  · S/p ex lap, arteriogram of SMA, papavarine infusion, exploration of SMA, angiogram SMA on 7/31/2020 and repeat ex lap 8/1/2020  · General surgery signed off     · Abdominal staples removed 8/18    ZHOU on CPAP  Assessment & Plan  · Was discontinued by RT given patient's refusal     Normal pressure hydrocephalus ([facility] [address] )  Assessment & Plan  · S/p  shunt placement in 2005  · Now s/p removal of shunt apparatus given infection as above  · Planning for repeat placement of  shunt anticipated for 08/24 Monday "likely ventriculo-atrial approach given recent abdominal issues    Type 2 diabetes mellitus [facility])  Assessment & Plan  Lab Results   Component Value Date    HGBA1C 5 6 07/31/2020       Recent Labs     08/20/20  1723 08/20/20  2110 08/21/20  0741 08/21/20  1200   POCGLU 156* 97 120 111           · Diet controlled  · Can continue SSI however has not required any insulin  · Diabetic diet  · Monitor Accu-Cheks    Lumbar stenosis  Assessment & Plan  · S/p L5-S1 fusion by Neurosurgery on 7/6/2020  · Due for f/u xrays today, pending  To be reviewed by neurosx  Dysphagia  Assessment & Plan  · Continue modified diet per most recent SLP recs: pureed with NTL  · Aspiration precautions     Multiple wounds  Assessment & Plan  · Wound care per Wound Care team recommendations     Drug rash  Assessment & Plan  · Developed prior, was suspected to be 2/2 cefepime which has since been discontinued     Essential hypertension  Assessment & Plan  · BP controlled now off antihypertensives  Previously taking Vasotec  · Monitor      VTE Pharmacologic Prophylaxis:   Pharmacologic: Heparin  Mechanical VTE Prophylaxis in Place: Yes    Patient Centered Rounds: I have performed bedside rounds with nursing staff today  Discussions with Specialists or Other Care Team Provider: nursing   Education and Discussions with Family / Patient: patient (states no need to call anyone to update    Time Spent for Care: 45 minutes  More than 50% of total time spent on counseling and coordination of care as described above      Current Length of Stay: 21 day(s)    Current Patient Status: Inpatient   Certification Statement: The patient will continue to require additional inpatient hospital stay due to ongong need for iv abx and plan for surgery on monday     Discharge Plan: likely rehab when medically stable per id and neurosurgery not in next 5 days     Code Status: Level 1 - Full Code      Subjective:   Pt is lethargic but responsive opens eyes says very little no pain no n/v/d no sob  Discussed with neurosx reports has been this way with plan for procedure on Monday   Feels likely dt need for shunt     Objective:     Vitals:   Temp (24hrs), Av 1 °F (36 7 °C), Min:97 °F (36 1 °C), Max:98 8 °F (37 1 °C)    Temp:  [97 °F (36 1 °C)-98 8 °F (37 1 °C)] 98 8 °F (37 1 °C)  HR:  [61-71] 71  Resp:  [18-20] 18  BP: (107-152)/(60-79) 126/66  SpO2:  [95 %-99 %] 99 %  Body mass index is 29 24 kg/m²  Input and Output Summary (last 24 hours): Intake/Output Summary (Last 24 hours) at 2020 2133  Last data filed at 2020 1530  Gross per 24 hour   Intake 960 ml   Output 191 ml   Net 769 ml       Physical Exam:     Physical Exam  Constitutional:       General: He is not in acute distress  Appearance: He is not diaphoretic  Comments: obese   HENT:        Comments: Incision intact on right occipital area Eyes:      General: No scleral icterus  Right eye: No discharge  Left eye: No discharge  Neck:      Thyroid: No thyromegaly  Trachea: No tracheal deviation  Cardiovascular:      Rate and Rhythm: Normal rate  Heart sounds: No murmur  No friction rub  Pulmonary:      Effort: No respiratory distress  Breath sounds: No stridor  No wheezing or rales  Chest:      Chest wall: No tenderness  Abdominal:      General: There is no distension  Palpations: Abdomen is soft  There is no mass  Tenderness: There is abdominal tenderness  There is no guarding or rebound  Hernia: No hernia is present  Comments: Midline incision abdomen with scar but midline is one black suture    Musculoskeletal:         General: No tenderness, deformity or edema  Lymphadenopathy:      Cervical: No cervical adenopathy  Skin:     Coloration: Skin is not pale  Findings: No erythema or rash  Neurological:      Mental Status: He is oriented to person, place, and time  Psychiatric:      Comments: Lethargic sleepy            Additional Data:     Labs:    Results from last 7 days   Lab Units 08/21/20  0518 08/19/20  0449   WBC Thousand/uL 11 66* 8 10   HEMOGLOBIN g/dL 13 3 12 2   HEMATOCRIT % 43 3 38 8   PLATELETS Thousands/uL 241 234   NEUTROS PCT %  --  76*   LYMPHS PCT %  --  10*   MONOS PCT %  --  9   EOS PCT %  --  2     Results from last 7 days   Lab Units 08/21/20  0518   SODIUM mmol/L 137   POTASSIUM mmol/L 4 4   CHLORIDE mmol/L 102   CO2 mmol/L 32   BUN mg/dL 15   CREATININE mg/dL 0 71   ANION GAP mmol/L 3*   CALCIUM mg/dL 8 7   ALBUMIN g/dL 2 5*   TOTAL BILIRUBIN mg/dL 0 55   ALK PHOS U/L 106   ALT U/L 20   AST U/L 16   GLUCOSE RANDOM mg/dL 111         Results from last 7 days   Lab Units 08/21/20  2035 08/21/20  1715 08/21/20  1200 08/21/20  0741 08/20/20  2110 08/20/20  1723 08/20/20  1144 08/20/20  0754 08/19/20  2122 08/19/20  1722 08/19/20  1149 08/19/20  0736   POC GLUCOSE mg/dl 142* 156* 111 120 97 156* 149* 120 128 108 103 113                   * I Have Reviewed All Lab Data Listed Above  * Additional Pertinent Lab Tests Reviewed:  All Labs Within Last 24 Hours Reviewed    Imaging:    Imaging Reports Reviewed Today Include: reviewed    Recent Cultures (last 7 days):           Last 24 Hours Medication List:   Current Facility-Administered Medications   Medication Dose Route Frequency Provider Last Rate    acetaminophen  325 mg Rectal Q6H PRN AR Valenzuela      acetaminophen  650 mg Oral Q6H PRN AR Valenzuela      diphenhydrAMINE  25 mg Intravenous Q6H PRN AR Valenzuela      heparin (porcine)  5,000 Units Subcutaneous Q8H [address] Radha Marquez PA-C      hydrocortisone   Topical 4x Daily PRN AR Valenzuela      insulin lispro  2-12 Units Subcutaneous 4x Daily (AC & HS) AR Valenzuela      iodixanol  15 mL Intravenous Once in imaging AR Dong      meropenem  2,000 mg Intravenous Q8H Ofelia Lucia MD 2,000 mg (08/21/20 2002)    VERONICA ANTIFUNGAL   Topical BID Heather Desai PA-C      oxyCODONE  5 mg Oral Q4H PRN AR Jones          Today, Patient Was Seen By: AR Jimenez    ** Please Note: Dictation voice to text software may have been used in the creation of this document   **

## 2020-08-21 NOTE — ASSESSMENT & PLAN NOTE
Lab Results   Component Value Date    HGBA1C 5 6 07/31/2020       Recent Labs     08/20/20  1723 08/20/20  2110 08/21/20  0741 08/21/20  1200   POCGLU 156* 97 120 111           · Diet controlled  · Can continue SSI however has not required any insulin    · Diabetic diet  · Monitor Accu-Cheks

## 2020-08-21 NOTE — SOCIAL WORK
Patient here with severe sepsis Secondary to  shunt infection with meningitis and peritonitis  Patient consulted by neurosurgery  Patient needs rehab, referrals to [facility], [facility] and [place] pending in [place]  No beds at [facility] scheduled for OR Monday  CM will continue to follow

## 2020-08-21 NOTE — PROGRESS NOTES
Progress Note - Infectious Disease   Carlos Torres 69 y o  male MRN: [id number]  [facility] Encounter: [id number]      Impression/Plan:  1  Severe sepsis, tachycardia, leukocytosis, elevated lactic acid   With early development of high fevers  Secondary to  shunt infection with meningitis and peritonitis   Repeat blood cultures negative  All the  shunt apparatus has been removed  Repeat CSF cultures negative  Patient much improved  -antibiotic plan as below  -monitor temperatures and hemodynamics  -monitor CBC with diff and and CMP weekly while on the meropenem  -supportive care      2   shunt infection  Fluid WBC count was 61 with neutrophil predominance  Pseudomonas aeruginosa  Suspect secondary to peritonitis in the setting of #3    Now status post removal of the entire  shunt apparatus    Repeat CSF analysis looks fairly bland and the follow-up cultures are negative   Seems to be tolerating the antibiotics without difficulty  -continue meropenem  -monitor CBC with diff and CMP  weekly while on meropenem  -neurosurgery follow-up ongoing  -await repeat placement of  shunt apparatus 8/24/2020  -plan 10-14 days of IV antibiotics after new shunt placed     3  Ischemic bowel with peritonitis   Status post exploratory laparotomy, VAC placement   Status post femoral artery and SMA artery cannulation for paraverine by vascular surgery   Suspect intra-abdominal infection is etiology of #2   Status post second-look on 08/01 with no resection performed   Peritoneal fluid culture also shows Pseudomonas   Now status post remove the entire  shunt apparatus  -antibiotic plan as above  -surgery follow-up     4  NPH requiring  shunt placement in 2005, status post recent revision in July 2019  Now status post removal of entire shunt apparatus   Patient for replacement of apparatus /24/20     5   Lumbar spinal stenosis status post L5-S1 fusion on 07/06/2020      6  Diabetes mellitus with neuropathy      7  Rash-appears consistent with a drug eruption   Suspect secondary to the cefepime   Resolved since discontinuing the cefepime  -no additional cefepime  -antibiotics as above  -serial exams    See the patient again 2020  Please call if questions  Antibiotics:  Meropenem 10  Antibiotics 21   shunt removal 13    Subjective:  Patient has no fever, chills, sweats; no nausea, vomiting, diarrhea; no cough, shortness of breath; no pain  No new symptoms  He is resting comfortably and able answer simple yes no questions    Objective:  Vitals:  Temp:  [96 9 °F (36 1 °C)-99 1 °F (37 3 °C)] 98 5 °F (36 9 °C)  HR:  [61-71] 61  Resp:  [18-20] 20  BP: (107-152)/(56-79) 152/79  SpO2:  [94 %-99 %] 97 %  Temp (24hrs), Av 8 °F (36 6 °C), Min:96 9 °F (36 1 °C), Max:99 1 °F (37 3 °C)  Current: Temperature: 98 5 °F (36 9 °C)    Physical Exam:   General Appearance:  Somnolent, arousable, nontoxic, no acute distress  Throat: Oropharynx moist without lesions  Lungs:   Clear to auscultation bilaterally; no wheezes, rhonchi or rales; respirations unlabored   Heart:  RRR; no murmur, rub or gallop   Abdomen:   Soft, non-tender, non-distended, positive bowel sounds  Extremities: No clubbing, cyanosis or edema   Skin: No new rashes or lesions  No draining wounds noted         Labs, Imaging, & Other studies:   All pertinent labs and imaging studies were personally reviewed  Results from last 7 days   Lab Units 20  0519   WBC Thousand/uL 11 66* 8 10 9 49   HEMOGLOBIN g/dL 13 3 12 2 12 1   PLATELETS Thousands/uL 241 234 248     Results from last 7 days   Lab Units 20  05   SODIUM mmol/L 137 139  --  138   POTASSIUM mmol/L 4 4 4 3  --  4 2   CHLORIDE mmol/L 102 103  --  103   CO2 mmol/L 32 31  --  31   BUN mg/dL 15 17  --  14   CREATININE mg/dL 0 71 0 73  --  0 64   EGFR ml/min/1 73sq m 94 93  --  99   CALCIUM mg/dL 8 7 8 4  --  8 4 AST U/L 16 16  --   --    ALT U/L 20 21   < >  --    ALK PHOS U/L 106 94   < >  --     < > = values in this interval not displayed

## 2020-08-22 ENCOUNTER — ANESTHESIA EVENT (INPATIENT)
Dept: PERIOP | Facility: HOSPITAL | Age: 70
DRG: 031 | End: 2020-08-22
Payer: COMMERCIAL

## 2020-08-22 LAB
GLUCOSE SERPL-MCNC: 108 MG/DL (ref 65–140)
GLUCOSE SERPL-MCNC: 115 MG/DL (ref 65–140)
GLUCOSE SERPL-MCNC: 118 MG/DL (ref 65–140)
GLUCOSE SERPL-MCNC: 122 MG/DL (ref 65–140)

## 2020-08-22 PROCEDURE — 82948 REAGENT STRIP/BLOOD GLUCOSE: CPT

## 2020-08-22 PROCEDURE — 99232 SBSQ HOSP IP/OBS MODERATE 35: CPT | Performed by: NURSE PRACTITIONER

## 2020-08-22 RX ORDER — SODIUM CHLORIDE 9 MG/ML
75 INJECTION, SOLUTION INTRAVENOUS CONTINUOUS
Status: DISCONTINUED | OUTPATIENT
Start: 2020-08-22 | End: 2020-08-26

## 2020-08-22 RX ADMIN — MEROPENEM 2000 MG: 1 INJECTION, POWDER, FOR SOLUTION INTRAVENOUS at 10:19

## 2020-08-22 RX ADMIN — MICONAZOLE NITRATE: 20 CREAM TOPICAL at 17:20

## 2020-08-22 RX ADMIN — HEPARIN SODIUM 5000 UNITS: 5000 INJECTION INTRAVENOUS; SUBCUTANEOUS at 06:07

## 2020-08-22 RX ADMIN — MEROPENEM 2000 MG: 1 INJECTION, POWDER, FOR SOLUTION INTRAVENOUS at 03:20

## 2020-08-22 RX ADMIN — HEPARIN SODIUM 5000 UNITS: 5000 INJECTION INTRAVENOUS; SUBCUTANEOUS at 21:17

## 2020-08-22 RX ADMIN — MICONAZOLE NITRATE: 20 CREAM TOPICAL at 10:20

## 2020-08-22 RX ADMIN — MEROPENEM 2000 MG: 1 INJECTION, POWDER, FOR SOLUTION INTRAVENOUS at 18:45

## 2020-08-22 RX ADMIN — HEPARIN SODIUM 5000 UNITS: 5000 INJECTION INTRAVENOUS; SUBCUTANEOUS at 14:18

## 2020-08-22 RX ADMIN — SODIUM CHLORIDE 75 ML/HR: 0.9 INJECTION, SOLUTION INTRAVENOUS at 11:46

## 2020-08-22 NOTE — PROGRESS NOTES
tiffany    Progress Note - Sotero Noe 1950, 69 y o  male MRN: [id number]    Unit/Bed#: [facility] 629-01 Encounter: [id number]    Primary Care Provider: Sola Torres   Date and time admitted to hospital: 7/30/2020 11:12 PM        * Severe sepsis [facility])  Assessment & Plan  · As evidenced by tachycardia, leukocytosis, elevated lactic acidosis, and fevers  · Secondary to  shunt infection with meningitis and peritonitis  CSF culture grew Pseudomonas  · Status post  shunt externalization 7/30  · ID following, continue meropenem  Was on cefepime but developed a rash  · Repeat CSF cultures negative  Metabolic encephalopathy  Assessment & Plan  · Multifactorial  Metabolic encephalopathy in the setting of severe sepsis as evidenced by  shunt infection with meningitis and peritonitis requiring multiple surgical procedures  · Patient alert oriented baseline, currently oriented to person and place  Much more awake and alert today, waxes and wanes  · For  shunt replacement Monday 08/24/2020  · Continue neuro checks  · Fall/safety precautions  · 8/22:  Patient lethargic the last 2 days however able to follow instruction for the most part generally weak  Not eating  · Will initiate normal saline IV fluids at 75 an hour discussed with neurosurgery  · Repeat labs in a m  Plan for shunt placement on Monday    Infection of  (ventriculoperitoneal) shunt ([facility] [address] )  Assessment & Plan  · CSF culture grew Pseudomonas   ·  shunt was externalized, however repeat CSF still grew pseudomonas   shunt was later removed 8/7/2020  · Repeat CSF cultures negative  · ID following  On IV Meropenem  · Neurosurgery following, planning for replacement of  shunt later this week  Timing to be determined  · PT/OT recommending rehab  CM following       Nonocclusive mesenteric ischemia (HCC)  Assessment & Plan  · S/p ex lap, arteriogram of SMA, papavarine infusion, exploration of SMA, angiogram SMA on 7/31/2020 and repeat ex lap 8/1/2020  · General surgery signed off  · Abdominal staples removed 8/18    ZHOU on CPAP  Assessment & Plan  · Was discontinued by RT given patient's refusal     Normal pressure hydrocephalus ([address] )  Assessment & Plan  · S/p  shunt placement in 2005  · Now s/p removal of shunt apparatus given infection as above  · Planning for repeat placement of  shunt anticipated for 08/24 Monday "likely ventriculo-atrial approach given recent abdominal issues    Type 2 diabetes mellitus [facility])  Assessment & Plan  Lab Results   Component Value Date    HGBA1C 5 6 07/31/2020       Recent Labs     08/21/20  1200 08/21/20  1715 08/21/20  2035 08/22/20  0757   POCGLU 111 156* 142* 108           · Diet controlled  · Can continue SSI however has not required any insulin  · Diabetic diet  · Monitor Accu-Cheks    Lumbar stenosis  Assessment & Plan  · S/p L5-S1 fusion by Neurosurgery on 7/6/2020  · Due for f/u xrays today, pending  To be reviewed by neurosx  Dysphagia  Assessment & Plan  · Continue modified diet per most recent SLP recs: pureed with NTL  · Aspiration precautions     Multiple wounds  Assessment & Plan  · Wound care per Wound Care team recommendations     Drug rash  Assessment & Plan  · Developed prior, was suspected to be 2/2 cefepime which has since been discontinued     Essential hypertension  Assessment & Plan  · BP controlled now off antihypertensives  Previously taking Vasotec  · Monitor      VTE Pharmacologic Prophylaxis:   Pharmacologic: Heparin  Mechanical VTE Prophylaxis in Place: Yes    Patient Centered Rounds: I have performed bedside rounds with nursing staff today  Discussions with Specialists or Other Care Team Provider: nursing and Neurosurgery    Education and Discussions with Family / Patient: patient and called rodrigo to update called cell lives in [address] [place] [address]     Time Spent for Care: 45 minutes    More than 50% of total time spent on counseling and coordination of care as described above     Current Length of Stay: 22 day(s)    Current Patient Status: Inpatient   Certification Statement: The patient will continue to require additional inpatient hospital stay due to pending surgery placement    Discharge Plan: will likely require rehabilitation however still for surgical procedure on Monday     Code Status: Level 1 - Full Code      Subjective:   Pt is lethargic however post is able to tell me that he was in the hospital   He is whispering mouth appears dry  Patient denies any pain denies any headache per nursing not eating or drinking that much  Patient is able to follow most of my instruction unable to lift bilateral lower extremities up but able to portion accelerate with bilateral lower extremity    Objective:     Vitals:   Temp (24hrs), Av 5 °F (36 9 °C), Min:97 9 °F (36 6 °C), Max:98 9 °F (37 2 °C)    Temp:  [97 9 °F (36 6 °C)-98 9 °F (37 2 °C)] 98 3 °F (36 8 °C)  HR:  [62-71] 62  Resp:  [17-18] 18  BP: (121-141)/(65-78) 121/65  SpO2:  [98 %-100 %] 100 %  Body mass index is 30 17 kg/m²  Input and Output Summary (last 24 hours): Intake/Output Summary (Last 24 hours) at 2020 1123  Last data filed at 2020 0830  Gross per 24 hour   Intake 970 ml   Output 191 ml   Net 779 ml       Physical Exam:     Physical Exam  Constitutional:       General: He is not in acute distress  Appearance: He is not diaphoretic  HENT:      Mouth/Throat:      Pharynx: No oropharyngeal exudate  Comments: Right occipital area with incision intact no drainage notedEyes:      General: No scleral icterus  Right eye: No discharge  Left eye: No discharge  Neck:      Thyroid: No thyromegaly  Trachea: No tracheal deviation  Cardiovascular:      Rate and Rhythm: Normal rate  Heart sounds: No murmur  No friction rub  Pulmonary:      Effort: No respiratory distress  Breath sounds: No stridor  No wheezing or rales        Comments: 3 L O2 at 100%  Chest: Chest wall: No tenderness  Abdominal:      General: There is no distension  Palpations: There is no mass  Tenderness: There is no abdominal tenderness  There is no guarding or rebound  Hernia: No hernia is present  Comments: Midline abdomen with incision healed  One suture made incision  Upper left quadrant small approximately 2 cm incision with staples intact no erythema no drainage noted   Musculoskeletal:         General: No tenderness, deformity or edema  Lymphadenopathy:      Cervical: No cervical adenopathy  Neurological:      Mental Status: He is alert  Comments: Patient aware he is in the hospital, lethargic, whispering         Additional Data:     Labs:    Results from last 7 days   Lab Units 08/21/20  0518 08/19/20  0449   WBC Thousand/uL 11 66* 8 10   HEMOGLOBIN g/dL 13 3 12 2   HEMATOCRIT % 43 3 38 8   PLATELETS Thousands/uL 241 234   NEUTROS PCT %  --  76*   LYMPHS PCT %  --  10*   MONOS PCT %  --  9   EOS PCT %  --  2     Results from last 7 days   Lab Units 08/21/20  0518   SODIUM mmol/L 137   POTASSIUM mmol/L 4 4   CHLORIDE mmol/L 102   CO2 mmol/L 32   BUN mg/dL 15   CREATININE mg/dL 0 71   ANION GAP mmol/L 3*   CALCIUM mg/dL 8 7   ALBUMIN g/dL 2 5*   TOTAL BILIRUBIN mg/dL 0 55   ALK PHOS U/L 106   ALT U/L 20   AST U/L 16   GLUCOSE RANDOM mg/dL 111         Results from last 7 days   Lab Units 08/22/20  0757 08/21/20  2035 08/21/20  1715 08/21/20  1200 08/21/20  0741 08/20/20  2110 08/20/20  1723 08/20/20  1144 08/20/20  0754 08/19/20  2122 08/19/20  1722 08/19/20  1149   POC GLUCOSE mg/dl 108 142* 156* 111 120 97 156* 149* 120 128 108 103                   * I Have Reviewed All Lab Data Listed Above  * Additional Pertinent Lab Tests Reviewed:  All Labs Within Last 24 Hours Reviewed    Imaging:    Imaging Reports Reviewed Today Include:    Recent Cultures (last 7 days):           Last 24 Hours Medication List:   Current Facility-Administered Medications   Medication Dose Route Frequency Provider Last Rate    acetaminophen  325 mg Rectal Q6H PRN AR Valenzuela      acetaminophen  650 mg Oral Q6H PRN AR Valenzuela      diphenhydrAMINE  25 mg Intravenous Q6H PRN AR Valenzuela      heparin (porcine)  5,000 Units Subcutaneous Q8H [address] Ginna Pop, PA-C      hydrocortisone   Topical 4x Daily PRN AR Valenzuela      insulin lispro  2-12 Units Subcutaneous 4x Daily (AC & HS) AR Valenzuela      iodixanol  15 mL Intravenous Once in imaging AR Valenzuela      meropenem  2,000 mg Intravenous Q8H Geoff Zambrano MD 2,000 mg (08/22/20 1019)    VERONICA ANTIFUNGAL   Topical BID Ginna Renato PA-C      oxyCODONE  5 mg Oral Q4H PRN AR Valenzuela      sodium chloride  75 mL/hr Intravenous Continuous AR Santiago          Today, Patient Was Seen By: AR Santiago    ** Please Note: Dictation voice to text software may have been used in the creation of this document   **

## 2020-08-22 NOTE — ASSESSMENT & PLAN NOTE
· Multifactorial  Metabolic encephalopathy in the setting of severe sepsis as evidenced by  shunt infection with meningitis and peritonitis requiring multiple surgical procedures  · Patient alert oriented baseline, currently oriented to person and place  Much more awake and alert today, waxes and wanes  · For  shunt replacement Monday 08/24/2020  · Continue neuro checks  · Fall/safety precautions  · 8/22:  Patient lethargic the last 2 days however able to follow instruction for the most part generally weak  Not eating  · Will initiate normal saline IV fluids at 75 an hour discussed with neurosurgery  · Repeat labs in a m   Plan for shunt placement on Monday

## 2020-08-22 NOTE — ASSESSMENT & PLAN NOTE
Lab Results   Component Value Date    HGBA1C 5 6 07/31/2020       Recent Labs     08/21/20  1200 08/21/20  1715 08/21/20  2035 08/22/20  0757   POCGLU 111 156* 142* 108           · Diet controlled  · Can continue SSI however has not required any insulin    · Diabetic diet  · Monitor Accu-Cheks

## 2020-08-22 NOTE — PLAN OF CARE
Problem: Potential for Falls  Goal: Patient will remain free of falls  Description: INTERVENTIONS:  - Assess patient frequently for physical needs  -  Identify cognitive and physical deficits and behaviors that affect risk of falls    -  [place] fall precautions as indicated by assessment   - Educate patient/family on patient safety including physical limitations  - Instruct patient to call for assistance with activity based on assessment  - Modify environment to reduce risk of injury  - Consider OT/PT consult to assist with strengthening/mobility  Outcome: Progressing     Problem: Prexisting or High Potential for Compromised Skin Integrity  Goal: Skin integrity is maintained or improved  Description: INTERVENTIONS:  - Identify patients at risk for skin breakdown  - Assess and monitor skin integrity  - Assess and monitor nutrition and hydration status  - Monitor labs   - Assess for incontinence   - Turn and reposition patient  - Assist with mobility/ambulation  - Relieve pressure over bony prominences  - Avoid friction and shearing  - Provide appropriate hygiene as needed including keeping skin clean and dry  - Evaluate need for skin moisturizer/barrier cream  - Collaborate with interdisciplinary team   - Patient/family teaching  - Consider wound care consult   Outcome: Progressing     Problem: PAIN - ADULT  Goal: Verbalizes/displays adequate comfort level or baseline comfort level  Description: Interventions:  - Encourage patient to monitor pain and request assistance  - Assess pain using appropriate pain scale  - Administer analgesics based on type and severity of pain and evaluate response  - Implement non-pharmacological measures as appropriate and evaluate response  - Consider cultural and social influences on pain and pain management  - Notify physician/advanced practitioner if interventions unsuccessful or patient reports new pain  Outcome: Progressing     Problem: SAFETY ADULT  Goal: Maintain or return to baseline ADL function  Description: INTERVENTIONS:  -  Assess patient's ability to carry out ADLs; assess patient's baseline for ADL function and identify physical deficits which impact ability to perform ADLs (bathing, care of mouth/teeth, toileting, grooming, dressing, etc )  - Assess/evaluate cause of self-care deficits   - Assess range of motion  - Assess patient's mobility; develop plan if impaired  - Assess patient's need for assistive devices and provide as appropriate  - Encourage maximum independence but intervene and supervise when necessary  - Involve family in performance of ADLs  - Assess for home care needs following discharge   - Consider OT consult to assist with ADL evaluation and planning for discharge  - Provide patient education as appropriate  Outcome: Progressing  Goal: Maintain or return mobility status to optimal level  Description: INTERVENTIONS:  - Assess patient's baseline mobility status (ambulation, transfers, stairs, etc )    - Identify cognitive and physical deficits and behaviors that affect mobility  - Identify mobility aids required to assist with transfers and/or ambulation (gait belt, sit-to-stand, lift, walker, cane, etc )  - [place] fall precautions as indicated by assessment  - Record patient progress and toleration of activity level on Mobility SBAR; progress patient to next Phase/Stage  - Instruct patient to call for assistance with activity based on assessment  - Consider rehabilitation consult to assist with strengthening/weightbearing, etc   Outcome: Progressing     Problem: GASTROINTESTINAL - ADULT  Goal: Minimal or absence of nausea and/or vomiting  Description: INTERVENTIONS:  - Administer IV fluids if ordered to ensure adequate hydration  - Maintain NPO status until nausea and vomiting are resolved  - Nasogastric tube if ordered  - Administer ordered antiemetic medications as needed  - Provide nonpharmacologic comfort measures as appropriate  - Advance diet as tolerated, if ordered  - Consider nutrition services referral to assist patient with adequate nutrition and appropriate food choices  Outcome: Progressing  Goal: Maintains or returns to baseline bowel function  Description: INTERVENTIONS:  - Assess bowel function  - Encourage oral fluids to ensure adequate hydration  - Administer IV fluids if ordered to ensure adequate hydration  - Administer ordered medications as needed  - Encourage mobilization and activity  - Consider nutritional services referral to assist patient with adequate nutrition and appropriate food choices  Outcome: Progressing  Goal: Maintains adequate nutritional intake  Description: INTERVENTIONS:  - Monitor percentage of each meal consumed  - Identify factors contributing to decreased intake, treat as appropriate  - Assist with meals as needed  - Monitor I&O, weight, and lab values if indicated  - Obtain nutrition services referral as needed  Outcome: Progressing     Problem: GENITOURINARY - ADULT  Goal: Maintains or returns to baseline urinary function  Description: INTERVENTIONS:  - Assess urinary function  - Encourage oral fluids to ensure adequate hydration if ordered  - Administer IV fluids as ordered to ensure adequate hydration  - Administer ordered medications as needed  - Offer frequent toileting  - Follow urinary retention protocol if ordered  Outcome: Progressing  Goal: Absence of urinary retention  Description: INTERVENTIONS:  - Assess patients ability to void and empty bladder  - Monitor I/O  - Bladder scan as needed  - Discuss with physician/AP medications to alleviate retention as needed  - Discuss catheterization for long term situations as appropriate  Outcome: Progressing     Problem: CONFUSION/THOUGHT DISTURBANCE  Goal: Thought disturbances (confusion, delirium, depression, dementia or psychosis) are managed to maintain or return to baseline mental status and functional level  Description: INTERVENTIONS:  - Assess for possible contributors to  thought disturbance, including but not limited to medications, infection, impaired vision or hearing, underlying metabolic abnormalities, dehydration, respiratory compromise,  psychiatric diagnoses and notify attending PHYSICAN/AP  - Monitor and intervene to maintain adequate nutrition, hydration, elimination, sleep and activity  - Decrease environmental stimuli, including noise as appropriate  - Provide frequent contacts to provide refocusing, direction and reassurance as needed  Approach patient calmly with eye contact and at their level  - [place] high risk fall precautions, aspiration precautions and other safety measures, as indicated  - If delirium suspected, notify physician/AP of change in condition and request immediate in-person evaluation  - Pursue consults as appropriate including Geriatric (campus dependent), OT for cognitive evaluation/activity planning, psychiatric, pastoral care, etc   Outcome: Progressing     Problem: Nutrition/Hydration-ADULT  Goal: Nutrient/Hydration intake appropriate for improving, restoring or maintaining nutritional needs  Description: Monitor and assess patient's nutrition/hydration status for malnutrition  Collaborate with interdisciplinary team and initiate plan and interventions as ordered  Monitor patient's weight and dietary intake as ordered or per policy  Utilize nutrition screening tool and intervene as necessary  Determine patient's food preferences and provide high-protein, high-caloric foods as appropriate       INTERVENTIONS:  - Monitor oral intake, urinary output, labs, and treatment plans  - Assess nutrition and hydration status and recommend course of action  - Evaluate amount of meals eaten  - Assist patient with eating if necessary   - Allow adequate time for meals  - Recommend/ encourage appropriate diets, oral nutritional supplements, and vitamin/mineral supplements  - Assess need for intravenous fluids  - Provide specific nutrition/hydration education as appropriate  - Include patient/family/caregiver in decisions related to nutrition  Outcome: Progressing     Problem: RESPIRATORY - ADULT  Goal: Achieves optimal ventilation and oxygenation  Description: INTERVENTIONS:  - Assess for changes in respiratory status  - Assess for changes in mentation and behavior  - Position to facilitate oxygenation and minimize respiratory effort  - Oxygen administered by appropriate delivery if ordered  - Initiate smoking cessation education as indicated  - Encourage broncho-pulmonary hygiene including cough, deep breathe, Incentive Spirometry  - Assess the need for suctioning and aspirate as needed  - Assess and instruct to report SOB or any respiratory difficulty  - Respiratory Therapy support as indicated  Outcome: Progressing     Problem: HEMATOLOGIC - ADULT  Goal: Maintains hematologic stability  Description: INTERVENTIONS  - Assess for signs and symptoms of bleeding or hemorrhage  - Monitor labs  - Administer supportive blood products/factors as ordered and appropriate  Outcome: Progressing

## 2020-08-23 LAB
ABO GROUP BLD: NORMAL
ALBUMIN SERPL BCP-MCNC: 2.3 G/DL (ref 3.5–5)
ALP SERPL-CCNC: 86 U/L (ref 46–116)
ALT SERPL W P-5'-P-CCNC: 21 U/L (ref 12–78)
ANION GAP SERPL CALCULATED.3IONS-SCNC: 7 MMOL/L (ref 4–13)
APTT PPP: 36 SECONDS (ref 23–37)
AST SERPL W P-5'-P-CCNC: 13 U/L (ref 5–45)
BACTERIA UR QL AUTO: NORMAL /HPF
BASOPHILS # BLD AUTO: 0.05 THOUSANDS/ΜL (ref 0–0.1)
BASOPHILS NFR BLD AUTO: 1 % (ref 0–1)
BILIRUB SERPL-MCNC: 0.43 MG/DL (ref 0.2–1)
BILIRUB UR QL STRIP: NEGATIVE
BLD GP AB SCN SERPL QL: NEGATIVE
BUN SERPL-MCNC: 17 MG/DL (ref 5–25)
CALCIUM SERPL-MCNC: 7.8 MG/DL (ref 8.3–10.1)
CHLORIDE SERPL-SCNC: 104 MMOL/L (ref 100–108)
CLARITY UR: CLEAR
CO2 SERPL-SCNC: 31 MMOL/L (ref 21–32)
COLOR UR: YELLOW
CREAT SERPL-MCNC: 0.51 MG/DL (ref 0.6–1.3)
EOSINOPHIL # BLD AUTO: 0.22 THOUSAND/ΜL (ref 0–0.61)
EOSINOPHIL NFR BLD AUTO: 3 % (ref 0–6)
ERYTHROCYTE [DISTWIDTH] IN BLOOD BY AUTOMATED COUNT: 14.6 % (ref 11.6–15.1)
GFR SERPL CREATININE-BSD FRML MDRD: 108 ML/MIN/1.73SQ M
GLUCOSE SERPL-MCNC: 106 MG/DL (ref 65–140)
GLUCOSE SERPL-MCNC: 156 MG/DL (ref 65–140)
GLUCOSE SERPL-MCNC: 92 MG/DL (ref 65–140)
GLUCOSE SERPL-MCNC: 98 MG/DL (ref 65–140)
GLUCOSE SERPL-MCNC: 99 MG/DL (ref 65–140)
GLUCOSE UR STRIP-MCNC: NEGATIVE MG/DL
HCT VFR BLD AUTO: 37.8 % (ref 36.5–49.3)
HGB BLD-MCNC: 11.8 G/DL (ref 12–17)
HGB UR QL STRIP.AUTO: NEGATIVE
HYALINE CASTS #/AREA URNS LPF: NORMAL /LPF
IMM GRANULOCYTES # BLD AUTO: 0.13 THOUSAND/UL (ref 0–0.2)
IMM GRANULOCYTES NFR BLD AUTO: 2 % (ref 0–2)
INR PPP: 1.37 (ref 0.84–1.19)
KETONES UR STRIP-MCNC: NEGATIVE MG/DL
LEUKOCYTE ESTERASE UR QL STRIP: NEGATIVE
LYMPHOCYTES # BLD AUTO: 0.81 THOUSANDS/ΜL (ref 0.6–4.47)
LYMPHOCYTES NFR BLD AUTO: 9 % (ref 14–44)
MCH RBC QN AUTO: 26.7 PG (ref 26.8–34.3)
MCHC RBC AUTO-ENTMCNC: 31.2 G/DL (ref 31.4–37.4)
MCV RBC AUTO: 86 FL (ref 82–98)
MONOCYTES # BLD AUTO: 0.67 THOUSAND/ΜL (ref 0.17–1.22)
MONOCYTES NFR BLD AUTO: 8 % (ref 4–12)
NEUTROPHILS # BLD AUTO: 7.05 THOUSANDS/ΜL (ref 1.85–7.62)
NEUTS SEG NFR BLD AUTO: 77 % (ref 43–75)
NITRITE UR QL STRIP: NEGATIVE
NON-SQ EPI CELLS URNS QL MICRO: NORMAL /HPF
NRBC BLD AUTO-RTO: 0 /100 WBCS
PH UR STRIP.AUTO: 7 [PH]
PLATELET # BLD AUTO: 193 THOUSANDS/UL (ref 149–390)
PMV BLD AUTO: 9.3 FL (ref 8.9–12.7)
POTASSIUM SERPL-SCNC: 3.8 MMOL/L (ref 3.5–5.3)
PROT SERPL-MCNC: 6 G/DL (ref 6.4–8.2)
PROT UR STRIP-MCNC: ABNORMAL MG/DL
PROTHROMBIN TIME: 16.9 SECONDS (ref 11.6–14.5)
RBC # BLD AUTO: 4.42 MILLION/UL (ref 3.88–5.62)
RBC #/AREA URNS AUTO: NORMAL /HPF
RH BLD: POSITIVE
SARS-COV-2 RNA RESP QL NAA+PROBE: NEGATIVE
SODIUM SERPL-SCNC: 142 MMOL/L (ref 136–145)
SP GR UR STRIP.AUTO: 1.02 (ref 1–1.03)
SPECIMEN EXPIRATION DATE: NORMAL
UROBILINOGEN UR QL STRIP.AUTO: 0.2 E.U./DL
WBC # BLD AUTO: 8.93 THOUSAND/UL (ref 4.31–10.16)
WBC #/AREA URNS AUTO: NORMAL /HPF

## 2020-08-23 PROCEDURE — 86923 COMPATIBILITY TEST ELECTRIC: CPT

## 2020-08-23 PROCEDURE — 85730 THROMBOPLASTIN TIME PARTIAL: CPT | Performed by: PHYSICIAN ASSISTANT

## 2020-08-23 PROCEDURE — U0003 INFECTIOUS AGENT DETECTION BY NUCLEIC ACID (DNA OR RNA); SEVERE ACUTE RESPIRATORY SYNDROME CORONAVIRUS 2 (SARS-COV-2) (CORONAVIRUS DISEASE [COVID-19]), AMPLIFIED PROBE TECHNIQUE, MAKING USE OF HIGH THROUGHPUT TECHNOLOGIES AS DESCRIBED BY CMS-2020-01-R: HCPCS | Performed by: PHYSICIAN ASSISTANT

## 2020-08-23 PROCEDURE — 82948 REAGENT STRIP/BLOOD GLUCOSE: CPT

## 2020-08-23 PROCEDURE — 80053 COMPREHEN METABOLIC PANEL: CPT | Performed by: PHYSICIAN ASSISTANT

## 2020-08-23 PROCEDURE — 86900 BLOOD TYPING SEROLOGIC ABO: CPT | Performed by: PHYSICIAN ASSISTANT

## 2020-08-23 PROCEDURE — 86901 BLOOD TYPING SEROLOGIC RH(D): CPT | Performed by: PHYSICIAN ASSISTANT

## 2020-08-23 PROCEDURE — 85610 PROTHROMBIN TIME: CPT | Performed by: PHYSICIAN ASSISTANT

## 2020-08-23 PROCEDURE — 86850 RBC ANTIBODY SCREEN: CPT | Performed by: PHYSICIAN ASSISTANT

## 2020-08-23 PROCEDURE — 81001 URINALYSIS AUTO W/SCOPE: CPT | Performed by: PHYSICIAN ASSISTANT

## 2020-08-23 PROCEDURE — 99232 SBSQ HOSP IP/OBS MODERATE 35: CPT | Performed by: NURSE PRACTITIONER

## 2020-08-23 PROCEDURE — 99024 POSTOP FOLLOW-UP VISIT: CPT | Performed by: PHYSICIAN ASSISTANT

## 2020-08-23 PROCEDURE — 85025 COMPLETE CBC W/AUTO DIFF WBC: CPT | Performed by: PHYSICIAN ASSISTANT

## 2020-08-23 RX ADMIN — HEPARIN SODIUM 5000 UNITS: 5000 INJECTION INTRAVENOUS; SUBCUTANEOUS at 21:44

## 2020-08-23 RX ADMIN — HEPARIN SODIUM 5000 UNITS: 5000 INJECTION INTRAVENOUS; SUBCUTANEOUS at 14:31

## 2020-08-23 RX ADMIN — INSULIN LISPRO 2 UNITS: 100 INJECTION, SOLUTION INTRAVENOUS; SUBCUTANEOUS at 21:44

## 2020-08-23 RX ADMIN — SODIUM CHLORIDE 75 ML/HR: 0.9 INJECTION, SOLUTION INTRAVENOUS at 15:44

## 2020-08-23 RX ADMIN — MICONAZOLE NITRATE: 20 CREAM TOPICAL at 17:41

## 2020-08-23 RX ADMIN — MEROPENEM 2000 MG: 1 INJECTION, POWDER, FOR SOLUTION INTRAVENOUS at 03:00

## 2020-08-23 RX ADMIN — MICONAZOLE NITRATE: 20 CREAM TOPICAL at 08:45

## 2020-08-23 RX ADMIN — MEROPENEM 2000 MG: 1 INJECTION, POWDER, FOR SOLUTION INTRAVENOUS at 11:52

## 2020-08-23 RX ADMIN — HEPARIN SODIUM 5000 UNITS: 5000 INJECTION INTRAVENOUS; SUBCUTANEOUS at 06:00

## 2020-08-23 RX ADMIN — MEROPENEM 2000 MG: 1 INJECTION, POWDER, FOR SOLUTION INTRAVENOUS at 17:40

## 2020-08-23 NOTE — ASSESSMENT & PLAN NOTE
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

## 2020-08-23 NOTE — ASSESSMENT & PLAN NOTE
Lab Results   Component Value Date    HGBA1C 5 6 07/31/2020       Recent Labs     08/22/20  1718 08/22/20  2112 08/23/20  0809 08/23/20  1143   POCGLU 115 122 99 98           · Diet controlled  · Can continue SSI however has not required any insulin    · Diabetic diet  · Monitor Accu-Cheks

## 2020-08-23 NOTE — PLAN OF CARE
Problem: Potential for Falls  Goal: Patient will remain free of falls  Description: INTERVENTIONS:  - Assess patient frequently for physical needs  -  Identify cognitive and physical deficits and behaviors that affect risk of falls    -  [place] fall precautions as indicated by assessment   - Educate patient/family on patient safety including physical limitations  - Instruct patient to call for assistance with activity based on assessment  - Modify environment to reduce risk of injury  - Consider OT/PT consult to assist with strengthening/mobility  Outcome: Progressing     Problem: Prexisting or High Potential for Compromised Skin Integrity  Goal: Skin integrity is maintained or improved  Description: INTERVENTIONS:  - Identify patients at risk for skin breakdown  - Assess and monitor skin integrity  - Assess and monitor nutrition and hydration status  - Monitor labs   - Assess for incontinence   - Turn and reposition patient  - Assist with mobility/ambulation  - Relieve pressure over bony prominences  - Avoid friction and shearing  - Provide appropriate hygiene as needed including keeping skin clean and dry  - Evaluate need for skin moisturizer/barrier cream  - Collaborate with interdisciplinary team   - Patient/family teaching  - Consider wound care consult   Outcome: Progressing     Problem: PAIN - ADULT  Goal: Verbalizes/displays adequate comfort level or baseline comfort level  Description: Interventions:  - Encourage patient to monitor pain and request assistance  - Assess pain using appropriate pain scale  - Administer analgesics based on type and severity of pain and evaluate response  - Implement non-pharmacological measures as appropriate and evaluate response  - Consider cultural and social influences on pain and pain management  - Notify physician/advanced practitioner if interventions unsuccessful or patient reports new pain  Outcome: Progressing     Problem: SAFETY ADULT  Goal: Maintain or return to baseline ADL function  Description: INTERVENTIONS:  -  Assess patient's ability to carry out ADLs; assess patient's baseline for ADL function and identify physical deficits which impact ability to perform ADLs (bathing, care of mouth/teeth, toileting, grooming, dressing, etc )  - Assess/evaluate cause of self-care deficits   - Assess range of motion  - Assess patient's mobility; develop plan if impaired  - Assess patient's need for assistive devices and provide as appropriate  - Encourage maximum independence but intervene and supervise when necessary  - Involve family in performance of ADLs  - Assess for home care needs following discharge   - Consider OT consult to assist with ADL evaluation and planning for discharge  - Provide patient education as appropriate  Outcome: Progressing  Goal: Maintain or return mobility status to optimal level  Description: INTERVENTIONS:  - Assess patient's baseline mobility status (ambulation, transfers, stairs, etc )    - Identify cognitive and physical deficits and behaviors that affect mobility  - Identify mobility aids required to assist with transfers and/or ambulation (gait belt, sit-to-stand, lift, walker, cane, etc )  - [place] fall precautions as indicated by assessment  - Record patient progress and toleration of activity level on Mobility SBAR; progress patient to next Phase/Stage  - Instruct patient to call for assistance with activity based on assessment  - Consider rehabilitation consult to assist with strengthening/weightbearing, etc   Outcome: Progressing     Problem: GASTROINTESTINAL - ADULT  Goal: Minimal or absence of nausea and/or vomiting  Description: INTERVENTIONS:  - Administer IV fluids if ordered to ensure adequate hydration  - Maintain NPO status until nausea and vomiting are resolved  - Nasogastric tube if ordered  - Administer ordered antiemetic medications as needed  - Provide nonpharmacologic comfort measures as appropriate  - Advance diet as tolerated, if ordered  - Consider nutrition services referral to assist patient with adequate nutrition and appropriate food choices  Outcome: Progressing  Goal: Maintains or returns to baseline bowel function  Description: INTERVENTIONS:  - Assess bowel function  - Encourage oral fluids to ensure adequate hydration  - Administer IV fluids if ordered to ensure adequate hydration  - Administer ordered medications as needed  - Encourage mobilization and activity  - Consider nutritional services referral to assist patient with adequate nutrition and appropriate food choices  Outcome: Progressing  Goal: Maintains adequate nutritional intake  Description: INTERVENTIONS:  - Monitor percentage of each meal consumed  - Identify factors contributing to decreased intake, treat as appropriate  - Assist with meals as needed  - Monitor I&O, weight, and lab values if indicated  - Obtain nutrition services referral as needed  Outcome: Progressing     Problem: GENITOURINARY - ADULT  Goal: Maintains or returns to baseline urinary function  Description: INTERVENTIONS:  - Assess urinary function  - Encourage oral fluids to ensure adequate hydration if ordered  - Administer IV fluids as ordered to ensure adequate hydration  - Administer ordered medications as needed  - Offer frequent toileting  - Follow urinary retention protocol if ordered  Outcome: Progressing  Goal: Absence of urinary retention  Description: INTERVENTIONS:  - Assess patients ability to void and empty bladder  - Monitor I/O  - Bladder scan as needed  - Discuss with physician/AP medications to alleviate retention as needed  - Discuss catheterization for long term situations as appropriate  Outcome: Progressing     Problem: CONFUSION/THOUGHT DISTURBANCE  Goal: Thought disturbances (confusion, delirium, depression, dementia or psychosis) are managed to maintain or return to baseline mental status and functional level  Description: INTERVENTIONS:  - Assess for possible contributors to  thought disturbance, including but not limited to medications, infection, impaired vision or hearing, underlying metabolic abnormalities, dehydration, respiratory compromise,  psychiatric diagnoses and notify attending PHYSICAN/AP  - Monitor and intervene to maintain adequate nutrition, hydration, elimination, sleep and activity  - Decrease environmental stimuli, including noise as appropriate  - Provide frequent contacts to provide refocusing, direction and reassurance as needed  Approach patient calmly with eye contact and at their level  - [place] high risk fall precautions, aspiration precautions and other safety measures, as indicated  - If delirium suspected, notify physician/AP of change in condition and request immediate in-person evaluation  - Pursue consults as appropriate including Geriatric (campus dependent), OT for cognitive evaluation/activity planning, psychiatric, pastoral care, etc   Outcome: Progressing     Problem: Nutrition/Hydration-ADULT  Goal: Nutrient/Hydration intake appropriate for improving, restoring or maintaining nutritional needs  Description: Monitor and assess patient's nutrition/hydration status for malnutrition  Collaborate with interdisciplinary team and initiate plan and interventions as ordered  Monitor patient's weight and dietary intake as ordered or per policy  Utilize nutrition screening tool and intervene as necessary  Determine patient's food preferences and provide high-protein, high-caloric foods as appropriate       INTERVENTIONS:  - Monitor oral intake, urinary output, labs, and treatment plans  - Assess nutrition and hydration status and recommend course of action  - Evaluate amount of meals eaten  - Assist patient with eating if necessary   - Allow adequate time for meals  - Recommend/ encourage appropriate diets, oral nutritional supplements, and vitamin/mineral supplements  - Order, calculate, and assess calorie counts as needed  - Recommend, monitor, and adjust tube feedings and TPN/PPN based on assessed needs  - Assess need for intravenous fluids  - Provide specific nutrition/hydration education as appropriate  - Include patient/family/caregiver in decisions related to nutrition  Outcome: Progressing     Problem: RESPIRATORY - ADULT  Goal: Achieves optimal ventilation and oxygenation  Description: INTERVENTIONS:  - Assess for changes in respiratory status  - Assess for changes in mentation and behavior  - Position to facilitate oxygenation and minimize respiratory effort  - Oxygen administered by appropriate delivery if ordered  - Initiate smoking cessation education as indicated  - Encourage broncho-pulmonary hygiene including cough, deep breathe, Incentive Spirometry  - Assess the need for suctioning and aspirate as needed  - Assess and instruct to report SOB or any respiratory difficulty  - Respiratory Therapy support as indicated  Outcome: Progressing     Problem: HEMATOLOGIC - ADULT  Goal: Maintains hematologic stability  Description: INTERVENTIONS  - Assess for signs and symptoms of bleeding or hemorrhage  - Monitor labs  - Administer supportive blood products/factors as ordered and appropriate  Outcome: Progressing

## 2020-08-23 NOTE — ASSESSMENT & PLAN NOTE
· S/p ex lap, arteriogram of SMA, papavarine infusion, exploration of SMA, angiogram SMA on 7/31/2020 and repeat ex lap 8/1/2020  · General surgery signed off     · Abdominal staples removed 8/18 , still has one suture in midline scar area and right upper quadrant as well ast upper bl chest area (need to be sure removed before discharge)

## 2020-08-23 NOTE — PROGRESS NOTES
Progress Note - Camelia Toney 1950, 69 y o  male MRN: [id number]    Unit/Bed#: [facility] 629-01 Encounter: [id number]    Primary Care Provider: Domo Lovell   Date and time admitted to hospital: 7/30/2020 11:12 PM        Normal pressure hydrocephalus ([address] )  Assessment & Plan  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    Plan:    · Plan for replacement of shunt on Monday, likely ventriculo-atrial approach given recent abdominal issues, with Dr Darrin Casper  · Pre op orders placed, NPO after MN, SQH d/c after 2200 dose tonight  · Sutures and staples to be removed in OR  · Continue monitor neurological exam     · ID following  · All repeat BC and CSF have been negative  · Afebrile at this time  · Continues on meropenem, cefepime dc due to drug reaction  · Will likely need 10-14 days of IV abx once new shunt is placed  · Follow and trend labs  · DVT prophylaxis:  SCDs and heparin  Neurosurgery will continue to follow  Plan for surgery Monday  Please call with questions or concerns      Lumbar stenosis  Assessment & Plan  · Status post minimally invasive transverse lumbar interbody fusion L5/S1 7/6/20  · No issues with incision noted  · Patient with diffuse weakness throughout, GCS 14  · Will likely have outpatient follow up in 6 weeks with new upright XR lumbar spine imaging  · No further inpatient needs regarding this diagnosis    Type 2 diabetes mellitus [facility])  Assessment & Plan  Lab Results   Component Value Date    HGBA1C 5 6 07/31/2020       Recent Labs 08/19/20  1722 08/19/20  2122 08/20/20  0754 08/20/20  1144   POCGLU 108 128 120 149*       Blood Sugar Average: Last 72 hrs:  (P) 125 2     Continue medical management per primary team        Subjective/Objective   Chief Complaint: none    Subjective: Patient with NAEO  Extremely lethargic and soft spoken today  States he cannot wait for new shunt  Denies HA, dizziness, pain, nausea, vomiting, fevers, chills  Objective: Patient sitting in bed sleeping  NAD  VSS  Intake/Output       08/23/20 0701 - 08/24/20 0700      [id number] Total       Intake    P O   450  -- 450    Total Intake 450 -- 450       Output    Total Output -- -- --       Net I/O     450 -- 450          Invasive Devices     Peripherally Inserted Central Catheter Line            PICC Line 60/08/58 Left Basilic 17 days                Vitals: Blood pressure 121/71, pulse 68, temperature 98 3 °F (36 8 °C), resp  rate 16, height 6' 3" (1 905 m), weight 110 kg (241 lb 6 5 oz), SpO2 98 %  ,Body mass index is 30 17 kg/m²  General appearance: drowsy, appears stated age, cooperative and no distress  Head: Normocephalic, right frontal incision CDI, sutures present  Eyes: EOMI, PERRL  Lungs: non labored breathing  Heart: regular heart rate  Neurologic:   Mental status: Drowsy, oriented x2 (not to date), thought content appropriate, speech very soft  Cranial nerves: grossly intact (Cranial nerves II-XII)  Sensory: normal to LT bilaterally  Motor: moving all extremities without focal weakness  4/5 generalized weakness throughout  Reflexes: 1+ and symmetric, no hoffmans or clonus  Coordination: finger to nose abnormal bilaterally, no drift bilaterally  Abdominal incisions well healed, sutures and staples present    Lab Results: I have personally reviewed pertinent results        Results from last 7 days   Lab Units 08/21/20  0518 08/19/20  0449 08/17/20  0519   WBC Thousand/uL 11 66* 8 10 9 49   HEMOGLOBIN g/dL 13 3 12 2 12 1   HEMATOCRIT % 43 3 38 8 39 3   PLATELETS Thousands/uL 241 234 248   NEUTROS PCT %  --  76*  --    MONOS PCT %  --  9  --      Results from last 7 days   Lab Units 08/21/20  0518 08/19/20  0449 08/17/20  0519   POTASSIUM mmol/L 4 4 4 3 4 2   CHLORIDE mmol/L 102 103 103   CO2 mmol/L 32 31 31   BUN mg/dL 15 17 14   CREATININE mg/dL 0 71 0 73 0 64   CALCIUM mg/dL 8 7 8 4 8 4   ALK PHOS U/L 106 94  --    ALT U/L 20 21  --    AST U/L 16 16  --                  No results found for: TROPONINT  ABG:  Lab Results   Component Value Date    PHART 7 357 07/31/2020    UON2GBC 32 3 (L) 07/31/2020    PO2ART 87 8 07/31/2020    JTE5JLU 17 7 (L) 07/31/2020    BEART -6 7 07/31/2020    SOURCE Line, Arterial 07/31/2020       Imaging Studies: I have personally reviewed pertinent reports  and I have personally reviewed pertinent films in PACS     Xr Chest Portable    Result Date: 8/10/2020  Narrative: CHEST INDICATION:   Fever and leukocytosis  COMPARISON:  7/31/2020 EXAM PERFORMED/VIEWS:  XR CHEST PORTABLE FINDINGS:  Left PICC line tip projects over the cavoatrial junction in satisfactory position without pneumothorax  Cardiomediastinal silhouette appears unremarkable  The lungs are clear  No pneumothorax or pleural effusion  Osseous structures appear within normal limits for patient age  Impression: Left PICC line tip projects over the cavoatrial junction satisfactory position without pneumothorax  No acute cardiopulmonary disease  Workstation performed: [id number]     Xr Chest Portable    Result Date: 7/31/2020  Narrative: CHEST INDICATION:   ett location  COMPARISON:  8/11/2011 EXAM PERFORMED/VIEWS:  XR CHEST PORTABLE FINDINGS:  Interval intubation with the endotracheal tube 5 cm above the tye  There is also a nasogastric tube with the tube coiled in the midesophagus  Repositioning of the nasogastric tube is advised  Right ventriculoperitoneal shunt catheter noted, incompletely imaged   Heart shadow is enlarged but unchanged from prior exam  Lung markings are crowded secondary to low lung volumes  Within limitations of this examination there is no focal airspace opacity to suggest pneumonia  No pneumothorax or pleural effusion  Osseous structures stable  Impression: 1  Interval intubation with coiling of the nasogastric tube in the midesophagus  Repositioning of the the nasogastric tube advised  2   Endotracheal tube noted with the tip well above the tye  3   Scattered strandy densities likely related to diminished degree of inspiration  Workstation performed: [id number]     Xr Spine Lumbar 2 Or 3 Views Injury    Result Date: 8/19/2020  Narrative: LUMBAR SPINE INDICATION:   lumbar fusion  COMPARISON:  July 17, 2020 VIEWS:  XR SPINE LUMBAR 2 OR 3 VIEWS INJURY FINDINGS: Patient is status post L5-S1 pedicle screw fixation with disc cage placement  Stable alignment from the prior study  There is no evidence of acute fracture or destructive osseous lesion  Alignment is unremarkable  Degenerative disc disease noted particularly at the thoracolumbar junction  The pedicles appear intact  Soft tissues are unremarkable  Impression: Stable postoperative alignment  Workstation performed: [id number]     Ct Head Wo Contrast    Result Date: 8/20/2020  Narrative: CT BRAIN - WITHOUT CONTRAST INDICATION:   Hydrocephalus follow up s/p  shunt removal  COMPARISON:  8/9/2020 TECHNIQUE:  CT examination of the brain was performed  In addition to axial images, coronal 2D reformatted images were created and submitted for interpretation  Radiation dose length product (DLP) for this visit:  990 19 mGy-cm   This examination, like all CT scans performed in the [facility], was performed utilizing techniques to minimize radiation dose exposure, including the use of iterative  reconstruction and automated exposure control  IMAGE QUALITY:  Diagnostic   FINDINGS: PARENCHYMA:  Stable right anterior frontal vertex encephalomalacia and left occipital encephalomalacia consistent with old infarctions  Stable additional periventricular white matter attenuation consistent with mild to moderate chronic microangiopathy  No mass, mass effect or midline shift  No hemorrhage  No evidence of acute territorial infarction  Stable vascular calcification  VENTRICLES AND EXTRA-AXIAL SPACES:  Stable ventricular enlargement involving lateral ventricles, 3rd ventricle and 4th ventricle  No subdural collections have developed  VISUALIZED ORBITS AND PARANASAL SINUSES:  Unremarkable  CALVARIUM AND EXTRACRANIAL SOFT TISSUES:  Normal      Impression: Stable ventricular enlargement post shunt removal  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  Workstation performed: [id number]     Ct Head Wo Contrast    Result Date: 8/9/2020  Narrative: CT BRAIN - WITHOUT CONTRAST INDICATION:   Altered mental status  COMPARISON:  Head CT 8/5/2020 TECHNIQUE:  CT examination of the brain was performed  In addition to axial images, coronal 2D reformatted images were created and submitted for interpretation  Radiation dose length product (DLP) for this visit:  972 4 mGy-cm   This examination, like all CT scans performed in the [facility], was performed utilizing techniques to minimize radiation dose exposure, including the use of iterative reconstruction and automated exposure control  IMAGE QUALITY:  Diagnostic  FINDINGS: PARENCHYMA AND EXTRA-AXIAL SPACES: Interval removal of right frontal approach ventricular shunt catheter  Stable encephalomalacia in the right superior frontal gyrus and left occipitotemporal region  No acute intracranial hemorrhage  No masslike lesion, mass effect or midline shift  No definite CT evidence for acute infarct    VENTRICLES AND EXTRA-AXIAL SPACES:  Grossly size and configuration of prominent ventricular system VISUALIZED ORBITS AND PARANASAL SINUSES:  Unremarkable   CALVARIUM AND EXTRACRANIAL SOFT TISSUES:  Incompletely imaged post surgical changes in the face over right mandible  Periapical lucency of retained root of right maxillary tooth  Impression: 1  Interval removal of right frontal approach ventricular shunt catheter  Grossly stable size and configuration of prominent ventricular system  2   Stable encephalomalacia in the right superior frontal gyrus and left occipitotemporal region  3   Periapical lucency of retained root of right maxillary tooth  Workstation performed: [id number]     Ct Head Wo Contrast    Result Date: 8/5/2020  Narrative: CT BRAIN - WITHOUT CONTRAST INDICATION:   F/u on externalized shunt  COMPARISON:  7/31/2020 TECHNIQUE:  CT examination of the brain was performed  In addition to axial images, coronal 2D reformatted images were created and submitted for interpretation  Radiation dose length product (DLP) for this visit:  902 46 mGy-cm   This examination, like all CT scans performed in the [facility], was performed utilizing techniques to minimize radiation dose exposure, including the use of iterative  reconstruction and automated exposure control  IMAGE QUALITY:  Diagnostic  FINDINGS: PARENCHYMA:  Stable focal encephalomalacia within the anterior medial right frontal lobe and left occipital lobe consistent with old infarcts  No mass, mass effect or midline shift  Mild chronic microangiopathic change within the remainder of the brain  parenchyma  Stable appearance of the basal ganglia, basilar cisterns, brainstem and cerebellar hemispheres  VENTRICLES AND EXTRA-AXIAL SPACES:  There is mild enlargement of the ventricular system compared to the previous examination best seen on coronal imaging within the temporal horns of the lateral ventricles  VISUALIZED ORBITS AND PARANASAL SINUSES:  Unremarkable   CALVARIUM AND EXTRACRANIAL SOFT TISSUES:  Normal      Impression: Continued mild enlargement of the ventricular system which continues to gradually enlarge compared to prior studies dated 7/21/2020 and 7/31/2020  Stable right frontal and left occipital encephalomalacia consistent with old infarcts  The study was marked in [facility] for immediate notification  Workstation performed: [id number]     Ct Head Without Contrast    Result Date: 7/31/2020  Narrative: CT BRAIN - WITHOUT CONTRAST INDICATION:   vomiting, has shunt  COMPARISON:  July 21, 2020 TECHNIQUE:  CT examination of the brain was performed  In addition to axial images, coronal 2D reformatted images were created and submitted for interpretation  Radiation dose length product (DLP) for this visit:  959 37 mGy-cm   This examination, like all CT scans performed in the [facility], was performed utilizing techniques to minimize radiation dose exposure, including the use of iterative  reconstruction and automated exposure control  IMAGE QUALITY:  Diagnostic  FINDINGS: PARENCHYMA:  No intracranial mass, mass effect or midline shift  No CT signs of acute infarction  No acute parenchymal hemorrhage  Encephalomalacia in the right frontal lobe is seen  Unchanged hypoattenuation in the left occipital temporal lobe  VENTRICLES AND EXTRA-AXIAL SPACES:  Stable position of the right ventriculostomy shunt  The ventricular system is minimally prominent compared to prior study  Unchanged bilateral subdural hygromas  VISUALIZED ORBITS AND PARANASAL SINUSES:  Unremarkable  CALVARIUM AND EXTRACRANIAL SOFT TISSUES:  Normal      Impression: Minimal increase in size of ventricular system The study was marked in [facility] for immediate notification  Workstation performed: [id number]     Fl Barium Swallow Video W Speech    Result Date: 8/12/2020  Narrative: A video barium swallow study was performed by the Department of Speech Pathology  Please refer to the report for the official interpretation  The images are stored for archival purposes only  Study images were not formally reviewed by the Radiology Department      Pe Study With Ct Abdomen And Pelvis With Contrast    Result Date: 7/31/2020  Narrative: CT PULMONARY ANGIOGRAM OF THE CHEST AND CT ABDOMEN AND PELVIS WITH INTRAVENOUS CONTRAST INDICATION:   new S1Q3T3 and was going to scan belly (epigastric pain)  COMPARISON:  None  TECHNIQUE:  CT examination of the chest, abdomen and pelvis was performed  Thin section CT angiographic technique was used in the chest in order to evaluate for pulmonary embolus and coronal 3D MIP postprocessing was performed on the acquisition scanner  Axial, sagittal, and coronal 2D reformatted images were created from the source data and submitted for interpretation  Radiation dose length product (DLP) for this visit:  3893 33 mGy-cm   This examination, like all CT scans performed in the [facility], was performed utilizing techniques to minimize radiation dose exposure, including the use of iterative reconstruction and automated exposure control  IV Contrast:  100 mL of iohexol (OMNIPAQUE) Enteric Contrast:  Enteric contrast was not administered  FINDINGS: CHEST PULMONARY ARTERIAL TREE:  No evidence of pulmonary embolus  LUNGS:  The trachea and central bronchial tree are patent  Nodular airspace opacities are seen within the right upper lobe  PLEURA:  Pleural calcification is seen within the right upper lobe  HEART/AORTA:  Unremarkable for patient's age  MEDIASTINUM AND ERNIE:  Unremarkable  CHEST WALL AND LOWER NECK:   Unremarkable  ABDOMEN LIVER/BILIARY TREE:  Liver is diffusely decreased in density consistent with fatty change  No CT evidence of suspicious hepatic mass  Normal hepatic contours  No biliary dilatation  GALLBLADDER:  There are gallstone(s) within the gallbladder, without pericholecystic inflammatory changes  SPLEEN:  Unremarkable  PANCREAS:  Unremarkable  ADRENAL GLANDS:  Unremarkable  KIDNEYS/URETERS:  No hydronephrosis or urinary tract calculus    One or more sharply circumscribed subcentimeter renal hypodensities are present, too small to accurately characterize, and statistically most likely benign findings  According to recent literature (Radiology 2019) no further workup of these findings is recommended  STOMACH AND BOWEL:  Fluid-filled loops of small bowel with pneumatosis is seen  Thickening of the loops of small bowel are visualized  APPENDIX:  No findings to suggest appendicitis  ABDOMINOPELVIC CAVITY:  Mesenteric edema is visualized  Small amount of perihepatic free fluid is seen  Tip of the  shunt is visualized  VESSELS:  Unremarkable for patient's age  PELVIS REPRODUCTIVE ORGANS:  Unremarkable for patient's age  URINARY BLADDER:  Unremarkable  ABDOMINAL WALL/INGUINAL REGIONS:  Unremarkable  OSSEOUS STRUCTURES:  Status post L5/S1 surgical changes are seen  Impression: Fluid-filled loops of small bowel with pneumatosis and bowel wall thickening  Findings are suspicious for ischemic bowel  Nodular airspace opacities within the right upper lobe  Findings may represent infection  I personally discussed this study with Mylene Joya on 7/31/2020 at 1:46 AM  Workstation performed: [id number]     Fl Lumbar Puncture Diagnostic    Result Date: 8/11/2020  Narrative: FLUOROSCOPICALLY GUIDED LUMBAR PUNCTURE  INDICATION:  Prior history of NPH with recent  shunt infection  Evaluate for underlying meningitis  FLUOROSCOPY TIME:  4 minuters 44 seconds IMAGES:  1    TECHNIQUE:   Consent was obtained on the phone from the patient's brother Dr Jeannine Funk ([phone]) at 1500  The risks of the procedure were discussed in detail  The risks discussed included, however were not limited to, infection, bleeding, injury to surrounding structures (nerves, spinal cord, and blood vessels), allergic reactions, arachnoiditis, encephalitis, and spinal headaches  The patient's brother verbalized his understanding of the above risks and wished for Rose Marie Vaz to proceed with the lumbar puncture       The patient was placed in a left lateral decubitus position  He was prepped and draped in the usual sterile fashion  1% lidocaine was infiltrated at the puncture site  Utilizing left paravertebral approach, a 20 gauge 3 5 inch spinal needle was advanced under fluoroscopic guidance into the subarachnoid space at the L1-L2 level, utilizing sterile technique  Once in position, opening pressure was 15 cm H2O  Approximately 10 cc of clear, colorless CSF were removed and placed into 4 tubes which subsequently were transported to the lab for requested analysis  Closing pressure was 5 cm H2O  The needle was removed  The patient tolerated the procedure well  The patient was discharged from the department with appropriate instructions  Impression: Successful fluoroscopically guided lumbar puncture with an opening pressure of 15 cm H2O  Approximately 10 mL's of clear colorless CSF was removed and sent to lab for requested analysis  Closing pressure was 5 cm H2O  PERFORMED BY: Dr Samantha Osman and Jalyn Macias PA-C DICTATED AND SIGNED BY: Jalyn Macias PA-C Workstation performed: [id number]     Ct Abdomen Pelvis W Contrast    Result Date: 8/11/2020  Narrative: CT ABDOMEN AND PELVIS WITH IV CONTRAST INDICATION:   Abdominal pain, fever  COMPARISON:  July 31, 2020 TECHNIQUE:  CT examination of the abdomen and pelvis was performed  Axial, sagittal, and coronal 2D reformatted images were created from the source data and submitted for interpretation  Radiation dose length product (DLP) for this visit:  1801 86 mGy-cm   This examination, like all CT scans performed in the [facility], was performed utilizing techniques to minimize radiation dose exposure, including the use of iterative reconstruction and automated exposure control  IV Contrast:  100 mL of iohexol (OMNIPAQUE) Enteric Contrast:  Enteric contrast was not administered   FINDINGS: ABDOMEN LOWER CHEST:  No clinically significant abnormality identified in the visualized lower chest  LIVER/BILIARY TREE:  Nodular contour of the liver compatible with cirrhosis GALLBLADDER:  No listhesis is seen SPLEEN:  Unremarkable  PANCREAS:  Unremarkable  ADRENAL GLANDS:  Unremarkable  KIDNEYS/URETERS:  Right renal cyst seen STOMACH AND BOWEL:  Diverticulosis seen and no evidence of bowel obstruction APPENDIX:  No findings to suggest appendicitis  ABDOMINOPELVIC CAVITY:  No ascites  No pneumoperitoneum  No lymphadenopathy  VESSELS:  Celiac trunk, SMA appear unremarkable  PELVIS REPRODUCTIVE ORGANS:  Unremarkable for patient's age  URINARY BLADDER:  Unremarkable  ABDOMINAL WALL/INGUINAL REGIONS:  Unremarkable  OSSEOUS STRUCTURES:  No acute compression collapse of the vertebra No gross lytic lesion Bridging ossification seen in the spine  Lumbar fusion seen at L5-S1 Degenerative changes in the both hip joint    Impression: No intra-abdominal fluid collection No bowel obstruction Workstation performed: [id number]     Ir Other    Result Date: 8/6/2020  Narrative: PLEASE SEE REPORT FOR THIS PROCEDURE IN PATIENT'S CHART UNDER OP NOTE     EKG, Pathology, and Other Studies: I have personally reviewed pertinent reports        VTE Pharmacologic Prophylaxis: Heparin    VTE Mechanical Prophylaxis: sequential compression device

## 2020-08-23 NOTE — ANESTHESIA PREPROCEDURE EVALUATION
Procedure:  Left ventriculoatrial shunt (Left Head)    Relevant Problems   CARDIO   (+) Essential hypertension      ENDO   (+) Type 2 diabetes mellitus (HCC)      GI/HEPATIC   (+) Dysphagia      MUSCULOSKELETAL   (+) Gout      PULMONARY   (+) ZHOU on CPAP      Other   (+) Infection of  (ventriculoperitoneal) shunt (HCC)   (+) Normal pressure hydrocephalus ([address] )     ROSEMARIE ECHO SUMMARY 07/31/20     LEFT VENTRICLE:  Systolic Function: normal  Ejection Fraction: 50-55%  Cavity size: normal      RIGHT VENTRICLE:  Systolic Function: normal  Cavity size normal  No hypertrophy     AORTIC VALVE:  Leaflets: normal and trileaflet  Leaflet motions normal and normal  Stenosis: none  Regurgitation: none  MITRAL VALVE:  Leaflets: normal  Leaflet Motions: normal  Regurgitation: trace  TRICUSPID VALVE:  Leaflets: normal  Leaflet Motions: normal  Stenosis: none  Regurgitation: mild  PULMONIC VALVE:  Leaflets: normal           Physical Exam    Airway    Mallampati score: II  TM Distance: >3 FB  Neck ROM: full     Dental   No notable dental hx     Cardiovascular  Rhythm: regular, Rate: normal, Cardiovascular exam normal    Pulmonary  Pulmonary exam normal Breath sounds clear to auscultation,     Other Findings        Anesthesia Plan  ASA Score- 3     Anesthesia Type- general with ASA Monitors  Additional Monitors:   Airway Plan: ETT  Comment: Risks/benefits and alternatives discussed with patient including likely possibility of PONV and sore throat, as well as the rare possibilities of aspiration, dental/oropharyngeal/ocular injuries, or grave/life threatening anesthetic and surgical emergencies          Plan Factors-Exercise tolerance (METS): >4 METS  Patient summary reviewed  Patient instructed to abstain from smoking on day of procedure  Patient did not smoke on day of surgery  Induction- intravenous  Postoperative Plan- Plan for postoperative opioid use   Planned trial extubation    Informed Consent- Anesthetic plan and risks discussed with patient  I personally reviewed this patient with the CRNA  Discussed and agreed on the Anesthesia Plan with the CAROLE Faria

## 2020-08-23 NOTE — PROGRESS NOTES
Progress Note - Ralph Rojo 1950, 69 y o  male MRN: [id number]    Unit/Bed#: [facility] 629-01 Encounter: [id number]    Primary Care Provider: Kenney Boxer   Date and time admitted to hospital: 7/30/2020 11:12 PM        * Severe sepsis [facility])  Assessment & Plan  · As evidenced by tachycardia, leukocytosis, elevated lactic acidosis, and fevers  · Secondary to  shunt infection with meningitis and peritonitis  CSF culture grew Pseudomonas  · Status post  shunt externalization 7/30  · ID following, continue meropenem  Was on cefepime but developed a rash  · Repeat CSF cultures negative  Metabolic encephalopathy  Assessment & Plan  · Multifactorial  Metabolic encephalopathy in the setting of severe sepsis as evidenced by  shunt infection with meningitis and peritonitis requiring multiple surgical procedures  · Patient alert oriented baseline, currently oriented to person and place  Much more awake and alert today, waxes and wanes  · For  shunt replacement Monday 08/24/2020  · Continue neuro checks  · Fall/safety precautions  · 8/22:  Patient lethargic the last 2 days however able to follow instruction for the most part generally weak  Not eating  · Started normal saline IV fluids at 75 an hour discussed with neurosurgery 8/22  · Sunday improved mental status not as lethargic continue for now   · Repeat labs in a m  Plan for shunt placement on Monday    Infection of  (ventriculoperitoneal) shunt ([facility] [address] )  Assessment & Plan  · CSF culture grew Pseudomonas   ·  shunt was externalized, however repeat CSF still grew pseudomonas   shunt was later removed 8/7/2020  · Repeat CSF cultures negative  · ID following  On IV Meropenem  · Neurosurgery following, planning for replacement of  shunt later this week  Timing to be determined  · PT/OT recommending rehab  CM following       Nonocclusive mesenteric ischemia (HCC)  Assessment & Plan  · S/p ex lap, arteriogram of SMA, papavarine infusion, exploration of SMA, angiogram SMA on 7/31/2020 and repeat ex lap 8/1/2020  · General surgery signed off  · Abdominal staples removed 8/18 , still has one suture in midline scar area and right upper quadrant as well ast upper bl chest area (need to be sure removed before discharge)     ZHOU on CPAP  Assessment & Plan  · Was discontinued by RT given patient's refusal     Normal pressure hydrocephalus ([address] )  Assessment & Plan  · S/p  shunt placement in 2005  · Now s/p removal of shunt apparatus given infection as above  · Planning for repeat placement of  shunt anticipated for 08/24 Monday "likely ventriculo-atrial approach given recent abdominal issues    Type 2 diabetes mellitus [facility])  Assessment & Plan  Lab Results   Component Value Date    HGBA1C 5 6 07/31/2020       Recent Labs     08/22/20  1718 08/22/20  2112 08/23/20  0809 08/23/20  1143   POCGLU 115 122 99 98           · Diet controlled  · Can continue SSI however has not required any insulin  · Diabetic diet  · Monitor Accu-Cheks    Lumbar stenosis  Assessment & Plan  · S/p L5-S1 fusion by Neurosurgery on 7/6/2020  · Due for f/u xrays today, pending  To be reviewed by neurosx  Dysphagia  Assessment & Plan  · Continue modified diet per most recent SLP recs: pureed with NTL  · Aspiration precautions   · Started iv fluids last night due to lethargy and poor appetite, now appears to have improved mental status     Multiple wounds  Assessment & Plan  · Wound care per Wound Care team recommendations     Drug rash  Assessment & Plan  · Developed prior, was suspected to be 2/2 cefepime which has since been discontinued     Essential hypertension  Assessment & Plan  · BP controlled now off antihypertensives  Previously taking Vasotec  · Monitor        VTE Pharmacologic Prophylaxis:   Pharmacologic: Heparin  Mechanical VTE Prophylaxis in Place: Yes    Patient Centered Rounds: I have performed bedside rounds with nursing staff today      Discussions with Specialists or Other Care Team Provider: nursing     Education and Discussions with Family / Patient: patient     Time Spent for Care: 45 minutes  More than 50% of total time spent on counseling and coordination of care as described above  Current Length of Stay: 23 day(s)    Current Patient Status: Inpatient   Certification Statement: The patient will continue to require additional inpatient hospital stay due to ongoing need for surgery today     Discharge Plan: will need rehab once cleared for discharge after surgery not in next 72 hrs     Code Status: Level 1 - Full Code      Subjective:   Upon arrival to room patient is more alert than last 2 days prior  Patient is able to interact as he has been doing, generally alert and oriented  Patient denies any pain no nausea vomiting diarrhea no feeling of constipation  Reports that he is eating  Patient currently receiving IV fluids believes this is assisting with improved mental status today  Objective:     Vitals:   Temp (24hrs), Av °F (36 7 °C), Min:96 3 °F (35 7 °C), Max:99 °F (37 2 °C)    Temp:  [96 3 °F (35 7 °C)-99 °F (37 2 °C)] 98 3 °F (36 8 °C)  HR:  [58-73] 68  Resp:  [16-19] 16  BP: (116-124)/(59-71) 121/71  SpO2:  [94 %-98 %] 98 %  Body mass index is 30 17 kg/m²  Input and Output Summary (last 24 hours): Intake/Output Summary (Last 24 hours) at 2020 1514  Last data filed at 2020 1444  Gross per 24 hour   Intake 1668 ml   Output 330 ml   Net 1338 ml       Physical Exam:     Physical Exam  Constitutional:       General: He is not in acute distress  Appearance: He is not diaphoretic  HENT:        Comments: Right occipital area with staples intactEyes:      General: No scleral icterus  Right eye: No discharge  Left eye: No discharge  Conjunctiva/sclera: Conjunctivae normal    Neck:      Thyroid: No thyromegaly  Trachea: No tracheal deviation     Cardiovascular:      Rate and Rhythm: Normal rate       Heart sounds: No murmur  No friction rub  No gallop  Pulmonary:      Effort: No respiratory distress  Breath sounds: No stridor  No wheezing or rales  Chest:      Chest wall: No tenderness  Abdominal:      General: There is no distension  Palpations: There is no mass  Tenderness: There is no abdominal tenderness  There is no guarding or rebound  Hernia: No hernia is present  Comments: Midline abdominal incision healed scar with 1 suture in the middle of it  One set of staples on right upper quadrant intact   Musculoskeletal:         General: No tenderness, deformity or edema  Lymphadenopathy:      Cervical: No cervical adenopathy  Skin:     Coloration: Skin is not pale  Findings: No erythema or rash  Neurological:      Mental Status: He is oriented to person, place, and time  Psychiatric:      Comments: Slow but alert and oriented           Additional Data:     Labs:    Results from last 7 days   Lab Units 08/23/20  1234   WBC Thousand/uL 8 93   HEMOGLOBIN g/dL 11 8*   HEMATOCRIT % 37 8   PLATELETS Thousands/uL 193   NEUTROS PCT % 77*   LYMPHS PCT % 9*   MONOS PCT % 8   EOS PCT % 3     Results from last 7 days   Lab Units 08/23/20  1234   SODIUM mmol/L 142   POTASSIUM mmol/L 3 8   CHLORIDE mmol/L 104   CO2 mmol/L 31   BUN mg/dL 17   CREATININE mg/dL 0 51*   ANION GAP mmol/L 7   CALCIUM mg/dL 7 8*   ALBUMIN g/dL 2 3*   TOTAL BILIRUBIN mg/dL 0 43   ALK PHOS U/L 86   ALT U/L 21   AST U/L 13   GLUCOSE RANDOM mg/dL 92     Results from last 7 days   Lab Units 08/23/20  1234   INR  1 37*     Results from last 7 days   Lab Units 08/23/20  1143 08/23/20  0809 08/22/20  2112 08/22/20  1718 08/22/20  1120 08/22/20  0757 08/21/20  2035 08/21/20  1715 08/21/20  1200 08/21/20  0741 08/20/20  2110 08/20/20  1723   POC GLUCOSE mg/dl 98 99 122 115 118 108 142* 156* 111 120 97 156*                   * I Have Reviewed All Lab Data Listed Above    * Additional Pertinent Lab Tests Reviewed: All Labs Within Last 24 Hours Reviewed    Imaging:    Imaging Reports Reviewed Today Include: reviewed     Recent Cultures (last 7 days):           Last 24 Hours Medication List:   Current Facility-Administered Medications   Medication Dose Route Frequency Provider Last Rate    acetaminophen  325 mg Rectal Q6H PRN AR Valenzuela      acetaminophen  650 mg Oral Q6H PRN AR Valenzuela      diphenhydrAMINE  25 mg Intravenous Q6H PRN AR Valenzuela      heparin (porcine)  5,000 Units Subcutaneous Q8H [address] Milton Medrano PA-C      hydrocortisone   Topical 4x Daily PRN AR Valenzuela      insulin lispro  2-12 Units Subcutaneous 4x Daily (AC & HS) AR Valenzuela      iodixanol  15 mL Intravenous Once in imaging RA Valenzuela      meropenem  2,000 mg Intravenous Q8H Tahir Palma MD Stopped (08/23/20 1252)    VERONICA ANTIFUNGAL   Topical BID Jessica Scott PA-C      oxyCODONE  5 mg Oral Q4H PRN AR Valenzuela      sodium chloride  75 mL/hr Intravenous Continuous AR Castro 75 mL/hr (08/22/20 1146)        Today, Patient Was Seen By: AR Castro    ** Please Note: Dictation voice to text software may have been used in the creation of this document   **

## 2020-08-24 ENCOUNTER — APPOINTMENT (INPATIENT)
Dept: RADIOLOGY | Facility: HOSPITAL | Age: 70
DRG: 031 | End: 2020-08-24
Payer: COMMERCIAL

## 2020-08-24 ENCOUNTER — ANESTHESIA (INPATIENT)
Dept: PERIOP | Facility: HOSPITAL | Age: 70
DRG: 031 | End: 2020-08-24
Payer: COMMERCIAL

## 2020-08-24 LAB
ABO GROUP BLD: NORMAL
APPEARANCE CSF: CLEAR
BASOPHILS NFR CSF MANUAL: 1 %
BLD GP AB SCN SERPL QL: NEGATIVE
GLUCOSE CSF-MCNC: 66 MG/DL (ref 50–80)
GLUCOSE SERPL-MCNC: 112 MG/DL (ref 65–140)
GLUCOSE SERPL-MCNC: 141 MG/DL (ref 65–140)
GLUCOSE SERPL-MCNC: 146 MG/DL (ref 65–140)
GLUCOSE SERPL-MCNC: 171 MG/DL (ref 65–140)
HBV SURFACE AG SER QL: NORMAL
HCV AB SER QL: NORMAL
HISTIOCYTES NFR CSF: 1 %
HIV 1+2 AB+HIV1 P24 AG SERPL QL IA: NORMAL
HIV1 P24 AG SER QL: NORMAL
INR PPP: 1.05 (ref 0.84–1.19)
LYMPHOCYTES NFR CSF MANUAL: 92 %
MONOS+MACROS CSF MANUAL: 3 %
NEUTROPHILS NFR CSF MANUAL: 3 %
PROT CSF-MCNC: 25 MG/DL (ref 15–45)
PROTHROMBIN TIME: 13.7 SECONDS (ref 11.6–14.5)
RBC # CSF MANUAL: 37 UL (ref 0–10)
RH BLD: POSITIVE
SPECIMEN EXPIRATION DATE: NORMAL
TOTAL CELLS COUNTED BLD: NO
TOTAL CELLS COUNTED SPEC: 100
WBC # CSF AUTO: 4 /UL (ref 0–5)

## 2020-08-24 PROCEDURE — 82948 REAGENT STRIP/BLOOD GLUCOSE: CPT

## 2020-08-24 PROCEDURE — 99223 1ST HOSP IP/OBS HIGH 75: CPT | Performed by: EMERGENCY MEDICINE

## 2020-08-24 PROCEDURE — 4A133B1 MONITORING OF ARTERIAL PRESSURE, PERIPHERAL, PERCUTANEOUS APPROACH: ICD-10-PCS | Performed by: STUDENT IN AN ORGANIZED HEALTH CARE EDUCATION/TRAINING PROGRAM

## 2020-08-24 PROCEDURE — 84157 ASSAY OF PROTEIN OTHER: CPT | Performed by: NEUROLOGICAL SURGERY

## 2020-08-24 PROCEDURE — 03HY32Z INSERTION OF MONITORING DEVICE INTO UPPER ARTERY, PERCUTANEOUS APPROACH: ICD-10-PCS | Performed by: STUDENT IN AN ORGANIZED HEALTH CARE EDUCATION/TRAINING PROGRAM

## 2020-08-24 PROCEDURE — 4A133J1 MONITORING OF ARTERIAL PULSE, PERIPHERAL, PERCUTANEOUS APPROACH: ICD-10-PCS | Performed by: STUDENT IN AN ORGANIZED HEALTH CARE EDUCATION/TRAINING PROGRAM

## 2020-08-24 PROCEDURE — 85610 PROTHROMBIN TIME: CPT | Performed by: NURSE PRACTITIONER

## 2020-08-24 PROCEDURE — 99223 1ST HOSP IP/OBS HIGH 75: CPT | Performed by: PHYSICAL MEDICINE & REHABILITATION

## 2020-08-24 PROCEDURE — 71045 X-RAY EXAM CHEST 1 VIEW: CPT

## 2020-08-24 PROCEDURE — 86850 RBC ANTIBODY SCREEN: CPT | Performed by: THORACIC SURGERY (CARDIOTHORACIC VASCULAR SURGERY)

## 2020-08-24 PROCEDURE — 89050 BODY FLUID CELL COUNT: CPT | Performed by: NEUROLOGICAL SURGERY

## 2020-08-24 PROCEDURE — 86900 BLOOD TYPING SEROLOGIC ABO: CPT | Performed by: THORACIC SURGERY (CARDIOTHORACIC VASCULAR SURGERY)

## 2020-08-24 PROCEDURE — C1894 INTRO/SHEATH, NON-LASER: HCPCS | Performed by: NEUROLOGICAL SURGERY

## 2020-08-24 PROCEDURE — 99233 SBSQ HOSP IP/OBS HIGH 50: CPT | Performed by: INTERNAL MEDICINE

## 2020-08-24 PROCEDURE — 99024 POSTOP FOLLOW-UP VISIT: CPT | Performed by: NEUROLOGICAL SURGERY

## 2020-08-24 PROCEDURE — 87070 CULTURE OTHR SPECIMN AEROBIC: CPT | Performed by: NEUROLOGICAL SURGERY

## 2020-08-24 PROCEDURE — 82945 GLUCOSE OTHER FLUID: CPT | Performed by: NEUROLOGICAL SURGERY

## 2020-08-24 PROCEDURE — 86901 BLOOD TYPING SEROLOGIC RH(D): CPT | Performed by: THORACIC SURGERY (CARDIOTHORACIC VASCULAR SURGERY)

## 2020-08-24 PROCEDURE — 86803 HEPATITIS C AB TEST: CPT | Performed by: INTERNAL MEDICINE

## 2020-08-24 PROCEDURE — 87340 HEPATITIS B SURFACE AG IA: CPT | Performed by: INTERNAL MEDICINE

## 2020-08-24 PROCEDURE — 89051 BODY FLUID CELL COUNT: CPT | Performed by: NEUROLOGICAL SURGERY

## 2020-08-24 PROCEDURE — NC001 PR NO CHARGE: Performed by: EMERGENCY MEDICINE

## 2020-08-24 PROCEDURE — 00160J6 BYPASS CEREBRAL VENTRICLE TO PERITONEAL CAVITY WITH SYNTHETIC SUBSTITUTE, OPEN APPROACH: ICD-10-PCS | Performed by: SURGERY

## 2020-08-24 PROCEDURE — 62220 ESTABLISH BRAIN CAVITY SHUNT: CPT | Performed by: NEUROLOGICAL SURGERY

## 2020-08-24 PROCEDURE — 87806 HIV AG W/HIV1&2 ANTB W/OPTIC: CPT | Performed by: INTERNAL MEDICINE

## 2020-08-24 DEVICE — INTEGRA® NEUROSCIENCES CONNECTOR PUDENZ STRAIGHT CONNECTOR
Type: IMPLANTABLE DEVICE | Site: CRANIAL | Status: FUNCTIONAL
Brand: INTEGRA®

## 2020-08-24 DEVICE — CODMAN® BACTISEAL® VENTRICULAR CATHETER AND DISTAL CATHETER KIT WITH BACTISEAL SHUNT SYSTEM
Type: IMPLANTABLE DEVICE | Site: CRANIAL | Status: FUNCTIONAL
Brand: CODMAN® BACTISEAL®

## 2020-08-24 DEVICE — CODMAN CERTAS PLUS PROGRAMMABLE VALVE INLINE VALVE ONLY WITH SIPHONGUARD INCLUDES: PRIMING ADAPTER AND INFORMATION MANUAL
Type: IMPLANTABLE DEVICE | Site: CRANIAL | Status: FUNCTIONAL
Brand: CODMAN CERTAS SIPHONGUARD

## 2020-08-24 DEVICE — CATH 43418 CARDIAC CATH REDUCED TIP 58CM: Type: IMPLANTABLE DEVICE | Site: CRANIAL | Status: FUNCTIONAL

## 2020-08-24 RX ORDER — LIDOCAINE HYDROCHLORIDE AND EPINEPHRINE 10; 10 MG/ML; UG/ML
INJECTION, SOLUTION INFILTRATION; PERINEURAL AS NEEDED
Status: DISCONTINUED | OUTPATIENT
Start: 2020-08-24 | End: 2020-08-24 | Stop reason: HOSPADM

## 2020-08-24 RX ORDER — HYDROMORPHONE HCL/PF 1 MG/ML
0.2 SYRINGE (ML) INJECTION
Status: DISCONTINUED | OUTPATIENT
Start: 2020-08-24 | End: 2020-08-24 | Stop reason: HOSPADM

## 2020-08-24 RX ORDER — DOCUSATE SODIUM 100 MG/1
100 CAPSULE, LIQUID FILLED ORAL 2 TIMES DAILY
Status: DISCONTINUED | OUTPATIENT
Start: 2020-08-24 | End: 2020-08-29 | Stop reason: HOSPADM

## 2020-08-24 RX ORDER — FENTANYL CITRATE/PF 50 MCG/ML
25 SYRINGE (ML) INJECTION
Status: DISCONTINUED | OUTPATIENT
Start: 2020-08-24 | End: 2020-08-24 | Stop reason: HOSPADM

## 2020-08-24 RX ORDER — ONDANSETRON 2 MG/ML
4 INJECTION INTRAMUSCULAR; INTRAVENOUS EVERY 6 HOURS PRN
Status: DISCONTINUED | OUTPATIENT
Start: 2020-08-24 | End: 2020-08-29 | Stop reason: HOSPADM

## 2020-08-24 RX ORDER — SODIUM CHLORIDE 9 MG/ML
125 INJECTION, SOLUTION INTRAVENOUS CONTINUOUS
Status: DISCONTINUED | OUTPATIENT
Start: 2020-08-24 | End: 2020-08-26

## 2020-08-24 RX ORDER — CEFAZOLIN SODIUM 2 G/50ML
SOLUTION INTRAVENOUS AS NEEDED
Status: DISCONTINUED | OUTPATIENT
Start: 2020-08-24 | End: 2020-08-24

## 2020-08-24 RX ORDER — FENTANYL CITRATE 50 UG/ML
INJECTION, SOLUTION INTRAMUSCULAR; INTRAVENOUS AS NEEDED
Status: DISCONTINUED | OUTPATIENT
Start: 2020-08-24 | End: 2020-08-24

## 2020-08-24 RX ORDER — SENNOSIDES 8.6 MG
1 TABLET ORAL DAILY
Status: DISCONTINUED | OUTPATIENT
Start: 2020-08-24 | End: 2020-08-29 | Stop reason: HOSPADM

## 2020-08-24 RX ORDER — ALBUMIN, HUMAN INJ 5% 5 %
SOLUTION INTRAVENOUS CONTINUOUS PRN
Status: DISCONTINUED | OUTPATIENT
Start: 2020-08-24 | End: 2020-08-24

## 2020-08-24 RX ORDER — SUCCINYLCHOLINE/SOD CL,ISO/PF 100 MG/5ML
SYRINGE (ML) INTRAVENOUS AS NEEDED
Status: DISCONTINUED | OUTPATIENT
Start: 2020-08-24 | End: 2020-08-24

## 2020-08-24 RX ORDER — GLYCOPYRROLATE 0.2 MG/ML
INJECTION INTRAMUSCULAR; INTRAVENOUS AS NEEDED
Status: DISCONTINUED | OUTPATIENT
Start: 2020-08-24 | End: 2020-08-24

## 2020-08-24 RX ORDER — HYDROMORPHONE HCL/PF 1 MG/ML
0.2 SYRINGE (ML) INJECTION EVERY 4 HOURS PRN
Status: DISCONTINUED | OUTPATIENT
Start: 2020-08-24 | End: 2020-08-29 | Stop reason: HOSPADM

## 2020-08-24 RX ORDER — CEFAZOLIN SODIUM 2 G/50ML
2000 SOLUTION INTRAVENOUS EVERY 8 HOURS
Status: DISCONTINUED | OUTPATIENT
Start: 2020-08-24 | End: 2020-08-24

## 2020-08-24 RX ORDER — LABETALOL 20 MG/4 ML (5 MG/ML) INTRAVENOUS SYRINGE
AS NEEDED
Status: DISCONTINUED | OUTPATIENT
Start: 2020-08-24 | End: 2020-08-24

## 2020-08-24 RX ORDER — SODIUM CHLORIDE, SODIUM LACTATE, POTASSIUM CHLORIDE, CALCIUM CHLORIDE 600; 310; 30; 20 MG/100ML; MG/100ML; MG/100ML; MG/100ML
INJECTION, SOLUTION INTRAVENOUS CONTINUOUS PRN
Status: DISCONTINUED | OUTPATIENT
Start: 2020-08-24 | End: 2020-08-24

## 2020-08-24 RX ORDER — CHLORHEXIDINE GLUCONATE 0.12 MG/ML
15 RINSE ORAL ONCE
Status: COMPLETED | OUTPATIENT
Start: 2020-08-24 | End: 2020-08-24

## 2020-08-24 RX ORDER — EPHEDRINE SULFATE 50 MG/ML
INJECTION INTRAVENOUS AS NEEDED
Status: DISCONTINUED | OUTPATIENT
Start: 2020-08-24 | End: 2020-08-24

## 2020-08-24 RX ORDER — LIDOCAINE HYDROCHLORIDE 10 MG/ML
INJECTION, SOLUTION EPIDURAL; INFILTRATION; INTRACAUDAL; PERINEURAL AS NEEDED
Status: DISCONTINUED | OUTPATIENT
Start: 2020-08-24 | End: 2020-08-24

## 2020-08-24 RX ORDER — SODIUM CHLORIDE, SODIUM LACTATE, POTASSIUM CHLORIDE, CALCIUM CHLORIDE 600; 310; 30; 20 MG/100ML; MG/100ML; MG/100ML; MG/100ML
50 INJECTION, SOLUTION INTRAVENOUS CONTINUOUS
Status: DISCONTINUED | OUTPATIENT
Start: 2020-08-24 | End: 2020-08-26

## 2020-08-24 RX ORDER — DEXAMETHASONE SODIUM PHOSPHATE 10 MG/ML
INJECTION, SOLUTION INTRAMUSCULAR; INTRAVENOUS AS NEEDED
Status: DISCONTINUED | OUTPATIENT
Start: 2020-08-24 | End: 2020-08-24

## 2020-08-24 RX ORDER — SODIUM CHLORIDE 9 MG/ML
75 INJECTION, SOLUTION INTRAVENOUS CONTINUOUS
Status: DISCONTINUED | OUTPATIENT
Start: 2020-08-24 | End: 2020-08-27

## 2020-08-24 RX ORDER — PROPOFOL 10 MG/ML
INJECTION, EMULSION INTRAVENOUS AS NEEDED
Status: DISCONTINUED | OUTPATIENT
Start: 2020-08-24 | End: 2020-08-24

## 2020-08-24 RX ORDER — ONDANSETRON 2 MG/ML
4 INJECTION INTRAMUSCULAR; INTRAVENOUS ONCE AS NEEDED
Status: DISCONTINUED | OUTPATIENT
Start: 2020-08-24 | End: 2020-08-24 | Stop reason: HOSPADM

## 2020-08-24 RX ORDER — ROCURONIUM BROMIDE 10 MG/ML
INJECTION, SOLUTION INTRAVENOUS AS NEEDED
Status: DISCONTINUED | OUTPATIENT
Start: 2020-08-24 | End: 2020-08-24

## 2020-08-24 RX ORDER — CEFAZOLIN SODIUM 2 G/50ML
2000 SOLUTION INTRAVENOUS ONCE
Status: DISCONTINUED | OUTPATIENT
Start: 2020-08-24 | End: 2020-08-24 | Stop reason: HOSPADM

## 2020-08-24 RX ORDER — NEOSTIGMINE METHYLSULFATE 1 MG/ML
INJECTION INTRAVENOUS AS NEEDED
Status: DISCONTINUED | OUTPATIENT
Start: 2020-08-24 | End: 2020-08-24

## 2020-08-24 RX ADMIN — DOCUSATE SODIUM 100 MG: 100 CAPSULE, LIQUID FILLED ORAL at 17:54

## 2020-08-24 RX ADMIN — ROCURONIUM BROMIDE 10 MG: 10 INJECTION, SOLUTION INTRAVENOUS at 09:19

## 2020-08-24 RX ADMIN — SODIUM CHLORIDE, SODIUM LACTATE, POTASSIUM CHLORIDE, AND CALCIUM CHLORIDE: .6; .31; .03; .02 INJECTION, SOLUTION INTRAVENOUS at 08:00

## 2020-08-24 RX ADMIN — Medication 100 MG: at 07:44

## 2020-08-24 RX ADMIN — ALBUMIN (HUMAN): 12.5 SOLUTION INTRAVENOUS at 08:00

## 2020-08-24 RX ADMIN — EPHEDRINE SULFATE 10 MG: 50 INJECTION, SOLUTION INTRAVENOUS at 07:44

## 2020-08-24 RX ADMIN — LIDOCAINE HYDROCHLORIDE 50 MG: 10 INJECTION, SOLUTION EPIDURAL; INFILTRATION; INTRACAUDAL; PERINEURAL at 07:44

## 2020-08-24 RX ADMIN — MEROPENEM 2000 MG: 1 INJECTION, POWDER, FOR SOLUTION INTRAVENOUS at 02:48

## 2020-08-24 RX ADMIN — FENTANYL CITRATE 50 MCG: 50 INJECTION, SOLUTION INTRAMUSCULAR; INTRAVENOUS at 07:44

## 2020-08-24 RX ADMIN — EPHEDRINE SULFATE 10 MG: 50 INJECTION, SOLUTION INTRAVENOUS at 09:14

## 2020-08-24 RX ADMIN — CHLORHEXIDINE GLUCONATE 0.12% ORAL RINSE 15 ML: 1.2 LIQUID ORAL at 06:36

## 2020-08-24 RX ADMIN — SODIUM CHLORIDE 75 ML/HR: 0.9 INJECTION, SOLUTION INTRAVENOUS at 11:05

## 2020-08-24 RX ADMIN — INSULIN LISPRO 2 UNITS: 100 INJECTION, SOLUTION INTRAVENOUS; SUBCUTANEOUS at 16:34

## 2020-08-24 RX ADMIN — LABETALOL 20 MG/4 ML (5 MG/ML) INTRAVENOUS SYRINGE 5 MG: at 10:21

## 2020-08-24 RX ADMIN — DEXAMETHASONE SODIUM PHOSPHATE 10 MG: 10 INJECTION, SOLUTION INTRAMUSCULAR; INTRAVENOUS at 08:32

## 2020-08-24 RX ADMIN — ROCURONIUM BROMIDE 25 MG: 10 INJECTION, SOLUTION INTRAVENOUS at 08:32

## 2020-08-24 RX ADMIN — GLYCOPYRROLATE 0.6 MG: 0.2 INJECTION, SOLUTION INTRAMUSCULAR; INTRAVENOUS at 10:18

## 2020-08-24 RX ADMIN — ROCURONIUM BROMIDE 30 MG: 10 INJECTION, SOLUTION INTRAVENOUS at 07:53

## 2020-08-24 RX ADMIN — SODIUM CHLORIDE 75 ML/HR: 0.9 INJECTION, SOLUTION INTRAVENOUS at 19:00

## 2020-08-24 RX ADMIN — FENTANYL CITRATE 50 MCG: 50 INJECTION, SOLUTION INTRAMUSCULAR; INTRAVENOUS at 08:43

## 2020-08-24 RX ADMIN — NEOSTIGMINE METHYLSULFATE 5 MG: 1 INJECTION, SOLUTION INTRAVENOUS at 10:18

## 2020-08-24 RX ADMIN — EPHEDRINE SULFATE 5 MG: 50 INJECTION, SOLUTION INTRAVENOUS at 10:01

## 2020-08-24 RX ADMIN — PROPOFOL 80 MG: 10 INJECTION, EMULSION INTRAVENOUS at 07:44

## 2020-08-24 RX ADMIN — MEROPENEM 2000 MG: 1 INJECTION, POWDER, FOR SOLUTION INTRAVENOUS at 20:00

## 2020-08-24 RX ADMIN — ROCURONIUM BROMIDE 20 MG: 10 INJECTION, SOLUTION INTRAVENOUS at 08:16

## 2020-08-24 RX ADMIN — CEFAZOLIN SODIUM 2000 MG: 2 SOLUTION INTRAVENOUS at 07:54

## 2020-08-24 NOTE — PROGRESS NOTES
Progress Note - Infectious Disease   Bo Gage 69 y o  male MRN: [id number]  Unit/Bed#: ICU 13 Encounter: [id number]      Impression/Plan:  1  Severe sepsis, tachycardia, leukocytosis, elevated lactic acid   With early development of high fevers  Secondary to  shunt infection with meningitis and peritonitis   Repeat blood cultures negative  All the  shunt apparatus has been removed  Repeat CSF cultures negative  Patient much improved  -antibiotic plan as below  -monitor temperatures and hemodynamics  -monitor CBC with diff and and CMP weekly while on the meropenem  -supportive care      2   shunt infection  Fluid WBC count was 61 with neutrophil predominance   Pseudomonas aeruginosa  Suspect secondary to peritonitis in the setting of #3    Now status post removal of the entire  shunt apparatus    Repeat CSF analysis looks fairly bland and the follow-up cultures are negative   Seems to be tolerating the antibiotics without difficulty  The patient underwent ventriculoatrial shunt  -continue meropenem  -monitor CBC with diff and CMP  weekly while on meropenem  -neurosurgery follow-up ongoing  -followup CSF culture  -plan another 10-14 days of IV antibiotics as the shunt is now replaced     3  Ischemic bowel with peritonitis   Status post exploratory laparotomy, VAC placement   Status post femoral artery and SMA artery cannulation for paraverine by vascular surgery   Suspect intra-abdominal infection is etiology of #2   Status post second-look on 08/01 with no resection performed   Peritoneal fluid culture also shows Pseudomonas   Now status post remove the entire  shunt apparatus  -antibiotic plan as above  -surgery follow-up     4  NPH requiring  shunt placement in 2005, status post recent revision in July 2019  Now status post removal of entire shunt apparatus    Patient now status post ventriculoatrial shunt     5  Lumbar spinal stenosis status post L5-S1 fusion on 07/06/2020      6  Diabetes mellitus with neuropathy      7  Rash-appears consistent with a drug eruption   Suspect secondary to the cefepime   Resolved since discontinuing the cefepime  -no additional cefepime  -antibiotics as above  -serial exams    Antibiotics:  Meropenem 13  Antibiotics 24   shunt removal 16  Postop day 0    Subjective:  Patient has no report fever, chills, sweats; no report vomiting, diarrhea; no report cough, shortness of breath; no reported pain  He is now in the intensive care unit after undergoing ventriculoatrial shunt for communicating hydrocephalus  Objective:  Vitals:  Temp:  [97 °F (36 1 °C)-98 9 °F (37 2 °C)] 98 3 °F (36 8 °C)  HR:  [48-75] 62  Resp:  [15-21] 20  BP: (115-129)/(51-71) 115/51  SpO2:  [93 %-100 %] 98 %  Temp (24hrs), Av 8 °F (36 6 °C), Min:97 °F (36 1 °C), Max:98 9 °F (37 2 °C)  Current: Temperature: 98 3 °F (36 8 °C)    Physical Exam:   General Appearance:  Somnolent, nontoxic, no acute distress  Throat: Oropharynx moist without lesions  Lungs:   Clear to auscultation bilaterally; no wheezes, rhonchi or rales; respirations unlabored   Heart:  RRR; no murmur, rub or gallop   Abdomen:   Soft, non-tender, non-distended, positive bowel sounds  Extremities: No clubbing, cyanosis or edema   Skin: No new rashes or lesions  No draining wounds noted         Labs, Imaging, & Other studies:   All pertinent labs and imaging studies were personally reviewed  Results from last 7 days   Lab Units 20  1234 20  0449   WBC Thousand/uL 8 93 11 66* 8 10   HEMOGLOBIN g/dL 11 8* 13 3 12 2   PLATELETS Thousands/uL 193 241 234     Results from last 7 days   Lab Units 20  1234 20  0520  0449   SODIUM mmol/L 142 137 139   POTASSIUM mmol/L 3 8 4 4 4 3   CHLORIDE mmol/L 104 102 103   CO2 mmol/L 31 32 31   BUN mg/dL 17 15 17   CREATININE mg/dL 0 51* 0 71 0 73   EGFR ml/min/1 73sq m 108 94 93   CALCIUM mg/dL 7 8* 8 7 8 4   AST U/L 13 16 16   ALT U/L 21 20 21 ALK PHOS U/L 86 106 94

## 2020-08-24 NOTE — SOCIAL WORK
CM spoke to pt's brother  Pt was in [address] today with Nsg  Pt went to ICU post-op  Pt's brother aware  Plan for ICU until medically stable for the floor  No d/c at this time   SNF continue to follow

## 2020-08-24 NOTE — ANESTHESIA PROCEDURE NOTES
Arterial Line Insertion  Performed by: Selene Mills CRNA  Authorized by: Hodan Pruitt MD   Consent: Verbal consent obtained  Written consent not obtained  Consent given by: brother  Patient identity confirmed: arm band  Preparation: Patient was prepped and draped in the usual sterile fashion  Indications: hemodynamic monitoring  Orientation:  Right  Location: radial artery  Sedation:  Patient sedated: G A      Procedure Details:  Needle gauge: 20    Post-procedure:  Post-procedure: dressing applied  Comments: Jossy inserted by Shruthi Hall

## 2020-08-24 NOTE — OP NOTE
OPERATIVE REPORT  PATIENT NAME: Camelia Toney    :  1950  MRN: [id number]  Pt Location:  OR ROOM 09    SURGERY DATE: 2020    Surgeon(s) and Role:     * John Arrington MD - Primary    Preop Diagnosis:  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]    Post-Op Diagnosis Codes:     * 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]    Procedure(s) (LRB):  Left ventriculoatrial shunt (Left)    Specimen(s):  ID Type Source Tests Collected by Time Destination   A : WBC COUNT W DIFF / RED CELL COUNT / GLUCOSE / PROTEIN Cerebrospinal Fluid Brain GLUCOSE, CSF, PROTEIN TOTAL, CSF, CSF WBC COUNT WITH DIFFERENTIAL, RED CELL COUNT, CSF John Arrington MD 2020 [id number]    B : CSF CULTURE AND GRAM STAIN Cerebrospinal Fluid Brain CSF CULTURE AND Emanuel Vernon MD 2020 [id number]        Estimated Blood Loss:   Minimal    Drains:  * No LDAs found *    Anesthesia Type:   General    Operative Indications:  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]    Operative Findings:  Hydrocephalus    Complications:   None    Procedure and Technique:  After adequate general endotracheal anesthesia the patient was placed supine on the operating table  The head was turned to the right  Hair was clipped and the scalp was prepped with Betadine soap then DuraPrep  The left anterior neck was also prepped in a similar fashion  Double layer drapes were placed in the normal fashion and a 3M Betadine impregnated sticky drape was placed over these  A time-out was called and all parameters a time-out were followed  The skin in the right coronal region was injected with 1% lidocaine with 1 100,000 epinephrine  A 15  Blade was used to incise the skin  Bovie and bipolar were used throughout the procedure to maintain hemostasis  The Bovie and a cutting setting was used to divide the tissues to the para cranium  Terrell clips were placed on the scalp edges  A cone style [place] retractor was used to maintain operative exposure  The [employer] drill with a 3 0 match stick was utilized to form a single bur hole  Bone wax was placed on the bone edges  Next attention was placed to the left neck in the region of the anterior border of the sternocleidomastoid muscle near the angle of the jaw  In this region the skin was injected with 1% lidocaine with 1 100,000 epinephrine  A 15 blade was used to incise the skin  Bovie was utilized to dissect the tissues to the platysma  The platysma was coagulated and divided  These were tucked under the lips of a we later retractor  Dissection was carried out to the region of the jugular vein  The facial vein was identified as it entered into the jugular vein and circumferential vessel loops were placed  Next a peritoneal catheter was utilized with a guide catheter inserted into this and this was advanced through the facial vein into the jugular foramen and then into the superior vena cava  The point at which the catheter was just at the region of the superior atrium was marked on the catheter an image fluoroscopically  The catheter was then trimmed an in-line connector was placed  At no time was the catheter allowed to be exposed to air so as to minimize the risk of an air embolism  Next a bactoseal catheter was tunneled from the coronal incision to the neck incision  This was connected to a Codman Cordis valve set at 4  The connection was secured with a 2 0 silk suture  A bactosea catheter was then advanced into the ipsilateral ventricle and clear cerebral spinal fluid was collected and sent to the laboratory as specimen  The catheter was then trimmed and connected to the valve proximally  The available bracket was placed circumferentially around the ventricular catheter as it entered through the bur hole  Attention was then placed on the cervical region where the bactosea catheter was trimmed and connected to the intra connector which was attached to the cardiac catheter    Each connection was secured with a 2 0 silk suture and these were placed circumferentially around the facial vein  The sutures were tied so as to occlude the facial vein and to secure the inter connector at the entrance into the jugular vein  Copious quantities of irrigation were used to cleanse out the region  The platysma was approximated with interrupted 3 0 Vicryl suture  The skin was closed with interrupted inverted 3 0 Vicryl suture  Cyanoacrylate was used to approximate the skin  The coronal incision was closed with interrupted inverted 3 0 Vicryl suture and the epidermis was approximated with a running 4 0 Monocryl  Cyanoacrylate was used to approximate the skin       I was present for the entire procedure    Patient Disposition:  PACU     SIGNATURE: Clover Herrera MD  DATE: August 24, 2020  TIME: 10:33 AM

## 2020-08-24 NOTE — PROGRESS NOTES
Patient seen and examined    Plan to place a new  shunt    Consent from brother Addison Ortiz    The risks, benefits and complications of surgery were explained in detail to Michael Whittington and his brother  including hemorrhage, infection, CSF leakage, wound problems, pain, weakness, numbness, dysesthesiae, paralysis, coma, and death  Also, the possibility  of further surgery being required was emphasized  Other potential medical complications were outlined, including deep venous thrombosis, pulmonary embolism, pneumonia, urinary tract infection, myocardial infarction,  and stroke  The need for physical therapy, occupational therapy, and rehabilitation was also mentioned  The alternatives to surgery were discussed  Michael Whittington and his brother asked relevant questions and asked that we proceed with arrangements for surgery

## 2020-08-24 NOTE — H&P
H&P Exam - Critical Care   Lexi Portillo 69 y o  male MRN: [id number]  Unit/Bed#: ICU 13 Encounter: [id number]      -------------------------------------------------------------------------------------------------------------  Chief Complaint: S/P  Shunt replacement due to  shunt infection    History of Present Illness     Lexi Portillo is a 69 y o  male who presents with S/P [facility] shunt placement  Patient has a history of obesity with ZHOU on CPAP, HTN, Gout, [id number], Normal Pressure Hydrocephalus with  shunt placement in 2005 with replacement in July 2020 and S/P L5-S1 spine surgery (07/06/2020) who originally presented to [facility] on 07/31 due to acute superficial gastritis with hemorrhage  CT imaging showed ischemic bowel  Patient subsequently had exploratory laparotomy, arteriogram of SMA, exploration of SMA and angiogram of SMA  Same day patient had  shunt externalized secondary to laparotomy for bowel ischemia and peritonitis  On 08/01, patient had repeat exploratory laparotomy and peritoneal fluid culture was positive for Pseudomonas  Surveillance aspiration of CSF from externalized showed growth of Pseudomonas as well  Patient was started on cefepime and on 08/07 patient's  shunt was removed due to development of severe sepsis and CNS infection  Patient was later transitioned to Meropenem and is currently day #13 of treatment  On 08/24 patient had Ventriculoatrial shunt placed and transferred to ICU for post-of monitoring  History obtained from chart review    -------------------------------------------------------------------------------------------------------------  Assessment and Plan:    Neuro:    Diagnosis: Infection of  shunt  o Hx of Normal pressure hydrocephalus with  shunt placed in 2005 and replacement in July 2020  o Patient had externalization of  shunt and subsequent removal on 08/07 due to growth of pseudomonas on CSF cultures  o ID following, appreciate recommendation  o Plan: Continue Meropenem 2g q 8hrs, Day #13 with total Day #24 of antibiotics   o Follow up repeat CSF culture and gram stain   Diagnosis: Normal Pressure Hydrocephalus   o Post-op Day #0 S/P VA shunt placement  o Previously had  shunt placed in 2005  o Plan: continue to monitor neuro status with q2 neuro checks  o Order Shunt Series  o Repeat CT head in the AM  o Portable CXR    Diagnosis: Metabolic Encephalopathy  o Multifactorial, Secondary to severe sepsis 2/2  shunt infection with meningitis and peritonitis requiring multiple surgical procedures  o Currently A&O to person only  o S/P VA shunt placement 08/24  o Plan: continue neuro q2 neuro checks  o Continue Meropenem    Delirium precautions: CAM-ICU daily, frequent reorientation, regulate sleep wake cycle      CV:    Diagnosis: Hx of Hx  o Plan: Continue PTA Enalapril 5mg q daily and hold for SBP <110      Pulm:   Diagnosis: ZHOU on CPAP  o Plan: discontinued due to patient's refusal  o Continue to monitor oxygen saturation to maintain SpO2>92%      GI:    Diagnosis: Nonocclusive Mesenteric Ischemia  o S/P Ex lap, arteriogram of SMA, exploration of SMA and angiogram SMA on 07/31/20 and repeat Ex Lap 08/01/2020  o Lenor Mort removed on 08/18  o General Surgery has signed off      :    Diagnosis: No acute issues   UOP: 65ml last 24 hours    Cr: yesterday at 0 51, BUN at 17  o Plan: Continue to monitor BUN/Cr and UOP      F/E/N:    Fluids: Lactated Ringers 50ml/hr    Electrolytes: continue to monitor and replete to maintain K>4, Phos>3, Mg >2   Nutrition: Regular house diet; aspiration precautions         Heme/Onc:    Diagnosis: No acute issues  o Plan: Monitor Hgb and replete if drop below 7      Endo:    Diagnosis: Hx of Type 2 DM  o Plan: Continue SSI Algorithm #4  o Maintain BS between 140-180      ID:    Diagnosis: Severe Sepsis secondary to  shunt infection with meningitis and peritonitis  o Plan: Continue Meropenem 2g q8hrs  o Follow up with repeat CSF studies  o Continue to monitor WBC and fever curve      MSK/Skin:    Diagnosis: Lumbar stenosis  o S/P minimally invasive lumbar interbody fusion of L5/S1 on 07/06/20  o Plan: Tylenol PRN for moderate pain, Jessica 5 for moderate pain   Frequent repositioning and off pressure loading to prevent skin breakdown    Disposition: Continue Critical Care   Code Status: Level 1 - Full Code  --------------------------------------------------------------------------------------------------------------  Review of Systems    A 12-point, complete review of systems was reviewed and negative except as stated above     Physical Exam  Vitals signs reviewed  Constitutional:       Appearance: He is well-developed and well-nourished  Eyes:      General: Visual field deficit present  Pupils: Pupils are equal, round, and reactive to light  Comments: Exhibits left visual field deficit   Neck:      Comments: Sutures are closed and no discharge along left side on neck  Cardiovascular:      Rate and Rhythm: Normal rate and regular rhythm  Pulmonary:      Effort: Pulmonary effort is normal  No respiratory distress  Breath sounds: Normal breath sounds  Abdominal:      General: Bowel sounds are normal       Palpations: Abdomen is soft  Musculoskeletal:         General: No edema  Neurological:      Mental Status: He is lethargic  GCS: GCS eye subscore is 3  GCS verbal subscore is 4  GCS motor subscore is 6        Comments: Oriented to person only  Left visual field deficit  Able to wiggle toes and fingers  Motor strength 2/5 upper extremity bilaterally       --------------------------------------------------------------------------------------------------------------  Vitals:   Vitals:    08/24/20 1215 08/24/20 1230 08/24/20 1300 08/24/20 1321   BP: 122/60 121/65 122/56 115/51   BP Location:    Right arm   Pulse: (!) 52 60 66 62   Resp: 19 20 21 20   Temp:   (!) 97 2 °F (36 2 °C) 98 3 °F (36 8 °C)   TempSrc:    Axillary   SpO2: 100% 100% 98%    Weight:       Height:         Temp  Min: 96 3 °F (35 7 °C)  Max: 102 8 °F (39 3 °C)  IBW: 84 5 kg  Height: 6' 3" (190 5 cm)  Body mass index is 30 17 kg/m²  Laboratory and Diagnostics:  Results from last 7 days   Lab Units 08/23/20  1234 08/21/20  0518 08/19/20  0449   WBC Thousand/uL 8 93 11 66* 8 10   HEMOGLOBIN g/dL 11 8* 13 3 12 2   HEMATOCRIT % 37 8 43 3 38 8   PLATELETS Thousands/uL 193 241 234   NEUTROS PCT % 77*  --  76*   MONOS PCT % 8  --  9     Results from last 7 days   Lab Units 08/23/20  1234 08/21/20  0518 08/19/20  0449   SODIUM mmol/L 142 137 139   POTASSIUM mmol/L 3 8 4 4 4 3   CHLORIDE mmol/L 104 102 103   CO2 mmol/L 31 32 31   ANION GAP mmol/L 7 3* 5   BUN mg/dL 17 15 17   CREATININE mg/dL 0 51* 0 71 0 73   CALCIUM mg/dL 7 8* 8 7 8 4   GLUCOSE RANDOM mg/dL 92 111 112   ALT U/L 21 20 21   AST U/L 13 16 16   ALK PHOS U/L 86 106 94   ALBUMIN g/dL 2 3* 2 5* 2 2*   TOTAL BILIRUBIN mg/dL 0 43 0 55 0 36          Results from last 7 days   Lab Units 08/24/20  0530 08/23/20  1234   INR  1 05 1 37*   PTT seconds  --  36              ABG:    VBG:          Micro:        EKG:   Imaging:   XR chest 1 view   Final Result by Keith Moreno DO (08/24 1306)      Fluoroscopic guidance provided for surgical procedure  Please refer to the separate procedure notes for additional details  Workstation performed: [id number]         CT head wo contrast   Final Result by Andry Drake DO (08/20 1605)      Stable ventricular enlargement post shunt removal       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  Workstation performed: [id number]         XR spine lumbar 2 or 3 views injury   Final Result by Lorena Rubio DO (08/19 2009)      Stable postoperative alignment           Workstation performed: [id number]         FL barium swallow video w speech   Final Result by Stephanie Cook DOCUMENTATION (08/12 1138)      FL lumbar puncture diagnostic   Final Result by Fernanda Spivey MD (08/11 2121)      Successful fluoroscopically guided lumbar puncture with an opening pressure of 15 cm H2O  Approximately 10 mL's of clear colorless CSF was removed and sent to lab for requested analysis  Closing pressure was 5 cm H2O  PERFORMED BY: Dr Katlin Guzmán and Krista Riddle PA-C      DICTATED AND SIGNED BY: Krista Riddle PA-C         Workstation performed: [id number]         CT abdomen pelvis w contrast   Final Result by Lelo Camacho MD (08/11 1448)      No intra-abdominal fluid collection   No bowel obstruction            Workstation performed: [id number]         XR chest portable   Final Result by Linda Novak MD (08/10 1400)   Left PICC line tip projects over the cavoatrial junction satisfactory position without pneumothorax  No acute cardiopulmonary disease  Workstation performed: [id number]         CT head wo contrast   Final Result by Len Marie MD (08/09 1530)      1  Interval removal of right frontal approach ventricular shunt catheter  Grossly stable size and configuration of prominent ventricular system  2   Stable encephalomalacia in the right superior frontal gyrus and left occipitotemporal region  3   Periapical lucency of retained root of right maxillary tooth  Workstation performed: [id number]         CT head wo contrast   Final Result by Burt Wilson DO (08/05 1334)      Continued mild enlargement of the ventricular system which continues to gradually enlarge compared to prior studies dated 7/21/2020 and 7/31/2020  Stable right frontal and left occipital encephalomalacia consistent with old infarcts  The study was marked in [facility] for immediate notification  Workstation performed: [id number]         XR chest portable   Final Result by Roscoe Luna MD (07/31 1524)         1    Interval intubation with coiling of the nasogastric tube in the midesophagus  Repositioning of the the nasogastric tube advised  2   Endotracheal tube noted with the tip well above the tye  3   Scattered strandy densities likely related to diminished degree of inspiration  Workstation performed: [id number]         IR other   Final Result by Mi Bear (08/06 9074)      CT head without contrast   ED Interpretation by Jared Hussein MD (07/31 0141)   CT read by radiologist; will discuss with medicine  Final Result by Dejuan Vergara DO (07/31 0127)      Minimal increase in size of ventricular system      The study was marked in EPIC for immediate notification  Workstation performed: [id number]         PE Study with CT Abdomen and Pelvis with contrast   Final Result by Dejuan Vergara DO (07/31 0150)      Fluid-filled loops of small bowel with pneumatosis and bowel wall thickening  Findings are suspicious for ischemic bowel  Nodular airspace opacities within the right upper lobe  Findings may represent infection               I personally discussed this study with Suha Art on 7/31/2020 at 1:46 AM                               Workstation performed: [id number]         CT head wo contrast    (Results Pending)   XR shunt series    (Results Pending)   XR chest portable    (Results Pending)         Historical Information   Past Medical History:   Diagnosis Date    Cancer [facility]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     Diabetes mellitus [facility] [address] )     DM2 (diabetes mellitus, type 2) ([facility] [address] )     Gout     HTN (hypertension)     Hydrocephalus ([facility] [address] )     Hypertension     Neuropathy     ZHOU on CPAP     Pressure injury of skin     TIA (transient ischemic attack)      Past Surgical History:   Procedure Laterality Date    ABDOMINAL WALL SURGERY N/A 8/1/2020    Procedure: CLOSURE WOUND ABDOMINAL/TRUNK;  Surgeon: Eve Carias DO;  [facility];  Service: General    ARTERIOGRAM N/A 7/31/2020 Procedure: ARTERIOGRAM Of SMA and placement of Papavarine onfusion arterial catheter;  Surgeon: Michele Arteaga MD;  [facility];  Service: Vascular    ARTERIOGRAM Left 8/1/2020    Procedure: ARTERIOGRAM; cath removal;  Surgeon: Michele Arteaga MD;  [facility];  Service: Vascular    CSF SHUNT      x3 Op Reports, Dr Blossom Licona in [facility] chart viewer    FL LUMBAR PUNCTURE DIAGNOSTIC  8/11/2020    LAPAROTOMY N/A 7/31/2020    Procedure: LAPAROTOMY EXPLORATORY: Externalizes  shunt Sherrel Catena application;  Surgeon: Kylee Light MD;  [facility];  Service: General    LAPAROTOMY N/A 8/1/2020    Procedure: LAPAROTOMY EXPLORATORY,;  Surgeon: Gisele Armenta DO;  [facility];  Service: General    CO AMPUTATION FOOT,TRANSMETATARSAL Left 5/30/2020    Procedure: excision 5th metatarsal head   excision 5th metatarsal ulcer ;  Surgeon: Rocio Dugan DPM;  [facility];  Service: Podiatry    CO ARTHDSIS POST/POSTEROLATRL/POSTINTERBODY LUMBAR N/A 7/6/2020    Procedure: MINIMALLY INVASIVE TRANSVERSE LUMBAR INTERBODY FUSION L5-S1 FROM LEFT-SIDED APPROACH;  Surgeon: Josef Hairston MD;  [facility];  Service: Neurosurgery    CO [address] CSF SHUNT,W/O REPLACE Right 8/7/2020    Procedure: REMOVAL SHUNT VENTRICULAR PERITONEAL;  Surgeon: Josef Hairston MD;  [facility];  Service: Neurosurgery    CO REPLACEMENT/REVISION,CSF SHUNT Right 7/6/2020    Procedure: REVISION OF SCALP OVER VENTRICULAR CATHETER IN THE RIGHT [facility];  Surgeon: Josef Hairston MD;  [facility];  Service: Neurosurgery    WOUND DEBRIDEMENT N/A 8/1/2020    Procedure: abd washout;  Surgeon: Gisele Armenta DO;  [facility];  Service: General     Social History   Social History     Substance and Sexual Activity   Alcohol Use Not Currently    Frequency: Monthly or less    Drinks per session: 1 or 2     Social History     Substance and Sexual Activity   Drug Use Never     Social History     Tobacco Use   Smoking Status Never Smoker   Smokeless Tobacco Never Used     Exercise History: unknown  Family History:   Family History   Problem Relation Age of Onset    Polymyositis Mother     Heart failure Father      I have reviewed this patient's family history and commented on sigificant items within the HPI      Medications:  Current Facility-Administered Medications   Medication Dose Route Frequency    acetaminophen (TYLENOL) rectal suppository 325 mg  325 mg Rectal Q6H PRN    acetaminophen (TYLENOL) tablet 650 mg  650 mg Oral Q6H PRN    diphenhydrAMINE (BENADRYL) injection 25 mg  25 mg Intravenous Q6H PRN    docusate sodium (COLACE) capsule 100 mg  100 mg Oral BID    hydrocortisone 1 % cream   Topical 4x Daily PRN    HYDROmorphone (DILAUDID) injection 0 2 mg  0 2 mg Intravenous Q4H PRN    insulin lispro (HumaLOG) 100 units/mL subcutaneous injection 2-12 Units  2-12 Units Subcutaneous 4x Daily (AC & HS)    iodixanol (VISIPAQUE) 320 MG/ML injection 15 mL  15 mL Intravenous Once in imaging    lactated ringers infusion  50 mL/hr Intravenous Continuous    meropenem (MERREM) 2,000 mg in sodium chloride 0 9 % 100 mL IVPB  2,000 mg Intravenous Q8H    moisture barrier miconazole 2% cream (aka VERONICA MOISTURE BARRIER ANTIFUNGAL CREAM)   Topical BID    ondansetron (ZOFRAN) injection 4 mg  4 mg Intravenous Q6H PRN    oxyCODONE (ROXICODONE) IR tablet 5 mg  5 mg Oral Q4H PRN    senna (SENOKOT) tablet 8 6 mg  1 tablet Oral Daily    sodium chloride 0 9 % infusion  75 mL/hr Intravenous Continuous    sodium chloride 0 9 % infusion  125 mL/hr Intravenous Continuous    sodium chloride 0 9 % infusion  75 mL/hr Intravenous Continuous     Home medications:  Prior to Admission Medications   Prescriptions Last Dose Informant Patient Reported? Taking?    Insulin Glargine (LANTUS SOLOSTAR SC)   Yes Yes   Sig: Inject under the skin 10 units in morning and 12 units at bedtime   Lancets ([address]) MISC   Yes No   Si (two) times a day   ONE TOUCH ULTRA TEST test strip   Yes No   Sig: daily   allopurinol (ZYLOPRIM) 300 mg tablet   Yes No   Si mg daily   aspirin 325 mg tablet   No No   Sig: Take 1 tablet (325 mg total) by mouth daily   enalapril (VASOTEC) 5 mg tablet   No No   Sig: Take 1 tablet (5 mg total) by mouth daily   methocarbamol (ROBAXIN) 500 mg tablet   No No   Sig: Take 1 tablet (500 mg total) by mouth 2 (two) times a day as needed for muscle spasms (back pain)   multivitamin (THERAGRAN) TABS   Yes No   Sig: Take 1 tablet by mouth daily   travoprost (Travatan Z) 0 004 % ophthalmic solution   Yes No   Sig: Administer 1 drop to the right eye daily at bedtime       Facility-Administered Medications: None     Allergies: Allergies   Allergen Reactions    Pollen Extract Allergic Rhinitis     ------------------------------------------------------------------------------------------------------------  Advance Directive and Living Will:      Power of :    POLST:    ------------------------------------------------------------------------------------------------------------  Anticipated Length of Stay is > 2 midnights    Care Time Delivered:   No Critical Care time spent       Merrell Kayser, DO        Portions of the record may have been created with voice recognition software  Occasional wrong word or "sound a like" substitutions may have occurred due to the inherent limitations of voice recognition software    Read the chart carefully and recognize, using context, where substitutions have occurred

## 2020-08-24 NOTE — PROGRESS NOTES
I have personally seen and examined patient and reviewed all data with resident  Agree with note, assessment and plan  Critical care time 0 minutes   Please refer to attending comments below  Critical care time does not include procedures, family meeting or teaching  Please refer to Critical Care resident note for full details on laboratory data, medications and review of systems     shunt infection status post ex lap for ischemic bowel  Patient had  shunt externalized with replacement of VA shunt 08/24/2020  NPH  Nonocclusive mesenteric ischemia status post ex lap and arteriogram 07/31/2020 and 08/01/2020  Hypertension-continue enalapril maintain systolic blood pressure less than 160  ZHOU-continue CPAP HS  Diabetes mellitus type 2-insulin for glycemic control  Gout      PE:  The patient is awake and alert, follows commands x4 extremities, generalized weakness    Patient present s/p VAS for hydrocephalus  Recent externalization s/p peritonitis with VPS cx positive pseudomonas  Neuro checks q2 h days q 4 hs  Shunt series and CT head in am  Check cxr post op      CPAP ZHOU    Continue meropenem as per ID    Chest x-ray without pneumothorax

## 2020-08-24 NOTE — ANESTHESIA POSTPROCEDURE EVALUATION
Post-Op Assessment Note    CV Status:  Stable    Pain management: adequate     Mental Status:  Alert and awake   Hydration Status:  Euvolemic   PONV Controlled:  Controlled   Airway Patency:  Patent      Post Op Vitals Reviewed: Yes      Staff: Anesthesiologist, CRNA         No complications documented      /63 (08/24/20 1034)    Temp (!) 97 °F (36 1 °C) (08/24/20 1034)    Pulse 61 (08/24/20 1034)   Resp 16 (08/24/20 1034)    SpO2 96 % (08/24/20 1034)

## 2020-08-24 NOTE — ANESTHESIA POSTPROCEDURE EVALUATION
Post-Op Assessment Note    No complications documented      /63 (08/24/20 1034)    Temp (!) 97 °F (36 1 °C) (08/24/20 1034)    Pulse 61 (08/24/20 1034)   Resp 16 (08/24/20 1034)    SpO2 96 % (08/24/20 1034)

## 2020-08-24 NOTE — CONSULTS
PHYSICAL MEDICINE AND REHABILITATION CONSULT NOTE  Lexi Portillo 69 y o  male MRN: [id number]  Unit/Bed#: ICU 13 Encounter: [id number]    Requested by (Physician/Service): Paula Tirado MD  Reason for Consultation:  Assessment of rehabilitation needs    Assessment:  Rehabilitation Diagnosis:    Critical illness myopathy     shunt infection s/p replacement    Severe sepsis    Mesenteric ischemia     Recommendations:  Rehabilitation Plan:   Continue PT/OT while on acute care  · The patient is currently s/p  shunt replacement and is lethargic; however is able to follow commands  He may required a slower paced longer length of inpatient rehabilitation at sub acute level once medically stable  Can consider more aggressive rehabilitation at acute inpatient rehab if lethargy and ability to tolerate 3 hours a day of therapy improves over his hospital course  Medical Co-morbidities Plan:  · Severe sepsis due to  shunt infection with meningitis and peritonitis   · NPH s/p  shunt replacement   · Non-occlusive mesenteric ischemia s/p exploration and laparotomy   · Metabolic encephalopathy  · Dysphagia   · ZHOU  · Diabetes type 2  · Lumbar stenosis s/p recent L5-S1 fusion   · HTN  · Drug rash  · Multiple wounds  · DVT ppx:  Heparin and SCDs    Thank you for this consultation  Do not hesitate to contact service with further questions  AR Scott  PM&R    History of Present Illness:  Lexi Portillo is a 69 y o  male with a PMH of hydrocephalus requiring  shunt placement 2005 with revision in 7/2019, type 2 diabetes, neuropathy, bilateral TKAs, chronic lft foot ulceration, lumbar stenosis s/p recent L5-S1 fusion and admission to [facility] who presented to the [address] on 7/31/20 with worsening abdominal pain and bloody emesis  CT of the abdomen was concerning for pneumatosis    He was taken emergently to the OR for exploration, laparotomy, externalization of  shunt, open abdomen with VAC placement  Intraoperatively he was found to have largely dilated and ischemic appearing bowel from ligament of Treitz to mid ileum  He underwent mesenteric arteriogram with catheter at SMA running paraverine  CT of the head showed mild increase in size of the ventricular system  He was treated for  severe sepsis due to  shunt infection with meningitis and peritonitis and was placed on IV cefepime/flagyl by ID  CSF cultures grew pseudomonas and  shunt was removed on 8/7/20  A new  shunt was placed by neurosurgery using ventriculo-atrial approach on 8/24/20  IV cefepime was d/c due to drug rash  Anti-biotics were changed to Meropenem  The patient was seen in the ICU s/p shunt replacement  He is lethargic however opens eyes easily to voice and follows simple commands  PM&R are consulted for rehabilitation recommendations  Review of Systems: 10 point ROS negative except for what is noted in HPI    Function:  Prior level of function and living situation:  The patient lives with a friend who works during the day  They live in a 2 story home with 3 SOBIA  He was independent for ADLs  Current level of function:  Physical Therapy: Maximal assist for bed mobility   Occupational Therapy:  Maximal assist for ADLs        Physical Exam:  /51 (BP Location: Right arm)   Pulse 62   Temp 98 3 °F (36 8 °C) (Axillary)   Resp 20   Ht 6' 3" (1 905 m)   Wt 110 kg (241 lb 6 5 oz)   SpO2 98%   BMI 30 17 kg/m²        Intake/Output Summary (Last 24 hours) at 8/24/2020 1334  Last data filed at 8/24/2020 1105  Gross per 24 hour   Intake 2530 ml   Output 627 ml   Net 1903 ml       Body mass index is 30 17 kg/m²  Physical Exam  Constitutional:       Appearance: He is well-developed and well-nourished  HENT:        Comments: SP shunt incisions Cardiovascular:      Pulses: Intact distal pulses     Pulmonary:      Effort: Pulmonary effort is normal    Abdominal: Palpations: Abdomen is soft  Musculoskeletal:      Comments: Generally weak, able to follow simple commands    Skin:     Coloration: Skin is pale  Neurological:      Mental Status: He is easily aroused  He is lethargic  Comments:  Following simple commands         Social History:    Social History     Socioeconomic History    Marital status:      Spouse name: None    Number of children: None    Years of education: None    Highest education level: None   Occupational History    None   Social Needs    Financial resource strain: None    Food insecurity     Worry: None     Inability: None    Transportation needs     Medical: None     Non-medical: None   Tobacco Use    Smoking status: Never Smoker    Smokeless tobacco: Never Used   Substance and Sexual Activity    Alcohol use: Not Currently     Frequency: Monthly or less     Drinks per session: 1 or 2    Drug use: Never    Sexual activity: None   Lifestyle    Physical activity     Days per week: None     Minutes per session: None    Stress: None   Relationships    Social connections     Talks on phone: None     Gets together: None     Attends Restorationism service: None     Active member of club or organization: None     Attends meetings of clubs or organizations: None     Relationship status: None    Intimate partner violence     Fear of current or ex partner: None     Emotionally abused: None     Physically abused: None     Forced sexual activity: None   Other Topics Concern    None   Social History Narrative    None        Family History:    Family History   Problem Relation Age of Onset    Polymyositis Mother     Heart failure Father          Medications:     Current Facility-Administered Medications:     [MAR Hold] acetaminophen (TYLENOL) rectal suppository 325 mg, 325 mg, Rectal, Q6H PRN, AR Valenzuela, 325 mg at 08/10/20 0115    [MAR Hold] acetaminophen (TYLENOL) tablet 650 mg, 650 mg, Oral, Q6H PRN, AR Valenzuela, 650 mg at 08/19/20 2219    [MAR Hold] diphenhydrAMINE (BENADRYL) injection 25 mg, 25 mg, Intravenous, Q6H PRN, AR Valenzuela, 25 mg at 08/13/20 2320    [MAR Hold] hydrocortisone 1 % cream, , Topical, 4x Daily PRN, AR Valenzuela    [MAR Hold] insulin lispro (HumaLOG) 100 units/mL subcutaneous injection 2-12 Units, 2-12 Units, Subcutaneous, 4x Daily (AC & HS), 2 Units at 08/23/20 2144 **AND** Fingerstick Glucose (POCT), , , 4x Daily AC and at bedtime, AR Valenzuela    [MAR Hold] iodixanol (VISIPAQUE) 320 MG/ML injection 15 mL, 15 mL, Intravenous, Once in imaging, AR Valenzuela    lactated ringers infusion, 50 mL/hr, Intravenous, Continuous, Davide Sadler, CRNA, Stopped at 08/24/20 1104    [MAR Hold] meropenem (MERREM) 2,000 mg in sodium chloride 0 9 % 100 mL IVPB, 2,000 mg, Intravenous, Q8H, Christina Hancock MD, Last Rate: 100 mL/hr at 08/24/20 0248, 2,000 mg at 08/24/20 0248    [MAR Hold] moisture barrier miconazole 2% cream (aka VERONICA MOISTURE BARRIER ANTIFUNGAL CREAM), , Topical, BID, Alexandre Woodruff PA-C    [facility] Hold] oxyCODONE (ROXICODONE) IR tablet 5 mg, 5 mg, Oral, Q4H PRN, AR Valenzuela, 5 mg at 08/18/20 0349    sodium chloride 0 9 % infusion, 75 mL/hr, Intravenous, Continuous, AR Oconnor, Last Rate: 75 mL/hr at 08/24/20 1105, 75 mL/hr at 08/24/20 1105    sodium chloride 0 9 % infusion, 125 mL/hr, Intravenous, Continuous, Milton Parnell PA-C    Past Medical History:     Past Medical History:   Diagnosis Date    Cancer [facility]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     Diabetes mellitus ([address] )     DM2 (diabetes mellitus, type 2) ([place] [address] )     Gout     HTN (hypertension)     Hydrocephalus ([place] [address] )     Hypertension     Neuropathy     ZHOU on CPAP     Pressure injury of skin     TIA (transient ischemic attack)         Past Surgical History:     Past Surgical History:   Procedure Laterality Date    ABDOMINAL WALL SURGERY N/A 8/1/2020    Procedure: CLOSURE WOUND ABDOMINAL/TRUNK;  Surgeon: Rosanne Castillo DO;  [facility];  Service: General    ARTERIOGRAM N/A 7/31/2020    Procedure: ARTERIOGRAM Of SMA and placement of Papavarine onfusion arterial catheter;  Surgeon: Josey Charles MD;  [facility];  Service: Vascular    ARTERIOGRAM Left 8/1/2020    Procedure: ARTERIOGRAM; cath removal;  Surgeon: Josey Charles MD;  [facility];  Service: Vascular    CSF SHUNT      x3 Op Reports, Dr Lisa Wright in MPF chart viewer    FL LUMBAR PUNCTURE DIAGNOSTIC  8/11/2020    LAPAROTOMY N/A 7/31/2020    Procedure: LAPAROTOMY EXPLORATORY: Externalizes  shunt Jhon Nicholas application;  Surgeon: Juan Mcguire MD;  [facility];  Service: General    LAPAROTOMY N/A 8/1/2020    Procedure: LAPAROTOMY EXPLORATORY,;  Surgeon: Rosanne Castillo DO;  [facility];  Service: General    AL AMPUTATION FOOT,TRANSMETATARSAL Left 5/30/2020    Procedure: excision 5th metatarsal head  excision 5th metatarsal ulcer ;  Surgeon: Alex Mcguire DPM;  [facility];  Service: Podiatry    AL ARTHDSIS POST/POSTEROLATRL/POSTINTERBODY LUMBAR N/A 7/6/2020    Procedure: MINIMALLY INVASIVE TRANSVERSE LUMBAR INTERBODY FUSION L5-S1 FROM LEFT-SIDED APPROACH;  Surgeon: John Arrington MD;  [facility];  Service: Neurosurgery    AL Mark  CSF SHUNT,W/O REPLACE Right 8/7/2020    Procedure: REMOVAL SHUNT VENTRICULAR PERITONEAL;  Surgeon: John Arrington MD;  [facility];  Service: Neurosurgery    AL REPLACEMENT/REVISION,CSF SHUNT Right 7/6/2020    Procedure: REVISION OF SCALP OVER VENTRICULAR CATHETER IN THE RIGHT CORONAL REGION;  Surgeon: John Arrington MD;  [facility];  Service: Neurosurgery    WOUND DEBRIDEMENT N/A 8/1/2020    Procedure: abd washout;  Surgeon: Rosanne Castillo DO;  [facility];  Service: General         Allergies:      Allergies   Allergen Reactions    Pollen Extract Allergic Rhinitis           LABORATORY RESULTS:      Lab Results   Component Value Date    HGB 11 8 (L) 08/23/2020    HCT 37 8 08/23/2020    WBC 8 93 08/23/2020     Lab Results   Component Value Date    BUN 17 08/23/2020    K 3 8 08/23/2020     08/23/2020    GLUCOSE 132 07/31/2020    CREATININE 0 51 (L) 08/23/2020     Lab Results   Component Value Date    PROTIME 13 7 08/24/2020    INR 1 05 08/24/2020        DIAGNOSTIC STUDIES: Reviewed  Xr Chest Portable    Result Date: 7/31/2020  Impression: 1  Interval intubation with coiling of the nasogastric tube in the midesophagus  Repositioning of the the nasogastric tube advised  2   Endotracheal tube noted with the tip well above the tye  3   Scattered strandy densities likely related to diminished degree of inspiration  Workstation performed: [id number]     Ct Head Without Contrast    Result Date: 7/31/2020  Impression: Minimal increase in size of ventricular system The study was marked in EPIC for immediate notification  Workstation performed: [id number]     Pe Study With Ct Abdomen And Pelvis With Contrast    Result Date: 7/31/2020  Impression: Fluid-filled loops of small bowel with pneumatosis and bowel wall thickening  Findings are suspicious for ischemic bowel  Nodular airspace opacities within the right upper lobe  Findings may represent infection    I personally discussed this study with Cristy Duenas on 7/31/2020 at 1:46 AM  Workstation performed: [id number]

## 2020-08-25 ENCOUNTER — APPOINTMENT (INPATIENT)
Dept: RADIOLOGY | Facility: HOSPITAL | Age: 70
DRG: 031 | End: 2020-08-25
Payer: COMMERCIAL

## 2020-08-25 LAB
ANION GAP SERPL CALCULATED.3IONS-SCNC: 6 MMOL/L (ref 4–13)
APTT PPP: 41 SECONDS (ref 23–37)
BUN SERPL-MCNC: 13 MG/DL (ref 5–25)
CALCIUM SERPL-MCNC: 8.3 MG/DL (ref 8.3–10.1)
CHLORIDE SERPL-SCNC: 107 MMOL/L (ref 100–108)
CO2 SERPL-SCNC: 28 MMOL/L (ref 21–32)
CREAT SERPL-MCNC: 0.56 MG/DL (ref 0.6–1.3)
ERYTHROCYTE [DISTWIDTH] IN BLOOD BY AUTOMATED COUNT: 14.7 % (ref 11.6–15.1)
GFR SERPL CREATININE-BSD FRML MDRD: 104 ML/MIN/1.73SQ M
GLUCOSE SERPL-MCNC: 100 MG/DL (ref 65–140)
GLUCOSE SERPL-MCNC: 101 MG/DL (ref 65–140)
GLUCOSE SERPL-MCNC: 103 MG/DL (ref 65–140)
GLUCOSE SERPL-MCNC: 129 MG/DL (ref 65–140)
HCT VFR BLD AUTO: 38.3 % (ref 36.5–49.3)
HGB BLD-MCNC: 12.1 G/DL (ref 12–17)
INR PPP: 1.13 (ref 0.84–1.19)
MCH RBC QN AUTO: 27.1 PG (ref 26.8–34.3)
MCHC RBC AUTO-ENTMCNC: 31.6 G/DL (ref 31.4–37.4)
MCV RBC AUTO: 86 FL (ref 82–98)
PLATELET # BLD AUTO: 189 THOUSANDS/UL (ref 149–390)
PMV BLD AUTO: 9.8 FL (ref 8.9–12.7)
POTASSIUM SERPL-SCNC: 4.1 MMOL/L (ref 3.5–5.3)
PROTHROMBIN TIME: 14.5 SECONDS (ref 11.6–14.5)
RBC # BLD AUTO: 4.46 MILLION/UL (ref 3.88–5.62)
SODIUM SERPL-SCNC: 141 MMOL/L (ref 136–145)
WBC # BLD AUTO: 10.71 THOUSAND/UL (ref 4.31–10.16)

## 2020-08-25 PROCEDURE — G1004 CDSM NDSC: HCPCS

## 2020-08-25 PROCEDURE — 99024 POSTOP FOLLOW-UP VISIT: CPT | Performed by: NURSE PRACTITIONER

## 2020-08-25 PROCEDURE — 99233 SBSQ HOSP IP/OBS HIGH 50: CPT | Performed by: EMERGENCY MEDICINE

## 2020-08-25 PROCEDURE — 97164 PT RE-EVAL EST PLAN CARE: CPT

## 2020-08-25 PROCEDURE — 97168 OT RE-EVAL EST PLAN CARE: CPT

## 2020-08-25 PROCEDURE — 85730 THROMBOPLASTIN TIME PARTIAL: CPT | Performed by: PHYSICIAN ASSISTANT

## 2020-08-25 PROCEDURE — 80048 BASIC METABOLIC PNL TOTAL CA: CPT | Performed by: PHYSICIAN ASSISTANT

## 2020-08-25 PROCEDURE — 85610 PROTHROMBIN TIME: CPT | Performed by: PHYSICIAN ASSISTANT

## 2020-08-25 PROCEDURE — 99233 SBSQ HOSP IP/OBS HIGH 50: CPT | Performed by: INTERNAL MEDICINE

## 2020-08-25 PROCEDURE — 70450 CT HEAD/BRAIN W/O DYE: CPT

## 2020-08-25 PROCEDURE — 82948 REAGENT STRIP/BLOOD GLUCOSE: CPT

## 2020-08-25 PROCEDURE — 97535 SELF CARE MNGMENT TRAINING: CPT

## 2020-08-25 PROCEDURE — 85027 COMPLETE CBC AUTOMATED: CPT | Performed by: PHYSICIAN ASSISTANT

## 2020-08-25 PROCEDURE — 92526 ORAL FUNCTION THERAPY: CPT

## 2020-08-25 RX ORDER — LISINOPRIL 10 MG/1
10 TABLET ORAL DAILY
Status: DISCONTINUED | OUTPATIENT
Start: 2020-08-25 | End: 2020-08-29 | Stop reason: HOSPADM

## 2020-08-25 RX ORDER — LANOLIN ALCOHOL/MO/W.PET/CERES
3 CREAM (GRAM) TOPICAL
Status: DISCONTINUED | OUTPATIENT
Start: 2020-08-25 | End: 2020-08-29 | Stop reason: HOSPADM

## 2020-08-25 RX ORDER — HEPARIN SODIUM 5000 [USP'U]/ML
5000 INJECTION, SOLUTION INTRAVENOUS; SUBCUTANEOUS EVERY 8 HOURS SCHEDULED
Status: DISCONTINUED | OUTPATIENT
Start: 2020-08-25 | End: 2020-08-29 | Stop reason: HOSPADM

## 2020-08-25 RX ADMIN — MELATONIN 3 MG: at 23:21

## 2020-08-25 RX ADMIN — HEPARIN SODIUM 5000 UNITS: 5000 INJECTION INTRAVENOUS; SUBCUTANEOUS at 23:22

## 2020-08-25 RX ADMIN — MEROPENEM 2000 MG: 1 INJECTION, POWDER, FOR SOLUTION INTRAVENOUS at 10:21

## 2020-08-25 RX ADMIN — HEPARIN SODIUM 5000 UNITS: 5000 INJECTION INTRAVENOUS; SUBCUTANEOUS at 14:53

## 2020-08-25 RX ADMIN — MEROPENEM 2000 MG: 1 INJECTION, POWDER, FOR SOLUTION INTRAVENOUS at 18:11

## 2020-08-25 RX ADMIN — SENNOSIDES 8.6 MG: 8.6 TABLET ORAL at 08:46

## 2020-08-25 RX ADMIN — NYSTATIN 500000 UNITS: 100000 SUSPENSION ORAL at 23:21

## 2020-08-25 RX ADMIN — MICONAZOLE NITRATE: 20 CREAM TOPICAL at 15:07

## 2020-08-25 RX ADMIN — NYSTATIN 500000 UNITS: 100000 SUSPENSION ORAL at 18:11

## 2020-08-25 RX ADMIN — SODIUM CHLORIDE 75 ML/HR: 0.9 INJECTION, SOLUTION INTRAVENOUS at 20:33

## 2020-08-25 RX ADMIN — LISINOPRIL 10 MG: 10 TABLET ORAL at 08:47

## 2020-08-25 RX ADMIN — DOCUSATE SODIUM 100 MG: 100 CAPSULE, LIQUID FILLED ORAL at 18:11

## 2020-08-25 RX ADMIN — MEROPENEM 2000 MG: 1 INJECTION, POWDER, FOR SOLUTION INTRAVENOUS at 03:32

## 2020-08-25 RX ADMIN — SODIUM CHLORIDE 75 ML/HR: 0.9 INJECTION, SOLUTION INTRAVENOUS at 05:07

## 2020-08-25 RX ADMIN — DOCUSATE SODIUM 100 MG: 100 CAPSULE, LIQUID FILLED ORAL at 08:47

## 2020-08-25 NOTE — ASSESSMENT & PLAN NOTE
POD1 left ventriculoatrial shunt placement  2 weeks s/p  shunt removal 8/7/2020  · Externalization 7/30 secondary to laparotomy for bowel ischemia/peritonitis  · S/p VPS placement for NPH 2005, revision 2011  · Shunt last adjusted to 100 cm of water 7/21/2020 for increasing size of bilateral subdural hygromas  · Sutures placed over about beginning in July for thinning skin  Imaging:   · CT head without 08/25/2020:  Status post placement of left frontal ventriculoperitoneal shunt catheter  Stable ventriculomegaly  · CT head without 8/20/2020: Stable ventricular enlargement post shunt removal  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    Plan:    · Frequent neuro checks  · Stat CT head with decline in GCS greater than 2 points in 1 hour  · Shunt series x-ray pending  · Left shunt reservoir compresses and refills  · ID following  · All repeat BC and CSF have been negative  · Afebrile at this time  · Continues on meropenem through 09/09/2020 (total of 2 weeks)  · CSF cultures from shunt placement pending  · Follow and trend labs  · DVT prophylaxis:  SCDs and heparin  Neurosurgery will continue to follow  Please call with questions or concerns

## 2020-08-25 NOTE — PLAN OF CARE
Problem: PHYSICAL THERAPY ADULT  Goal: Performs mobility at highest level of function for planned discharge setting  See evaluation for individualized goals  Description: Treatment/Interventions: OT, Spoke to case management, Spoke to nursing, Gait training, Bed mobility, Patient/family training, Endurance training, LE strengthening/ROM, Functional transfer training          See flowsheet documentation for full assessment, interventions and recommendations  Outcome: Not Progressing  Note: Prognosis: Fair  Problem List: Decreased strength, Decreased endurance, Impaired balance, Decreased mobility, Decreased coordination, Decreased cognition, Decreased safety awareness, Impaired tone, Decreased range of motion  Assessment: Pt is 69 y o  male seen for PT re-evaluation s/p  shunt revision PT consulted to assess pt's functional mobility and d/c needs  Comorbidities affecting pt's physical performance at time of assessment include:  has a past medical history of Cancer ([facility] ), Diabetes mellitus ([facility] ), DM2 (diabetes mellitus, type 2) ([facility] ), Gout, HTN (hypertension), Hydrocephalus ([facility] ), Hypertension, Neuropathy, ZHOU on CPAP, Pressure injury of skin, and TIA (transient ischemic attack)  PTA, pt was ambulates community distances and elevations and retired  Personal factors affecting pt at time of IE include: unable to perform dynamic tasks in community, decreased cognition, limited home support, decreased initiation and engagement, unable to perform physical activity, limited insight into impairments, inability to perform IADLs and inability to perform ADLs   Please find objective findings from PT assessment regarding body systems outlined above with impairments and limitations including weakness, decreased ROM, impaired balance, decreased endurance, impaired coordination, gait deviations, pain, decreased activity tolerance, decreased functional mobility tolerance, decreased safety awareness, impaired judgement, fall risk, decreased skin integrity and decreased cognition  Pt required A to complete rolling in bed with minimal participation  REquired A for LE management and to upright trunk during bed mobility  Poor sitting balance requiring constant A to maintain upright  Noted severe weakness in all extremities  Required increased encouragement throughout session to participate  The following objective measures performed on IE also reveal limitations: Barthel Index: 10/100  Pt's clinical presentation is currently unstable/unpredictable seen in pt's presentation of critical care monitoring  Pt to benefit from continued PT tx to address deficits as defined above and maximize level of functional independent mobility and consistency  From PT/mobility standpoint, recommendation at time of d/c would be IP rehab pending progress in order to facilitate return to PLOF  Barriers to Discharge: Decreased caregiver support, Inaccessible home environment     PT Discharge Recommendation: [address]     PT - OK to Discharge: Yes    See flowsheet documentation for full assessment

## 2020-08-25 NOTE — PROGRESS NOTES
Progress Note - Carlos Soto 1950, 69 y o  male MRN: [id number]    Unit/Bed#: ICU 13 Encounter: [id number]    Primary Care Provider: Jasmin Oscar   Date and time admitted to hospital: 7/30/2020 11:12 PM        Lumbar stenosis  Assessment & Plan  · Status post minimally invasive transverse lumbar interbody fusion L5/S1 7/6/20  · No issues with incision noted  · Patient with diffuse weakness throughout, GCS 14  · Outpatient follow up in 6 weeks with new upright XR lumbar spine imaging  · No further inpatient needs regarding this diagnosis    Normal pressure hydrocephalus ([address] )  Assessment & Plan  POD1 left ventriculoatrial shunt placement  2 weeks s/p  shunt removal 8/7/2020  · Externalization 7/30 secondary to laparotomy for bowel ischemia/peritonitis  · S/p VPS placement for NPH 2005, revision 2011  · Shunt last adjusted to 100 cm of water 7/21/2020 for increasing size of bilateral subdural hygromas  · Sutures placed over about beginning in July for thinning skin  Imaging:   · CT head without 08/25/2020:  Status post placement of left frontal ventriculoperitoneal shunt catheter  Stable ventriculomegaly  · CT head without 8/20/2020: Stable ventricular enlargement post shunt removal  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    Plan:    · Frequent neuro checks  · Stat CT head with decline in GCS greater than 2 points in 1 hour  · Shunt series x-ray pending  · Left shunt reservoir compresses and refills  · ID following  · All repeat BC and CSF have been negative  · Afebrile at this time  · Continues on meropenem through 09/09/2020 (total of 2 weeks)  · CSF cultures from shunt placement pending  · Follow and trend labs  · DVT prophylaxis:  SCDs and heparin  Neurosurgery will continue to follow  Please call with questions or concerns      Type 2 diabetes mellitus [facility])  Assessment & Plan  Lab Results   Component Value Date    HGBA1C 5 6 07/31/2020 Recent Labs     08/24/20  1528 08/24/20  1757 08/24/20  2212 08/25/20  1246   POCGLU 171* 146* 112 100       Blood Sugar Average: Last 72 hrs:  (P) 476 [id number]     Continue medical management per primary team        Subjective/Objective   Chief Complaint:  I am doing okay      Subjective:  Patient denies any headache  Denies any nausea  States he has a good appetite  Objective:  Patient alert and oriented to self  Unable to state date  Still unable to gaze upwards  Appears more lucid than prior  Moving all extremities  Sitting up in bed, resting comfortably  Surgical incisions that left parietal area, left neck clean and dry, well approximated  I/O       08/23 0701 - 08/24 0700 08/24 0701 - 08/25 0700 08/25 0701 - 08/26 0700    P  O  630  400    I V  (mL/kg) 900 (8 3) 2445 (21 8) 130 (1 2)    IV Piggyback 200 450     Total Intake(mL/kg) 1730 (15 9) 2895 (25 8) 530 (4 7)    Urine (mL/kg/hr) 677 (0 3) 1068 (0 4)     Stool       Total Output 677 1068     Net +1053 +1827 +530           Unmeasured Urine Occurrence 2 x            Invasive Devices     Peripherally Inserted Central Catheter Line            PICC Line 00/23/02 Left Basilic 19 days          Peripheral Intravenous Line            Peripheral IV 08/24/20 Right Arm 1 day          Arterial Line            Arterial Line 08/24/20 Right Radial 1 day                Physical Exam:  Vitals: Blood pressure 115/51, pulse 70, temperature 98 2 °F (36 8 °C), temperature source Oral, resp  rate 20, height 6' 3" (1 905 m), weight 112 kg (247 lb 12 8 oz), SpO2 95 %  ,Body mass index is 30 97 kg/m²  Hemodynamic Monitoring: MAP: Arterial Line MAP (mmHg): 64 mmHg    General appearance: alert, appears stated age, cooperative and no distress  Head: Normocephalic, left parietal incision, clean and dry  Right-sided ulnar incision clean and dry    Eyes: PERRL, unable to gets up once  Neck: supple, symmetrical, trachea midline and NT  Back: no kyphosis present, no tenderness to percussion or palpation  Lungs: non labored breathing  Heart: regular heart rate  Neurologic:   Mental status: Alert, oriented x1, thought content appropriate  Cranial nerves: grossly intact (Cranial nerves II-XII)  Sensory: normal to light touch  Motor:  Strength is grossly 4/5 secondary generalized deconditioning  Coordination: finger to nose normal bilaterally, no drift bilaterally      Lab Results:  Results from last 7 days   Lab Units 08/25/20  0506 08/23/20  1234 08/21/20  0518 08/19/20  0449   WBC Thousand/uL 10 71* 8 93 11 66* 8 10   HEMOGLOBIN g/dL 12 1 11 8* 13 3 12 2   HEMATOCRIT % 38 3 37 8 43 3 38 8   PLATELETS Thousands/uL 189 193 241 234   NEUTROS PCT %  --  77*  --  76*   MONOS PCT %  --  8  --  9     Results from last 7 days   Lab Units 08/25/20  0506 08/23/20  1234 08/21/20  0518 08/19/20  0449   POTASSIUM mmol/L 4 1 3 8 4 4 4 3   CHLORIDE mmol/L 107 104 102 103   CO2 mmol/L 28 31 32 31   BUN mg/dL 13 17 15 17   CREATININE mg/dL 0 56* 0 51* 0 71 0 73   CALCIUM mg/dL 8 3 7 8* 8 7 8 4   ALK PHOS U/L  --  86 106 94   ALT U/L  --  21 20 21   AST U/L  --  13 16 16             Results from last 7 days   Lab Units 08/25/20  0506 08/24/20  0530 08/23/20  1234   INR  1 13 1 05 1 37*   PTT seconds 41*  --  36     No results found for: TROPONINT  ABG:  Lab Results   Component Value Date    PHART 7 357 07/31/2020    YDM4EOW 32 3 (L) 07/31/2020    PO2ART 87 8 07/31/2020    OZV5FME 17 7 (L) 07/31/2020    BEART -6 7 07/31/2020    SOURCE Line, Arterial 07/31/2020       Imaging Studies: I have personally reviewed pertinent reports  and I have personally reviewed pertinent films in PACS    Xr Chest Portable    Result Date: 7/31/2020  Impression: 1  Interval intubation with coiling of the nasogastric tube in the midesophagus  Repositioning of the the nasogastric tube advised  2   Endotracheal tube noted with the tip well above the tye   3   Scattered strandy densities likely related to diminished degree of inspiration  Workstation performed: [id number]     Ct Head Without Contrast    Result Date: 7/31/2020  Impression: Minimal increase in size of ventricular system The study was marked in EPIC for immediate notification  Workstation performed: [id number]     Pe Study With Ct Abdomen And Pelvis With Contrast    Result Date: 7/31/2020  Impression: Fluid-filled loops of small bowel with pneumatosis and bowel wall thickening  Findings are suspicious for ischemic bowel  Nodular airspace opacities within the right upper lobe  Findings may represent infection  I personally discussed this study with Maite Sparrow on 7/31/2020 at 1:46 AM  Workstation performed: [id number]       EKG, Pathology, and Other Studies: I have personally reviewed pertinent reports        VTE Pharmacologic Prophylaxis: Sequential compression device (Venodyne)  and Heparin    VTE Mechanical Prophylaxis: sequential compression device

## 2020-08-25 NOTE — SPEECH THERAPY NOTE
SLP Progress Note    Patient Name: Amy Álvarez  Today's Date: 8/25/2020     Problem List  Principal Problem:    Severe sepsis [facility])  Active Problems:    Type 2 diabetes mellitus ([address] )    Normal pressure hydrocephalus (HCC)    Essential hypertension    ZHOU on CPAP    Nonocclusive mesenteric ischemia (HCC)    Infection of  (ventriculoperitoneal) shunt (HCC)    Lumbar stenosis    Drug rash    Multiple wounds    Dysphagia    Metabolic encephalopathy    Subjective:  "I don't remember" when asked what he had already eaten for lunch  Objective:  Pt seen for dysphagia therapy bedside s/p VA shunt 8/24  Regular diet with thin liquid ordered s/p procedure (pt on modified diet prior to procedure)  He is known to our department from clinical and instrumental (video) swallow evaluation (see report for specifics, including episodes of laryngeal penetration/mild aspiration with reduced response)  He was grossly alert & upright  He was O to self, but uncertain re: place and unable to reprot any recent events  He was assessed with small amts of puree, soft fruit, nectar thick and thin liquid  He has taken some pulled pork & pudding at lunch (did not eat roll, coleslaw)  He presented c no upper dentures in place and prolonged mastication  Bolus formation and transfer were prolonged but with no residue with fruit     Swallow initiation appeared to be mildly delayed  Cough and throat clearing noted with thin liquid (not with thick, puree or banana)  Assessment:  Pt presented with s/s oropharyngeal dysphagia clinically and per VBS  Plan/Recommendations:  Recommend Level 2 dysphagia diet and nectar thick liquid

## 2020-08-25 NOTE — PLAN OF CARE
Problem: OCCUPATIONAL THERAPY ADULT  Goal: Performs self-care activities at highest level of function for planned discharge setting  See evaluation for individualized goals  Description: Treatment Interventions: ADL retraining, Functional transfer training, Endurance training, Patient/family training, Equipment evaluation/education, Activityengagement          See flowsheet documentation for full assessment, interventions and recommendations  Note: Limitation: Decreased ADL status, Decreased UE ROM, Decreased UE strength, Decreased Safe judgement during ADL, Decreased cognition, Decreased endurance, Decreased self-care trans, Decreased high-level ADLs, Decreased fine motor control, Visual deficit  Prognosis: Fair  Assessment: Pt is a 68 y/o male seen for OT re-reval s/p Left ventriculoatrial shunt (Left) placement and transfer back to ICU  Pt initially admitted for vomiting blood  Pt was s/p the following procedures"LAPAROTOMY EXPLORATORY (N/A)" performed on 7/31, s/p "LAPAROTOMY EXPLORATORY: Externalizes  shunt Abthera application (N/A); ARTERIOGRAM Of SMA and placement of Papavarine onfusion arterial catheter (N/A) ; Exploration of SMA; Angiogram SMA; Limited angiogram Left lower extremity" performed 7/31; s/p "LAPAROTOMY EXPLORATORY, (N/A)abd washout (N/A)ARTERIOGRAM; cath removal (Left)CLOSURE WOUNDABDOMINAL/TRUNK (N/A)" performed 8/1 and 8/2  Pt seen for initial OT evaluation on 8/3 and performing @ a level of Max A UB ADLS, Total A LB ADLS, Max A x2 bed mobility, Total A x2 STS transfers  Pt currently performing @ a level of Max A UB ADLS/Total A LB ADLS, Max A x2 bed mobility, Max A EOB sitting balance  Not appropriate for transfers/functional mobility @ this time   Pt remains limited 2* fatigue, decreased strength, ROM, activity tolerance, functional mobility, forward functional reach, sitting balance, endurance, insight, safety, judgement, motivation for participation, and overall decreased I w/ functional tasks   Continue to recommend STR upon D/C, when medically stable  Pt continues to benefit from immediate inpatient skilled OT services 3-5x/wk   Will continue to follow to address goals stated below to be met within the next 10-14 days:     OT Discharge Recommendation: [id number]  OT - OK to Discharge: Yes(when medically stable)     Katerina Springer MS, OTR/L

## 2020-08-25 NOTE — PLAN OF CARE
P/O Assessment:  Pt presented with s/s oropharyngeal dysphagia clinically and per VBS  Plan/Recommendations:  Recommend Level 2 dysphagia diet and nectar thick liquid

## 2020-08-25 NOTE — PHYSICAL THERAPY NOTE
Physical Therapy Re-Evaluation     Patient's Name: Kelsey Berry    Admitting Diagnosis  Weakness [R53 1]  Generalized weakness [R53 1]  Ambulatory dysfunction [R26 2]  Acute superficial gastritis with hemorrhage [K29 01]    Problem List  Patient Active Problem List   Diagnosis    Status post ventriculoperitoneal shunt    Unspecified abnormalities of gait and mobility    Diabetic ulcer of left foot associated with type 2 diabetes mellitus ([facility] )    Type 2 diabetes mellitus ([facility] )    Normal pressure hydrocephalus (HCC)    Essential hypertension    Gout    ZHOU on CPAP    Spondylolisthesis of lumbosacral region    Impaired functional mobility, balance, gait, and endurance    Weakness of left foot    Pain    Scalp hematoma    Scalp laceration    Self-care deficit for hygiene    Acute superficial gastritis with hemorrhage    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[facility] )    Nonocclusive mesenteric ischemia (HCC)    Hydrocephalus (HCC)    Severe sepsis ([facility] )    Infection of  (ventriculoperitoneal) shunt (HCC)    Lumbar stenosis    Drug rash    Multiple wounds    Dysphagia    Metabolic encephalopathy       Past Medical History  Past Medical History:   Diagnosis Date    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     Diabetes mellitus ([facility] )     DM2 (diabetes mellitus, type 2) ([place] [id number] )     Gout     HTN (hypertension)     Hydrocephalus (HCC)     Hypertension     Neuropathy     ZHOU on CPAP     Pressure injury of skin     TIA (transient ischemic attack)        Past Surgical History  Past Surgical History:   Procedure Laterality Date    ABDOMINAL WALL SURGERY N/A 8/1/2020    Procedure: CLOSURE WOUND ABDOMINAL/TRUNK;  Surgeon: Sanjana Noguera DO;  [facility];  Service: General    ARTERIOGRAM N/A 7/31/2020    Procedure: ARTERIOGRAM Of SMA and placement of Papavarine onfusion arterial catheter;  Surgeon: Leandro Pimentel MD;  [facility];  Service: Vascular    ARTERIOGRAM Left 8/1/2020    Procedure: ARTERIOGRAM; cath removal;  Surgeon: Roger Avendaño MD;  [facility];  Service: Vascular    CSF SHUNT      x3 Op Reports, Dr Da Sarah, Mary [place] in [facility] chart viewer    FL LUMBAR PUNCTURE DIAGNOSTIC  8/11/2020    LAPAROTOMY N/A 7/31/2020    Procedure: LAPAROTOMY EXPLORATORY: Externalizes  shunt Ed Velásquez application;  Surgeon: Lance Urias MD;  [facility];  Service: General    LAPAROTOMY N/A 8/1/2020    Procedure: LAPAROTOMY EXPLORATORY,;  Surgeon: Troy Jim DO;  [facility];  Service: General    OR AMPUTATION FOOT,TRANSMETATARSAL Left 5/30/2020    Procedure: excision 5th metatarsal head  excision 5th metatarsal ulcer ;  Surgeon: Garland Bence, DPM;  [facility];  Service: Podiatry    OR ARTHDSIS POST/POSTEROLATRL/POSTINTERBODY LUMBAR N/A 7/6/2020    Procedure: MINIMALLY INVASIVE TRANSVERSE LUMBAR INTERBODY FUSION L5-S1 FROM LEFT-SIDED APPROACH;  Surgeon: Diana Olvera MD;  [facility];  Service: Neurosurgery    OR [address] CSF SHUNT,W/O REPLACE Right 8/7/2020    Procedure: REMOVAL SHUNT VENTRICULAR PERITONEAL;  Surgeon: Diana Olvera MD;  [facility];  Service: Neurosurgery    OR REPLACEMENT/REVISION,CSF SHUNT Right 7/6/2020    Procedure: REVISION OF SCALP OVER VENTRICULAR CATHETER IN THE RIGHT [address];  Surgeon: Diana Olvera MD;  [facility];  Service: Neurosurgery    WOUND DEBRIDEMENT N/A 8/1/2020    Procedure: abd washout;  Surgeon: Troy Jim DO;  [facility];  Service: General        08/25/20 0953   Note Type   Note type Re-eval   Pain Assessment   Pain Assessment Tool Pain Assessment not indicated - pt denies pain   Pain Score No Pain   Restrictions/Precautions   Weight Bearing Precautions Per Order No   Braces or Orthoses MAFO  (LLE)   Other Precautions Cognitive; Chair Alarm; Bed Alarm;Multiple lines;Telemetry; Fall Risk;O2   General   Family/Caregiver Present No   Cognition   Orientation Level Oriented to person   RLE Assessment   RLE Assessment X  (grossly 2/5 with movement)   LLE Assessment   LLE Assessment X  (grossly 2/5 with movement)   Coordination   Movements are Fluid and Coordinated 0   Coordination and Movement Description slow, guarded, bradykinetic   Bed Mobility   Rolling R 2  Maximal assistance   Additional items Assist x 2; Increased time required   Rolling L 2  Maximal assistance   Additional items Assist x 2; Increased time required   Supine to Sit 2  Maximal assistance   Additional items Assist x 2; Increased time required   Sit to Supine 2  Maximal assistance   Additional items Assist x 2; Increased time required   Transfers   Sit to Stand Unable to assess   Stand to Sit Unable to assess   Ambulation/Elevation   Gait pattern Not appropriate   Balance   Static Sitting Poor -   Static Standing Zero   Endurance Deficit   Endurance Deficit Yes   Endurance Deficit Description fatigue, generalized weakness, poor initiation   Activity Tolerance   Activity Tolerance Patient limited by fatigue;Patient limited by pain   Medical Staff Made Aware OT for D/C plannin   Nurse Made Aware yes, nsg gave clearance to work with pt   Assessment   Prognosis Fair   Problem List Decreased strength;Decreased endurance; Impaired balance;Decreased mobility; Decreased coordination;Decreased cognition;Decreased safety awareness; Impaired tone;Decreased range of motion   Assessment Pt is 69 y o  male seen for PT re-evaluation s/p  shunt revision PT consulted to assess pt's functional mobility and d/c needs  Comorbidities affecting pt's physical performance at time of assessment include:  has a past medical history of Cancer ([facility] ), Diabetes mellitus ([facility] ), DM2 (diabetes mellitus, type 2) ([facility] ), Gout, HTN (hypertension), Hydrocephalus ([facility] ), Hypertension, Neuropathy, ZHOU on CPAP, Pressure injury of skin, and TIA (transient ischemic attack)   PTA, pt was ambulates community distances and elevations and retired  Personal factors affecting pt at time of IE include: unable to perform dynamic tasks in community, decreased cognition, limited home support, decreased initiation and engagement, unable to perform physical activity, limited insight into impairments, inability to perform IADLs and inability to perform ADLs  Please find objective findings from PT assessment regarding body systems outlined above with impairments and limitations including weakness, decreased ROM, impaired balance, decreased endurance, impaired coordination, gait deviations, pain, decreased activity tolerance, decreased functional mobility tolerance, decreased safety awareness, impaired judgement, fall risk, decreased skin integrity and decreased cognition  Pt required A to complete rolling in bed with minimal participation  REquired A for LE management and to upright trunk during bed mobility  Poor sitting balance requiring constant A to maintain upright  Noted severe weakness in all extremities  Required increased encouragement throughout session to participate  The following objective measures performed on IE also reveal limitations: Barthel Index: 10/100  Pt's clinical presentation is currently unstable/unpredictable seen in pt's presentation of critical care monitoring  Pt to benefit from continued PT tx to address deficits as defined above and maximize level of functional independent mobility and consistency  From PT/mobility standpoint, recommendation at time of d/c would be IP rehab pending progress in order to facilitate return to PLOF  Barriers to Discharge Decreased caregiver support; Inaccessible home environment   Goals   Patient Goals To read, be able to ambulate   STG Expiration Date 09/06/20   Short Term Goal #2 continue to progress eval goals, slow progress with decreased activity tolerance   Plan   Treatment/Interventions Bed mobility;Gait training;Patient/family training; Endurance training;LE strengthening/ROM; Functional transfer training; Therapeutic exercise;Spoke to case management;Spoke to nursing;OT   PT Frequency Other (Comment)  (3-6x/wk)   Recommendation   PT Discharge Recommendation Post-Acute Rehabilitation Services   PT - OK to Discharge Yes   Additional Comments to rehab when medically stable   Barthel Index   Feeding 5   Bathing 0   Grooming Score 0   Dressing Score 0   Bladder Score 0   Bowels Score 0   Toilet Use Score 0   Transfers (Bed/Chair) Score 5   Mobility (Level Surface) Score 0   Stairs Score 0   Barthel Index Score 10           Michele Jones, PT

## 2020-08-25 NOTE — PLAN OF CARE
Problem: Potential for Falls  Goal: Patient will remain free of falls  Description: INTERVENTIONS:  - Assess patient frequently for physical needs  -  Identify cognitive and physical deficits and behaviors that affect risk of falls    -  [place] fall precautions as indicated by assessment   - Educate patient/family on patient safety including physical limitations  - Instruct patient to call for assistance with activity based on assessment  - Modify environment to reduce risk of injury  - Consider OT/PT consult to assist with strengthening/mobility  Outcome: Progressing     Problem: Prexisting or High Potential for Compromised Skin Integrity  Goal: Skin integrity is maintained or improved  Description: INTERVENTIONS:  - Identify patients at risk for skin breakdown  - Assess and monitor skin integrity  - Assess and monitor nutrition and hydration status  - Monitor labs   - Assess for incontinence   - Turn and reposition patient  - Assist with mobility/ambulation  - Relieve pressure over bony prominences  - Avoid friction and shearing  - Provide appropriate hygiene as needed including keeping skin clean and dry  - Evaluate need for skin moisturizer/barrier cream  - Collaborate with interdisciplinary team   - Patient/family teaching  - Consider wound care consult   Outcome: Progressing     Problem: PAIN - ADULT  Goal: Verbalizes/displays adequate comfort level or baseline comfort level  Description: Interventions:  - Encourage patient to monitor pain and request assistance  - Assess pain using appropriate pain scale  - Administer analgesics based on type and severity of pain and evaluate response  - Implement non-pharmacological measures as appropriate and evaluate response  - Consider cultural and social influences on pain and pain management  - Notify physician/advanced practitioner if interventions unsuccessful or patient reports new pain  Outcome: Progressing     Problem: SAFETY ADULT  Goal: Maintain or return to baseline ADL function  Description: INTERVENTIONS:  -  Assess patient's ability to carry out ADLs; assess patient's baseline for ADL function and identify physical deficits which impact ability to perform ADLs (bathing, care of mouth/teeth, toileting, grooming, dressing, etc )  - Assess/evaluate cause of self-care deficits   - Assess range of motion  - Assess patient's mobility; develop plan if impaired  - Assess patient's need for assistive devices and provide as appropriate  - Encourage maximum independence but intervene and supervise when necessary  - Involve family in performance of ADLs  - Assess for home care needs following discharge   - Consider OT consult to assist with ADL evaluation and planning for discharge  - Provide patient education as appropriate  Outcome: Progressing  Goal: Maintain or return mobility status to optimal level  Description: INTERVENTIONS:  - Assess patient's baseline mobility status (ambulation, transfers, stairs, etc )    - Identify cognitive and physical deficits and behaviors that affect mobility  - Identify mobility aids required to assist with transfers and/or ambulation (gait belt, sit-to-stand, lift, walker, cane, etc )  - [place] fall precautions as indicated by assessment  - Record patient progress and toleration of activity level on Mobility SBAR; progress patient to next Phase/Stage  - Instruct patient to call for assistance with activity based on assessment  - Consider rehabilitation consult to assist with strengthening/weightbearing, etc   Outcome: Progressing     Problem: GASTROINTESTINAL - ADULT  Goal: Minimal or absence of nausea and/or vomiting  Description: INTERVENTIONS:  - Administer IV fluids if ordered to ensure adequate hydration  - Maintain NPO status until nausea and vomiting are resolved  - Nasogastric tube if ordered  - Administer ordered antiemetic medications as needed  - Provide nonpharmacologic comfort measures as appropriate  - Advance diet as tolerated, if ordered  - Consider nutrition services referral to assist patient with adequate nutrition and appropriate food choices  Outcome: Progressing  Goal: Maintains or returns to baseline bowel function  Description: INTERVENTIONS:  - Assess bowel function  - Encourage oral fluids to ensure adequate hydration  - Administer IV fluids if ordered to ensure adequate hydration  - Administer ordered medications as needed  - Encourage mobilization and activity  - Consider nutritional services referral to assist patient with adequate nutrition and appropriate food choices  Outcome: Progressing  Goal: Maintains adequate nutritional intake  Description: INTERVENTIONS:  - Monitor percentage of each meal consumed  - Identify factors contributing to decreased intake, treat as appropriate  - Assist with meals as needed  - Monitor I&O, weight, and lab values if indicated  - Obtain nutrition services referral as needed  Outcome: Progressing     Problem: GENITOURINARY - ADULT  Goal: Maintains or returns to baseline urinary function  Description: INTERVENTIONS:  - Assess urinary function  - Encourage oral fluids to ensure adequate hydration if ordered  - Administer IV fluids as ordered to ensure adequate hydration  - Administer ordered medications as needed  - Offer frequent toileting  - Follow urinary retention protocol if ordered  Outcome: Progressing  Goal: Absence of urinary retention  Description: INTERVENTIONS:  - Assess patients ability to void and empty bladder  - Monitor I/O  - Bladder scan as needed  - Discuss with physician/AP medications to alleviate retention as needed  - Discuss catheterization for long term situations as appropriate  Outcome: Progressing     Problem: CONFUSION/THOUGHT DISTURBANCE  Goal: Thought disturbances (confusion, delirium, depression, dementia or psychosis) are managed to maintain or return to baseline mental status and functional level  Description: INTERVENTIONS:  - Assess for possible contributors to  thought disturbance, including but not limited to medications, infection, impaired vision or hearing, underlying metabolic abnormalities, dehydration, respiratory compromise,  psychiatric diagnoses and notify attending PHYSICAN/AP  - Monitor and intervene to maintain adequate nutrition, hydration, elimination, sleep and activity  - Decrease environmental stimuli, including noise as appropriate  - Provide frequent contacts to provide refocusing, direction and reassurance as needed  Approach patient calmly with eye contact and at their level  - [place] high risk fall precautions, aspiration precautions and other safety measures, as indicated  - If delirium suspected, notify physician/AP of change in condition and request immediate in-person evaluation  - Pursue consults as appropriate including Geriatric (campus dependent), OT for cognitive evaluation/activity planning, psychiatric, pastoral care, etc   Outcome: Progressing     Problem: Nutrition/Hydration-ADULT  Goal: Nutrient/Hydration intake appropriate for improving, restoring or maintaining nutritional needs  Description: Monitor and assess patient's nutrition/hydration status for malnutrition  Collaborate with interdisciplinary team and initiate plan and interventions as ordered  Monitor patient's weight and dietary intake as ordered or per policy  Utilize nutrition screening tool and intervene as necessary  Determine patient's food preferences and provide high-protein, high-caloric foods as appropriate       INTERVENTIONS:  - Monitor oral intake, urinary output, labs, and treatment plans  - Assess nutrition and hydration status and recommend course of action  - Evaluate amount of meals eaten  - Assist patient with eating if necessary   - Allow adequate time for meals  - Recommend/ encourage appropriate diets, oral nutritional supplements, and vitamin/mineral supplements  - Order, calculate, and assess calorie counts as needed  - Recommend, monitor, and adjust tube feedings and TPN/PPN based on assessed needs  - Assess need for intravenous fluids  - Provide specific nutrition/hydration education as appropriate  - Include patient/family/caregiver in decisions related to nutrition  Outcome: Progressing     Problem: RESPIRATORY - ADULT  Goal: Achieves optimal ventilation and oxygenation  Description: INTERVENTIONS:  - Assess for changes in respiratory status  - Assess for changes in mentation and behavior  - Position to facilitate oxygenation and minimize respiratory effort  - Oxygen administered by appropriate delivery if ordered  - Initiate smoking cessation education as indicated  - Encourage broncho-pulmonary hygiene including cough, deep breathe, Incentive Spirometry  - Assess the need for suctioning and aspirate as needed  - Assess and instruct to report SOB or any respiratory difficulty  - Respiratory Therapy support as indicated  Outcome: Progressing     Problem: HEMATOLOGIC - ADULT  Goal: Maintains hematologic stability  Description: INTERVENTIONS  - Assess for signs and symptoms of bleeding or hemorrhage  - Monitor labs  - Administer supportive blood products/factors as ordered and appropriate  Outcome: Progressing

## 2020-08-25 NOTE — OCCUPATIONAL THERAPY NOTE
Occupational Therapy Re-Evaluation     Kelsey Berry    8/25/2020    Principal Problem:    Severe sepsis [facility])  Active Problems:    Type 2 diabetes mellitus ([address] )    Normal pressure hydrocephalus (HCC)    Essential hypertension    ZHOU on CPAP    Nonocclusive mesenteric ischemia (HCC)    Infection of  (ventriculoperitoneal) shunt (HCC)    Lumbar stenosis    Drug rash    Multiple wounds    Dysphagia    Metabolic encephalopathy      @hPMHl@    Past Surgical History:   Procedure Laterality Date    ABDOMINAL WALL SURGERY N/A 8/1/2020    Procedure: CLOSURE WOUND ABDOMINAL/TRUNK;  Surgeon: Sanjana Noguera DO;  [facility];  Service: General    ARTERIOGRAM N/A 7/31/2020    Procedure: ARTERIOGRAM Of SMA and placement of Papavarine onfusion arterial catheter;  Surgeon: Leandro Pimentel MD;  [facility];  Service: Vascular    ARTERIOGRAM Left 8/1/2020    Procedure: ARTERIOGRAM; cath removal;  Surgeon: Leandro Pimentel MD;  [facility];  Service: Vascular    CSF SHUNT      x3 Op Reports, Timothy Billingsley in MPF chart viewer    FL LUMBAR PUNCTURE DIAGNOSTIC  8/11/2020    LAPAROTOMY N/A 7/31/2020    Procedure: LAPAROTOMY EXPLORATORY: Externalizes  shunt Ruth Jenkins application;  Surgeon: Brunilda Clark MD;  [facility];  Service: General    LAPAROTOMY N/A 8/1/2020    Procedure: LAPAROTOMY EXPLORATORY,;  Surgeon: Sanjana Noguera DO;  [facility];  Service: General    WI AMPUTATION FOOT,TRANSMETATARSAL Left 5/30/2020    Procedure: excision 5th metatarsal head   excision 5th metatarsal ulcer ;  Surgeon: Lila Ragland DPM;  [facility];  Service: Podiatry    WI ARTHDSIS POST/POSTEROLATRL/POSTINTERBODY LUMBAR N/A 7/6/2020    Procedure: MINIMALLY INVASIVE TRANSVERSE LUMBAR INTERBODY FUSION L5-S1 FROM LEFT-SIDED APPROACH;  Surgeon: Ninfa Montana MD;  [facility];  Service: Neurosurgery    WI REMOVAL,COMPLETE CSF SHUNT,W/O REPLACE Right 8/7/2020    Procedure: REMOVAL SHUNT VENTRICULAR PERITONEAL;  Surgeon: Latasha Luque MD;  [facility];  Service: Neurosurgery    NH REPLACEMENT/REVISION,CSF SHUNT Right 7/6/2020    Procedure: REVISION OF SCALP OVER VENTRICULAR CATHETER IN THE RIGHT CORONAL REGION;  Surgeon: Latasha Luque MD;  [facility];  Service: Neurosurgery    WOUND DEBRIDEMENT N/A 8/1/2020    Procedure: abd washout;  Surgeon: Marty Ramirez DO;  [facility];  Service: General        08/25/20 0952   Note Type   Note type Re-eval   Restrictions/Precautions   Weight Bearing Precautions Per Order No   Braces or Orthoses MAFO;Other (Comment)  (LLE; B/L prevalon boots)   Other Precautions Cognitive; Bed Alarm;Multiple lines;Telemetry; Fall Risk;Pain;Visual impairment   Pain Assessment   Pain Assessment Tool Pain Assessment not indicated - pt denies pain   Pain Score No Pain   Home Living   Additional Comments see initial OT eval   Prior Function   Comments see initial OT eval   Lifestyle   Autonomy I ADLS, mainly I w/ IADLS except cooking, Mod I w/ transfers and functional mobility PTA   Reciprocal Relationships Pt lives w/ a friend, the friend works 6-8 hrs/day   Service to [employer] [facility] [employer] pt is retired   Intrinsic Gratification Enjoys yard work and reading   Psychosocial   Psychosocial (WDL) X   Patient Behaviors/Mood Calm;Flat affect; Not interactive   Subjective   Subjective " I like to read"   ADL   Eating Assistance 2  Maximal Assistance   Grooming Assistance 2  Maximal Assistance   UB Bathing Assistance 2  Maximal Assistance   LB Bathing Assistance 1  Total Assistance   UB Dressing Assistance 2  Maximal Assistance   LB Dressing Assistance 1  Total Assistance   LB Dressing Deficit Don/doff R sock; Don/doff L sock   Toileting Assistance  1  Total Assistance   Toileting Deficit Perineal hygiene   Functional Assistance 2  Maximal Assistance   Functional Deficit Steadying;Supervision/safety;Verbal cueing; Increased time to complete  (Ax2)   Bed Mobility   Rolling R 2  Maximal assistance   Additional items Assist x 2; Increased time required;Verbal cues;LE management   Rolling L 2  Maximal assistance   Additional items Assist x 2; Increased time required;LE management;Verbal cues   Supine to Sit 2  Maximal assistance   Additional items Assist x 2; Increased time required;Verbal cues;LE management;HOB elevated   Sit to Supine 2  Maximal assistance   Additional items Assist x 2; Increased time required;LE management;Verbal cues   Additional Comments Pt went from supine<>sit w/ max A x2 for UB support and LE management, HOB elevated for assist  Pt sat EOB for approx 5 mins w/ Max A for sitting balance/trunk control  Presents w/ R lateral/posterior lean  Unable to self correct  Unable to extend neck/head in seated position  Sits w/ chin down   Once returned to supine, pt rolled L/R w/ Max A x2 for initiation into rolling and assist in side lying   Transfers   Sit to Stand Unable to assess   Stand to Sit Unable to assess   Additional Comments Not appropriate @ this time 2/2 poor sitting balance/trunk control   Functional Mobility   Additional Comments Not appropriate @ this time   Balance   Static Sitting Poor -   Dynamic Sitting Poor -   Static Standing Zero   Activity Tolerance   Activity Tolerance Patient limited by fatigue;Patient limited by pain   Medical Staff Made Aware PT   Nurse Made Aware yes, Rebeca   RUE Assessment   RUE Assessment X   RUE Overall AROM   R Mass Grasp poor   RUE Strength   RUE Overall Strength Deficits;Due to cognitive deficits;Due to pain   R Shoulder Flexion 3-/5   LUE Assessment   LUE Assessment X   LUE Overall AROM   L Mass Grasp poor   LUE Strength   LUE Overall Strength Deficits;Due to pain;Due to cognitive deficits   L Shoulder Flexion 3-/5   Hand Function   Gross Motor Coordination Impaired   Fine Motor Coordination Impaired   Sensation   Light Touch No apparent deficits   Vision - Complex Assessment   Ocular Range of Motion WFL   Head Position Chin down   Perception   Inattention/Neglect Cues to maintain midline in sitting   Cognition   Overall Cognitive Status Impaired   Arousal/Participation Cooperative;Lethargic;Poorly responsive;Arousable   Attention Difficulty attending to directions   Orientation Level Oriented to person;Oriented to place; Disoriented to time;Disoriented to situation   Memory Decreased recall of precautions;Decreased recall of recent events;Decreased short term memory   Following Commands Follows one step commands with increased time or repetition   Comments Pt is cooperative but very lethargic and slow to respond  Limited verbal interactions noted; appears to have decreased motivation for participation in therapy  Needs re-direction to task  Has decreased insight into deficits/safety awareness   Assessment   Limitation Decreased ADL status; Decreased UE ROM; Decreased UE strength;Decreased Safe judgement during ADL;Decreased cognition;Decreased endurance;Decreased self-care trans;Decreased high-level ADLs; Decreased fine motor control;Visual deficit   Prognosis Fair   Assessment Pt is a 68 y/o male seen for OT re-reval s/p Left ventriculoatrial shunt (Left) placement and transfer back to ICU  Pt initially admitted for vomiting blood  Pt was s/p the following procedures"LAPAROTOMY EXPLORATORY (N/A)" performed on 7/31, s/p "LAPAROTOMY EXPLORATORY: Externalizes  shunt Abthera application (N/A); ARTERIOGRAM Of SMA and placement of Papavarine onfusion arterial catheter (N/A) ; Exploration of SMA; Angiogram SMA; Limited angiogram Left lower extremity" performed 7/31; s/p "LAPAROTOMY EXPLORATORY, (N/A)abd washout (N/A)ARTERIOGRAM; cath removal (Left)CLOSURE WOUNDABDOMINAL/TRUNK (N/A)" performed 8/1 and 8/2  Pt seen for initial OT evaluation on 8/3 and performing @ a level of Max A UB ADLS, Total A LB ADLS, Max A x2 bed mobility, Total A x2 STS transfers   Pt currently performing @ a level of Max A UB ADLS/Total A LB ADLS, Max A x2 bed mobility, Max A EOB sitting balance  Not appropriate for transfers/functional mobility @ this time  Pt remains limited 2* fatigue, decreased strength, ROM, activity tolerance, functional mobility, forward functional reach, sitting balance, endurance, insight, safety, judgement, motivation for participation, and overall decreased I w/ functional tasks   Continue to recommend STR upon D/C, when medically stable  Pt continues to benefit from immediate inpatient skilled OT services 3-5x/wk  Will continue to follow to address goals stated below to be met within the next 10-14 days:   Goals   Patient Goals to read, walk again, and be w/ his friends   LTG Time Frame 10-14   Long Term Goal #1 see below listed goals   Plan   Treatment Interventions ADL retraining;Functional transfer training;Visual perceptual retraining;UE strengthening/ROM; Endurance training;Cognitive reorientation;Patient/family training;Equipment evaluation/education; Compensatory technique education; Fine motor coordination activities;Continued evaluation; Energy conservation; Activityengagement   Goal Expiration Date 09/08/20   OT Frequency 3-5x/wk   Recommendation   OT Discharge Recommendation Post-Acute Rehabilitation Services   OT - OK to Discharge Yes  (when medically stable)   Barthel Index   Feeding 5   Bathing 0   Grooming Score 0   Dressing Score 0   Bladder Score 0   Bowels Score 0   Toilet Use Score 0   Transfers (Bed/Chair) Score 5   Mobility (Level Surface) Score 0   Stairs Score 0   Barthel Index Score 10       GOALS    1) Pt will complete rolling left/right in bed with Mod assist, as prerequisite for further engagement in ADLS   2) Pt will complete supine to sit transfer with Mod A using B/L UEs to initiate bed mobility   3) Pt will tolerate sitting at EOB 20 minutes with Min assist and stable vital signs, as prerequisite for further engagement in ADLS   4) Pt will complete grooming task with Min assist and increased time to increase independence in functional tasks  5) Pt will increase B/L UE ROM 1/2 MMT to increase functional UB use during ADLS   6) Pt will complete UB ADLS with Min A and use of AD/DME as needed to increase independence in functional tasks  7) Pt will complete LB ADLS with Mod A and use of AD/DME as needed to increase independence in functional tasks  8) Pt will follow 100% simple one step verbal commands and be A/Ox4 consistently t/o use of external environmental cues to increase awareness for functional tasks  9) Pt will demonstrate 100% carryover of E C  techniques t/o fx'l I/ADL/ leisure tasks w/o cues s/p skilled education  10) Pt will attend to functional task/activity for 10 mins w/ no more than 2 verbal cues for re-direction to task to increase attention for participation in meaningful activities  11) Pt will tolerate cardiac chair position 3x/day for 3 hours w/ stable vital signs as a pre-requisite for upright functional activities    Mildred Gottron MS, OTR/L

## 2020-08-25 NOTE — ASSESSMENT & PLAN NOTE
· Status post minimally invasive transverse lumbar interbody fusion L5/S1 7/6/20  · No issues with incision noted  · Patient with diffuse weakness throughout, GCS 14  · Outpatient follow up in 6 weeks with new upright XR lumbar spine imaging  · No further inpatient needs regarding this diagnosis

## 2020-08-25 NOTE — PROGRESS NOTES
Progress Note - Infectious Disease   Michael Kidney 69 y o  male MRN: [id number]  Unit/Bed#: ICU 13 Encounter: [id number]      Impression/Plan:  1  Severe sepsis, tachycardia, leukocytosis, elevated lactic acid   With early development of high fevers  Secondary to  shunt infection with meningitis and peritonitis   Repeat blood cultures negative  All the  shunt apparatus has been removed  Repeat CSF cultures negative   Patient much improved  -antibiotic plan as below  -monitor temperatures and hemodynamics  -monitor CBC with diff and and CMP weekly while on the meropenem  -supportive care      2   shunt infection  Fluid WBC count was 61 with neutrophil predominance   Pseudomonas aeruginosa  Suspect secondary to peritonitis in the setting of #3    Now status post removal of the entire  shunt apparatus    Repeat CSF analysis looks fairly bland and the follow-up cultures are negative   Seems to be tolerating the antibiotics without difficulty  The patient underwent ventriculoatrial shunt  His cognition is much improved since the procedure  -continue meropenem through 9/9/2020 to complete 2 weeks from the time the new shunt placement  -monitor CBC with diff and CMP  weekly while on meropenem  -neurosurgery follow-up ongoing  -followup CSF culture     3  Ischemic bowel with peritonitis   Status post exploratory laparotomy, VAC placement   Status post femoral artery and SMA artery cannulation for paraverine by vascular surgery   Suspect intra-abdominal infection is etiology of #2   Status post second-look on 08/01 with no resection performed   Peritoneal fluid culture also shows Pseudomonas   Now status post remove the entire  shunt apparatus  -antibiotic plan as above  -surgery follow-up     4  NPH requiring  shunt placement in 2005, status post recent revision in July 2019  Now status post removal of entire shunt apparatus    Patient now status post ventriculoatrial shunt     5  Lumbar spinal stenosis status post L5-S1 fusion on 2020      6  Diabetes mellitus with neuropathy      7  Rash-appears consistent with a drug eruption   Suspect secondary to the cefepime   Resolved since discontinuing the cefepime  Seems to be tolerating the meropenem without difficulty    Antibiotics:  Meropenem 14  Antibiotics 25   shunt removal 17  Postop day 1    Subjective:  Patient has no fever, chills, sweats; no nausea, vomiting, diarrhea; no cough, shortness of breath; no pain  No new symptoms  He is much more alert and talkative    Objective:  Vitals:  Temp:  [97 2 °F (36 2 °C)-98 9 °F (37 2 °C)] 98 2 °F (36 8 °C)  HR:  [50-66] 60  Resp:  [6-21] 20  BP: (115-122)/(51-65) 115/51  SpO2:  [95 %-100 %] 97 %  Temp (24hrs), Av °F (36 7 °C), Min:97 2 °F (36 2 °C), Max:98 9 °F (37 2 °C)  Current: Temperature: 98 2 °F (36 8 °C)    Physical Exam:   General Appearance:  Alert, interactive, nontoxic, no acute distress  Throat: Oropharynx moist without lesions  Lungs:   Clear to auscultation bilaterally; no wheezes, rhonchi or rales; respirations unlabored   Heart:  RRR; no murmur, rub or gallop   Abdomen:   Soft, non-tender, non-distended, positive bowel sounds  Extremities: No clubbing, cyanosis or edema   Skin: No new rashes or lesions  No draining wounds noted         Labs, Imaging, & Other studies:   All pertinent labs and imaging studies were personally reviewed  Results from last 7 days   Lab Units 20  0506 20  1234 20  0518   WBC Thousand/uL 10 71* 8 93 11 66*   HEMOGLOBIN g/dL 12 1 11 8* 13 3   PLATELETS Thousands/uL 189 193 241     Results from last 7 days   Lab Units 20  0506 20  1234 20  0518 20  0449   SODIUM mmol/L 141 142 137 139   POTASSIUM mmol/L 4 1 3 8 4 4 4 3   CHLORIDE mmol/L 107 104 102 103   CO2 mmol/L 28 31 32 31   BUN mg/dL 13 17 15 17   CREATININE mg/dL 0 56* 0 51* 0 71 0 73   EGFR ml/min/1 73sq m 104 108 94 93   CALCIUM mg/dL 8 3 7 8* 8 7 8 4   AST U/L --  13 16 16   ALT U/L  --  21 20 21   ALK PHOS U/L  --  86 106 94                    CT head-status post placement of left frontal ventricular shunt    Stable ventriculomegaly    Chest x-ray-clear lung fields, slight elevation left hemidiaphragm      Images personally reviewed by me in PACS

## 2020-08-25 NOTE — PROGRESS NOTES
Daily Progress Note - Critical Care   Lenora Xiong 69 y o  male MRN: [id number]  Unit/Bed#: ICU 13 Encounter: [id number]        ----------------------------------------------------------------------------------------  HPI: 69 y o  male who presents with S/P [facility] shunt placement  Patient has a history of obesity with ZHOU on CPAP, HTN, Gout, [id number], Normal Pressure Hydrocephalus with  shunt placement in 2005 with replacement in July 2020 and S/P L5-S1 spine surgery (07/06/2020) who originally presented to [facility] on 07/31 due to acute superficial gastritis with hemorrhage  CT imaging showed ischemic bowel  Patient subsequently had exploratory laparotomy, arteriogram of SMA, exploration of SMA and angiogram of SMA  Same day patient had  shunt externalized secondary to laparotomy for bowel ischemia and peritonitis  On 08/01, patient had repeat exploratory laparotomy and peritoneal fluid culture was positive for Pseudomonas  Surveillance aspiration of CSF from externalized showed growth of Pseudomonas as well  Patient was started on cefepime and on 08/07 patient's  shunt was removed due to development of severe sepsis and CNS infection  Patient was later transitioned to Meropenem and is currently day #14 of treatment  On 08/24 patient had Ventriculoatrial shunt placed and transferred to ICU for post-of monitoring  24hour events: [facility] shunt placed yesterday  CXR yesterday was negative for pneumothorax  Repeat CTH this morning, pending read  No events overnight      ---------------------------------------------------------------------------------------  SUBJECTIVE  Patient is more awake today and following commands  Denies any pain  Review of Systems   Constitutional: Negative for chills and fever  HENT: Negative for congestion  Eyes: Negative for visual disturbance  Respiratory: Negative for cough and shortness of breath  Cardiovascular: Negative for chest pain     Gastrointestinal: Negative for abdominal pain, constipation, diarrhea, nausea and vomiting  Musculoskeletal: Negative for back pain  Neurological: Negative for dizziness and headaches  All other systems reviewed and are negative       ---------------------------------------------------------------------------------------  Assessment and Plan:    · Diagnosis: Infection of  shunt  ? Hx of Normal pressure hydrocephalus with  shunt placed in 2005 and replacement in July 2020  ? Patient had externalization of  shunt and subsequent removal on 08/07 due to growth of pseudomonas on CSF cultures  ? ID following, appreciate recommendation  ? Plan: Continue Meropenem 2g q 8hrs, Day #14 with total Day #25 of antibiotics   ? Follow up repeat CSF culture and gram stain  · Diagnosis: Normal Pressure Hydrocephalus   ? Post-op Day #1 S/P [facility] shunt placement  ? Previously had  shunt placed in 2005  ? CXR 08/24, negative for pneumothorax  ? Repeat CT head performed this morning, pending read  ? Plan: continue to monitor neuro status with q2 neuro checks  ? Shunt Series later today  · Diagnosis: Metabolic Encephalopathy  ? Multifactorial, Secondary to severe sepsis 2/2  shunt infection with meningitis and peritonitis requiring multiple surgical procedures  ? Currently A&O to person and place only  ? S/P VA shunt placement 08/24  ? Plan: continue neuro q2 neuro checks  ? Continue Meropenem   · Delirium precautions: CAM-ICU daily, frequent reorientation, regulate sleep wake cycle        CV:   · Diagnosis: Hx of Hx  ? PTA Enalapril 5mg q daily (not on formulary)  ? Plan: Will continue Lisinopril 10mg q daily and hold for SBP <110        Pulm:  · Diagnosis: ZHOU on CPAP  ? Plan: discontinued due to patient's refusal  ? Continue to monitor oxygen saturation to maintain SpO2>92%        GI:   · Diagnosis: Nonocclusive Mesenteric Ischemia  ?  S/P Ex lap, arteriogram of SMA, exploration of SMA and angiogram SMA on 07/31/20 and repeat Ex Lap 08/01/2020  ? Staples removed on 08/18  ? General Surgery has signed off        :   · Diagnosis: No acute issues  · UOP: 1 1 last 24 hours, net + 1 8L  · Cr: 0 56, BUN: 13  ? Plan: Continue to monitor BUN/Cr and UOP        F/E/N:   · Fluids: Lactated Ringers 50ml/hr   · Electrolytes: continue to monitor and replete to maintain K>4, Phos>3, Mg >2  · Nutrition: Regular house diet; aspiration precautions  ·          Heme/Onc:   · Diagnosis: No acute issues  ? Plan: Monitor Hgb and replete if drop below 7        Endo:   · Diagnosis: Hx of Type 2 DM  ? Plan: Continue SSI Algorithm #4  ? Maintain BS between 140-180        ID:   · Diagnosis: Severe Sepsis secondary to  shunt infection with meningitis and peritonitis  ? Plan: Continue Meropenem 2g q8hrs  ? Follow up with repeat CSF studies  ? Continue to monitor WBC and fever curve  · Diagnosis: Thrush  ? Plan: started on Nystatin oral suspension        MSK/Skin:   · Diagnosis: Lumbar stenosis  ? S/P minimally invasive lumbar interbody fusion of L5/S1 on 07/06/20  ? Plan: Tylenol PRN for moderate pain, Jessica 5 for moderate pain  · Frequent repositioning and off pressure loading to prevent skin breakdown      Disposition: Continue Critical Care   Code Status: Level 1 - Full Code  ---------------------------------------------------------------------------------------  ICU CORE MEASURES    Prophylaxis   VTE Pharmacologic Prophylaxis: Pharmacologic VTE Prophylaxis contraindicated due to recent [facility] shunt placement  VTE Mechanical Prophylaxis: sequential compression device  Stress Ulcer Prophylaxis: Prophylaxis Not Indicated     ABCDE Protocol (if indicated)  Plan to perform spontaneous awakening trial today? Not applicable  Plan to perform spontaneous breathing trial today? Not applicable  Obvious barriers to extubation?  Not applicable  CAM-ICU: Negative    Invasive Devices Review  Invasive Devices     Peripherally Inserted Central Catheter Line            PICC Line 08/06/20 Left Basilic 18 days          Peripheral Intravenous Line            Peripheral IV 20 Right Arm less than 1 day          Arterial Line            Arterial Line 20 Right Radial less than 1 day              Can any invasive devices be discontinued today? Not applicable  ---------------------------------------------------------------------------------------  OBJECTIVE    Vitals   Vitals:    20 0300 20 0400 20 0500 20 0600   BP:       BP Location:       Pulse: (!) 54 58 62    Resp: 13 15 18    Temp:   97 7 °F (36 5 °C)    TempSrc:   Oral    SpO2: 97% 96% 95%    Weight:    112 kg (247 lb 12 8 oz)   Height:         Temp (24hrs), Av 7 °F (36 5 °C), Min:97 °F (36 1 °C), Max:98 9 °F (37 2 °C)  Current: Temperature: 97 7 °F (36 5 °C)  Arterial Line BP: 120/46  Arterial Line MAP (mmHg): 68 mmHg  Temp:  [97 °F (36 1 °C)-98 9 °F (37 2 °C)] 97 7 °F (36 5 °C)  HR:  [48-66] 56  Resp:  [6-21] 19  BP: (115-129)/(51-66) 115/51  Arterial Line BP: (108-160)/(40-67) 120/46  Temp (24hrs), Av 7 °F (36 5 °C), Min:97 °F (36 1 °C), Max:98 9 °F (37 2 °C)        Respiratory:  SpO2: SpO2: 95 %  Nasal Cannula O2 Flow Rate (L/min): 2 L/min    Invasive/non-invasive ventilation settings   Respiratory    Lab Data (Last 4 hours)    None         O2/Vent Data (Last 4 hours)    None                Physical Exam  Vitals signs reviewed  Constitutional:       Appearance: He is well-developed and well-nourished  HENT:        Comments: Stitches clean, intact, no discharge  Eyes:      Extraocular Movements: EOM normal       Pupils: Pupils are equal, round, and reactive to light  Neck:      Musculoskeletal: Neck supple  Cardiovascular:      Rate and Rhythm: Normal rate and regular rhythm  Pulmonary:      Effort: Pulmonary effort is normal       Breath sounds: Normal breath sounds  Abdominal:      General: Bowel sounds are normal       Palpations: Abdomen is soft     Musculoskeletal:         General: No tenderness  Skin:     General: Skin is warm  Neurological:      Mental Status: He is alert  Comments: A&Ox2 only to person and place  Motor strength 4/5 bilaterally         Laboratory and Diagnostics:  Results from last 7 days   Lab Units 08/25/20  0506 08/23/20  1234 08/21/20  0518 08/19/20  0449   WBC Thousand/uL 10 71* 8 93 11 66* 8 10   HEMOGLOBIN g/dL 12 1 11 8* 13 3 12 2   HEMATOCRIT % 38 3 37 8 43 3 38 8   PLATELETS Thousands/uL 189 193 241 234   NEUTROS PCT %  --  77*  --  76*   MONOS PCT %  --  8  --  9     Results from last 7 days   Lab Units 08/25/20  0506 08/23/20  1234 08/21/20  0518 08/19/20  0449   SODIUM mmol/L 141 142 137 139   POTASSIUM mmol/L 4 1 3 8 4 4 4 3   CHLORIDE mmol/L 107 104 102 103   CO2 mmol/L 28 31 32 31   ANION GAP mmol/L 6 7 3* 5   BUN mg/dL 13 17 15 17   CREATININE mg/dL 0 56* 0 51* 0 71 0 73   CALCIUM mg/dL 8 3 7 8* 8 7 8 4   GLUCOSE RANDOM mg/dL 103 92 111 112   ALT U/L  --  21 20 21   AST U/L  --  13 16 16   ALK PHOS U/L  --  86 106 94   ALBUMIN g/dL  --  2 3* 2 5* 2 2*   TOTAL BILIRUBIN mg/dL  --  0 43 0 55 0 36          Results from last 7 days   Lab Units 08/25/20  0506 08/24/20  0530 08/23/20  1234   INR  1 13 1 05 1 37*   PTT seconds 41*  --  36              ABG:    VBG:          Micro          Imaging:   XR chest portable   Final Result by Haylie Christianson DO (08/24 1924)      Left-sided PICC line extends to the cavoatrial junction  Clear lung fields  Tubing projects over the neck bilaterally  Workstation performed: [id number]         XR chest 1 view   Final Result by Dmitri Thomas DO (08/24 1306)      Fluoroscopic guidance provided for surgical procedure  Please refer to the separate procedure notes for additional details            Workstation performed: [id number]         CT head wo contrast   Final Result by Andry Drake DO (08/20 1605)      Stable ventricular enlargement post shunt removal       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  Workstation performed: [id number]         XR spine lumbar 2 or 3 views injury   Final Result by Lorena Rubio DO (08/19 2009)      Stable postoperative alignment  Workstation performed: [id number]         FL barium swallow video w speech   Final Result by BASIL BETH (08/12 1138)      FL lumbar puncture diagnostic   Final Result by Jenna Langston MD (08/11 2121)      Successful fluoroscopically guided lumbar puncture with an opening pressure of 15 cm H2O  Approximately 10 mL's of clear colorless CSF was removed and sent to lab for requested analysis  Closing pressure was 5 cm H2O  PERFORMED BY: Dr Haydee Austin and Kylah Quevedo PA-C      DICTATED AND SIGNED BY: Kylah Quevedo PA-C         Workstation performed: [id number]         CT abdomen pelvis w contrast   Final Result by Neysa Cushing, MD (08/11 1448)      No intra-abdominal fluid collection   No bowel obstruction            Workstation performed: [id number]         XR chest portable   Final Result by Roberto Raymond MD (08/10 1400)   Left PICC line tip projects over the cavoatrial junction satisfactory position without pneumothorax  No acute cardiopulmonary disease  Workstation performed: [id number]         CT head wo contrast   Final Result by Steve Gray MD (08/09 1530)      1  Interval removal of right frontal approach ventricular shunt catheter  Grossly stable size and configuration of prominent ventricular system  2   Stable encephalomalacia in the right superior frontal gyrus and left occipitotemporal region  3   Periapical lucency of retained root of right maxillary tooth  Workstation performed: [id number]         CT head wo contrast   Final Result by Elly Rahman DO (08/05 1334)      Continued mild enlargement of the ventricular system which continues to gradually enlarge compared to prior studies dated 7/21/2020 and 7/31/2020  Stable right frontal and left occipital encephalomalacia consistent with old infarcts  The study was marked in [facility] for immediate notification  Workstation performed: [id number]         XR chest portable   Final Result by Cassia Bob MD (07/31 1524)         1  Interval intubation with coiling of the nasogastric tube in the midesophagus  Repositioning of the the nasogastric tube advised  2   Endotracheal tube noted with the tip well above the tye  3   Scattered strandy densities likely related to diminished degree of inspiration  Workstation performed: [id number]         IR other   Final Result by Claudene Flank (08/06 7013)      CT head without contrast   ED Interpretation by Jeffrey Nunez MD (07/31 0141)   CT read by radiologist; will discuss with medicine  Final Result by Haleigh Jernigan DO (07/31 0127)      Minimal increase in size of ventricular system      The study was marked in EPIC for immediate notification  Workstation performed: [id number]         PE Study with CT Abdomen and Pelvis with contrast   Final Result by Haleigh Jernigan DO (07/31 0150)      Fluid-filled loops of small bowel with pneumatosis and bowel wall thickening  Findings are suspicious for ischemic bowel  Nodular airspace opacities within the right upper lobe  Findings may represent infection  I personally discussed this study with Verenice Lamar on 7/31/2020 at 1:46 AM                               Workstation performed: [id number]         CT head wo contrast    (Results Pending)   XR shunt series    (Results Pending)         Intake and Output  I/O       08/23 0701 - 08/24 0700 08/24 0701 - 08/25 0700    P  O  630     I V  (mL/kg) 900 (8 3) 2445 (21 8)    IV Piggyback 200 450    Total Intake(mL/kg) 1730 (15 9) 2895 (25 8)    Urine (mL/kg/hr) 677 (0 3) 1068 (0 4)    Total Output 677 1068    Net +1053 +1827          Unmeasured Urine Occurrence 2 x         UOP: 0 4ml/kg/hr    Height and Weights   Height: 6' 3" (190 5 cm)  IBW: 84 5 kg  Body mass index is 30 97 kg/m²  Weight (last 2 days)     Date/Time   Weight    08/25/20 0600   112 (247 8)                Nutrition       Diet Orders   (From admission, onward)             Start     Ordered    08/24/20 1340  Diet Regular; Regular House  Diet effective now     Question Answer Comment   Diet Type Regular    Regular Regular House    RD to adjust diet per protocol?  Yes        08/24/20 1339    08/17/20 1420  Dietary nutrition supplements  Once     Question Answer Comment   Select Supplement: MightyShake    Frequency Breakfast, Lunch, Dinner        08/17/20 1419    08/15/20 1127  Dietary nutrition supplements  Once     Question Answer Comment   Select Supplement: Magic Cup Chocolate    Frequency Lunch        08/15/20 1127    08/15/20 1127  Dietary nutrition supplements  Once     Question Answer Comment   Select Supplement: Magic Cup [place]    Frequency Dinner        08/15/20 1127                    Active Medications  Scheduled Meds:  Current Facility-Administered Medications   Medication Dose Route Frequency Provider Last Rate    acetaminophen  325 mg Rectal Q6H PRN MALISSA Pappas-LAURA      acetaminophen  650 mg Oral Q6H PRN Abner Monroy PA-C      diphenhydrAMINE  25 mg Intravenous Q6H PRN MALISSA Cheung-LAURA      docusate sodium  100 mg Oral BID Pauline Monroy PA-C      hydrocortisone   Topical 4x Daily PRN MALISSA Pappas-C      HYDROmorphone  0 2 mg Intravenous Q4H PRN Pauline Monroy PA-LAURA      insulin lispro  2-12 Units Subcutaneous 4x Daily (AC & HS) MALISSA Cheung-LAURA      iodixanol  15 mL Intravenous Once in imaging MALISSA Cheung-LAURA      lactated ringers  50 mL/hr Intravenous Continuous Ornelas [place], CRNA Stopped (08/24/20 1104)    meropenem  2,000 mg Intravenous Q8H MALISSA Cheung-LAURA Stopped (08/25/20 0434)   Wash Caller VERONICA ANTIFUNGAL   Topical BID [place] EVI Monroy      ondansetron  4 mg Intravenous Q6H PRN Ursula Perales PA-C      oxyCODONE  5 mg Oral Q4H PRN Ursula Perales PA-C      senna  1 tablet Oral Daily Paulineavi Monroy PA-C      sodium chloride  75 mL/hr Intravenous Continuous Pauline LUZ Monroy PA-C Stopped (08/24/20 1345)    sodium chloride  125 mL/hr Intravenous Continuous Pauline N Ireifereese, PA-C      sodium chloride  75 mL/hr Intravenous Continuous Pauline N Porfirio, PA-C 75 mL/hr (08/25/20 0507)     Continuous Infusions:  lactated ringers, 50 mL/hr, Last Rate: Stopped (08/24/20 1104)  sodium chloride, 75 mL/hr, Last Rate: Stopped (08/24/20 1345)  sodium chloride, 125 mL/hr  sodium chloride, 75 mL/hr, Last Rate: 75 mL/hr (08/25/20 0507)      PRN Meds:   acetaminophen, 325 mg, Q6H PRN  acetaminophen, 650 mg, Q6H PRN  diphenhydrAMINE, 25 mg, Q6H PRN  hydrocortisone, , 4x Daily PRN  HYDROmorphone, 0 2 mg, Q4H PRN  iodixanol, 15 mL, Once in imaging  ondansetron, 4 mg, Q6H PRN  oxyCODONE, 5 mg, Q4H PRN        Allergies   Allergies   Allergen Reactions    Pollen Extract Allergic Rhinitis     ---------------------------------------------------------------------------------------  Advance Directive and Living Will:      Power of :    POLST:    ---------------------------------------------------------------------------------------  Care Time Delivered:   No Critical Care time spent     [facility], DO      Portions of the record may have been created with voice recognition software  Occasional wrong word or "sound a like" substitutions may have occurred due to the inherent limitations of voice recognition software    Read the chart carefully and recognize, using context, where substitutions have occurred

## 2020-08-25 NOTE — ASSESSMENT & PLAN NOTE
Lab Results   Component Value Date    HGBA1C 5 6 07/31/2020       Recent Labs     08/24/20  1528 08/24/20  1757 08/24/20  2212 08/25/20  1246   POCGLU 171* 146* 112 100       Blood Sugar Average: Last 72 hrs:  (P) 916 [id number]     Continue medical management per primary team

## 2020-08-26 ENCOUNTER — APPOINTMENT (INPATIENT)
Dept: RADIOLOGY | Facility: HOSPITAL | Age: 70
DRG: 031 | End: 2020-08-26
Payer: COMMERCIAL

## 2020-08-26 LAB
ANION GAP SERPL CALCULATED.3IONS-SCNC: 4 MMOL/L (ref 4–13)
BASOPHILS # BLD AUTO: 0.04 THOUSANDS/ΜL (ref 0–0.1)
BASOPHILS NFR BLD AUTO: 0 % (ref 0–1)
BUN SERPL-MCNC: 16 MG/DL (ref 5–25)
CALCIUM SERPL-MCNC: 7.7 MG/DL (ref 8.3–10.1)
CHLORIDE SERPL-SCNC: 108 MMOL/L (ref 100–108)
CO2 SERPL-SCNC: 28 MMOL/L (ref 21–32)
CREAT SERPL-MCNC: 0.52 MG/DL (ref 0.6–1.3)
EOSINOPHIL # BLD AUTO: 0.25 THOUSAND/ΜL (ref 0–0.61)
EOSINOPHIL NFR BLD AUTO: 2 % (ref 0–6)
ERYTHROCYTE [DISTWIDTH] IN BLOOD BY AUTOMATED COUNT: 14.9 % (ref 11.6–15.1)
GFR SERPL CREATININE-BSD FRML MDRD: 107 ML/MIN/1.73SQ M
GLUCOSE SERPL-MCNC: 108 MG/DL (ref 65–140)
GLUCOSE SERPL-MCNC: 113 MG/DL (ref 65–140)
GLUCOSE SERPL-MCNC: 123 MG/DL (ref 65–140)
GLUCOSE SERPL-MCNC: 127 MG/DL (ref 65–140)
GLUCOSE SERPL-MCNC: 129 MG/DL (ref 65–140)
GLUCOSE SERPL-MCNC: 132 MG/DL (ref 65–140)
HCT VFR BLD AUTO: 37.3 % (ref 36.5–49.3)
HGB BLD-MCNC: 11.9 G/DL (ref 12–17)
IMM GRANULOCYTES # BLD AUTO: 0.14 THOUSAND/UL (ref 0–0.2)
IMM GRANULOCYTES NFR BLD AUTO: 1 % (ref 0–2)
LYMPHOCYTES # BLD AUTO: 1.04 THOUSANDS/ΜL (ref 0.6–4.47)
LYMPHOCYTES NFR BLD AUTO: 10 % (ref 14–44)
MAGNESIUM SERPL-MCNC: 2.4 MG/DL (ref 1.6–2.6)
MCH RBC QN AUTO: 27 PG (ref 26.8–34.3)
MCHC RBC AUTO-ENTMCNC: 31.9 G/DL (ref 31.4–37.4)
MCV RBC AUTO: 85 FL (ref 82–98)
MONOCYTES # BLD AUTO: 0.91 THOUSAND/ΜL (ref 0.17–1.22)
MONOCYTES NFR BLD AUTO: 9 % (ref 4–12)
NEUTROPHILS # BLD AUTO: 7.88 THOUSANDS/ΜL (ref 1.85–7.62)
NEUTS SEG NFR BLD AUTO: 78 % (ref 43–75)
NRBC BLD AUTO-RTO: 0 /100 WBCS
PHOSPHATE SERPL-MCNC: 2.2 MG/DL (ref 2.3–4.1)
PLATELET # BLD AUTO: 178 THOUSANDS/UL (ref 149–390)
PMV BLD AUTO: 9.7 FL (ref 8.9–12.7)
POTASSIUM SERPL-SCNC: 4 MMOL/L (ref 3.5–5.3)
RBC # BLD AUTO: 4.4 MILLION/UL (ref 3.88–5.62)
SODIUM SERPL-SCNC: 140 MMOL/L (ref 136–145)
WBC # BLD AUTO: 10.26 THOUSAND/UL (ref 4.31–10.16)

## 2020-08-26 PROCEDURE — 82948 REAGENT STRIP/BLOOD GLUCOSE: CPT

## 2020-08-26 PROCEDURE — 99024 POSTOP FOLLOW-UP VISIT: CPT | Performed by: NURSE PRACTITIONER

## 2020-08-26 PROCEDURE — 99233 SBSQ HOSP IP/OBS HIGH 50: CPT | Performed by: EMERGENCY MEDICINE

## 2020-08-26 PROCEDURE — 70250 X-RAY EXAM OF SKULL: CPT

## 2020-08-26 PROCEDURE — 80048 BASIC METABOLIC PNL TOTAL CA: CPT | Performed by: FAMILY MEDICINE

## 2020-08-26 PROCEDURE — 84100 ASSAY OF PHOSPHORUS: CPT | Performed by: FAMILY MEDICINE

## 2020-08-26 PROCEDURE — 92526 ORAL FUNCTION THERAPY: CPT

## 2020-08-26 PROCEDURE — NC001 PR NO CHARGE: Performed by: EMERGENCY MEDICINE

## 2020-08-26 PROCEDURE — 85025 COMPLETE CBC W/AUTO DIFF WBC: CPT | Performed by: FAMILY MEDICINE

## 2020-08-26 PROCEDURE — 83735 ASSAY OF MAGNESIUM: CPT | Performed by: FAMILY MEDICINE

## 2020-08-26 PROCEDURE — 99233 SBSQ HOSP IP/OBS HIGH 50: CPT | Performed by: INTERNAL MEDICINE

## 2020-08-26 RX ORDER — ESCITALOPRAM OXALATE 10 MG/1
10 TABLET ORAL DAILY
Status: DISCONTINUED | OUTPATIENT
Start: 2020-08-27 | End: 2020-08-29 | Stop reason: HOSPADM

## 2020-08-26 RX ADMIN — MEROPENEM 2000 MG: 1 INJECTION, POWDER, FOR SOLUTION INTRAVENOUS at 02:27

## 2020-08-26 RX ADMIN — NYSTATIN 500000 UNITS: 100000 SUSPENSION ORAL at 09:30

## 2020-08-26 RX ADMIN — HEPARIN SODIUM 5000 UNITS: 5000 INJECTION INTRAVENOUS; SUBCUTANEOUS at 21:19

## 2020-08-26 RX ADMIN — MELATONIN 3 MG: at 21:19

## 2020-08-26 RX ADMIN — MEROPENEM 2000 MG: 1 INJECTION, POWDER, FOR SOLUTION INTRAVENOUS at 20:38

## 2020-08-26 RX ADMIN — NYSTATIN 500000 UNITS: 100000 SUSPENSION ORAL at 17:29

## 2020-08-26 RX ADMIN — SENNOSIDES 8.6 MG: 8.6 TABLET ORAL at 09:30

## 2020-08-26 RX ADMIN — DOCUSATE SODIUM 100 MG: 100 CAPSULE, LIQUID FILLED ORAL at 09:30

## 2020-08-26 RX ADMIN — POTASSIUM & SODIUM PHOSPHATES POWDER PACK 280-160-250 MG 1 PACKET: 280-160-250 PACK at 09:30

## 2020-08-26 RX ADMIN — HEPARIN SODIUM 5000 UNITS: 5000 INJECTION INTRAVENOUS; SUBCUTANEOUS at 05:23

## 2020-08-26 RX ADMIN — OXYCODONE HYDROCHLORIDE 5 MG: 5 TABLET ORAL at 23:30

## 2020-08-26 RX ADMIN — MICONAZOLE NITRATE: 20 CREAM TOPICAL at 09:31

## 2020-08-26 RX ADMIN — MICONAZOLE NITRATE: 20 CREAM TOPICAL at 17:29

## 2020-08-26 RX ADMIN — NYSTATIN 500000 UNITS: 100000 SUSPENSION ORAL at 21:19

## 2020-08-26 RX ADMIN — MEROPENEM 2000 MG: 1 INJECTION, POWDER, FOR SOLUTION INTRAVENOUS at 11:08

## 2020-08-26 RX ADMIN — LISINOPRIL 10 MG: 10 TABLET ORAL at 09:30

## 2020-08-26 RX ADMIN — HEPARIN SODIUM 5000 UNITS: 5000 INJECTION INTRAVENOUS; SUBCUTANEOUS at 14:34

## 2020-08-26 NOTE — PROGRESS NOTES
Progress Note - Rashad Nicolas 1950, 69 y o  male MRN: [id number]    Unit/Bed#: ICU 13 Encounter: [id number]    Primary Care Provider: Kailey Becerra   Date and time admitted to hospital: 7/30/2020 11:12 PM        Normal pressure hydrocephalus ([address] )  Assessment & Plan  POD2 left ventriculoatrial shunt placement  2 weeks s/p  shunt removal 8/7/2020  · Externalization 7/30 secondary to laparotomy for bowel ischemia/peritonitis  · S/p VPS placement for NPH 2005, revision 2011  · Shunt last adjusted to 100 cm of water 7/21/2020 for increasing size of bilateral subdural hygromas  · Sutures placed over about beginning in July for thinning skin  Imaging:   · CT head without 08/25/2020:  Status post placement of left frontal ventriculoperitoneal shunt catheter  Stable ventriculomegaly  · CT head without 8/20/2020: Stable ventricular enlargement post shunt removal  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    Plan:    · Frequent neuro checks  · Stat CT head with decline in GCS greater than 2 points in 1 hour  · Chest x-ray for shunt placement  Skull x-ray pending  · Left shunt reservoir compresses and refills  · ID following  · All repeat BC and CSF have been negative  · Afebrile at this time  · Continues on meropenem through 09/09/2020 (total of 2 weeks)  · CSF cultures from shunt placement NTD  · DVT prophylaxis:  SCDs and heparin  Neurosurgery will sign off  Follow up in 2 weeks OP as scheduled with repeat CT head  Please call with questions or concerns  Subjective/Objective   Chief Complaint: "I'm ok "    Subjective: Denies HA or discomfort  Denies nausea or vomiting  Objective: AOx1  Moving all extremities  Surgical incisions at left front-parietal area and left neck clean and dry  I/O       08/24 8003 - 08/25 0700 08/25 0701 - 08/26 0700 08/26 0701 - 08/27 0700    P  O   600 140    I V  (mL/kg) 2445 (21 8) 1840 (16 1)     IV Piggyback 450 565     Total Intake(mL/kg) 2895 (25 8) 3005 (26 4) 140 (1 2)    Urine (mL/kg/hr) 1068 (0 4) 1184 (0 4)     Stool  0     Total Output 1068 1184     Net +1827 +1821 +140           Unmeasured Urine Occurrence  1 x     Unmeasured Stool Occurrence  4 x           Invasive Devices     Peripherally Inserted Central Catheter Line            PICC Line 82/50/00 Left Basilic 20 days          Peripheral Intravenous Line            Peripheral IV 08/24/20 Right Arm 2 days          Arterial Line            Arterial Line 08/24/20 Right Radial 2 days                Physical Exam:  Vitals: Blood pressure 115/51, pulse (!) 54, temperature 98 5 °F (36 9 °C), temperature source Oral, resp  rate 20, height 6' 3" (1 905 m), weight 114 kg (251 lb 5 2 oz), SpO2 94 %  ,Body mass index is 31 41 kg/m²  Hemodynamic Monitoring: MAP: Arterial Line MAP (mmHg): 66 mmHg    General appearance: alert, appears stated age, cooperative and no distress  Head: Normocephalic, older right frontal incision clean and dry, left fronto-parietal incision clean and dry, well-approximated  Eyes: EOMI, PERRL  Neck: supple, symmetrical, trachea midline and NT  Back: no kyphosis present, no tenderness to percussion or palpation  Lungs: non labored breathing  Heart: regular heart rate  Neurologic:   Mental status: Alert, oriented x1, thought content appropriate  Cranial nerves: grossly intact (Cranial nerves II-XII), unable to upgaze  Sensory: normal to light touch  Motor:  Strength grossly 4/5 secondary to generalized deconditioning    Coordination: finger to nose normal bilaterally, no drift bilaterally      Lab Results:  Results from last 7 days   Lab Units 08/26/20  0451 08/25/20  0506 08/23/20  1234   WBC Thousand/uL 10 26* 10 71* 8 93   HEMOGLOBIN g/dL 11 9* 12 1 11 8*   HEMATOCRIT % 37 3 38 3 37 8   PLATELETS Thousands/uL 178 189 193   NEUTROS PCT % 78*  --  77*   MONOS PCT % 9  --  8     Results from last 7 days   Lab Units 08/26/20  0451 08/25/20  0506 08/23/20  1234 08/21/20  0518   POTASSIUM mmol/L 4 0 4 1 3 8 4 4   CHLORIDE mmol/L 108 107 104 102   CO2 mmol/L 28 28 31 32   BUN mg/dL 16 13 17 15   CREATININE mg/dL 0 52* 0 56* 0 51* 0 71   CALCIUM mg/dL 7 7* 8 3 7 8* 8 7   ALK PHOS U/L  --   --  86 106   ALT U/L  --   --  21 20   AST U/L  --   --  13 16     Results from last 7 days   Lab Units 08/26/20  0451   MAGNESIUM mg/dL 2 4     Results from last 7 days   Lab Units 08/26/20  0451   PHOSPHORUS mg/dL 2 2*     Results from last 7 days   Lab Units 08/25/20  0506 08/24/20  0530 08/23/20  1234   INR  1 13 1 05 1 37*   PTT seconds 41*  --  36     No results found for: TROPONINT  ABG:  Lab Results   Component Value Date    PHART 7 357 07/31/2020    PKS8WDR 32 3 (L) 07/31/2020    PO2ART 87 8 07/31/2020    HJP3BQH 17 7 (L) 07/31/2020    BEART -6 7 07/31/2020    SOURCE Line, Arterial 07/31/2020       Imaging Studies: I have personally reviewed pertinent reports  and I have personally reviewed pertinent films in PACS    Xr Chest Portable    Result Date: 7/31/2020  Impression: 1  Interval intubation with coiling of the nasogastric tube in the midesophagus  Repositioning of the the nasogastric tube advised  2   Endotracheal tube noted with the tip well above the tye  3   Scattered strandy densities likely related to diminished degree of inspiration  Workstation performed: [id number]     Ct Head Without Contrast    Result Date: 7/31/2020  Impression: Minimal increase in size of ventricular system The study was marked in EPIC for immediate notification  Workstation performed: [id number]     Pe Study With Ct Abdomen And Pelvis With Contrast    Result Date: 7/31/2020  Impression: Fluid-filled loops of small bowel with pneumatosis and bowel wall thickening  Findings are suspicious for ischemic bowel  Nodular airspace opacities within the right upper lobe  Findings may represent infection    I personally discussed this study with Maricruz De La O on 7/31/2020 at 1:46 AM  Workstation performed: [id number]       EKG, Pathology, and Other Studies: I have personally reviewed pertinent reports        VTE Pharmacologic Prophylaxis: Sequential compression device (Venodyne)  and Heparin    VTE Mechanical Prophylaxis: sequential compression device

## 2020-08-26 NOTE — PROGRESS NOTES
Progress Note - Infectious Disease   Juliano Wofle 69 y o  male MRN: [id number]  Unit/Bed#: ICU 13 Encounter: [id number]      Impression/Plan:  1  Severe sepsis, tachycardia, leukocytosis, elevated lactic acid   With early development of high fevers  Secondary to  shunt infection with meningitis and peritonitis   Repeat blood cultures negative  All the  shunt apparatus has been removed  Repeat CSF cultures negative   Patient much improved  -antibiotic plan as below  -monitor temperatures and hemodynamics  -monitor CBC with diff and and CMP weekly while on the meropenem  -supportive care      2   shunt infection  Fluid WBC count was 61 with neutrophil predominance   Pseudomonas aeruginosa  Suspect secondary to peritonitis in the setting of #3    Now status post removal of the entire  shunt apparatus    Repeat CSF analysis looks fairly bland and the follow-up cultures are negative   Seems to be tolerating the antibiotics without difficulty   The patient underwent ventriculoatrial shunt  His cognition is much improved since the procedure  CSF culture negative thus far  -continue meropenem through 9/9/2020 to complete 2 weeks from the time the new shunt placement  -monitor CBC with diff and CMP  weekly while on meropenem  -neurosurgery follow-up ongoing  -followup CSF culture     3   Ischemic bowel with peritonitis   Status post exploratory laparotomy, VAC placement   Status post femoral artery and SMA artery cannulation for paraverine by vascular surgery   Suspect intra-abdominal infection is etiology of #2   Status post second-look on 08/01 with no resection performed   Peritoneal fluid culture also shows Pseudomonas   Now status post remove the entire  shunt apparatus  -antibiotic plan as above  -surgery follow-up     4  NPH requiring  shunt placement in 2005, status post recent revision in July 2019  Now status post removal of entire shunt apparatus   Patient now status post ventriculoatrial shunt     5  Lumbar spinal stenosis status post L5-S1 fusion on 2020      6  Diabetes mellitus with neuropathy      7  Rash-appears consistent with a drug eruption   Suspect secondary to the cefepime   Resolved since discontinuing the cefepime  Seems to be tolerating the meropenem without difficulty    Possible transfer to rehab soon  Antibiotics:  Meropenem 15  Antibiotics 26   shunt removal 18  Postop day 2    Subjective:  Patient has no fever, chills, sweats; no nausea, vomiting, diarrhea; no cough, shortness of breath; no pain  No new symptoms  Objective:  Vitals:  Temp:  [97 6 °F (36 4 °C)-98 7 °F (37 1 °C)] 97 6 °F (36 4 °C)  HR:  [52-76] 52  Resp:  [14-26] 20  BP: ()/(50-70) 82/50  SpO2:  [93 %-99 %] 96 %  Temp (24hrs), Av 4 °F (36 9 °C), Min:97 6 °F (36 4 °C), Max:98 7 °F (37 1 °C)  Current: Temperature: 97 6 °F (36 4 °C)    Physical Exam:   General Appearance:  Alert, interactive, nontoxic, no acute distress  Throat: Oropharynx moist without lesions  Lungs:   Clear to auscultation bilaterally; no wheezes, rhonchi or rales; respirations unlabored   Heart:  Bradycardic; no murmur, rub or gallop   Abdomen:   Soft, non-tender, non-distended, positive bowel sounds  Extremities: No clubbing, cyanosis or edema   Skin: No new rashes or lesions  No draining wounds noted         Labs, Imaging, & Other studies:   All pertinent labs and imaging studies were personally reviewed  Results from last 7 days   Lab Units 20  0451 20  0506 20  1234   WBC Thousand/uL 10 26* 10 71* 8 93   HEMOGLOBIN g/dL 11 9* 12 1 11 8*   PLATELETS Thousands/uL 178 189 193     Results from last 7 days   Lab Units 20  0451 20  0506 20  1234 20  0518   SODIUM mmol/L 140 141 142 137   POTASSIUM mmol/L 4 0 4 1 3 8 4 4   CHLORIDE mmol/L 108 107 104 102   CO2 mmol/L 28 28 31 32   BUN mg/dL 16 13 17 15   CREATININE mg/dL 0 52* 0 56* 0 51* 0 71   EGFR ml/min/1 73sq m 107 104 108 94   CALCIUM mg/dL 7 7* 8 3 7 8* 8 7   AST U/L  --   --  13 16   ALT U/L  --   --  21 20   ALK PHOS U/L  --   --  86 106

## 2020-08-26 NOTE — WOUND OSTOMY CARE
Progress Note - Wound   Izabel Soto 69 y o  male MRN: [id number]  Unit/Bed#: ICU 13 Encounter: [id number]        Assessment:   Patient is seen for wound follow up  Patient examined in bed with primary nurse present  Patient is dependent with positioning  Patient is incontinent  Findings  1  MASD with yeast on sacro-buttocks and groin  2  Stage 2 pressure injury left buttock        3  Partial thickness wound on right posterior thigh  Skin is resurfacing and blanchable        4  unstageable pressure injury on nose presenting as a dry black eschar      See flowsheets for details      Skin care plans:  1-Apply VERONICA to sacrum, buttocks, groin BID and PRN  2-Hydraguard to bilateral heel BID and PRN  3-Elevate heels to offload pressure  Heels in prevalon boots  4-Ehob cushion when out of bed  5-Turn/repoisiton q2h or when medically stable for pressure re-distribution on skin    6-Moisturize skin daily with skin nourishing cream  7-3M skin protectant to nose daily  8-Low air loss mattress           Call or tigertext with any questions  Wound Care will continue to follow    Wound 07/31/20 Moisture associated skin damage Sacrum (Active)   Wound Image   08/13/20 0956   Wound Description Clean;Fragile;Epithelialization 08/26/20 1039   Pressure Injury Stage  08/21/20 1611   Frances-wound Assessment Intact;Fragile 08/26/20 1039   Wound Length (cm) 3 cm 08/20/20 1024   Wound Width (cm) 0 5 cm 08/20/20 1024   Wound Depth (cm) 0 1 cm 08/20/20 1024   Wound Surface Area (cm^2) 1 5 cm^2 08/20/20 1024   Wound Volume (cm^3) 0 15 cm^3 08/20/20 1024   Calculated Wound Volume (cm^3) 0 15 cm^3 08/20/20 1024   Change in Wound Size % -275 08/20/20 1024   Drainage Amount None 08/26/20 0800   Drainage Description Serosanguineous 08/25/20 1500   Non-staged Wound Description Partial thickness 08/26/20 0800   Treatments Cleansed;Site care 08/25/20 2200   Dressing Protective barrier;Open to air 08/26/20 0800   Dressing Changed New 08/17/20 0950 Patient Tolerance Tolerated well 08/25/20 2200   Dressing Status Clean;Dry; Intact 08/19/20 1500       Wound 08/13/20 Nose (Active)   Wound Image   08/26/20 1026   Wound Description Clean;Dry; Intact; Brown 08/26/20 1026   Pressure Injury Stage U 08/26/20 1026   Frances-wound Assessment Clean;Dry; Intact 08/26/20 1026   Wound Length (cm) 0 3 cm 08/26/20 1026   Wound Width (cm) 0 3 cm 08/26/20 1026   Wound Depth (cm) 0 cm 08/13/20 0950   Wound Surface Area (cm^2) 0 09 cm^2 08/26/20 1026   Wound Volume (cm^3) 0 cm^3 08/13/20 0950   Calculated Wound Volume (cm^3) 0 cm^3 08/13/20 0950   Drainage Amount None 08/26/20 0800   Non-staged Wound Description Not applicable 34/41/81 7677   Treatments Cleansed 08/25/20 2200   Dressing Open to air 08/26/20 1026   Patient Tolerance Tolerated well 08/25/20 2200       Wound 08/13/20 Thigh Posterior;Proximal;Right (Active)   Wound Image   08/26/20 1041   Wound Description Clean;Dry;Fragile;Pink 08/26/20 1041   Frances-wound Assessment Clean;Dry; Intact 08/26/20 1041   Wound Length (cm) 0 5 cm 08/26/20 1041   Wound Width (cm) 2 3 cm 08/26/20 1041   Wound Depth (cm) 0 1 cm 08/26/20 1041   Wound Surface Area (cm^2) 1 15 cm^2 08/26/20 1041   Wound Volume (cm^3) 0 12 cm^3 08/26/20 1041   Calculated Wound Volume (cm^3) 0 12 cm^3 08/26/20 1041   Change in Wound Size % 92 08/26/20 1041   Drainage Amount None 08/26/20 1041   Drainage Description Serosanguineous 08/26/20 0800   Non-staged Wound Description Partial thickness 08/26/20 1041   Treatments Cleansed;Site care 08/25/20 2200   Dressing Moisture barrier;Protective barrier 08/26/20 1041   Wound packed? No 08/26/20 0600   Dressing Changed New 08/17/20 0950   Patient Tolerance Tolerated well 08/25/20 2200   Dressing Status Clean;Dry; Intact 08/18/20 0950       Wound 08/16/20 Sacrum Left (Active)   Wound Image   08/26/20 1039   Wound Description Dry;Brown;Pink; White;Epithelialization 08/26/20 1039   Pressure Injury Stage 2 08/26/20 1039   Frances-wound Assessment Clean;Dry; Intact;Fragile 08/26/20 1039   Wound Length (cm) 3 cm 08/26/20 1039   Wound Width (cm) 5 5 cm 08/26/20 1039   Wound Depth (cm) 0 1 cm 08/26/20 1039   Wound Surface Area (cm^2) 16 5 cm^2 08/26/20 1039   Wound Volume (cm^3) 1 65 cm^3 08/26/20 1039   Calculated Wound Volume (cm^3) 1 65 cm^3 08/26/20 1039   Change in Wound Size % -22 22 08/26/20 1039   Treatments Cleansed 08/26/20 1039   Dressing Protective barrier;Moisture barrier;Open to air 08/26/20 1039   Wound packed? No 08/26/20 0600   Dressing Changed New 08/17/20 0950   Patient Tolerance Tolerated well 08/25/20 2200   Dressing Status Clean;Dry; Intact 08/19/20 2100

## 2020-08-26 NOTE — ASSESSMENT & PLAN NOTE
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

## 2020-08-26 NOTE — ASSESSMENT & PLAN NOTE
Lab Results   Component Value Date    HGBA1C 5 6 07/31/2020       Recent Labs     08/25/20  1246 08/25/20  1749 08/25/20 2027 08/26/20  0827   POCGLU 100 101 129 123       Blood Sugar Average: Last 72 hrs:  (P) 123 5     Continue medical management per primary team

## 2020-08-26 NOTE — SPEECH THERAPY NOTE
F/U to swallowing evaluation completed  I met with staff in am and pm (staff feeding/fed pt)  Pt tolerated 100% of trays and in a timely manner per RN (compared to 1 tray of regular textured food when ordered p/o)  No overt s/s aspiration noted or reported with pureed or minced foods and nectar thick liquid)  I reviewed VBS results with staff (and Aspiration Precautions remain posted in room ICU13)

## 2020-08-26 NOTE — PROGRESS NOTES
Daily Progress Note - Critical Care   Rashad Nicolas 69 y o  male MRN: [id number]  Unit/Bed#: ICU 13 Encounter: [id number]        ----------------------------------------------------------------------------------------  HPI: 69 y  o  male who presents with S[facility]P [facility]unt placement  Patient has a history of obesity with ZHOU on CPAP, HTN, Gout, [id number], Normal Pressure Hydrocephalus with  shunt placement in 2005 with replacement in July 2020 and S/P L5-S1 spine surgery (07/06/2020) who originally presented to [facility] 07/31 due to acute superficial gastritis with hemorrhage   CT imaging showed ischemic bowel  Patient subsequently had exploratory laparotomy, arteriogram of SMA, exploration of SMA and angiogram of SMA  Same day patient had  shunt externalized secondary to laparotomy for bowel ischemia and peritonitis   On 08/01, patient had repeat exploratory laparotomy and peritoneal fluid culture was positive for Pseudomonas  Surveillance aspiration of CSF from externalized showed growth of Pseudomonas as well  Patient was started on cefepime and on 08/07 patient's  shunt was removed due to development of severe sepsis and CNS infection  Patient was later transitioned to Meropenem and is currently day #14 of treatment  On 08/24 patient had Ventriculoatrial shunt placed and transferred to ICU for post-op monitoring  24hr events: No acute events overnight  A line is noted to be positional     ---------------------------------------------------------------------------------------  SUBJECTIVE  Patient seen and examined at baseline  No complaints this morning  Denies any headache, dizziness, chest pain, SOB, abdominal pain, N/V, or other issues  Review of Systems  Review of systems was reviewed and negative unless stated above in HPI/24-hour events   ---------------------------------------------------------------------------------------  Assessment and Plan:    · Diagnosis: Infection of  shunt  ?  Hx of Normal pressure hydrocephalus with  shunt placed in 2005 and replacement in July 2020  ? Patient had externalization of  shunt and subsequent removal on 08/07 due to growth of pseudomonas on CSF cultures  ? ID following, appreciate recommendation: continue Meropenem through 9/9/20; monitor CBC with diff and CMP weekly   ? -Repeat CSF from 08/24 showed no growth  ? Plan: Continue Meropenem 2g q 8hrs, Day #15 with total Day #26 of antibiotics   · Diagnosis: Normal Pressure Hydrocephalus   ? Post-op Day #2 [facility]/P [facility] shunt placement  ? Previously had  shunt placed in 2005  ? CXR 08/24, negative for pneumothorax  ? Repeat CT head 08/25 showed stable ventricularmegaly  ? Plan: continue to monitor neuro status with q2 neuro checks during the day and q4 at night  ? Shunt Series later today  · Diagnosis: Metabolic Encephalopathy  ? Multifactorial, Secondary to severe sepsis 2/2  shunt infection with meningitis and peritonitis requiring multiple surgical procedures  ? Currently A&O x3  ? S/P [facility] shunt placement 08/24  ? Plan: continue neuro q2 neuro checks during the day and q4 at night  ? Continue Meropenem until 09/09  · Delirium precautions: CAM-ICU daily, frequent reorientation, regulate sleep wake cycle, melatonin 3mg qHS        CV:   · Diagnosis: Hx of Hx  ? PTA Enalapril 5mg q daily (not on formulary)  ? Plan: Will continue Lisinopril 10mg q daily and hold for SBP <110        Pulm:  · Diagnosis: ZHOU on CPAP  ? Plan: discontinued due to patient's refusal  ? Continue to monitor oxygen saturation to maintain SpO2>92%        GI:   · Diagnosis: Nonocclusive Mesenteric Ischemia  ? S/P Ex lap, arteriogram of SMA, exploration of SMA and angiogram SMA on 07/31/20 and repeat Ex Lap 08/01/2020  ? Staples removed on 08/18  ? General Surgery has signed off        :   · Diagnosis: No acute issues  · UOP: 1 2 last 24 hours, net + 1 8L during lats 24hrs, and net positive 6L since 08/12   · Cr: 0 56, BUN: 13  ?  Plan: Continue to monitor BUN/Cr and UOP        F/E/N:   · Fluids: Lactated Ringers 50ml/hr   · Electrolytes: continue to monitor and replete to maintain K>4, Phos>3, Mg >2  · Nutrition: Level 2 Dysphagia diet and nectar thick liquid  ·          Heme/Onc:   · Diagnosis: No acute issues  ? Plan: Monitor Hgb and replete if drop below 7        Endo:   · Diagnosis: Hx of Type 2 DM  ? Plan: downgraded to SSI Algorithm #3  ? Maintain BS between 140-180        ID:   · Diagnosis: Severe Sepsis secondary to  shunt infection with meningitis and peritonitis  ? Plan: Continue Meropenem 2g q8hrs  ? CSF: no growth  ? Continue to monitor WBC and fever curve  · Diagnosis: Thrush  ? Plan: continue Nystatin oral suspension        MSK/Skin:   · Diagnosis: Lumbar stenosis  ? S/P minimally invasive lumbar interbody fusion of L5/S1 on 07/06/20  ? Plan: Tylenol PRN for moderate pain, Jessica 5 for moderate pain  · Frequent repositioning and off pressure loading to prevent skin breakdown      Disposition: Continue Stepdown Level 2 level of care   Code Status: Level 1 - Full Code  ---------------------------------------------------------------------------------------  ICU CORE MEASURES    Prophylaxis   VTE Pharmacologic Prophylaxis: Heparin  VTE Mechanical Prophylaxis: sequential compression device  Stress Ulcer Prophylaxis: Prophylaxis Not Indicated     ABCDE Protocol (if indicated)  Plan to perform spontaneous awakening trial today? Not applicable  Plan to perform spontaneous breathing trial today? Not applicable  Obvious barriers to extubation? Not applicable  CAM-ICU: Negative    Invasive Devices Review  Invasive Devices     Peripherally Inserted Central Catheter Line            PICC Line 69/22/87 Left Basilic 19 days          Peripheral Intravenous Line            Peripheral IV 08/24/20 Right Arm 2 days          Arterial Line            Arterial Line 08/24/20 Right Radial 1 day              Can any invasive devices be discontinued today?  Not applicable  ---------------------------------------------------------------------------------------  OBJECTIVE    Vitals   Vitals:    20 0300 20 0400 20 0500 20 0600   BP:       BP Location:       Pulse: (!) 54 (!) 52 (!) 54 (!) 54   Resp:  20  20   Temp:  98 5 °F (36 9 °C)     TempSrc:  Oral     SpO2: 97% 96% 94% 94%   Weight:    114 kg (251 lb 5 2 oz)   Height:         Temp (24hrs), Av 5 °F (36 9 °C), Min:98 2 °F (36 8 °C), Max:98 7 °F (37 1 °C)  Current: Temperature: 98 5 °F (36 9 °C)  Arterial Line BP: 82/54  Arterial Line MAP (mmHg): 66 mmHg  Temp:  [98 2 °F (36 8 °C)-98 7 °F (37 1 °C)] 98 5 °F (36 9 °C)  HR:  [52-76] 54  Resp:  [10-26] 20  Arterial Line BP: ()/(32-54) 82/54  FiO2 (%):  [40] 40  Temp (24hrs), Av 5 °F (36 9 °C), Min:98 2 °F (36 8 °C), Max:98 7 °F (37 1 °C)        Respiratory:  SpO2: SpO2: 94 %, SpO2 Activity: SpO2 Activity: At Rest, SpO2 Device: O2 Device: Nasal cannula  Nasal Cannula O2 Flow Rate (L/min): 2 L/min    Invasive/non-invasive ventilation settings   Respiratory    Lab Data (Last 4 hours)    None         O2/Vent Data (Last 4 hours)    None                Physical Exam  Vitals signs reviewed  Constitutional:       General: He is not in acute distress  Appearance: He is well-developed and well-nourished  He is not diaphoretic  HENT:        Comments: Stitches intact, no erythema   Thrush improvingEyes:      Extraocular Movements: EOM normal       Pupils: Pupils are equal, round, and reactive to light  Neck:      Musculoskeletal: Neck supple  Cardiovascular:      Rate and Rhythm: Normal rate and regular rhythm  Pulmonary:      Effort: Pulmonary effort is normal  No respiratory distress  Breath sounds: Normal breath sounds  No wheezing  Abdominal:      General: Bowel sounds are normal       Palpations: Abdomen is soft  Musculoskeletal:         General: No tenderness or edema  Skin:     General: Skin is warm     Neurological: Mental Status: He is alert  Cranial Nerves: No cranial nerve deficit  Comments: Oriented to person, place, but not date  Following commands  Motor strength: 4/5 bilaterally in RUE and LUE         Laboratory and Diagnostics:  Results from last 7 days   Lab Units 08/26/20  0451 08/25/20  0506 08/23/20  1234 08/21/20  0518   WBC Thousand/uL 10 26* 10 71* 8 93 11 66*   HEMOGLOBIN g/dL 11 9* 12 1 11 8* 13 3   HEMATOCRIT % 37 3 38 3 37 8 43 3   PLATELETS Thousands/uL 178 189 193 241   NEUTROS PCT % 78*  --  77*  --    MONOS PCT % 9  --  8  --      Results from last 7 days   Lab Units 08/26/20  0451 08/25/20  0506 08/23/20  1234 08/21/20  0518   SODIUM mmol/L 140 141 142 137   POTASSIUM mmol/L 4 0 4 1 3 8 4 4   CHLORIDE mmol/L 108 107 104 102   CO2 mmol/L 28 28 31 32   ANION GAP mmol/L 4 6 7 3*   BUN mg/dL 16 13 17 15   CREATININE mg/dL 0 52* 0 56* 0 51* 0 71   CALCIUM mg/dL 7 7* 8 3 7 8* 8 7   GLUCOSE RANDOM mg/dL 108 103 92 111   ALT U/L  --   --  21 20   AST U/L  --   --  13 16   ALK PHOS U/L  --   --  86 106   ALBUMIN g/dL  --   --  2 3* 2 5*   TOTAL BILIRUBIN mg/dL  --   --  0 43 0 55     Results from last 7 days   Lab Units 08/26/20  0451   MAGNESIUM mg/dL 2 4   PHOSPHORUS mg/dL 2 2*      Results from last 7 days   Lab Units 08/25/20  0506 08/24/20  0530 08/23/20  1234   INR  1 13 1 05 1 37*   PTT seconds 41*  --  36              ABG:    VBG:          Micro          Imaging:   CT head wo contrast   Final Result by Burt Cedeno MD (08/25 5908)      Status post placement of left frontal ventriculoperitoneal shunt catheter  Stable ventricular megaly  Workstation performed: [id number]         XR chest portable   Final Result by Sabina Mcdaniel DO (08/24 1924)      Left-sided PICC line extends to the cavoatrial junction  Clear lung fields  Tubing projects over the neck bilaterally              Workstation performed: [id number]         XR chest 1 view   Final Result by Arnoldo Villanueva DO (08/24 1306)      Fluoroscopic guidance provided for surgical procedure  Please refer to the separate procedure notes for additional details  Workstation performed: [id number]         CT head wo contrast   Final Result by Andry Drake DO (08/20 1605)      Stable ventricular enlargement post shunt removal       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  Workstation performed: [id number]         XR spine lumbar 2 or 3 views injury   Final Result by Lorena Rubio DO (08/19 2009)      Stable postoperative alignment  Workstation performed: [id number]         FL barium swallow video w speech   Final Result by BASIL BETH (08/12 1138)      FL lumbar puncture diagnostic   Final Result by Jazmin Lugo MD (08/11 2121)      Successful fluoroscopically guided lumbar puncture with an opening pressure of 15 cm H2O  Approximately 10 mL's of clear colorless CSF was removed and sent to lab for requested analysis  Closing pressure was 5 cm H2O  PERFORMED BY: Dr Jenny Dickerson and Rachel Baum PA-C      DICTATED AND SIGNED BY: Rachel Baum PA-C         Workstation performed: [id number]         CT abdomen pelvis w contrast   Final Result by Yocasta Patel MD (08/11 1448)      No intra-abdominal fluid collection   No bowel obstruction            Workstation performed: [id number]         XR chest portable   Final Result by Dex Baron MD (08/10 1400)   Left PICC line tip projects over the cavoatrial junction satisfactory position without pneumothorax  No acute cardiopulmonary disease  Workstation performed: [id number]         CT head wo contrast   Final Result by Lavern Thurston MD (08/09 1530)      1  Interval removal of right frontal approach ventricular shunt catheter  Grossly stable size and configuration of prominent ventricular system        2   Stable encephalomalacia in the right superior frontal gyrus and left occipitotemporal region  3   Periapical lucency of retained root of right maxillary tooth  Workstation performed: [id number]         CT head wo contrast   Final Result by Tavares Mckeon DO (08/05 1334)      Continued mild enlargement of the ventricular system which continues to gradually enlarge compared to prior studies dated 7/21/2020 and 7/31/2020  Stable right frontal and left occipital encephalomalacia consistent with old infarcts  The study was marked in [facility] for immediate notification  Workstation performed: [id number]         XR chest portable   Final Result by Justin Calderon MD (07/31 1524)         1  Interval intubation with coiling of the nasogastric tube in the midesophagus  Repositioning of the the nasogastric tube advised  2   Endotracheal tube noted with the tip well above the tye  3   Scattered strandy densities likely related to diminished degree of inspiration  Workstation performed: [id number]         IR other   Final Result by Tone Hsuain (08/06 0909)      CT head without contrast   ED Interpretation by Rafaela Alexander MD (07/31 0141)   CT read by radiologist; will discuss with medicine  Final Result by Toni De Luna DO (07/31 0127)      Minimal increase in size of ventricular system      The study was marked in EPIC for immediate notification  Workstation performed: [id number]         PE Study with CT Abdomen and Pelvis with contrast   Final Result by Toni De Luna DO (07/31 0150)      Fluid-filled loops of small bowel with pneumatosis and bowel wall thickening  Findings are suspicious for ischemic bowel  Nodular airspace opacities within the right upper lobe  Findings may represent infection               I personally discussed this study with Bernice Castellanos on 7/31/2020 at 1:46 AM                               Workstation performed: [id number]         XR shunt series    (Results Pending) Intake and Output  I/O       08/24 0701 - 08/25 0700 08/25 0701 - 08/26 0700    P  O   600    I V  (mL/kg) 2445 (21 8) 1840 (16 1)    IV Piggyback 450 565    Total Intake(mL/kg) 2895 (25 8) 3005 (26 4)    Urine (mL/kg/hr) 1068 (0 4) 1184 (0 4)    Stool  0    Total Output 1068 1184    Net +1827 +1821          Unmeasured Urine Occurrence  1 x    Unmeasured Stool Occurrence  4 x        UOP: 0 4 ml/kg/hr     Height and Weights   Height: 6' 3" (190 5 cm)  IBW: 84 5 kg  Body mass index is 31 41 kg/m²  Weight (last 2 days)     Date/Time   Weight    08/26/20 0600   114 (251 32)    08/25/20 0600   112 (247 8)                Nutrition       Diet Orders   (From admission, onward)             Start     Ordered    08/25/20 1402  Diet Dysphagia/Modified Consistency; Dysphagia 2-Mechanical Soft; Nectar Thick Liquid  Diet effective now     Question Answer Comment   Diet Type Dysphagia/Modified Consistency    Dysphagia/Modified Consistency Dysphagia 2-Mechanical Soft    Liquid Modifier Nectar Thick Liquid    RD to adjust diet per protocol?  Yes        08/25/20 1402    08/17/20 1420  Dietary nutrition supplements  Once     Question Answer Comment   Select Supplement: MightyShake    Frequency Breakfast, Lunch, Dinner        08/17/20 1419    08/15/20 1127  Dietary nutrition supplements  Once     Question Answer Comment   Select Supplement: Magic Cup Chocolate    Frequency Lunch        08/15/20 1127    08/15/20 1127  Dietary nutrition supplements  Once     Question Answer Comment   Select Supplement: Magic Cup [employer]    Frequency Dinner        08/15/20 1127                  Active Medications  Scheduled Meds:  Current Facility-Administered Medications   Medication Dose Route Frequency Provider Last Rate    acetaminophen  325 mg Rectal Q6H PRN Vickki Solum EVI Monroy      acetaminophen  650 mg Oral Q6H PRN Paulinesarahy Monroy PA-C      diphenhydrAMINE  25 mg Intravenous Q6H PRN Paulinesarahy Monroy PA-C      docusate sodium  100 mg Oral BID Angela Naajhonatan Monroy PA-C      heparin (porcine)  5,000 Units Subcutaneous [facility] Romario Fulling, DO      hydrocortisone   Topical 4x Daily PRN Garcia MALISSA Carias-LAURA      HYDROmorphone  0 2 mg Intravenous Q4H PRN Pauline LUZ Monroy PA-LAURA      insulin lispro  1-6 Units Subcutaneous TID AC An Darlin, DO      iodixanol  15 mL Intravenous Once in imaging MALISSA Cheung-C      lisinopril  10 mg Oral Daily An Darlin, DO      melatonin  3 mg Oral HS Romario Fulling, DO      meropenem  2,000 mg Intravenous Q8H Pauline Monroy PA-C Stopped (08/26/20 0327)    VERONICA ANTIFUNGAL   Topical BID Pauline Monroy PA-C      nystatin  500,000 Units Swish & Swallow 4x Daily An Darlin, DO      ondansetron  4 mg Intravenous Q6H PRN PaulineMALISSA Vasquez-LAURA      oxyCODONE  5 mg Oral Q4H PRN Pauline N MALISSA Monroy-C      potassium-sodium phosphates  1 packet Oral Once [facility], DO      senna  1 tablet Oral Daily Pauline Monroy PA-C      sodium chloride  75 mL/hr Intravenous Continuous Angela Garrison Monroy PA-C 75 mL/hr (08/25/20 2033)     Continuous Infusions:  sodium chloride, 75 mL/hr, Last Rate: 75 mL/hr (08/25/20 2033)      PRN Meds:   acetaminophen, 325 mg, Q6H PRN  acetaminophen, 650 mg, Q6H PRN  diphenhydrAMINE, 25 mg, Q6H PRN  hydrocortisone, , 4x Daily PRN  HYDROmorphone, 0 2 mg, Q4H PRN  iodixanol, 15 mL, Once in imaging  ondansetron, 4 mg, Q6H PRN  oxyCODONE, 5 mg, Q4H PRN        Allergies   Allergies   Allergen Reactions    Pollen Extract Allergic Rhinitis     ---------------------------------------------------------------------------------------  Advance Directive and Living Will:      Power of :    POLST:    ---------------------------------------------------------------------------------------  Care Time Delivered:   No Critical Care time spent     [employer], DO      Portions of the record may have been created with voice recognition software    Occasional wrong word or "sound a like" substitutions may have occurred due to the inherent limitations of voice recognition software    Read the chart carefully and recognize, using context, where substitutions have occurred

## 2020-08-26 NOTE — ASSESSMENT & PLAN NOTE
POD2 left ventriculoatrial shunt placement  2 weeks s/p  shunt removal 8/7/2020  · Externalization 7/30 secondary to laparotomy for bowel ischemia/peritonitis  · S/p VPS placement for NPH 2005, revision 2011  · Shunt last adjusted to 100 cm of water 7/21/2020 for increasing size of bilateral subdural hygromas  · Sutures placed over about beginning in July for thinning skin  Imaging:   · CT head without 08/25/2020:  Status post placement of left frontal ventriculoperitoneal shunt catheter  Stable ventriculomegaly  · CT head without 8/20/2020: Stable ventricular enlargement post shunt removal  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    Plan:    · Frequent neuro checks  · Stat CT head with decline in GCS greater than 2 points in 1 hour  · Chest x-ray for shunt placement  Skull x-ray pending  · Left shunt reservoir compresses and refills  · ID following  · All repeat BC and CSF have been negative  · Afebrile at this time  · Continues on meropenem through 09/09/2020 (total of 2 weeks)  · CSF cultures from shunt placement NTD  · DVT prophylaxis:  SCDs and heparin  Neurosurgery will sign off  Follow up in 2 weeks OP as scheduled with repeat CT head  Please call with questions or concerns

## 2020-08-26 NOTE — PROGRESS NOTES
Transfer Note - Critical Care   Manuel Melendez 69 y o  male MRN: [id number]  Unit/Bed#: ICU 13 Encounter: [id number]    Code Status: Level 1 - Full Code  POA:    POLST:      Reason for ICU admission:   S/P [facility] shunt placement     Active problems:   Principal Problem:    Severe sepsis ([facility] [address] )  Active Problems:    Type 2 diabetes mellitus ([facility] [address] )    Normal pressure hydrocephalus (HCC)    Essential hypertension    ZHOU on CPAP    Nonocclusive mesenteric ischemia (HCC)    Infection of  (ventriculoperitoneal) shunt (HCC)    Lumbar stenosis    Drug rash    Multiple wounds    Dysphagia    Metabolic encephalopathy  Resolved Problems:    * No resolved hospital problems  *    Assessment and Plan:     · Diagnosis: Infection of  shunt  ? Hx of Normal pressure hydrocephalus with  shunt placed in 2005 and replacement in July 2020  ? Patient had externalization of  shunt and subsequent removal on 08/07 due to growth of pseudomonas on CSF cultures  ? ID following, appreciate recommendation: continue Meropenem through 9/9/20; monitor CBC with diff and CMP weekly   ? -Repeat CSF from 08/24 showed no growth  ? Plan: Continue Meropenem 2g q 8hrs, Day #15 with total Day #26 of antibiotics     · Diagnosis: Normal Pressure Hydrocephalus   ? Post-op Day #2 S/P [facility] shunt placement  ? Previously had  shunt placed in 2005  ? CXR 08/24, negative for pneumothorax  ? Repeat CT head 08/25 showed stable ventricularmegaly  ? Plan: continue to monitor neuro status with q2 neuro checks during the day and q4 at night  ? Shunt Series pending  ? Started on Lexapro 10mg q daily   ? Per neurosurgery, will need OP follow up in 2 weeks with repeat CT head    · Diagnosis: Metabolic Encephalopathy  ? Multifactorial, Secondary to severe sepsis 2/2  shunt infection with meningitis and peritonitis requiring multiple surgical procedures  ? Currently A&O x3 (person, place, president, but not date)  ? S/P VA shunt placement 08/24  ?  Plan: continue neuro q2 neuro checks during the day and q4 at night  ? Continue Meropenem until 09/09  · Delirium precautions: regulate sleep wake cycle, melatonin 3mg qHS        CV:   · Diagnosis: Hx of Hx  ?  PTA Enalapril 5mg q daily (not on formulary)  ? Plan: Will continue Lisinopril 10mg q daily and hold for SBP <110        Pulm:  · Diagnosis: ZHOU on CPAP  ? Plan: discontinued due to patient's refusal  ? Continue to monitor oxygen saturation to maintain SpO2>92%        GI:   · Diagnosis: Nonocclusive Mesenteric Ischemia  ? S/P Ex lap, arteriogram of SMA, exploration of SMA and angiogram SMA on 07/31/20 and repeat Ex Lap 08/01/2020  ? Staples removed on 08/18  ? General Surgery has signed off         · Diet:   · Nutrition: Level 2 Dysphagia diet and nectar thick liquid           Endo:   · Diagnosis: Hx of Type 2 DM  ? Plan: SSI Algorithm #3  ? Maintain BS between 140-180        ID:   · Diagnosis: Severe Sepsis secondary to  shunt infection with meningitis and peritonitis  ? Plan: Continue Meropenem 2g q8hrs  ? CSF: no growth  ? Continue to monitor WBC and fever curve  · Diagnosis: Thrush  ? Plan: continue Nystatin oral suspension        MSK/Skin:   · Diagnosis: Lumbar stenosis  ? S/P minimally invasive lumbar interbody fusion of L5/S1 on 07/06/20  ? Plan: Tylenol PRN for moderate pain, Jessica 5 for moderate pain  · Frequent repositioning and off pressure loading to prevent skin breakdown  · Diagnosis: MASD with yeast on sacro-buttocks and groin; Stage 2 pressure injury left buttock  Plan: Wound care following and recommend:     Skin care plans:  1-Apply VERONICA to sacrum, buttocks, groin BID and PRN  2-Hydraguard to bilateral heel BID and PRN  3-Elevate heels to offload pressure  Heels in prevalon boots  4-Ehob cushion when out of bed  5-Turn/repoisiton q2h or when medically stable for pressure re-distribution on skin    6-Moisturize skin daily with skin nourishing cream  7-3M skin protectant to nose daily  · 8-Low air loss mattress Consultants:   Neurosurgery  Infectious Disease    History of Present Illness:   Boone Ruffin is a 69 y o  male who presents with S/P VA shunt placement  Patient has a history of obesity with ZHOU on CPAP, HTN, Gout, [id number], Normal Pressure Hydrocephalus with  shunt placement in 2005 with replacement in July 2020 and S/P L5-S1 spine surgery (07/06/2020) who originally presented to [facility] on 07/31 due to acute superficial gastritis with hemorrhage  CT imaging  showed ischemic bowel  Patient subsequently had exploratory laparotomy, arteriogram of SMA, exploration of SMA and angiogram of SMA  Same day patient had  shunt externalized secondary to laparotomy for bowel ischemia and peritonitis  On 08/01, patient had repeat exploratory laparotomy and peritoneal fluid culture was positive for Pseudomonas  Surveillance aspiration of CSF from externalized showed growth of Pseudomonas as well  Patient was started on cefepime and on 08/07 patient's  shunt was removed due to development of severe sepsis and CNS infection  Patient was later transitioned to Meropenem and is currently day #13 of treatment  On 08/24 patient had Ventriculoatrial shunt placed and transferred to ICU for post-of monitoring  Summary of clinical course:   Patient was admitted to the ICU following VA shunt placement for post-op monitoring and care  Patient tolerated the procedure well and follow up chest x-ray showed no pneumothorax  Repeat CT head on 08/25 showed stable ventricularmegaly  During his admission the patient has been on frequent neuro checks with no deterioration in neuro status  He has been alert and oriented to person and place but not date  He is able to follow commands and has intact sensory and motor bilaterally  His motor strength is 4/5 bilaterally, secondary to general deconditioning    ID has continued to follow the patient and is recommending to continue Meropenem for a total of two week course from the time of the new shunt placement  As shunt was placed on 08/24/20, patient is to continue Meropenem until 09/09/20  Patient was also started on Lexapro 10mg q daily starting on 08/26/20 due to depressed mood since the procedure and Melatonin 3g qHS to help regulate his sleep wake cycle  Shunt series was performed on 08/26/20 with results pending  Recent or scheduled procedures:   08/24: VA shunt placement   -CXR: upon review negative for pneumothorax    08/25: CT head w/o contrast showed status post placment of the left ventriculoperitoneal shunt catheter  Stable Ventricularmegaly"    08/26: Shunt series pending official read      Outstanding/pending diagnostics:   1) Shunt series penidng  2) Repeat CT Head pending    CT head wo contrast   Final Result by Leyda Parr MD (08/25 2170)      Status post placement of left frontal ventriculoperitoneal shunt catheter  Stable ventricular megaly  Workstation performed: [id number]         XR chest portable   Final Result by Marcelo Ramirez DO (08/24 1924)      Left-sided PICC line extends to the cavoatrial junction  Clear lung fields  Tubing projects over the neck bilaterally  Workstation performed: [id number]         XR chest 1 view   Final Result by Patt Winston DO (08/24 1306)      Fluoroscopic guidance provided for surgical procedure  Please refer to the separate procedure notes for additional details  Workstation performed: [id number]         CT head wo contrast   Final Result by Andry Drake DO (08/20 1605)      Stable ventricular enlargement post shunt removal       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  Workstation performed: [id number]         XR spine lumbar 2 or 3 views injury   Final Result by Lorena Rubio DO (08/19 2009)      Stable postoperative alignment           Workstation performed: [id number]         FL barium swallow video w speech   Final Result by SYSTEMGENERATED, DOCUMENTATION (08/12 1138)      FL lumbar puncture diagnostic   Final Result by Jeanna Handley MD (08/11 2121)      Successful fluoroscopically guided lumbar puncture with an opening pressure of 15 cm H2O  Approximately 10 mL's of clear colorless CSF was removed and sent to lab for requested analysis  Closing pressure was 5 cm H2O  PERFORMED BY: Dr Deysi Jacobson and Genoveva Wong PA-C      DICTATED AND SIGNED BY: Genoveva Wong PA-C         Workstation performed: [id number]         CT abdomen pelvis w contrast   Final Result by Julissa Iglesias MD (08/11 1448)      No intra-abdominal fluid collection   No bowel obstruction            Workstation performed: [id number]         XR chest portable   Final Result by Amish Lopez MD (08/10 1400)   Left PICC line tip projects over the cavoatrial junction satisfactory position without pneumothorax  No acute cardiopulmonary disease  Workstation performed: [id number]         CT head wo contrast   Final Result by Ani Salinas MD (08/09 1530)      1  Interval removal of right frontal approach ventricular shunt catheter  Grossly stable size and configuration of prominent ventricular system  2   Stable encephalomalacia in the right superior frontal gyrus and left occipitotemporal region  3   Periapical lucency of retained root of right maxillary tooth  Workstation performed: [id number]         CT head wo contrast   Final Result by Vesna Baker DO (08/05 1334)      Continued mild enlargement of the ventricular system which continues to gradually enlarge compared to prior studies dated 7/21/2020 and 7/31/2020  Stable right frontal and left occipital encephalomalacia consistent with old infarcts  The study was marked in [facility] for immediate notification  Workstation performed: [id number]         XR chest portable   Final Result by Kenzie Duque MD (07/31 1524)         1    Interval intubation with coiling of the nasogastric tube in the midesophagus  Repositioning of the the nasogastric tube advised  2   Endotracheal tube noted with the tip well above the tye  3   Scattered strandy densities likely related to diminished degree of inspiration  Workstation performed: [id number]         IR other   Final Result by Dafne Greene (08/06 1046)      CT head without contrast   ED Interpretation by Lou Stearns MD (07/31 0141)   CT read by radiologist; will discuss with medicine  Final Result by Eron Watkins DO (07/31 0127)      Minimal increase in size of ventricular system      The study was marked in EPIC for immediate notification  Workstation performed: [id number]         PE Study with CT Abdomen and Pelvis with contrast   Final Result by Eron Watkins DO (07/31 0150)      Fluid-filled loops of small bowel with pneumatosis and bowel wall thickening  Findings are suspicious for ischemic bowel  Nodular airspace opacities within the right upper lobe  Findings may represent infection               I personally discussed this study with Sree Howell on 7/31/2020 at 1:46 AM                               Workstation performed: [id number]         XR shunt series    (Results Pending)   XR skull < 4 vw    (Results Pending)   CT head wo contrast    (Results Pending)         Cultures:   08/24: CSF culture and gram stain: no growth  08/11: Stat gram stain: no bacteria seen, 3+ polys  08/11: CSF culture and gram stain: no growth  08/10: blood cultures: no growth after 5 days x2  08/04/20: CSF culture and gram stain showed 3+ growth of Pseudomonas aeruginosa   08/03: CSF culture and gram stain showed 3+ growth of Pseudomonas aeruginosa   07/31: Fungal culture: no fungus isolated  07/31: Blood culture: No growth x 5 days x2       Mobilization Plan:   PT recommendation: Post- Acute Rehabilitation services    Nutrition Plan:   Level 2 Dysphagia Diet and Nectar Thick Liquid      VTE Pharmacologic Prophylaxis: Heparin  VTE Mechanical Prophylaxis: sequential compression device    Discharge Plan:   Patient should be ready for discharge to Dr Starlett Goldberg on 08/26/20    Initial Physical Therapy Recommendations: Post-acute rehabilitation services  Initial Occupational Therapy Recommendations: Post acute rehabilitation services  Initial /Plan: pending    Home medications that are not reordered and reason why:   Lantus 10units qHS: currently controlled on sliding scale  Allopurinol 300mg q daily  Robaxin 500mg BID prn for pain        Spoke with Dr Starlett Goldberg  regarding transfer  Please call [phone] with any questions or concerns  Portions of the record may have been created with voice recognition software  Occasional wrong word or "sound a like" substitutions may have occurred due to the inherent limitations of voice recognition software  Read the chart carefully and recognize, using context, where substitutions have occurred

## 2020-08-27 ENCOUNTER — APPOINTMENT (INPATIENT)
Dept: RADIOLOGY | Facility: HOSPITAL | Age: 70
DRG: 031 | End: 2020-08-27
Payer: COMMERCIAL

## 2020-08-27 PROBLEM — L27.0 DRUG RASH: Status: RESOLVED | Noted: 2020-08-16 | Resolved: 2020-08-27

## 2020-08-27 LAB
ABO GROUP BLD BPU: NORMAL
ANION GAP SERPL CALCULATED.3IONS-SCNC: 4 MMOL/L (ref 4–13)
BACTERIA CSF CULT: NO GROWTH
BASOPHILS # BLD AUTO: 0.05 THOUSANDS/ΜL (ref 0–0.1)
BASOPHILS NFR BLD AUTO: 1 % (ref 0–1)
BPU ID: NORMAL
BUN SERPL-MCNC: 14 MG/DL (ref 5–25)
CALCIUM SERPL-MCNC: 8.2 MG/DL (ref 8.3–10.1)
CHLORIDE SERPL-SCNC: 106 MMOL/L (ref 100–108)
CO2 SERPL-SCNC: 30 MMOL/L (ref 21–32)
CREAT SERPL-MCNC: 0.58 MG/DL (ref 0.6–1.3)
CROSSMATCH: NORMAL
EOSINOPHIL # BLD AUTO: 0.31 THOUSAND/ΜL (ref 0–0.61)
EOSINOPHIL NFR BLD AUTO: 4 % (ref 0–6)
ERYTHROCYTE [DISTWIDTH] IN BLOOD BY AUTOMATED COUNT: 15 % (ref 11.6–15.1)
GFR SERPL CREATININE-BSD FRML MDRD: 103 ML/MIN/1.73SQ M
GLUCOSE SERPL-MCNC: 104 MG/DL (ref 65–140)
GLUCOSE SERPL-MCNC: 105 MG/DL (ref 65–140)
GLUCOSE SERPL-MCNC: 109 MG/DL (ref 65–140)
GLUCOSE SERPL-MCNC: 114 MG/DL (ref 65–140)
GLUCOSE SERPL-MCNC: 118 MG/DL (ref 65–140)
HCT VFR BLD AUTO: 39.4 % (ref 36.5–49.3)
HGB BLD-MCNC: 12.3 G/DL (ref 12–17)
IMM GRANULOCYTES # BLD AUTO: 0.11 THOUSAND/UL (ref 0–0.2)
IMM GRANULOCYTES NFR BLD AUTO: 1 % (ref 0–2)
LYMPHOCYTES # BLD AUTO: 1.08 THOUSANDS/ΜL (ref 0.6–4.47)
LYMPHOCYTES NFR BLD AUTO: 13 % (ref 14–44)
MAGNESIUM SERPL-MCNC: 2.3 MG/DL (ref 1.6–2.6)
MCH RBC QN AUTO: 26.9 PG (ref 26.8–34.3)
MCHC RBC AUTO-ENTMCNC: 31.2 G/DL (ref 31.4–37.4)
MCV RBC AUTO: 86 FL (ref 82–98)
MONOCYTES # BLD AUTO: 0.8 THOUSAND/ΜL (ref 0.17–1.22)
MONOCYTES NFR BLD AUTO: 9 % (ref 4–12)
NEUTROPHILS # BLD AUTO: 6.14 THOUSANDS/ΜL (ref 1.85–7.62)
NEUTS SEG NFR BLD AUTO: 72 % (ref 43–75)
NRBC BLD AUTO-RTO: 0 /100 WBCS
PHOSPHATE SERPL-MCNC: 2 MG/DL (ref 2.3–4.1)
PLATELET # BLD AUTO: 175 THOUSANDS/UL (ref 149–390)
PMV BLD AUTO: 9.8 FL (ref 8.9–12.7)
POTASSIUM SERPL-SCNC: 4.3 MMOL/L (ref 3.5–5.3)
RBC # BLD AUTO: 4.57 MILLION/UL (ref 3.88–5.62)
SODIUM SERPL-SCNC: 140 MMOL/L (ref 136–145)
UNIT DISPENSE STATUS: NORMAL
UNIT PRODUCT CODE: NORMAL
UNIT RH: NORMAL
WBC # BLD AUTO: 8.49 THOUSAND/UL (ref 4.31–10.16)

## 2020-08-27 PROCEDURE — 80048 BASIC METABOLIC PNL TOTAL CA: CPT | Performed by: FAMILY MEDICINE

## 2020-08-27 PROCEDURE — 99232 SBSQ HOSP IP/OBS MODERATE 35: CPT | Performed by: NURSE PRACTITIONER

## 2020-08-27 PROCEDURE — 85025 COMPLETE CBC W/AUTO DIFF WBC: CPT | Performed by: FAMILY MEDICINE

## 2020-08-27 PROCEDURE — 82948 REAGENT STRIP/BLOOD GLUCOSE: CPT

## 2020-08-27 PROCEDURE — 97112 NEUROMUSCULAR REEDUCATION: CPT

## 2020-08-27 PROCEDURE — 84100 ASSAY OF PHOSPHORUS: CPT | Performed by: FAMILY MEDICINE

## 2020-08-27 PROCEDURE — 74018 RADEX ABDOMEN 1 VIEW: CPT

## 2020-08-27 PROCEDURE — 97110 THERAPEUTIC EXERCISES: CPT

## 2020-08-27 PROCEDURE — 83735 ASSAY OF MAGNESIUM: CPT | Performed by: FAMILY MEDICINE

## 2020-08-27 PROCEDURE — 70250 X-RAY EXAM OF SKULL: CPT

## 2020-08-27 PROCEDURE — 97530 THERAPEUTIC ACTIVITIES: CPT

## 2020-08-27 PROCEDURE — 99233 SBSQ HOSP IP/OBS HIGH 50: CPT | Performed by: INTERNAL MEDICINE

## 2020-08-27 PROCEDURE — 71046 X-RAY EXAM CHEST 2 VIEWS: CPT

## 2020-08-27 RX ADMIN — OXYCODONE HYDROCHLORIDE 5 MG: 5 TABLET ORAL at 15:45

## 2020-08-27 RX ADMIN — MICONAZOLE NITRATE: 20 CREAM TOPICAL at 16:54

## 2020-08-27 RX ADMIN — HEPARIN SODIUM 5000 UNITS: 5000 INJECTION INTRAVENOUS; SUBCUTANEOUS at 13:50

## 2020-08-27 RX ADMIN — NYSTATIN 500000 UNITS: 100000 SUSPENSION ORAL at 08:32

## 2020-08-27 RX ADMIN — ESCITALOPRAM OXALATE 10 MG: 10 TABLET ORAL at 08:32

## 2020-08-27 RX ADMIN — MEROPENEM 2000 MG: 1 INJECTION, POWDER, FOR SOLUTION INTRAVENOUS at 04:24

## 2020-08-27 RX ADMIN — MELATONIN 3 MG: at 21:07

## 2020-08-27 RX ADMIN — MEROPENEM 2000 MG: 1 INJECTION, POWDER, FOR SOLUTION INTRAVENOUS at 20:52

## 2020-08-27 RX ADMIN — MICONAZOLE NITRATE: 20 CREAM TOPICAL at 08:32

## 2020-08-27 RX ADMIN — LISINOPRIL 10 MG: 10 TABLET ORAL at 08:32

## 2020-08-27 RX ADMIN — HEPARIN SODIUM 5000 UNITS: 5000 INJECTION INTRAVENOUS; SUBCUTANEOUS at 21:07

## 2020-08-27 RX ADMIN — HEPARIN SODIUM 5000 UNITS: 5000 INJECTION INTRAVENOUS; SUBCUTANEOUS at 04:24

## 2020-08-27 RX ADMIN — MEROPENEM 2000 MG: 1 INJECTION, POWDER, FOR SOLUTION INTRAVENOUS at 11:58

## 2020-08-27 RX ADMIN — ACETAMINOPHEN 650 MG: 325 TABLET, FILM COATED ORAL at 15:45

## 2020-08-27 RX ADMIN — NYSTATIN 500000 UNITS: 100000 SUSPENSION ORAL at 16:54

## 2020-08-27 RX ADMIN — NYSTATIN 500000 UNITS: 100000 SUSPENSION ORAL at 21:07

## 2020-08-27 NOTE — SOCIAL WORK
CM spoke to pt's brother regarding d/c plan  Pt is accepted to [facility] is interested but needed pt's medical insurance cards  CM faxed to them  Pt's brother prefers [facility] and would like them as long as they had a bed locally

## 2020-08-27 NOTE — OCCUPATIONAL THERAPY NOTE
Occupational Therapy Treatment Note:       08/27/20 1419   ADL   Toileting Assistance  1  Total Assistance   Toileting Comments incontinent of bowel and unaware, needing total asst to get cleaned  Bed Mobility   Rolling R 2  Maximal assistance   Additional items Assist x 2   Rolling L 2  Maximal assistance   Additional items Assist x 2   Supine to Sit 2  Maximal assistance   Sit to Supine 2  Maximal assistance   Cognition   Overall Cognitive Status Impaired   Arousal/Participation Cooperative   Attention Attends with cues to redirect   Memory Decreased recall of recent events;Decreased recall of precautions   Comments pt is overall cooperative  needs increased time to process but was more animated and did follow directions more easily this session  pt was noted to smile x 2 this session in reaction to joking conversation with friend pt voiced intrest in basketball, played in [place]   Activity Tolerance   Activity Tolerance Patient tolerated treatment well   Assessment   Assessment pt participated in pm ot session and was seen focusing on bed mobility, direction following, task initiaion,   and activity tolerence  pt requires max asst to maintain sitting balance  pt with r sided  lean/push, improvement noted when l le was lextended  pt was more animated this session, initiated jokes and  verbally answered some questions  pt's old time friend was present this session   pt tolerated sitting approx 15 min eob  pt requires increased time and aarom for b ue rom  pt was able to lift own ue's slowly and rigidly  to approx 50 degrees shoulder flexion  pt was able to reach for beach ball, difficulty with active hand rom, was able to grossly hold when ball placed ontop of arms  pt with tolerated gentle arom rom of neck  Plan   Treatment Interventions ADL retraining;Functional transfer training; Endurance training;Patient/family training;Equipment evaluation/education; Activityengagement   Goal Expiration Date 09/08/20 OT Treatment Day 1   OT Frequency 3-5x/wk   Recommendation   OT Discharge Recommendation Post-Acute Rehabilitation Services   OT - OK to Discharge Yes   April WARD Villar

## 2020-08-27 NOTE — PROGRESS NOTES
Progress Note - Infectious Disease   Smiley Reyna 69 y o  male MRN: [id number]  Unit/Bed#: [facility] 603-01 Encounter: [id number]      Impression/Plan:  1  Severe sepsis, tachycardia, leukocytosis, elevated lactic acid   With early development of high fevers  Secondary to  shunt infection with meningitis and peritonitis   Repeat blood cultures negative  All the  shunt apparatus has been removed  Repeat CSF cultures negative   Patient much improved  -antibiotic plan as below  -monitor temperatures and hemodynamics  -monitor CBC with diff and and CMP weekly while on the meropenem  -recheck LFTs tomorrow  -supportive care      2   shunt infection  Fluid WBC count was 61 with neutrophil predominance   Pseudomonas aeruginosa  Suspect secondary to peritonitis in the setting of #3    Now status post removal of the entire  shunt apparatus    Repeat CSF analysis looks fairly bland and the follow-up cultures are negative   Seems to be tolerating the antibiotics without difficulty   The patient underwent ventriculoatrial shunt   His cognition is much improved since the procedure  CSF culture negative thus far  -continue meropenem through 9/9/2020 to complete 2 weeks from the time the new shunt placement  -monitor CBC with diff and CMP  weekly while on meropenem  -neurosurgery follow-up ongoing  -followup CSF culture     3   Ischemic bowel with peritonitis   Status post exploratory laparotomy, VAC placement   Status post femoral artery and SMA artery cannulation for paraverine by vascular surgery   Suspect intra-abdominal infection is etiology of #2   Status post second-look on 08/01 with no resection performed   Peritoneal fluid culture also shows Pseudomonas   Now status post remove the entire  shunt apparatus  -antibiotic plan as above  -surgery follow-up     4  NPH requiring  shunt placement in 2005, status post recent revision in July 2019  Now status post removal of entire shunt apparatus   Patient now status post ventriculoatrial shunt     5  Lumbar spinal stenosis status post L5-S1 fusion on 2020      6  Diabetes mellitus with neuropathy      7  Rash-appears consistent with a drug eruption   Suspect secondary to the cefepime   Resolved since discontinuing the cefepime   Seems to be tolerating the meropenem without difficulty    Possibly to rehab soon    Antibiotics:  Meropenem 16  Antibiotics 27   shunt removal 19  Postop day 3    Subjective:  Patient has no fever, chills, sweats; no nausea, vomiting, diarrhea; no cough, shortness of breath; no pain  No new symptoms  Objective:  Vitals:  Temp:  [97 6 °F (36 4 °C)-98 °F (36 7 °C)] 98 °F (36 7 °C)  HR:  [56-68] 61  Resp:  [16-20] 18  BP: (100-136)/(50-73) 133/71  SpO2:  [96 %-100 %] 97 %  Temp (24hrs), Av 8 °F (36 6 °C), Min:97 6 °F (36 4 °C), Max:98 °F (36 7 °C)  Current: Temperature: 98 °F (36 7 °C)    Physical Exam:   General Appearance:  Alert, interactive, nontoxic, no acute distress  Throat: Oropharynx moist without lesions  Lungs:   Clear to auscultation bilaterally; no wheezes, rhonchi or rales; respirations unlabored   Heart:  RRR; no murmur, rub or gallop   Abdomen:   Soft, non-tender, non-distended, positive bowel sounds  Extremities: No clubbing, cyanosis or edema   Skin: No new rashes or lesions  No draining wounds noted         Labs, Imaging, & Other studies:   All pertinent labs and imaging studies were personally reviewed  Results from last 7 days   Lab Units 20  0430 20  0451 20  0506   WBC Thousand/uL 8 49 10 26* 10 71*   HEMOGLOBIN g/dL 12 3 11 9* 12 1   PLATELETS Thousands/uL 175 178 189     Results from last 7 days   Lab Units 20  0451 20  0451 20  0506 20  1234 20  0518   SODIUM mmol/L 140 140 141 142 137   POTASSIUM mmol/L 4 3 4 0 4 1 3 8 4 4   CHLORIDE mmol/L 106 108 107 104 102   CO2 mmol/L 30 28 28 31 32   BUN mg/dL 14 16 13 17 15   CREATININE mg/dL 0 58* 0 52* 0 56* 0 51* 0 71   EGFR ml/min/1 73sq m 103 107 104 108 94   CALCIUM mg/dL 8 2* 7 7* 8 3 7 8* 8 7   AST U/L  --   --   --  13 16   ALT U/L  --   --   --  21 20   ALK PHOS U/L  --   --   --  86 106        x-rays skull-stable left-sided ventriculoatrial shunt catheter    Images personally reviewed by me in PACS

## 2020-08-27 NOTE — ASSESSMENT & PLAN NOTE
Lab Results   Component Value Date    HGBA1C 5 6 07/31/2020       Recent Labs     08/26/20  1725 08/26/20  2221 08/27/20  0728 08/27/20  1140   POCGLU 127 113 104 118           · Diet controlled  · Can continue SSI however has not required any insulin    · Diabetic diet  · Monitor Accu-Cheks

## 2020-08-27 NOTE — ASSESSMENT & PLAN NOTE
· S/p  shunt placement in 2005  · Now s/p removal of shunt apparatus given infection as above with re-insertion 8/24

## 2020-08-27 NOTE — PHYSICAL THERAPY NOTE
PHYSICAL THERAPY NOTE  Patient Name: Lexi Portillo  VSVGH'A Date: 8/27/2020 08/27/20 1415   Pain Assessment   Pain Assessment Tool 0-10   Pain Score No Pain   Restrictions/Precautions   Weight Bearing Precautions Per Order No   Braces or Orthoses MAFO  (LLE)   Other Precautions Cognitive; Chair Alarm; Bed Alarm;Multiple lines; Fall Risk;Pain   General   Chart Reviewed Yes   Response to Previous Treatment Patient with no complaints from previous session  Family/Caregiver Present No   Cognition   Overall Cognitive Status Impaired   Arousal/Participation Alert; Cooperative   Attention Attends with cues to redirect   Orientation Level Oriented to person   Memory Decreased recall of precautions   Following Commands Follows one step commands with increased time or repetition   Comments Pt is pleasant and cooperative to work w/ therapy  Pt able to follow commands this session   Subjective   Subjective Pt lying supine and agreeable to work w/ therapy   Bed Mobility   Rolling R 2  Maximal assistance   Additional items Assist x 2   Rolling L 2  Maximal assistance   Additional items Assist x 2   Supine to Sit 2  Maximal assistance   Additional items Assist x 2;HOB elevated; Increased time required;Verbal cues;LE management   Sit to Supine 2  Maximal assistance   Additional items Assist x 2; Increased time required;Verbal cues;LE management   Additional Comments Pt lying supine upon PT arrival  Pt sat EOB ~20 minutes at Mod/Max A; pt presents w/ posterior and R lean  Pt able to participate in LE therex program while sitting EOB  Pt returned lying supine  Pt performed rolling R and L x2 for pericare and linen change   Pt returned lying supine w/ all needs within reach and bed alarm activated   Transfers   Sit to Stand Unable to assess   Balance   Static Sitting Poor -   Dynamic Sitting Poor -   Endurance Deficit   Endurance Deficit Yes Endurance Deficit Description fatigue, weakness   Activity Tolerance   Activity Tolerance Patient limited by fatigue   Medical Staff Made Aware OT, CM   Nurse Made Aware yes   Exercises   Knee AROM Long Arc Quad Sitting;Bilateral;5 reps;AAROM   Ankle Pumps Sitting;Bilateral;10 reps;AROM   Marching Sitting;Bilateral;5 reps;AAROM   Balance training  Sitting balance Mod/Max A while performingr reaching tasks w/ OT   Assessment   Prognosis Fair   Problem List Decreased strength;Decreased range of motion;Decreased endurance; Impaired balance;Decreased mobility; Decreased cognition;Pain   Assessment Pt presents with decreased mobility, strength, balance, and activity tolerance  Pt demonstrated ability to perform bed mobility at Max Ax2  Pt sat EOB ~20 minutes at Mod/Max A; pt presents w/ posterior and R lean  Pt able to perform LE therex program as outlined above- pt required AAROM for LLE w/ L>R LE weakness  Pt not appropriate for transfers at this time 2' poor sitting balance  Pt returned lying supine and performed rolling R and L at Max Ax2 for pericare and linen change  Pt demonstrates improvements in tolerance sitting EOB and ability to follow commands this session  Pt continues to benefit from skilled therapy to maximize functional independence  Recommendation at this time is rehab  Pt would benefit from bed mobility, sitting balance, LE strength, and activity tolerance   Barriers to Discharge Decreased caregiver support; Inaccessible home environment   Goals   Patient Goals to get stronger   STG Expiration Date 09/06/20   PT Treatment Day 4   Plan   Treatment/Interventions Functional transfer training;LE strengthening/ROM; Therapeutic exercise; Endurance training;Patient/family training;Equipment eval/education; Bed mobility;Spoke to nursing   Progress Slow progress, decreased activity tolerance   PT Frequency   (3-6x/week)   Recommendation   PT Discharge Recommendation Post-Acute Rehabilitation Services   PT - OK to Discharge Yes  (when medically cleared to rehab)     Nevin Mahan, PT, DPT

## 2020-08-27 NOTE — ASSESSMENT & PLAN NOTE
· Speech following  Status post VBS    · Continue pureed  Diet with nectar thick liquids  · Aspiration precautions  · Ongoing speech evaluations

## 2020-08-27 NOTE — ASSESSMENT & PLAN NOTE
· Developed prior, was suspected to be 2/2 cefepime which has since been discontinued   · Now resolved

## 2020-08-27 NOTE — ASSESSMENT & PLAN NOTE
· As evidenced by tachycardia, leukocytosis, elevated lactic acidosis, and fevers  · Secondary to  shunt infection with meningitis and peritonitis  CSF culture grew Pseudomonas  · Status post  shunt externalization 7/30  · ID following, continue meropenem through 9/9 to complete 2 week course from time new shunt was replaced  · Repeat CSF cultures negative

## 2020-08-27 NOTE — PROGRESS NOTES
[facility]    Progress Note - Smiley Reyna 1950, 69 y o  male MRN: [id number]    Unit/Bed#: [address] 603-01 Encounter: [id number]    Primary Care Provider: Steven Mckeon   Date and time admitted to hospital: 7/30/2020 11:12 PM    * Severe sepsis [facility])  Assessment & Plan  · As evidenced by tachycardia, leukocytosis, elevated lactic acidosis, and fevers  · Secondary to  shunt infection with meningitis and peritonitis  CSF culture grew Pseudomonas  · Status post  shunt externalization 7/30  · ID following, continue meropenem through 9/9 to complete 2 week course from time new shunt was replaced  · Repeat CSF cultures negative  Infection of  (ventriculoperitoneal) shunt (HCC)  Assessment & Plan  · CSF culture grew Pseudomonas   ·  shunt was externalized, however repeat CSF still grew pseudomonas   shunt was later removed 8/7/2020  · Repeat CSF cultures negative  · Now s/p  shunt replacement 8/24 by neurosurgery  Neurosx has signed off  Is to f/u as outpatient in 2 weeks with repeat CTH prior to visit  · ID following  On IV Meropenem through 9/9 as above  · PT/OT recommending rehab  CM following  Metabolic encephalopathy  Assessment & Plan  · Multifactorial  Metabolic encephalopathy in the setting of severe sepsis as evidenced by  shunt infection with meningitis and peritonitis requiring multiple surgical procedures  · Patient alert and oriented baseline  Currently much more awake and alert than on prior encounters  · Does wax and wane  · Monitor     Normal pressure hydrocephalus (HCC)  Assessment & Plan  · S/p  shunt placement in 2005  · Now s/p removal of shunt apparatus given infection as above with re-insertion 8/24  Nonocclusive mesenteric ischemia (HCC)  Assessment & Plan  · S/p ex lap, arteriogram of SMA, papavarine infusion, exploration of SMA, angiogram SMA on 7/31/2020 and repeat ex lap 8/1/2020  · General surgery signed off     · Abdominal staples removed 8/18  Still with 1 suture in place, general surgery made aware, will remove today  Type 2 diabetes mellitus [facility])  Assessment & Plan  Lab Results   Component Value Date    HGBA1C 5 6 07/31/2020       Recent Labs     08/26/20  1725 08/26/20  2221 08/27/20  0728 08/27/20  1140   POCGLU 127 113 104 118           · Diet controlled  · Can continue SSI however has not required any insulin  · Diabetic diet  · Monitor Accu-Cheks    Lumbar stenosis  Assessment & Plan  · S/p L5-S1 fusion by Neurosurgery on 7/6/2020  · Outpatient neurosx follow up    Essential hypertension  Assessment & Plan  · Blood pressure acceptable  · Continue lisinopril    Dysphagia  Assessment & Plan  · Speech following  Status post VBS  · Continue pureed  Diet with nectar thick liquids  · Aspiration precautions  · Ongoing speech evaluations    Drug rashresolved as of 8/27/2020  Assessment & Plan  · Developed prior, was suspected to be 2/2 cefepime which has since been discontinued   · Now resolved    ZHOU on CPAP  Assessment & Plan  · Was discontinued by RT given patient's refusal     Pharmacologic: Heparin  Mechanical VTE Prophylaxis in Place: Yes    Patient Centered Rounds: I have performed bedside rounds with nursing staff today  Discussions with Specialists or Other Care Team Provider: nursing, case management     Education and Discussions with Family / Patient: patient  Message left for brother  Time Spent for Care: 30 minutes  More than 50% of total time spent on counseling and coordination of care as described above  Current Length of Stay: 27 day(s)    Current Patient Status: Inpatient     Certification Statement: The patient will continue to require additional inpatient hospital stay due to pending rehab placement    Discharge Plan / Estimated Discharge Date: pending rehab placement  Code Status: Level 1 - Full Code      Subjective:   Patient offers no acute complaints  No pain        Objective:     Vitals: Temp (24hrs), Av 8 °F (36 6 °C), Min:97 6 °F (36 4 °C), Max:98 °F (36 7 °C)    Temp:  [97 6 °F (36 4 °C)-98 °F (36 7 °C)] 98 °F (36 7 °C)  HR:  [56-68] 61  Resp:  [16-20] 18  BP: (100-136)/(50-73) 133/71  SpO2:  [96 %-100 %] 97 %  Body mass index is 31 75 kg/m²  Input and Output Summary (last 24 hours): Intake/Output Summary (Last 24 hours) at 2020 1331  Last data filed at 2020 1045  Gross per 24 hour   Intake 220 ml   Output 1329 ml   Net -1109 ml       Physical Exam:     Physical Exam  Vitals signs and nursing note reviewed  Constitutional:       General: He is not in acute distress  HENT:      Head: Normocephalic  Eyes:      Conjunctiva/sclera: Conjunctivae normal    Neck:      Musculoskeletal: Normal range of motion  Cardiovascular:      Rate and Rhythm: Normal rate and regular rhythm  Pulmonary:      Breath sounds: Normal breath sounds  Abdominal:      General: Bowel sounds are normal    Musculoskeletal: Normal range of motion  General: No edema  Skin:     General: Skin is warm  Coloration: Skin is pale  Comments: Midline abdominal scar with one suture still intact  Neurological:      Mental Status: He is alert and oriented to person, place, and time  Comments: Waxes and wanes, forgetful      Psychiatric:         Mood and Affect: Mood and affect normal          Additional Data:     Labs:    Results from last 7 days   Lab Units 20  0430   WBC Thousand/uL 8 49   HEMOGLOBIN g/dL 12 3   HEMATOCRIT % 39 4   PLATELETS Thousands/uL 175   NEUTROS PCT % 72   LYMPHS PCT % 13*   MONOS PCT % 9   EOS PCT % 4     Results from last 7 days   Lab Units 20  0451  20  1234   POTASSIUM mmol/L 4 3   < > 3 8   CHLORIDE mmol/L 106   < > 104   CO2 mmol/L 30   < > 31   BUN mg/dL 14   < > 17   CREATININE mg/dL 0 58*   < > 0 51*   CALCIUM mg/dL 8 2*   < > 7 8*   ALK PHOS U/L  --   --  86   ALT U/L  --   --  21   AST U/L  --   --  13    < > = values in this interval not displayed  Results from last 7 days   Lab Units 08/25/20  0506   INR  1 13         Recent Cultures (last 7 days):           Last 24 Hours Medication List:   Current Facility-Administered Medications   Medication Dose Route Frequency Provider Last Rate    acetaminophen  650 mg Oral Q6H PRN Heddy Chemung, DO      docusate sodium  100 mg Oral BID Heddy Chemung, DO      escitalopram  10 mg Oral Daily An Darlin, DO      heparin (porcine)  5,000 Units Subcutaneous Q8H 2701 Sandra Cervantes, DO      hydrocortisone   Topical 4x Daily PRN Heddy Chemung, DO      HYDROmorphone  0 2 mg Intravenous Q4H PRN An Darlin, DO      insulin lispro  1-6 Units Subcutaneous TID AC An Darlin, DO      lisinopril  10 mg Oral Daily An Darlin, DO      melatonin  3 mg Oral HS An Darlin, DO      meropenem  2,000 mg Intravenous Q8H An Darlin, DO 2,000 mg (08/27/20 1158)    VERONICA ANTIFUNGAL   Topical BID An Darlin, DO      nystatin  500,000 Units Swish & Swallow 4x Daily An Darlin, DO      ondansetron  4 mg Intravenous Q6H PRN Heddy Chemung, DO      oxyCODONE  5 mg Oral Q4H PRN Nancy Chemung, DO      senna  1 tablet Oral Daily Nancy Landaverde, DO          Today, Patient Was Seen By: Peggye Gottron, CRNP    ** Please Note: Dragon 360 Dictation voice to text software may have been used in the creation of this document   **

## 2020-08-27 NOTE — ASSESSMENT & PLAN NOTE
· CSF culture grew Pseudomonas   ·  shunt was externalized, however repeat CSF still grew pseudomonas   shunt was later removed 8/7/2020  · Repeat CSF cultures negative  · Now s/p  shunt replacement 8/24 by neurosurgery  Neurosx has signed off  Is to f/u as outpatient in 2 weeks with repeat CTH prior to visit  · ID following  On IV Meropenem through 9/9 as above  · PT/OT recommending rehab  CM following

## 2020-08-27 NOTE — ASSESSMENT & PLAN NOTE
· Multifactorial  Metabolic encephalopathy in the setting of severe sepsis as evidenced by  shunt infection with meningitis and peritonitis requiring multiple surgical procedures  · Patient alert and oriented baseline  Currently much more awake and alert than on prior encounters  · Does wax and wane     · Monitor

## 2020-08-27 NOTE — PLAN OF CARE
Problem: PHYSICAL THERAPY ADULT  Goal: Performs mobility at highest level of function for planned discharge setting  See evaluation for individualized goals  Description: Treatment/Interventions: OT, Spoke to case management, Spoke to nursing, Gait training, Bed mobility, Patient/family training, Endurance training, LE strengthening/ROM, Functional transfer training          See flowsheet documentation for full assessment, interventions and recommendations  Outcome: Progressing  Note: Prognosis: Fair  Problem List: Decreased strength, Decreased range of motion, Decreased endurance, Impaired balance, Decreased mobility, Decreased cognition, Pain  Assessment: Pt presents with decreased mobility, strength, balance, and activity tolerance  Pt demonstrated ability to perform bed mobility at Max Ax2  Pt sat EOB ~20 minutes at Mod/Max A; pt presents w/ posterior and R lean  Pt able to perform LE therex program as outlined above- pt required AAROM for LLE w/ L>R LE weakness  Pt not appropriate for transfers at this time 2' poor sitting balance  Pt returned lying supine and performed rolling R and L at Max Ax2 for pericare and linen change  Pt demonstrates improvements in tolerance sitting EOB and ability to follow commands this session  Pt continues to benefit from skilled therapy to maximize functional independence  Recommendation at this time is rehab  Pt would benefit from bed mobility, sitting balance, LE strength, and activity tolerance  Barriers to Discharge: Decreased caregiver support, Inaccessible home environment     PT Discharge Recommendation: [address]     PT - OK to Discharge: Yes(when medically cleared to rehab)    See flowsheet documentation for full assessment

## 2020-08-27 NOTE — DISCHARGE INSTRUCTIONS
 Monitor incisions daily  Keep incision clean and dry   May shower and wash hair 72 hours after surgery   Avoid rubbing the incision, but gently massage hair   Do not use a hair dryer, and avoid hair products such as mousse, oils, and gels  Do not brush your hair away from the incision since this will put strain on the suture line   Do not dye or perm hair until cleared by MD Maryann Hazel Please remove dressing within 1-2 days after surgery   No soaking in tub   May walk as tolerated: 3 short walks daily   Avoid heavy lifting, pushing or pulling over 10lbs for 2 weeks   Do not drive until cleared by MD/PA   Coumadin, ASA, Plavix may be restarted once cleared by MD Maryann Hazel If you ever require a MRI a skull X-ray must be done before and after the MRI  There may be a need to reprogram your shunt, so contact the office   Follow-up for a 2 week incision check and staple removal as scheduled  Follow-up for a 6 week post-operative visit with a repeat CT head to be completed prior to your visit  **Please contact MD if incision becomes red, swelling, and tender or has increased drainage  Or if you experience increased headaches, difficulties walking, nausea/vomiting, changes in vision, and increased drowsiness or temp>101   **    CBC with diff and CMP weekly while on the meropenem    Remove PICC line after last dose of meropenem 9/9/2020

## 2020-08-27 NOTE — ASSESSMENT & PLAN NOTE
· S/p ex lap, arteriogram of SMA, papavarine infusion, exploration of SMA, angiogram SMA on 7/31/2020 and repeat ex lap 8/1/2020  · General surgery signed off  · Abdominal staples removed 8/18  Still with 1 suture in place, general surgery made aware, will remove today

## 2020-08-28 LAB
ALBUMIN SERPL BCP-MCNC: 2.5 G/DL (ref 3.5–5)
ALP SERPL-CCNC: 99 U/L (ref 46–116)
ALT SERPL W P-5'-P-CCNC: 15 U/L (ref 12–78)
ANION GAP SERPL CALCULATED.3IONS-SCNC: 2 MMOL/L (ref 4–13)
AST SERPL W P-5'-P-CCNC: 10 U/L (ref 5–45)
BILIRUB SERPL-MCNC: 0.59 MG/DL (ref 0.2–1)
BUN SERPL-MCNC: 13 MG/DL (ref 5–25)
CALCIUM SERPL-MCNC: 8.4 MG/DL (ref 8.3–10.1)
CHLORIDE SERPL-SCNC: 104 MMOL/L (ref 100–108)
CO2 SERPL-SCNC: 31 MMOL/L (ref 21–32)
CREAT SERPL-MCNC: 0.53 MG/DL (ref 0.6–1.3)
GFR SERPL CREATININE-BSD FRML MDRD: 106 ML/MIN/1.73SQ M
GLUCOSE SERPL-MCNC: 106 MG/DL (ref 65–140)
GLUCOSE SERPL-MCNC: 109 MG/DL (ref 65–140)
GLUCOSE SERPL-MCNC: 116 MG/DL (ref 65–140)
GLUCOSE SERPL-MCNC: 117 MG/DL (ref 65–140)
GLUCOSE SERPL-MCNC: 131 MG/DL (ref 65–140)
POTASSIUM SERPL-SCNC: 4.1 MMOL/L (ref 3.5–5.3)
PROT SERPL-MCNC: 6.5 G/DL (ref 6.4–8.2)
SODIUM SERPL-SCNC: 137 MMOL/L (ref 136–145)

## 2020-08-28 PROCEDURE — 97110 THERAPEUTIC EXERCISES: CPT

## 2020-08-28 PROCEDURE — 82948 REAGENT STRIP/BLOOD GLUCOSE: CPT

## 2020-08-28 PROCEDURE — 99232 SBSQ HOSP IP/OBS MODERATE 35: CPT | Performed by: NURSE PRACTITIONER

## 2020-08-28 PROCEDURE — 97112 NEUROMUSCULAR REEDUCATION: CPT

## 2020-08-28 PROCEDURE — 99233 SBSQ HOSP IP/OBS HIGH 50: CPT | Performed by: INTERNAL MEDICINE

## 2020-08-28 PROCEDURE — 97535 SELF CARE MNGMENT TRAINING: CPT

## 2020-08-28 PROCEDURE — 97530 THERAPEUTIC ACTIVITIES: CPT

## 2020-08-28 PROCEDURE — 80053 COMPREHEN METABOLIC PANEL: CPT | Performed by: INTERNAL MEDICINE

## 2020-08-28 RX ADMIN — NYSTATIN 500000 UNITS: 100000 SUSPENSION ORAL at 22:11

## 2020-08-28 RX ADMIN — HEPARIN SODIUM 5000 UNITS: 5000 INJECTION INTRAVENOUS; SUBCUTANEOUS at 22:11

## 2020-08-28 RX ADMIN — MELATONIN 3 MG: at 22:11

## 2020-08-28 RX ADMIN — NYSTATIN 500000 UNITS: 100000 SUSPENSION ORAL at 11:55

## 2020-08-28 RX ADMIN — OXYCODONE HYDROCHLORIDE 5 MG: 5 TABLET ORAL at 11:55

## 2020-08-28 RX ADMIN — MICONAZOLE NITRATE: 20 CREAM TOPICAL at 08:49

## 2020-08-28 RX ADMIN — HEPARIN SODIUM 5000 UNITS: 5000 INJECTION INTRAVENOUS; SUBCUTANEOUS at 04:51

## 2020-08-28 RX ADMIN — MEROPENEM 2000 MG: 1 INJECTION, POWDER, FOR SOLUTION INTRAVENOUS at 04:51

## 2020-08-28 RX ADMIN — LISINOPRIL 10 MG: 10 TABLET ORAL at 08:45

## 2020-08-28 RX ADMIN — MICONAZOLE NITRATE: 20 CREAM TOPICAL at 17:54

## 2020-08-28 RX ADMIN — MEROPENEM 2000 MG: 1 INJECTION, POWDER, FOR SOLUTION INTRAVENOUS at 22:29

## 2020-08-28 RX ADMIN — NYSTATIN 500000 UNITS: 100000 SUSPENSION ORAL at 08:45

## 2020-08-28 RX ADMIN — HEPARIN SODIUM 5000 UNITS: 5000 INJECTION INTRAVENOUS; SUBCUTANEOUS at 14:11

## 2020-08-28 RX ADMIN — ESCITALOPRAM OXALATE 10 MG: 10 TABLET ORAL at 08:45

## 2020-08-28 RX ADMIN — MEROPENEM 2000 MG: 1 INJECTION, POWDER, FOR SOLUTION INTRAVENOUS at 12:36

## 2020-08-28 NOTE — PLAN OF CARE
Problem: Potential for Falls  Goal: Patient will remain free of falls  Description: INTERVENTIONS:  - Assess patient frequently for physical needs  -  Identify cognitive and physical deficits and behaviors that affect risk of falls    -  [place] fall precautions as indicated by assessment   - Educate patient/family on patient safety including physical limitations  - Instruct patient to call for assistance with activity based on assessment  - Modify environment to reduce risk of injury  - Consider OT/PT consult to assist with strengthening/mobility  Outcome: Progressing     Problem: Prexisting or High Potential for Compromised Skin Integrity  Goal: Skin integrity is maintained or improved  Description: INTERVENTIONS:  - Identify patients at risk for skin breakdown  - Assess and monitor skin integrity  - Assess and monitor nutrition and hydration status  - Monitor labs   - Assess for incontinence   - Turn and reposition patient  - Assist with mobility/ambulation  - Relieve pressure over bony prominences  - Avoid friction and shearing  - Provide appropriate hygiene as needed including keeping skin clean and dry  - Evaluate need for skin moisturizer/barrier cream  - Collaborate with interdisciplinary team   - Patient/family teaching  - Consider wound care consult   Outcome: Progressing     Problem: PAIN - ADULT  Goal: Verbalizes/displays adequate comfort level or baseline comfort level  Description: Interventions:  - Encourage patient to monitor pain and request assistance  - Assess pain using appropriate pain scale  - Administer analgesics based on type and severity of pain and evaluate response  - Implement non-pharmacological measures as appropriate and evaluate response  - Consider cultural and social influences on pain and pain management  - Notify physician/advanced practitioner if interventions unsuccessful or patient reports new pain  Outcome: Progressing     Problem: SAFETY ADULT  Goal: Maintain or return to baseline ADL function  Description: INTERVENTIONS:  -  Assess patient's ability to carry out ADLs; assess patient's baseline for ADL function and identify physical deficits which impact ability to perform ADLs (bathing, care of mouth/teeth, toileting, grooming, dressing, etc )  - Assess/evaluate cause of self-care deficits   - Assess range of motion  - Assess patient's mobility; develop plan if impaired  - Assess patient's need for assistive devices and provide as appropriate  - Encourage maximum independence but intervene and supervise when necessary  - Involve family in performance of ADLs  - Assess for home care needs following discharge   - Consider OT consult to assist with ADL evaluation and planning for discharge  - Provide patient education as appropriate  Outcome: Progressing  Goal: Maintain or return mobility status to optimal level  Description: INTERVENTIONS:  - Assess patient's baseline mobility status (ambulation, transfers, stairs, etc )    - Identify cognitive and physical deficits and behaviors that affect mobility  - Identify mobility aids required to assist with transfers and/or ambulation (gait belt, sit-to-stand, lift, walker, cane, etc )  - [place] fall precautions as indicated by assessment  - Record patient progress and toleration of activity level on Mobility SBAR; progress patient to next Phase/Stage  - Instruct patient to call for assistance with activity based on assessment  - Consider rehabilitation consult to assist with strengthening/weightbearing, etc   Outcome: Progressing     Problem: GASTROINTESTINAL - ADULT  Goal: Minimal or absence of nausea and/or vomiting  Description: INTERVENTIONS:  - Administer IV fluids if ordered to ensure adequate hydration  - Maintain NPO status until nausea and vomiting are resolved  - Nasogastric tube if ordered  - Administer ordered antiemetic medications as needed  - Provide nonpharmacologic comfort measures as appropriate  - Advance diet as tolerated, if ordered  - Consider nutrition services referral to assist patient with adequate nutrition and appropriate food choices  Outcome: Progressing  Goal: Maintains or returns to baseline bowel function  Description: INTERVENTIONS:  - Assess bowel function  - Encourage oral fluids to ensure adequate hydration  - Administer IV fluids if ordered to ensure adequate hydration  - Administer ordered medications as needed  - Encourage mobilization and activity  - Consider nutritional services referral to assist patient with adequate nutrition and appropriate food choices  Outcome: Progressing  Goal: Maintains adequate nutritional intake  Description: INTERVENTIONS:  - Monitor percentage of each meal consumed  - Identify factors contributing to decreased intake, treat as appropriate  - Assist with meals as needed  - Monitor I&O, weight, and lab values if indicated  - Obtain nutrition services referral as needed  Outcome: Progressing     Problem: GENITOURINARY - ADULT  Goal: Maintains or returns to baseline urinary function  Description: INTERVENTIONS:  - Assess urinary function  - Encourage oral fluids to ensure adequate hydration if ordered  - Administer IV fluids as ordered to ensure adequate hydration  - Administer ordered medications as needed  - Offer frequent toileting  - Follow urinary retention protocol if ordered  Outcome: Progressing  Goal: Absence of urinary retention  Description: INTERVENTIONS:  - Assess patients ability to void and empty bladder  - Monitor I/O  - Bladder scan as needed  - Discuss with physician/AP medications to alleviate retention as needed  - Discuss catheterization for long term situations as appropriate  Outcome: Progressing     Problem: Nutrition/Hydration-ADULT  Goal: Nutrient/Hydration intake appropriate for improving, restoring or maintaining nutritional needs  Description: Monitor and assess patient's nutrition/hydration status for malnutrition   Collaborate with interdisciplinary team and initiate plan and interventions as ordered  Monitor patient's weight and dietary intake as ordered or per policy  Utilize nutrition screening tool and intervene as necessary  Determine patient's food preferences and provide high-protein, high-caloric foods as appropriate       INTERVENTIONS:  - Monitor oral intake, urinary output, labs, and treatment plans  - Assess nutrition and hydration status and recommend course of action  - Evaluate amount of meals eaten  - Assist patient with eating if necessary   - Allow adequate time for meals  - Recommend/ encourage appropriate diets, oral nutritional supplements, and vitamin/mineral supplements  - Order, calculate, and assess calorie counts as needed  - Recommend, monitor, and adjust tube feedings and TPN/PPN based on assessed needs  - Assess need for intravenous fluids  - Provide specific nutrition/hydration education as appropriate  - Include patient/family/caregiver in decisions related to nutrition  Outcome: Progressing

## 2020-08-28 NOTE — PROGRESS NOTES
[facility] Internal Medicine    Progress Note - Kelsey Berry 1950, 69 y o  male MRN: [id number]    [address] Encounter: [id number]    Primary Care Provider: Sakshi Villegas   Date and time admitted to hospital: 7/30/2020 11:12 PM    * Severe sepsis [facility])  Assessment & Plan  · As evidenced by tachycardia, leukocytosis, elevated lactic acidosis, and fevers  · Secondary to  shunt infection with meningitis and peritonitis  CSF culture grew Pseudomonas  · Status post  shunt externalization 7/30  · ID following, continue meropenem through 9/9 to complete 2 week course from time new shunt was replaced  · Repeat CSF cultures negative  Infection of  (ventriculoperitoneal) shunt (HCC)  Assessment & Plan  · CSF culture grew Pseudomonas   ·  shunt was externalized, however repeat CSF still grew pseudomonas   shunt was later removed 8/7/2020  · Repeat CSF cultures negative  · Now s/p  shunt replacement 8/24 by neurosurgery  Neurosx has signed off  Is to f/u as outpatient in 2 weeks with repeat CTH prior to visit  · ID following  On IV Meropenem through 9/9 as above  · PT/OT recommending rehab  CM following  Metabolic encephalopathy  Assessment & Plan  · Multifactorial  Metabolic encephalopathy in the setting of severe sepsis as evidenced by  shunt infection with meningitis and peritonitis requiring multiple surgical procedures  · Patient alert and oriented baseline  Currently much more awake and alert than on prior encounters  · Does wax and wane  · Monitor     Normal pressure hydrocephalus (HCC)  Assessment & Plan  · S/p  shunt placement in 2005  · Now s/p removal of shunt apparatus given infection as above with re-insertion 8/24  Nonocclusive mesenteric ischemia (HCC)  Assessment & Plan  · S/p ex lap, arteriogram of SMA, papavarine infusion, exploration of SMA, angiogram SMA on 7/31/2020 and repeat ex lap 8/1/2020  · General surgery signed off     · Abdominal staples removed 8/18  Had one suture still in place  Surgery assessed 8/27 and cut it  Possibly a visceral suture, no further intervention for now  · Outpatient f/u with general surgery    Multiple wounds  Assessment & Plan  · Wound care per Wound Care team recommendations     Type 2 diabetes mellitus [facility])  Assessment & Plan  Lab Results   Component Value Date    HGBA1C 5 6 07/31/2020       Recent Labs     08/27/20  1140 08/27/20  1749 08/27/20  2152 08/28/20  0805   POCGLU 118 114 109 106           · Diet controlled  · Can continue SSI however has not required any insulin  · Diabetic diet  · Monitor Accu-Cheks    Lumbar stenosis  Assessment & Plan  · S/p L5-S1 fusion by Neurosurgery on 7/6/2020  · Outpatient neurosx follow up    Essential hypertension  Assessment & Plan  · Blood pressure acceptable  · Continue lisinopril    Dysphagia  Assessment & Plan  · Speech following  Status post VBS  · Upgraded to mechanical soft Diet with nectar thick liquids  · Aspiration precautions  · Ongoing speech evaluations    ZHOU on CPAP  Assessment & Plan  · Was discontinued by RT given patient's refusal     Pharmacologic: Heparin  Mechanical VTE Prophylaxis in Place: Yes    Patient Centered Rounds: I have performed bedside rounds with nursing staff today  Discussions with Specialists or Other Care Team Provider: nursing, case management, surgery     Education and Discussions with Family / Patient: patient and brother over phone     Time Spent for Care: 30 minutes  More than 50% of total time spent on counseling and coordination of care as described above  Current Length of Stay: 28 day(s)    Current Patient Status: Inpatient   Certification Statement: The patient will continue to require additional inpatient hospital stay due to awaiting placement    Discharge Plan / Estimated Discharge Date: awaiting placement  Code Status: Level 1 - Full Code      Subjective:   Patient offers no acute complaints  No pain  Objective:     Vitals:   Temp (24hrs), Av 3 °F (36 8 °C), Min:98 3 °F (36 8 °C), Max:98 3 °F (36 8 °C)    Temp:  [98 3 °F (36 8 °C)] 98 3 °F (36 8 °C)  HR:  [54-65] 54  Resp:  [18-22] 18  BP: (108-123)/(59-70) 123/70  SpO2:  [95 %-98 %] 97 %  Body mass index is 31 75 kg/m²  Input and Output Summary (last 24 hours): Intake/Output Summary (Last 24 hours) at 2020 1111  Last data filed at 2020 0900  Gross per 24 hour   Intake 800 ml   Output 1400 ml   Net -600 ml       Physical Exam:     Physical Exam  Vitals signs and nursing note reviewed  Constitutional:       General: He is not in acute distress  HENT:      Head: Normocephalic  Comments: Right scalp incision with sutures CDI  Left scalp incision with sutures removed, PAULO  Eyes:      Conjunctiva/sclera: Conjunctivae normal    Neck:      Musculoskeletal: Normal range of motion  Comments: Left healing incision noted  Sutures out  Cardiovascular:      Rate and Rhythm: Normal rate  Pulmonary:      Breath sounds: Decreased breath sounds present  Abdominal:      General: Bowel sounds are normal       Comments: Healing abdominal incision noted    Musculoskeletal: Normal range of motion  General: No edema  Skin:     General: Skin is warm  Neurological:      Mental Status: He is alert and oriented to person, place, and time        Comments: forgetful   Psychiatric:         Mood and Affect: Mood and affect normal          Additional Data:     Labs:    Results from last 7 days   Lab Units 20  0430   WBC Thousand/uL 8 49   HEMOGLOBIN g/dL 12 3   HEMATOCRIT % 39 4   PLATELETS Thousands/uL 175   NEUTROS PCT % 72   LYMPHS PCT % 13*   MONOS PCT % 9   EOS PCT % 4     Results from last 7 days   Lab Units 20  0452   POTASSIUM mmol/L 4 1   CHLORIDE mmol/L 104   CO2 mmol/L 31   BUN mg/dL 13   CREATININE mg/dL 0 53*   CALCIUM mg/dL 8 4   ALK PHOS U/L 99   ALT U/L 15   AST U/L 10     Results from last 7 days   Lab Units 08/25/20  0506   INR  1 13         Recent Cultures (last 7 days):           Last 24 Hours Medication List:   Current Facility-Administered Medications   Medication Dose Route Frequency Provider Last Rate    acetaminophen  650 mg Oral Q6H PRN Amanda Corrie, DO      docusate sodium  100 mg Oral BID Amanda Corrie, DO      escitalopram  10 mg Oral Daily An Darlin, DO      heparin (porcine)  5,000 Units Subcutaneous Q8H 2701 Sandra Cervantes, DO      hydrocortisone   Topical 4x Daily PRN Amanda Corrie, DO      HYDROmorphone  0 2 mg Intravenous Q4H PRN An Darlin, DO      insulin lispro  1-6 Units Subcutaneous TID AC An Darlin, DO      lisinopril  10 mg Oral Daily An Darlin, DO      melatonin  3 mg Oral HS An Darlin, DO      meropenem  2,000 mg Intravenous Q8H An Darlin, DO 2,000 mg (08/28/20 0451)    VERONICA ANTIFUNGAL   Topical BID Amanda Corrie, DO      nystatin  500,000 Units Swish & Swallow 4x Daily An Darlin, DO      ondansetron  4 mg Intravenous Q6H PRN Amanda Corrie, DO      oxyCODONE  5 mg Oral Q4H PRN Amanda Corrie, DO      senna  1 tablet Oral Daily Amanda Corrie, DO          Today, Patient Was Seen By: AR Tanner    ** Please Note: Dragon 360 Dictation voice to text software may have been used in the creation of this document   **

## 2020-08-28 NOTE — PLAN OF CARE
Problem: PHYSICAL THERAPY ADULT  Goal: Performs mobility at highest level of function for planned discharge setting  See evaluation for individualized goals  Description: Treatment/Interventions: OT, Spoke to case management, Spoke to nursing, Gait training, Bed mobility, Patient/family training, Endurance training, LE strengthening/ROM, Functional transfer training          See flowsheet documentation for full assessment, interventions and recommendations  Outcome: Progressing  Note: Prognosis: Fair  Problem List: Decreased strength, Decreased range of motion, Decreased endurance, Impaired balance, Decreased mobility, Decreased cognition, Pain  Assessment: Pt continues to require Ax2 for all mobility tasks due to weakness & inconsistant participation in tasks  was able to follow instructions in attempt to increase UE support to pull into unsupported sitting, but pt unable to maintain  Pt performed seated exercises as noted above in recliner with assist given to improve AROM  Pt demonstrated difficulty with motor planning to perform hip abduction despite tactile cues on outside of knees, likely due to increased fatigue at that point in session  Pt performed seated reaching tasks supported in recliner, but was unable to do so unsupported  Pt returned to bed by way of lateral transfer, then performed rolling L&R to reposition with wedges under L thorax & thigh  RN notified of pt position post session  Pt will continue to benefit from therapy services to improve strength, balance, and participation in all tasks to reduce burden of care  Recommendations remain the same  Barriers to Discharge: Decreased caregiver support, Inaccessible home environment     PT Discharge Recommendation: [address]     PT - OK to Discharge: Yes(when medically cleared to rehab)    See flowsheet documentation for full assessment

## 2020-08-28 NOTE — SOCIAL WORK
Auth obtained  Pt will d/c to [facility] tomorrow @3191 via [facility]  Pt's brother Alejandra Benedict made aware and in agreement

## 2020-08-28 NOTE — ASSESSMENT & PLAN NOTE
Lab Results   Component Value Date    HGBA1C 5 6 07/31/2020       Recent Labs     08/27/20  1140 08/27/20  1749 08/27/20  2152 08/28/20  0805   POCGLU 118 114 109 106           · Diet controlled  · Can continue SSI however has not required any insulin    · Diabetic diet  · Monitor Accu-Cheks

## 2020-08-28 NOTE — ASSESSMENT & PLAN NOTE
· S/p ex lap, arteriogram of SMA, papavarine infusion, exploration of SMA, angiogram SMA on 7/31/2020 and repeat ex lap 8/1/2020  · General surgery signed off  · Abdominal staples removed 8/18  Had one suture still in place  Surgery assessed 8/27 and cut it  Possibly a visceral suture, no further intervention for now     · Outpatient f/u with general surgery

## 2020-08-28 NOTE — ASSESSMENT & PLAN NOTE
· Speech following  Status post VBS    · Upgraded to mechanical soft Diet with nectar thick liquids  · Aspiration precautions  · Ongoing speech evaluations

## 2020-08-28 NOTE — SOCIAL WORK
[place] [facility] [place] at the earliest  [facility] is [facility] accepting facility  Pt's brother in agreement  Auth was started  Pending reference # was obtained by [facility]

## 2020-08-28 NOTE — PLAN OF CARE
Problem: OCCUPATIONAL THERAPY ADULT  Goal: Performs self-care activities at highest level of function for planned discharge setting  See evaluation for individualized goals  Description: Treatment Interventions: ADL retraining, Functional transfer training, Endurance training, Patient/family training, Equipment evaluation/education, Activityengagement          See flowsheet documentation for full assessment, interventions and recommendations  Outcome: Progressing  Note: Limitation: Decreased ADL status, Decreased UE ROM, Decreased UE strength, Decreased Safe judgement during ADL, Decreased cognition, Decreased endurance, Decreased self-care trans, Decreased high-level ADLs, Decreased fine motor control, Visual deficit  Prognosis: Fair  Assessment: pt particiapted in pm ot sessoin and was seen focusing  on activity tolerence, ue strengthening and rom (spastic ue's) , activity engagement / functional reach and direction following  pt continues with brighter affect, engages in  few word sentences  pt demsontrtes fair minus activity tolerence  pt is motivated and cooperative  pt with improvement in ability to perform ot session while oob in chair  per nsg pt was oob for since am today  pt continues to require max asst x 2 for bed mobility and max asst ub / total assist lb for adls  ptengagted in 5 min x 2 of ue activity for table reach  and mail opening tasks  plan to continue to focus on goals from ie     OT Discharge Recommendation: [address]  OT - OK to Discharge:  Yes     WARD Browning

## 2020-08-28 NOTE — UTILIZATION REVIEW
Continued Stay Review    Date: 8/28/20                     Current Patient Class: Inpatient Current Level of Care: Med Surg    HPI:70 y o  male initially admitted on 69 y o  male initially admitted on 7/31/20 with GI bleed  Found to have ischemic bowel with peritonitis  S/P exploratory lap, VAC placement  Status post exploratory laparotomy, VAC placement   Status post femoral artery and SMA artery cannulation for paraverine by vascular surgery     Status post second-look on 08/01 with no resection performed   Peritoneal fluid culture also shows Pseudomonas   Distal externalized shunt still has Pseudomonas aeruginosa   Now status post removal of the entire  shunt  8/28/20 Internal Medicine: POD #4 S/P shunt replacement by Neurosurgery  Upgraded to mechanical soft diet with nectar thick liquids  Alert and oriented x3, forgetful  Infectious Disease:  Continue meropenem through 9/9/20 to complete two weeks from the time of new shunt placement  Physical Therapy recommending post acute rehab at D/C  Case Management noted auth obtained today for [facility], EMS transport scheduled,  plan is for D/C tomorrow @ 1400  Pertinent Labs/Diagnostic Results:    8/27 skull x-ray: Intact  shunt catheter with tip terminating in the region of the superior cavoatrial junction  8/26 skull x-ray:  Stable appearance of left-sided ventriculoatrial shunt catheter  Shunt setting is approximately 109 mm of water  This appears similar to the prior CT scan     8/25 CT head:  Status post placement of left frontal ventriculoperitoneal shunt catheter  Stable ventricular megaly      SURGERY DATE: 8/24/2020      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]     Post-Op Diagnosis Codes:   * 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]     Procedure(s) (LRB):Left ventriculoatrial shunt (Left)     Specimen(s):  ID Type Source Tests Collected by Time Destination   A : WBC COUNT W DIFF / RED CELL COUNT / GLUCOSE / PROTEIN Cerebrospinal Fluid Brain GLUCOSE, CSF, PROTEIN TOTAL, CSF, CSF WBC COUNT WITH DIFFERENTIAL, RED CELL COUNT, CSF Pranav Ryder MD 8/24/2020 9891     B : CSF CULTURE AND GRAM STAIN Cerebrospinal Fluid Brain CSF CULTURE AND Shruthi Bernard MD 8/24/2020 0492          Estimated Blood Loss: Minimal        Anesthesia Type: General     Operative Indications: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]     Operative Findings:Hydrocephalus     Complications: None            Results from last 7 days   Lab Units 08/23/20  1211   SARS-COV-2  Negative     Results from last 7 days   Lab Units 08/27/20  0430 08/26/20  0451 08/25/20  0506 08/23/20  1234   WBC Thousand/uL 8 49 10 26* 10 71* 8 93   HEMOGLOBIN g/dL 12 3 11 9* 12 1 11 8*   HEMATOCRIT % 39 4 37 3 38 3 37 8   PLATELETS Thousands/uL 175 178 189 193   NEUTROS ABS Thousands/µL 6 14 7 88*  --  7 05         Results from last 7 days   Lab Units 08/28/20  0452 08/27/20  0451 08/26/20  0451 08/25/20  0506 08/23/20  1234   SODIUM mmol/L 137 140 140 141 142   POTASSIUM mmol/L 4 1 4 3 4 0 4 1 3 8   CHLORIDE mmol/L 104 106 108 107 104   CO2 mmol/L 31 30 28 28 31   ANION GAP mmol/L 2* 4 4 6 7   BUN mg/dL 13 14 16 13 17   CREATININE mg/dL 0 53* 0 58* 0 52* 0 56* 0 51*   EGFR ml/min/1 73sq m 106 103 107 104 108   CALCIUM mg/dL 8 4 8 2* 7 7* 8 3 7 8*   MAGNESIUM mg/dL  --  2 3 2 4  --   --    PHOSPHORUS mg/dL  --  2 0* 2 2*  --   --      Results from last 7 days   Lab Units 08/28/20  0452 08/23/20  1234   AST U/L 10 13   ALT U/L 15 21   ALK PHOS U/L 99 86   TOTAL PROTEIN g/dL 6 5 6 0*   ALBUMIN g/dL 2 5* 2 3*   TOTAL BILIRUBIN mg/dL 0 59 0 43     Results from last 7 days   Lab Units 08/28/20  1627 08/28/20  1121 08/28/20  0805 08/27/20  2152 08/27/20  1749 08/27/20  1140 08/27/20  0728 08/26/20  2221 08/26/20  1725 08/26/20  1538 08/26/20  1143 08/26/20  0827   POC GLUCOSE mg/dl 116 117 106 109 114 118 104 113 127 129 132 123     Results from last 7 days   Lab Units 08/28/20  0452 08/27/20  0451 08/26/20  0451 08/25/20  0506 08/23/20  1234   GLUCOSE RANDOM mg/dL 109 105 108 103 92         Results from last 7 days   Lab Units 08/25/20  0506 08/24/20  0530 08/23/20  1234   PROTIME seconds 14 5 13 7 16 9*   INR  1 13 1 05 1 37*   PTT seconds 41*  --  36         Results from last 7 days   Lab Units 08/24/20  0033   HEP B S AG  Non-reactive   HEP C AB  Non-reactive                 Results from last 7 days   Lab Units 08/23/20  1235   CLARITY UA  Clear   COLOR UA  Yellow   SPEC GRAV UA  1 021   PH UA  7 0   GLUCOSE UA mg/dl Negative   KETONES UA mg/dl Negative   BLOOD UA  Negative   PROTEIN UA mg/dl 30 (1+)*   NITRITE UA  Negative   BILIRUBIN UA  Negative   UROBILINOGEN UA E U /dl 0 2   LEUKOCYTES UA  Negative   WBC UA /hpf None Seen   RBC UA /hpf None Seen   BACTERIA UA /hpf None Seen   EPITHELIAL CELLS WET PREP /hpf None Seen           Results from last 7 days   Lab Units 08/24/20  0838   TOTAL COUNTED  100     Results from last 7 days   Lab Units 08/24/20  0838 08/24/20  0838   APPEARANCE CSF  clear  --    WBC CSF /uL 4  --    XANTHOCHROMIA  No  --    NEUTROPHILS % (CSF) %  --  3   LYMPHS % (CSF) %  --  92   MONOCYTES % (CSF) %  --  3   GLUCOSE CSF mg/dL 66  --    PROTEIN CSF mg/dL 25  --    RBC CSF uL 37*  --    CSF CULTURE  No growth  --        Vital Signs:     Date/Time   Temp   Pulse   Resp   BP   MAP (mmHg)   Arterial Line BP   MAP   SpO2   O2 Device     08/28/20 15:24:06      64      124/68   87         95 %   Room air    08/28/20 08:07:13      54Abnormal     18   123/70   88         97 %        08/27/20 2320   98 3 °F (36 8 °C)   65   22   118/70            95 %        08/27/20 15:50:06      65      108/59   75         97 %        08/27/20 11:01:22      61   18   133/71   92         97 %        08/27/20 07:25:50      59   18   135/71   92         100 %        08/27/20 02:43:08      56   18   136/73   94         97 %        08/26/20 22:19:36   98 °F (36 7 °C)   64   16   113/60   78         98 %   None (Room air)     08/26/20 19:32:25   97 6 °F (36 4 °C)   61   18   111/62   78         98 %   None (Room air)     08/26/20 16:26:08         20   105/61   76                 08/26/20 1400      68   17   100/50   67         96 %        08/26/20 1200   98 °F (36 7 °C)   52Abnormal     21   100/48Abnormal     65         94 %   None (Room air)     08/26/20 1100      52Abnormal     20   82/50Abnormal     59         96 %        08/26/20 1000      54Abnormal     21   107/58   76         99 %        08/26/20 0900      56   16   127/70   91   134/52   78 mmHg   97 %        08/26/20 0800   97 6 °F (36 4 °C)   52Abnormal     18   118/65   76   86/68   78 mmHg   97 %   None (Room air)     08/26/20 0700      54Abnormal        116/60   79   96/52   72 mmHg   96 %        08/26/20 0600      54Abnormal     20         82/54   66 mmHg   94 %   Nasal cannula     08/26/20 0500      54Abnormal              110/40   62 mmHg   94 %        08/26/20 0400   98 5 °F (36 9 °C)   52Abnormal     20         106/36   56 mmHg   96 %   None (Room air)     08/26/20 0300      54Abnormal              110/38   58 mmHg   97 %        08/26/20 0200      52Abnormal              100/32   52 mmHg   96 %   None (Room air)     08/26/20 0100      58            96/40   58 mmHg   94 %        08/26/20 0000   98 7 °F (37 1 °C)   58   19         114/42   64 mmHg   95 %   None (Room air)       Date and Time  Eye Opening  Best Verbal Response  Best Motor Response  Palmer Coma Scale Score    08/28/20 1235  4  4  6  14    08/28/20 0845  4  4  6  14    08/28/20 0639  4  4  6  14    08/28/20 0400  4  4  6  14    08/28/20 0000  4  4  6  14    08/27/20 2000  4  4  6  14    08/27/20 1621  4  4  6  14    08/27/20 1149  4  4  6  14    08/27/20 0735  4  4  6  14    08/27/20 0400  4  4  6  14    08/27/20 0000  4  4  6  14    08/26/20 2200  4  4  6  14    08/26/20 2000  4 4  6  14    08/26/20 1600  4  4  6  14    08/26/20 1200  4  4  6  14    08/26/20 1000  4  4  6  14    08/26/20 0800  4  4  6  14    08/26/20 0600  4  4  6  14    08/26/20 0400  4  4  6  14    08/26/20 0200  4  4  6  14    08/26/20 0000  4  4  6  14    08/25/20 2200  4  4  6  14    08/25/20 2000  4  4  6  14    08/25/20 1200  4  4  6  14    08/25/20 0800  4  4  6  14    08/25/20 0616  4  4  6  14    08/25/20 0434  4  4  6  14    08/25/20 0204  4  4  6  14    08/25/20 0000  4  4  6  14    08/24/20 2213  4  4  6  14                Medications:   Scheduled Medications:      docusate sodium, 100 mg, Oral, BID  escitalopram, 10 mg, Oral, Daily  heparin (porcine), 5,000 Units, Subcutaneous, Q8H LEONORA  insulin lispro, 1-6 Units, Subcutaneous, TID AC  lisinopril, 10 mg, Oral, Daily  melatonin, 3 mg, Oral, HS  meropenem, 2,000 mg, Intravenous, Q8H  VERONICA ANTIFUNGAL, , Topical, BID  nystatin, 500,000 Units, Swish & Swallow, 4x Daily  senna, 1 tablet, Oral, Daily      Continuous IV Infusions: None  PRN Meds:  acetaminophen, 650 mg, Oral, Q6H PRN  hydrocortisone, , Topical, 4x Daily PRN  HYDROmorphone, 0 2 mg, Intravenous, Q4H PRN  ondansetron, 4 mg, Intravenous, Q6H PRN  oxyCODONE, 5 mg, Oral, Q4H PRN          Network Utilization Review Department  [email or web address]  ATTENTION: Please call with any questions or concerns to [phone] and carefully listen to the prompts so that you are directed to the right person  All voicemails are confidential   Elidia Lucia all requests for admission clinical reviews, approved or denied determinations and any other requests to dedicated fax number below belonging to the campus where the patient is receiving treatment   List of dedicated fax numbers for the Facilities:  [address] DENIALS (Administrative/Medical Necessity) [phone]   [address] (Maternity/NICU/Pediatrics) [phone]   [facility] [phone] Dana Green [phone]   [address] [phone]   [address] [phone]   [facility]  [phone]   [address] [facility] [place]  [address] [facility] [address] [facility] [phone]

## 2020-08-28 NOTE — PROGRESS NOTES
Progress Note - Infectious Disease   Joon Forbes 69 y o  male MRN: [id number]  Unit/Bed#: [facility] 603-01 Encounter: [id number]      Impression/Plan:  1  Severe sepsis, tachycardia, leukocytosis, elevated lactic acid   With early development of high fevers  Secondary to  shunt infection with meningitis and peritonitis   Repeat blood cultures negative  All the  shunt apparatus has been removed  Repeat CSF cultures negative   Patient much improved  -antibiotic plan as below  -monitor temperatures and hemodynamics  -monitor CBC with diff and and CMP weekly while on the meropenem  -supportive care      2   shunt infection  Fluid WBC count was 61 with neutrophil predominance   Pseudomonas aeruginosa  Suspect secondary to peritonitis in the setting of #3    Now status post removal of the entire  shunt apparatus    Repeat CSF analysis looks fairly bland and the follow-up cultures are negative   Seems to be tolerating the antibiotics without difficulty   The patient underwent ventriculoatrial shunt   His cognition is much improved since the procedure   CSF culture negative thus far  -continue meropenem through 9/9/2020 to complete 2 weeks from the time the new shunt placement  -monitor CBC with diff and CMP  weekly while on meropenem  -neurosurgery follow-up ongoing  -followup CSF culture     3   Ischemic bowel with peritonitis   Status post exploratory laparotomy, VAC placement   Status post femoral artery and SMA artery cannulation for paraverine by vascular surgery   Suspect intra-abdominal infection is etiology of #2   Status post second-look on 08/01 with no resection performed   Peritoneal fluid culture also shows Pseudomonas   Now status post remove the entire  shunt apparatus  -antibiotic plan as above  -surgery follow-up     4  NPH requiring  shunt placement in 2005, status post recent revision in July 2019  Now status post removal of entire shunt apparatus   Patient now status post ventriculoatrial shunt     5  Lumbar spinal stenosis status post L5-S1 fusion on 2020      6  Diabetes mellitus with neuropathy      7  Rash-appears consistent with a drug eruption   Suspect secondary to the cefepime   Resolved since discontinuing the cefepime   Seems to be tolerating the meropenem without difficulty    Discussed the above management plan in detail with the primary service    Will see the patient again 2020 if not discharged  Please call if questions  Antibiotics:  Meropenem 17  Antibiotics 28   shunt removal 20  Postop day 4    Subjective:  Patient has no fever, chills, sweats; no nausea, vomiting, diarrhea; no cough, shortness of breath; no pain  No new symptoms  Objective:  Vitals:  Temp:  [98 3 °F (36 8 °C)] 98 3 °F (36 8 °C)  HR:  [54-65] 54  Resp:  [18-22] 18  BP: (108-133)/(59-71) 123/70  SpO2:  [95 %-98 %] 97 %  Temp (24hrs), Av 3 °F (36 8 °C), Min:98 3 °F (36 8 °C), Max:98 3 °F (36 8 °C)  Current: Temperature: 98 3 °F (36 8 °C)    Physical Exam:   General Appearance:  Alert, interactive, nontoxic, no acute distress  Throat: Oropharynx moist without lesions  Lungs:   Clear to auscultation bilaterally; no wheezes, rhonchi or rales; respirations unlabored   Heart:  RRR; no murmur, rub or gallop   Abdomen:   Soft, non-tender, non-distended, positive bowel sounds  Extremities: No clubbing, cyanosis or edema   Skin: No new rashes or lesions  No draining wounds noted         Labs, Imaging, & Other studies:   All pertinent labs and imaging studies were personally reviewed  Results from last 7 days   Lab Units 20  0430 20  0451 20  0506   WBC Thousand/uL 8 49 10 26* 10 71*   HEMOGLOBIN g/dL 12 3 11 9* 12 1   PLATELETS Thousands/uL 175 178 189     Results from last 7 days   Lab Units 20  0452 20  0451 20  0451  20  1234   SODIUM mmol/L 137 140 140   < > 142   POTASSIUM mmol/L 4 1 4 3 4 0   < > 3 8   CHLORIDE mmol/L 104 106 108   < > 104   CO2 mmol/L 31 30 28   < > 31   BUN mg/dL 13 14 16   < > 17   CREATININE mg/dL 0 53* 0 58* 0 52*   < > 0 51*   EGFR ml/min/1 73sq m 106 103 107   < > 108   CALCIUM mg/dL 8 4 8 2* 7 7*   < > 7 8*   AST U/L 10  --   --   --  13   ALT U/L 15  --   --   --  21   ALK PHOS U/L 99  --   --   --  86    < > = values in this interval not displayed

## 2020-08-28 NOTE — OCCUPATIONAL THERAPY NOTE
Occupational Therapy Treatment       08/28/20 1508   Restrictions/Precautions   Weight Bearing Precautions Per Order No   Other Precautions Fall Risk   Pain Assessment   Pain Assessment Tool Pain Assessment not indicated - pt denies pain   ADL   UB Dressing Assistance 2  Maximal Assistance   LB Dressing Assistance 1  Total Assistance   Bed Mobility   Rolling R 2  Maximal assistance   Additional items Assist x 2   Rolling L 2  Maximal assistance   Additional items Assist x 2   Additional Comments recommend lateral transfer via nsg for oob   Coordination   Gross Motor Pt was able to reach B ue's across bedside table X 3 each arm to perform functional reach for items on table during tabletop activity  Pt required mod A c L arm ROM due to weakness and rigidity  Overall pt requires increased time to perform UE tasks  Fine Motor When placed in R hand pt was able to pinch and hold envelope in R hand while using L thumb with proximal support of L arm to open mail  He was then able to grasp card in B hands to open and read card  Pt only noted to have AROM in thumb of L hand  Cognition   Overall Cognitive Status Impaired   Arousal/Participation Alert; Responsive   Attention Attends with cues to redirect   Memory Decreased short term memory;Decreased recall of recent events;Decreased recall of precautions   Following Commands Follows one step commands with increased time or repetition   Comments Pt slow to initiate task upon request and requires much increased time  Activity Tolerance   Activity Tolerance Patient tolerated treatment well  (Tolerateed session while OOB in chair )   Assessment   Assessment pt particiapted in pm ot meera and was seen focusing  on activity tolerence, ue strengthening and rom (spastic ue's) , activity engagement / functional reach and direction following  pt continues with brighter affect, engages in  few word sentences  pt demsontrtes fair minus activity tolerence   pt is motivated and cooperative  pt with improvement in ability to perform ot session while oob in chair  per nsg pt was oob for since am today  pt continues to require max asst x 2 for bed mobility and max asst ub / total assist lb for adls  ptengagted in 5 min x 2 of ue activity for table reach  and mail opening tasks  plan to continue to focus on goals from ie   Plan   Treatment Interventions ADL retraining;Functional transfer training;UE strengthening/ROM; Endurance training;Cognitive reorientation;Equipment evaluation/education; Activityengagement   Goal Expiration Date 09/08/20   OT Frequency 3-5x/wk   Recommendation   OT Discharge Recommendation Post-Acute Rehabilitation Services   OT - OK to Discharge Yes   Rikki Ramirez

## 2020-08-28 NOTE — PHYSICAL THERAPY NOTE
Physical Therapy Progress Note     08/28/20 1503   Pain Assessment   Pain Assessment Tool Pain Assessment not indicated - pt denies pain   Restrictions/Precautions   Braces or Orthoses MAFO  (LLE)   Other Precautions Cognitive; Chair Alarm; Bed Alarm;Pain; Fall Risk;Multiple lines  (IV, [place] catheter)   Subjective   Subjective Pt encountered seated in bedside recliner, having gotten there by seated scooting Ax3 as per RN  Pt offers no complaints during session  Able to follow commands with repetition & tactile cues  Appeared fatigued at conclusion of session & was asleep in bed post session  Bed Mobility   Rolling R 2  Maximal assistance   Additional items Assist x 2   Rolling L 2  Maximal assistance   Additional items Assist x 2   Balance   Static Sitting Poor -   Dynamic Sitting Poor -   Endurance Deficit   Endurance Deficit Yes   Endurance Deficit Description fatigue, generalized weakness   Activity Tolerance   Activity Tolerance Patient tolerated treatment well;Patient limited by fatigue   Nurse Heather Zhang RN   Exercises   Hip Abduction Sitting;5 reps;AROM; Bilateral   Hip Adduction Sitting;10 reps;AROM; Bilateral   Knee AROM Long Arc Quad Sitting;20 reps;AAROM; Bilateral   Marching Sitting;20 reps;AAROM; Bilateral   Assessment   Prognosis Fair   Problem List Decreased strength;Decreased range of motion;Decreased endurance; Impaired balance;Decreased mobility; Decreased cognition;Pain   Assessment Pt continues to require Ax2 for all mobility tasks due to weakness & inconsistant participation in tasks  was able to follow instructions in attempt to increase UE support to pull into unsupported sitting, but pt unable to maintain  Pt performed seated exercises as noted above in recliner with assist given to improve AROM  Pt demonstrated difficulty with motor planning to perform hip abduction despite tactile cues on outside of knees, likely due to increased fatigue at that point in session    Pt performed seated reaching tasks supported in recliner, but was unable to do so unsupported  Pt returned to bed by way of lateral transfer, then performed rolling L&R to reposition with wedges under L thorax & thigh  RN notified of pt position post session  Pt will continue to benefit from therapy services to improve strength, balance, and participation in all tasks to reduce burden of care  Recommendations remain the same  Goals   Patient Goals none stated due to cognitive status, fatigue   STG Expiration Date 09/06/20   PT Treatment Day 5   Plan   Treatment/Interventions Functional transfer training;LE strengthening/ROM; Therapeutic exercise; Endurance training;Cognitive reorientation;Patient/family training;Equipment eval/education; Bed mobility   Progress Slow progress, decreased activity tolerance   PT Frequency   (3-6x/week)   Recommendation   PT Discharge Recommendation Post-Acute Rehabilitation Services     [place], [place]

## 2020-08-29 VITALS
OXYGEN SATURATION: 97 % | BODY MASS INDEX: 31.58 KG/M2 | TEMPERATURE: 98 F | WEIGHT: 254 LBS | SYSTOLIC BLOOD PRESSURE: 148 MMHG | RESPIRATION RATE: 16 BRPM | HEIGHT: 75 IN | HEART RATE: 70 BPM | DIASTOLIC BLOOD PRESSURE: 70 MMHG

## 2020-08-29 LAB
GLUCOSE SERPL-MCNC: 110 MG/DL (ref 65–140)
GLUCOSE SERPL-MCNC: 124 MG/DL (ref 65–140)
SARS-COV-2 RNA RESP QL NAA+PROBE: NEGATIVE

## 2020-08-29 PROCEDURE — 99239 HOSP IP/OBS DSCHRG MGMT >30: CPT | Performed by: NURSE PRACTITIONER

## 2020-08-29 PROCEDURE — U0003 INFECTIOUS AGENT DETECTION BY NUCLEIC ACID (DNA OR RNA); SEVERE ACUTE RESPIRATORY SYNDROME CORONAVIRUS 2 (SARS-COV-2) (CORONAVIRUS DISEASE [COVID-19]), AMPLIFIED PROBE TECHNIQUE, MAKING USE OF HIGH THROUGHPUT TECHNOLOGIES AS DESCRIBED BY CMS-2020-01-R: HCPCS | Performed by: NURSE PRACTITIONER

## 2020-08-29 PROCEDURE — 82948 REAGENT STRIP/BLOOD GLUCOSE: CPT

## 2020-08-29 RX ORDER — ESCITALOPRAM OXALATE 10 MG/1
10 TABLET ORAL DAILY
Refills: 0
Start: 2020-08-30 | End: 2020-09-15

## 2020-08-29 RX ORDER — SENNOSIDES 8.6 MG
1 TABLET ORAL DAILY
Qty: 120 EACH | Refills: 0
Start: 2020-08-30

## 2020-08-29 RX ORDER — DOCUSATE SODIUM 100 MG/1
100 CAPSULE, LIQUID FILLED ORAL 2 TIMES DAILY
Qty: 10 CAPSULE | Refills: 0
Start: 2020-08-29

## 2020-08-29 RX ORDER — ACETAMINOPHEN 325 MG/1
650 TABLET ORAL EVERY 6 HOURS PRN
Qty: 30 TABLET | Refills: 0
Start: 2020-08-29

## 2020-08-29 RX ORDER — OXYCODONE HYDROCHLORIDE 5 MG/1
5 TABLET ORAL EVERY 4 HOURS PRN
Qty: 10 TABLET | Refills: 0 | Status: SHIPPED | OUTPATIENT
Start: 2020-08-29 | End: 2020-11-12 | Stop reason: HOSPADM

## 2020-08-29 RX ORDER — LANOLIN ALCOHOL/MO/W.PET/CERES
3 CREAM (GRAM) TOPICAL
Refills: 0
Start: 2020-08-29 | End: 2020-10-07

## 2020-08-29 RX ADMIN — HEPARIN SODIUM 5000 UNITS: 5000 INJECTION INTRAVENOUS; SUBCUTANEOUS at 05:19

## 2020-08-29 RX ADMIN — SENNOSIDES 8.6 MG: 8.6 TABLET ORAL at 08:10

## 2020-08-29 RX ADMIN — MEROPENEM 2000 MG: 1 INJECTION, POWDER, FOR SOLUTION INTRAVENOUS at 12:17

## 2020-08-29 RX ADMIN — DOCUSATE SODIUM 100 MG: 100 CAPSULE, LIQUID FILLED ORAL at 08:10

## 2020-08-29 RX ADMIN — MICONAZOLE NITRATE: 20 CREAM TOPICAL at 08:11

## 2020-08-29 RX ADMIN — MEROPENEM 2000 MG: 1 INJECTION, POWDER, FOR SOLUTION INTRAVENOUS at 04:12

## 2020-08-29 RX ADMIN — ESCITALOPRAM OXALATE 10 MG: 10 TABLET ORAL at 08:10

## 2020-08-29 RX ADMIN — OXYCODONE HYDROCHLORIDE 5 MG: 5 TABLET ORAL at 13:01

## 2020-08-29 RX ADMIN — LISINOPRIL 10 MG: 10 TABLET ORAL at 08:10

## 2020-08-29 RX ADMIN — HEPARIN SODIUM 5000 UNITS: 5000 INJECTION INTRAVENOUS; SUBCUTANEOUS at 13:02

## 2020-08-29 RX ADMIN — NYSTATIN 500000 UNITS: 100000 SUSPENSION ORAL at 12:18

## 2020-08-29 NOTE — PLAN OF CARE
Problem: Potential for Falls  Goal: Patient will remain free of falls  Description: INTERVENTIONS:  - Assess patient frequently for physical needs  -  Identify cognitive and physical deficits and behaviors that affect risk of falls    -  [place] fall precautions as indicated by assessment   - Educate patient/family on patient safety including physical limitations  - Instruct patient to call for assistance with activity based on assessment  - Modify environment to reduce risk of injury  - Consider OT/PT consult to assist with strengthening/mobility  Outcome: Completed     Problem: Prexisting or High Potential for Compromised Skin Integrity  Goal: Skin integrity is maintained or improved  Description: INTERVENTIONS:  - Identify patients at risk for skin breakdown  - Assess and monitor skin integrity  - Assess and monitor nutrition and hydration status  - Monitor labs   - Assess for incontinence   - Turn and reposition patient  - Assist with mobility/ambulation  - Relieve pressure over bony prominences  - Avoid friction and shearing  - Provide appropriate hygiene as needed including keeping skin clean and dry  - Evaluate need for skin moisturizer/barrier cream  - Collaborate with interdisciplinary team   - Patient/family teaching  - Consider wound care consult   Outcome: Completed     Problem: PAIN - ADULT  Goal: Verbalizes/displays adequate comfort level or baseline comfort level  Description: Interventions:  - Encourage patient to monitor pain and request assistance  - Assess pain using appropriate pain scale  - Administer analgesics based on type and severity of pain and evaluate response  - Implement non-pharmacological measures as appropriate and evaluate response  - Consider cultural and social influences on pain and pain management  - Notify physician/advanced practitioner if interventions unsuccessful or patient reports new pain  Outcome: Completed     Problem: SAFETY ADULT  Goal: Maintain or return to baseline ADL function  Description: INTERVENTIONS:  -  Assess patient's ability to carry out ADLs; assess patient's baseline for ADL function and identify physical deficits which impact ability to perform ADLs (bathing, care of mouth/teeth, toileting, grooming, dressing, etc )  - Assess/evaluate cause of self-care deficits   - Assess range of motion  - Assess patient's mobility; develop plan if impaired  - Assess patient's need for assistive devices and provide as appropriate  - Encourage maximum independence but intervene and supervise when necessary  - Involve family in performance of ADLs  - Assess for home care needs following discharge   - Consider OT consult to assist with ADL evaluation and planning for discharge  - Provide patient education as appropriate  Outcome: Completed  Goal: Maintain or return mobility status to optimal level  Description: INTERVENTIONS:  - Assess patient's baseline mobility status (ambulation, transfers, stairs, etc )    - Identify cognitive and physical deficits and behaviors that affect mobility  - Identify mobility aids required to assist with transfers and/or ambulation (gait belt, sit-to-stand, lift, walker, cane, etc )  - [place] fall precautions as indicated by assessment  - Record patient progress and toleration of activity level on Mobility SBAR; progress patient to next Phase/Stage  - Instruct patient to call for assistance with activity based on assessment  - Consider rehabilitation consult to assist with strengthening/weightbearing, etc   Outcome: Completed     Problem: GASTROINTESTINAL - ADULT  Goal: Minimal or absence of nausea and/or vomiting  Description: INTERVENTIONS:  - Administer IV fluids if ordered to ensure adequate hydration  - Maintain NPO status until nausea and vomiting are resolved  - Nasogastric tube if ordered  - Administer ordered antiemetic medications as needed  - Provide nonpharmacologic comfort measures as appropriate  - Advance diet as tolerated, if ordered  - Consider nutrition services referral to assist patient with adequate nutrition and appropriate food choices  Outcome: Completed  Goal: Maintains or returns to baseline bowel function  Description: INTERVENTIONS:  - Assess bowel function  - Encourage oral fluids to ensure adequate hydration  - Administer IV fluids if ordered to ensure adequate hydration  - Administer ordered medications as needed  - Encourage mobilization and activity  - Consider nutritional services referral to assist patient with adequate nutrition and appropriate food choices  Outcome: Completed  Goal: Maintains adequate nutritional intake  Description: INTERVENTIONS:  - Monitor percentage of each meal consumed  - Identify factors contributing to decreased intake, treat as appropriate  - Assist with meals as needed  - Monitor I&O, weight, and lab values if indicated  - Obtain nutrition services referral as needed  Outcome: Completed     Problem: GENITOURINARY - ADULT  Goal: Maintains or returns to baseline urinary function  Description: INTERVENTIONS:  - Assess urinary function  - Encourage oral fluids to ensure adequate hydration if ordered  - Administer IV fluids as ordered to ensure adequate hydration  - Administer ordered medications as needed  - Offer frequent toileting  - Follow urinary retention protocol if ordered  Outcome: Completed  Goal: Absence of urinary retention  Description: INTERVENTIONS:  - Assess patients ability to void and empty bladder  - Monitor I/O  - Bladder scan as needed  - Discuss with physician/AP medications to alleviate retention as needed  - Discuss catheterization for long term situations as appropriate  Outcome: Completed     Problem: RESPIRATORY - ADULT  Goal: Achieves optimal ventilation and oxygenation  Description: INTERVENTIONS:  - Assess for changes in respiratory status  - Assess for changes in mentation and behavior  - Position to facilitate oxygenation and minimize respiratory effort  - Oxygen administered by appropriate delivery if ordered  - Initiate smoking cessation education as indicated  - Encourage broncho-pulmonary hygiene including cough, deep breathe, Incentive Spirometry  - Assess the need for suctioning and aspirate as needed  - Assess and instruct to report SOB or any respiratory difficulty  - Respiratory Therapy support as indicated  Outcome: Completed     Problem: HEMATOLOGIC - ADULT  Goal: Maintains hematologic stability  Description: INTERVENTIONS  - Assess for signs and symptoms of bleeding or hemorrhage  - Monitor labs  - Administer supportive blood products/factors as ordered and appropriate  Outcome: Completed     Problem: CONFUSION/THOUGHT DISTURBANCE  Goal: Thought disturbances (confusion, delirium, depression, dementia or psychosis) are managed to maintain or return to baseline mental status and functional level  Description: INTERVENTIONS:  - Assess for possible contributors to  thought disturbance, including but not limited to medications, infection, impaired vision or hearing, underlying metabolic abnormalities, dehydration, respiratory compromise,  psychiatric diagnoses and notify attending PHYSICAN/AP  - Monitor and intervene to maintain adequate nutrition, hydration, elimination, sleep and activity  - Decrease environmental stimuli, including noise as appropriate  - Provide frequent contacts to provide refocusing, direction and reassurance as needed  Approach patient calmly with eye contact and at their level    - [place] high risk fall precautions, aspiration precautions and other safety measures, as indicated  - If delirium suspected, notify physician/AP of change in condition and request immediate in-person evaluation  - Pursue consults as appropriate including Geriatric (campus dependent), OT for cognitive evaluation/activity planning, psychiatric, pastoral care, etc   Outcome: Completed     Problem: Nutrition/Hydration-ADULT  Goal: Nutrient/Hydration intake appropriate for improving, restoring or maintaining nutritional needs  Description: Monitor and assess patient's nutrition/hydration status for malnutrition  Collaborate with interdisciplinary team and initiate plan and interventions as ordered  Monitor patient's weight and dietary intake as ordered or per policy  Utilize nutrition screening tool and intervene as necessary  Determine patient's food preferences and provide high-protein, high-caloric foods as appropriate       INTERVENTIONS:  - Monitor oral intake, urinary output, labs, and treatment plans  - Assess nutrition and hydration status and recommend course of action  - Evaluate amount of meals eaten  - Assist patient with eating if necessary   - Allow adequate time for meals  - Recommend/ encourage appropriate diets, oral nutritional supplements, and vitamin/mineral supplements  - Order, calculate, and assess calorie counts as needed  - Recommend, monitor, and adjust tube feedings and TPN/PPN based on assessed needs  - Assess need for intravenous fluids  - Provide specific nutrition/hydration education as appropriate  - Include patient/family/caregiver in decisions related to nutrition  Outcome: Completed

## 2020-08-29 NOTE — DISCHARGE SUMMARY
Discharge Summary - [facility] Internal Medicine    Patient Information: Michael Kidney 69 y o  male MRN: [id number]  Unit/Bed#: [facility] 603-01 Encounter: [id number]    Discharging Physician / Practitioner: RA Ochoa  PCP: Ash Wiley  Admission Date: 7/30/2020  Discharge Date: 08/29/20    Reason for Admission:     shunt infection with meningitis and peritonitis  Mesenteric ischemia S/p ex lap, arteriogram of SMA, papavarine infusion, exploration of SMA, angiogram SMA on 7/31/2020 and repeat ex lap 8/1/2020    Discharge Diagnoses:     Principal Problem:    Severe sepsis ([facility] [address] )  Active Problems:    Infection of  (ventriculoperitoneal) shunt (HCC)    Normal pressure hydrocephalus (HCC)    Metabolic encephalopathy    Nonocclusive mesenteric ischemia (HCC)    Type 2 diabetes mellitus ([facility] [address] )    Multiple wounds    Lumbar stenosis    Essential hypertension    Dysphagia    ZHOU on CPAP  Resolved Problems:    Drug rash      Consultations During Hospital Stay:  · Neurosurgery  · General surgery  · Infectious disease  · PMR  · Vascular surgery  · PT/OT  · Speech  · Case management    Procedures Performed:     · exploratory laparotomy, arteriogram of SMA, exploration of SMA and angiogram of SMA  Same day patient had  shunt externalized secondary to laparotomy for bowel ischemia and peritonitis  Second look 8/1  ·  shunt apparatus removal 8/7  ·  shunt apparatus read insertion 8/24    Significant Findings / Test Results:     · CTH without contrast: Minimal increase in size of ventricular system  · CTA chest and CT A/P with contrast: Fluid-filled loops of small bowel with pneumatosis and bowel wall thickening  Findings are suspicious for ischemic bowel   Nodular airspace opacities within the right upper lobe  Findings may represent infection  · CXR Interval intubation with coiling of the nasogastric tube in the midesophagus  Repositioning of the the nasogastric tube advised   Endotracheal tube noted with the tip well above the tye  Scattered strandy densities likely related to diminished degree of inspiration  · [address] without contrast: Continued mild enlargement of the ventricular system which continues to gradually enlarge compared to prior studies dated 7/21/2020 and 7/31/2020  Stable right frontal and left occipital encephalomalacia consistent with old infarcts  · CTH without contrast: Interval removal of right frontal approach ventricular shunt catheter  Grossly stable size and configuration of prominent ventricular system  Stable encephalomalacia in the right superior frontal gyrus and left occipitotemporal region  Periapical lucency of retained root of right maxillary tooth  · CXR: Left PICC line tip projects over the cavoatrial junction satisfactory position without pneumothorax  No acute cardiopulmonary disease  · CT a/p with contrast: No intra-abdominal fluid collection  No bowel obstruction  · Xray lumbar spine: Stable postoperative alignment  · CTH without contrast: Stable ventricular enlargement post shunt removal  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  · CXR Left-sided PICC line extends to the cavoatrial junction  Clear lung fields  · CTH: Status post placement of left frontal ventriculoperitoneal shunt catheter  Stable ventricular megaly  · XR shunt series: Intact  shunt catheter with tip terminating in the region of the superior cavoatrial junction  · XR skull: Stable appearance of left-sided ventriculoatrial shunt catheter  · COVID-19 PCR negative  · Blood cultures negative at 5 days  · Initial CSF culture with Pseudomonas, Repeat CSF cultures no growth    Incidental Findings:   · None    Test Results Pending at Discharge (will require follow up):    · None     Outpatient Tests Requested:  · Outpatient follow-up with PCP  · Outpatient follow-up with Neurosurgery as scheduled 9/8  · Outpatient follow-up with general surgery within 2 weeks, appointment with need to be made    Complications:  None    Hospital Course:     Amy Álvarez is a 69 y o  male patient with past medical history of obesity, ZHOU, hypertension, gout, type 2 diabetes, normal pressure hydrocephalus with  shunt placement in 2005 with replacement in July 2020, recent L5-S1 spine surgery 7/6 who originally presented to the hospital on 7/30/2020 due to ischemic bowel  Patient subsequently had exploratory laparotomy, arteriogram of SMA, expiration of SMA angiogram of SMA  Same day, patient had  shunt externalized secondary to CNS infection meningitis and peritonitis  CSF aspirate grew Pseudomonas  Id was consulted and patient was started on cefepime however later transition to meropenem secondary to drug rash  Patient was later taken back to the OR on 08/24 with neurosurgery and had  shunt re-placed  Was monitored in ICU shortly and was subsequently transferred back out to medical-surgical floor  He is currently on intravenous meropenem which will continue through 9/9 per Infectious Disease  He currently has a PICC line in place which should be removed following completion of treatment and should also have weekly labs while on antibiotics  He does not need to follow-up with infectious disease per my discussion with Dr Moon Stage  He will need to follow-up with general surgery in 2 weeks, this appointment will need to be made  He also needs to follow-up with Neurosurgery as scheduled on 09/08  Should have CT scan of his head without contrast done prior to this visit  Patient was seen in consultation by Physical and Occupational therapy, who recommended rehabilitation  Patient will be transition to Charlee Nascimento later today  Will need ongoing speech, physical and occupational therapy  Currently on modified diet with mechanical soft and nectar thick liquids  See hospital chart for additional details       Condition at Discharge: stable     Discharge Day Visit / Exam:     Subjective: Patient offers no acute complaints  Feels well  Vitals: Blood Pressure: 145/68 (08/29/20 0743)  Pulse: 66 (08/29/20 0743)  Temperature: 98 °F (36 7 °C) (08/29/20 0743)  Temp Source: Oral (08/28/20 2326)  Respirations: 16 (08/29/20 0743)  Height: 6' 3" (190 5 cm) (08/09/20 0800)  Weight - Scale: 115 kg (254 lb) (08/27/20 0600)  SpO2: 96 % (08/29/20 0743)     Exam:   Physical Exam  Vitals signs and nursing note reviewed  Constitutional:       General: He is not in acute distress  HENT:      Head: Normocephalic  Comments: Right scalp incision with sutures CDI  Left scalp incision with sutures removed, PAULO  Eyes:      Conjunctiva/sclera: Conjunctivae normal    Neck:      Musculoskeletal: Normal range of motion  Comments: Left healing incision noted  Sutures out  Cardiovascular:      Rate and Rhythm: Normal rate  Pulmonary:      Breath sounds: Decreased breath sounds present  Abdominal:      General: Bowel sounds are normal       Comments: Healing abdominal incision    Musculoskeletal:         General: No edema  Skin:     General: Skin is warm  Neurological:      Mental Status: He is alert and oriented to person, place, and time  Comments: Can be forgetful   Psychiatric:      Comments: Flat affect          Discussion with Family:  Patient and brother over the phone    Discharge instructions/Information to patient and family:   See after visit summary for information provided to patient and family  Provisions for Follow-Up Care:  See after visit summary for information related to follow-up care and any pertinent home health orders  Disposition:     [facility]    For Discharges to [facility] SNF:   · Not Applicable to this Patient - Not Applicable to this Patient    Planned Readmission: no     Discharge Statement:  I spent 45 minutes discharging the patient  This time was spent on the day of discharge   I had direct contact with the patient on the day of discharge  Greater than 50% of the total time was spent examining patient, answering all patient questions, arranging and discussing plan of care with patient as well as directly providing post-discharge instructions  Additional time then spent on discharge activities  Discharge Medications:  See after visit summary for reconciled discharge medications provided to patient and family        ** Please Note: This note has been constructed using a voice recognition system **

## 2020-08-31 ENCOUNTER — TELEPHONE (OUTPATIENT)
Dept: NEUROSURGERY | Facility: CLINIC | Age: 70
End: 2020-08-31

## 2020-08-31 ENCOUNTER — NURSING HOME VISIT (OUTPATIENT)
Dept: FAMILY MEDICINE CLINIC | Facility: CLINIC | Age: 70
End: 2020-08-31
Payer: COMMERCIAL

## 2020-08-31 VITALS
BODY MASS INDEX: 23.81 KG/M2 | DIASTOLIC BLOOD PRESSURE: 78 MMHG | WEIGHT: 190.5 LBS | SYSTOLIC BLOOD PRESSURE: 126 MMHG | HEART RATE: 72 BPM | TEMPERATURE: 98.1 F | OXYGEN SATURATION: 95 % | RESPIRATION RATE: 18 BRPM

## 2020-08-31 DIAGNOSIS — K29.01 ACUTE SUPERFICIAL GASTRITIS WITH HEMORRHAGE: ICD-10-CM

## 2020-08-31 DIAGNOSIS — R65.20 SEVERE SEPSIS (HCC): ICD-10-CM

## 2020-08-31 DIAGNOSIS — A41.9 SEVERE SEPSIS (HCC): Primary | ICD-10-CM

## 2020-08-31 DIAGNOSIS — K55.059 NONOCCLUSIVE MESENTERIC ISCHEMIA (HCC): Primary | ICD-10-CM

## 2020-08-31 DIAGNOSIS — R65.20 SEVERE SEPSIS (HCC): Primary | ICD-10-CM

## 2020-08-31 DIAGNOSIS — F32.A DEPRESSION, UNSPECIFIED DEPRESSION TYPE: ICD-10-CM

## 2020-08-31 DIAGNOSIS — Z74.09 IMPAIRED FUNCTIONAL MOBILITY, BALANCE, GAIT, AND ENDURANCE: ICD-10-CM

## 2020-08-31 DIAGNOSIS — A41.9 SEVERE SEPSIS (HCC): ICD-10-CM

## 2020-08-31 DIAGNOSIS — E11.65 TYPE 2 DIABETES MELLITUS WITH HYPERGLYCEMIA, WITH LONG-TERM CURRENT USE OF INSULIN (HCC): ICD-10-CM

## 2020-08-31 DIAGNOSIS — R26.9 UNSPECIFIED ABNORMALITIES OF GAIT AND MOBILITY: ICD-10-CM

## 2020-08-31 DIAGNOSIS — G93.41 METABOLIC ENCEPHALOPATHY: ICD-10-CM

## 2020-08-31 DIAGNOSIS — K55.9 ISCHEMIC BOWEL DISEASE (HCC): ICD-10-CM

## 2020-08-31 DIAGNOSIS — G47.33 OSA ON CPAP: ICD-10-CM

## 2020-08-31 DIAGNOSIS — I10 ESSENTIAL HYPERTENSION: ICD-10-CM

## 2020-08-31 DIAGNOSIS — T85.730D INFECTION OF VENTRICULOPERITONEAL SHUNT, SUBSEQUENT ENCOUNTER: ICD-10-CM

## 2020-08-31 DIAGNOSIS — Z99.89 OSA ON CPAP: ICD-10-CM

## 2020-08-31 DIAGNOSIS — T07.XXXA MULTIPLE WOUNDS: ICD-10-CM

## 2020-08-31 DIAGNOSIS — G91.2 NORMAL PRESSURE HYDROCEPHALUS (HCC): ICD-10-CM

## 2020-08-31 DIAGNOSIS — R13.10 DYSPHAGIA, UNSPECIFIED TYPE: ICD-10-CM

## 2020-08-31 DIAGNOSIS — M1A.9XX0 CHRONIC GOUT WITHOUT TOPHUS, UNSPECIFIED CAUSE, UNSPECIFIED SITE: ICD-10-CM

## 2020-08-31 DIAGNOSIS — Z79.4 TYPE 2 DIABETES MELLITUS WITH HYPERGLYCEMIA, WITH LONG-TERM CURRENT USE OF INSULIN (HCC): ICD-10-CM

## 2020-08-31 LAB — FUNGUS SPEC CULT: NORMAL

## 2020-08-31 PROCEDURE — 99306 1ST NF CARE HIGH MDM 50: CPT | Performed by: INTERNAL MEDICINE

## 2020-08-31 NOTE — ASSESSMENT & PLAN NOTE
Patient was noted to have severe sepsis on admission attributed to  shunt infection with meningitis and peritonitis  CSF cultures grew Pseudomonas  Status post  shunt externalization on 07/30   shunt was replaced on 08/24  Patient was evaluated by ID service and started on meropenem  Antimicrobial therapy through 9/9 is planned    Repeat CSF cultures were reported neck

## 2020-08-31 NOTE — ASSESSMENT & PLAN NOTE
CSF fluid culture recently grew Pseudomonas   shunt was initially externalized however repeat culture still grew Pseudomonas  BP shunt was later removed on 08/07/2020  Repeat CSF cultures were reported negative  Patient underwent  shunt replacement by neuro surgical service on 08/24  Outpatient follow-up in 2 weeks with repeat CT head prior to visit was recommended    Continue IV meropenem through 09/09/2020

## 2020-08-31 NOTE — TELEPHONE ENCOUNTER
Attempted to call [address] [facility] to check on patient status and remind of upcoming POV date and time  Nurse stated staff involved in a code and unable to speak with me  Will make another attempt later today or tomorrow

## 2020-08-31 NOTE — ASSESSMENT & PLAN NOTE
Lab Results   Component Value Date    HGBA1C 5 6 07/31/2020   Diet controlled  Recent hemoglobin A1c was well controlled at 5 6    Discontinue sliding scale coverage and Accu-Cheks

## 2020-08-31 NOTE — UTILIZATION REVIEW
Notification of Discharge  This is a Notification of Discharge from our facility [address]  Please be advised that this patient has been discharge from our facility  Below you will find the admission and discharge date and time including the patients disposition  PRESENTATION DATE: 7/30/2020 11:12 PM  OBS ADMISSION DATE:   IP ADMISSION DATE: 7/31/20 0024   DISCHARGE DATE: 8/29/2020  2:41 PM  DISPOSITION: Non [facility] SNF/TCU/SNU Non [facility] SNF/TCU/SNU   Admission Orders listed below:  Admission Orders (From admission, onward)     Ordered        07/31/20 0028  Inpatient Admission  Once                   Please contact the UR Department if additional information is required to close this patient's authorization/case  [address] Utilization Review Department  Main: [phone] x carefully listen to the prompts  All voicemails are confidential   [email or web address]  Send all requests for admission clinical reviews, approved or denied determinations and any other requests to dedicated fax number below belonging to the campus where the patient is receiving treatment   List of dedicated fax numbers:  [address] DENIALS (Administrative/Medical Necessity) [phone]   [address] (Maternity/NICU/Pediatrics) [phone]   Lesley Strong [phone]   Real Russell [phone]   Keily Weaver [phone]   [facility] [address] [facility] [phone]   [facility]  [phone]   [address] [place] [address], [address]  [address] [facility] [address] [facility] [phone]

## 2020-08-31 NOTE — ASSESSMENT & PLAN NOTE
Multifactorial   Patient was suspected to have metabolic encephalopathy in the setting of severe sepsis as evidence by  shunt infection with meningitis and peritonitis requiring multiple surgical procedures    Mental status has improved since admission however patient remains lethargic at times

## 2020-08-31 NOTE — ASSESSMENT & PLAN NOTE
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

## 2020-08-31 NOTE — ASSESSMENT & PLAN NOTE
Status post exploratory laparotomy, arteriogram of SMA, exploration of SMA, angiogram SMA on 07/31/2020 and repeat exploratory laparotomy on 08/01/2020  Patient is tolerating diet well     Follow-up with general surgery

## 2020-08-31 NOTE — PROGRESS NOTES
Nursing Home Admission    Patient location:  [facility] rehab  Patients care was coordinated with nursing facility staff  Recent vitals, labs and updated medications were reviewed on [facility] of facility  Past Medical, surgical, social, medication and allergy history and patients previous records reviewed  Problem List Items Addressed This Visit        Digestive    Acute superficial gastritis with hemorrhage     History of gastritis attributed to naproxen use  Patient had been on Protonix 40 mg b i d  Previously  Currently not on any PPI  Will resume at 40 mg daily         Ischemic bowel disease [facility])     Patient recently underwent exploratory laparotomy and angiogram as SMA  Continue to follow-up with general and vascular surgery         Nonocclusive mesenteric ischemia ([facility] ) - Primary     Status post exploratory laparotomy, arteriogram of SMA, exploration of SMA, angiogram SMA on 07/31/2020 and repeat exploratory laparotomy on 08/01/2020  Patient is tolerating diet well  Follow-up with general surgery         Dysphagia     Patient was recently evaluated by speech therapy  Underwent VBS and diet was modified  Endocrine    Type 2 diabetes mellitus ([facility] )       Lab Results   Component Value Date    HGBA1C 5 6 07/31/2020   Diet controlled  Recent hemoglobin A1c was well controlled at 5 6  Discontinue sliding scale coverage and Accu-Cheks            Respiratory    ZHOU on CPAP     Patient has been noncompliant with CPAP use            Cardiovascular and Mediastinum    Essential hypertension     Blood pressure stable at 126/78  Continue enalapril 5 mg daily            Nervous and Auditory    Normal pressure hydrocephalus (HCC)     Status post  shunt placement in 2005   shunt recently removed due to infection and reinserted on 08/24         Infection of  (ventriculoperitoneal) shunt (HCC)     CSF fluid culture recently grew Pseudomonas     shunt was initially externalized however repeat culture still grew Pseudomonas  BP shunt was later removed on 08/07/2020  Repeat CSF cultures were reported negative  Patient underwent  shunt replacement by neuro surgical service on 08/24  Outpatient follow-up in 2 weeks with repeat CT head prior to visit was recommended  Continue IV meropenem through 37/55/3323         Metabolic encephalopathy     Multifactorial   Patient was suspected to have metabolic encephalopathy in the setting of severe sepsis as evidence by  shunt infection with meningitis and peritonitis requiring multiple surgical procedures  Mental status has improved since admission however patient remains lethargic at times            Other    Unspecified abnormalities of gait and mobility     Continue PT OT         Gout     Continue Allopurinol         Impaired functional mobility, balance, gait, and endurance     Continue PT OT         Severe sepsis ([address] )     Patient was noted to have severe sepsis on admission attributed to  shunt infection with meningitis and peritonitis  CSF cultures grew Pseudomonas  Status post  shunt externalization on 07/30   shunt was replaced on 08/24  Patient was evaluated by ID service and started on meropenem  Antimicrobial therapy through 9/9 is planned  Repeat CSF cultures were reported neck         Multiple wounds     Continue local wound care per wound care team         Depression     Continue escitalopram               Chief complaint:  Severe sepsis due to  shunt infection, normal pressure hydrocephalus, nonocclusive mesenteric ischemia, diabetes mellitus type 2, multiple wounds, obstructive sleep apnea, ambulatory dysfunction, generalized weakness    HPI: patient is a 69 y o  male  with past medical history as listed above  Patient has history of normal pressure hydrocephalus with  shunt placement in 2005 with replacement in July 2020    Patient initially presented to the hospital on 07/30/2020 with ischemic bowel and sepsis     Subsequently underwent exploratory laparotomy, arteriogram of SMA  CSF fluid culture grew Pseudomonas  Patient was noted to have  shunt infection with meningitis and peritonitis  Patient was seen by multiple consultants including ID, general and vascular surgery and neurosurgical services  He was initially started on IV cefepime later switched to meropenem due to drug rash with cefepime   shunt was initially externalized and subsequently replaced on 08/24  Patient needed ICU stay for some time  Patient was subsequently transferred to [facility] for rehab where he is being seen for post hospital admission  At the time of my evaluation he is doing okay  Patient currently has a PICC line in place  He is tolerating antibiotics well  Follow-up with general surgery is planned in 2 weeks  Patient additionally has neurosurgical follow-up on 09/08  Repeat CT head was recommended prior to the visit  At the time of my evaluation patient is sleeping comfortably  Per nurse he was awake and up earlier and took his medication  Review of Systems   Constitutional: Positive for fatigue  Negative for chills and fever  HENT: Negative for nosebleeds and rhinorrhea  Eyes: Negative for discharge and redness  Respiratory: Negative for cough, chest tightness, shortness of breath, wheezing and stridor  Cardiovascular: Negative for chest pain and leg swelling  Gastrointestinal: Negative for abdominal distention, abdominal pain, diarrhea and vomiting  Genitourinary: Negative for dysuria, flank pain and hematuria  Musculoskeletal: Positive for gait problem  Negative for arthralgias and back pain  Skin: Positive for wound (Healing incision over left forehead in left neck)  Negative for pallor  Dry skin   Neurological: Positive for weakness (Generalized)  Negative for tremors, seizures, syncope and headaches     Psychiatric/Behavioral: Negative for agitation and behavioral problems  Past Medical History:   Diagnosis Date    Cancer [facility]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     Diabetes mellitus ([facility] )     DM2 (diabetes mellitus, type 2) ([place] [address] )     Gout     HTN (hypertension)     Hydrocephalus ([place] [address] )     Hypertension     Neuropathy     ZHOU on CPAP     Pressure injury of skin     TIA (transient ischemic attack)        Past Surgical History:   Procedure Laterality Date    ABDOMINAL WALL SURGERY N/A 8/1/2020    Procedure: CLOSURE WOUND ABDOMINAL/TRUNK;  Surgeon: Ranjan Gagnon DO;  [facility];  Service: General    ARTERIOGRAM N/A 7/31/2020    Procedure: ARTERIOGRAM Of SMA and placement of Papavarine onfusion arterial catheter;  Surgeon: Josee Britton MD;  [facility];  Service: Vascular    ARTERIOGRAM Left 8/1/2020    Procedure: ARTERIOGRAM; cath removal;  Surgeon: Josee Britton MD;  [facility];  Service: Vascular    CSF SHUNT      x3 Op Reports, Carie Buck Aid in MP[facility] chart viewer    FL LUMBAR PUNCTURE DIAGNOSTIC  8/11/2020    LAPAROTOMY N/A 7/31/2020    Procedure: LAPAROTOMY EXPLORATORY: Externalizes  shunt Melburn Pill application;  Surgeon: Mauri Jo MD;  [facility];  Service: General    LAPAROTOMY N/A 8/1/2020    Procedure: LAPAROTOMY EXPLORATORY,;  Surgeon: Ranjan Gagnon DO;  [facility];  Service: General    PA AMPUTATION FOOT,TRANSMETATARSAL Left 5/30/2020    Procedure: excision 5th metatarsal head   excision 5th metatarsal ulcer ;  Surgeon: Kamila Garber DPM;  [facility];  Service: Podiatry    PA ARTHDSIS POST/POSTEROLATRL/POSTINTERBODY LUMBAR N/A 7/6/2020    Procedure: MINIMALLY INVASIVE TRANSVERSE LUMBAR INTERBODY FUSION L5-S1 FROM LEFT-SIDED APPROACH;  Surgeon: Rosa Menard MD;  [facility];  Service: Neurosurgery    PA CREATE SHUNT:VENTRIC-ATRIAL Left 8/24/2020    Procedure: Left ventriculoatrial shunt;  Surgeon: Rosa Menard MD; [facility];  Service: Neurosurgery    CA REMOVAL,COMPLETE CSF SHUNT,W/O REPLACE Right 8/7/2020    Procedure: REMOVAL SHUNT VENTRICULAR PERITONEAL;  Surgeon: Alphonso Vera MD;  [facility];  Service: Neurosurgery    CA REPLACEMENT/REVISION,CSF SHUNT Right 7/6/2020    Procedure: REVISION OF SCALP OVER VENTRICULAR CATHETER IN THE RIGHT CORONAL REGION;  Surgeon: Alphonso Vera MD;  [facility];  Service: Neurosurgery    WOUND DEBRIDEMENT N/A 8/1/2020    Procedure: abd washout;  Surgeon: Alireza Alvares DO;  [facility];  Service: General       Social History     Tobacco Use   Smoking Status Never Smoker   Smokeless Tobacco Never Used       @ALCX     Family History   Problem Relation Age of Onset    Polymyositis Mother     Heart failure Father         Allergies   Allergen Reactions    Pollen Extract Allergic Rhinitis          Current Outpatient Medications:     acetaminophen (TYLENOL) 325 mg tablet, Take 2 tablets (650 mg total) by mouth every 6 (six) hours as needed for mild pain, Disp: 30 tablet, Rfl: 0    allopurinol (ZYLOPRIM) 300 mg tablet, 300 mg daily, Disp: , Rfl:     docusate sodium (COLACE) 100 mg capsule, Take 1 capsule (100 mg total) by mouth 2 (two) times a day, Disp: 10 capsule, Rfl: 0    enalapril (VASOTEC) 5 mg tablet, Take 1 tablet (5 mg total) by mouth daily, Disp: 30 tablet, Rfl: 0    escitalopram (LEXAPRO) 10 mg tablet, Take 1 tablet (10 mg total) by mouth daily, Disp:  , Rfl: 0    insulin lispro (HumaLOG) 100 units/mL injection, Inject 1-6 Units under the skin 3 (three) times a day before meals, Disp:  , Rfl: 0    Lancets (ONETOUCH DELICA PLUS [id number]) MISC, 2 (two) times a day, Disp: , Rfl:     melatonin 3 mg, Take 1 tablet (3 mg total) by mouth daily at bedtime, Disp: , Rfl: 0    meropenem 2,000 mg in sodium chloride 0 9 % 100 mL IVPB, Infuse 2,000 mg into a venous catheter every 8 (eight) hours for 11 days, Disp: , Rfl: 0    multivitamin (THERAGRAN) TABS, Take 1 tablet by mouth daily, Disp: , Rfl:     oxyCODONE (ROXICODONE) 5 mg immediate release tablet, Take 1 tablet (5 mg total) by mouth every 4 (four) hours as needed for moderate pain or severe painMax Daily Amount: 30 mg, Disp: 10 tablet, Rfl: 0    senna (SENOKOT) 8 6 mg, Take 1 tablet (8 6 mg total) by mouth daily, Disp: 120 each, Rfl: 0    travoprost (Travatan Z) 0 004 % ophthalmic solution, Administer 1 drop to the right eye daily at bedtime , Disp: , Rfl:     Updated list was reviewed in [facility] of facility  Vitals:    08/31/20 1035   BP: 126/78   Pulse: 72   Resp: 18   Temp: 98 1 °F (36 7 °C)   SpO2: 95%       Physical Exam  Constitutional:       General: He is not in acute distress  Appearance: He is well-developed  He is not diaphoretic  Comments: Patient appears to be sleepy, somewhat lethargic  Per nurse he was awake and took his pills earlier   Eyes:      General: No scleral icterus  Right eye: No discharge  Left eye: No discharge  Neck:      Musculoskeletal: Neck supple  Cardiovascular:      Rate and Rhythm: Normal rate and regular rhythm  Pulmonary:      Breath sounds: No stridor  No wheezing  Abdominal:      General: There is no distension  Palpations: Abdomen is soft  Tenderness: There is no guarding  Musculoskeletal:      Right lower leg: No edema  Left lower leg: Edema present  Skin:     General: Skin is dry  Coloration: Skin is not jaundiced or pale  Comments: Dry skin involving bilateral lower extremities  Patient has small incision overthe left neck and left forehead  Both healing well   Neurological:      Motor: Weakness (Patient appears to have generalized weakness) present  Comments: Sleepy  Not communicating at present  Response to verbal stimuli    Noted to have increased rigidity involving bilateral upper extremities   Psychiatric:         Mood and Affect: Mood normal  Behavior: Behavior normal          Diagnostic Data     Recent labs were reviewed  Lab Results   Component Value Date    WBC 8 49 08/27/2020    HGB 12 3 08/27/2020    HCT 39 4 08/27/2020    MCV 86 08/27/2020     08/27/2020     Lab Results   Component Value Date    SODIUM 137 08/28/2020    K 4 1 08/28/2020     08/28/2020    CO2 31 08/28/2020    BUN 13 08/28/2020    CREATININE 0 53 (L) 08/28/2020    GLUC 109 08/28/2020    CALCIUM 8 4 08/28/2020     Additional notes:   Continue IV meropenem through 09/09/2020  Weekly labs while on antibiotics  Discontinue sliding scale coverage  Follow-up with ID, general and neurosurgical services  CT head to be repeated prior to neurosurgical appointment  Start Protonix 40 mg daily  Total time spent on this patient's encounter today was in excess of 45 minutes    Greater than 50% of the time was spent in extensive review of records, labs, meds, coordination of care with the staff and management decisions

## 2020-08-31 NOTE — ASSESSMENT & PLAN NOTE
Patient recently underwent exploratory laparotomy and angiogram as SMA    Continue to follow-up with general and vascular surgery

## 2020-08-31 NOTE — ASSESSMENT & PLAN NOTE
Status post  shunt placement in 2005     shunt recently removed due to infection and reinserted on 08/24

## 2020-09-01 NOTE — TELEPHONE ENCOUNTER
Contacted [facility] to discussed post op status and review pov  Spoke with Lyle Orta who was made aware of the 2week pov with CT - faxed Rx to [phone]

## 2020-09-04 ENCOUNTER — TRANSCRIBE ORDERS (OUTPATIENT)
Dept: ADMINISTRATIVE | Facility: HOSPITAL | Age: 70
End: 2020-09-04

## 2020-09-04 ENCOUNTER — HOSPITAL ENCOUNTER (OUTPATIENT)
Dept: CT IMAGING | Facility: HOSPITAL | Age: 70
Discharge: HOME | End: 2020-09-04
Payer: COMMERCIAL

## 2020-09-04 DIAGNOSIS — G91.2 NORMAL PRESSURE HYDROCEPHALUS (HCC): ICD-10-CM

## 2020-09-04 PROCEDURE — G1004 CDSM NDSC: HCPCS

## 2020-09-04 PROCEDURE — 70450 CT HEAD/BRAIN W/O DYE: CPT

## 2020-09-08 ENCOUNTER — NURSING HOME VISIT (OUTPATIENT)
Dept: FAMILY MEDICINE CLINIC | Facility: CLINIC | Age: 70
End: 2020-09-08
Payer: COMMERCIAL

## 2020-09-08 ENCOUNTER — OFFICE VISIT (OUTPATIENT)
Dept: NEUROSURGERY | Facility: CLINIC | Age: 70
End: 2020-09-08

## 2020-09-08 VITALS — DIASTOLIC BLOOD PRESSURE: 86 MMHG | HEART RATE: 71 BPM | TEMPERATURE: 97 F | SYSTOLIC BLOOD PRESSURE: 154 MMHG

## 2020-09-08 DIAGNOSIS — G91.2 NORMAL PRESSURE HYDROCEPHALUS (HCC): Primary | ICD-10-CM

## 2020-09-08 DIAGNOSIS — F32.A DEPRESSION, UNSPECIFIED DEPRESSION TYPE: ICD-10-CM

## 2020-09-08 DIAGNOSIS — M1A.9XX0 CHRONIC GOUT WITHOUT TOPHUS, UNSPECIFIED CAUSE, UNSPECIFIED SITE: ICD-10-CM

## 2020-09-08 DIAGNOSIS — K55.9 ISCHEMIC BOWEL DISEASE (HCC): ICD-10-CM

## 2020-09-08 DIAGNOSIS — M43.17 SPONDYLOLISTHESIS OF LUMBOSACRAL REGION: ICD-10-CM

## 2020-09-08 DIAGNOSIS — Z74.09 IMPAIRED FUNCTIONAL MOBILITY, BALANCE, GAIT, AND ENDURANCE: ICD-10-CM

## 2020-09-08 DIAGNOSIS — I10 ESSENTIAL HYPERTENSION: ICD-10-CM

## 2020-09-08 DIAGNOSIS — T85.730D INFECTION OF VENTRICULOPERITONEAL SHUNT, SUBSEQUENT ENCOUNTER: Primary | ICD-10-CM

## 2020-09-08 DIAGNOSIS — R13.10 DYSPHAGIA, UNSPECIFIED TYPE: ICD-10-CM

## 2020-09-08 PROCEDURE — 99308 SBSQ NF CARE LOW MDM 20: CPT | Performed by: NURSE PRACTITIONER

## 2020-09-08 PROCEDURE — 99024 POSTOP FOLLOW-UP VISIT: CPT | Performed by: PHYSICIAN ASSISTANT

## 2020-09-08 RX ORDER — PANTOPRAZOLE SODIUM 40 MG/1
40 TABLET, DELAYED RELEASE ORAL DAILY
COMMUNITY

## 2020-09-08 NOTE — PROGRESS NOTES
Neurosurgery Office Note  Amy Álvarez 69 y o  male MRN: [id number]      Assessment/Plan     Normal pressure hydrocephalus ([address] )  POD#15 left ventriculoatrial shunt placement  · S/p removal of  shunt 0/4/9367  · Certas Cordis set to 4    Imaging:   CT head wo contrast 9/4/2020:  Stable CT appearance of the brain  Shunted ventriculomegaly  No extra-axial fluid collections  Plan:   Exam:  GCS 14, patient does not know current location, month or president, is able to do simple calculations but does have difficulty with adding change, repeats a sentence fluently, some mild weakness bilateral lower extremities however difficult to assess as patient is currently in a stretcher, incisions clean dry intact with Monocryl dissolving, prior  shunt sutures removed and incision appears well approximated   Per ID noted, will continue to meropenem until 9/9 to complete 2 weeks of abx treatment s/p shunt placement   Continue therapy per rehab recommendation   Brother, Dr Cem Piper, contacted via phone and updated, he is very appreciative of the call and would like to be contacted with any further updates   Patient is to return to the office in about 4 weeks with MD and repeat CT head or sooner should patient develop worsening symptoms or red flag signs      Spondylolisthesis of lumbosacral region  · Status post L5-S1 TLIF on 07/06/2020  · Will follow-up in about 3-4 weeks with repeat upright lumbar spine x-rays for his 3 month follow-up       Diagnoses and all orders for this visit:    Normal pressure hydrocephalus ([address] )  -     CT head wo contrast; Future    Spondylolisthesis of lumbosacral region  -     XR spine lumbar 2 or 3 views injury; Future    Other orders  -     pantoprazole (PROTONIX) 40 mg tablet; Take 40 mg by mouth daily  -     Bisacodyl (DULCOLAX RE); Insert into the rectum as needed  -     magnesium hydroxide (MILK OF MAGNESIA) 400 mg/5 mL oral suspension;  Take 30 mL by mouth daily as needed for constipation            CHIEF COMPLAINT    Chief Complaint   Patient presents with    Post-op       HISTORY    History of Present Illness     69y o  year old male with a complicated past medical history including spondylolisthesis at L5-S1 resulting in L5-S1 TLIF on 7 620, several  shunt revisions for history of normal pressure hydrocephalus with  shunt infection and peritonitis requiring removal of  shunt and placement of a VA shunt at the end of August 2020, TIA, ZHOU on CPAP, hypertension, type 2 diabetes who presents for 2 week follow-up status post VA shunt placement  Patient is on a stretcher in the exam room and calling out for help  States he is having back pain due to lying on the stretcher  Denies headache, dizziness, chest pain, shortness of breath, abdominal pain, nausea or vomiting, bowel or bladder issues, saddle anesthesia  Currently at a rehab center and it is unclear if he is ambulating or not  Brother was contacted via phone and feels his cognition has declined and he is unsure if this is due to the shunt or due to psychological reasons  HPI    See Discussion    REVIEW OF SYSTEMS    Review of Systems   Constitutional: Negative  HENT: Negative  Eyes: Negative  Respiratory: Negative  Cardiovascular: Negative  Gastrointestinal: Negative  Endocrine: Negative  Genitourinary: Negative  Musculoskeletal: Positive for gait problem (uses a walker)  Negative for myalgias and neck pain  Skin: Negative  Allergic/Immunologic: Negative  Neurological: Negative for dizziness, tremors, seizures, speech difficulty, weakness, light-headedness, numbness and headaches  Hematological: Negative  Psychiatric/Behavioral: Negative            Meds/Allergies     Current Outpatient Medications   Medication Sig Dispense Refill    acetaminophen (TYLENOL) 325 mg tablet Take 2 tablets (650 mg total) by mouth every 6 (six) hours as needed for mild pain 30 tablet 0    allopurinol (ZYLOPRIM) 300 mg tablet 300 mg daily      Bisacodyl (DULCOLAX RE) Insert into the rectum as needed      docusate sodium (COLACE) 100 mg capsule Take 1 capsule (100 mg total) by mouth 2 (two) times a day 10 capsule 0    enalapril (VASOTEC) 5 mg tablet Take 1 tablet (5 mg total) by mouth daily 30 tablet 0    escitalopram (LEXAPRO) 10 mg tablet Take 1 tablet (10 mg total) by mouth daily  0    magnesium hydroxide (MILK OF MAGNESIA) 400 mg/5 mL oral suspension Take 30 mL by mouth daily as needed for constipation      melatonin 3 mg Take 1 tablet (3 mg total) by mouth daily at bedtime  0    meropenem 2,000 mg in sodium chloride 0 9 % 100 mL IVPB Infuse 2,000 mg into a venous catheter every 8 (eight) hours for 11 days  0    multivitamin (THERAGRAN) TABS Take 1 tablet by mouth daily      oxyCODONE (ROXICODONE) 5 mg immediate release tablet Take 1 tablet (5 mg total) by mouth every 4 (four) hours as needed for moderate pain or severe painMax Daily Amount: 30 mg 10 tablet 0    pantoprazole (PROTONIX) 40 mg tablet Take 40 mg by mouth daily      senna (SENOKOT) 8 6 mg Take 1 tablet (8 6 mg total) by mouth daily 120 each 0    travoprost (Travatan Z) 0 004 % ophthalmic solution Administer 1 drop to the right eye daily at bedtime       insulin lispro (HumaLOG) 100 units/mL injection Inject 1-6 Units under the skin 3 (three) times a day before meals (Patient not taking: Reported on 9/8/2020)  0    Lancets (ONETOUCH DELICA PLUS [id number]) MISC 2 (two) times a day       No current facility-administered medications for this visit          Allergies   Allergen Reactions    Pollen Extract Allergic Rhinitis       PAST HISTORY    Past Medical History:   Diagnosis Date    Cancer [facility]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     Diabetes mellitus [facility] )     DM2 (diabetes mellitus, type 2) ([facility] )     Gout     HTN (hypertension)     Hydrocephalus (HCC)     Hypertension     Neuropathy     ZHOU on CPAP     Pressure injury of skin     TIA (transient ischemic attack)        Past Surgical History:   Procedure Laterality Date    ABDOMINAL WALL SURGERY N/A 8/1/2020    Procedure: CLOSURE WOUND ABDOMINAL/TRUNK;  Surgeon: Silvestre Ramon DO;  [facility];  Service: General    ARTERIOGRAM N/A 7/31/2020    Procedure: ARTERIOGRAM Of SMA and placement of Papavarine onfusion arterial catheter;  Surgeon: Domingo Strong MD;  [facility];  Service: Vascular    ARTERIOGRAM Left 8/1/2020    Procedure: ARTERIOGRAM; cath removal;  Surgeon: Domingo Strong MD;  [facility];  Service: Vascular    CSF SHUNT      x3 Op Reports, Dr Cristina Figueroa in [facility] chart viewer    FL LUMBAR PUNCTURE DIAGNOSTIC  8/11/2020    LAPAROTOMY N/A 7/31/2020    Procedure: LAPAROTOMY EXPLORATORY: Externalizes  shunt Cathalene Bentley application;  Surgeon: Jurgen Soriano MD;  [facility];  Service: General    LAPAROTOMY N/A 8/1/2020    Procedure: LAPAROTOMY EXPLORATORY,;  Surgeon: Silvestre Ramon DO;  [facility];  Service: General    [address] [employer] Left 5/30/2020    Procedure: excision 5th metatarsal head   excision 5th metatarsal ulcer ;  Surgeon: Lexie Hawkins DPM;  [facility];  Service: Podiatry    MO ARTHDSIS POST/POSTEROLATRL/POSTINTERBODY LUMBAR N/A 7/6/2020    Procedure: MINIMALLY INVASIVE TRANSVERSE LUMBAR INTERBODY FUSION L5-S1 FROM LEFT-SIDED APPROACH;  Surgeon: Cherelle Briceño MD;  [facility];  Service: Neurosurgery    MO CREATE SHUNT:VENTRIC-ATRIAL Left 8/24/2020    Procedure: Left ventriculoatrial shunt;  Surgeon: Cherelle Briceño MD;  [facility];  Service: Neurosurgery    MO Alberta Faraz CSF SHUNT,W/O REPLACE Right 8/7/2020    Procedure: REMOVAL SHUNT VENTRICULAR PERITONEAL;  Surgeon: Cherelle Briceño MD;  [facility];  Service: Neurosurgery    MO REPLACEMENT/REVISION,CSF SHUNT Right 7/6/2020    Procedure: REVISION OF SCALP OVER VENTRICULAR CATHETER IN THE RIGHT CORONAL REGION;  Surgeon: Jayme Perez MD;  [facility];  Service: Neurosurgery    WOUND DEBRIDEMENT N/A 8/1/2020    Procedure: abd washout;  Surgeon: Cyndy Holloway DO;  [facility];  Service: General       Social History     Tobacco Use    Smoking status: Never Smoker    Smokeless tobacco: Never Used   Substance Use Topics    Alcohol use: Not Currently     Frequency: Monthly or less     Drinks per session: 1 or 2    Drug use: Never       Family History   Problem Relation Age of Onset    Polymyositis Mother     Heart failure Father          Above history personally reviewed  EXAM    Vitals:Blood pressure 154/86, pulse 71, temperature (!) 97 °F (36 1 °C), temperature source Tympanic ,There is no height or weight on file to calculate BMI  Physical Exam  Constitutional:       General: He is awake  Appearance: He is well-developed  Comments: In a stretcher   HENT:      Head:      Comments: VA shunt reservoir compresses and refills briskly  Prior  shunt valve incision appears well approximated and sutures removed  VA shunt scalp incision appears well approximated  Eyes:      Conjunctiva/sclera: Conjunctivae normal    Neck:      Comments: VA shunt incision on neck well approximated  Cardiovascular:      Rate and Rhythm: Normal rate  Pulmonary:      Effort: Pulmonary effort is normal  No respiratory distress  Skin:     General: Skin is warm and dry  Neurological:      Mental Status: He is alert  Coordination: Finger-Nose-Finger Test abnormal (dysmetria noted and unable to reach nose bilaterally)  Psychiatric:         Attention and Perception: Attention normal          Mood and Affect: Mood is anxious (callng out for help in room)  Speech: Speech normal          Behavior: Behavior normal  Behavior is cooperative  Thought Content: Thought content normal          Cognition and Memory: Cognition is impaired  Memory is impaired  Judgment: Judgment normal          Neurologic Exam     Mental Status   Follows 1 step commands  Attention: normal  Concentration: normal    Speech: speech is normal   Level of consciousness: alert  Knowledge: good  Unable to perform simple calculations (some trouble with adding change)  Able to repeat  Normal comprehension  GCs 14 (-1 confusion), does not know current location, current president or current month  Unable to name the days of the week backwards     Cranial Nerves     CN III, IV, VI   CN III: no CN III palsy  CN VI: no CN VI palsy  Nystagmus: none   Diplopia: none  Ophthalmoparesis: none  Upgaze: abnormal (Cannot fully look upward)  Downgaze: normal  Conjugate gaze: present    CN VII   Right facial weakness: none  Left facial weakness: none    CN VIII   Hearing: intact    CN IX, X   CN IX normal    CN X normal      CN XI   Right trapezius strength: normal  Left trapezius strength: normal    CN XII   CN XII normal      Motor Exam   Muscle bulk: normal  Overall muscle tone: normal  Right arm pronator drift: absent  Left arm pronator drift: absent  BLE:  4/5 HF, difficult to assess in stretcher, unable to assess distally  BUE:  5/5 throughout     Sensory Exam   Light touch normal    Unable to assess DST and JPS     Gait, Coordination, and Reflexes     Gait  Gait: (in stretcher)    Coordination   Finger to nose coordination: abnormal (dysmetria noted and unable to reach nose bilaterally)    Tremor   Resting tremor: absent  Intention tremor: absent  Action tremor: left arm and right arm    Reflexes   Right Ariza: absent  Left Ariza: absent  Right ankle clonus: absent  Left ankle clonus: absent  Right > left hand appears closed and difficult to open         MEDICAL DECISION MAKING    Imaging Studies:     Xr Skull < 4 Vw    Result Date: 8/27/2020  Narrative: POST MR SHUNT EVALUATION INDICATION:   postop VA shunt   COMPARISON:  CT brain August 25, 2020 VIEWS:  XR SKULL < 4 VW Images: 2 FINDINGS: There is an aborted right-sided ventriculoperitoneal shunt catheter within the neck  Left-sided ventriculoatrial catheter is present  Shunt setting is approximately 109 mm of water  This appears similar to the prior CT scan  Impression: Stable appearance of left-sided ventriculoatrial shunt catheter    Workstation performed: [id number]     Xr Chest Portable    Result Date: 8/24/2020  Narrative: CHEST INDICATION:   s/p VA shunt  COMPARISON:  Intraoperative spot films performed earlier today  Chest x-ray dated 8/10/2020  EXAM PERFORMED/VIEWS:  XR CHEST PORTABLE FINDINGS:  Left-sided PICC line extends to the mid chest   There is tubing over the right neck which appears to terminate over the right upper chest   Tubing within the left neck extends towards the atrium  Cardiomediastinal silhouette appears unremarkable  The lungs are clear  No pneumothorax or pleural effusion  Osseous structures appear within normal limits for patient age  Impression: Left-sided PICC line extends to the cavoatrial junction  Clear lung fields  Tubing projects over the neck bilaterally  Workstation performed: [id number]     Xr Chest Portable    Result Date: 8/10/2020  Narrative: CHEST INDICATION:   Fever and leukocytosis  COMPARISON:  7/31/2020 EXAM PERFORMED/VIEWS:  XR CHEST PORTABLE FINDINGS:  Left PICC line tip projects over the cavoatrial junction in satisfactory position without pneumothorax  Cardiomediastinal silhouette appears unremarkable  The lungs are clear  No pneumothorax or pleural effusion  Osseous structures appear within normal limits for patient age  Impression: Left PICC line tip projects over the cavoatrial junction satisfactory position without pneumothorax  No acute cardiopulmonary disease  Workstation performed: [id number]     Xr Spine Lumbar 2 Or 3 Views Injury    Result Date: 8/19/2020  Narrative: LUMBAR SPINE INDICATION:   lumbar fusion   COMPARISON:  July 17, 2020 VIEWS:  XR SPINE LUMBAR 2 OR 3 VIEWS INJURY FINDINGS: Patient is status post L5-S1 pedicle screw fixation with disc cage placement  Stable alignment from the prior study  There is no evidence of acute fracture or destructive osseous lesion  Alignment is unremarkable  Degenerative disc disease noted particularly at the thoracolumbar junction  The pedicles appear intact  Soft tissues are unremarkable  Impression: Stable postoperative alignment  Workstation performed: [id number]     Ct Head Wo Contrast    Result Date: 9/4/2020  Narrative: CT BRAIN - WITHOUT CONTRAST INDICATION:   G91 2: (Idiopathic) normal pressure hydrocephalus  COMPARISON:  CT 8/25/2020 TECHNIQUE:  CT examination of the brain was performed  In addition to axial images, sagittal and coronal 2D reformatted images were created and submitted for interpretation  Radiation dose length product (DLP) for this visit:  1225 6 mGy-cm   This examination, like all CT scans performed in the [facility], was performed utilizing techniques to minimize radiation dose exposure, including the use of iterative  reconstruction and automated exposure control  IMAGE QUALITY:  Diagnostic  FINDINGS: PARENCHYMA:  No intracranial mass, mass effect or midline shift  No CT signs of acute infarction  No acute parenchymal hemorrhage  Minor degree chronic small vessel disease  Encephalomalacia right frontal and left occipital lobes  Left frontal shunt catheter, terminating in the anterior body of the left lateral ventricle, adjacent to the foramen of Rogers  VENTRICLES AND EXTRA-AXIAL SPACES:  Stable ventriculomegaly  3rd ventricle extends 1 8 cm in transverse dimension, frontal horn spanning 5 4 cm overtly stable  No extra-axial fluid collections  VISUALIZED ORBITS AND PARANASAL SINUSES:  Bilateral lens implants  CALVARIUM AND EXTRACRANIAL SOFT TISSUES:  Right frontal craniotomy, left keila hole  Impression: Stable CT appearance of the brain    Shunted ventriculomegaly  No extra-axial fluid collections  Workstation performed: [id number]     Ct Head Wo Contrast    Result Date: 8/25/2020  Narrative: CT BRAIN - WITHOUT CONTRAST INDICATION:   s/p VA shunt  COMPARISON:  8/20/2020  TECHNIQUE:  CT examination of the brain was performed  In addition to axial images, sagittal and coronal 2D reformatted images were created and submitted for interpretation  Radiation dose length product (DLP) for this visit:  946 mGy-cm   This examination, like all CT scans performed in the [facility], was performed utilizing techniques to minimize radiation dose exposure, including the use of iterative reconstruction and automated exposure control  IMAGE QUALITY:  Diagnostic  FINDINGS: PARENCHYMA:  No intracranial mass, mass effect or midline shift  No CT signs of acute infarction  No acute parenchymal hemorrhage  Right frontal and left occipital encephalomalacia present  VENTRICLES AND EXTRA-AXIAL SPACES:  Interval placement of left frontal ventriculoperitoneal shunt catheter is identified, catheter tip terminates in the left lateral ventricle  Ventricular system appears grossly stable from prior exam   Small amount of postoperative pneumocephalus is present  VISUALIZED ORBITS AND PARANASAL SINUSES:  Unremarkable  CALVARIUM AND EXTRACRANIAL SOFT TISSUES:  Normal      Impression: Status post placement of left frontal ventriculoperitoneal shunt catheter  Stable ventricular megaly  Workstation performed: [id number]     Ct Head Wo Contrast    Result Date: 8/20/2020  Narrative: CT BRAIN - WITHOUT CONTRAST INDICATION:   Hydrocephalus follow up s/p  shunt removal  COMPARISON:  8/9/2020 TECHNIQUE:  CT examination of the brain was performed  In addition to axial images, coronal 2D reformatted images were created and submitted for interpretation  Radiation dose length product (DLP) for this visit:  990 19 mGy-cm     This examination, like all CT scans performed in the [facility], was performed utilizing techniques to minimize radiation dose exposure, including the use of iterative  reconstruction and automated exposure control  IMAGE QUALITY:  Diagnostic  FINDINGS: PARENCHYMA:  Stable right anterior frontal vertex encephalomalacia and left occipital encephalomalacia consistent with old infarctions  Stable additional periventricular white matter attenuation consistent with mild to moderate chronic microangiopathy  No mass, mass effect or midline shift  No hemorrhage  No evidence of acute territorial infarction  Stable vascular calcification  VENTRICLES AND EXTRA-AXIAL SPACES:  Stable ventricular enlargement involving lateral ventricles, 3rd ventricle and 4th ventricle  No subdural collections have developed  VISUALIZED ORBITS AND PARANASAL SINUSES:  Unremarkable  CALVARIUM AND EXTRACRANIAL SOFT TISSUES:  Normal      Impression: Stable ventricular enlargement post shunt removal  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  Workstation performed: [id number]     Ct Head Wo Contrast    Result Date: 8/9/2020  Narrative: CT BRAIN - WITHOUT CONTRAST INDICATION:   Altered mental status  COMPARISON:  Head CT 8/5/2020 TECHNIQUE:  CT examination of the brain was performed  In addition to axial images, coronal 2D reformatted images were created and submitted for interpretation  Radiation dose length product (DLP) for this visit:  972 4 mGy-cm   This examination, like all CT scans performed in the [facility], was performed utilizing techniques to minimize radiation dose exposure, including the use of iterative reconstruction and automated exposure control  IMAGE QUALITY:  Diagnostic  FINDINGS: PARENCHYMA AND EXTRA-AXIAL SPACES: Interval removal of right frontal approach ventricular shunt catheter  Stable encephalomalacia in the right superior frontal gyrus and left occipitotemporal region  No acute intracranial hemorrhage  No masslike lesion, mass effect or midline shift  No definite CT evidence for acute infarct    VENTRICLES AND EXTRA-AXIAL SPACES:  Grossly size and configuration of prominent ventricular system VISUALIZED ORBITS AND PARANASAL SINUSES:  Unremarkable  CALVARIUM AND EXTRACRANIAL SOFT TISSUES:  Incompletely imaged post surgical changes in the face over right mandible  Periapical lucency of retained root of right maxillary tooth  Impression: 1  Interval removal of right frontal approach ventricular shunt catheter  Grossly stable size and configuration of prominent ventricular system  2   Stable encephalomalacia in the right superior frontal gyrus and left occipitotemporal region  3   Periapical lucency of retained root of right maxillary tooth  Workstation performed: [id number]     Fl Barium Swallow Video W Speech    Result Date: 8/12/2020  Narrative: A video barium swallow study was performed by the Department of Speech Pathology  Please refer to the report for the official interpretation  The images are stored for archival purposes only  Study images were not formally reviewed by the Radiology Department  Xr Chest 1 View    Result Date: 8/24/2020  Narrative: C-ARM - chest INDICATION: 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]  Procedure guidance  COMPARISON:  8/10/2020 TECHNIQUE: FLUOROSCOPY TIME:   2 MIN 7 SEC 1 FLUOROSCOPIC IMAGE FINDINGS: Fluoroscopic guidance provided for surgical procedure  Osseous and soft tissue detail limited by technique  Impression: Fluoroscopic guidance provided for surgical procedure  Please refer to the separate procedure notes for additional details  Workstation performed: [id number]     Xr Shunt Series    Result Date: 8/28/2020  Narrative: SHUNT SERIES INDICATION:  s/p VA shunt  COMPARISON:  8/26/2020  VIEWS:  XR SHUNT SERIES FINDINGS: Left side  shunt catheter is identified    The tubing appears contiguous through its visualized course in the neck and chest   The caudal tip terminates in the region of the superior cavoatrial junction  A 2nd catheter is present along the right neck, likely residual tubing from prior catheter  Left peripherally inserted central venous catheter tip is within the superior cavoatrial junction  Impression: Intact  shunt catheter with tip terminating in the region of the superior cavoatrial junction  Workstation performed: [id number]     Fl Lumbar Puncture Diagnostic    Result Date: 8/11/2020  Narrative: FLUOROSCOPICALLY GUIDED LUMBAR PUNCTURE  INDICATION:  Prior history of NPH with recent  shunt infection  Evaluate for underlying meningitis  FLUOROSCOPY TIME:  4 minuters 44 seconds IMAGES:  1    TECHNIQUE:   Consent was obtained on the phone from the patient's brother Dr Jeannine Funk ([phone]) at 1500  The risks of the procedure were discussed in detail  The risks discussed included, however were not limited to, infection, bleeding, injury to surrounding structures (nerves, spinal cord, and blood vessels), allergic reactions, arachnoiditis, encephalitis, and spinal headaches  The patient's brother verbalized his understanding of the above risks and wished for Rose Marie Vaz to proceed with the lumbar puncture  The patient was placed in a left lateral decubitus position  He was prepped and draped in the usual sterile fashion  1% lidocaine was infiltrated at the puncture site  Utilizing left paravertebral approach, a 20 gauge 3 5 inch spinal needle was advanced under fluoroscopic guidance into the subarachnoid space at the L1-L2 level, utilizing sterile technique  Once in position, opening pressure was 15 cm H2O  Approximately 10 cc of clear, colorless CSF were removed and placed into 4 tubes which subsequently were transported to the lab for requested analysis  Closing pressure was 5 cm H2O  The needle was removed  The patient tolerated the procedure well   The patient was discharged from the department with appropriate instructions  Impression: Successful fluoroscopically guided lumbar puncture with an opening pressure of 15 cm H2O  Approximately 10 mL's of clear colorless CSF was removed and sent to lab for requested analysis  Closing pressure was 5 cm H2O  PERFORMED BY: Dr Katlin Guzmán and rKista Riddle PA-C DICTATED AND SIGNED BY: Krista Riddle PA-C Workstation performed: [id number]     Ct Abdomen Pelvis W Contrast    Result Date: 8/11/2020  Narrative: CT ABDOMEN AND PELVIS WITH IV CONTRAST INDICATION:   Abdominal pain, fever  COMPARISON:  July 31, 2020 TECHNIQUE:  CT examination of the abdomen and pelvis was performed  Axial, sagittal, and coronal 2D reformatted images were created from the source data and submitted for interpretation  Radiation dose length product (DLP) for this visit:  1801 86 mGy-cm   This examination, like all CT scans performed in the [facility], was performed utilizing techniques to minimize radiation dose exposure, including the use of iterative reconstruction and automated exposure control  IV Contrast:  100 mL of iohexol (OMNIPAQUE) Enteric Contrast:  Enteric contrast was not administered  FINDINGS: ABDOMEN LOWER CHEST:  No clinically significant abnormality identified in the visualized lower chest  LIVER/BILIARY TREE:  Nodular contour of the liver compatible with cirrhosis GALLBLADDER:  No listhesis is seen SPLEEN:  Unremarkable  PANCREAS:  Unremarkable  ADRENAL GLANDS:  Unremarkable  KIDNEYS/URETERS:  Right renal cyst seen STOMACH AND BOWEL:  Diverticulosis seen and no evidence of bowel obstruction APPENDIX:  No findings to suggest appendicitis  ABDOMINOPELVIC CAVITY:  No ascites  No pneumoperitoneum  No lymphadenopathy  VESSELS:  Celiac trunk, SMA appear unremarkable  PELVIS REPRODUCTIVE ORGANS:  Unremarkable for patient's age  URINARY BLADDER:  Unremarkable  ABDOMINAL WALL/INGUINAL REGIONS:  Unremarkable   OSSEOUS STRUCTURES:  No acute compression collapse of the vertebra No gross lytic lesion Bridging ossification seen in the spine  Lumbar fusion seen at L5-S1 Degenerative changes in the both hip joint    Impression: No intra-abdominal fluid collection No bowel obstruction Workstation performed: [id number]       I have personally reviewed pertinent reports     and I have personally reviewed pertinent films in PACS

## 2020-09-08 NOTE — ASSESSMENT & PLAN NOTE
POD#15 left ventriculoatrial shunt placement  · S/p removal of  shunt 4/8/5954  · [place] set to 4    Imaging:   CT head wo contrast 9/4/2020:  Stable CT appearance of the brain  Shunted ventriculomegaly  No extra-axial fluid collections      Plan:   Exam:  GCS 14, patient does not know current location, month or president, is able to do simple calculations but does have difficulty with adding change, repeats a sentence fluently, some mild weakness bilateral lower extremities however difficult to assess as patient is currently in a stretcher, incisions clean dry intact with Monocryl dissolving, prior  shunt sutures removed and incision appears well approximated   Per ID noted, will continue to meropenem until 9/9 to complete 2 weeks of abx treatment s/p shunt placement   Continue therapy per rehab recommendation   Brother, Dr Juliette Coughlin, contacted via phone and updated, he is very appreciative of the call and would like to be contacted with any further updates   Patient is to return to the office in about 4 weeks with MD and repeat CT head or sooner should patient develop worsening symptoms or red flag signs

## 2020-09-08 NOTE — ASSESSMENT & PLAN NOTE
· Status post L5-S1 TLIF on 07/06/2020  · Will follow-up in about 3-4 weeks with repeat upright lumbar spine x-rays for his 3 month follow-up

## 2020-09-08 NOTE — PATIENT INSTRUCTIONS
Patient is to follow-up in about 3-4 weeks for follow-up of lumbar surgery as well as ventriculoatrial shunt placement  He is to complete x-ray lumbar spine as well as CT head prior to that appointment  Continue therapy recommendations per rehab  He is to return to the office sooner should he develop worsening symptoms or red flag signs

## 2020-09-09 ENCOUNTER — NURSING HOME VISIT (OUTPATIENT)
Dept: WOUND CARE | Facility: HOSPITAL | Age: 70
End: 2020-09-09
Payer: COMMERCIAL

## 2020-09-09 DIAGNOSIS — L89.150 UNSTAGEABLE PRESSURE ULCER OF SACRAL REGION (HCC): ICD-10-CM

## 2020-09-09 DIAGNOSIS — R68.89 SUSPECTED DEEP TISSUE INJURY: Primary | ICD-10-CM

## 2020-09-09 PROCEDURE — 99308 SBSQ NF CARE LOW MDM 20: CPT | Performed by: NURSE PRACTITIONER

## 2020-09-09 NOTE — ASSESSMENT & PLAN NOTE
- improved, distal wound healed  - continue current treatment - venelex and border gauze  - offload, turning, and reposition frequently

## 2020-09-09 NOTE — PROGRESS NOTES
Patient ID: Carlos Torres is a 69 y o  male Date of Birth 1950      Location Services: [facility]    Performed wound round with: Geoff Fabian, nurse manager    Chief Complaint   Patient presents with    Follow Up Wound Care Visit     left buttock and sacrum       Allergies  Pollen extract    Assessment & Plan:  1  Suspected deep tissue injury  Assessment & Plan:  - resolved, continue offloading and frequent repositioning        2  Unstageable pressure ulcer of sacral region [facility])  Assessment & Plan:  - improved, distal wound healed  - continue current treatment - venelex and border gauze  - offload, turning, and reposition frequently      Orders:  -     Wound cleansing and dressings; Future        Subjective: This is a follow up for  wound on the sacrum and left buttock   No significant changes  Wound no  1: Location  Left buttock    Onset : prior to admission at [facility], as per report     Etiology : pressure ulcer    Severity: mild, improved    Wound no  2 : Location  Sacrum    Onset : [facility] to [facility] at [facility]     Etiology : pressure ulcer    Severity: mild, improved            The following portions of the patient's history were reviewed and updated as appropriate: allergies, current medications, past family history, past medical history, past social history, past surgical history, and problem list     Review of Systems   Skin: Positive for wound  All other systems reviewed and are negative  Objective: There were no vitals taken for this visit      Physical Exam      Constitutional: Does not seem to be in any kind of acute distress, groomed,   Pulmonary : NO respiratory distress, no wheezes,no rales, no SOB   Psych: alert, poor memory, poor judgement   CV: (-) edema , no cyanosis, or clubbing,   GI: incontinent of stool, abdomen is soft, non-tender, + bowel sounds   : incontinent, no complain of pain,   Skin:     Right outer ankle  wound bed  resolved , 100% epithelial tissue  Sacrum  wound bed - closed ( distal), 0 6 x 0 4 x  01 (proximal), 100% granulation tissue, no mal odor, no drainage, periwound  flat and intact,   Left buttock  resolved  Muskuloskeletal : LROM, dependent with positioning  Neurological: Positive for confusion  Alert       Procedures     Results from last 6 Months   Lab Units 05/29/20  1126   WOUND CULTURE  Few Colonies of Enterococcus faecalis*  Few Colonies of        Coordination of Care: ADON and nurse manager aware of the treatment plan    Wound Instructions:  Orders Placed This Encounter   Procedures    Wound cleansing and dressings     Wound:  Sacrum   Cleanse with soap and water, pat dry  Apply non-sting skin prep to periwound area  Apply venelex to wound bed, then cover with bordered gauze  Frequency :daily and prn  Offload all wounds  Turn and reposition frequently  Increase protein intake as per RD recommendation     Standing Status:   Future     Standing Expiration Date:   9/9/2021         AR Buckley

## 2020-09-09 NOTE — PATIENT INSTRUCTIONS
Orders Placed This Encounter   Procedures    Wound cleansing and dressings     Wound:  Sacrum   Cleanse with soap and water, pat dry  Apply non-sting skin prep to periwound area  Apply venelex to wound bed, then cover with bordered gauze  Frequency :daily and prn  Offload all wounds  Turn and reposition frequently  Increase protein intake as per RD recommendation     Standing Status:   Future     Standing Expiration Date:   9/9/2021

## 2020-09-10 ENCOUNTER — NURSING HOME VISIT (OUTPATIENT)
Dept: FAMILY MEDICINE CLINIC | Facility: CLINIC | Age: 70
End: 2020-09-10
Payer: COMMERCIAL

## 2020-09-10 VITALS
TEMPERATURE: 97.9 F | OXYGEN SATURATION: 98 % | HEART RATE: 78 BPM | RESPIRATION RATE: 18 BRPM | DIASTOLIC BLOOD PRESSURE: 78 MMHG | SYSTOLIC BLOOD PRESSURE: 130 MMHG

## 2020-09-10 VITALS
BODY MASS INDEX: 29.05 KG/M2 | DIASTOLIC BLOOD PRESSURE: 86 MMHG | OXYGEN SATURATION: 95 % | RESPIRATION RATE: 18 BRPM | WEIGHT: 232.4 LBS | HEART RATE: 76 BPM | TEMPERATURE: 97.5 F | SYSTOLIC BLOOD PRESSURE: 135 MMHG

## 2020-09-10 DIAGNOSIS — E11.621 DIABETIC ULCER OF LEFT MIDFOOT ASSOCIATED WITH TYPE 2 DIABETES MELLITUS, WITH OTHER ULCER SEVERITY (HCC): ICD-10-CM

## 2020-09-10 DIAGNOSIS — K55.9 ISCHEMIC BOWEL DISEASE (HCC): ICD-10-CM

## 2020-09-10 DIAGNOSIS — I10 ESSENTIAL HYPERTENSION: ICD-10-CM

## 2020-09-10 DIAGNOSIS — K55.059 NONOCCLUSIVE MESENTERIC ISCHEMIA (HCC): ICD-10-CM

## 2020-09-10 DIAGNOSIS — E11.65 TYPE 2 DIABETES MELLITUS WITH HYPERGLYCEMIA, WITH LONG-TERM CURRENT USE OF INSULIN (HCC): ICD-10-CM

## 2020-09-10 DIAGNOSIS — F32.A DEPRESSION, UNSPECIFIED DEPRESSION TYPE: ICD-10-CM

## 2020-09-10 DIAGNOSIS — Z98.2 STATUS POST VENTRICULOPERITONEAL SHUNT: ICD-10-CM

## 2020-09-10 DIAGNOSIS — G91.0 COMMUNICATING HYDROCEPHALUS (HCC): ICD-10-CM

## 2020-09-10 DIAGNOSIS — L89.150 UNSTAGEABLE PRESSURE ULCER OF SACRAL REGION (HCC): ICD-10-CM

## 2020-09-10 DIAGNOSIS — Z74.09 IMPAIRED FUNCTIONAL MOBILITY, BALANCE, GAIT, AND ENDURANCE: ICD-10-CM

## 2020-09-10 DIAGNOSIS — Z79.4 TYPE 2 DIABETES MELLITUS WITH HYPERGLYCEMIA, WITH LONG-TERM CURRENT USE OF INSULIN (HCC): ICD-10-CM

## 2020-09-10 DIAGNOSIS — G91.2 NORMAL PRESSURE HYDROCEPHALUS (HCC): Primary | ICD-10-CM

## 2020-09-10 DIAGNOSIS — S01.01XD LACERATION OF SCALP, SUBSEQUENT ENCOUNTER: ICD-10-CM

## 2020-09-10 DIAGNOSIS — L97.428 DIABETIC ULCER OF LEFT MIDFOOT ASSOCIATED WITH TYPE 2 DIABETES MELLITUS, WITH OTHER ULCER SEVERITY (HCC): ICD-10-CM

## 2020-09-10 DIAGNOSIS — T85.730D INFECTION OF VENTRICULOPERITONEAL SHUNT, SUBSEQUENT ENCOUNTER: ICD-10-CM

## 2020-09-10 DIAGNOSIS — M1A.9XX0 CHRONIC GOUT WITHOUT TOPHUS, UNSPECIFIED CAUSE, UNSPECIFIED SITE: ICD-10-CM

## 2020-09-10 PROBLEM — R41.0 CONFUSION: Status: ACTIVE | Noted: 2020-09-10

## 2020-09-10 PROCEDURE — 99309 SBSQ NF CARE MODERATE MDM 30: CPT | Performed by: INTERNAL MEDICINE

## 2020-09-10 NOTE — ASSESSMENT & PLAN NOTE
Diet control    Recent hemoglobin A1c was well controlled  Lab Results   Component Value Date    HGBA1C 5 6 07/31/2020

## 2020-09-10 NOTE — PROGRESS NOTES
[facility]  [address] [facility], [facility] [place]  [phone]    PROGRESS NOTE    Name: Ralph Rojo  AGE: 69 y o  SEX: male    MRN: [id number]    DATE OF ENCOUNTER: 9/8/20    Patient Location     [address] [place] [address]    Reason For Visit/ Chief Complaint     Follow-up visit:   shunt, depression, HTN, gout, dysphagia, ischemic bowel, impaired functional mobility    1  Infection of ventriculoperitoneal shunt, subsequent encounter  Assessment & Plan:  Continue IV meropenem until tomorrow  2  Depression, unspecified depression type  Assessment & Plan:  Continue citalopram 10 mg daily      3  Essential hypertension  Assessment & Plan:  Blood pressure today 130/78  Blood pressures reviewed and have been stable  Continue enalapril 5 mg daily  4  Chronic gout without tophus, unspecified cause, unspecified site  Assessment & Plan:  Stable  Continue allopurinol 300 mg daily  5  Dysphagia, unspecified type  Assessment & Plan:  Patient being fed and monitored by speech therapist       6  Impaired functional mobility, balance, gait, and endurance  Assessment & Plan:  Continue PT/OT  7  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[address] )  Assessment & Plan:  No abdominal symptoms currently  Patient has follow-ups with general on vascular surgery  HPI      Ralph Rojo is a 69 y o  male who is being seen today for of follow-up visit today  He appears to be calm today  Had a limited conversation about 7 were memorabelia  At times confused  Nurses report no acute issues today  He has several more days with antibiotics  Vital signs are stable  No fever, chills, abdominal pain, n/v/d, urinary symptoms have been reported  Review of Systems   Constitutional: Positive for fatigue  Negative for chills and fever  HENT: Negative for congestion, nosebleeds and rhinorrhea  Eyes: Negative for discharge and redness     Respiratory: Negative for cough, shortness of breath, wheezing and stridor  Cardiovascular: Negative for chest pain and leg swelling  Gastrointestinal: Negative for abdominal distention, abdominal pain, diarrhea and vomiting  Genitourinary: Negative for hematuria  Musculoskeletal: Positive for gait problem  Negative for arthralgias and back pain  Skin: Negative for pallor  Neurological: Positive for weakness (Generalized)  Negative for tremors, seizures and syncope  Psychiatric/Behavioral: Positive for confusion  Negative for agitation and behavioral problems         Past Medical History:   Diagnosis Date    Cancer [facility]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     Diabetes mellitus ([place] [address] )     DM2 (diabetes mellitus, type 2) ([place] [address] )     Gout     HTN (hypertension)     Hydrocephalus ([place] [address] )     Hypertension     Neuropathy     ZHOU on CPAP     Pressure injury of skin     TIA (transient ischemic attack)        Past Surgical History:   Procedure Laterality Date    ABDOMINAL WALL SURGERY N/A 8/1/2020    Procedure: CLOSURE WOUND ABDOMINAL/TRUNK;  Surgeon: Rosanne Castillo DO;  [facility];  Service: General    ARTERIOGRAM N/A 7/31/2020    Procedure: ARTERIOGRAM Of SMA and placement of Papavarine onfusion arterial catheter;  Surgeon: Josey Charles MD;  [facility];  Service: Vascular    ARTERIOGRAM Left 8/1/2020    Procedure: ARTERIOGRAM; cath removal;  Surgeon: Josey Charles MD;  [facility];  Service: Vascular    CSF SHUNT      x3 Op Reports, Mayelin Kaye in [facility] chart viewer    FL LUMBAR PUNCTURE DIAGNOSTIC  8/11/2020    LAPAROTOMY N/A 7/31/2020    Procedure: LAPAROTOMY EXPLORATORY: Externalizes  shunt Jhon Nicholas application;  Surgeon: Juan Mcguire MD;  [facility];  Service: General    LAPAROTOMY N/A 8/1/2020    Procedure: LAPAROTOMY EXPLORATORY,;  Surgeon: Rosanne Castillo DO;  [facility];  Service: General    FL AMPUTATION FOOT,TRANSMETATARSAL Left 5/30/2020    Procedure: excision 5th metatarsal head   excision 5th metatarsal ulcer ;  Surgeon: Yarely Aldana DPM;  [facility];  Service: Podiatry    MD ARTHDSIS POST/POSTEROLATRL/POSTINTERBODY LUMBAR N/A 7/6/2020    Procedure: MINIMALLY INVASIVE TRANSVERSE LUMBAR INTERBODY FUSION L5-S1 FROM LEFT-SIDED APPROACH;  Surgeon: Mortimer Rush, MD;  [facility];  Service: Neurosurgery    MD CREATE SHUNT:VENTRIC-ATRIAL Left 8/24/2020    Procedure: Left ventriculoatrial shunt;  Surgeon: Mortimer Rush, MD;  [facility];  Service: Neurosurgery    MD [facility] CSF SHUNT,W/O REPLACE Right 8/7/2020    Procedure: REMOVAL SHUNT VENTRICULAR PERITONEAL;  Surgeon: Mortimer Rush, MD;  [facility];  Service: Neurosurgery    MD REPLACEMENT/REVISION,CSF SHUNT Right 7/6/2020    Procedure: REVISION OF SCALP OVER VENTRICULAR CATHETER IN THE RIGHT [address] [place] [address];  Surgeon: Mortimer Rush, MD;  [facility];  Service: Neurosurgery    WOUND DEBRIDEMENT N/A 8/1/2020    Procedure: abd washout;  Surgeon: Cyn Light DO;  [facility];  Service: General       Social History     Social History     Tobacco Use   Smoking Status Never Smoker   Smokeless Tobacco Never Used       Social History     Substance and Sexual Activity   Alcohol Use Not Currently    Frequency: Monthly or less    Drinks per session: 1 or 2       Family History: non-contributory    Allergies   Allergen Reactions    Pollen Extract Allergic Rhinitis       Medications       Current Outpatient Medications:     acetaminophen (TYLENOL) 325 mg tablet, Take 2 tablets (650 mg total) by mouth every 6 (six) hours as needed for mild pain, Disp: 30 tablet, Rfl: 0    allopurinol (ZYLOPRIM) 300 mg tablet, 300 mg daily, Disp: , Rfl:     docusate sodium (COLACE) 100 mg capsule, Take 1 capsule (100 mg total) by mouth 2 (two) times a day, Disp: 10 capsule, Rfl: 0    enalapril (VASOTEC) 5 mg tablet, Take 1 tablet (5 mg total) by mouth daily, Disp: 30 tablet, Rfl: 0    escitalopram (LEXAPRO) 10 mg tablet, Take 1 tablet (10 mg total) by mouth daily, Disp:  , Rfl: 0    insulin lispro (HumaLOG) 100 units/mL injection, Inject 1-6 Units under the skin 3 (three) times a day before meals, Disp:  , Rfl: 0    magnesium hydroxide (MILK OF MAGNESIA) 400 mg/5 mL oral suspension, Take 30 mL by mouth daily as needed for constipation, Disp: , Rfl:     melatonin 3 mg, Take 1 tablet (3 mg total) by mouth daily at bedtime, Disp: , Rfl: 0    meropenem 2,000 mg in sodium chloride 0 9 % 100 mL IVPB, Infuse 2,000 mg into a venous catheter every 8 (eight) hours for 11 days, Disp: , Rfl: 0    multivitamin (THERAGRAN) TABS, Take 1 tablet by mouth daily, Disp: , Rfl:     oxyCODONE (ROXICODONE) 5 mg immediate release tablet, Take 1 tablet (5 mg total) by mouth every 4 (four) hours as needed for moderate pain or severe painMax Daily Amount: 30 mg, Disp: 10 tablet, Rfl: 0    pantoprazole (PROTONIX) 40 mg tablet, Take 40 mg by mouth daily, Disp: , Rfl:     travoprost (Travatan Z) 0 004 % ophthalmic solution, Administer 1 drop to the right eye daily at bedtime , Disp: , Rfl:     Bisacodyl (DULCOLAX RE), Insert into the rectum as needed, Disp: , Rfl:     Lancets (ONETOUCH DELICA PLUS [id number]) MISC, 2 (two) times a day, Disp: , Rfl:     senna (SENOKOT) 8 6 mg, Take 1 tablet (8 6 mg total) by mouth daily, Disp: 120 each, Rfl: 0    Updated list was reviewed in [facility] of facility  Vitals:    09/08/20 1311   BP: 130/78   Pulse: 78   Resp: 18   Temp: 97 9 °F (36 6 °C)   SpO2: 98%       Physical Exam  Constitutional:       General: He is not in acute distress  Appearance: He is well-developed  He is not diaphoretic  HENT:      Head: Normocephalic  Mouth/Throat:      Mouth: Mucous membranes are moist    Eyes:      General: No scleral icterus  Right eye: No discharge           Left eye: No discharge  Cardiovascular:      Rate and Rhythm: Normal rate  Heart sounds: No murmur  Pulmonary:      Effort: Pulmonary effort is normal  No respiratory distress  Breath sounds: Normal breath sounds  No wheezing  Abdominal:      General: Bowel sounds are normal  There is no distension  Tenderness: There is no abdominal tenderness  There is no guarding  Musculoskeletal:         General: Swelling present  No deformity  Comments: Bilateral lower extremities with heel protectors  Skin:     General: Skin is warm and dry  Neurological:      Mental Status: He is alert  He is disoriented  Diagnostic Data     Recent labs were reviewed  o 9/8/20   Na 140, potassium 4 9, BUN 15, Cr 0 7  · 09/04/2020  · WBC 9, Hgb 13 9, Hct 46    Additional Notes     Patients care was coordinated with nursing facility staff  Recent vitals, labs and updated medications were reviewed on [employer] system of facility  Past Medical, surgical, social, medication and allergy history and patients previous records reviewed  Spoke with brother last week       This was electronically signed by AR Jiang

## 2020-09-10 NOTE — ASSESSMENT & PLAN NOTE
Blood pressure today 130/78  Blood pressures reviewed and have been stable  Continue enalapril 5 mg daily

## 2020-09-10 NOTE — ASSESSMENT & PLAN NOTE
Lab Results   Component Value Date    HGBA1C 5 6 07/31/2020   Healing well per recent wound care notes

## 2020-09-10 NOTE — ASSESSMENT & PLAN NOTE
Shunt was initially placed on 2005 for hydrocephalus  Patient underwent externalization of shunt on 07/30/2020 due to laparotomy for bowel ischemia/peritonitis  We patient was subsequently replaced  CT head was repeated on 09/04 and showed stable findings    Patient completed IV antibiotic course for with patient infection

## 2020-09-10 NOTE — ASSESSMENT & PLAN NOTE
Status post removal of  shunt on 08/07/2020 followed by replacement  CT head on 09/04 was reported stable  Patient was recently evaluated by neurosurgical service  Surgical incision have healed well    Patient remains confused without any obvious focal changes

## 2020-09-10 NOTE — PROGRESS NOTES
Progress Note    Patient location 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[facility] )     No acute abdominal issues at this time  Follow-up with vascular and General surgery         Nonocclusive mesenteric ischemia ([facility] )     Status post laparotomy  Patient is being followed by speech therapy  Tolerating diet            Endocrine    Diabetic ulcer of left foot associated with type 2 diabetes mellitus ([email or web address]       Lab Results   Component Value Date    HGBA1C 5 6 07/31/2020   Healing well per recent wound care notes         Type 2 diabetes mellitus ([email or web address] )     Diet control  Recent hemoglobin A1c was well controlled  Lab Results   Component Value Date    HGBA1C 5 6 07/31/2020               Cardiovascular and Mediastinum    Essential hypertension     Blood pressure remains stable on enalapril 5 mg daily            Nervous and Auditory    Normal pressure hydrocephalus (HCC) - Primary     Status post removal of  shunt on 08/07/2020 followed by replacement  CT head on 09/04 was reported stable  Patient was recently evaluated by neurosurgical service  Surgical incision have healed well  Patient remains confused without any obvious focal changes         Hydrocephalus (HCC)    Infection of  (ventriculoperitoneal) shunt ([facility] )     Status post removal of  shunt on 8/7/10 due to infection followed by replacement  Patient completed IV meropenem course today    Will remove PICC line since patient has completed antibiotic treatment            Musculoskeletal and Integument    Unstageable pressure ulcer of sacral region [facility])     Healing well per recent wound care notes            Other    Status post ventriculoperitoneal shunt     Shunt was initially placed on 2005 for hydrocephalus  Patient underwent externalization of shunt on 07/30/2020 due to laparotomy for bowel ischemia/peritonitis  We patient was subsequently replaced  CT head was repeated on 09/04 and showed stable findings  Patient completed IV antibiotic course for with patient infection         Gout     No recent flare-up  Continue Allopurinol         Impaired functional mobility, balance, gait, and endurance     Continue PT OT         Scalp laceration     Healed         Depression     Continue citalopram               HPI:  Patient is being seen for a follow-up visit today  He is status post left ventricular arterial shunt placement for hydrocephalus   shunt was initially removed on 08/07/2020 due to peritonitis and concerns for shunt infection  CSF fluid culture had revealed Pseudomonas  Patient was started on IV meropenem therapy and completed antibiotic course today  Patient remains confused, has periods of  calling out for help at night  There have been no reports of any fever chills chest congestion dyspnea or wheezing  Patient has a sacral wound and wound on right ankle which is being followed by wound care team   Healing well per their notes  At the time of my evaluation patient appears comfortable  He is pleasantly confused  Denies any dyspnea or chest pain    Review of Systems   Constitutional: Positive for fatigue  Negative for chills and fever  HENT: Negative for nosebleeds and rhinorrhea  Eyes: Negative for discharge and redness  Respiratory: Negative for cough, shortness of breath, wheezing and stridor  Cardiovascular: Negative for chest pain and leg swelling     Gastrointestinal: Negative for abdominal distention, abdominal pain, diarrhea and vomiting  Genitourinary: Negative for hematuria  Musculoskeletal: Positive for gait problem  Negative for arthralgias and back pain  Skin: Negative for pallor  Neurological: Positive for weakness (Generalized)  Negative for tremors, seizures and syncope  Psychiatric/Behavioral: Positive for confusion  Negative for agitation and behavioral problems  Past Medical History:   Diagnosis Date    Cancer 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     Diabetes mellitus ([place] [address] )     DM2 (diabetes mellitus, type 2) ([place] [address] )     Gout     HTN (hypertension)     Hydrocephalus ([place] [address] )     Hypertension     Neuropathy     ZHOU on CPAP     Pressure injury of skin     TIA (transient ischemic attack)        Past Surgical History:   Procedure Laterality Date    ABDOMINAL WALL SURGERY N/A 8/1/2020    Procedure: CLOSURE WOUND ABDOMINAL/TRUNK;  Surgeon: Marty Ramirez DO;  [facility];  Service: General    ARTERIOGRAM N/A 7/31/2020    Procedure: ARTERIOGRAM Of SMA and placement of Papavarine onfusion arterial catheter;  Surgeon: Leeanne Escalona MD;  [facility];  Service: Vascular    ARTERIOGRAM Left 8/1/2020    Procedure: ARTERIOGRAM; cath removal;  Surgeon: Leeanne Escalona MD;  [facility];  Service: Vascular    CSF SHUNT      x3 Op Reports, Dr Sunny Duran, Ana Pacheco in [facility] chart viewer    FL LUMBAR PUNCTURE DIAGNOSTIC  8/11/2020    LAPAROTOMY N/A 7/31/2020    Procedure: LAPAROTOMY EXPLORATORY: Externalizes  shunt [place] application;  Surgeon: Penny Delgado MD;  [facility];  Service: General    LAPAROTOMY N/A 8/1/2020    Procedure: LAPAROTOMY EXPLORATORY,;  Surgeon: Marty Ramirez DO;  [facility];  Service: General    FL AMPUTATION FOOT,TRANSMETATARSAL Left 5/30/2020    Procedure: excision 5th metatarsal head   excision 5th metatarsal ulcer ;  Surgeon: Jennifer Childers DPM;  [facility];  Service: Julian [place] SD ARTHDSIS POST/POSTEROLATRL/POSTINTERBODY LUMBAR N/A 7/6/2020    Procedure: MINIMALLY INVASIVE TRANSVERSE LUMBAR INTERBODY FUSION L5-S1 FROM LEFT-SIDED APPROACH;  Surgeon: Roda Eisenmenger, MD;  [facility];  Service: Neurosurgery    SD CREATE SHUNT:VENTRIC-ATRIAL Left 8/24/2020    Procedure: Left ventriculoatrial shunt;  Surgeon: Roda Eisenmenger, MD;  [facility];  Service: Neurosurgery    SD [address] CSF SHUNT,W/O REPLACE Right 8/7/2020    Procedure: REMOVAL SHUNT VENTRICULAR PERITONEAL;  Surgeon: Roda Eisenmenger, MD;  [facility];  Service: Neurosurgery    SD REPLACEMENT/REVISION,CSF SHUNT Right 7/6/2020    Procedure: REVISION OF SCALP OVER VENTRICULAR CATHETER IN THE RIGHT [address] [place] [address];  Surgeon: Roda Eisenmenger, MD;  [facility];  Service: Neurosurgery    WOUND DEBRIDEMENT N/A 8/1/2020    Procedure: abd washout;  Surgeon: Barbara Santana DO;  [facility];  Service: General       Social History     Tobacco Use   Smoking Status Never Smoker   Smokeless Tobacco Never Used       Social History     Substance and Sexual Activity   Alcohol Use Not Currently    Frequency: Monthly or less    Drinks per session: 1 or 2       Family History   Problem Relation Age of Onset    Polymyositis Mother     Heart failure Father         Allergies   Allergen Reactions    Pollen Extract Allergic Rhinitis         Current Outpatient Medications:     acetaminophen (TYLENOL) 325 mg tablet, Take 2 tablets (650 mg total) by mouth every 6 (six) hours as needed for mild pain, Disp: 30 tablet, Rfl: 0    allopurinol (ZYLOPRIM) 300 mg tablet, 300 mg daily, Disp: , Rfl:     Bisacodyl (DULCOLAX RE), Insert into the rectum as needed, Disp: , Rfl:     docusate sodium (COLACE) 100 mg capsule, Take 1 capsule (100 mg total) by mouth 2 (two) times a day, Disp: 10 capsule, Rfl: 0    enalapril (VASOTEC) 5 mg tablet, Take 1 tablet (5 mg total) by mouth daily, Disp: 30 tablet, Rfl: 0   escitalopram (LEXAPRO) 10 mg tablet, Take 1 tablet (10 mg total) by mouth daily, Disp:  , Rfl: 0    insulin lispro (HumaLOG) 100 units/mL injection, Inject 1-6 Units under the skin 3 (three) times a day before meals, Disp:  , Rfl: 0    Lancets (ONETOUCH DELICA PLUS [id number]) MISC, 2 (two) times a day, Disp: , Rfl:     magnesium hydroxide (MILK OF MAGNESIA) 400 mg/5 mL oral suspension, Take 30 mL by mouth daily as needed for constipation, Disp: , Rfl:     melatonin 3 mg, Take 1 tablet (3 mg total) by mouth daily at bedtime, Disp: , Rfl: 0    meropenem 2,000 mg in sodium chloride 0 9 % 100 mL IVPB, Infuse 2,000 mg into a venous catheter every 8 (eight) hours for 11 days, Disp: , Rfl: 0    multivitamin (THERAGRAN) TABS, Take 1 tablet by mouth daily, Disp: , Rfl:     oxyCODONE (ROXICODONE) 5 mg immediate release tablet, Take 1 tablet (5 mg total) by mouth every 4 (four) hours as needed for moderate pain or severe painMax Daily Amount: 30 mg, Disp: 10 tablet, Rfl: 0    pantoprazole (PROTONIX) 40 mg tablet, Take 40 mg by mouth daily, Disp: , Rfl:     senna (SENOKOT) 8 6 mg, Take 1 tablet (8 6 mg total) by mouth daily, Disp: 120 each, Rfl: 0    travoprost (Travatan Z) 0 004 % ophthalmic solution, Administer 1 drop to the right eye daily at bedtime , Disp: , Rfl:     Updated list was reviewed in [facility] system of facility  Vitals:    09/10/20 1909   BP: 135/86   Pulse: 76   Resp: 18   Temp: 97 5 °F (36 4 °C)   SpO2: 95%       Physical Exam  Constitutional:       General: He is not in acute distress  Appearance: He is well-developed  He is not diaphoretic  HENT:      Nose: No rhinorrhea  Eyes:      General: No scleral icterus  Right eye: No discharge  Left eye: No discharge  Neck:      Musculoskeletal: Neck supple  Cardiovascular:      Rate and Rhythm: Normal rate and regular rhythm  Pulmonary:      Breath sounds: No stridor  No wheezing or rhonchi     Musculoskeletal: Right lower leg: No edema  Left lower leg: No edema  Comments: Bilateral feet are in heel protectors   Skin:     Coloration: Skin is not pale  Comments: Ankle and sacral ulcer not examined at this time  Being followed by wound care team  Scalp and neck incision have healed well   Neurological:      Mental Status: He is alert  Cranial Nerves: No cranial nerve deficit  Motor: Weakness (Patient appears to have generalized weakness more pronounced in bilateral lower extremities) present  Comments: Confused, not oriented in time  Increased rigidity or bilateral upper extremities noted   Psychiatric:         Behavior: Behavior normal          Diagnostic Data:    Recent labs were reviewed  On 09/10/2020 WBC count was 9, hemoglobin 13 9, platelet count 087  Sodium 140, potassium 4 9, BUN 15, creatinine 0 7    Additional Notes:   Continue PT OT  Removed PICC line since antibiotic course has been completed  Patient's condition was discussed at length with brother over the phone  Discussed with Dr Rudy Allen, will evaluate patient soon for competency evaluation    Total time spent on this patient's encounter today was in excess of 25 minutes

## 2020-09-10 NOTE — ASSESSMENT & PLAN NOTE
Status post removal of  shunt on 8/7/10 due to infection followed by replacement  Patient completed IV meropenem course today    Will remove PICC line since patient has completed antibiotic treatment

## 2020-09-11 NOTE — ASSESSMENT & PLAN NOTE
Patient's cognitive status appears to have significantly declined since recent hospitalizations, infection and surgical interventions  At the time of my evaluation patient remains confused  Dr Donte Collins has been consulted for official competency evaluation

## 2020-09-15 ENCOUNTER — NURSING HOME VISIT (OUTPATIENT)
Dept: FAMILY MEDICINE CLINIC | Facility: CLINIC | Age: 70
End: 2020-09-15
Payer: COMMERCIAL

## 2020-09-15 ENCOUNTER — HOSPITAL ENCOUNTER (INPATIENT)
Facility: HOSPITAL | Age: 70
LOS: 6 days | Discharge: NURSING, REHAB OR LONG-TERM CARE | DRG: 884 | End: 2020-09-22
Attending: EMERGENCY MEDICINE | Admitting: HOSPITALIST
Payer: COMMERCIAL

## 2020-09-15 ENCOUNTER — APPOINTMENT (EMERGENCY)
Dept: RADIOLOGY | Facility: HOSPITAL | Age: 70
DRG: 884 | End: 2020-09-15
Payer: COMMERCIAL

## 2020-09-15 VITALS
BODY MASS INDEX: 29.05 KG/M2 | DIASTOLIC BLOOD PRESSURE: 85 MMHG | TEMPERATURE: 96.1 F | RESPIRATION RATE: 18 BRPM | OXYGEN SATURATION: 92 % | SYSTOLIC BLOOD PRESSURE: 146 MMHG | WEIGHT: 232.4 LBS | HEART RATE: 55 BPM

## 2020-09-15 DIAGNOSIS — G91.2 NORMAL PRESSURE HYDROCEPHALUS (HCC): ICD-10-CM

## 2020-09-15 DIAGNOSIS — R41.0 CONFUSION: ICD-10-CM

## 2020-09-15 DIAGNOSIS — M79.605 ACUTE PAIN OF LEFT LOWER EXTREMITY: ICD-10-CM

## 2020-09-15 DIAGNOSIS — T85.730A: Primary | ICD-10-CM

## 2020-09-15 DIAGNOSIS — Z98.2 STATUS POST VENTRICULOPERITONEAL SHUNT: ICD-10-CM

## 2020-09-15 DIAGNOSIS — I10 ESSENTIAL HYPERTENSION: ICD-10-CM

## 2020-09-15 DIAGNOSIS — G93.40 ENCEPHALOPATHY: ICD-10-CM

## 2020-09-15 DIAGNOSIS — R41.89 COGNITIVE IMPAIRMENT: ICD-10-CM

## 2020-09-15 DIAGNOSIS — Z79.4 TYPE 2 DIABETES MELLITUS WITH HYPERGLYCEMIA, WITH LONG-TERM CURRENT USE OF INSULIN (HCC): ICD-10-CM

## 2020-09-15 DIAGNOSIS — Z74.09 IMPAIRED FUNCTIONAL MOBILITY, BALANCE, GAIT, AND ENDURANCE: ICD-10-CM

## 2020-09-15 DIAGNOSIS — K55.059 NONOCCLUSIVE MESENTERIC ISCHEMIA (HCC): ICD-10-CM
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
## 2020-09-15 DIAGNOSIS — N13.9 OBSTRUCTIVE UROPATHY: ICD-10-CM

## 2020-09-15 DIAGNOSIS — R33.9 URINARY RETENTION: ICD-10-CM

## 2020-09-15 DIAGNOSIS — E11.65 TYPE 2 DIABETES MELLITUS WITH HYPERGLYCEMIA, WITH LONG-TERM CURRENT USE OF INSULIN (HCC): ICD-10-CM

## 2020-09-15 LAB
ALBUMIN SERPL BCP-MCNC: 3.1 G/DL (ref 3.5–5)
ALP SERPL-CCNC: 116 U/L (ref 46–116)
ALT SERPL W P-5'-P-CCNC: 19 U/L (ref 12–78)
ANION GAP SERPL CALCULATED.3IONS-SCNC: 9 MMOL/L (ref 4–13)
AST SERPL W P-5'-P-CCNC: 15 U/L (ref 5–45)
BACTERIA UR QL AUTO: NORMAL /HPF
BASOPHILS # BLD AUTO: 0.08 THOUSANDS/ΜL (ref 0–0.1)
BASOPHILS NFR BLD AUTO: 1 % (ref 0–1)
BILIRUB SERPL-MCNC: 0.93 MG/DL (ref 0.2–1)
BILIRUB UR QL STRIP: NEGATIVE
BUN SERPL-MCNC: 12 MG/DL (ref 5–25)
CALCIUM SERPL-MCNC: 9.7 MG/DL (ref 8.3–10.1)
CHLORIDE SERPL-SCNC: 103 MMOL/L (ref 100–108)
CLARITY UR: CLEAR
CO2 SERPL-SCNC: 25 MMOL/L (ref 21–32)
COLOR UR: ABNORMAL
COLOR, POC: NORMAL
CREAT SERPL-MCNC: 0.63 MG/DL (ref 0.6–1.3)
EOSINOPHIL # BLD AUTO: 0.18 THOUSAND/ΜL (ref 0–0.61)
EOSINOPHIL NFR BLD AUTO: 1 % (ref 0–6)
ERYTHROCYTE [DISTWIDTH] IN BLOOD BY AUTOMATED COUNT: 15.6 % (ref 11.6–15.1)
GFR SERPL CREATININE-BSD FRML MDRD: 99 ML/MIN/1.73SQ M
GLUCOSE SERPL-MCNC: 125 MG/DL (ref 65–140)
GLUCOSE UR STRIP-MCNC: NEGATIVE MG/DL
HCT VFR BLD AUTO: 47.4 % (ref 36.5–49.3)
HGB BLD-MCNC: 15.6 G/DL (ref 12–17)
HGB UR QL STRIP.AUTO: NEGATIVE
HYALINE CASTS #/AREA URNS LPF: NORMAL /LPF
IMM GRANULOCYTES # BLD AUTO: 0.14 THOUSAND/UL (ref 0–0.2)
IMM GRANULOCYTES NFR BLD AUTO: 1 % (ref 0–2)
KETONES UR STRIP-MCNC: NEGATIVE MG/DL
LEUKOCYTE ESTERASE UR QL STRIP: NEGATIVE
LYMPHOCYTES # BLD AUTO: 1.28 THOUSANDS/ΜL (ref 0.6–4.47)
LYMPHOCYTES NFR BLD AUTO: 10 % (ref 14–44)
MCH RBC QN AUTO: 26.9 PG (ref 26.8–34.3)
MCHC RBC AUTO-ENTMCNC: 32.9 G/DL (ref 31.4–37.4)
MCV RBC AUTO: 82 FL (ref 82–98)
MONOCYTES # BLD AUTO: 1.12 THOUSAND/ΜL (ref 0.17–1.22)
MONOCYTES NFR BLD AUTO: 8 % (ref 4–12)
NEUTROPHILS # BLD AUTO: 10.63 THOUSANDS/ΜL (ref 1.85–7.62)
NEUTS SEG NFR BLD AUTO: 79 % (ref 43–75)
NITRITE UR QL STRIP: NEGATIVE
NON-SQ EPI CELLS URNS QL MICRO: NORMAL /HPF
NRBC BLD AUTO-RTO: 0 /100 WBCS
PH UR STRIP.AUTO: 6.5 [PH] (ref 4.5–8)
PLATELET # BLD AUTO: 219 THOUSANDS/UL (ref 149–390)
PMV BLD AUTO: 9.9 FL (ref 8.9–12.7)
POTASSIUM SERPL-SCNC: 4 MMOL/L (ref 3.5–5.3)
PROT SERPL-MCNC: 7.2 G/DL (ref 6.4–8.2)
PROT UR STRIP-MCNC: ABNORMAL MG/DL
RBC # BLD AUTO: 5.8 MILLION/UL (ref 3.88–5.62)
RBC #/AREA URNS AUTO: NORMAL /HPF
SODIUM SERPL-SCNC: 137 MMOL/L (ref 136–145)
SP GR UR STRIP.AUTO: 1.02 (ref 1–1.03)
UROBILINOGEN UR QL STRIP.AUTO: 1 E.U./DL
WBC # BLD AUTO: 13.43 THOUSAND/UL (ref 4.31–10.16)
WBC #/AREA URNS AUTO: NORMAL /HPF

## 2020-09-15 PROCEDURE — 93005 ELECTROCARDIOGRAM TRACING: CPT

## 2020-09-15 PROCEDURE — 81001 URINALYSIS AUTO W/SCOPE: CPT

## 2020-09-15 PROCEDURE — 99285 EMERGENCY DEPT VISIT HI MDM: CPT

## 2020-09-15 PROCEDURE — 73590 X-RAY EXAM OF LOWER LEG: CPT

## 2020-09-15 PROCEDURE — 99309 SBSQ NF CARE MODERATE MDM 30: CPT | Performed by: INTERNAL MEDICINE

## 2020-09-15 PROCEDURE — 80053 COMPREHEN METABOLIC PANEL: CPT | Performed by: EMERGENCY MEDICINE

## 2020-09-15 PROCEDURE — 85025 COMPLETE CBC W/AUTO DIFF WBC: CPT | Performed by: EMERGENCY MEDICINE

## 2020-09-15 PROCEDURE — 36415 COLL VENOUS BLD VENIPUNCTURE: CPT | Performed by: EMERGENCY MEDICINE

## 2020-09-15 PROCEDURE — G1004 CDSM NDSC: HCPCS

## 2020-09-15 PROCEDURE — 70450 CT HEAD/BRAIN W/O DYE: CPT

## 2020-09-15 PROCEDURE — 99285 EMERGENCY DEPT VISIT HI MDM: CPT | Performed by: EMERGENCY MEDICINE

## 2020-09-15 RX ORDER — ESCITALOPRAM OXALATE 20 MG/1
20 TABLET ORAL DAILY
COMMUNITY
End: 2020-09-22 | Stop reason: HOSPADM

## 2020-09-15 RX ORDER — QUETIAPINE FUMARATE 25 MG/1
25 TABLET, FILM COATED ORAL
COMMUNITY
End: 2020-09-22 | Stop reason: HOSPADM

## 2020-09-15 NOTE — ASSESSMENT & PLAN NOTE
Status post removal of  shunt on 08/07/2020 due to  infection followed by replacement  Patient recently completed IV meropenem therapy    PICC line was subsequently removed

## 2020-09-15 NOTE — ED ATTENDING ATTESTATION
9/15/2020  Rufus Sandhoff Nappe, DO, saw and evaluated the patient  I have discussed the patient with the resident/non-physician practitioner and agree with the resident's/non-physician practitioner's findings, Plan of Care, and MDM as documented in the resident's/non-physician practitioner's note, except where noted  All available labs and Radiology studies were reviewed  I was present for key portions of any procedure(s) performed by the resident/non-physician practitioner and I was immediately available to provide assistance  At this point I agree with the current assessment done in the Emergency Department  I have conducted an independent evaluation of this patient a history and physical is as follows:    69 yom with fall, struck left head and left leg  Lizeth Tolstoy while being transferred to bed by caregiver  No LOC  No HA  +recent shunt placement  +RLE pain    Past Medical History:   Diagnosis Date    Cancer [facility]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     [facility]     DM2 [facility]diabetes [facility], type 2) [facility]     Gout     HTN (hypertension)     Hydrocephalus (HCC)     Hypertension     Neuropathy     ZHOU on CPAP     Pressure injury of skin     TIA (transient ischemic attack)      /88   Pulse 76   Temp 97 6 °F (36 4 °C) (Oral)   Resp 16   Wt 47 6 kg (105 lb)   SpO2 94%   BMI 13 12 kg/m²   A&Ox3, no resp distress, RRR, head, cspine, chest/abd/pelvis NT, LLE with abrasion to proximal tibia abrasion and ecchymosis to lower tibia  CT head, XR LLE  While here, patient developed urinary retention and was given a urinary catheter  Urine did not show any sign of infection but showed signs of protein  Discussion with family related concern for recent worsening mental status and desire for further evaluation  Values was broadened and EKG showed mild ST depressions  Labs added including CBC, CMP and troponin  Patient be admitted for EKG changes and worsening mental status  [facility] at 2:19 a m  With labs pending      ED Course         Critical Care Time  Procedures

## 2020-09-15 NOTE — ASSESSMENT & PLAN NOTE
Patient's cognitive status has significantly declined since recent hospitalization  Suspect cognitive impairment progressing into dementia  Patient has had increased behavioral issues with agitation screaming and calling out"help me" especially during the night  Screaming during the night is keeping other residents awake  Patient was recently started on Seroquel 25 mg q h s  The dose of Lexapro was increased to 20 mg  Patient continues with increased yelling episodes  Will consider increasing Seroquel to 50 mg   Attempted to call brother regarding meds adjustment, left a message

## 2020-09-15 NOTE — PROGRESS NOTES
Progress Note    Patient location:  [facility] rehab    Reason for visit:  History of hydrocephalus status post  shunt infection, hypertension, diabetes mellitus type 2, dementia with behavioral disturbances, Depression  Patients care was coordinated with nursing facility staff  Recent vitals, labs and updated medications were reviewed on "[employer] [place]  Past Medical, surgical, social, medication and allergy history and patients previous records reviewed  Problem List Items Addressed This Visit        Digestive    Nonocclusive mesenteric ischemia ([facility] )     History of laparotomy in July 20  No acute abdominal/GI issues at this time            Endocrine    Type 2 diabetes mellitus ([facility] )       Lab Results   Component Value Date    HGBA1C 5 6 07/31/2020   Recent hemoglobin A1c was well controlled at 5 6  Patient is currently not on any therapy for diabetes            Cardiovascular and Mediastinum    Essential hypertension     Blood pressure remains stable on enalapril 5 mg daily            Nervous and Auditory    Normal pressure hydrocephalus (HCC)     History of  shunt placement subsequent removal on 08/07/2020 followed by replacement  Patient was recently evaluated by neurosurgical service  Surgical incision has healed well  Patient remains confused without any obvious focal changes         Infection of  (ventriculoperitoneal) shunt (HCC) - Primary     Status post removal of  shunt on 08/07/2020 due to  infection followed by replacement  Patient recently completed IV meropenem therapy  PICC line was subsequently removed         Confusion     Patient's cognitive status has significantly declined since recent hospitalization  Suspect cognitive impairment progressing into dementia  Patient has had increased behavioral issues with agitation screaming and calling out"help me" especially during the night  Screaming during the night is keeping other residents awake    Patient was recently started on Seroquel 25 mg q h s  The dose of Lexapro was increased to 20 mg  Patient continues with increased yelling episodes  Will consider increasing Seroquel to 50 mg  Attempted to call brother regarding meds adjustment, left a message          Relevant Medications    escitalopram (LEXAPRO) 20 mg tablet    QUEtiapine (SEROquel) 25 mg tablet       Other    Impaired functional mobility, balance, gait, and endurance     Continue PT OT               HPI:  Patient is being seen for a follow-up visit today  Care coordinated with nursing staff  Patient remains confused, unable to provide any meaningful history  Does not appear to be in any acute distress  There have been no reports of any fever chills chest congestion vomiting or diarrhea  Patient has had increased behavioral issues with episodes of calling out yelling and screaming esspecially during the night  Patient is keeping other residents awake  He was recently started on Seroquel 25 mg q h s , the dose of Lexapro was also increased to 20 mg daily  Patient continues to have increased episodes of agitation calling out 'help me" esspecially at night      Review of Systems   Constitutional: Positive for fatigue  Negative for chills and fever  HENT: Negative for nosebleeds and rhinorrhea  Eyes: Negative for discharge and redness  Respiratory: Negative for cough, shortness of breath, wheezing and stridor  Cardiovascular: Negative for chest pain and leg swelling  Gastrointestinal: Negative for abdominal distention, abdominal pain, diarrhea and vomiting  Genitourinary: Negative for hematuria  Musculoskeletal: Positive for gait problem  Negative for arthralgias and back pain  Skin: Negative for pallor  Neurological: Positive for weakness (Generalized)  Negative for tremors, seizures and syncope  Psychiatric/Behavioral: Positive for agitation (Periods of calling out"help me"), behavioral problems and confusion         Past Medical History:   Diagnosis Date    Cancer 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     Diabetes mellitus ([facility] )     DM2 (diabetes mellitus, type 2) ([place] [address] )     Gout     HTN (hypertension)     Hydrocephalus ([place] [address] )     Hypertension     Neuropathy     ZHOU on CPAP     Pressure injury of skin     TIA (transient ischemic attack)        Past Surgical History:   Procedure Laterality Date    ABDOMINAL WALL SURGERY N/A 8/1/2020    Procedure: CLOSURE WOUND ABDOMINAL/TRUNK;  Surgeon: Rasheed Varela DO;  [facility];  Service: General    ARTERIOGRAM N/A 7/31/2020    Procedure: ARTERIOGRAM Of SMA and placement of Papavarine onfusion arterial catheter;  Surgeon: Vanesa Powers MD;  [facility];  Service: Vascular    ARTERIOGRAM Left 8/1/2020    Procedure: ARTERIOGRAM; cath removal;  Surgeon: Vanesa Powers MD;  [facility];  Service: Vascular    CSF SHUNT      x3 Op Reports, Sailaja Perez in [facility] chart viewer    FL LUMBAR PUNCTURE DIAGNOSTIC  8/11/2020    LAPAROTOMY N/A 7/31/2020    Procedure: LAPAROTOMY EXPLORATORY: Externalizes  shunt Hurshel Liam application;  Surgeon: Jeannette Matias MD;  [facility];  Service: General    LAPAROTOMY N/A 8/1/2020    Procedure: LAPAROTOMY EXPLORATORY,;  Surgeon: Rasheed Varela DO;  [facility];  Service: General    WY AMPUTATION FOOT,TRANSMETATARSAL Left 5/30/2020    Procedure: excision 5th metatarsal head   excision 5th metatarsal ulcer ;  Surgeon: Alison Andrews DPM;  [facility];  Service: Podiatry    WY ARTHDSIS POST/POSTEROLATRL/POSTINTERBODY LUMBAR N/A 7/6/2020    Procedure: MINIMALLY INVASIVE TRANSVERSE LUMBAR INTERBODY FUSION L5-S1 FROM LEFT-SIDED APPROACH;  Surgeon: Chacho Connor MD;  [facility];  Service: Neurosurgery    WY CREATE SHUNT:VENTRIC-ATRIAL Left 8/24/2020    Procedure: Left ventriculoatrial shunt;  Surgeon: Chacho Connor MD;  [facility];  Service: Neurosurgery    SD REMOVAL,COMPLETE CSF SHUNT,W/O REPLACE Right 8/7/2020    Procedure: REMOVAL SHUNT VENTRICULAR PERITONEAL;  Surgeon: Ayaz Arceo MD;  [facility];  Service: Neurosurgery    SD REPLACEMENT/REVISION,CSF SHUNT Right 7/6/2020    Procedure: REVISION OF SCALP OVER VENTRICULAR CATHETER IN THE RIGHT CORONAL REGION;  Surgeon: Ayaz Arceo MD;  [facility];  Service: Neurosurgery    WOUND DEBRIDEMENT N/A 8/1/2020    Procedure: abd washout;  Surgeon: Eve Carias DO;  [facility];  Service: General       Social History     Tobacco Use   Smoking Status Never Smoker   Smokeless Tobacco Never Used       Social History     Substance and Sexual Activity   Alcohol Use Not Currently    Frequency: Monthly or less    Drinks per session: 1 or 2       Family History   Problem Relation Age of Onset    Polymyositis Mother     Heart failure Father         Allergies   Allergen Reactions    Pollen Extract Allergic Rhinitis         Current Outpatient Medications:     escitalopram (LEXAPRO) 20 mg tablet, Take 20 mg by mouth daily, Disp: , Rfl:     QUEtiapine (SEROquel) 25 mg tablet, Take 25 mg by mouth daily at bedtime, Disp: , Rfl:     acetaminophen (TYLENOL) 325 mg tablet, Take 2 tablets (650 mg total) by mouth every 6 (six) hours as needed for mild pain, Disp: 30 tablet, Rfl: 0    allopurinol (ZYLOPRIM) 300 mg tablet, 300 mg daily, Disp: , Rfl:     Bisacodyl (DULCOLAX RE), Insert into the rectum as needed, Disp: , Rfl:     docusate sodium (COLACE) 100 mg capsule, Take 1 capsule (100 mg total) by mouth 2 (two) times a day, Disp: 10 capsule, Rfl: 0    enalapril (VASOTEC) 5 mg tablet, Take 1 tablet (5 mg total) by mouth daily, Disp: 30 tablet, Rfl: 0    insulin lispro (HumaLOG) 100 units/mL injection, Inject 1-6 Units under the skin 3 (three) times a day before meals, Disp:  , Rfl: 0    Lancets (ONETOUCH DELICA PLUS [id number]) MISC, 2 (two) times a day, Disp: , Rfl:    magnesium hydroxide (MILK OF MAGNESIA) 400 mg/5 mL oral suspension, Take 30 mL by mouth daily as needed for constipation, Disp: , Rfl:     melatonin 3 mg, Take 1 tablet (3 mg total) by mouth daily at bedtime, Disp: , Rfl: 0    multivitamin (THERAGRAN) TABS, Take 1 tablet by mouth daily, Disp: , Rfl:     oxyCODONE (ROXICODONE) 5 mg immediate release tablet, Take 1 tablet (5 mg total) by mouth every 4 (four) hours as needed for moderate pain or severe painMax Daily Amount: 30 mg, Disp: 10 tablet, Rfl: 0    pantoprazole (PROTONIX) 40 mg tablet, Take 40 mg by mouth daily, Disp: , Rfl:     senna (SENOKOT) 8 6 mg, Take 1 tablet (8 6 mg total) by mouth daily, Disp: 120 each, Rfl: 0    travoprost (Travatan Z) 0 004 % ophthalmic solution, Administer 1 drop to the right eye daily at bedtime , Disp: , Rfl:     Updated list was reviewed in [facility] of facility  Vitals:    09/15/20 1555   BP: 146/85   Pulse: 55   Resp: 18   Temp: (!) 96 1 °F (35 6 °C)   SpO2: 92%       Physical Exam  Constitutional:       General: He is not in acute distress  Appearance: He is well-developed  He is not diaphoretic  Eyes:      General: No scleral icterus  Right eye: No discharge  Left eye: No discharge  Pulmonary:      Breath sounds: No stridor  No wheezing  Skin:     Coloration: Skin is not pale  Neurological:      General: No focal deficit present  Mental Status: He is alert  Cranial Nerves: No cranial nerve deficit  Comments: Patient remains confused, not oriented in time  Noted to have increased rigidity involving bilateral upper extremities   Psychiatric:         Mood and Affect: Mood normal          Behavior: Behavior normal          Diagnostic Data:    Recent labs were reviewed  On 09/10/2020 WBC count was 9, hemoglobin 13 9 platelet count 708, sodium 140, potassium 4 9, creatinine 0 7    Additional Notes: Attempted to calls brother  Left a message    Will consider increasing Seroquel at night due to episodes of increased agitation with brother  Awaiting callback from the family to discuss med adjustment    Care coordinated with the nursing staff    This note was electronically signed by Dr Noam Barrera

## 2020-09-15 NOTE — ASSESSMENT & PLAN NOTE
Lab Results   Component Value Date    HGBA1C 5 6 07/31/2020   Recent hemoglobin A1c was well controlled at 5 6    Patient is currently not on any therapy for diabetes

## 2020-09-15 NOTE — ASSESSMENT & PLAN NOTE
History of  shunt placement subsequent removal on 08/07/2020 followed by replacement  Patient was recently evaluated by neurosurgical service  Surgical incision has healed well    Patient remains confused without any obvious focal changes

## 2020-09-15 NOTE — ASSESSMENT & PLAN NOTE
shunt was initially placed  in 2005 for hydrocephalus  Patient underwent externalization of shunt on 07/30/2020 due to suspected infection in the setting of bowel ischemia/peritonitis   shunt was subsequently replaced  CT head 09/4 showed stable findings    Patient recently completed antibiotic course

## 2020-09-16 ENCOUNTER — APPOINTMENT (INPATIENT)
Dept: RADIOLOGY | Facility: HOSPITAL | Age: 70
DRG: 884 | End: 2020-09-16
Payer: COMMERCIAL

## 2020-09-16 ENCOUNTER — APPOINTMENT (OUTPATIENT)
Dept: RADIOLOGY | Facility: HOSPITAL | Age: 70
DRG: 884 | End: 2020-09-16
Payer: COMMERCIAL
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
## 2020-09-16 PROBLEM — R33.9 URINARY RETENTION: Status: ACTIVE | Noted: 2020-09-16

## 2020-09-16 PROBLEM — R94.31 ABNORMAL EKG: Status: ACTIVE | Noted: 2020-09-16

## 2020-09-16 PROBLEM — D72.829 LEUKOCYTOSIS: Status: ACTIVE | Noted: 2020-09-16

## 2020-09-16 LAB
ANION GAP SERPL CALCULATED.3IONS-SCNC: 7 MMOL/L (ref 4–13)
ATRIAL RATE: 107 BPM
ATRIAL RATE: 69 BPM
BASE EX.OXY STD BLDV CALC-SCNC: 81.2 % (ref 60–80)
BASE EXCESS BLDV CALC-SCNC: 0.6 MMOL/L
BASOPHILS # BLD AUTO: 0.08 THOUSANDS/ΜL (ref 0–0.1)
BASOPHILS NFR BLD AUTO: 1 % (ref 0–1)
BUN SERPL-MCNC: 11 MG/DL (ref 5–25)
CALCIUM SERPL-MCNC: 9.7 MG/DL (ref 8.3–10.1)
CHLORIDE SERPL-SCNC: 104 MMOL/L (ref 100–108)
CO2 SERPL-SCNC: 27 MMOL/L (ref 21–32)
CREAT SERPL-MCNC: 0.71 MG/DL (ref 0.6–1.3)
EOSINOPHIL # BLD AUTO: 0.15 THOUSAND/ΜL (ref 0–0.61)
EOSINOPHIL NFR BLD AUTO: 1 % (ref 0–6)
ERYTHROCYTE [DISTWIDTH] IN BLOOD BY AUTOMATED COUNT: 15.9 % (ref 11.6–15.1)
FOLATE SERPL-MCNC: 4.2 NG/ML (ref 3.1–17.5)
GFR SERPL CREATININE-BSD FRML MDRD: 94 ML/MIN/1.73SQ M
GLUCOSE SERPL-MCNC: 100 MG/DL (ref 65–140)
GLUCOSE SERPL-MCNC: 115 MG/DL (ref 65–140)
GLUCOSE SERPL-MCNC: 117 MG/DL (ref 65–140)
GLUCOSE SERPL-MCNC: 127 MG/DL (ref 65–140)
GLUCOSE SERPL-MCNC: 127 MG/DL (ref 65–140)
HCO3 BLDV-SCNC: 23.8 MMOL/L (ref 24–30)
HCT VFR BLD AUTO: 47.8 % (ref 36.5–49.3)
HGB BLD-MCNC: 15.5 G/DL (ref 12–17)
IMM GRANULOCYTES # BLD AUTO: 0.11 THOUSAND/UL (ref 0–0.2)
IMM GRANULOCYTES NFR BLD AUTO: 1 % (ref 0–2)
LYMPHOCYTES # BLD AUTO: 0.91 THOUSANDS/ΜL (ref 0.6–4.47)
LYMPHOCYTES NFR BLD AUTO: 9 % (ref 14–44)
MCH RBC QN AUTO: 26.9 PG (ref 26.8–34.3)
MCHC RBC AUTO-ENTMCNC: 32.4 G/DL (ref 31.4–37.4)
MCV RBC AUTO: 83 FL (ref 82–98)
MONOCYTES # BLD AUTO: 0.88 THOUSAND/ΜL (ref 0.17–1.22)
MONOCYTES NFR BLD AUTO: 8 % (ref 4–12)
NEUTROPHILS # BLD AUTO: 8.32 THOUSANDS/ΜL (ref 1.85–7.62)
NEUTS SEG NFR BLD AUTO: 80 % (ref 43–75)
NRBC BLD AUTO-RTO: 0 /100 WBCS
O2 CT BLDV-SCNC: 17.6 ML/DL
P AXIS: 96 DEGREES
PCO2 BLDV: 34.3 MM HG (ref 42–50)
PH BLDV: 7.46 [PH] (ref 7.3–7.4)
PLATELET # BLD AUTO: 199 THOUSANDS/UL (ref 149–390)
PMV BLD AUTO: 10.2 FL (ref 8.9–12.7)
PO2 BLDV: 45.3 MM HG (ref 35–45)
POTASSIUM SERPL-SCNC: 3.6 MMOL/L (ref 3.5–5.3)
PR INTERVAL: 160 MS
PR INTERVAL: 172 MS
PROCALCITONIN SERPL-MCNC: <0.05 NG/ML
QRS AXIS: 46 DEGREES
QRS AXIS: 48 DEGREES
QRSD INTERVAL: 94 MS
QRSD INTERVAL: 96 MS
QT INTERVAL: 320 MS
QT INTERVAL: 372 MS
QTC INTERVAL: 398 MS
QTC INTERVAL: 427 MS
RBC # BLD AUTO: 5.76 MILLION/UL (ref 3.88–5.62)
SODIUM SERPL-SCNC: 138 MMOL/L (ref 136–145)
T WAVE AXIS: 157 DEGREES
T WAVE AXIS: 172 DEGREES
T3 SERPL-MCNC: 1.3 NG/ML (ref 0.6–1.8)
T4 FREE SERPL-MCNC: 1.45 NG/DL (ref 0.76–1.46)
T4 FREE SERPL-MCNC: 1.47 NG/DL (ref 0.76–1.46)
TROPONIN I SERPL-MCNC: <0.02 NG/ML
TROPONIN I SERPL-MCNC: <0.02 NG/ML
TSH SERPL DL<=0.05 MIU/L-ACNC: 0.09 UIU/ML (ref 0.36–3.74)
VENTRICULAR RATE: 107 BPM
VENTRICULAR RATE: 69 BPM
VIT B12 SERPL-MCNC: 476 PG/ML (ref 100–900)
WBC # BLD AUTO: 10.45 THOUSAND/UL (ref 4.31–10.16)

## 2020-09-16 PROCEDURE — 93010 ELECTROCARDIOGRAM REPORT: CPT | Performed by: INTERNAL MEDICINE

## 2020-09-16 PROCEDURE — 84439 ASSAY OF FREE THYROXINE: CPT | Performed by: INTERNAL MEDICINE

## 2020-09-16 PROCEDURE — 84443 ASSAY THYROID STIM HORMONE: CPT | Performed by: PHYSICIAN ASSISTANT

## 2020-09-16 PROCEDURE — 80048 BASIC METABOLIC PNL TOTAL CA: CPT | Performed by: PHYSICIAN ASSISTANT

## 2020-09-16 PROCEDURE — 71046 X-RAY EXAM CHEST 2 VIEWS: CPT

## 2020-09-16 PROCEDURE — 84484 ASSAY OF TROPONIN QUANT: CPT | Performed by: EMERGENCY MEDICINE

## 2020-09-16 PROCEDURE — 87040 BLOOD CULTURE FOR BACTERIA: CPT | Performed by: INTERNAL MEDICINE

## 2020-09-16 PROCEDURE — 84484 ASSAY OF TROPONIN QUANT: CPT | Performed by: PHYSICIAN ASSISTANT

## 2020-09-16 PROCEDURE — 99219 PR INITIAL OBSERVATION CARE/DAY 50 MINUTES: CPT | Performed by: INTERNAL MEDICINE

## 2020-09-16 PROCEDURE — 82948 REAGENT STRIP/BLOOD GLUCOSE: CPT

## 2020-09-16 PROCEDURE — 93005 ELECTROCARDIOGRAM TRACING: CPT

## 2020-09-16 PROCEDURE — 82746 ASSAY OF FOLIC ACID SERUM: CPT | Performed by: PHYSICIAN ASSISTANT

## 2020-09-16 PROCEDURE — 97163 PT EVAL HIGH COMPLEX 45 MIN: CPT

## 2020-09-16 PROCEDURE — 99223 1ST HOSP IP/OBS HIGH 75: CPT | Performed by: PSYCHIATRY & NEUROLOGY

## 2020-09-16 PROCEDURE — 85025 COMPLETE CBC W/AUTO DIFF WBC: CPT | Performed by: PHYSICIAN ASSISTANT

## 2020-09-16 PROCEDURE — 74018 RADEX ABDOMEN 1 VIEW: CPT

## 2020-09-16 PROCEDURE — 97167 OT EVAL HIGH COMPLEX 60 MIN: CPT

## 2020-09-16 PROCEDURE — 84439 ASSAY OF FREE THYROXINE: CPT | Performed by: PHYSICIAN ASSISTANT

## 2020-09-16 PROCEDURE — 36415 COLL VENOUS BLD VENIPUNCTURE: CPT | Performed by: EMERGENCY MEDICINE

## 2020-09-16 PROCEDURE — 84480 ASSAY TRIIODOTHYRONINE (T3): CPT | Performed by: INTERNAL MEDICINE

## 2020-09-16 PROCEDURE — 70250 X-RAY EXAM OF SKULL: CPT

## 2020-09-16 PROCEDURE — 84145 PROCALCITONIN (PCT): CPT | Performed by: PHYSICIAN ASSISTANT

## 2020-09-16 PROCEDURE — 82607 VITAMIN B-12: CPT | Performed by: PHYSICIAN ASSISTANT

## 2020-09-16 PROCEDURE — 82805 BLOOD GASES W/O2 SATURATION: CPT | Performed by: PHYSICIAN ASSISTANT

## 2020-09-16 PROCEDURE — 99222 1ST HOSP IP/OBS MODERATE 55: CPT | Performed by: PHYSICIAN ASSISTANT

## 2020-09-16 RX ORDER — BISACODYL 10 MG
10 SUPPOSITORY, RECTAL RECTAL DAILY PRN
Status: DISCONTINUED | OUTPATIENT
Start: 2020-09-16 | End: 2020-09-22 | Stop reason: HOSPADM

## 2020-09-16 RX ORDER — DOCUSATE SODIUM 100 MG/1
100 CAPSULE, LIQUID FILLED ORAL 2 TIMES DAILY
Status: DISCONTINUED | OUTPATIENT
Start: 2020-09-16 | End: 2020-09-22 | Stop reason: HOSPADM

## 2020-09-16 RX ORDER — SODIUM CHLORIDE 9 MG/ML
75 INJECTION, SOLUTION INTRAVENOUS CONTINUOUS
Status: DISPENSED | OUTPATIENT
Start: 2020-09-16 | End: 2020-09-16

## 2020-09-16 RX ORDER — SENNOSIDES 8.6 MG
1 TABLET ORAL DAILY
Status: DISCONTINUED | OUTPATIENT
Start: 2020-09-16 | End: 2020-09-22 | Stop reason: HOSPADM

## 2020-09-16 RX ORDER — OXYCODONE HYDROCHLORIDE 5 MG/1
5 TABLET ORAL EVERY 4 HOURS PRN
Status: DISCONTINUED | OUTPATIENT
Start: 2020-09-16 | End: 2020-09-22 | Stop reason: HOSPADM

## 2020-09-16 RX ORDER — QUETIAPINE FUMARATE 25 MG/1
25 TABLET, FILM COATED ORAL
Status: DISCONTINUED | OUTPATIENT
Start: 2020-09-16 | End: 2020-09-16

## 2020-09-16 RX ORDER — LISINOPRIL 10 MG/1
10 TABLET ORAL DAILY
Status: DISCONTINUED | OUTPATIENT
Start: 2020-09-16 | End: 2020-09-22 | Stop reason: HOSPADM

## 2020-09-16 RX ORDER — MICONAZOLE NITRATE 20 MG/G
CREAM TOPICAL 2 TIMES DAILY
Status: DISCONTINUED | OUTPATIENT
Start: 2020-09-16 | End: 2020-09-22 | Stop reason: HOSPADM

## 2020-09-16 RX ORDER — ALLOPURINOL 300 MG/1
300 TABLET ORAL DAILY
Status: DISCONTINUED | OUTPATIENT
Start: 2020-09-16 | End: 2020-09-22 | Stop reason: HOSPADM

## 2020-09-16 RX ORDER — ESCITALOPRAM OXALATE 20 MG/1
20 TABLET ORAL DAILY
Status: DISCONTINUED | OUTPATIENT
Start: 2020-09-16 | End: 2020-09-17

## 2020-09-16 RX ORDER — LANOLIN ALCOHOL/MO/W.PET/CERES
3 CREAM (GRAM) TOPICAL
Status: DISCONTINUED | OUTPATIENT
Start: 2020-09-16 | End: 2020-09-22 | Stop reason: HOSPADM

## 2020-09-16 RX ORDER — PANTOPRAZOLE SODIUM 40 MG/1
40 TABLET, DELAYED RELEASE ORAL
Status: DISCONTINUED | OUTPATIENT
Start: 2020-09-16 | End: 2020-09-22 | Stop reason: HOSPADM

## 2020-09-16 RX ORDER — ACETAMINOPHEN 325 MG/1
650 TABLET ORAL EVERY 6 HOURS PRN
Status: DISCONTINUED | OUTPATIENT
Start: 2020-09-16 | End: 2020-09-22 | Stop reason: HOSPADM

## 2020-09-16 RX ORDER — TRAVOPROST OPHTHALMIC SOLUTION 0.04 MG/ML
1 SOLUTION OPHTHALMIC
Status: DISCONTINUED | OUTPATIENT
Start: 2020-09-16 | End: 2020-09-22 | Stop reason: HOSPADM

## 2020-09-16 RX ADMIN — SENNOSIDES 8.6 MG: 8.6 TABLET, FILM COATED ORAL at 09:37

## 2020-09-16 RX ADMIN — DOCUSATE SODIUM 100 MG: 100 CAPSULE, LIQUID FILLED ORAL at 19:00

## 2020-09-16 RX ADMIN — QUETIAPINE FUMARATE 25 MG: 25 TABLET ORAL at 02:07

## 2020-09-16 RX ADMIN — ENOXAPARIN SODIUM 40 MG: 40 INJECTION SUBCUTANEOUS at 09:40

## 2020-09-16 RX ADMIN — MELATONIN 3 MG: at 02:07

## 2020-09-16 RX ADMIN — ESCITALOPRAM OXALATE 20 MG: 20 TABLET ORAL at 09:37

## 2020-09-16 RX ADMIN — TRAVOPROST 1 DROP: 0.04 SOLUTION/ DROPS OPHTHALMIC at 23:25

## 2020-09-16 RX ADMIN — ALLOPURINOL 300 MG: 300 TABLET ORAL at 09:37

## 2020-09-16 RX ADMIN — MICONAZOLE NITRATE 1 APPLICATION: 20 CREAM TOPICAL at 12:27

## 2020-09-16 RX ADMIN — PANTOPRAZOLE SODIUM 40 MG: 40 TABLET, DELAYED RELEASE ORAL at 06:56

## 2020-09-16 RX ADMIN — MICONAZOLE NITRATE: 20 CREAM TOPICAL at 22:07

## 2020-09-16 RX ADMIN — SODIUM CHLORIDE 75 ML/HR: 0.9 INJECTION, SOLUTION INTRAVENOUS at 06:33

## 2020-09-16 RX ADMIN — LISINOPRIL 10 MG: 10 TABLET ORAL at 09:37

## 2020-09-16 NOTE — ASSESSMENT & PLAN NOTE
· BP on presentation 136/88  Most recent /60    · On enalapril at baseline  · Medical management per primary team

## 2020-09-16 NOTE — PROGRESS NOTES
Progress Note - Nikole Nolasco 1950, 69 y o  male MRN: [id number]  Unit/Bed#: -01 Encounter: [id number]  Primary Care Provider: Allie Anne   Date and time admitted to hospital: 9/15/2020  7:28 PM    * Confusion  Assessment & Plan  · Initially brought in for fall at rehab facility however patient's brother and rehab staff reporting worsening of baseline cognitive impairment since discharge from hospital 8/29, in setting of recent prolonged and complicated hospitalization  · Per rehab notes, noted to have increasing behavioral issues with agitation/screaming and calling out "help me" deuce  at night  · Was started on seroquel 25 mg qhs, lexapro increased to 20 mg, no other recent med changes  · 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 and stable ventriculomegaly  · ? urinary retention and/or penumonia contributing to delirium - see related plans   · Appreciate neurology consult and recommendations  · Discontinued Seroquel   · Routine EEG   · Continue neuro checks    Normal pressure hydrocephalus (HCC)  Assessment & Plan  · S/p VA shunt after  shunt infection/meningitis/peritonitis during last hospitalization treated with meropenem until 9/9  · CTH in ED with stable ventriculomegaly,  shunt catheter tip in the left lateral ventricle   · Obtain shunt series   · Appreciate neurosurgery consult     Leukocytosis  Assessment & Plan  · Unclear etiology; down-trending   · No other SRIS criteria present  · UA unremarkable  · Chest x-ray: Small left base infiltrate and/or slight atelectasis     · Procalcitonin negative, repeat with morning labs   · Blood cultures pending  · Patient with multiple skin abrasions, sacral ulcer and fungal infection   · Continue local wound care  · Monitor off antibiotics    Abnormal EKG  Assessment & Plan  · EKG x 2 with mild ST depressions  · Patient without chest pain   · Troponin negative x 2  · Recent ROSEMARIE largely unremarkable    Urinary retention  Assessment & Plan  · PVR in ED noted to be 270 mL and Griffith catheter inserted  · Evaluated by Urology, appreciate recommendations   · No significant evidence of urinary retention   · D/C Griffith catheter today   · Urinary retention protocol    Fall  Assessment & Plan  · Patient initially presented after sustaining a fall at rehab facility  · CT head without acute intracranial abnormality  · X-ray left tibia and fibula without acute osseous abnormality  · PT/OT recommending return to rehab    Essential hypertension  Assessment & Plan  · BP acceptable, monitor routinely  · Continue lisinopril 10 mg daily    Type 2 diabetes mellitus ([facility] )  Assessment & Plan  Lab Results   Component Value Date    HGBA1C 5 6 2020       Recent Labs     20  0615 20  1049   POCGLU 127 100       Blood Sugar Average: Last 72 hrs:    · Home insulin regimen: Lantus 10U qam/12U qpm  · During last hospitalization only required SSI  · Monitor accuchecks and continue with SSI coverage for now   · Diabetic diet  · Hypoglycemia protocol     Post Admission Follow-up     Subjective:   Patient offers no complaints  Noted to have cough during encounter and patient does endorse dry cough but cannot tell me for how long  Denies fever/chills, chest pain, SOB, abdominal pain, n/v/d  Updated brother (Dr Lianet Winters) vie telephone  He suspects patient has underlying dementia contributing to intermittent confusion and agitation  Objective:     Vitals:   Temp (24hrs), Av °F (36 7 °C), Min:97 6 °F (36 4 °C), Max:98 5 °F (36 9 °C)    Temp:  [97 6 °F (36 4 °C)-98 5 °F (36 9 °C)] 97 8 °F (36 6 °C)  HR:  [] 69  Resp:  [16-24] 17  BP: (115-157)/(66-93) 115/66  SpO2:  [92 %-96 %] 96 %  Body mass index is 28 12 kg/m²  Input and Output Summary (last 24 hours):        Intake/Output Summary (Last 24 hours) at 2020 1626  Last data filed at 2020 1427  Gross per 24 hour   Intake 0 ml   Output 350 ml   Net -350 ml       Physical Exam:     Physical Exam  Vitals signs and nursing note reviewed  Exam conducted with a chaperone present  Constitutional:       General: He is not in acute distress  Appearance: He is obese  Cardiovascular:      Rate and Rhythm: Normal rate and regular rhythm  Pulses: Normal pulses  Heart sounds: No murmur  Pulmonary:      Effort: Pulmonary effort is normal  No respiratory distress  Breath sounds: No wheezing or rales  Abdominal:      General: Bowel sounds are normal  There is no distension  Palpations: Abdomen is soft  Tenderness: There is no abdominal tenderness  Musculoskeletal:      Right lower leg: No edema  Left lower leg: No edema  Neurological:      Mental Status: He is alert        Comments: Oriented only to place

## 2020-09-16 NOTE — ASSESSMENT & PLAN NOTE
Lab Results   Component Value Date    HGBA1C 5 6 07/31/2020       No results for input(s): POCGLU in the last 72 hours      Blood Sugar Average: Last 72 hrs:  · During last hospitalization only required SSI  · At home on Lantus 10U qam/12U qpm  · Diabetic diet, monitor accu checks, continue SSI

## 2020-09-16 NOTE — CONSULTS
[address] NOTE   Admission Date: 9/15/2020    Patient Identifiers: Felisha Gibson (MRN: [id number])  Service Requesting Consultation: Bartolo Fisher MD  Service Providing Consultation:  Urology, Jose Daniel Emmanuel PA-C  Consults  Date of Service: 9/16/2020    Reason for Consultation:  Urinary retention    History of Present Illness:     Felisha Gibson is a 69 y o  male with a history normal pressure hydrocephalus and  shunt history of meningitis and peritonitis  Presented after a fall at rehab facility where he was transferring out of bed  In the emergency room he had a PVR of 270 mL and a Griffith catheter was inserted  Creatinine is 0 6  WBC 10 45  Abdomen and pelvis imaging done on August 11th was unremarkable from a  standpoint  Microscopic urinalysis was clear      Past Medical, Past Surgical History:     Past Medical History:   Diagnosis Date    Cancer [facility]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 )     DM2 (diabetes mellitus, type 2) ([facility] )     Gout     HTN (hypertension)     [facility]     Hypertension     Neuropathy     ZHOU on CPAP     Pressure injury of skin     TIA (transient ischemic attack)    :    Past Surgical History:   Procedure Laterality Date    ABDOMINAL WALL SURGERY N/A 8/1/2020    Procedure: CLOSURE WOUND ABDOMINAL/TRUNK;  Surgeon: Barbara Santana DO;  [facility];  Service: General    ARTERIOGRAM N/A 7/31/2020    Procedure: ARTERIOGRAM Of SMA and placement of Papavarine onfusion arterial catheter;  Surgeon: Maribel Bravo MD;  [facility];  Service: Vascular    ARTERIOGRAM Left 8/1/2020    Procedure: ARTERIOGRAM; cath removal;  Surgeon: Maribel Bravo MD;  [facility];  Service: Vascular    CSF SHUNT      x3 Op Reports, Dr Maci Paula in [facility] chart viewer    FL LUMBAR PUNCTURE DIAGNOSTIC  8/11/2020    LAPAROTOMY N/A 7/31/2020 Procedure: LAPAROTOMY EXPLORATORY: Externalizes  shunt Abthera application;  Surgeon: Jeannette Matias MD;  [facility];  Service: General    LAPAROTOMY N/A 8/1/2020    Procedure: LAPAROTOMY EXPLORATORY,;  Surgeon: Rasheed Varela DO;  [facility];  Service: General    WV AMPUTATION FOOT,TRANSMETATARSAL Left 5/30/2020    Procedure: excision 5th metatarsal head   excision 5th metatarsal ulcer ;  Surgeon: Alison Andrews DPM;  [facility];  Service: Podiatry    [place] ARTHDSIS POST/POSTEROLATRL/POSTINTERBODY LUMBAR N/A 7/6/2020    Procedure: MINIMALLY INVASIVE TRANSVERSE LUMBAR INTERBODY FUSION L5-S1 FROM LEFT-SIDED APPROACH;  Surgeon: Chacho Connor MD;  [facility];  Service: Neurosurgery    [address] [place] [address] Left 8/24/2020    Procedure: Left ventriculoatrial shunt;  Surgeon: Chacho Connor MD;  [facility];  Service: Neurosurgery    [address] [address] CSF SHUNT,W/O REPLACE Right 8/7/2020    Procedure: REMOVAL SHUNT VENTRICULAR PERITONEAL;  Surgeon: Chacho Connor MD;  [facility]: [facility];  Service: Neurosurgery    [place] REPLACEMENT/REVISION,CSF SHUNT Right 7/6/2020    Procedure: REVISION OF SCALP OVER VENTRICULAR CATHETER IN THE RIGHT [facility];  Surgeon: Chacho Connor MD;  [facility]: [facility];  Service: Neurosurgery    WOUND DEBRIDEMENT N/A 8/1/2020    Procedure: abd washout;  Surgeon: Rasheed Varela DO;  Location: [facility];  Service: General   :    Medications, Allergies:     Current Facility-Administered Medications:     acetaminophen (TYLENOL) tablet 650 mg, 650 mg, Oral, Q6H PRN, Angela Mcrae PA-C    allopurinol (ZYLOPRIM) tablet 300 mg, 300 mg, Oral, Daily, Angela Mcrae PA-C, 300 mg at 09/16/20 3305    bisacodyl (DULCOLAX) rectal suppository 10 mg, 10 mg, Rectal, Daily PRN, Angela Mcrae PA-C    docusate sodium (COLACE) capsule 100 mg, 100 mg, Oral, BID, Angela Mcrae PA-C    enoxaparin (LOVENOX) subcutaneous injection 40 mg, 40 mg, Subcutaneous, Daily, Genevia Afshan, PA-C, 40 mg at 09/16/20 0940    escitalopram (LEXAPRO) tablet 20 mg, 20 mg, Oral, Daily, Genevia Afshan, PA-C, 20 mg at 09/16/20 0528    insulin lispro (HumaLOG) 100 units/mL subcutaneous injection 2-12 Units, 2-12 Units, Subcutaneous, 4x Daily (AC & HS) **AND** Fingerstick Glucose (POCT), , , 4x Daily AC and at bedtime, Genevia Afshan, PA-C    lisinopril (ZESTRIL) tablet 10 mg, 10 mg, Oral, Daily, Genevia Afshan, PA-C, 10 mg at 09/16/20 7132    melatonin tablet 3 mg, 3 mg, Oral, HS, Keira West, PA-C, 3 mg at 09/16/20 0207    moisture barrier miconazole 2% cream (aka VEORNICA MOISTURE BARRIER ANTIFUNGAL CREAM), , Topical, BID, Елена Palacio MD    oxyCODONE (ROXICODONE) IR tablet 5 mg, 5 mg, Oral, Q4H PRN, Genevia Afshan, PA-C    pantoprazole (PROTONIX) EC tablet 40 mg, 40 mg, Oral, Early Morning, Genevia Afshan, PA-C, 40 mg at 09/16/20 0656    QUEtiapine (SEROquel) tablet 25 mg, 25 mg, Oral, HS, Keira West PA-C, 25 mg at 09/16/20 0207    senna (SENOKOT) tablet 8 6 mg, 1 tablet, Oral, Daily, Genenidiaa Afshan, PA-C, 8 6 mg at 09/16/20 1531    sodium chloride 0 9 % infusion, 75 mL/hr, Intravenous, Continuous, Genenidiaa Afshan, PA-C, Last Rate: 75 mL/hr at 09/16/20 0633, 75 mL/hr at 09/16/20 0633    travoprost (TRAVATAN-Z) 0 004 % ophthalmic solution 1 drop, 1 drop, Right Eye, HS, Genecooper Cavanaugh, PA-C    Allergies: Allergies   Allergen Reactions    Pollen Extract Allergic Rhinitis   :    Social and Family History:   Social History:   Social History     Tobacco Use    Smoking status: Never Smoker    Smokeless tobacco: Never Used   Substance Use Topics    Alcohol use: Not Currently     Frequency: Monthly or less     Drinks per session: 1 or 2    Drug use: Never         Social History     Tobacco Use   Smoking Status Never Smoker   Smokeless Tobacco Never Used       Family History:  Family History   Problem Relation Age of Onset    Polymyositis Mother     Heart failure Father    :     Review of Systems:     Unable to obtain  Physical Exam:   General: Patient is pleasant and in NAD  Awake and alert  /85   Pulse 100   Temp 98 5 °F (36 9 °C) (Oral)   Resp 20   Ht 6' 3" (1 905 m)   Wt 102 kg (225 lb)   SpO2 94%   BMI 28 12 kg/m²   HEENT:  No scleral icterus  Constitutional:   lethargic  Cardiac: Peripheral edema: positive  Pulmonary: Non-labored breathing  Abdomen: Soft, non-tender, non-distended  No surgical scars  No masses, tenderness, hernias noted  Genitourinary: Negative CVA tenderness, negative suprapubic tenderness  Extremities:  Moves extremities  Neurological:  Lethargic response to noxious stimuli  Psychiatric:  As above    (Male): Penis circumcised, phallus normal, meatus patent  Testicles descended into scrotum bilaterally without masses nor tenderness  No inguinal hernias bilaterally  SOFIA: in place draining clear yellow urine  no clots and       Labs:     Lab Results   Component Value Date    HGB 15 5 09/16/2020    HCT 47 8 09/16/2020    WBC 10 45 (H) 09/16/2020     09/16/2020   ]    Lab Results   Component Value Date    K 3 6 09/16/2020     09/16/2020    CO2 27 09/16/2020    BUN 11 09/16/2020    CREATININE 0 71 09/16/2020    CALCIUM 9 7 09/16/2020    GLUCOSE 132 07/31/2020   ]    Imaging:   I personally reviewed the images and report of the following studies, and reviewed them with the patient:  No  imaging    ASSESSMENT:     1  Metabolic/toxic encephalopathy   2  Sacral ulcer  3  Normal pressure hydrocephalus with  shunt     PLAN:   -no significant evidence of urinary retention  -may discontinue Sofia catheter per the primary service  -would only leave in place if protection of sacral wound was an issue  -no further urologic intervention anticipated      Thank you for allowing me to participate in this patients care    Please do not hesitate to call with any additional questions    Vanesa Dunn PA-C

## 2020-09-16 NOTE — ASSESSMENT & PLAN NOTE
· Initially brought in for fall at rehab facility however patient's brother and rehab staff reporting worsening of baseline cognitive impairment since discharge from hospital 8/29, in setting of recent prolonged and complicated hospitalization  · Per rehab notes, noted to have increasing behavioral issues with agitation/screaming and calling out "help me" deuce   at night  · Was started on seroquel 25 mg qhs, lexapro increased to 20 mg, no other recent med changes  · 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 and stable ventriculomegaly  · ?urinary retention contributing to delirium  · Also with mild leukocytosis and dry cough noted during exam, will check CXR and trend CBC  · Consider neurology consult  · Continue neuro checks

## 2020-09-16 NOTE — WOUND OSTOMY CARE
Consult Note - Wound   Mcneil Gowers 69 y o  male MRN: [id number]  Unit/Bed#: -70 Encounter: [id number]      History and Present Illness:  Patient is seen for wound assessment  Patient is dependent with postioning and is a max assist for full exam  Patient is incontinent of bowel and has an urinary catheter in place  Assessment Findings:   1  POA DTI sacrum        2  MASD with fungus bilateral buttocks    Heels intact    See flowsheets for details    Skin care plans:  1-Apply VERONICA to sacrum, buttocks BID and PRN  2-Hydraguard to bilateral heel BID and PRN  3-Elevate heels to offload pressure  4-Ehob cushion when out of bed  5-Turn/repoisiton q2h or when medically stable for pressure re-distribution on skin  6-Moisturize skin daily with skin nourishing cream   7-Low air loss mattress  Please use a flat sheet instead of a fitted sheet with a low air loss mattress  Call or tigertext with any questions  Wound Care will continue to follow    Wound 07/31/20 Moisture associated skin damage Sacrum (Active)   Pressure Injury Stage O 09/16/20 1024   Frances-wound Assessment Excoriated;Fragile;Erythema 09/16/20 1024   Closure Open to air 09/16/20 0800   Drainage Amount None 09/16/20 0800   Treatments Site care 09/16/20 0800       Wound 08/16/20 Sacrum Left (Active)   Wound Image   09/16/20 1024   Wound Description Fragile;Light purple;Non-blanchable erythema;Slough; Yellow 09/16/20 1024   Pressure Injury Stage DTPI 09/16/20 1024   Frances-wound Assessment Excoriated;Fragile;Erythema 09/16/20 1024   Wound Length (cm) 5 5 cm 09/16/20 1024   Wound Width (cm) 5 cm 09/16/20 1024   Wound Surface Area (cm^2) 27 5 cm^2 09/16/20 1024

## 2020-09-16 NOTE — PLAN OF CARE
Problem: PHYSICAL THERAPY ADULT  Goal: Performs mobility at highest level of function for planned discharge setting  See evaluation for individualized goals  Description: Treatment/Interventions: Functional transfer training, LE strengthening/ROM, Elevations, Therapeutic exercise, Endurance training, Cognitive reorientation, Patient/family training, Equipment eval/education, Bed mobility, Compensatory technique education, Continued evaluation, Spoke to nursing, Spoke to case management, OT, Family  Equipment Recommended: (TBD)       See flowsheet documentation for full assessment, interventions and recommendations  Note: Prognosis: Fair  Problem List: Decreased strength, Decreased range of motion, Decreased endurance, Impaired balance, Decreased mobility, Decreased coordination, Decreased cognition, Impaired judgement, Decreased safety awareness, Impaired tone, Pain  Assessment: Pt is 69 y o  male seen for PT evaluation s/p admit to [facility] on 9/15/2020  Pt presenting from SNF w/ confusion and recent fall   CTB (-); crays (-)  Undergoing w/u  Comorbidities affecting pt's physical performance at time of assessment listed above and significant for:  shunt; NPH leukocytosis; urinary retention worsening cognitive functioning per family (see notes)   Personal factors advanced age, past experience, inability to perform IADLs, inability to perform ADLs, inability to ambulate household distances, limited insight into impairments and recent fall(s) worsening QOL and baseline cognition    Due to acute medical issues, ongoing medical workup for primary dx; pain, fall risk, increased reliance on more restrictive AD compared to baseline;  decreased activity tolerance compared to baseline, increased assistance needed from caregiver at current time, continuous telemetry monitoring, multiple lines, decline in overall functional mobility status; health management issues; note unstable clinical picture (high complexity)   Prior to admission, pt was receiving rehab at [address] [place] [address]- pt is a poor historian- is only able to state name- and "hospital" all info obtained from chart  Currently pt  is requiring maxA x2 A for bed skills including rolling repositioning and supine> sit at EOB pt was maxA x1 for seated static balance AT EOB x 10 mins and was resisting assist at attempts to stand- becoming agitated  Max A x2 for BTB and repositioning  Pt was placed w/ bed in chair position for upright posture and tolerance  Alarm intact  PT to see for functional progression as able when medically appropriate  PT recommending DEP mean for OOB at this time for safety  RN aware Pt presents currently w/ overall mobility deficits 2* to: cognition agitation; intermittent lethargy; poor attention;  decreased LE strength/AROM; limited flexibility; increased tone retropulsion  generalized weakness/ deconditioning; decreased endurance; decreased activity tolerance; decreased coordination; impaired balancedecreased safety awareness; fatigue; impaired safety and judgement; limited insight into current deficits; bed/ chair alarms; multiple lines; Pt currently at risk for falls and skin breakdown (Please find additional objective findings from PT assessment regarding body systems outlined above ) Pt will continue to benefit from skilled PT Interventions to address stated impairments; to maximize functional potential; for ongoing pt/ family training; and DME need  Barriers to Discharge: None     PT Discharge Recommendation: Return to previous environment with social support(back to SNF w/ therapies on return )     PT - OK to Discharge: Yes    See flowsheet documentation for full assessment

## 2020-09-16 NOTE — ASSESSMENT & PLAN NOTE
Lab Results   Component Value Date    HGBA1C 5 6 07/31/2020       Recent Labs     09/16/20  0615 09/16/20  1049   POCGLU 127 100       Blood Sugar Average: Last 72 hrs:  (P) 113 5     · Medical management per primary team

## 2020-09-16 NOTE — H&P
H&P- Daysi Carpenter 1950, 69 y o  male MRN: [id number]    Unit/Bed#: ED 29 Encounter: [id number]    Primary Care Provider: Henrique Rodriguez   Date and time admitted to hospital: 9/15/2020  7:28 PM        Confusion  Assessment & Plan  · Initially brought in for fall at rehab facility however patient's brother and rehab staff reporting worsening of baseline cognitive impairment since discharge from hospital 8/29, in setting of recent prolonged and complicated hospitalization  · Per rehab notes, noted to have increasing behavioral issues with agitation/screaming and calling out "help me" deuce  at night  · Was started on seroquel 25 mg qhs, lexapro increased to 20 mg, no other recent med changes  · 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 and stable ventriculomegaly  · ?urinary retention contributing to delirium  · Also with mild leukocytosis and dry cough noted during exam, will check CXR and trend CBC  · Consider neurology consult  · Continue neuro checks    Urinary retention  Assessment & Plan  · PVR in ED noted to be 270 mL, grover catheter inserted  · Consult urology    Normal pressure hydrocephalus ([address] )  Assessment & Plan  · S/p VA shunt after  shunt infection/meningitis/peritonitis during last hospitalization treated with meropenem until 9/9  · CTH in ED with stable ventriculomegaly,  shunt catheter tip in the left lateral ventricle     Essential hypertension  Assessment & Plan  · Continue lisinopril 10 mg qd    Type 2 diabetes mellitus ([address] )  Assessment & Plan  Lab Results   Component Value Date    HGBA1C 5 6 07/31/2020       No results for input(s): POCGLU in the last 72 hours      Blood Sugar Average: Last 72 hrs:  · During last hospitalization only required SSI  · At home on Lantus 10U qam/12U qpm  · Diabetic diet, monitor accu checks, continue SSI    Abnormal EKG  Assessment & Plan  · EKG obtained in ED showed sinus tachy with mild ST depressions, however patient without chest pain  · Continue trending troponins  · Recent ROSEMARIE largely unremarkable  · Recheck EKG in AM    VTE Prophylaxis: Enoxaparin (Lovenox)  / sequential compression device   Code Status: Full Code  POLST: POLST form is not discussed and not completed at this time  Discussion with family: none present    Anticipated Length of Stay:  Patient will be admitted on an Observation basis with an anticipated length of stay of  < 2 midnights  Justification for Hospital Stay: urinary retention requiring urology evaluation, worsening confusion    Total Time for Visit, including Counseling / Coordination of Care: 30 minutes  Greater than 50% of this total time spent on direct patient counseling and coordination of care  Chief Complaint:  Brought to ED after fall at rehab facility    History of Present Illness:    Rashad Nicolas is a 69 y o  male with PMHx NPH c/b  shunt infection with meningitis/peritonitis and subsequent placement of VA shunt in August 2020, cognitive impairment, hx of mesenteric ischemia s/p exploratory laparotomy x 2 Aug 2020, HTN, T2DM with neuropathy, TIA, gout, ZHOU on CPAP, who presents after a fall at his rehab facility  Patient was being transferred out of bed by staff when he sustained a fall, hitting the left side of his head and left leg  Pt underwent XR of his L leg and CTH which were unremarkable  While in ED, patient was noted to be retaining urine and a grover catheter was inserted  UA was negative for infection  Per rehab staff and his brother who is a physician, patient has also been more confused since his most recent hospital discharge in August  He does have baseline cognitive impairment and is typically oriented to person and place  However, he has been calling out for help at night and keeping other residents awake, with periods of agitation which occur more frequently at night  He was started on seroquel 25 mg and his lexapro was increased to 20 mg   Per nursing home notes there was concern that his cognitive impairment could be progressing into dementia  Patient himself has no complaints other than mild LLE pain  He is unable to tell me why he is in the hospital today and seems to have poor memory of recent events however can carry on a simple conversation  Asks me if I will give him something to "knock him out tonight"  Review of Systems:  ROS limited due to poor baseline mental status    Review of Systems   Constitutional: Negative for activity change, appetite change, chills and fever  HENT: Negative for congestion and sore throat  Eyes: Negative for visual disturbance  Respiratory: Positive for cough  Negative for shortness of breath and wheezing  Cardiovascular: Negative for chest pain, palpitations and leg swelling  Gastrointestinal: Negative for abdominal pain, diarrhea, nausea and vomiting  Endocrine: Negative for polyuria  Genitourinary: Positive for difficulty urinating  Negative for dysuria, frequency and urgency  Musculoskeletal: Negative for neck pain  Allergic/Immunologic: Negative  Neurological: Negative for dizziness and headaches  Hematological: Negative          Past Medical and Surgical History:     Past Medical History:   Diagnosis Date    Cancer [facility]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     Diabetes mellitus ([facility] )     DM2 [facility]di[facility] mellitus, type 2) [facility]     Gout     HTN (hypertension)     Hydrocephalus ([facility] )     Hypertension     Neuropathy     ZHOU on CPAP     Pressure injury of skin     TIA (transient ischemic attack)        Past Surgical History:   Procedure Laterality Date    ABDOMINAL WALL SURGERY N/A 8/1/2020    Procedure: CLOSURE WOUND ABDOMINAL/TRUNK;  Surgeon: Marty Ramirez DO;  [facility];  Service: General    ARTERIOGRAM N/A 7/31/2020    Procedure: ARTERIOGRAM Of SMA and placement of Papavarine onfusion arterial catheter;  Surgeon: Leeanne Escalona MD;  [facility];  Service: Vascular    ARTERIOGRAM Left 8/1/2020 Procedure: ARTERIOGRAM; cath removal;  Surgeon: Vanesa Powers MD;  [facility];  Service: Vascular    CSF SHUNT      x3 Op Reports, Dr Edda Samano, Sailaja Gunn in [facility] chart viewer    FL LUMBAR PUNCTURE DIAGNOSTIC  8/11/2020    LAPAROTOMY N/A 7/31/2020    Procedure: LAPAROTOMY EXPLORATORY: Externalizes  shunt Hurshel Liam application;  Surgeon: Jeannette Matias MD;  [facility];  Service: General    LAPAROTOMY N/A 8/1/2020    Procedure: LAPAROTOMY EXPLORATORY,;  Surgeon: Rasheed Varela DO;  [facility];  Service: General    NC AMPUTATION FOOT,TRANSMETATARSAL Left 5/30/2020    Procedure: excision 5th metatarsal head  excision 5th metatarsal ulcer ;  Surgeon: Alison Andrews DPM;  [facility];  Service: Podiatry    NC ARTHDSIS POST/POSTEROLATRL/POSTINTERBODY LUMBAR N/A 7/6/2020    Procedure: MINIMALLY INVASIVE TRANSVERSE LUMBAR INTERBODY FUSION L5-S1 FROM LEFT-SIDED APPROACH;  Surgeon: Chacho Connor MD;  [facility];  Service: Neurosurgery    NC CREATE SHUNT:VENTRIC-ATRIAL Left 8/24/2020    Procedure: Left ventriculoatrial shunt;  Surgeon: Chacho Connor MD;  [facility];  Service: Neurosurgery    NC [address] CSF SHUNT,W/O REPLACE Right 8/7/2020    Procedure: REMOVAL SHUNT VENTRICULAR PERITONEAL;  Surgeon: Chacho Connor MD;  [facility];  Service: Neurosurgery    NC REPLACEMENT/REVISION,CSF SHUNT Right 7/6/2020    Procedure: REVISION OF SCALP OVER VENTRICULAR CATHETER IN THE RIGHT CORONAL REGION;  Surgeon: Chacho Connor MD;  [facility];  Service: Neurosurgery    WOUND DEBRIDEMENT N/A 8/1/2020    Procedure: abd washout;  Surgeon: Rasheed Varela DO;  [facility];  Service: General       Meds/Allergies:    Prior to Admission medications    Medication Sig Start Date End Date Taking?  Authorizing Provider   acetaminophen (TYLENOL) 325 mg tablet Take 2 tablets (650 mg total) by mouth every 6 (six) hours as needed for mild pain 8/29/20   AR Petersen   allopurinol (ZYLOPRIM) 300 mg tablet 300 mg daily 3/25/20   Historical Provider, MD   Bisacodyl (DULCOLAX RE) Insert into the rectum as needed    Historical Provider, MD   docusate sodium (COLACE) 100 mg capsule Take 1 capsule (100 mg total) by mouth 2 (two) times a day 8/29/20   AR Petersen   enalapril (VASOTEC) 5 mg tablet Take 1 tablet (5 mg total) by mouth daily 7/22/20   Maricruz Mayorga MD   escitalopram (LEXAPRO) 20 mg tablet Take 20 mg by mouth daily    Historical Provider, MD   insulin lispro (HumaLOG) 100 units/mL injection Inject 1-6 Units under the skin 3 (three) times a day before meals 8/29/20   AR Petersen   Lancets (Ed Bombard) MISC 2 (two) times a day 2/10/20   Historical Provider, MD   magnesium hydroxide (MILK OF MAGNESIA) 400 mg/5 mL oral suspension Take 30 mL by mouth daily as needed for constipation    Historical Provider, MD   melatonin 3 mg Take 1 tablet (3 mg total) by mouth daily at bedtime 8/29/20   AR Petersen   multivitamin (THERAGRAN) TABS Take 1 tablet by mouth daily    Historical Provider, MD   oxyCODONE (ROXICODONE) 5 mg immediate release tablet Take 1 tablet (5 mg total) by mouth every 4 (four) hours as needed for moderate pain or severe painMax Daily Amount: 30 mg 8/29/20   AR Petersen   pantoprazole (PROTONIX) 40 mg tablet Take 40 mg by mouth daily    Historical Provider, MD   QUEtiapine (SEROquel) 25 mg tablet Take 25 mg by mouth daily at bedtime    Historical Provider, MD   senna (SENOKOT) 8 6 mg Take 1 tablet (8 6 mg total) by mouth daily 8/30/20   AR Petersen   travoprost (Travatan Z) 0 004 % ophthalmic solution Administer 1 drop to the right eye daily at bedtime  7/28/15   Historical Provider, MD JEFFERSON have reviewed home medications using allscripts  Allergies:    Allergies   Allergen Reactions    Pollen Extract Allergic Rhinitis       Social History: Marital Status:    Occupation: retired, former bridge   Patient Pre-hospital Living Situation: rehab facility  Patient Pre-hospital Level of Mobility: wheelchair  Patient Pre-hospital Diet Restrictions: mechanical soft, diabetic  Substance Use History:   Social History     Substance and Sexual Activity   Alcohol Use Not Currently    Frequency: Monthly or less    Drinks per session: 1 or 2     Social History     Tobacco Use   Smoking Status Never Smoker   Smokeless Tobacco Never Used     Social History     Substance and Sexual Activity   Drug Use Never       Family History:    Family History   Problem Relation Age of Onset    Polymyositis Mother     Heart failure Father        Physical Exam:     Vitals:   Blood Pressure: 146/85 (09/16/20 0230)  Pulse: 100 (09/16/20 0230)  Temperature: 97 6 °F (36 4 °C) (09/15/20 1933)  Temp Source: Oral (09/15/20 1933)  Respirations: 21 (09/16/20 0230)  Weight - Scale: 47 6 kg (105 lb) (09/15/20 1933)  SpO2: 92 % (09/16/20 0230)    Physical Exam  Constitutional:       General: He is not in acute distress  Appearance: He is obese  He is ill-appearing  He is not toxic-appearing or diaphoretic  HENT:      Head:      Comments: +frontal scarring s/p craniotomy  No scalp hematomas/ecchymosis noted     Nose: Nose normal       Mouth/Throat:      Mouth: Mucous membranes are dry  Pharynx: No oropharyngeal exudate  Comments: Extremely dry  Eyes:      Extraocular Movements: Extraocular movements intact  Conjunctiva/sclera: Conjunctivae normal       Pupils: Pupils are equal, round, and reactive to light  Neck:      Musculoskeletal: Normal range of motion and neck supple  No neck rigidity or muscular tenderness  Comments: Full ROM of neck  Cardiovascular:      Rate and Rhythm: Normal rate and regular rhythm  Pulses: Normal pulses  Heart sounds: Normal heart sounds     Pulmonary:      Effort: Pulmonary effort is normal  No respiratory distress  Breath sounds: Normal breath sounds  No wheezing or rales  Abdominal:      General: Abdomen is flat  Bowel sounds are normal  There is no distension  Palpations: Abdomen is soft  Tenderness: There is no abdominal tenderness  There is no guarding  Comments: +midline incision scar s/p laparotomy    Genitourinary:     Comments: +grover catheter draining clear yellow urine  Musculoskeletal: Normal range of motion  General: No tenderness or deformity  Right lower leg: No edema  Left lower leg: No edema  Comments: Abrasions b/l LEs s/p fall   Skin:     General: Skin is warm and dry  Capillary Refill: Capillary refill takes less than 2 seconds  Comments: Abrasions b/l shins and knees   Neurological:      Mental Status: He is alert  He is confused  GCS: GCS eye subscore is 4  GCS verbal subscore is 5  GCS motor subscore is 6  Cranial Nerves: Cranial nerves are intact  Comments: Oriented to person, place, believes it is August 1999  Able to do simple math, name objects, repeat simple sentences  Able to tell me he has an 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 but does not remember why he was brought into the hospital or that he was yelling out at the nursing home  Otherwise pleasant and cooperative  Strength 4/5 b/l LEs with decreased sensation 2/2 neuropathy, 5/5 b/l UEs, CNs grossly intact    Intention tremor b/l UEs, unable to complete FTN testing   Psychiatric:         Attention and Perception: Attention normal          Mood and Affect: Mood normal          Speech: Speech is delayed  Behavior: Behavior is slowed  Behavior is not agitated  Behavior is cooperative  Cognition and Memory: Cognition is impaired  He exhibits impaired recent memory  He does not exhibit impaired remote memory  Additional Data:     Lab Results: I have personally reviewed pertinent reports        Results from last 7 days   Lab Units 09/15/20  2307   WBC Thousand/uL 13 43*   HEMOGLOBIN g/dL 15 6   HEMATOCRIT % 47 4   PLATELETS Thousands/uL 219   NEUTROS PCT % 79*   LYMPHS PCT % 10*   MONOS PCT % 8   EOS PCT % 1     Results from last 7 days   Lab Units 09/15/20  2307   SODIUM mmol/L 137   POTASSIUM mmol/L 4 0   CHLORIDE mmol/L 103   CO2 mmol/L 25   BUN mg/dL 12   CREATININE mg/dL 0 63   ANION GAP mmol/L 9   CALCIUM mg/dL 9 7   ALBUMIN g/dL 3 1*   TOTAL BILIRUBIN mg/dL 0 93   ALK PHOS U/L 116   ALT U/L 19   AST U/L 15   GLUCOSE RANDOM mg/dL 125                       Imaging: I have personally reviewed pertinent reports  XR tibia fibula 2 views LEFT   Final Result by Angela Carvalho MD (09/15 2151)      Status post left knee arthroplasty  No periprosthetic fractures identified  Workstation performed: [id number]         CT head without contrast   Final Result by Nadir Gonzalez MD (09/15 2128)      No acute intracranial abnormality  Stable exam                   Workstation performed: [id number]             EKG, Pathology, and Other Studies Reviewed on Admission:   · EKG: nonspecific st and t wave abnormality, sinus tachycardia    Allscripts / Epic Records Reviewed: Yes     ** Please Note: This note has been constructed using a voice recognition system   **

## 2020-09-16 NOTE — PHYSICAL THERAPY NOTE
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     Diabetes mellitus ([place] [address] )     DM2 (diabetes mellitus, type 2) ([place] [address] )     Gout     HTN (hypertension)     Hydrocephalus ([place] [address] )     Hypertension     Neuropathy     ZHOU on CPAP     Pressure injury of skin     TIA (transient ischemic attack)         Past Surgical History  Past Surgical History:   Procedure Laterality Date    ABDOMINAL WALL SURGERY N/A 8/1/2020    Procedure: CLOSURE WOUND ABDOMINAL/TRUNK;  Surgeon: Alice Cardona DO;  [facility];  Service: General    ARTERIOGRAM N/A 7/31/2020    Procedure: ARTERIOGRAM Of SMA and placement of Papavarine onfusion arterial catheter;  Surgeon: Philomena Lyn MD;  [facility];  Service: Vascular    ARTERIOGRAM Left 8/1/2020    Procedure: ARTERIOGRAM; cath removal;  Surgeon: Philomena Lyn MD;  [facility];  Service: Vascular    CSF SHUNT      x3 Op Reports, Analia Kunz in MPF chart viewer    FL LUMBAR PUNCTURE DIAGNOSTIC  8/11/2020    LAPAROTOMY N/A 7/31/2020    Procedure: LAPAROTOMY EXPLORATORY: Externalizes  shunt Bridget Vipinve application;  Surgeon: Derrick Serrato MD;  [facility];  Service: General    LAPAROTOMY N/A 8/1/2020    Procedure: LAPAROTOMY EXPLORATORY,;  Surgeon: Alice Cardona DO;  [facility];  Service: General    GA AMPUTATION FOOT,TRANSMETATARSAL Left 5/30/2020    Procedure: excision 5th metatarsal head   excision 5th metatarsal ulcer ;  Surgeon: Morris Eldridge DPM;  [facility]; Service: Podiatry    LA ARTHDSIS POST/POSTEROLATRL/POSTINTERBODY LUMBAR N/A 7/6/2020    Procedure: MINIMALLY INVASIVE TRANSVERSE LUMBAR INTERBODY FUSION L5-S1 FROM LEFT-SIDED APPROACH;  Surgeon: Carolyn Novak MD;  [facility];  Service: Neurosurgery    LA CREATE SHUNT:VENTRIC-ATRIAL Left 8/24/2020    Procedure: Left ventriculoatrial shunt;  Surgeon: Carolyn Novak MD;  [facility];  Service: Neurosurgery    LA [address] CSF SHUNT,W/O REPLACE Right 8/7/2020    Procedure: REMOVAL SHUNT VENTRICULAR PERITONEAL;  Surgeon: Carolyn Novak MD;  [facility];  Service: Neurosurgery    LA REPLACEMENT/REVISION,CSF SHUNT Right 7/6/2020    Procedure: REVISION OF SCALP OVER VENTRICULAR CATHETER IN THE RIGHT CORONAL REGION;  Surgeon: Carolyn Novak MD;  [facility];  Service: Neurosurgery    WOUND DEBRIDEMENT N/A 8/1/2020    Procedure: abd washout;  Surgeon: Brigida Navas DO;  [facility];  Service: General         09/16/20 1215   PT Last Visit   PT Visit Date 09/16/20   Note Type   Note type Eval only   Pain Assessment   Pain Assessment Tool FLACC   Pain Rating: FLACC (Rest) - Face 0   Pain Rating: FLACC (Rest) - Legs 0   Pain Rating: FLACC (Rest) - Activity 0   Pain Rating: FLACC (Rest) - Cry 0   Pain Rating: FLACC (Rest) - Consolability 0   Score: FLACC (Rest) 0   Pain Rating: FLACC (Activity) - Face 1   Pain Rating: FLACC (Activity) - Legs 1   Pain Rating: FLACC (Activity) - Activity 0   Pain Rating: FLACC (Activity) - Cry 1   Pain Rating: FLACC (Activity) - Consolability 1   Score: FLACC (Activity) 4   Home Living   Type of Home SNF   Additional Comments pt was admitted from [facility] SNF was receiving therapies at SNF [facility]   Prior Function   Level of [place] Needs assistance with ADLs and functional mobility   Lives With Facility staff   ADL Assistance Needs assistance   IADLs Needs assistance   Falls in the last 6 months 1 to 4   Comments Assist for xfers and all cares at [facility] rehab following recent hospital admission- pt is unable to provide info 2* cognitive decline - all info otained from available medical records    Restrictions/Precautions   Weight Bearing Precautions Per Order No   Other Precautions Cognitive; Chair Alarm; Bed Alarm;Telemetry; Fall Risk;Pain; Impulsive;Agitated   General   Family/Caregiver Present No   Cognition   Overall Cognitive Status Impaired   Orientation Level Oriented to person;Oriented to place   RLE Assessment   RLE Assessment WFL  (3+/5 throughout )   LLE Assessment   LLE Assessment WFL   Coordination   Movements are Fluid and Coordinated 0   Sensation   (grossly intact to pain and lt touch )   Bed Mobility   Rolling R 2  Maximal assistance   Additional items Assist x 2   Supine to Sit 2  Maximal assistance   Additional items Assist x 1; Increased time required;Verbal cues;LE management   Sit to Supine 2  Maximal assistance   Additional items Assist x 2; Increased time required;Verbal cues;LE management   Additional Comments max A x1-2 for seated atatic balance at EOB x ~10 mins w/ increasing agitation adn retropulsion limiting    Transfers   Sit to Stand Unable to assess  (pt resisting assist- unable to be redirected- )   Balance   Static Sitting Poor -   Activity Tolerance   Activity Tolerance Patient limited by fatigue;Treatment limited secondary to agitation   Medical Staff Made Aware Sriram Castellanos    Nurse Made Aware RN cleared for participation in pT    Assessment   Prognosis Fair   Problem List Decreased strength;Decreased range of motion;Decreased endurance; Impaired balance;Decreased mobility; Decreased coordination;Decreased cognition; Impaired judgement;Decreased safety awareness; Impaired tone;Pain   Assessment Pt is 69 y o  male seen for PT evaluation s/p admit to [facility] on 9/15/2020  Pt presenting from SNF w/ confusion and recent fall   CTB (-); crays (-)  Undergoing w/u      Comorbidities affecting pt's physical performance at time of assessment listed above and significant for:  shunt; NPH leukocytosis; urinary retention worsening cognitive functioning per family (see notes)   Personal factors advanced age, past experience, inability to perform IADLs, inability to perform ADLs, inability to ambulate household distances, limited insight into impairments and recent fall(s) worsening QOL and baseline cognition  Due to acute medical issues, ongoing medical workup for primary dx; pain, fall risk, increased reliance on more restrictive AD compared to baseline;  decreased activity tolerance compared to baseline, increased assistance needed from caregiver at current time, continuous telemetry monitoring, multiple lines, decline in overall functional mobility status; health management issues; note unstable clinical picture (high complexity)   Prior to admission, pt was receiving rehab at [facility]- pt is a poor historian- is only able to state name- and "hospital" all info obtained from chart  Currently pt  is requiring maxA x2 A for bed skills including rolling repositioning and supine> sit at EOB pt was maxA x1 for seated static balance AT EOB x 10 mins and was resisting assist at attempts to stand- becoming agitated  Max A x2 for BTB and repositioning  Pt was placed w/ bed in chair position for upright posture and tolerance  Alarm intact  PT to see for functional progression as able when medically appropriate  PT recommending DEP mean for OOB at this time for safety   RN aware Pt presents currently w/ overall mobility deficits 2* to: cognition agitation; intermittent lethargy; poor attention;  decreased LE strength/AROM; limited flexibility; increased tone retropulsion  generalized weakness/ deconditioning; decreased endurance; decreased activity tolerance; decreased coordination; impaired balancedecreased safety awareness; fatigue; impaired safety and judgement; limited insight into current deficits; bed/ chair alarms; multiple lines; Pt currently at risk for falls and skin breakdown (Please find additional objective findings from PT assessment regarding body systems outlined above ) Pt will continue to benefit from skilled PT Interventions to address stated impairments; to maximize functional potential; for ongoing pt/ family training; and DME need   Barriers to Discharge None   Goals   Patient Goals to be left alone to rest    STG Expiration Date 09/26/20   Short Term Goal #1 In 10 days pt will complete: 1) Bed mobility skills with modA x1 to facilitate safe return to previous living environment 2) seated balance activities at EOB w/ Hellen x1 x 20 mins 3) LE HEP w/ S 4)  Improve balance grades to fair  6) Improve LE strength grades by 1 to increase independence w/ transfers and gait  7) LE HEP independently  8) PT for ongoing pt and family education; DME needs and D/C planning to promote highest level of function in least restrictive environment  PT Treatment Day 0   Plan   Treatment/Interventions Functional transfer training;LE strengthening/ROM; Elevations; Therapeutic exercise; Endurance training;Cognitive reorientation;Patient/family training;Equipment eval/education; Bed mobility; Compensatory technique education;Continued evaluation;Spoke to nursing;Spoke to case management;OT;Family   PT Frequency 2-3x/wk   Recommendation   PT Discharge Recommendation Return to previous environment with social support  (back to SNF w/ therapies on return )   Equipment Recommended   (TBD)   PT - OK to Discharge Yes   Modified Ross Scale   Modified Ross Scale 4   Barthel Index   Feeding 5   Bathing 0   Grooming Score 0   Dressing Score 0   Bladder Score 0   Bowels Score 0   Toilet Use Score 0   Transfers (Bed/Chair) Score 0   Mobility (Level Surface) Score 0   Stairs Score 0   Barthel Index Score 5   Pt is 69 y o  male seen for PT evaluation s/p admit to [facility] on 9/15/2020      Pt presenting from SNF w/ confusion and recent fall   CTB (-); crays (-)  Undergoing w/u  Comorbidities affecting pt's physical performance at time of assessment listed above and significant for:  shunt; NPH leukocytosis; urinary retention worsening cognitive functioning per family (see notes)   Personal factors advanced age, past experience, inability to perform IADLs, inability to perform ADLs, inability to ambulate household distances, limited insight into impairments and recent fall(s) worsening QOL and baseline cognition  Due to acute medical issues, ongoing medical workup for primary dx; pain, fall risk, increased reliance on more restrictive AD compared to baseline;  decreased activity tolerance compared to baseline, increased assistance needed from caregiver at current time, continuous telemetry monitoring, multiple lines, decline in overall functional mobility status; health management issues; note unstable clinical picture (high complexity)   Prior to admission, pt was receiving rehab at [facility]- pt is a poor historian- is only able to state name- and "hospital" all info obtained from chart  Currently pt  is requiring maxA x2 A for bed skills including rolling repositioning and supine> sit at EOB pt was maxA x1 for seated static balance AT EOB x 10 mins and was resisting assist at attempts to stand- becoming agitated  Max A x2 for BTB and repositioning  Pt was placed w/ bed in chair position for upright posture and tolerance  Alarm intact  PT to see for functional progression as able when medically appropriate  PT recommending DEP mean for OOB at this time for safety   RN aware Pt presents currently w/ overall mobility deficits 2* to: cognition agitation; intermittent lethargy; poor attention;  decreased LE strength/AROM; limited flexibility; increased tone retropulsion  generalized weakness/ deconditioning; decreased endurance; decreased activity tolerance; decreased coordination; impaired balancedecreased safety awareness; fatigue; impaired safety and judgement; limited insight into current deficits; bed/ chair alarms; multiple lines; Pt currently at risk for falls and skin breakdown (Please find additional objective findings from PT assessment regarding body systems outlined above ) Pt will continue to benefit from skilled PT Interventions to address stated impairments; to maximize functional potential; for ongoing pt/ family training; and DME needs  PT is currently recommending return to rehab when medically cleared

## 2020-09-16 NOTE — ASSESSMENT & PLAN NOTE
· Patient initially presented after sustaining a fall at rehab facility  · CT head without acute intracranial abnormality  · X-ray left tibia and fibula without acute osseous abnormality  · PT/OT recommending return to rehab

## 2020-09-16 NOTE — PLAN OF CARE
Problem: Potential for Falls  Goal: Patient will remain free of falls  Description: INTERVENTIONS:  - Assess patient frequently for physical needs  -  Identify cognitive and physical deficits and behaviors that affect risk of falls    -  [place] fall precautions as indicated by assessment   - Educate patient/family on patient safety including physical limitations  - Instruct patient to call for assistance with activity based on assessment  - Modify environment to reduce risk of injury  - Consider OT/PT consult to assist with strengthening/mobility  Outcome: Progressing     Problem: PAIN - ADULT  Goal: Verbalizes/displays adequate comfort level or baseline comfort level  Description: Interventions:  - Encourage patient to monitor pain and request assistance  - Assess pain using appropriate pain scale  - Administer analgesics based on type and severity of pain and evaluate response  - Implement non-pharmacological measures as appropriate and evaluate response  - Consider cultural and social influences on pain and pain management  - Notify physician/advanced practitioner if interventions unsuccessful or patient reports new pain  Outcome: Progressing     Problem: INFECTION - ADULT  Goal: Absence or prevention of progression during hospitalization  Description: INTERVENTIONS:  - Assess and monitor for signs and symptoms of infection  - Monitor lab/diagnostic results  - Monitor all insertion sites, i e  indwelling lines, tubes, and drains  - Monitor endotracheal if appropriate and nasal secretions for changes in amount and color  - [place] appropriate cooling/warming therapies per order  - Administer medications as ordered  - Instruct and encourage patient and family to use good hand hygiene technique  - Identify and instruct in appropriate isolation precautions for identified infection/condition  Outcome: Progressing  Goal: Absence of fever/infection during neutropenic period  Description: INTERVENTIONS:  - Monitor WBC    Outcome: Progressing     Problem: SAFETY ADULT  Goal: Patient will remain free of falls  Description: INTERVENTIONS:  - Assess patient frequently for physical needs  -  Identify cognitive and physical deficits and behaviors that affect risk of falls    -  [place] fall precautions as indicated by assessment   - Educate patient/family on patient safety including physical limitations  - Instruct patient to call for assistance with activity based on assessment  - Modify environment to reduce risk of injury  - Consider OT/PT consult to assist with strengthening/mobility  Outcome: Progressing  Goal: Maintain or return to baseline ADL function  Description: INTERVENTIONS:  -  Assess patient's ability to carry out ADLs; assess patient's baseline for ADL function and identify physical deficits which impact ability to perform ADLs (bathing, care of mouth/teeth, toileting, grooming, dressing, etc )  - Assess/evaluate cause of self-care deficits   - Assess range of motion  - Assess patient's mobility; develop plan if impaired  - Assess patient's need for assistive devices and provide as appropriate  - Encourage maximum independence but intervene and supervise when necessary  - Involve family in performance of ADLs  - Assess for home care needs following discharge   - Consider OT consult to assist with ADL evaluation and planning for discharge  - Provide patient education as appropriate  Outcome: Progressing  Goal: Maintain or return mobility status to optimal level  Description: INTERVENTIONS:  - Assess patient's baseline mobility status (ambulation, transfers, stairs, etc )    - Identify cognitive and physical deficits and behaviors that affect mobility  - Identify mobility aids required to assist with transfers and/or ambulation (gait belt, sit-to-stand, lift, walker, cane, etc )  - [place] fall precautions as indicated by assessment  - Record patient progress and toleration of activity level on Mobility SBAR; progress patient to next Phase/Stage  - Instruct patient to call for assistance with activity based on assessment  - Consider rehabilitation consult to assist with strengthening/weightbearing, etc   Outcome: Progressing     Problem: DISCHARGE PLANNING  Goal: Discharge to home or other facility with appropriate resources  Description: INTERVENTIONS:  - Identify barriers to discharge w/patient and caregiver  - Arrange for needed discharge resources and transportation as appropriate  - Identify discharge learning needs (meds, wound care, etc )  - Arrange for interpretive services to assist at discharge as needed  - Refer to Case Management Department for coordinating discharge planning if the patient needs post-hospital services based on physician/advanced practitioner order or complex needs related to functional status, cognitive ability, or social support system  Outcome: Progressing     Problem: Knowledge Deficit  Goal: Patient/family/caregiver demonstrates understanding of disease process, treatment plan, medications, and discharge instructions  Description: Complete learning assessment and assess knowledge base    Interventions:  - Provide teaching at level of understanding  - Provide teaching via preferred learning methods  Outcome: Progressing     Problem: SKIN/TISSUE INTEGRITY - ADULT  Goal: Skin integrity remains intact  Description: INTERVENTIONS  - Identify patients at risk for skin breakdown  - Assess and monitor skin integrity  - Assess and monitor nutrition and hydration status  - Monitor labs (i e  albumin)  - Assess for incontinence   - Turn and reposition patient  - Assist with mobility/ambulation  - Relieve pressure over bony prominences  - Avoid friction and shearing  - Provide appropriate hygiene as needed including keeping skin clean and dry  - Evaluate need for skin moisturizer/barrier cream  - Collaborate with interdisciplinary team (i e  Nutrition, Rehabilitation, etc )   - Patient/family teaching  Outcome: Progressing  Goal: Incision(s), wounds(s) or drain site(s) healing without S/S of infection  Description: INTERVENTIONS  - Assess and document risk factors for skin impairment   - Assess and document dressing, incision, wound bed, drain sites and surrounding tissue  - Consider nutrition services referral as needed  - Oral mucous membranes remain intact  - Provide patient/ family education  Outcome: Progressing  Goal: Oral mucous membranes remain intact  Description: INTERVENTIONS  - Assess oral mucosa and hygiene practices  - Implement preventative oral hygiene regimen  - Implement oral medicated treatments as ordered  - Initiate Nutrition services referral as needed  Outcome: Progressing     Problem: MUSCULOSKELETAL - ADULT  Goal: Maintain or return mobility to safest level of function  Description: INTERVENTIONS:  - Assess patient's ability to carry out ADLs; assess patient's baseline for ADL function and identify physical deficits which impact ability to perform ADLs (bathing, care of mouth/teeth, toileting, grooming, dressing, etc )  - Assess/evaluate cause of self-care deficits   - Assess range of motion  - Assess patient's mobility  - Assess patient's need for assistive devices and provide as appropriate  - Encourage maximum independence but intervene and supervise when necessary  - Involve family in performance of ADLs  - Assess for home care needs following discharge   - Consider OT consult to assist with ADL evaluation and planning for discharge  - Provide patient education as appropriate  Outcome: Progressing  Goal: Maintain proper alignment of affected body part  Description: INTERVENTIONS:  - Support, maintain and protect limb and body alignment  - Provide patient/ family with appropriate education  Outcome: Progressing

## 2020-09-16 NOTE — ASSESSMENT & PLAN NOTE
Assessment:  · Unclear etiology at this time  Suspect possibly toxic metabolic encephalopathy vs  Progression of underlying dementia  Although low suspicion for seizures, will check routine EEG for further evaluation  · 9/15 CT head without acute changes  · Recently started Seroquel 25 mg HS and Lexapro was increased to 20 mg  Seroquel discontinued yesterday; if restarted, recommend 12 5 mg HS  · Mental status was noted to be improved at discharge during last admission in 08/2020, was treated with antibiotics until 9/9  Per chart review, patient noted to be confused 9/8/20 during office visit  Patient's baseline per family is usually alert and oriented to person and place  Patient also has been having episodes of agitation and yelling out at night  · WBC on presentation 13 43  Most recent level 10 64    · UA negative  · B12 level 476, folate level 4 2  · Blood cultures pending  · CXR demonstrates small left base infiltrate and/or slight atelectasis  Plan:  · Blood cultures pending  · Lexapro 20 mg  · Routine EEG  · Neurosurgery consult pending  · Geriatrics consult pending  · PT/OT  · Monitor neuro exam; notify with any changes  · Medical management and supportive care per primary team  Correction of infectious or metabolic disturbances

## 2020-09-16 NOTE — ED PROVIDER NOTES
History  Chief Complaint   Patient presents with    Fall     pt fell out of bed while being transferred and hit left head and left leg  c/o LLE pain  denies thinners and LOC     HPI  68 yo male with PMH HTN, DM2, TIA, normal pressure hydrocephalus with  shunt, presenting with concern for head strike and left leg pain s/p fall today  Pt was recently admitted from - for mesenteric ischemia underwent ex lap x 2 and also contracted meningitis/shunt infection that required treatment and shunt replacement  Since discharge pt has been at [facility]  Pt reports he has been transferred out of bed today when he was "dropped" and hit his head and left leg  Denies LOC  Pt does not take blood thinners  Denies headache at this time but does report some LLE pain and notes abrasion to left anterior shin  Denies knee pain  Pt is not ambulatory at baseline  No recent fevers, chills, cough, congestion, other complaints at this time  Prior to Admission Medications   Prescriptions Last Dose Informant Patient Reported? Taking?    Bisacodyl (DULCOLAX RE) Unknown at Unknown time  Yes No   Sig: Insert into the rectum as needed   Lancets ([place] Nip) MISC Past Month at Unknown time  Yes Yes   Si (two) times a day   QUEtiapine (SEROquel) 25 mg tablet Past Month at Unknown time  Yes Yes   Sig: Take 25 mg by mouth daily at bedtime   acetaminophen (TYLENOL) 325 mg tablet Past Month at Unknown time  No Yes   Sig: Take 2 tablets (650 mg total) by mouth every 6 (six) hours as needed for mild pain   allopurinol (ZYLOPRIM) 300 mg tablet 2020 at Unknown time  Yes Yes   Si mg daily   docusate sodium (COLACE) 100 mg capsule Unknown at Unknown time  No No   Sig: Take 1 capsule (100 mg total) by mouth 2 (two) times a day   enalapril (VASOTEC) 5 mg tablet Past Month at Unknown time  No Yes   Sig: Take 1 tablet (5 mg total) by mouth daily   escitalopram (LEXAPRO) 20 mg tablet 2020 at Unknown time Yes Yes   Sig: Take 20 mg by mouth daily   insulin lispro (HumaLOG) 100 units/mL injection Past Month at Unknown time  No Yes   Sig: Inject 1-6 Units under the skin 3 (three) times a day before meals   magnesium hydroxide (MILK OF MAGNESIA) 400 mg/5 mL oral suspension Unknown at Unknown time  Yes No   Sig: Take 30 mL by mouth daily as needed for constipation   melatonin 3 mg Past Month at Unknown time  No Yes   Sig: Take 1 tablet (3 mg total) by mouth daily at bedtime   multivitamin (THERAGRAN) TABS Past Month at Unknown time  Yes Yes   Sig: Take 1 tablet by mouth daily   oxyCODONE (ROXICODONE) 5 mg immediate release tablet Unknown at Unknown time  No No   Sig: Take 1 tablet (5 mg total) by mouth every 4 (four) hours as needed for moderate pain or severe painMax Daily Amount: 30 mg   pantoprazole (PROTONIX) 40 mg tablet Past Month at Unknown time  Yes Yes   Sig: Take 40 mg by mouth daily   senna (SENOKOT) 8 6 mg 9/16/2020 at Unknown time  No Yes   Sig: Take 1 tablet (8 6 mg total) by mouth daily   travoprost (Travatan Z) 0 004 % ophthalmic solution Past Month at Unknown time  Yes Yes   Sig: Administer 1 drop to the right eye daily at bedtime       Facility-Administered Medications: None       Past Medical History:   Diagnosis Date    Cancer [facility]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 [address] )     DM2 (diabetes mellitus, type 2) ([facility] [address] )     Gout     HTN (hypertension)     Hydrocephalus (HCC)     Hypertension     Neuropathy     ZHOU on CPAP     Pressure injury of skin     TIA (transient ischemic attack)        Past Surgical History:   Procedure Laterality Date    ABDOMINAL WALL SURGERY N/A 8/1/2020    Procedure: CLOSURE WOUND ABDOMINAL/TRUNK;  Surgeon: Marty Ramirez DO;  [facility];  Service: General    ARTERIOGRAM N/A 7/31/2020    Procedure: ARTERIOGRAM Of SMA and placement of Papavarine onfusion arterial catheter;  Surgeon: Leeanne Escalona MD;  [facility]; Service: Vascular    ARTERIOGRAM Left 8/1/2020    Procedure: ARTERIOGRAM; cath removal;  Surgeon: Addie Maloney MD;  [facility];  Service: Vascular    CSF SHUNT      x3 Op Reports, Dr Natalie Hurt, Neeru Cool in [facility] chart viewer    FL LUMBAR PUNCTURE DIAGNOSTIC  8/11/2020    LAPAROTOMY N/A 7/31/2020    Procedure: LAPAROTOMY EXPLORATORY: Externalizes  shunt Vannie Revering application;  Surgeon: Gianna Cedeno MD;  [facility];  Service: General    LAPAROTOMY N/A 8/1/2020    Procedure: LAPAROTOMY EXPLORATORY,;  Surgeon: Shaylee Valadez DO;  [facility];  Service: General    WI AMPUTATION FOOT,TRANSMETATARSAL Left 5/30/2020    Procedure: excision 5th metatarsal head   excision 5th metatarsal ulcer ;  Surgeon: Verl Soulier, DPM;  Location[facility];  Service: Podiatry    WI ARTHDSIS POST/POSTEROLATRL/POSTINTERBODY LUMBAR N/A 7/6/2020    Procedure: MINIMALLY INVASIVE TRANSVERSE LUMBAR INTERBODY FUSION L5-S1 FROM LEFT-SIDED APPROACH;  Surgeon: Silvana Melendez MD;  [facility];  Service: Neurosurgery    WI CREATE SHUNT:VENTRIC-ATRIAL Left 8/24/2020    Procedure: Left ventriculoatrial shunt;  Surgeon: Silvana Melendez MD;  [facility];  Service: Neurosurgery    [place] [address] CSF SHUNT,W/O REPLACE Right 8/7/2020    Procedure: REMOVAL SHUNT VENTRICULAR PERITONEAL;  Surgeon: Silvana Melendez MD;  [facility];  Service: Neurosurgery    WI REPLACEMENT/REVISION,CSF SHUNT Right 7/6/2020    Procedure: REVISION OF SCALP OVER VENTRICULAR CATHETER IN THE RIGHT CORONAL REGION;  Surgeon: Silvana Melendez MD;  [facility];  Service: Neurosurgery    WOUND DEBRIDEMENT N/A 8/1/2020    Procedure: abd washout;  Surgeon: Shaylee Valadez DO;  [facility];  Service: General       Family History   Problem Relation Age of Onset    Polymyositis Mother     Heart failure Father      I have reviewed and agree with the history as documented  E-Cigarette/Vaping     E-Cigarette/Vaping Substances    Nicotine No     THC No     CBD No     Flavoring No     Other No     Unknown No      Social History     Tobacco Use    Smoking status: Never Smoker    Smokeless tobacco: Never Used   Substance Use Topics    Alcohol use: Not Currently     Frequency: Monthly or less     Drinks per session: 1 or 2    Drug use: Never        Review of Systems   Constitutional: Negative for chills and fever  HENT: Negative for congestion, rhinorrhea and sore throat  Respiratory: Negative for cough and shortness of breath  Cardiovascular: Negative for chest pain and palpitations  Gastrointestinal: Negative for abdominal pain, nausea and vomiting  Genitourinary: Negative for dysuria and hematuria  Musculoskeletal: Positive for arthralgias and myalgias  Skin: Positive for wound  Negative for rash  Neurological: Negative for dizziness, weakness, light-headedness, numbness and headaches  All other systems reviewed and are negative  Physical Exam  ED Triage Vitals   Temperature Pulse Respirations Blood Pressure SpO2   09/15/20 1933 09/15/20 1933 09/15/20 1933 09/15/20 1933 09/15/20 1933   97 6 °F (36 4 °C) 76 16 136/88 94 %      Temp Source Heart Rate Source Patient Position - Orthostatic VS BP Location FiO2 (%)   09/15/20 1933 09/16/20 0005 09/16/20 0005 09/16/20 0005 --   Oral Monitor Lying Right arm       Pain Score       09/15/20 1933       9             Orthostatic Vital Signs  Vitals:    09/17/20 0929 09/17/20 1516 09/17/20 2143 09/18/20 0808   BP: 118/66 117/64 136/66 117/63   Pulse:  75 66    Patient Position - Orthostatic VS:           Physical Exam  Constitutional:       General: He is not in acute distress  Appearance: He is well-developed  He is not diaphoretic  HENT:      Head: Normocephalic and atraumatic        Right Ear: External ear normal       Left Ear: External ear normal    Eyes:      Conjunctiva/sclera: Conjunctivae normal       Pupils: Pupils are equal, round, and reactive to light  Neck:      Musculoskeletal: Normal range of motion and neck supple  Cardiovascular:      Rate and Rhythm: Normal rate and regular rhythm  Heart sounds: Normal heart sounds  No murmur  No friction rub  No gallop  Pulmonary:      Effort: Pulmonary effort is normal  No respiratory distress  Breath sounds: Normal breath sounds  No wheezing, rhonchi or rales  Abdominal:      General: Bowel sounds are normal  There is no distension  Palpations: Abdomen is soft  Tenderness: There is no abdominal tenderness  Musculoskeletal: Normal range of motion  Right knee: He exhibits normal range of motion, no swelling and no effusion  No tenderness found  Left knee: He exhibits normal range of motion, no swelling and no effusion  No tenderness found  Left lower leg: He exhibits tenderness  He exhibits no swelling and no deformity  Lymphadenopathy:      Cervical: No cervical adenopathy  Skin:     General: Skin is warm and dry  Comments: Abrasion left anterior shin, bilateral knees    Neurological:      General: No focal deficit present  Mental Status: He is alert and oriented to person, place, and time  Cranial Nerves: No cranial nerve deficit  Sensory: No sensory deficit           ED Medications  Medications   acetaminophen (TYLENOL) tablet 650 mg (has no administration in time range)   docusate sodium (COLACE) capsule 100 mg (100 mg Oral Not Given 9/18/20 1019)   allopurinol (ZYLOPRIM) tablet 300 mg (300 mg Oral Not Given 9/18/20 1018)   bisacodyl (DULCOLAX) rectal suppository 10 mg (has no administration in time range)   lisinopril (ZESTRIL) tablet 10 mg (10 mg Oral Not Given 9/18/20 1019)   melatonin tablet 3 mg (3 mg Oral Given 9/17/20 2201)   oxyCODONE (ROXICODONE) IR tablet 5 mg (has no administration in time range)   pantoprazole (PROTONIX) EC tablet 40 mg (40 mg Oral Given 9/18/20 0646)   senna (SENOKOT) tablet 8 6 mg (8 6 mg Oral Not Given 9/18/20 1019)   travoprost (TRAVATAN-Z) 0 004 % ophthalmic solution 1 drop (1 drop Right Eye Given 9/17/20 2202)   sodium chloride 0 9 % infusion (0 mL/hr Intravenous Stopped 9/16/20 2115)   enoxaparin (LOVENOX) subcutaneous injection 40 mg (40 mg Subcutaneous Not Given 9/18/20 1019)   insulin lispro (HumaLOG) 100 units/mL subcutaneous injection 2-12 Units (2 Units Subcutaneous Not Given 9/18/20 1310)   moisture barrier miconazole 2% cream (aka VERONICA MOISTURE BARRIER ANTIFUNGAL CREAM) ( Topical Not Given 9/18/20 1019)   escitalopram (LEXAPRO) tablet 10 mg (10 mg Oral Not Given 9/18/20 1019)   QUEtiapine (SEROquel) tablet 12 5 mg (12 5 mg Oral Given 9/17/20 2201)       Diagnostic Studies  Results Reviewed     Procedure Component Value Units Date/Time    Blood gas, venous [[id number]]  (Abnormal) Collected:  09/16/20 0937    Lab Status:  Final result Specimen:  Blood from Arm, Right Updated:  09/16/20 0954     pH, Jesse 7 459     pCO2, Jesse 34 3 mm Hg      pO2, Jesse 45 3 mm Hg      HCO3, Jesse 23 8 mmol/L      Base Excess, Jesse 0 6 mmol/L      O2 Content, Jesse 17 6 ml/dL      O2 HGB, VENOUS 81 2 %     Troponin I [[id number]] Collected:  09/16/20 0937    Lab Status:  No result Specimen:  Blood from Arm, Right     Basic metabolic panel [[id number]] Collected:  09/16/20 0603    Lab Status:  Final result Specimen:  Blood from Hand, Right Updated:  09/16/20 0757     Sodium 138 mmol/L      Potassium 3 6 mmol/L      Chloride 104 mmol/L      CO2 27 mmol/L      ANION GAP 7 mmol/L      BUN 11 mg/dL      Creatinine 0 71 mg/dL      Glucose 115 mg/dL      Calcium 9 7 mg/dL      eGFR 94 ml/min/1 73sq m     Narrative:       [place] guidelines for Chronic Kidney Disease (CKD):     Stage 1 with normal or high GFR (GFR > 90 mL/min/1 73 square meters)    Stage 2 Mild CKD (GFR = 60-89 mL/min/1 73 square meters)    Stage 3A Moderate CKD (GFR = 45-59 mL/min/1 73 square meters)    Stage 3B Moderate CKD (GFR = 30-44 mL/min/1 73 square meters)    Stage 4 Severe CKD (GFR = 15-29 mL/min/1 73 square meters)    Stage 5 End Stage CKD (GFR <15 mL/min/1 73 square meters)  Note: GFR calculation is accurate only with a steady state creatinine    TSH, 3rd generation with Free T4 reflex [[id number]]  (Abnormal) Collected:  09/16/20 0603    Lab Status:  Final result Specimen:  Blood from Hand, Right Updated:  09/16/20 0757     TSH 3RD GENERATON 0 089 uIU/mL     Narrative:       Patients undergoing fluorescein dye angiography may retain small amounts of fluorescein in the body for 48-72 hours post procedure  Samples containing fluorescein can produce falsely depressed TSH values  If the patient had this procedure,a specimen should be resubmitted post fluorescein clearance        Vitamin B12 [[id number]]  (Normal) Collected:  09/16/20 0603    Lab Status:  Final result Specimen:  Blood from Hand, Right Updated:  09/16/20 0757     Vitamin B-12 476 pg/mL     Folate [[id number]]  (Normal) Collected:  09/16/20 0603    Lab Status:  Final result Specimen:  Blood from Hand, Right Updated:  09/16/20 0757     Folate 4 2 ng/mL     Troponin I [[id number]] Collected:  09/16/20 0603    Lab Status:  Final result Specimen:  Blood from Hand, Right Updated:  09/16/20 0706     Troponin I <0 02 ng/mL     CBC and differential [[id number]]  (Abnormal) Collected:  09/16/20 0603    Lab Status:  Final result Specimen:  Blood from Hand, Right Updated:  09/16/20 0659     WBC 10 45 Thousand/uL      RBC 5 76 Million/uL      Hemoglobin 15 5 g/dL      Hematocrit 47 8 %      MCV 83 fL      MCH 26 9 pg      MCHC 32 4 g/dL      RDW 15 9 %      MPV 10 2 fL      Platelets 382 Thousands/uL      nRBC 0 /100 WBCs      Neutrophils Relative 80 %      Immat GRANS % 1 %      Lymphocytes Relative 9 %      Monocytes Relative 8 %      Eosinophils Relative 1 %      Basophils Relative 1 %      Neutrophils Absolute 8 32 Thousands/µL      Immature Grans Absolute 0 11 Thousand/uL      Lymphocytes Absolute 0 91 Thousands/µL      Monocytes Absolute 0 88 Thousand/µL      Eosinophils Absolute 0 15 Thousand/µL      Basophils Absolute 0 08 Thousands/µL     Troponin I [[id number]]  (Normal) Collected:  09/16/20 0103    Lab Status:  Final result Specimen:  Blood from Arm, Left Updated:  09/16/20 0132     Troponin I <0 02 ng/mL     Comprehensive metabolic panel [[id number]]  (Abnormal) Collected:  09/15/20 2307    Lab Status:  Final result Specimen:  Blood from Arm, Left Updated:  09/15/20 2335     Sodium 137 mmol/L      Potassium 4 0 mmol/L      Chloride 103 mmol/L      CO2 25 mmol/L      ANION GAP 9 mmol/L      BUN 12 mg/dL      Creatinine 0 63 mg/dL      Glucose 125 mg/dL      Calcium 9 7 mg/dL      AST 15 U/L      ALT 19 U/L      Alkaline Phosphatase 116 U/L      Total Protein 7 2 g/dL      Albumin 3 1 g/dL      Total Bilirubin 0 93 mg/dL      eGFR 99 ml/min/1 73sq m     Narrative:       [facility] guidelines for Chronic Kidney Disease (CKD):     Stage 1 with normal or high GFR (GFR > 90 mL/min/1 73 square meters)    Stage 2 Mild CKD (GFR = 60-89 mL/min/1 73 square meters)    Stage 3A Moderate CKD (GFR = 45-59 mL/min/1 73 square meters)    Stage 3B Moderate CKD (GFR = 30-44 mL/min/1 73 square meters)    Stage 4 Severe CKD (GFR = 15-29 mL/min/1 73 square meters)    Stage 5 End Stage CKD (GFR <15 mL/min/1 73 square meters)  Note: GFR calculation is accurate only with a steady state creatinine    CBC and differential [[id number]]  (Abnormal) Collected:  09/15/20 2307    Lab Status:  Final result Specimen:  Blood from Arm, Left Updated:  09/15/20 2313     WBC 13 43 Thousand/uL      RBC 5 80 Million/uL      Hemoglobin 15 6 g/dL      Hematocrit 47 4 %      MCV 82 fL      MCH 26 9 pg      MCHC 32 9 g/dL      RDW 15 6 %      MPV 9 9 fL      Platelets 716 Thousands/uL      nRBC 0 /100 WBCs      Neutrophils Relative 79 % Immat GRANS % 1 %      Lymphocytes Relative 10 %      Monocytes Relative 8 %      Eosinophils Relative 1 %      Basophils Relative 1 %      Neutrophils Absolute 10 63 Thousands/µL      Immature Grans Absolute 0 14 Thousand/uL      Lymphocytes Absolute 1 28 Thousands/µL      Monocytes Absolute 1 12 Thousand/µL      Eosinophils Absolute 0 18 Thousand/µL      Basophils Absolute 0 08 Thousands/µL     Urine Microscopic [[id number]]  (Normal) Collected:  09/15/20 2252    Lab Status:  Final result Specimen:  Urine, Other Updated:  09/15/20 2310     RBC, UA None Seen /hpf      WBC, UA None Seen /hpf      Epithelial Cells None Seen /hpf      Bacteria, UA None Seen /hpf      Hyaline Casts, UA None Seen /lpf     POCT urinalysis dipstick [[id number]]  (Normal) Resulted:  09/15/20 2255    Lab Status:  Final result Updated:  09/15/20 2255     Color, UA chart    Urine Macroscopic, POC [[id number]]  (Abnormal) Collected:  09/15/20 2252    Lab Status:  Final result Specimen:  Urine Updated:  09/15/20 2253     Color, UA Emily     Clarity, UA Clear     pH, UA 6 5     Leukocytes, UA Negative     Nitrite, UA Negative     Protein, UA Trace mg/dl      Glucose, UA Negative mg/dl      Ketones, UA Negative mg/dl      Urobilinogen, UA 1 0 E U /dl      Bilirubin, UA Negative     Blood, UA Negative     Specific Gravity, UA 1 025    Narrative:       CLINITEK RESULT                 XR shunt series   Final Result by Eleanor Ornelas DO (09/17 1014)      Left-sided shunt demonstrates intact tubing within and above the neck  The tubing is slightly more difficult to visualize over the chest but appears to terminate in the region of the cavoatrial junction  Workstation performed: [id number]         XR chest pa & lateral   Final Result by Jesenia Roberts MD (09/16 1333)      Right and left IJ lines remain  Small left base infiltrate and/or slight atelectasis  The study was marked in [facility] for immediate notification  Workstation performed: [id number]         XR tibia fibula 2 views LEFT   Final Result by Mima Cortez MD (09/15 2151)      Status post left knee arthroplasty  No periprosthetic fractures identified  Workstation performed: [id number]         CT head without contrast   Final Result by Jared Marcos MD (09/15 2128)      No acute intracranial abnormality  Stable exam                   Workstation performed: [id number]               Procedures  Procedures      ED Course  ED Course as of Sep 18 1451   Tue Sep 15, 2020   2130 No acute abnormality    CT head without contrast   2154 No periprosthetic fracture  XR tibia fibula 2 views LEFT   2157 Pt c/o feeling like he needs to urinate but unable to void  Bladder scan shows 270mL  Will place grover and obtain UA      2239 Discussed patient with his brother  (POA) over the phone  Pt has been confused recently  No hx problems urinating  If pt has UTI will admit for antibiotics  Will also check CBC, CMP  Pt may require admission for acute encephalopathy                Identification of Seniors at Risk      Most Recent Value   (ISAR) Identification of Seniors at Risk   Before the illness or injury that brought you to the Emergency, did you need someone to help you on a regular basis? 1 Filed at: 09/15/2020 1935   In the last 24 hours, have you needed more help than usual?  1 Filed at: 09/15/2020 1935   Have you been hospitalized for one or more nights during the past 6 months? 1 Filed at: 09/15/2020 1935   In general, do you see well?  0 Filed at: 09/15/2020 1935   In general, do you have serious problems with your memory? 0 Filed at: 09/15/2020 1935   Do you take more than three different medications every day?   1 Filed at: 09/15/2020 1935   ISAR Score  4 Filed at: 09/15/2020 1935                                MDM  70 yo male with PMH HTN, DM2, TIA, normal pressure hydrocephalus with  shunt, recent hospitalization for mesenteric ischemia and shunt infection, presenting s/p fall with head strike and left lower extremity pain  Will obtain CT head and XR left tib/fib and knee  If no acute findings pt can likely be discharged back to rehab facility with return precautions and plan to follow up with PCP/neurosurgery  Disposition  Final diagnoses:   Fall, initial encounter   Acute pain of left lower extremity   Obstructive uropathy     Time reflects when diagnosis was documented in both MDM as applicable and the Disposition within this note     Time User Action Codes Description Comment    9/15/2020  9:55 PM Ashkan Suarez Add Jessica Joseph  [id number]] Fall, initial encounter     9/15/2020  9:55 PM Ashkan Suarez Add [M79 605] Acute pain of left lower extremity     9/15/2020 11:57 PM Red Cruz Add [N13 9] Obstructive uropathy     9/16/2020  1:34 AM Army Carl H Add [R33 9] Urinary retention     9/16/2020  8:10 AM Bishop Naylor Add [G91 2] Normal pressure hydrocephalus ([address] )     9/16/2020  4:07 PM Held, Edwin German Add [Z98 2] Status post ventriculoperitoneal shunt     9/16/2020  4:20 PM Held, Linda Add [R41 0] Confusion     9/16/2020  4:20 PM Held, Linda Add [R41 89] Cognitive impairment       ED Disposition     ED Disposition Condition Date/Time Comment    Admit Stable Tue Sep 15, 2020 11:57 PM Case was discussed with Dr Joseph Rizzo and the patient's admission status was agreed to be Admission Status: observation status to the service of Dr Joseph Rizzo   Follow-up Information     Follow up With Specialties Details Why Contact Info Additional [facility] Schedule an appointment as soon as possible for a visit  As needed [address] [place] [address]        [facility] Follow up in 4 week(s) Please follow up with neurology in 4 weeks  The  will call you to set up an appointment   If you do not hear from the  in 1 week, please call the number above to set up an appointment   [address] Neurology [address], [address] [place], [place], [address] [address] Neurosurgery Follow up follow up as scheduled for postoperative care [address] [facility] [place] [address] [id number]  [facility], [address] [place], [place], [address] [phone]          Current Discharge Medication List      CONTINUE these medications which have NOT CHANGED    Details   acetaminophen (TYLENOL) 325 mg tablet Take 2 tablets (650 mg total) by mouth every 6 (six) hours as needed for mild pain  Qty: 30 tablet, Refills: 0    Associated Diagnoses: Infection of  (ventriculoperitoneal) shunt (HCC)      allopurinol (ZYLOPRIM) 300 mg tablet 300 mg daily      enalapril (VASOTEC) 5 mg tablet Take 1 tablet (5 mg total) by mouth daily  Qty: 30 tablet, Refills: 0    Associated Diagnoses: Essential hypertension      escitalopram (LEXAPRO) 20 mg tablet Take 20 mg by mouth daily      insulin lispro (HumaLOG) 100 units/mL injection Inject 1-6 Units under the skin 3 (three) times a day before meals  Qty:  , Refills: 0    Associated Diagnoses: Infection of  (ventriculoperitoneal) shunt (HCC)      Lancets (ONETOUCH DELICA PLUS [id number]) MISC 2 (two) times a day      melatonin 3 mg Take 1 tablet (3 mg total) by mouth daily at bedtime  Refills: 0    Associated Diagnoses: Infection of  (ventriculoperitoneal) shunt (HCC)      multivitamin (THERAGRAN) TABS Take 1 tablet by mouth daily      pantoprazole (PROTONIX) 40 mg tablet Take 40 mg by mouth daily      QUEtiapine (SEROquel) 25 mg tablet Take 25 mg by mouth daily at bedtime      senna (SENOKOT) 8 6 mg Take 1 tablet (8 6 mg total) by mouth daily  Qty: 120 each, Refills: 0    Associated Diagnoses: Infection of  (ventriculoperitoneal) shunt ([address] ) travoprost (Travatan Z) 0 004 % ophthalmic solution Administer 1 drop to the right eye daily at bedtime       Bisacodyl (DULCOLAX RE) Insert into the rectum as needed      docusate sodium (COLACE) 100 mg capsule Take 1 capsule (100 mg total) by mouth 2 (two) times a day  Qty: 10 capsule, Refills: 0    Associated Diagnoses: Infection of  (ventriculoperitoneal) shunt (HCC)      magnesium hydroxide (MILK OF MAGNESIA) 400 mg/5 mL oral suspension Take 30 mL by mouth daily as needed for constipation      oxyCODONE (ROXICODONE) 5 mg immediate release tablet Take 1 tablet (5 mg total) by mouth every 4 (four) hours as needed for moderate pain or severe painMax Daily Amount: 30 mg  Qty: 10 tablet, Refills: 0    Associated Diagnoses: Infection of  (ventriculoperitoneal) shunt (HCC)           No discharge procedures on file  PDMP Review     None           ED Provider  Attending physically available and evaluated Joon Forbes  RONNY managed the patient along with the ED Attending      Electronically Signed by         Sis Khan MD  09/18/20 9921

## 2020-09-16 NOTE — ASSESSMENT & PLAN NOTE
Full consult to follow    Chart reveiwed- she did get 10mg dexamethasone intra-op on 11/30. Low cortisol level likely because of the steroid dose. Will reassess after cosyntropin test although that can also be affected by the steroid dose. No steroid treatment for now   · Continue lisinopril 10 mg qd

## 2020-09-16 NOTE — ASSESSMENT & PLAN NOTE
· EKG obtained in ED showed sinus tachy with mild ST depressions, however patient without chest pain  · Continue trending troponins  · Recent ROSEMARIE largely unremarkable  · Recheck EKG in AM

## 2020-09-16 NOTE — UTILIZATION REVIEW
Initial Clinical Review    Admission: Date/Time/Statement:    9/16/20 AT 0003 OBSERVATION CONVERTED TO INPATIENT 9/16/20 AT  1603 2ND INCREASED CONFUSION - PER NEUROLOGY SUSPECT  ACUTE TOXIC METABOLIC ENCEPHALOPATHY - NEEDS NEUROSURGICAL EVALUATION OF  SHUNT + SEIZURE EVALUATION     09/16/20 1603    Inpatient Admission  Once      Transfer Service: General Medicine       Question  Answer  Comment    Admitting Physician  Eliud Long     Level of Care  Med Surg     Estimated length of stay  More than 2 Midnights     Certification  I certify that inpatient services are medically necessary for this patient for a duration of greater than two midnights  See H&P and MD Progress Notes for additional information about the patient's course of treatment  09/16/20 1603     ED Arrival Information     Expected Arrival Acuity Means of Arrival Escorted By Service Admission Type    - 9/15/2020 19:28 Urgent Ambulance [facility] Hospitalist Urgent    Arrival Complaint    fall     Chief Complaint   Patient presents with   Yepez Fall     pt fell out of bed while being transferred and hit left head and left leg  c/o LLE pain  denies thinners and LOC     Assessment/Plan: 69year old male with PMHx  HTN, TIA, NPH status post  shunt 8/2020 2nd infection with meningitis/ peritonitis, baseline cognitive impairment,  mesenteric ischemia s/p exploratory laparotomy x 2 Aug 2020, also ZHOU on CPAP, DM2 with neuropathy, Gout  Presented via EMS to [facility] ED  from [address] [place] [address] facility on 9/15/20 after a fall while being transferred out of bed by staff and hitting the left side of his head and left leg  In ED - reports LLE pain  Exhibits poor memory of recent events however can carry on a simple conversation  LABS:  WBC  13,340  Troponin neg  UA neg  CT head and X ray of left leg showed no acute abnormality  While in ED - noted to be retaining urine and a gorver catheter was inserted     Addt'l information - per rehab staff and patient's brother who is a physician - he has been more confused since his most recent hospital discharge in August  Though he has baseline cognitive impairment, he is typically oriented to person and place  However, lately - he has been calling out for help at night and keeping other residents awake with periods of agitation which occur more frequently at night  He was started on seroquel and lexapro was increased to 20 mg  Per nursing home notes there was concern that his cognitive impairment could be progressing into dementia  ED TX:  Exam, CT head and Left leg x ray, IVF, melatonin and seroquel given    Placed in Observation 9/16/20 at 0003 2nd fall at rehab facility, increasing confusion, urine retention - Tx Plan: IVF,  close monitoring, VS + neuro checks q4hrs, Neurology + Urology Consults  Per Neurology 9/16/20 at [phone] converted to Inpatient 2nd   increased confusion with Acute Toxic Metabolic Encephalopathy - needs close neurologic monitoring with NeuroSurgical evaluation of  Shunt + Seizure evaluation  9/16/20 Neurology Consult:  Assessment:  * Confusion Unclear etiology at this time  Suspect possibly toxic metabolic encephalopathy  Would recommend neurosurgery evaluation of shunt as well  Although low suspicion for seizures, will check routine EEG for further evaluation  · 9/15 CT head without acute changes  · Recently started Seroquel 25 mg HS and Lexapro was increased to 20 mg  Will discontinue Seroquel for now; if restarted, recommend 12 5 mg HS  · Mental status was noted to be improved at discharge during last admission in 08/2020, was treated with antibiotics until 9/9  Per chart review, patient noted to be confused 9/8/20 during office visit  Patient's baseline per family is usually alert and oriented to person and place  Patient also has been having episodes of agitation and yelling out at night  · WBC on presentation 13 43   Most recent level 10 45    · UA negative  · B12 level 476, folate level 4 2  · Blood cultures pending  · CXR demonstrates small left base infiltrate and/or slight atelectasis       Plan:  · Blood cultures pending  · Lexapro 20 mg  · Discontinue Seroquel  · Routine EEG  · Recommend neurosurgery consult for evaluation of shunt  · PT/OT  · Monitor neuro exam; notify with any changes  · Medical management and supportive care per primary team  Correction of infectious or metabolic disturbances    ED Triage Vitals   Temperature Pulse Respirations Blood Pressure SpO2   09/15/20 1933 09/15/20 1933 09/15/20 1933 09/15/20 1933 09/15/20 1933   97 6 °F (36 4 °C) 76 16 136/88 94 %      Temp Source Heart Rate Source Patient Position - Orthostatic VS BP Location FiO2 (%)   09/15/20 1933 09/16/20 0005 09/16/20 0005 09/16/20 0005 --   Oral Monitor Lying Right arm       Pain Score       09/15/20 1933       9          Wt Readings from Last 1 Encounters:   09/16/20 102 kg (225 lb 15 5 oz)     Additional Vital Signs:  Date/Time   Temp   Pulse   Resp   BP   MAP (mmHg)   SpO2   O2 Device   Patient Position - Orthostatic VS    09/16/20 09:00:16            157/85   109             09/16/20 05:52:30   98 5 °F (36 9 °C)      20   136/77   97   94 %   None (Room air)   Lying    09/16/20 0230      100   21   146/85   104   92 %   None (Room air)   Lying    09/16/20 0200      88   24Abnormal     150/77   102   95 %   None (Room air)   Lying    09/16/20 0005      86   16   152/93               I/O 09/15 0701   09/16 0700    P  O   0    Total Intake(mL/kg)  0 (0)    Urine (mL/kg/hr)  100    Total Output  100    Net  -100      Pertinent Labs/Diagnostic Test Results:     Results from last 7 days   Lab Units 09/16/20  0603 09/15/20  2307   WBC Thousand/uL 10 45* 13 43*   HEMOGLOBIN g/dL 15 5 15 6   HEMATOCRIT % 47 8 47 4   PLATELETS Thousands/uL 199 219   NEUTROS ABS Thousands/µL 8 32* 10 63*     Results from last 7 days   Lab Units 09/16/20  0603 09/15/20  2307   SODIUM mmol/L 138 137 POTASSIUM mmol/L 3 6 4 0   CHLORIDE mmol/L 104 103   CO2 mmol/L 27 25   ANION GAP mmol/L 7 9   BUN mg/dL 11 12   CREATININE mg/dL 0 71 0 63   EGFR ml/min/1 73sq m 94 99   CALCIUM mg/dL 9 7 9 7     Results from last 7 days   Lab Units 09/15/20  2307   AST U/L 15   ALT U/L 19   ALK PHOS U/L 116   TOTAL PROTEIN g/dL 7 2   ALBUMIN g/dL 3 1*   TOTAL BILIRUBIN mg/dL 0 93     Results from last 7 days   Lab Units 09/16/20  1049 09/16/20  0615   POC GLUCOSE mg/dl 100 127     Results from last 7 days   Lab Units 09/16/20  0603 09/15/20  2307   GLUCOSE RANDOM mg/dL 115 125     Results from last 7 days   Lab Units 09/16/20  0937   PH BELTRAN  7 459*   PCO2 BELTRAN mm Hg 34 3*   PO2 BELTRAN mm Hg 45 3*   HCO3 BELTRAN mmol/L 23 8*   BASE EXC BELTRAN mmol/L 0 6   O2 CONTENT BELTRAN ml/dL 17 6   O2 HGB, VENOUS % 81 2*     Results from last 7 days   Lab Units 09/16/20  0603 09/16/20  0103   TROPONIN I ng/mL <0 02 <0 02     Results from last 7 days   Lab Units 09/16/20  0603   TSH 3RD GENERATON uIU/mL 0 089*     Results from last 7 days   Lab Units 09/15/20  2255 09/15/20  2252   CLARITY UA   --  Clear   COLOR UA  chart Emily   SPEC GRAV UA   --  1 025   PH UA   --  6 5   GLUCOSE UA mg/dl  --  Negative   KETONES UA mg/dl  --  Negative   BLOOD UA   --  Negative   PROTEIN UA mg/dl  --  Trace*   NITRITE UA   --  Negative   BILIRUBIN UA   --  Negative   UROBILINOGEN UA E U /dl  --  1 0   LEUKOCYTES UA   --  Negative   WBC UA /hpf  --  None Seen   RBC UA /hpf  --  None Seen   BACTERIA UA /hpf  --  None Seen   EPITHELIAL CELLS WET PREP /hpf  --  None Seen        EKG (Per MD)-  · showed sinus tachy with mild ST depressions,     CT HEAD WITHOUT CONTRAST -  No acute intracranial abnormality   Stable exam      LEFT TIBIA AND FIBULA X RAY   Status post left knee arthroplasty   No periprosthetic fractures identified       ED Treatment:   Medication Administration from 09/15/2020 1928 to 09/16/2020 0427       Date/Time Order Dose Route Action     09/16/2020 0207 melatonin tablet 3 mg 3 mg Oral Given     09/16/2020 0207 QUEtiapine (SEROquel) tablet 25 mg 25 mg Oral Given     Past Medical History:   Diagnosis Date    Cancer [facility]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     Diabetes mellitus ([place] [address] )     DM2 (diabetes mellitus, type 2) ([place] [address] )     Gout     HTN (hypertension)     Hydrocephalus ([place] [address] )     Hypertension     Neuropathy     ZHOU on CPAP     Pressure injury of skin     TIA (transient ischemic attack)      Present on Admission:   Normal pressure hydrocephalus (HCC)   Essential hypertension   Type 2 diabetes mellitus ([place] [address] )   Confusion    Admitting Diagnosis: Urinary retention [R33 9]  Injury [T14 90XA]  Obstructive uropathy [N13 9]  Acute pain of left lower extremity [M79 605]    Age/Sex: 69 y o  male    Admission Orders:  VS + Neuro Checks q4hrs  Up with assistance  Diet Dysphagia/Modified Consistency; Dysphagia 2-Mechanical Soft; Nectar Thick Liquid; Consistent Carbohydrate Diet Level 2 (5 carb servings/75 grams CHO/meal)  Skin care + wound care sacrum  Buttocks bID  PT + OT Evals    Scheduled Medications:  allopurinol, 300 mg, Oral, Daily  docusate sodium, 100 mg, Oral, BID  enoxaparin, 40 mg, Subcutaneous, Daily  escitalopram, 20 mg, Oral, Daily  insulin lispro, 2-12 Units, Subcutaneous, 4x Daily (AC & HS)  lisinopril, 10 mg, Oral, Daily  melatonin, 3 mg, Oral, HS  VERONICA ANTIFUNGAL, , Topical, BID  pantoprazole, 40 mg, Oral, Early Morning  QUEtiapine, 25 mg, Oral, HS  senna, 1 tablet, Oral, Daily  travoprost, 1 drop, Right Eye, HS    Continuous IV Infusions:  IVF sodium chloride, 75 mL/hr, Intravenous, Continuous    PRN Meds:  acetaminophen, 650 mg, Oral, Q6H PRN  bisacodyl, 10 mg, Rectal, Daily PRN  oxyCODONE, 5 mg, Oral, Q4H PRN    IP CONSULT TO UROLOGY  IP CONSULT TO NEUROLOGY    Network Utilization Review Department  [email or web address]  ATTENTION: Please call with any questions or concerns to [phone] and carefully listen to the prompts so that you are directed to the right person  All voicemails are confidential   Elisha Juares all requests for admission clinical reviews, approved or denied determinations and any other requests to dedicated fax number below belonging to the campus where the patient is receiving treatment   List of dedicated fax numbers for the Facilities:  [address] DENIALS (Administrative/Medical Necessity) [phone]   [address] (Maternity/NICU/Pediatrics) [phone]   Tia Ochoa [phone]   Darío Louise [phone]   Diony Hauser [phone]   Lord Cobian [phone]   [address] [phone]   [facility]  [phone]   [address] [facility]  [address] [facility] [address] [facility] [phone]

## 2020-09-16 NOTE — ASSESSMENT & PLAN NOTE
Lab Results   Component Value Date    HGBA1C 5 6 07/31/2020       Recent Labs     09/16/20  0615 09/16/20  1049   POCGLU 127 100       Blood Sugar Average: Last 72 hrs:    · Home insulin regimen: Lantus 10U qam/12U qpm  · During last hospitalization only required SSI  · Monitor accuchecks and continue with SSI coverage for now   · Diabetic diet  · Hypoglycemia protocol

## 2020-09-16 NOTE — CONSULTS
Consultation - Neurology   Joon Forbes 69 y o  male MRN: [id number]  Unit/Bed#: -01 Encounter: [id number]      Assessment/Plan   70 y/o male with prior TIA, HTN, NPH complicated by  shunt infection with meningitis/peritonitis with subsequent [facility] shunt placement 08/2020, cognitive impairment, hx mesenteric ischemia s/p ex lap x 2 08/2020, neuropathy, sleep apnea, diabetes, who presents with worsening confusion  * Confusion  Assessment & Plan  Assessment:  · Unclear etiology at this time  Suspect possibly toxic metabolic encephalopathy  Would recommend neurosurgery evaluation of shunt as well  Although low suspicion for seizures, will check routine EEG for further evaluation  · 9/15 CT head without acute changes  · Recently started Seroquel 25 mg HS and Lexapro was increased to 20 mg  Will discontinue Seroquel for now; if restarted, recommend 12 5 mg HS  · Mental status was noted to be improved at discharge during last admission in 08/2020, was treated with antibiotics until 9/9  Per chart review, patient noted to be confused 9/8/20 during office visit  Patient's baseline per family is usually alert and oriented to person and place  Patient also has been having episodes of agitation and yelling out at night  · WBC on presentation 13 43  Most recent level 10 45    · UA negative  · B12 level 476, folate level 4 2  · Blood cultures pending  · CXR demonstrates small left base infiltrate and/or slight atelectasis  Plan:  · Blood cultures pending  · Lexapro 20 mg  · Discontinue Seroquel  · Routine EEG  · Recommend neurosurgery consult for evaluation of shunt  · PT/OT  · Monitor neuro exam; notify with any changes  · Medical management and supportive care per primary team  Correction of infectious or metabolic disturbances      Normal pressure hydrocephalus (HCC)  Assessment & Plan  · Follows with Neurosurgery outpatient  · S/p VA shunt after  shunt infection/meningitis/peritonitis during last admission  Treated with antibiotics until 9/9/20  · CT head completed; see above for results  Essential hypertension  Assessment & Plan  · BP on presentation 136/88  Most recent /85  · On enalapril at baseline  · Medical management per primary team    Urinary retention  Assessment & Plan  · Grover catheter in place   · Urology following  · Medical management per primary team and Urology    Type 2 diabetes mellitus [facility])  Assessment & Plan  Lab Results   Component Value Date    HGBA1C 5 6 07/31/2020       Recent Labs     09/16/20  0615 09/16/20  1049   POCGLU 127 100       Blood Sugar Average: Last 72 hrs:  (P) 113 5     · Medical management per primary team        Recommendations for outpatient neurological follow up have yet to be determined  Case and treatment plan reviewed with attending neurologist, Dr Giovani Davila  History of Present Illness     Reason for Consult / Principal Problem: Worsening confusion  Hx and PE limited by: Poor historian  HPI: Kady Cartagena is a 69 y o   male with prior TIA, HTN, NPH complicated by  shunt infection with meningitis/peritonitis with subsequent [facility] shunt 08/2020, cognitive impairment, hx mesenteric ischemia s/p ex lap x 2 08/2020, neuropathy, sleep apnea, diabetes, who presents with worsening confusion  Per H&P review, patient presented from rehab after sustaining a fall with positive head strike as he was being transferred [facility] [facility] by staff  Patient was noted to be retaining urine in ED, therefore, grover was placed  Patient noted to be more confused than baseline (oriented to person and place) with episodes of agitation, especially at night  Per chart review, patient had extensive admission 7/30/2020-8/29/2020 due to ischemic bowel and also had  shunt infection with meningitis and peritonitis  He was treated with long term antibiotics   At the time of discharge, patient's mental status was noted to be improved as he was alert and oriented to person, place, president, but not date  Per nursing home note 8/31/20, patient's mental status continued to be improved but remained lethargic  Patient was evaluated by nursing home and Neurosurgery 9/8/20  Upon review, both notes note that patient was confused and unable to name location, president, or month  Per nursing home note 9/15/20, patient's significant cognitive decline was suspected to be cognitive impairment progressing into dementia  He was recently started on Seroquel 25 mg HS and Lexapro was increased to 20 mg  Inpatient consult to Neurology  Consult performed by: AR Irizarry  Consult ordered by: Can Schulte MD        Review of Systems   Eyes: Negative for photophobia and visual disturbance  Respiratory: Negative for cough and shortness of breath  Cardiovascular: Negative for chest pain  Musculoskeletal: Negative for arthralgias, back pain, myalgias and neck pain  Neurological: Positive for numbness (Chronic neuropathy)  Negative for dizziness, tremors, seizures, syncope, facial asymmetry, speech difficulty, weakness, light-headedness and headaches  Psychiatric/Behavioral: Negative for hallucinations  The patient is not nervous/anxious  All other systems reviewed and are negative        Historical Information   Past Medical History:   Diagnosis Date    Cancer [facility]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     Diabetes mellitus [facility] )     DM2 (diabetes mellitus, type 2) ([facility] )     Gout     HTN (hypertension)     [facility]     Hypertension     Neuropathy     ZHOU on CPAP     Pressure injury of skin     TIA (transient ischemic attack)      Past Surgical History:   Procedure Laterality Date    ABDOMINAL WALL SURGERY N/A 8/1/2020    Procedure: CLOSURE WOUND ABDOMINAL/TRUNK;  Surgeon: Ranjan Gagnon DO;  [facility];  Service: General    ARTERIOGRAM N/A 7/31/2020    Procedure: ARTERIOGRAM Of SMA and placement of Papavarine onfusion arterial catheter;  Surgeon: Lili Ramírez Tano Paris MD;  [facility];  Service: Vascular    ARTERIOGRAM Left 8/1/2020    Procedure: ARTERIOGRAM; cath removal;  Surgeon: Josee Britton MD;  [facility];  Service: Vascular    CSF SHUNT      x3 Op Reports, Ameena Buckt Aid in MPF chart viewer    FL LUMBAR PUNCTURE DIAGNOSTIC  8/11/2020    LAPAROTOMY N/A 7/31/2020    Procedure: LAPAROTOMY EXPLORATORY: Externalizes  shunt Melburn Pill application;  Surgeon: Mauri Jo MD;  [facility];  Service: General    LAPAROTOMY N/A 8/1/2020    Procedure: LAPAROTOMY EXPLORATORY,;  Surgeon: Ranjan Gagnon DO;  [facility];  Service: General    OH AMPUTATION FOOT,TRANSMETATARSAL Left 5/30/2020    Procedure: excision 5th metatarsal head   excision 5th metatarsal ulcer ;  Surgeon: Kamila Garber DPM;  [facility];  Service: Podiatry    OH ARTHDSIS POST/POSTEROLATRL/POSTINTERBODY LUMBAR N/A 7/6/2020    Procedure: MINIMALLY INVASIVE TRANSVERSE LUMBAR INTERBODY FUSION L5-S1 FROM LEFT-SIDED APPROACH;  Surgeon: Rosa Menard MD;  [facility];  Service: Neurosurgery    OH CREATE SHUNT:VENTRIC-ATRIAL Left 8/24/2020    Procedure: Left ventriculoatrial shunt;  Surgeon: Rosa Menard MD;  [facility];  Service: Neurosurgery    OH [address] CSF SHUNT,W/O REPLACE Right 8/7/2020    Procedure: REMOVAL SHUNT VENTRICULAR PERITONEAL;  Surgeon: Rosa Menard MD;  [facility];  Service: Neurosurgery    OH REPLACEMENT/REVISION,CSF SHUNT Right 7/6/2020    Procedure: REVISION OF SCALP OVER VENTRICULAR CATHETER IN THE RIGHT [address] [place] [address];  Surgeon: Rosa Menard MD;  [facility];  Service: Neurosurgery    WOUND DEBRIDEMENT N/A 8/1/2020    Procedure: abd washout;  Surgeon: Ranjan Gagnon DO;  [facility];  Service: General     Social History   Social History     Substance and Sexual Activity   Alcohol Use Not Currently    Frequency: Monthly or less    Drinks per session: 1 or 2     Social History     Substance and Sexual Activity   Drug Use Never     E-Cigarette/Vaping     E-Cigarette/Vaping Substances    Nicotine No     THC No     CBD No     Flavoring No     Other No     Unknown No      Social History     Tobacco Use   Smoking Status Never Smoker   Smokeless Tobacco Never Used     Family History:   Family History   Problem Relation Age of Onset    Polymyositis Mother     Heart failure Father        Review of previous medical records was completed  Meds/Allergies   all current active meds have been reviewed, current meds:   Current Facility-Administered Medications   Medication Dose Route Frequency    acetaminophen (TYLENOL) tablet 650 mg  650 mg Oral Q6H PRN    allopurinol (ZYLOPRIM) tablet 300 mg  300 mg Oral Daily    bisacodyl (DULCOLAX) rectal suppository 10 mg  10 mg Rectal Daily PRN    docusate sodium (COLACE) capsule 100 mg  100 mg Oral BID    enoxaparin (LOVENOX) subcutaneous injection 40 mg  40 mg Subcutaneous Daily    escitalopram (LEXAPRO) tablet 20 mg  20 mg Oral Daily    insulin lispro (HumaLOG) 100 units/mL subcutaneous injection 2-12 Units  2-12 Units Subcutaneous 4x Daily (AC & HS)    lisinopril (ZESTRIL) tablet 10 mg  10 mg Oral Daily    melatonin tablet 3 mg  3 mg Oral HS    moisture barrier miconazole 2% cream (aka VERONICA MOISTURE BARRIER ANTIFUNGAL CREAM)   Topical BID    oxyCODONE (ROXICODONE) IR tablet 5 mg  5 mg Oral Q4H PRN    pantoprazole (PROTONIX) EC tablet 40 mg  40 mg Oral Early Morning    QUEtiapine (SEROquel) tablet 25 mg  25 mg Oral HS    senna (SENOKOT) tablet 8 6 mg  1 tablet Oral Daily    sodium chloride 0 9 % infusion  75 mL/hr Intravenous Continuous    travoprost (TRAVATAN-Z) 0 004 % ophthalmic solution 1 drop  1 drop Right Eye HS    and PTA meds:   Prior to Admission Medications   Prescriptions Last Dose Informant Patient Reported? Taking?    Bisacodyl (DULCOLAX RE) Unknown at Unknown time  Yes No   Sig: Insert into the rectum as needed   Lancets ([address]) MISC Past Month at Unknown time  Yes Yes   Si (two) times a day   QUEtiapine (SEROquel) 25 mg tablet Past Month at Unknown time  Yes Yes   Sig: Take 25 mg by mouth daily at bedtime   acetaminophen (TYLENOL) 325 mg tablet Past Month at Unknown time  No Yes   Sig: Take 2 tablets (650 mg total) by mouth every 6 (six) hours as needed for mild pain   allopurinol (ZYLOPRIM) 300 mg tablet 2020 at Unknown time  Yes Yes   Si mg daily   docusate sodium (COLACE) 100 mg capsule Unknown at Unknown time  No No   Sig: Take 1 capsule (100 mg total) by mouth 2 (two) times a day   enalapril (VASOTEC) 5 mg tablet Past Month at Unknown time  No Yes   Sig: Take 1 tablet (5 mg total) by mouth daily   escitalopram (LEXAPRO) 20 mg tablet 2020 at Unknown time  Yes Yes   Sig: Take 20 mg by mouth daily   insulin lispro (HumaLOG) 100 units/mL injection Past Month at Unknown time  No Yes   Sig: Inject 1-6 Units under the skin 3 (three) times a day before meals   magnesium hydroxide (MILK OF MAGNESIA) 400 mg/5 mL oral suspension Unknown at Unknown time  Yes No   Sig: Take 30 mL by mouth daily as needed for constipation   melatonin 3 mg Past Month at Unknown time  No Yes   Sig: Take 1 tablet (3 mg total) by mouth daily at bedtime   multivitamin (THERAGRAN) TABS Past Month at Unknown time  Yes Yes   Sig: Take 1 tablet by mouth daily   oxyCODONE (ROXICODONE) 5 mg immediate release tablet Unknown at Unknown time  No No   Sig: Take 1 tablet (5 mg total) by mouth every 4 (four) hours as needed for moderate pain or severe painMax Daily Amount: 30 mg   pantoprazole (PROTONIX) 40 mg tablet Past Month at Unknown time  Yes Yes   Sig: Take 40 mg by mouth daily   senna (SENOKOT) 8 6 mg 2020 at Unknown time  No Yes   Sig: Take 1 tablet (8 6 mg total) by mouth daily   travoprost (Travatan Z) 0 004 % ophthalmic solution Past Month at Unknown time  Yes Yes   Sig: Administer 1 drop to the right eye daily at bedtime       Facility-Administered Medications: None       Allergies   Allergen Reactions    Pollen Extract Allergic Rhinitis       Objective   Vitals:Blood pressure 157/85, pulse 100, temperature 98 5 °F (36 9 °C), temperature source Oral, resp  rate 20, height 6' 3" (1 905 m), weight 102 kg (225 lb), SpO2 94 %  ,Body mass index is 28 12 kg/m²  Intake/Output Summary (Last 24 hours) at 9/16/2020 1524  Last data filed at 9/16/2020 1427  Gross per 24 hour   Intake 0 ml   Output 350 ml   Net -350 ml       Invasive Devices: Invasive Devices     Peripherally Inserted Central Catheter Line            PICC Line 01/49/87 Left Basilic 41 days          Peripheral Intravenous Line            Peripheral IV 09/16/20 Right Forearm less than 1 day          Drain            External Urinary Catheter Small 19 days                Physical Exam  Vitals signs and nursing note reviewed  Constitutional:       General: He is not in acute distress  Appearance: He is normal weight  He is ill-appearing  He is not diaphoretic  HENT:      Head:      Comments: Left frontal scarring noted     Mouth/Throat:      Mouth: Mucous membranes are dry  Pharynx: Oropharynx is clear  Eyes:      General: No scleral icterus  Right eye: No discharge  Left eye: No discharge  Extraocular Movements: Extraocular movements intact  Conjunctiva/sclera: Conjunctivae normal       Pupils: Pupils are equal, round, and reactive to light  Neck:      Musculoskeletal: Normal range of motion  Cardiovascular:      Rate and Rhythm: Normal rate  Pulmonary:      Effort: Pulmonary effort is normal  No respiratory distress  Genitourinary:     Comments: Griffith catheter in place  Musculoskeletal: Normal range of motion  General: No tenderness  Right lower leg: No edema  Left lower leg: No edema  Skin:     General: Skin is warm and dry        Coloration: Skin is not jaundiced  Findings: No erythema  Comments: Generalized abrasions   Neurological:      Mental Status: He is alert  Coordination: Finger-Nose-Finger Test abnormal (Bilateral dysmetria noted)  Deep Tendon Reflexes:      Reflex Scores:       Bicep reflexes are 1+ on the right side and 1+ on the left side  Brachioradialis reflexes are 1+ on the right side and 1+ on the left side  Patellar reflexes are 0 on the right side and 0 on the left side  Psychiatric:         Mood and Affect: Mood normal          Behavior: Behavior normal          Thought Content: Thought content normal          Judgment: Judgment normal        Neurologic Exam     Mental Status   Level of consciousness: alert  Able to state his name, that we are at [facility], that it is 2020  Unable to name the month  Had difficulty naming the President but when given options, he was able to choose Trump  Able to follow simple and complex commands  Able to calculate how many quarters in $1 but not $1 75  Able to spell world forward but not backwards  Able to name objects correctly  Unable to name the months of the year  No dysarthria noted  Cranial Nerves     CN II   Visual fields full to confrontation  CN III, IV, VI   Pupils are equal, round, and reactive to light  Nystagmus: none     CN V   Facial sensation intact       CN VIII   CN VIII normal      CN XI   CN XI normal      CN XII   CN XII normal    Right facial weakness noted     Motor Exam   Muscle bulk: normal  Right arm tone: increased  Left arm tone: normal  Right arm pronator drift: absent  Left arm pronator drift: absent  Bilateral UE strength 5/5 deltoid, biceps, triceps, hand   Bilateral LE strength 4/5 hip flexion, 5/5 dorsiflexion and plantar flexion     Sensory Exam   Light touch normal    Decreased vibration in bilateral LE due to chronic neuropathy     Gait, Coordination, and Reflexes     Gait  Gait: (Deferred for safety)    Coordination Finger to nose coordination: abnormal (Bilateral dysmetria noted)    Tremor   Resting tremor: absent    Reflexes   Right brachioradialis: 1+  Left brachioradialis: 1+  Right biceps: 1+  Left biceps: 1+  Right patellar: 0  Left patellar: 0  Right plantar: equivocal  Left plantar: equivocal  Bilateral UE intention tremor noted       Lab Results:   I have personally reviewed pertinent reports  , CBC:   Results from last 7 days   Lab Units 09/16/20  0603 09/15/20  2307   WBC Thousand/uL 10 45* 13 43*   RBC Million/uL 5 76* 5 80*   HEMOGLOBIN g/dL 15 5 15 6   HEMATOCRIT % 47 8 47 4   MCV fL 83 82   PLATELETS Thousands/uL 199 219   , BMP/CMP:   Results from last 7 days   Lab Units 09/16/20  0603 09/15/20  2307   SODIUM mmol/L 138 137   POTASSIUM mmol/L 3 6 4 0   CHLORIDE mmol/L 104 103   CO2 mmol/L 27 25   BUN mg/dL 11 12   CREATININE mg/dL 0 71 0 63   CALCIUM mg/dL 9 7 9 7   AST U/L  --  15   ALT U/L  --  19   ALK PHOS U/L  --  116   EGFR ml/min/1 73sq m 94 99   , Vitamin B12:   Results from last 7 days   Lab Units 09/16/20  0603   VITAMIN B 12 pg/mL 476   , HgBA1C:   , TSH:   Results from last 7 days   Lab Units 09/16/20  0603   TSH 3RD GENERATON uIU/mL 0 089*   , Coagulation:   , Lipid Profile:   , Ammonia:   , Urinalysis:   Results from last 7 days   Lab Units 09/15/20  2255 09/15/20  2252   COLOR UA  chart Emily   CLARITY UA   --  Clear   SPEC GRAV UA   --  1 025   PH UA   --  6 5   LEUKOCYTES UA   --  Negative   NITRITE UA   --  Negative   GLUCOSE UA mg/dl  --  Negative   KETONES UA mg/dl  --  Negative   BILIRUBIN UA   --  Negative   BLOOD UA   --  Negative         Imaging Studies: I have personally reviewed pertinent reports  EKG, Pathology, and Other Studies: I have personally reviewed pertinent reports      VTE Prophylaxis: Sequential compression device (Venodyne)  and Enoxaparin (Lovenox)

## 2020-09-16 NOTE — DISCHARGE INSTR - OTHER ORDERS
Skin care plans:  1-Apply VERONICA to sacrum, buttocks BID and PRN  2-Hydraguard to bilateral heel BID and PRN  3-Elevate heels to offload pressure  4-Ehob cushion when out of bed  5-Turn/repoisiton q2h or when medically stable for pressure re-distribution on skin  6-Moisturize skin daily with skin nourishing cream   7-Low air loss mattress  Please use a flat sheet instead of a fitted sheet with a low air loss mattress

## 2020-09-16 NOTE — ASSESSMENT & PLAN NOTE
· Follows with Neurosurgery outpatient  · S/p VA shunt after  shunt infection/meningitis/peritonitis during last admission  Treated with antibiotics until 9/9/20  · CT head completed; see above for results    · Shunt series pending  · Neurosurgery consult pending

## 2020-09-16 NOTE — ASSESSMENT & PLAN NOTE
· S/p VA shunt after  shunt infection/meningitis/peritonitis during last hospitalization treated with meropenem until 9/9  · CTH in ED with stable ventriculomegaly,  shunt catheter tip in the left lateral ventricle   · Obtain shunt series   · Appreciate neurosurgery consult

## 2020-09-16 NOTE — ASSESSMENT & PLAN NOTE
· PVR in ED noted to be 270 mL and Griffith catheter inserted  · Evaluated by Urology, appreciate recommendations   · No significant evidence of urinary retention   · D/C Griffith catheter today   · Urinary retention protocol

## 2020-09-16 NOTE — PLAN OF CARE
Problem: OCCUPATIONAL THERAPY ADULT  Goal: Performs self-care activities at highest level of function for planned discharge setting  See evaluation for individualized goals  Description: Treatment Interventions: ADL retraining, Functional transfer training, UE strengthening/ROM, Endurance training, Cognitive reorientation, Patient/family training, Equipment evaluation/education, Fine motor coordination activities, Compensatory technique education, Energy conservation, Activityengagement          See flowsheet documentation for full assessment, interventions and recommendations  Outcome: Progressing  Note: Limitation: Decreased ADL status, Decreased UE strength, Decreased Safe judgement during ADL, Decreased cognition, Decreased endurance, Decreased self-care trans, Decreased high-level ADLs, Decreased fine motor control  Prognosis: Fair  Assessment: Pt is a 69 y o  male admitted to [facility] on 9/15/2020 w/ Confusion  Comorbidities include a h/o S/P Ventriculoperitoneal Shunt, Unspecified Abnormalities of Gait and Mobility, Gout, Diabetic Ulcer of Left Foot, ZHOU on CPAP, Spondylothesis of Lumbosacral Region, Suspected Deep Tissue Injury, Unstageable Pressure Ulcer of Sacral Region  Pt with active OT orders and up with assistance  orders  Pt resides at [place] [address] with facility staff assistance  Pt was needed assistance w/  ADLS and IADLS  Pt poor historian and unable to obtain actual functional status and if patient utilized any AD  Currently pt requires max A x2 for bed mobility and sitting EOB balance and max A for all ADLs including bathing, dressing and toileting  Pt is limited at this time 2*: endurance, activity tolerance, functional mobility, forward functional reach, balance, trunk control, functional standing tolerance, decreased I w/ ADLS/IADLS, strength, cognitive impairments, decreased safety awareness, decreased insight into deficits and impaired fine motor skills  The following Occupational Performance Areas to address include: eating, grooming, bathing/shower, toilet hygiene, dressing, functional mobility and clothing management  Pt scored overall  10/100 on the Barthel Index  Based on the aforementioned OT evaluation, functional performance deficits, and assessments, pt has been identified as a high complexity evaluation  From OT standpoint, anticipate d/c to [facility] with rehabilitation services  Pt to continue to benefit from acute immediate OT services to address the following goals 2-3x/week to  w/in 14-21 days:       OT Discharge Recommendation: (S) Post-Acute Rehabilitation Services(Return to [facility])  OT - OK to Discharge: Yes(to SNF once medically cleared)

## 2020-09-16 NOTE — ASSESSMENT & PLAN NOTE
· EKG x 2 with mild ST depressions  · Patient without chest pain   · Troponin negative x 2  · Recent ROSEMARIE largely unremarkable

## 2020-09-16 NOTE — ASSESSMENT & PLAN NOTE
· S/p VA shunt after  shunt infection/meningitis/peritonitis during last hospitalization treated with meropenem until 9/9  · CTH in ED with stable ventriculomegaly,  shunt catheter tip in the left lateral ventricle

## 2020-09-16 NOTE — DISCHARGE INSTRUCTIONS
Return to the emergency department for severe headache, dizziness, persistent vomiting, confusion, any other new or concerning symptoms

## 2020-09-16 NOTE — PLAN OF CARE
Problem: Potential for Falls  Goal: Patient will remain free of falls  Description: INTERVENTIONS:  - Assess patient frequently for physical needs  -  Identify cognitive and physical deficits and behaviors that affect risk of falls    -  [place] fall precautions as indicated by assessment   - Educate patient/family on patient safety including physical limitations  - Instruct patient to call for assistance with activity based on assessment  - Modify environment to reduce risk of injury  - Consider OT/PT consult to assist with strengthening/mobility  Outcome: Progressing     Problem: PAIN - ADULT  Goal: Verbalizes/displays adequate comfort level or baseline comfort level  Description: Interventions:  - Encourage patient to monitor pain and request assistance  - Assess pain using appropriate pain scale  - Administer analgesics based on type and severity of pain and evaluate response  - Implement non-pharmacological measures as appropriate and evaluate response  - Consider cultural and social influences on pain and pain management  - Notify physician/advanced practitioner if interventions unsuccessful or patient reports new pain  Outcome: Progressing     Problem: INFECTION - ADULT  Goal: Absence or prevention of progression during hospitalization  Description: INTERVENTIONS:  - Assess and monitor for signs and symptoms of infection  - Monitor lab/diagnostic results  - Monitor all insertion sites, i e  indwelling lines, tubes, and drains  - Monitor endotracheal if appropriate and nasal secretions for changes in amount and color  - [place] appropriate cooling/warming therapies per order  - Administer medications as ordered  - Instruct and encourage patient and family to use good hand hygiene technique  - Identify and instruct in appropriate isolation precautions for identified infection/condition  Outcome: Progressing  Goal: Absence of fever/infection during neutropenic period  Description: INTERVENTIONS:  - Monitor WBC    Outcome: Progressing     Problem: SAFETY ADULT  Goal: Patient will remain free of falls  Description: INTERVENTIONS:  - Assess patient frequently for physical needs  -  Identify cognitive and physical deficits and behaviors that affect risk of falls    -  [place] fall precautions as indicated by assessment   - Educate patient/family on patient safety including physical limitations  - Instruct patient to call for assistance with activity based on assessment  - Modify environment to reduce risk of injury  - Consider OT/PT consult to assist with strengthening/mobility  Outcome: Progressing  Goal: Maintain or return to baseline ADL function  Description: INTERVENTIONS:  -  Assess patient's ability to carry out ADLs; assess patient's baseline for ADL function and identify physical deficits which impact ability to perform ADLs (bathing, care of mouth/teeth, toileting, grooming, dressing, etc )  - Assess/evaluate cause of self-care deficits   - Assess range of motion  - Assess patient's mobility; develop plan if impaired  - Assess patient's need for assistive devices and provide as appropriate  - Encourage maximum independence but intervene and supervise when necessary  - Involve family in performance of ADLs  - Assess for home care needs following discharge   - Consider OT consult to assist with ADL evaluation and planning for discharge  - Provide patient education as appropriate  Outcome: Progressing  Goal: Maintain or return mobility status to optimal level  Description: INTERVENTIONS:  - Assess patient's baseline mobility status (ambulation, transfers, stairs, etc )    - Identify cognitive and physical deficits and behaviors that affect mobility  - Identify mobility aids required to assist with transfers and/or ambulation (gait belt, sit-to-stand, lift, walker, cane, etc )  - [place] fall precautions as indicated by assessment  - Record patient progress and toleration of activity level on Mobility SBAR; progress patient to next Phase/Stage  - Instruct patient to call for assistance with activity based on assessment  - Consider rehabilitation consult to assist with strengthening/weightbearing, etc   Outcome: Progressing     Problem: DISCHARGE PLANNING  Goal: Discharge to home or other facility with appropriate resources  Description: INTERVENTIONS:  - Identify barriers to discharge w/patient and caregiver  - Arrange for needed discharge resources and transportation as appropriate  - Identify discharge learning needs (meds, wound care, etc )  - Arrange for interpretive services to assist at discharge as needed  - Refer to Case Management Department for coordinating discharge planning if the patient needs post-hospital services based on physician/advanced practitioner order or complex needs related to functional status, cognitive ability, or social support system  Outcome: Progressing     Problem: Knowledge Deficit  Goal: Patient/family/caregiver demonstrates understanding of disease process, treatment plan, medications, and discharge instructions  Description: Complete learning assessment and assess knowledge base    Interventions:  - Provide teaching at level of understanding  - Provide teaching via preferred learning methods  Outcome: Progressing     Problem: SKIN/TISSUE INTEGRITY - ADULT  Goal: Skin integrity remains intact  Description: INTERVENTIONS  - Identify patients at risk for skin breakdown  - Assess and monitor skin integrity  - Assess and monitor nutrition and hydration status  - Monitor labs (i e  albumin)  - Assess for incontinence   - Turn and reposition patient  - Assist with mobility/ambulation  - Relieve pressure over bony prominences  - Avoid friction and shearing  - Provide appropriate hygiene as needed including keeping skin clean and dry  - Evaluate need for skin moisturizer/barrier cream  - Collaborate with interdisciplinary team (i e  Nutrition, Rehabilitation, etc )   - Patient/family teaching  Outcome: Progressing  Goal: Incision(s), wounds(s) or drain site(s) healing without S/S of infection  Description: INTERVENTIONS  - Assess and document risk factors for skin impairment   - Assess and document dressing, incision, wound bed, drain sites and surrounding tissue  - Consider nutrition services referral as needed  - Oral mucous membranes remain intact  - Provide patient/ family education  Outcome: Progressing  Goal: Oral mucous membranes remain intact  Description: INTERVENTIONS  - Assess oral mucosa and hygiene practices  - Implement preventative oral hygiene regimen  - Implement oral medicated treatments as ordered  - Initiate Nutrition services referral as needed  Outcome: Progressing     Problem: MUSCULOSKELETAL - ADULT  Goal: Maintain or return mobility to safest level of function  Description: INTERVENTIONS:  - Assess patient's ability to carry out ADLs; assess patient's baseline for ADL function and identify physical deficits which impact ability to perform ADLs (bathing, care of mouth/teeth, toileting, grooming, dressing, etc )  - Assess/evaluate cause of self-care deficits   - Assess range of motion  - Assess patient's mobility  - Assess patient's need for assistive devices and provide as appropriate  - Encourage maximum independence but intervene and supervise when necessary  - Involve family in performance of ADLs  - Assess for home care needs following discharge   - Consider OT consult to assist with ADL evaluation and planning for discharge  - Provide patient education as appropriate  Outcome: Progressing  Goal: Maintain proper alignment of affected body part  Description: INTERVENTIONS:  - Support, maintain and protect limb and body alignment  - Provide patient/ family with appropriate education  Outcome: Progressing     Problem: Prexisting or High Potential for Compromised Skin Integrity  Goal: Skin integrity is maintained or improved  Description: INTERVENTIONS:  - Identify patients at risk for skin breakdown  - Assess and monitor skin integrity  - Assess and monitor nutrition and hydration status  - Monitor labs   - Assess for incontinence   - Turn and reposition patient  - Assist with mobility/ambulation  - Relieve pressure over bony prominences  - Avoid friction and shearing  - Provide appropriate hygiene as needed including keeping skin clean and dry  - Evaluate need for skin moisturizer/barrier cream  - Collaborate with interdisciplinary team   - Patient/family teaching  - Consider wound care consult   Outcome: Progressing

## 2020-09-16 NOTE — OCCUPATIONAL THERAPY NOTE
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     Diabetes mellitus ([facility] )     DM2 (diabetes mellitus, type 2) ([facility] )     Gout     HTN (hypertension)     Hydrocephalus ([facility] )     Hypertension     Neuropathy     ZHOU on CPAP     Pressure injury of skin     TIA (transient ischemic attack)      Past Surgical History  Past Surgical History:   Procedure Laterality Date    ABDOMINAL WALL SURGERY N/A 8/1/2020    Procedure: CLOSURE WOUND ABDOMINAL/TRUNK;  Surgeon: Asuncion Rene DO;  [facility];  Service: General    ARTERIOGRAM N/A 7/31/2020    Procedure: ARTERIOGRAM Of SMA and placement of Papavarine onfusion arterial catheter;  Surgeon: Flaquito Fuller MD;  [facility];  Service: Vascular    ARTERIOGRAM Left 8/1/2020    Procedure: ARTERIOGRAM; cath removal;  Surgeon: Flaquito Fuller MD;  [facility];  Service: Vascular    CSF SHUNT      x3 Op Reports, Dr Faith Kendall, Gia Naik in [facility] chart viewer    FL LUMBAR PUNCTURE DIAGNOSTIC  8/11/2020    LAPAROTOMY N/A 7/31/2020    Procedure: LAPAROTOMY EXPLORATORY: Externalizes  shunt Jame Gehrig application;  Surgeon: Miky Griffin MD;  [facility];  Service: General    LAPAROTOMY N/A 8/1/2020    Procedure: LAPAROTOMY EXPLORATORY,;  Surgeon: Asuncion Rene DO;  [facility];  Service: General    MS AMPUTATION FOOT,TRANSMETATARSAL Left 5/30/2020    Procedure: excision 5th metatarsal head   excision 5th metatarsal ulcer ;  Surgeon: Flavia Martinez DPM;  [facility];  Service: Podiatry    MS ARTHDSIS POST/POSTEROLATRL/POSTINTERBODY LUMBAR N/A 7/6/2020    Procedure: MINIMALLY INVASIVE TRANSVERSE LUMBAR INTERBODY FUSION L5-S1 FROM LEFT-SIDED APPROACH;  Surgeon: Silvana Melendez MD;  [facility];  Service: Neurosurgery    CT CREATE SHUNT:VENTRIC-ATRIAL Left 8/24/2020    Procedure: Left ventriculoatrial shunt;  Surgeon: Silvana Melendez MD;  [facility];  Service: Neurosurgery    CT Theoplis Shack CSF SHUNT,W/O REPLACE Right 8/7/2020    Procedure: REMOVAL SHUNT VENTRICULAR PERITONEAL;  Surgeon: Silvana Melendez MD;  [facility];  Service: Neurosurgery    CT REPLACEMENT/REVISION,CSF SHUNT Right 7/6/2020    Procedure: REVISION OF SCALP OVER VENTRICULAR CATHETER IN THE RIGHT CORONAL REGION;  Surgeon: Silvana Melendez MD;  [facility];  Service: Neurosurgery    WOUND DEBRIDEMENT N/A 8/1/2020    Procedure: abd washout;  Surgeon: Shaylee Valadez DO;  [facility];  Service: General         09/16/20 1209   OT Last Visit   OT Visit Date 09/16/20   Note Type   Note type Eval only   Restrictions/Precautions   Weight Bearing Precautions Per Order No   Other Precautions Cognitive;Combative; Chair Alarm; Bed Alarm;Multiple lines; Fall Risk   Pain Assessment   Pain Assessment Tool FLACC   Pain Rating: FLACC (Rest) - Face 0   Pain Rating: FLACC (Rest) - Legs 0   Pain Rating: FLACC (Rest) - Activity 0   Pain Rating: FLACC (Rest) - Cry 0   Pain Rating: FLACC (Rest) - Consolability 0   Score: FLACC (Rest) 0   Home Living   Type of Home SNF  ([facility])   Additional Comments Pt resided at [facility]F    Prior Function   Level of [place] Needs assistance with ADLs and functional mobility; Needs assistance with IADLs   Lives With Facility staff   ADL Assistance Needs assistance   IADLs Needs assistance   Falls in the last 6 months 1 to 4  (Reason for admission)   Comments Pt had assistance at baseline for ADLs/IADLs from SNF staff   Psychosocial   Psychosocial (WDL) X   Patient Behaviors/Mood Flat affect; Not interactive;Irritable   ADL   Eating Assistance 4  Minimal Assistance   Eating Deficit Setup;Verbal cueing;Supervision/safety; Increased time to complete   Grooming Assistance 2  Maximal Assistance   UB Bathing Assistance 2  Maximal Assistance   LB Bathing Assistance 2  Maximal Assistance   UB Dressing Assistance 2  Maximal Assistance   LB Dressing Assistance 2  Maximal Assistance   Toileting Assistance  2  Maximal Assistance   Bed Mobility   Rolling R 2  Maximal assistance   Additional items Assist x 2;HOB elevated; Bedrails; Increased time required;Verbal cues   Supine to Sit 2  Maximal assistance   Additional items Assist x 2;HOB elevated; Bedrails; Increased time required;Verbal cues;LE management   Sit to Supine 2  Maximal assistance   Additional items Assist x 2;HOB elevated; Bedrails; Increased time required;Verbal cues;LE management   Balance   Static Sitting Poor   Dynamic Sitting Poor -   Activity Tolerance   Activity Tolerance Patient limited by fatigue;Treatment limited secondary to agitation;Treatment limited secondary to medical complications (Comment)   Medical Staff Made Aware PT    Nurse Made Aware RN gave clearance for evaluation   RUE Assessment   RUE Assessment WFL   LUE Assessment   LUE Assessment WFL   Hand Function   Gross Motor Coordination Functional   Fine Motor Coordination Impaired   Cognition   Overall Cognitive Status Impaired   Arousal/Participation Responsive;Lethargic   Attention Attends with cues to redirect   Orientation Level Oriented to person;Oriented to place; Disoriented to time;Disoriented to situation   Memory Decreased short term memory;Decreased recall of recent events;Decreased recall of precautions   Following Commands Follows one step commands with increased time or repetition   Comments Pt lethargic upon evaluation however oriented to self and place  Assessment   Limitation Decreased ADL status; Decreased UE strength;Decreased Safe judgement during ADL;Decreased cognition;Decreased endurance;Decreased self-care trans;Decreased high-level ADLs; Decreased fine motor control   Prognosis Fair   Assessment Pt is a 69 y o  male admitted to [facility] on [facility]/15/2020 w/ Confusion  Comorbidities include a h/o S/P Ventriculoperitoneal Shunt, Unspecified Abnormalities of Gait and Mobility, Gout, Diabetic Ulcer of Left Foot, ZHOU on CPAP, Spondylothesis of Lumbosacral Region, Suspected Deep Tissue Injury, Unstageable Pressure Ulcer of Sacral Region  Pt with active OT orders and up with assistance  orders  Pt resides at [place] [id number] with facility staff assistance  Pt was needed assistance w/  ADLS and IADLS  Pt poor historian and unable to obtain actual functional status and if patient utilized any AD  Currently pt requires max A x2 for bed mobility and sitting EOB balance and max A for all ADLs including bathing, dressing and toileting  Pt is limited at this time 2*: endurance, activity tolerance, functional mobility, forward functional reach, balance, trunk control, functional standing tolerance, decreased I w/ ADLS/IADLS, strength, cognitive impairments, decreased safety awareness, decreased insight into deficits and impaired fine motor skills  The following Occupational Performance Areas to address include: eating, grooming, bathing/shower, toilet hygiene, dressing, functional mobility and clothing management  Pt scored overall  10/100 on the Barthel Index  Based on the aforementioned OT evaluation, functional performance deficits, and assessments, pt has been identified as a high complexity evaluation  From OT standpoint, [facility]  Pt to continue to benefit from acute immediate OT services to address the following goals 2-3x/week to  w/in 14-21 days:  Plan   Treatment Interventions ADL retraining;Functional transfer training;UE strengthening/ROM; Endurance training;Cognitive reorientation;Patient/family training;Equipment evaluation/education; Fine motor coordination activities; Compensatory technique education; Energy conservation; Activityengagement   Goal Expiration Date 09/30/20   OT Treatment Day 0   OT Frequency 2-3x/wk   Recommendation   OT Discharge Recommendation Post-Acute Rehabilitation Services  (Return to [facility])   OT - OK to Discharge Yes  (to SNF once medically cleared)   Barthel Index   Feeding 5   Bathing 0   Grooming Score 0   Dressing Score 0   Bladder Score 0   Bowels Score 5   Toilet Use Score 0   Transfers (Bed/Chair) Score 0   Mobility (Level Surface) Score 0   Stairs Score 0   Barthel Index Score 10   Modified [place] Scale   Modified [place] Scale 4     GOALS    1) Pt will increase activity tolerance to G for 30 min txment sessions    2) Pt will complete UB/LB dressing/self care w/ mod A using adaptive device and DME as needed    3) Pt will complete bathing w/ Mod A w/ use of AE and DME as needed    4) Pt will complete toileting w/ Mod A w/ G hygiene/thoroughness using DME as needed    5) Pt will improve functional transfers to Mod A on/off all surfaces using DME as needed w/ G balance/safety     6) Pt will improve functional mobility during ADL/IADL/leisure tasks to Mod A using DME as needed w/ G balance/safety     7) Pt will engage in ongoing cognitive assessment w/ G participation to assist w/ safe d/c planning/recommendations    8) Pt will improve fine motor coordination and grasp for carryover to performing self feeding and grooming tasks with Amanda Tejeda MOT,OTR/L

## 2020-09-16 NOTE — ASSESSMENT & PLAN NOTE
· Initially brought in for fall at rehab facility however patient's brother and rehab staff reporting worsening of baseline cognitive impairment since discharge from hospital 8/29, in setting of recent prolonged and complicated hospitalization  · Per rehab notes, noted to have increasing behavioral issues with agitation/screaming and calling out "help me" deuce   at night  · Was started on seroquel 25 mg qhs, lexapro increased to 20 mg, no other recent med changes  · 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 and stable ventriculomegaly  · ? urinary retention and/or penumonia contributing to delirium - see related plans   · Appreciate neurology consult and recommendations  · Discontinued Seroquel   · Routine EEG   · Continue neuro checks

## 2020-09-16 NOTE — ASSESSMENT & PLAN NOTE
· Unclear etiology; down-trending   · No other SRIS criteria present  · UA unremarkable  · Chest x-ray: Small left base infiltrate and/or slight atelectasis     · Procalcitonin negative, repeat with morning labs   · Blood cultures pending  · Patient with multiple skin abrasions, sacral ulcer and fungal infection   · Continue local wound care  · Monitor off antibiotics

## 2020-09-17 ENCOUNTER — APPOINTMENT (INPATIENT)
Dept: NEUROLOGY | Facility: CLINIC | Age: 70
DRG: 884 | End: 2020-09-17
Payer: COMMERCIAL

## 2020-09-17 PROBLEM — G93.40 ENCEPHALOPATHY: Status: ACTIVE | Noted: 2020-09-10

## 2020-09-17 LAB
ANION GAP SERPL CALCULATED.3IONS-SCNC: 9 MMOL/L (ref 4–13)
BASOPHILS # BLD AUTO: 0.08 THOUSANDS/ΜL (ref 0–0.1)
BASOPHILS NFR BLD AUTO: 1 % (ref 0–1)
BUN SERPL-MCNC: 14 MG/DL (ref 5–25)
CALCIUM SERPL-MCNC: 9.1 MG/DL (ref 8.3–10.1)
CHLORIDE SERPL-SCNC: 107 MMOL/L (ref 100–108)
CO2 SERPL-SCNC: 24 MMOL/L (ref 21–32)
CREAT SERPL-MCNC: 0.68 MG/DL (ref 0.6–1.3)
EOSINOPHIL # BLD AUTO: 0.2 THOUSAND/ΜL (ref 0–0.61)
EOSINOPHIL NFR BLD AUTO: 2 % (ref 0–6)
ERYTHROCYTE [DISTWIDTH] IN BLOOD BY AUTOMATED COUNT: 15.9 % (ref 11.6–15.1)
GFR SERPL CREATININE-BSD FRML MDRD: 96 ML/MIN/1.73SQ M
GLUCOSE SERPL-MCNC: 106 MG/DL (ref 65–140)
GLUCOSE SERPL-MCNC: 108 MG/DL (ref 65–140)
GLUCOSE SERPL-MCNC: 112 MG/DL (ref 65–140)
GLUCOSE SERPL-MCNC: 118 MG/DL (ref 65–140)
GLUCOSE SERPL-MCNC: 119 MG/DL (ref 65–140)
HCT VFR BLD AUTO: 42.4 % (ref 36.5–49.3)
HGB BLD-MCNC: 13.2 G/DL (ref 12–17)
IMM GRANULOCYTES # BLD AUTO: 0.1 THOUSAND/UL (ref 0–0.2)
IMM GRANULOCYTES NFR BLD AUTO: 1 % (ref 0–2)
LYMPHOCYTES # BLD AUTO: 1.37 THOUSANDS/ΜL (ref 0.6–4.47)
LYMPHOCYTES NFR BLD AUTO: 13 % (ref 14–44)
MCH RBC QN AUTO: 26.5 PG (ref 26.8–34.3)
MCHC RBC AUTO-ENTMCNC: 31.1 G/DL (ref 31.4–37.4)
MCV RBC AUTO: 85 FL (ref 82–98)
MONOCYTES # BLD AUTO: 1.05 THOUSAND/ΜL (ref 0.17–1.22)
MONOCYTES NFR BLD AUTO: 10 % (ref 4–12)
NEUTROPHILS # BLD AUTO: 7.84 THOUSANDS/ΜL (ref 1.85–7.62)
NEUTS SEG NFR BLD AUTO: 73 % (ref 43–75)
NRBC BLD AUTO-RTO: 0 /100 WBCS
PLATELET # BLD AUTO: 218 THOUSANDS/UL (ref 149–390)
PMV BLD AUTO: 10.4 FL (ref 8.9–12.7)
POTASSIUM SERPL-SCNC: 3.6 MMOL/L (ref 3.5–5.3)
PROCALCITONIN SERPL-MCNC: <0.05 NG/ML
RBC # BLD AUTO: 4.99 MILLION/UL (ref 3.88–5.62)
SODIUM SERPL-SCNC: 140 MMOL/L (ref 136–145)
WBC # BLD AUTO: 10.64 THOUSAND/UL (ref 4.31–10.16)

## 2020-09-17 PROCEDURE — 80048 BASIC METABOLIC PNL TOTAL CA: CPT | Performed by: PHYSICIAN ASSISTANT

## 2020-09-17 PROCEDURE — 99223 1ST HOSP IP/OBS HIGH 75: CPT | Performed by: INTERNAL MEDICINE

## 2020-09-17 PROCEDURE — 99024 POSTOP FOLLOW-UP VISIT: CPT | Performed by: PHYSICIAN ASSISTANT

## 2020-09-17 PROCEDURE — 84145 PROCALCITONIN (PCT): CPT | Performed by: PHYSICIAN ASSISTANT

## 2020-09-17 PROCEDURE — NC001 PR NO CHARGE: Performed by: INTERNAL MEDICINE

## 2020-09-17 PROCEDURE — 82948 REAGENT STRIP/BLOOD GLUCOSE: CPT

## 2020-09-17 PROCEDURE — 95819 EEG AWAKE AND ASLEEP: CPT | Performed by: PSYCHIATRY & NEUROLOGY

## 2020-09-17 PROCEDURE — 99232 SBSQ HOSP IP/OBS MODERATE 35: CPT | Performed by: PSYCHIATRY & NEUROLOGY

## 2020-09-17 PROCEDURE — 99232 SBSQ HOSP IP/OBS MODERATE 35: CPT | Performed by: PHYSICIAN ASSISTANT

## 2020-09-17 PROCEDURE — 85025 COMPLETE CBC W/AUTO DIFF WBC: CPT | Performed by: PHYSICIAN ASSISTANT

## 2020-09-17 PROCEDURE — 95816 EEG AWAKE AND DROWSY: CPT

## 2020-09-17 RX ORDER — ESCITALOPRAM OXALATE 10 MG/1
10 TABLET ORAL DAILY
Status: DISCONTINUED | OUTPATIENT
Start: 2020-09-18 | End: 2020-09-22 | Stop reason: HOSPADM

## 2020-09-17 RX ORDER — QUETIAPINE FUMARATE 25 MG/1
12.5 TABLET, FILM COATED ORAL
Status: DISCONTINUED | OUTPATIENT
Start: 2020-09-17 | End: 2020-09-22 | Stop reason: HOSPADM

## 2020-09-17 RX ADMIN — ALLOPURINOL 300 MG: 300 TABLET ORAL at 09:29

## 2020-09-17 RX ADMIN — LISINOPRIL 10 MG: 10 TABLET ORAL at 09:30

## 2020-09-17 RX ADMIN — QUETIAPINE FUMARATE 12.5 MG: 25 TABLET ORAL at 22:01

## 2020-09-17 RX ADMIN — DOCUSATE SODIUM 100 MG: 100 CAPSULE, LIQUID FILLED ORAL at 09:30

## 2020-09-17 RX ADMIN — ENOXAPARIN SODIUM 40 MG: 40 INJECTION SUBCUTANEOUS at 09:31

## 2020-09-17 RX ADMIN — MICONAZOLE NITRATE: 20 CREAM TOPICAL at 17:36

## 2020-09-17 RX ADMIN — PANTOPRAZOLE SODIUM 40 MG: 40 TABLET, DELAYED RELEASE ORAL at 05:24

## 2020-09-17 RX ADMIN — ESCITALOPRAM OXALATE 20 MG: 20 TABLET ORAL at 09:30

## 2020-09-17 RX ADMIN — TRAVOPROST 1 DROP: 0.04 SOLUTION/ DROPS OPHTHALMIC at 22:02

## 2020-09-17 RX ADMIN — SENNOSIDES 8.6 MG: 8.6 TABLET, FILM COATED ORAL at 09:30

## 2020-09-17 RX ADMIN — MICONAZOLE NITRATE 1 APPLICATION: 20 CREAM TOPICAL at 09:36

## 2020-09-17 RX ADMIN — DOCUSATE SODIUM 100 MG: 100 CAPSULE, LIQUID FILLED ORAL at 17:36

## 2020-09-17 RX ADMIN — MELATONIN 3 MG: at 22:01

## 2020-09-17 NOTE — PROGRESS NOTES
Progress Note - Neurology   Keyur Flynn 69 y o  male [id number]  Unit/Bed#: /-01    Assessment:  69 y o  male with prior TIA, HTN, NPH complicated by  shunt infection with meningitis/peritonitis with subsequent [facility] shunt placement 08/2020, cognitive impairment, hx mesenteric ischemia s/p ex lap x 2 08/2020, neuropathy, sleep apnea, diabetes, who presented with worsening confusion with periods of agitation and yelling out since his recent discharge to nursing home  * Confusion  Assessment & Plan  Assessment:  · Unclear etiology at this time  Suspect possibly toxic metabolic encephalopathy vs  Progression of underlying dementia  Although low suspicion for seizures, will check routine EEG for further evaluation  · 9/15 CT head without acute changes  · Recently started Seroquel 25 mg HS and Lexapro was increased to 20 mg  Seroquel discontinued yesterday; if restarted, recommend 12 5 mg HS  · Mental status was noted to be improved at discharge during last admission in 08/2020, was treated with antibiotics until 9/9  Per chart review, patient noted to be confused 9/8/20 during office visit  Patient's baseline per family is usually alert and oriented to person and place  Patient also has been having episodes of agitation and yelling out at night  · WBC on presentation 13 43  Most recent level 10 64    · UA negative  · B12 level 476, folate level 4 2  · Blood cultures pending  · CXR demonstrates small left base infiltrate and/or slight atelectasis  Plan:  · Blood cultures pending  · Lexapro 20 mg  · Routine EEG  · Neurosurgery consult pending  · Geriatrics consult pending  · PT/OT  · Monitor neuro exam; notify with any changes  · Medical management and supportive care per primary team  Correction of infectious or metabolic disturbances      Normal pressure hydrocephalus (HCC)  Assessment & Plan  · Follows with Neurosurgery outpatient  · S/p VA shunt after  shunt infection/meningitis/peritonitis during last admission  Treated with antibiotics until 9/9/20  · CT head completed; see above for results  · Shunt series pending  · Neurosurgery consult pending    Essential hypertension  Assessment & Plan  · BP on presentation 136/88  Most recent /60  · On enalapril at baseline  · Medical management per primary team    Urinary retention  Assessment & Plan  · Griffith catheter removed  · Urology following  · Medical management per primary team and Urology    Type 2 diabetes mellitus [facility])  Assessment & Plan  Lab Results   Component Value Date    HGBA1C 5 6 07/31/2020       Recent Labs     09/16/20  0615 09/16/20  1049   POCGLU 127 100       Blood Sugar Average: Last 72 hrs:  (P) 113 5     · Medical management per primary team       Update: Routine EEG demonstrated "diffuse background irregularity and intermixed theta slowing, intermittent left anterior temporal sometimes sharply contoured theta slowing and rare left anterior temporal sharp waves noted during sleep  These abnormalities are consistent with mild to moderate diffuse cerebral dysfunction with superimposed focal left anterior temporal neuronal dysfunction which may be epileptogenic  No electrographic seizures noted " Discussed with attending neurologist, Dr Silva   Low suspicion for seizures so will defer video EEG monitoring at this time  If patient develops spells or episodes of seizure-like activity, may consider video EEG monitoring for further evaluation  Updated NAYE ALMA Rosas Bone will need follow up in in 4 weeks with general attending or advance practitioner  He will not require outpatient neurological testing  Subjective:   Patient resting in bed comfortably  He currently denies any complaints or pain  He does not remember meeting me yesterday  He states that he ate breakfast and it went well  He does not recall any episodes of confusion   He does appear to be more alert and awake today in comparison to yesterday  Past Medical History:   Diagnosis Date    Cancer [facility]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     [facility]     DM2 [facility]diabetes mellitus, type 2) [facility]     Gout     HTN (hypertension)     Hydrocephalus ([facility] )     Hypertension     Neuropathy     ZHOU on CPAP     Pressure injury of skin     TIA (transient ischemic attack)      Past Surgical History:   Procedure Laterality Date    ABDOMINAL WALL SURGERY N/A 8/1/2020    Procedure: CLOSURE WOUND ABDOMINAL/TRUNK;  Surgeon: Felicita Nicolas DO;  [facility];  Service: General    ARTERIOGRAM N/A 7/31/2020    Procedure: ARTERIOGRAM Of SMA and placement of Papavarine onfusion arterial catheter;  Surgeon: Abrahan Grimm MD;  [facility];  Service: Vascular    ARTERIOGRAM Left 8/1/2020    Procedure: ARTERIOGRAM; cath removal;  Surgeon: Abrahan Grimm MD;  [facility];  Service: Vascular    CSF SHUNT      x3 Op Reports, Dr Roxanne Leonard, Genetta Patience in [facility] chart viewer    FL LUMBAR PUNCTURE DIAGNOSTIC  8/11/2020    LAPAROTOMY N/A 7/31/2020    Procedure: LAPAROTOMY EXPLORATORY: Externalizes  shunt Latasha Livers application;  Surgeon: Shari Schrader MD;  [facility];  Service: General    LAPAROTOMY N/A 8/1/2020    Procedure: LAPAROTOMY EXPLORATORY,;  Surgeon: Felicita Nicolas DO;  [facility];  Service: General    MS AMPUTATION FOOT,TRANSMETATARSAL Left 5/30/2020    Procedure: excision 5th metatarsal head   excision 5th metatarsal ulcer ;  Surgeon: Flora Perez DPM;  [facility];  Service: Podiatry    MS ARTHDSIS POST/POSTEROLATRL/POSTINTERBODY LUMBAR N/A 7/6/2020    Procedure: MINIMALLY INVASIVE TRANSVERSE LUMBAR INTERBODY FUSION L5-S1 FROM LEFT-SIDED APPROACH;  Surgeon: Tatiana Donis MD;  [facility];  Service: Neurosurgery    MS CREATE SHUNT:VENTRIC-ATRIAL Left 8/24/2020    Procedure: Left ventriculoatrial shunt;  Surgeon: Tatiana Donis MD;  [facility];  Service: Neurosurgery    [place] [address] CSF SHUNT,W/O REPLACE Right 8/7/2020    Procedure: REMOVAL SHUNT VENTRICULAR PERITONEAL;  Surgeon: Myles Novak MD;  [facility];  Service: Neurosurgery    IL REPLACEMENT/REVISION,CSF SHUNT Right 7/6/2020    Procedure: REVISION OF SCALP OVER VENTRICULAR CATHETER IN THE RIGHT CORONAL REGION;  Surgeon: Myles Novak MD;  [facility];  Service: Neurosurgery    WOUND DEBRIDEMENT N/A 8/1/2020    Procedure: abd washout;  Surgeon: Edmond Jaime DO;  [facility];  Service: General     Family History   Problem Relation Age of Onset    Polymyositis Mother     Heart failure Father      Social History     Socioeconomic History    Marital status:      Spouse name: None    Number of children: None    Years of education: None    Highest education level: None   Occupational History    None   Social Needs    Financial resource strain: None    Food insecurity     Worry: None     Inability: None    Transportation needs     Medical: None     Non-medical: None   Tobacco Use    Smoking status: Never Smoker    Smokeless tobacco: Never Used   Substance and Sexual Activity    Alcohol use: Not Currently     Frequency: Monthly or less     Drinks per session: 1 or 2    Drug use: Never    Sexual activity: None   Lifestyle    Physical activity     Days per week: None     Minutes per session: None    Stress: None   Relationships    Social connections     Talks on phone: None     Gets together: None     Attends Alevism service: None     Active member of club or organization: None     Attends meetings of clubs or organizations: None     Relationship status: None    Intimate partner violence     Fear of current or ex partner: None     Emotionally abused: None     Physically abused: None     Forced sexual activity: None   Other Topics Concern    None   Social History Narrative    None         Medications:   All current active meds have been reviewed and current meds:  Scheduled Meds:  Current Facility-Administered Medications   Medication Dose Route Frequency Provider Last Rate    acetaminophen  650 mg Oral Q6H PRN Cori Sale, EVI      allopurinol  300 mg Oral Daily Cori Sale, EVI      bisacodyl  10 mg Rectal Daily PRN Cori Sale, PA-LAURA      docusate sodium  100 mg Oral BID Cori Sale, PA-LAURA      enoxaparin  40 mg Subcutaneous Daily Cori Sale, [place]      [START ON 9/18/2020] escitalopram  10 mg Oral Daily Linda E Held, EVI      insulin lispro  2-12 Units Subcutaneous 4x Daily (AC & HS) Cori Sale, EVI      lisinopril  10 mg Oral Daily Cori Sale, EVI      melatonin  3 mg Oral HS Cori Sale, EVI      VERONICA ANTIFUNGAL   Topical BID Danyelle Brown MD      oxyCODONE  5 mg Oral Q4H PRN Cori Sale, EVI      pantoprazole  40 mg Oral Early Morning Cori Sale, EVI      QUEtiapine  12 5 mg Oral HS Linda E Held, EVI      senna  1 tablet Oral Daily Cori Sale, EVI      travoprost  1 drop Right Eye HS Cori Sale, PA-LAURA       Continuous Infusions:   PRN Meds:   acetaminophen    bisacodyl    oxyCODONE       ROS:   Review of Systems   Eyes: Negative for photophobia and visual disturbance  Respiratory: Negative for cough and shortness of breath  Cardiovascular: Negative for chest pain  Musculoskeletal: Negative for arthralgias, back pain, myalgias and neck pain  Neurological: Negative for dizziness, tremors, seizures, syncope, facial asymmetry, speech difficulty, weakness, light-headedness, numbness and headaches  Psychiatric/Behavioral: Negative for agitation and hallucinations  The patient is not nervous/anxious  All other systems reviewed and are negative        Vitals:   /64   Pulse 75   Temp (!) 96 °F (35 6 °C) (Axillary)   Resp 17   Ht 6' 3" (1 905 m)   Wt 102 kg (225 lb)   SpO2 96%   BMI 28 12 kg/m² Physical Exam:   Physical Exam  Vitals signs and nursing note reviewed  Constitutional:       General: He is not in acute distress  Appearance: He is normal weight  He is not diaphoretic  HENT:      Head:      Comments: Left frontal scar     Mouth/Throat:      Mouth: Mucous membranes are dry  Pharynx: Oropharynx is clear  Eyes:      General: No scleral icterus  Right eye: No discharge  Left eye: No discharge  Extraocular Movements: Extraocular movements intact and EOM normal       Conjunctiva/sclera: Conjunctivae normal       Pupils: Pupils are equal, round, and reactive to light  Neck:      Musculoskeletal: Normal range of motion  Cardiovascular:      Rate and Rhythm: Normal rate  Pulmonary:      Effort: Pulmonary effort is normal  No respiratory distress  Musculoskeletal: Normal range of motion  General: No tenderness  Right lower leg: No edema  Left lower leg: No edema  Skin:     General: Skin is warm and dry  Coloration: Skin is not jaundiced  Findings: No erythema  Comments: Generalized abrasions and scabs   Neurological:      Mental Status: He is alert  Coordination: Finger-Nose-Finger Test abnormal (Bilateral dysmetria)  Psychiatric:         Mood and Affect: Mood normal          Behavior: Behavior normal          Thought Content: Thought content normal          Judgment: Judgment normal        Neurologic Exam     Mental Status   Level of consciousness: alert  Able to state his name, that we are at [facility], it is 2020  Could not name the current month  Had some difficulty naming the current president but eventually was able to provide the correct name  Able to follow simple and complex commands  Able to calculate how many quarters in $1 and $1 75  Had some difficulty naming the months of the year but was eventually able to name them  No dysarthria noted       Cranial Nerves     CN II   Visual fields full to confrontation  CN III, IV, VI   Pupils are equal, round, and reactive to light  Extraocular motions are normal    Nystagmus: none     CN V   Facial sensation intact  CN VIII   CN VIII normal      CN XI   CN XI normal      CN XII   CN XII normal    Mild right facial droop     Motor Exam   Muscle bulk: normal  Bilateral UE strength 5/5 deltoid, biceps, triceps, hand   Bilateral LE strength 4/5 hip flexion, 5/5 dorsiflexion and plantar flexion     Sensory Exam   Light touch normal      Gait, Coordination, and Reflexes     Coordination   Finger to nose coordination: abnormal (Bilateral dysmetria)    Tremor   Resting tremor: absent  Intention tremor: present (Bilateral)    Reflexes   Right plantar: equivocal  Left plantar: equivocal      Labs: I have personally reviewed pertinent reports     Recent Results (from the past 24 hour(s))   Fingerstick Glucose (POCT)    Collection Time: 09/16/20  9:01 PM   Result Value Ref Range    POC Glucose 117 65 - 140 mg/dl   Procalcitonin Reflex    Collection Time: 09/17/20  4:47 AM   Result Value Ref Range    Procalcitonin <0 05 <=0 25 ng/ml   CBC and differential    Collection Time: 09/17/20  4:47 AM   Result Value Ref Range    WBC 10 64 (H) 4 31 - 10 16 Thousand/uL    RBC 4 99 3 88 - 5 62 Million/uL    Hemoglobin 13 2 12 0 - 17 0 g/dL    Hematocrit 42 4 36 5 - 49 3 %    MCV 85 82 - 98 fL    MCH 26 5 (L) 26 8 - 34 3 pg    MCHC 31 1 (L) 31 4 - 37 4 g/dL    RDW 15 9 (H) 11 6 - 15 1 %    MPV 10 4 8 9 - 12 7 fL    Platelets 855 332 - 540 Thousands/uL    nRBC 0 /100 WBCs    Neutrophils Relative 73 43 - 75 %    Immat GRANS % 1 0 - 2 %    Lymphocytes Relative 13 (L) 14 - 44 %    Monocytes Relative 10 4 - 12 %    Eosinophils Relative 2 0 - 6 %    Basophils Relative 1 0 - 1 %    Neutrophils Absolute 7 84 (H) 1 85 - 7 62 Thousands/µL    Immature Grans Absolute 0 10 0 00 - 0 20 Thousand/uL    Lymphocytes Absolute 1 37 0 60 - 4 47 Thousands/µL    Monocytes Absolute 1 05 0 17 - 1 22 Thousand/µL    Eosinophils Absolute 0 20 0 00 - 0 61 Thousand/µL    Basophils Absolute 0 08 0 00 - 0 10 Thousands/µL   Basic metabolic panel    Collection Time: 09/17/20  4:47 AM   Result Value Ref Range    Sodium 140 136 - 145 mmol/L    Potassium 3 6 3 5 - 5 3 mmol/L    Chloride 107 100 - 108 mmol/L    CO2 24 21 - 32 mmol/L    ANION GAP 9 4 - 13 mmol/L    BUN 14 5 - 25 mg/dL    Creatinine 0 68 0 60 - 1 30 mg/dL    Glucose 106 65 - 140 mg/dL    Calcium 9 1 8 3 - 10 1 mg/dL    eGFR 96 ml/min/1 73sq m   Fingerstick Glucose (POCT)    Collection Time: 09/17/20  6:22 AM   Result Value Ref Range    POC Glucose 112 65 - 140 mg/dl   Fingerstick Glucose (POCT)    Collection Time: 09/17/20 11:36 AM   Result Value Ref Range    POC Glucose 119 65 - 140 mg/dl   Fingerstick Glucose (POCT)    Collection Time: 09/17/20  3:51 PM   Result Value Ref Range    POC Glucose 118 65 - 140 mg/dl       Imaging: I have personally reviewed pertinent imaging in PACS, and I have personally reviewed PACS reports  EKG, Pathology, and Other Studies: I have personally reviewed pertinent reports  VTE Prophylaxis: Sequential compression device (Venodyne)  and Enoxaparin (Lovenox)        Total time spent today 26 minutes  Greater than 50% of total time was spent with the patient and / or family counseling and / or coordination of care  A description of the counseling / coordination of care: Patient seen and evaluated  Case reviewed with attending  Chart thoroughly reviewed including medications and imaging  Coordination of care with SLIM and RN  Discussion of pending EEG, neurosurgery consult, and medications with patient

## 2020-09-17 NOTE — ASSESSMENT & PLAN NOTE
· Presents from rehab s/p fall at facility with concerns for worsening cognitive behavior  · He is POD#25 left VA shunt placement    Imaging reviewed personally and with attending, results are as follows:   CT head wo contrast 9/15/2020:  No acute intracranial abnormality  Shunt catheter in place  Ventricles appear stable in appearance to prior imaging   XR shunt series 9/16/2020:  Left-sided shunt demonstrates intact tubing within and above the neck  The tubing is slightly more difficult to visualize over the chest but appears to terminate in the region of the cavoatrial junction  Plan:   Low suspicion for shunt malfunction - shunt appears intact and CT head has been stable since placement  o Concerns for cognitive decline were discussed with patient's brother at patient's 2 week incision check on 9/8/2020 as at the time he voiced concerns as well  o Patient had a long hospitalization with shunt infection and peritonitis requiring removal of prior  shunt and placement of VA shunt and has not returned back to baseline   [facility] management and pain control per primary team   DVT ppx:  SCDs, lovenox   Mobilize as tolerated with assistance, PT / OT evaluation if warranted    Neurosurgery will follow as needed during hospitalization  Outpatient follow up as scheduled for postop care  No surgical intervention is warranted  Please call with questions or concerns, signed off

## 2020-09-17 NOTE — ASSESSMENT & PLAN NOTE
· EKG x 2 with mild ST depressions  · Patient without chest pain   · Troponin negative x 2  · Recent ROESMARIE largely unremarkable

## 2020-09-17 NOTE — PHYSICAL THERAPY NOTE
Physical Therapy Cancellation Note          Pt off the  Floor for testing   Will cont to  Follow as able             Alphonso Ochoa, PTA

## 2020-09-17 NOTE — ASSESSMENT & PLAN NOTE
· PVR in ED noted to be 270 mL and Griffith catheter inserted  · Evaluated by Urology, appreciate recommendations   · No significant evidence of urinary retention   · D/C'd Griffith catheter on 09/16  · Urinary retention protocol

## 2020-09-17 NOTE — ASSESSMENT & PLAN NOTE
· Unclear etiology; patient's bother who is a physician reports chronic leukocytosis around 11,000   · No other SRIS criteria present  · UA unremarkable  · Chest x-ray: Small left base infiltrate and/or slight atelectasis     · Procalcitonin negative x 2   · Blood cultures pending  · Patient with multiple skin abrasions, sacral ulcer and fungal infection   · Continue local wound care  · Monitor off antibiotics

## 2020-09-17 NOTE — ASSESSMENT & PLAN NOTE
· S/p VA shunt after  shunt infection/meningitis/peritonitis during last hospitalization treated with meropenem until 9/9  · CTH in ED with stable ventriculomegaly,  shunt catheter tip in the left lateral ventricle   · Shunt series: "Left-sided shunt demonstrates intact tubing within and above the neck   The tubing is slightly more difficult to visualize over the chest but appears to terminate in the region of the cavoatrial junction "  · Appreciate neurosurgery consult; no surgical intervention necessary

## 2020-09-17 NOTE — ASSESSMENT & PLAN NOTE
· Initially brought in for fall at rehab facility however patient's brother and rehab staff reporting worsening of baseline cognitive impairment since discharge from hospital 8/29, in setting of recent prolonged and complicated hospitalization  · Per rehab notes, noted to have increasing behavioral issues with agitation/screaming and calling out "help me" deuce   at night concerning for underlying dementia   · Was started on seroquel 25 mg qhs, lexapro increased to 20 mg, no other recent med changes  · 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 and stable ventriculomegaly  · Shunt series unremarkable   · Appreciate neurology consult and recommendations  · Discontinued Seroquel; recommend if resuming to start 12 5 mg qhs   · Routine EEG showed temporal sharps which may be epileptogenic however low suspicion for seizuers per neurology so no need for vEEG at this time   · Outpatient follow-up  · Appreciate geriatrics consult and recommendations   · Taper off Lexapro (10 mg daily x 1 week then 5 mg x 1 week then stop)  · May resume low dose Seroquel 12 5 mg at bedtime and up-titrate as needed

## 2020-09-17 NOTE — PROGRESS NOTES
Progress Note - Smiley Reyna 1950, 69 y o  male MRN: [id number]  Unit/Bed#: -Enzo Encounter: [id number]  Primary Care Provider: Steven Mckeon   Date and time admitted to hospital: 9/15/2020  7:28 PM    * Confusion  Assessment & Plan  · Initially brought in for fall at rehab facility however patient's brother and rehab staff reporting worsening of baseline cognitive impairment since discharge from hospital 8/29, in setting of recent prolonged and complicated hospitalization  · Per rehab notes, noted to have increasing behavioral issues with agitation/screaming and calling out "help me" deuce  at night concerning for underlying dementia   · Was started on seroquel 25 mg qhs, lexapro increased to 20 mg, no other recent med changes  · 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 and stable ventriculomegaly  · Shunt series unremarkable   · Appreciate neurology consult and recommendations  · Discontinued Seroquel; recommend if resuming to start 12 5 mg qhs   · Routine EEG results pending   · Appreciate geriatrics consult and recommendations     Normal pressure hydrocephalus ([address] )  Assessment & Plan  · S/p [facility] shunt after  shunt infection/meningitis/peritonitis during last hospitalization treated with meropenem until 9/9  · CTH in ED with stable ventriculomegaly,  shunt catheter tip in the left lateral ventricle   · Shunt series: "Left-sided shunt demonstrates intact tubing within and above the neck   The tubing is slightly more difficult to visualize over the chest but appears to terminate in the region of the cavoatrial junction "  · Appreciate neurosurgery consult; no surgical intervention necessary     Leukocytosis  Assessment & Plan  · Unclear etiology; patient's bother who is a physician reports chronic leukocytosis around 11,000   · No other SRIS criteria present  · UA unremarkable  · Chest x-ray: Small left base infiltrate and/or slight atelectasis     · Procalcitonin negative x 2   · Blood cultures pending  · Patient with multiple skin abrasions, sacral ulcer and fungal infection   · Continue local wound care  · Monitor off antibiotics    Abnormal EKG  Assessment & Plan  · EKG x 2 with mild ST depressions  · Patient without chest pain   · Troponin negative x 2  · Recent ROSEMARIE largely unremarkable    Urinary retention  Assessment & Plan  · PVR in ED noted to be 270 mL and Griffith catheter inserted  · Evaluated by Urology, appreciate recommendations   · No significant evidence of urinary retention   · D/C Griffith catheter yesterday   · Urinary retention protocol  · Check PVR    Fall  Assessment & Plan  · Patient initially presented after sustaining a fall at rehab facility  · CT head without acute intracranial abnormality  · X-ray left tibia and fibula without acute osseous abnormality  · PT/OT recommending return to rehab    Essential hypertension  Assessment & Plan  · BP acceptable, monitor routinely  · Continue lisinopril 10 mg daily    Type 2 diabetes mellitus [facility])  Assessment & Plan  Lab Results   Component Value Date    HGBA1C 5 6 07/31/2020       Recent Labs     09/16/20  1049 09/16/20  1556 09/16/20  2101 09/17/20  0622   POCGLU 100 127 117 112       Blood Sugar Average: Last 72 hrs:    · Home insulin regimen: Lantus 10U qam/12U qpm  · During last hospitalization only required SSI  · Monitor accuchecks and continue with SSI coverage for now   · Diabetic diet  · Hypoglycemia protocol     VTE Pharmacologic Prophylaxis:   Pharmacologic: Enoxaparin (Lovenox)  Mechanical VTE Prophylaxis in Place: No    Patient Centered Rounds: I have evaluated patient without nursing staff present due to called twice but no answer    Discussions with Specialists or Other Care Team Provider: primary RN, Neurosurgery, case management     Education and Discussions with Family / Patient: patient, will call brother with update     Time Spent for Care: 15 minutes    More than 50% of total time spent on counseling and coordination of care as described above     Current Length of Stay: 1 day(s)    Current Patient Status: Inpatient   Certification Statement: The patient will continue to require additional inpatient hospital stay due to pending EEG and geriatrics evalutaion     [place] [facility] pending EEG results and geriatrics evaluation     Code Status: Level 1 - Full Code      Subjective:   Patient offers no complaints today  States he had a bowel movement this morning and needs to urinate now  Pleasantly confused, answers questions and follows commands appropriately  Objective:     Vitals:   Temp (24hrs), Av 1 °F (36 7 °C), Min:97 8 °F (36 6 °C), Max:98 5 °F (36 9 °C)    Temp:  [97 8 °F (36 6 °C)-98 5 °F (36 9 °C)] 98 5 °F (36 9 °C)  HR:  [69-70] 70  Resp:  [16-17] 16  BP: (114-118)/(60-66) 118/66  SpO2:  [96 %] 96 %  Body mass index is 28 12 kg/m²  Input and Output Summary (last 24 hours): Intake/Output Summary (Last 24 hours) at 2020 1201  Last data filed at 2020 0929  Gross per 24 hour   Intake 1120 ml   Output 250 ml   Net 870 ml       Physical Exam:     Physical Exam  Vitals signs and nursing note reviewed  Exam conducted with a chaperone present  Constitutional:       General: He is not in acute distress  Cardiovascular:      Rate and Rhythm: Normal rate and regular rhythm  Pulses: Normal pulses  Heart sounds: No murmur  Pulmonary:      Effort: Pulmonary effort is normal  No respiratory distress  Breath sounds: Normal breath sounds  No rales  Abdominal:      General: Abdomen is flat  Bowel sounds are normal  There is no distension  Palpations: Abdomen is soft  Tenderness: There is no abdominal tenderness  Musculoskeletal:      Right lower leg: No edema  Left lower leg: No edema  Skin:     General: Skin is warm and dry  Coloration: Skin is not pale  Findings: No erythema  Neurological:      General: No focal deficit present        Mental Status: He is alert  Mental status is at baseline  Comments: Oriented only to self         Additional Data:     Labs:    Results from last 7 days   Lab Units 09/17/20  0447   WBC Thousand/uL 10 64*   HEMOGLOBIN g/dL 13 2   HEMATOCRIT % 42 4   PLATELETS Thousands/uL 218   NEUTROS PCT % 73   LYMPHS PCT % 13*   MONOS PCT % 10   EOS PCT % 2     Results from last 7 days   Lab Units 09/17/20  0447  09/15/20  2307   SODIUM mmol/L 140   < > 137   POTASSIUM mmol/L 3 6   < > 4 0   CHLORIDE mmol/L 107   < > 103   CO2 mmol/L 24   < > 25   BUN mg/dL 14   < > 12   CREATININE mg/dL 0 68   < > 0 63   ANION GAP mmol/L 9   < > 9   CALCIUM mg/dL 9 1   < > 9 7   ALBUMIN g/dL  --   --  3 1*   TOTAL BILIRUBIN mg/dL  --   --  0 93   ALK PHOS U/L  --   --  116   ALT U/L  --   --  19   AST U/L  --   --  15   GLUCOSE RANDOM mg/dL 106   < > 125    < > = values in this interval not displayed  Results from last 7 days   Lab Units 09/17/20  1136 09/17/20  0622 09/16/20  2101 09/16/20  1556 09/16/20  1049 09/16/20  0615   POC GLUCOSE mg/dl 119 112 117 127 100 127         Results from last 7 days   Lab Units 09/17/20  0447 09/16/20  1502   PROCALCITONIN ng/ml <0 05 <0 05           * I Have Reviewed All Lab Data Listed Above  * Additional Pertinent Lab Tests Reviewed: All Labs Within Last 24 Hours Reviewed    Imaging:    Imaging Reports Reviewed Today Include: XR shunt series, will review EEG results once available   Imaging Personally Reviewed by Myself Includes:  none    Recent Cultures (last 7 days):     Results from last 7 days   Lab Units 09/16/20  0938   BLOOD CULTURE  Received in Microbiology Lab  Culture in Progress  Received in Microbiology Lab  Culture in Progress         Last 24 Hours Medication List:   Current Facility-Administered Medications   Medication Dose Route Frequency Provider Last Rate    acetaminophen  650 mg Oral Q6H PRN Sherolyn Duverney, PA-C      allopurinol  300 mg Oral Daily Sherolyn Duverney, PA-C  bisacodyl  10 mg Rectal Daily PRN Khushi Manzano PA-C      docusate sodium  100 mg Oral BID Khushi Manzano PA-C      enoxaparin  40 mg Subcutaneous Daily Khushi Manzano PA-C      escitalopram  20 mg Oral Daily Khushi Manzano PA-C      insulin lispro  2-12 Units Subcutaneous 4x Daily (AC & HS) Khushi Manzano PA-C      lisinopril  10 mg Oral Daily Khushi Manzano PA-C      melatonin  3 mg Oral HS Khushi Manzano PA-C      VERONICA ANTIFUNGAL   Topical BID Eddi Farooq MD      oxyCODONE  5 mg Oral Q4H PRN Khushi Manzano PA-C      pantoprazole  40 mg Oral Early Morning Khushi Manzano PA-C      senna  1 tablet Oral Daily Khushi Manzano PA-C      travoprost  1 drop Right Eye HS Khushi Manzano PA-C          Today, Patient Was Seen By: Elmer Smith PA-C    ** Please Note: Dictation voice to text software may have been used in the creation of this document   **

## 2020-09-17 NOTE — CONSULTS
Consultation - Geriatric Medicine   Luly Nava 69 y o  male MRN: [id number]  Unit/Bed#: -01 Encounter: [id number]      Assessment/Plan     Acute metabolic encephalopathy   -multifactorial likely culmination of multiple recent environment changes between hospital and STR, recent admission for  shunt infection and mesenteric ischemia, recent shunt change due to infection end of July and multiple chronic medical conditions and medicaion changes superimposed on baseline cognitive impairment  -pt typically A/Ox3 and pleasant but on admission and evaluation today pt is irritated and refusing evaluation, screaming out at times  -[facility] on admission revealed no acute abnormalities  -blood cultures on admission revealed no growth at 24H  -novel Coronavirus-19 was negative on admission  -no significant electrolyte abnormalities to explain degree of confusion  -WBC elevated admission but now downtrending and remains afebrile  -neurology and neurosurgery following  -routine EEG pending  -given recent initiation of Lexapro on return to rehab facility following last hospital discharge and worsening agitation/insomnia with dose increase consider tapering down by 50% per week to off, could use slower taper if pt does not tolerate but as has not been on long term per MAR from STR from 9/8/20 and 9/17/20 and dose increase only in the past week would not expect intolerance to typical taper   -started on Seroquel at rehab 9/11 for behaviors, consider restart at reduced dose at bedtime   -consider scheduling low-dose Tylenol for any baseline pain which is uncontrolled could be multitude sources including skin breakdown in multiple areas which was noted on admission, continue local wound care  -monitor for fecal and urinary retention, maintain normal circadian rhythm, and ensure adequate nutrition   -encourage soothing environment limit overstimulation which may also contribute to worsening irritability and encephalopathy  -given patient's history of cognitive impairment and multiple medical comorbidities clinical course is expected to continue to decline  -encourage continued discussion of goals of care with patient and family to ensure that treatments and interventions are in line with these goals    NPH  -s/p remote hx shunt placement with recent change due to infection, course of Meropenem completed at [facility] on 9/8/20  -CTH on admission and shunt series no acute abnormalities   -Nsx following, no intervention anticipated, shunt unlikely to be contributing to current presentation     Ambulatory dysfunction with recurrent falls   -multifactorial including deconditioning, poor nutrition status, and cognitive impairment  -continue to encourage assistance with all transfers  -encourage good body mechanics  -recommend use of assist devices directed by PT  -PT and OT following, anticipate need for return to short-term rehab for intensive therapies    Sacral deep tissue injury  -POA, wound team following   -continue local wound care  -encourage frequent turning, repositioning, and offloading  -albumin low at 3 1, encourage well-balanced nutritional/supplementation    Deconditioning/Debility/Frailty   -clinical frailty scale stage 7, severely frail  -multifactorial including age, poor nutritional status, recent severe sepsis requiring prolonged course of antibiotics with meropenem and superimposed chronic medical conditions  -continue treatment of acute metabolic derangements and acute metabolic encephalopathy  -continue good control chronic medical conditions  -continue to encourage treatment of mood disturbance as can largely contribute to patient's overall clinical outcomes    Home medication review  Personally confirmed with [facility] personally obtained from [employer]:    Tylenol 650 mg every 6 hours as needed  Allopurinol 300 mg daily  Colace 100 mg twice daily  Dulcolax suppository every 24 hours as needed for constipation  Enalapril 5 mg daily  Fleet enema every 24 hours as needed for constipation  Lexapro 20 mg daily  Melatonin 3 mg at bedtime  Milk of Mag 30 mL every 24 hours as needed for constipation  Multivitamin daily  Oxycodone 2 5 mg every 6 hours as needed for pain  Protonix 40 mg daily  Senna 8 6 mg every evening  Seroquel 25 mg at bedtime  Travatan solution 0 04% 1 drop in right eye at bedtime  Venelex ointment to sacrum as needed    History of Present Illness   Physician Requesting Consult: Srikanth Llanes MD  Reason for Consult / Principal Problem:  Acute metabolic encephalopathy  Hx and PE limited by:  Patient agitation  Additional history obtained from:  Patient evaluation interview, [address] [place] [address] rehab facility, and extensive old record review    HPI: Joon Forbes is a 69y o  year old male with NPH, hypertension, type 2 diabetes, recent meningitis and peritonitis, ZHOU on CPAP, history of TIA, ambulatory dysfunction with recurrent falls, cognitive impairment with recent precipitous cognitive decline and multiple chronic medical comorbidities who is admitted to the medical team acute metabolic encephalopathy  He is being seen in consultation by Geriatrics for ambulatory dysfunction with recurrent falls and acute metabolic encephalopathy  He seen examined bedside where he sitting resting, he appears comfortable but is irritable and declines to participate with evaluation or HPI  He has been continually telling providers and nursing staff to get out of his room and let him go home  Since admission he has been seen by Neurology, Neurosurgery, and Neurology  Per his acute rehab facility he has been much more irritable and confused since return earlier this month and is admitted following unwitnessed fall  At his long-term care facility he has been having issues of agitation overnight frequently yelling out help me    He was started on Lexapro for mood and agitation which was recently increased to 20 mg daily with continued/worsening symptoms for which she was started on Seroquel 25 mg at bedtime which have been discontinued on hospital admission  Workup on admission to this point has been essentially unrevealing for etiology  He is awaiting routine EEG evaluation and if no acute abnormalities appreciated he is anticipating return to his long-term care facility  Consults    Review of Systems   Unable to perform ROS: Mental status change     Historical Information   Past Medical History:   Diagnosis Date    Cancer [facility]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     Diabetes mellitus ([place] [address] )     DM2 (diabetes mellitus, type 2) ([place] [address] )     Gout     HTN (hypertension)     Hydrocephalus ([place] [address] )     Hypertension     Neuropathy     ZHOU on CPAP     Pressure injury of skin     TIA (transient ischemic attack)      Past Surgical History:   Procedure Laterality Date    ABDOMINAL WALL SURGERY N/A 8/1/2020    Procedure: CLOSURE WOUND ABDOMINAL/TRUNK;  Surgeon: Cyn Light DO;  [facility];  Service: General    ARTERIOGRAM N/A 7/31/2020    Procedure: ARTERIOGRAM Of SMA and placement of Papavarine onfusion arterial catheter;  Surgeon: Ana Maria Lopez MD;  [facility];  Service: Vascular    ARTERIOGRAM Left 8/1/2020    Procedure: ARTERIOGRAM; cath removal;  Surgeon: Ana Maria Lopez MD;  [facility];  Service: Vascular    CSF SHUNT      x3 Op Reports, Dr Kriss Decker, Debbi Stephens in [facility] chart viewer    FL LUMBAR PUNCTURE DIAGNOSTIC  8/11/2020    LAPAROTOMY N/A 7/31/2020    Procedure: LAPAROTOMY EXPLORATORY: Externalizes  shunt [place] application;  Surgeon: Lizbeth Delaney MD;  [facility];  Service: General    LAPAROTOMY N/A 8/1/2020    Procedure: LAPAROTOMY EXPLORATORY,;  Surgeon: Cyn Light DO;  [facility];  Service: General    HI AMPUTATION FOOT,TRANSMETATARSAL Left 5/30/2020    Procedure: excision 5th metatarsal head   excision 5th metatarsal ulcer ; Surgeon: Alex Mcguire DPM;  [facility];  Service: [address] POST/POSTEROLATRL/POSTINTERBODY LUMBAR N/A 7/6/2020    Procedure: MINIMALLY INVASIVE TRANSVERSE LUMBAR INTERBODY FUSION L5-S1 FROM LEFT-SIDED APPROACH;  Surgeon: John Arrington MD;  [facility];  Service: Neurosurgery    AK CREATE SHUNT:VENTRIC-ATRIAL Left 8/24/2020    Procedure: Left ventriculoatrial shunt;  Surgeon: John Arrington MD;  [facility];  Service: Neurosurgery    [place] [address] CSF SHUNT,W/O REPLACE Right 8/7/2020    Procedure: REMOVAL SHUNT VENTRICULAR PERITONEAL;  Surgeon: John Arrington MD;  [facility];  Service: Neurosurgery    AK REPLACEMENT/REVISION,CSF SHUNT Right 7/6/2020    Procedure: REVISION OF SCALP OVER VENTRICULAR CATHETER IN THE RIGHT [address] [place] [address];  Surgeon: John Arrington MD;  [facility];  Service: Neurosurgery    WOUND DEBRIDEMENT N/A 8/1/2020    Procedure: abd washout;  Surgeon: Rosanne Castillo DO;  [facility];  Service: General     Social History   Social History     Substance and Sexual Activity   Alcohol Use Not Currently    Frequency: Monthly or less    Drinks per session: 1 or 2     Social History     Substance and Sexual Activity   Drug Use Never     Social History     Tobacco Use   Smoking Status Never Smoker   Smokeless Tobacco Never Used     Family History:   Family History   Problem Relation Age of Onset    Polymyositis Mother     Heart failure Father      Meds/Allergies   all current active meds have been reviewed    Allergies   Allergen Reactions    Pollen Extract Allergic Rhinitis     Objective     Intake/Output Summary (Last 24 hours) at 9/17/2020 1405  Last data filed at 9/17/2020 1242  Gross per 24 hour   Intake 1360 ml   Output 250 ml   Net 1110 ml     Invasive Devices     Peripheral Intravenous Line            Peripheral IV 09/17/20 Left;Ventral (anterior) Forearm less than 1 day              Physical Exam  Vitals signs and nursing note reviewed  Constitutional:       Appearance: He is obese  Comments: Elderly and chronically ill-appearing gentleman   HENT:      Head: Normocephalic and atraumatic  Nose: Nose normal       Mouth/Throat:      Comments: Poor dentition, poor oral hygiene  Eyes:      General:         Right eye: No discharge  Left eye: No discharge  Conjunctiva/sclera: Conjunctivae normal    Neck:      Musculoskeletal: Neck supple  Comments: Trachea appears midline  Cardiovascular:      Rate and Rhythm: Normal rate  Pulmonary:      Effort: No respiratory distress  Abdominal:      Comments: Obese abdomen   Musculoskeletal:      Comments: Slightly reduced overall muscle mass, appears to be moving all 4 extremities spontaneously   Skin:     Coloration: Skin is pale  Comments: Surgical scars noted on both knees   Neurological:      Mental Status: He is alert  He is disoriented  Comments: Mentation fluctuates throughout the day   Psychiatric:         Attention and Perception: He is inattentive  Mood and Affect: Affect is labile  Behavior: Behavior is uncooperative and agitated         Lab Results:   I have personally reviewed pertinent lab results including the following:  -sodium 140, potassium 3 6 chloride 107, CO2 24, BUN 14, creatinine 0 68, glucose 106, calcium 9 1, EGFR 96  -hemoglobin 13 2, hematocrit 42 4, WBC 10 64, platelets 991  -procalcitonin< 0 5  -TSH 0 089/FT4 1 47  -B12 476  -blood cultures negative at 24 hours  -novel coronavirus-19 negative on admission    I have personally reviewed the following imaging study reports in PACS:    CT head without contrast 09/15/2020:  No acute intracranial abnormality  XR tibia fibula two view left 09/15/2020:  Status post left knee arthroplasty, no periprosthetic fractures identified  Chest x-ray PA and lateral 09/16/2020:  Right and left IJ lines remain, stable left base infiltrate and/or atelectasis  X-ray shunt series 09/16/2020:  Left-sided son demonstrates intact tubing within and above the neck    Therapies:   PT:  Following  OT:  Following    VTE Prophylaxis: Enoxaparin (Lovenox)    Code Status: Level 1 - Full Code  Advance Directive and Living Will:      Power of : Yes  POLST:      Family and Social Support: Roommate    Goals of Care:  To go home

## 2020-09-17 NOTE — ASSESSMENT & PLAN NOTE
· PVR in ED noted to be 270 mL and Griffith catheter inserted  · Evaluated by Urology, appreciate recommendations   · No significant evidence of urinary retention   · D/C Griffith catheter yesterday   · Urinary retention protocol  · Check PVR

## 2020-09-17 NOTE — CONSULTS
Consult- Susannah Eric 1950, 69 y o  male MRN: [id number]    Unit/Bed#: -01 Encounter: [id number]    Primary Care Provider: Rio Phillips   Date and time admitted to hospital: 9/15/2020  7:28 PM      Inpatient consult to Neurosurgery  Consult performed by: Adriane Owusu PA-C  Consult ordered by: Mavis Freeman PA-C      consult completed on 9/17/2020 at 10am    Normal pressure hydrocephalus [facility])  Assessment & Plan  · Presents from rehab s/p fall at facility with concerns for worsening cognitive behavior  · He is POD#25 left VA shunt placement    Imaging reviewed personally and with attending, results are as follows:   CT head wo contrast 9/15/2020:  No acute intracranial abnormality  Shunt catheter in place  Ventricles appear stable in appearance to prior imaging   XR shunt series 9/16/2020:  Left-sided shunt demonstrates intact tubing within and above the neck  The tubing is slightly more difficult to visualize over the chest but appears to terminate in the region of the cavoatrial junction  Plan:   Low suspicion for shunt malfunction - shunt appears intact and CT head has been stable since placement  o Concerns for cognitive decline were discussed with patient's brother at patient's 2 week incision check on 9/8/2020 as at the time he voiced concerns as well  o Patient had a long hospitalization with shunt infection and peritonitis requiring removal of prior  shunt and placement of VA shunt and has not returned back to baseline   [facility] management and pain control per primary team   DVT ppx:  SCDs, lovenox   Mobilize as tolerated with assistance, PT / OT evaluation if warranted    Neurosurgery will follow as needed during hospitalization  Outpatient follow up as scheduled for postop care  No surgical intervention is warranted  Please call with questions or concerns, signed off      History of Present Illness   HPI: Susannah Eric is a 69y o  year old male with PMH including spondylolisthesis at L5-S1 results in L5-S1 TLIF in 7/2020, several  shunt revisions for history of NPH with NP shunt infection and peritonitis requring removal of  shunt and placement of VA shunt at the end of 8/2020, TIA, ZHOU on CPAP, hypertension, type II diabetes who presents s/p fall at facility with concerns for worsening cognitive decline  Patient is not a good historian and therefore history is gathered medical records  Patient was brought in after fall at facility however per rehab staff and brother, patient has had worsening cognitive decline since he left the hospital at the end of August after a prolonged hospitalization  He was also found to be retaining urine with grover catheter placement  At the time of exam, patient endorses some confusion but cannot recall why he is in the hospital     He was recently seen in the outpatient [address] [place] [address] office for 2 week incision check from [place] shunt placement and at that time, brother voiced concerns about cognitive decline with imaging remaining stable from hospitalization  Review of Systems   Constitutional: Positive for activity change  Negative for chills and fever  HENT: Negative for hearing loss and trouble swallowing  Eyes: Negative for visual disturbance  Respiratory: Negative for chest tightness and shortness of breath  Cardiovascular: Negative for chest pain  Gastrointestinal: Negative for abdominal pain, constipation, diarrhea, nausea and vomiting  Genitourinary: Negative for difficulty urinating  Musculoskeletal: Negative for back pain and neck pain  Skin: Negative for wound  Neurological: Positive for headaches  Negative for dizziness, facial asymmetry, speech difficulty, weakness and numbness  Hematological: Does not bruise/bleed easily  Psychiatric/Behavioral: Positive for confusion         Historical Information   Past Medical History:   Diagnosis Date    Cancer [facility]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     Diabetes mellitus ([place] [address] )     DM2 (diabetes mellitus, type 2) ([facility] )     Gout     HTN (hypertension)     Hydrocephalus ([place] [address] )     Hypertension     Neuropathy     ZHOU on CPAP     Pressure injury of skin     TIA (transient ischemic attack)      Past Surgical History:   Procedure Laterality Date    ABDOMINAL WALL SURGERY N/A 8/1/2020    Procedure: CLOSURE WOUND ABDOMINAL/TRUNK;  Surgeon: Tarsha Cates DO;  [facility];  Service: General    ARTERIOGRAM N/A 7/31/2020    Procedure: ARTERIOGRAM Of SMA and placement of Papavarine onfusion arterial catheter;  Surgeon: Tiffany Arndt MD;  [facility];  Service: Vascular    ARTERIOGRAM Left 8/1/2020    Procedure: ARTERIOGRAM; cath removal;  Surgeon: Tiffany Arndt MD;  [facility];  Service: Vascular    CSF SHUNT      x3 Op Reports, Dr Isai Cameron, Ruy Vargas in [facility] chart viewer    FL LUMBAR PUNCTURE DIAGNOSTIC  8/11/2020    LAPAROTOMY N/A 7/31/2020    Procedure: LAPAROTOMY EXPLORATORY: Externalizes  shunt [place] application;  Surgeon: Jamaal Bonilla MD;  [facility];  Service: General    LAPAROTOMY N/A 8/1/2020    Procedure: LAPAROTOMY EXPLORATORY,;  Surgeon: Tarsha Cates DO;  [facility];  Service: General    HI AMPUTATION FOOT,TRANSMETATARSAL Left 5/30/2020    Procedure: excision 5th metatarsal head   excision 5th metatarsal ulcer ;  Surgeon: Anjali Little DPM;  [facility];  Service: Podiatry    HI ARTHDSIS POST/POSTEROLATRL/POSTINTERBODY LUMBAR N/A 7/6/2020    Procedure: MINIMALLY INVASIVE TRANSVERSE LUMBAR INTERBODY FUSION L5-S1 FROM LEFT-SIDED APPROACH;  Surgeon: Liam Rasheed MD;  [facility];  Service: Neurosurgery    HI CREATE SHUNT:VENTRIC-ATRIAL Left 8/24/2020    Procedure: Left ventriculoatrial shunt;  Surgeon: Liam Rasheed MD;  [facility];  Service: Neurosurgery    HI Lenora Najjar CSF SHUNT,W/O REPLACE Right 8/7/2020    Procedure: REMOVAL SHUNT VENTRICULAR PERITONEAL;  Surgeon: Rosa Menard MD;  [facility];  Service: Neurosurgery    UT REPLACEMENT/REVISION,CSF SHUNT Right 7/6/2020    Procedure: REVISION OF SCALP OVER VENTRICULAR CATHETER IN THE RIGHT CORONAL REGION;  Surgeon: Rsoa Menard MD;  [facility];  Service: Neurosurgery    WOUND DEBRIDEMENT N/A 8/1/2020    Procedure: abd washout;  Surgeon: Ranjan Gagnon DO;  [facility];  Service: General     Social History     Substance and Sexual Activity   Alcohol Use Not Currently    Frequency: Monthly or less    Drinks per session: 1 or 2     Social History     Substance and Sexual Activity   Drug Use Never     Social History     Tobacco Use   Smoking Status Never Smoker   Smokeless Tobacco Never Used     Family History   Problem Relation Age of Onset    Polymyositis Mother     Heart failure Father        Meds/Allergies   all current active meds have been reviewed, current meds:   Current Facility-Administered Medications   Medication Dose Route Frequency    acetaminophen (TYLENOL) tablet 650 mg  650 mg Oral Q6H PRN    allopurinol (ZYLOPRIM) tablet 300 mg  300 mg Oral Daily    bisacodyl (DULCOLAX) rectal suppository 10 mg  10 mg Rectal Daily PRN    docusate sodium (COLACE) capsule 100 mg  100 mg Oral BID    enoxaparin (LOVENOX) subcutaneous injection 40 mg  40 mg Subcutaneous Daily    escitalopram (LEXAPRO) tablet 20 mg  20 mg Oral Daily    insulin lispro (HumaLOG) 100 units/mL subcutaneous injection 2-12 Units  2-12 Units Subcutaneous 4x Daily (AC & HS)    lisinopril (ZESTRIL) tablet 10 mg  10 mg Oral Daily    melatonin tablet 3 mg  3 mg Oral HS    moisture barrier miconazole 2% cream (aka VERONICA MOISTURE BARRIER ANTIFUNGAL CREAM)   Topical BID    oxyCODONE (ROXICODONE) IR tablet 5 mg  5 mg Oral Q4H PRN    pantoprazole (PROTONIX) EC tablet 40 mg  40 mg Oral Early Morning    senna (SENOKOT) tablet 8 6 mg  1 tablet Oral Daily    travoprost (TRAVATAN-Z) 0  004 % ophthalmic solution 1 drop  1 drop Right Eye HS    and PTA meds:   Prior to Admission Medications   Prescriptions Last Dose Informant Patient Reported? Taking?    Bisacodyl (DULCOLAX RE) Unknown at Unknown time  Yes No   Sig: Insert into the rectum as needed   Lancets ([address]) MISC Past Month at Unknown time  Yes Yes   Si (two) times a day   QUEtiapine (SEROquel) 25 mg tablet Past Month at Unknown time  Yes Yes   Sig: Take 25 mg by mouth daily at bedtime   acetaminophen (TYLENOL) 325 mg tablet Past Month at Unknown time  No Yes   Sig: Take 2 tablets (650 mg total) by mouth every 6 (six) hours as needed for mild pain   allopurinol (ZYLOPRIM) 300 mg tablet 2020 at Unknown time  Yes Yes   Si mg daily   docusate sodium (COLACE) 100 mg capsule Unknown at Unknown time  No No   Sig: Take 1 capsule (100 mg total) by mouth 2 (two) times a day   enalapril (VASOTEC) 5 mg tablet Past Month at Unknown time  No Yes   Sig: Take 1 tablet (5 mg total) by mouth daily   escitalopram (LEXAPRO) 20 mg tablet 2020 at Unknown time  Yes Yes   Sig: Take 20 mg by mouth daily   insulin lispro (HumaLOG) 100 units/mL injection Past Month at Unknown time  No Yes   Sig: Inject 1-6 Units under the skin 3 (three) times a day before meals   magnesium hydroxide (MILK OF MAGNESIA) 400 mg/5 mL oral suspension Unknown at Unknown time  Yes No   Sig: Take 30 mL by mouth daily as needed for constipation   melatonin 3 mg Past Month at Unknown time  No Yes   Sig: Take 1 tablet (3 mg total) by mouth daily at bedtime   multivitamin (THERAGRAN) TABS Past Month at Unknown time  Yes Yes   Sig: Take 1 tablet by mouth daily   oxyCODONE (ROXICODONE) 5 mg immediate release tablet Unknown at Unknown time  No No   Sig: Take 1 tablet (5 mg total) by mouth every 4 (four) hours as needed for moderate pain or severe painMax Daily Amount: 30 mg   pantoprazole (PROTONIX) 40 mg tablet Past Month at Unknown time  Yes Yes   Sig: Take 40 mg by mouth daily   senna (SENOKOT) 8 6 mg 9/16/2020 at Unknown time  No Yes   Sig: Take 1 tablet (8 6 mg total) by mouth daily   travoprost (Travatan Z) 0 004 % ophthalmic solution Past Month at Unknown time  Yes Yes   Sig: Administer 1 drop to the right eye daily at bedtime       Facility-Administered Medications: None     Allergies   Allergen Reactions    Pollen Extract Allergic Rhinitis       Objective   I/O       09/15 0701 - 09/16 0700 09/16 0701 - 09/17 0700 09/17 0701 - 09/18 0700    P  O  0 0 120    I V  (mL/kg)  1000 (9 8)     Total Intake(mL/kg) 0 (0) 1000 (9 8) 120 (1 2)    Urine (mL/kg/hr) 100 250 (0 1)     Total Output 100 250     Net -100 +750 +120                 Physical Exam  Constitutional:       General: He is awake  Appearance: Normal appearance  HENT:      Head: Atraumatic  Comments: VA shunt incision appears well approximated, small amount of monocryl at the distal end visible   Eyes:      General: Lids are normal       Extraocular Movements: Extraocular movements intact and EOM normal       Conjunctiva/sclera: Conjunctivae normal    Neck:      Comments: VA shunt incision well approximated located on the left side of the neck  Cardiovascular:      Rate and Rhythm: Normal rate  Pulmonary:      Effort: Pulmonary effort is normal    Skin:     General: Skin is warm  Findings: Abrasion (pt knees had mild abrasions, that were scabbed over and in no need of dressings) present  Neurological:      Mental Status: He is alert  Coordination: Finger-Nose-Finger Test abnormal (mild dysmetria but able to self correct)  Psychiatric:         Attention and Perception: Attention normal          Mood and Affect: Mood normal          Speech: Speech normal          Behavior: Behavior normal  Behavior is cooperative  Thought Content: Thought content normal          Cognition and Memory: Cognition is impaired  Memory is impaired         Neurologic Exam     Mental Status Oriented to person  Disoriented to place  (Pt knows he is in the hospital but is unsure of exact hospital)  Follows 1 step commands  Attention: normal  Concentration: normal    Speech: speech is normal   Level of consciousness: alert  GCS 14  Pt is unaware of current president      Cranial Nerves     CN III, IV, VI   Extraocular motions are normal    Conjugate gaze: present    CN V   Facial sensation intact  CN VII   Facial expression full, symmetric  CN VIII   Hearing: intact    CN IX, X   Palate: symmetric    CN XI   Right trapezius strength: normal  Left trapezius strength: normal    CN XII   Tongue: not atrophic  Fasciculations: absent  Tongue deviation: none  Visual field testing nonreliable, pt seems to be guessing     Motor Exam   Muscle bulk: normal  Overall muscle tone: increased  Right arm pronator drift: absent  Left arm pronator drift: absent  BUE:  4+/5 throughout, seems a bit rigid with movements  BLE:  5/5 throughout     Sensory Exam   Light touch normal    DST not well intact, patient has difficult time doing this     Gait, Coordination, and Reflexes     Coordination   Finger to nose coordination: abnormal (mild dysmetria but able to self correct)Pt first appeared with no resting tremor, upon action a tremor was appreciated  Tremor did not appear to worsen closer to target, but was consistent throughout motion  Once tremor was initiated the tremor was also seen at rest  Unable to appreciate reflexes at the patella secondary to extensive knee surgeries       Vitals:Blood pressure 118/66, pulse 70, temperature 98 5 °F (36 9 °C), temperature source Oral, resp  rate 16, height 6' 3" (1 905 m), weight 102 kg (225 lb), SpO2 96 %  ,Body mass index is 28 12 kg/m²       Lab Results:   Results from last 7 days   Lab Units 09/17/20  0447 09/16/20  0603 09/15/20  2307   WBC Thousand/uL 10 64* 10 45* 13 43*   HEMOGLOBIN g/dL 13 2 15 5 15 6   HEMATOCRIT % 42 4 47 8 47 4   PLATELETS Thousands/uL 218 199 219   NEUTROS PCT % 73 80* 79*   MONOS PCT % 10 8 8     Results from last 7 days   Lab Units 09/17/20  0447 09/16/20  0603 09/15/20  2307   POTASSIUM mmol/L 3 6 3 6 4 0   CHLORIDE mmol/L 107 104 103   CO2 mmol/L 24 27 25   BUN mg/dL 14 11 12   CREATININE mg/dL 0 68 0 71 0 63   CALCIUM mg/dL 9 1 9 7 9 7   ALK PHOS U/L  --   --  116   ALT U/L  --   --  19   AST U/L  --   --  15                 No results found for: TROPONINT  ABG:  Lab Results   Component Value Date    PHART 7 357 07/31/2020    GMK6QPO 32 3 (L) 07/31/2020    PO2ART 87 8 07/31/2020    ZIK3YOF 17 7 (L) 07/31/2020    BEART -6 7 07/31/2020    SOURCE Line, Arterial 07/31/2020       Imaging Studies: I have personally reviewed pertinent reports  and I have personally reviewed pertinent films in PACS    EKG, Pathology, and Other Studies: I have personally reviewed pertinent reports  VTE Prophylaxis: Sequential compression device (Venodyne)  and Enoxaparin (Lovenox)    Code Status: Level 1 - Full Code  Advance Directive and Living Will:      Power of : Yes  POLST:      Counseling / Coordination of Care  I spent 20 minutes with the patient

## 2020-09-17 NOTE — ASSESSMENT & PLAN NOTE
· Initially brought in for fall at rehab facility however patient's brother and rehab staff reporting worsening of baseline cognitive impairment since discharge from hospital 8/29, in setting of recent prolonged and complicated hospitalization  · Per rehab notes, noted to have increasing behavioral issues with agitation/screaming and calling out "help me" deuce   at night concerning for underlying dementia   · Was started on seroquel 25 mg qhs, lexapro increased to 20 mg, no other recent med changes  · 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 and stable ventriculomegaly  · Shunt series unremarkable   · Appreciate neurology consult and recommendations  · Discontinued Seroquel; recommend if resuming to start 12 5 mg qhs   · Routine EEG results pending   · Appreciate geriatrics consult and recommendations

## 2020-09-17 NOTE — ASSESSMENT & PLAN NOTE
Lab Results   Component Value Date    HGBA1C 5 6 07/31/2020       Recent Labs     09/16/20  1049 09/16/20  1556 09/16/20  2101 09/17/20  0622   POCGLU 100 127 117 112       Blood Sugar Average: Last 72 hrs:    · Home insulin regimen: Lantus 10U qam/12U qpm  · During last hospitalization only required SSI  · Monitor accuchecks and continue with SSI coverage for now   · Diabetic diet  · Hypoglycemia protocol

## 2020-09-17 NOTE — ASSESSMENT & PLAN NOTE
Lab Results   Component Value Date    HGBA1C 5 6 07/31/2020       Recent Labs     09/16/20  1556 09/16/20  2101 09/17/20  0622 09/17/20  1136   POCGLU 127 117 112 119       Blood Sugar Average: Last 72 hrs:    · Home insulin regimen: Lantus 10U qam/12U qpm  · During last hospitalization only required SSI  · Monitor accuchecks and continue with SSI coverage for now   · Diabetic diet  · Hypoglycemia protocol

## 2020-09-17 NOTE — CASE MANAGEMENT
CM made a report to [employer]  -  [facility] ([phone]) and spoke with Wiley Chakraborty about the patient reporting an individual took his medications  Please see this writer's note from 9/17/2020  Per Raj Lemon if patient has capacity and wants [facility] referral can be made  Raj Lemon aware patient has medication lock box and is discharging today    CM set up [employer] transport for 5 pm today per patient request  Pt reports he wants to continue with [facility], referral made  A post acute care recommendation was made by your care team for [facility]  Discussed Freedom of Choice with patient  Choice is to return/continue services

## 2020-09-17 NOTE — CASE MANAGEMENT
Discharge transport set up:    Per EVI Byrnes, pt medically cleared for dc  Per MSW CM Devoria Form note, pt is pend insurance auth for return to [facility]  CM called and spoke with Shawn Freitas at [facility]; BLS  set for 4pm Friday 9/18  CM will follow for receipt of auth prior to transport or reschedule transport as necessary

## 2020-09-17 NOTE — CASE MANAGEMENT
Pt is confused  CM spoke with pt's brother, Dr Cirilo Kelsey to complete discharge planning assessment  [facility] was at [facility], prior to this he was living in a [facility] with 3 SOBIA  He is now an assist of 2 with ambulation and transfers  He uses a RW  Unclear if patient has a POA/LW  No history of MH or substance abuse  Pt drove prior to admission  Preferred pharmacy is [facility]  PCP is Dr Kourtney Turner  Per Dr Cirilo Kelsey, he prefers patient to go to [facility] or [facility]  He reports pt was there prior and had a good experience  Referral was made via [facility]    A post acute care recommendation was made by your care team for [facility]  Discussed Freedom of Choice with caregiver  3:30 PM: Per [place] patient is denied due to current functioning and increased agitation while at SNF     4:30 PM  Per [facility] pt is medically stable for discharge  Discussed with Dr Cirilo Kelsey (brother) he agrees to referral to [facility]  CM spoke with Admissions at [facility], referral will need to be sent and Elsie Macdonald will need to be obtained  This will be addressed tomorrow  CM sent referral     CM reviewed d/c planning process including the following: identifying help at home, patient preference for d/c planning needs, Discharge Lounge, [facility] Meds to Bed program, availability of treatment team to discuss questions or concerns patient and/or family may have regarding understanding medications and recognizing signs and symptoms once discharged  CM also encouraged patient to follow up with all recommended appointments after discharge  Patient advised of importance for patient and family to participate in managing patients medical well being

## 2020-09-17 NOTE — PLAN OF CARE
Problem: Potential for Falls  Goal: Patient will remain free of falls  Description: INTERVENTIONS:  - Assess patient frequently for physical needs  -  Identify cognitive and physical deficits and behaviors that affect risk of falls    -  [place] fall precautions as indicated by assessment   - Educate patient/family on patient safety including physical limitations  - Instruct patient to call for assistance with activity based on assessment  - Modify environment to reduce risk of injury  - Consider OT/PT consult to assist with strengthening/mobility  Outcome: Progressing     Problem: PAIN - ADULT  Goal: Verbalizes/displays adequate comfort level or baseline comfort level  Description: Interventions:  - Encourage patient to monitor pain and request assistance  - Assess pain using appropriate pain scale  - Administer analgesics based on type and severity of pain and evaluate response  - Implement non-pharmacological measures as appropriate and evaluate response  - Consider cultural and social influences on pain and pain management  - Notify physician/advanced practitioner if interventions unsuccessful or patient reports new pain  Outcome: Progressing     Problem: INFECTION - ADULT  Goal: Absence or prevention of progression during hospitalization  Description: INTERVENTIONS:  - Assess and monitor for signs and symptoms of infection  - Monitor lab/diagnostic results  - Monitor all insertion sites, i e  indwelling lines, tubes, and drains  - Monitor endotracheal if appropriate and nasal secretions for changes in amount and color  - [place] appropriate cooling/warming therapies per order  - Administer medications as ordered  - Instruct and encourage patient and family to use good hand hygiene technique  - Identify and instruct in appropriate isolation precautions for identified infection/condition  Outcome: Progressing  Goal: Absence of fever/infection during neutropenic period  Description: INTERVENTIONS:  - Monitor WBC    Outcome: Progressing     Problem: SAFETY ADULT  Goal: Patient will remain free of falls  Description: INTERVENTIONS:  - Assess patient frequently for physical needs  -  Identify cognitive and physical deficits and behaviors that affect risk of falls    -  [place] fall precautions as indicated by assessment   - Educate patient/family on patient safety including physical limitations  - Instruct patient to call for assistance with activity based on assessment  - Modify environment to reduce risk of injury  - Consider OT/PT consult to assist with strengthening/mobility  Outcome: Progressing  Goal: Maintain or return to baseline ADL function  Description: INTERVENTIONS:  -  Assess patient's ability to carry out ADLs; assess patient's baseline for ADL function and identify physical deficits which impact ability to perform ADLs (bathing, care of mouth/teeth, toileting, grooming, dressing, etc )  - Assess/evaluate cause of self-care deficits   - Assess range of motion  - Assess patient's mobility; develop plan if impaired  - Assess patient's need for assistive devices and provide as appropriate  - Encourage maximum independence but intervene and supervise when necessary  - Involve family in performance of ADLs  - Assess for home care needs following discharge   - Consider OT consult to assist with ADL evaluation and planning for discharge  - Provide patient education as appropriate  Outcome: Progressing  Goal: Maintain or return mobility status to optimal level  Description: INTERVENTIONS:  - Assess patient's baseline mobility status (ambulation, transfers, stairs, etc )    - Identify cognitive and physical deficits and behaviors that affect mobility  - Identify mobility aids required to assist with transfers and/or ambulation (gait belt, sit-to-stand, lift, walker, cane, etc )  - [place] fall precautions as indicated by assessment  - Record patient progress and toleration of activity level on Mobility SBAR; progress patient to next Phase/Stage  - Instruct patient to call for assistance with activity based on assessment  - Consider rehabilitation consult to assist with strengthening/weightbearing, etc   Outcome: Progressing     Problem: DISCHARGE PLANNING  Goal: Discharge to home or other facility with appropriate resources  Description: INTERVENTIONS:  - Identify barriers to discharge w/patient and caregiver  - Arrange for needed discharge resources and transportation as appropriate  - Identify discharge learning needs (meds, wound care, etc )  - Arrange for interpretive services to assist at discharge as needed  - Refer to Case Management Department for coordinating discharge planning if the patient needs post-hospital services based on physician/advanced practitioner order or complex needs related to functional status, cognitive ability, or social support system  Outcome: Progressing     Problem: Knowledge Deficit  Goal: Patient/family/caregiver demonstrates understanding of disease process, treatment plan, medications, and discharge instructions  Description: Complete learning assessment and assess knowledge base    Interventions:  - Provide teaching at level of understanding  - Provide teaching via preferred learning methods  Outcome: Progressing     Problem: SKIN/TISSUE INTEGRITY - ADULT  Goal: Skin integrity remains intact  Description: INTERVENTIONS  - Identify patients at risk for skin breakdown  - Assess and monitor skin integrity  - Assess and monitor nutrition and hydration status  - Monitor labs (i e  albumin)  - Assess for incontinence   - Turn and reposition patient  - Assist with mobility/ambulation  - Relieve pressure over bony prominences  - Avoid friction and shearing  - Provide appropriate hygiene as needed including keeping skin clean and dry  - Evaluate need for skin moisturizer/barrier cream  - Collaborate with interdisciplinary team (i e  Nutrition, Rehabilitation, etc )   - Patient/family teaching  Outcome: Progressing  Goal: Incision(s), wounds(s) or drain site(s) healing without S/S of infection  Description: INTERVENTIONS  - Assess and document risk factors for skin impairment   - Assess and document dressing, incision, wound bed, drain sites and surrounding tissue  - Consider nutrition services referral as needed  - Oral mucous membranes remain intact  - Provide patient/ family education  Outcome: Progressing  Goal: Oral mucous membranes remain intact  Description: INTERVENTIONS  - Assess oral mucosa and hygiene practices  - Implement preventative oral hygiene regimen  - Implement oral medicated treatments as ordered  - Initiate Nutrition services referral as needed  Outcome: Progressing     Problem: MUSCULOSKELETAL - ADULT  Goal: Maintain or return mobility to safest level of function  Description: INTERVENTIONS:  - Assess patient's ability to carry out ADLs; assess patient's baseline for ADL function and identify physical deficits which impact ability to perform ADLs (bathing, care of mouth/teeth, toileting, grooming, dressing, etc )  - Assess/evaluate cause of self-care deficits   - Assess range of motion  - Assess patient's mobility  - Assess patient's need for assistive devices and provide as appropriate  - Encourage maximum independence but intervene and supervise when necessary  - Involve family in performance of ADLs  - Assess for home care needs following discharge   - Consider OT consult to assist with ADL evaluation and planning for discharge  - Provide patient education as appropriate  Outcome: Progressing  Goal: Maintain proper alignment of affected body part  Description: INTERVENTIONS:  - Support, maintain and protect limb and body alignment  - Provide patient/ family with appropriate education  Outcome: Progressing     Problem: Prexisting or High Potential for Compromised Skin Integrity  Goal: Skin integrity is maintained or improved  Description: INTERVENTIONS:  - Identify patients at risk for skin breakdown  - Assess and monitor skin integrity  - Assess and monitor nutrition and hydration status  - Monitor labs   - Assess for incontinence   - Turn and reposition patient  - Assist with mobility/ambulation  - Relieve pressure over bony prominences  - Avoid friction and shearing  - Provide appropriate hygiene as needed including keeping skin clean and dry  - Evaluate need for skin moisturizer/barrier cream  - Collaborate with interdisciplinary team   - Patient/family teaching  - Consider wound care consult   Outcome: Progressing

## 2020-09-17 NOTE — PLAN OF CARE
Problem: Potential for Falls  Goal: Patient will remain free of falls  Description: INTERVENTIONS:  - Assess patient frequently for physical needs  -  Identify cognitive and physical deficits and behaviors that affect risk of falls    -  [place] fall precautions as indicated by assessment   - Educate patient/family on patient safety including physical limitations  - Instruct patient to call for assistance with activity based on assessment  - Modify environment to reduce risk of injury  - Consider OT/PT consult to assist with strengthening/mobility  Outcome: Progressing     Problem: PAIN - ADULT  Goal: Verbalizes/displays adequate comfort level or baseline comfort level  Description: Interventions:  - Encourage patient to monitor pain and request assistance  - Assess pain using appropriate pain scale  - Administer analgesics based on type and severity of pain and evaluate response  - Implement non-pharmacological measures as appropriate and evaluate response  - Consider cultural and social influences on pain and pain management  - Notify physician/advanced practitioner if interventions unsuccessful or patient reports new pain  Outcome: Progressing     Problem: INFECTION - ADULT  Goal: Absence or prevention of progression during hospitalization  Description: INTERVENTIONS:  - Assess and monitor for signs and symptoms of infection  - Monitor lab/diagnostic results  - Monitor all insertion sites, i e  indwelling lines, tubes, and drains  - Monitor endotracheal if appropriate and nasal secretions for changes in amount and color  - [place] appropriate cooling/warming therapies per order  - Administer medications as ordered  - Instruct and encourage patient and family to use good hand hygiene technique  - Identify and instruct in appropriate isolation precautions for identified infection/condition  Outcome: Progressing  Goal: Absence of fever/infection during neutropenic period  Description: INTERVENTIONS:  - Monitor WBC    Outcome: Progressing     Problem: SAFETY ADULT  Goal: Patient will remain free of falls  Description: INTERVENTIONS:  - Assess patient frequently for physical needs  -  Identify cognitive and physical deficits and behaviors that affect risk of falls    -  [place] fall precautions as indicated by assessment   - Educate patient/family on patient safety including physical limitations  - Instruct patient to call for assistance with activity based on assessment  - Modify environment to reduce risk of injury  - Consider OT/PT consult to assist with strengthening/mobility  Outcome: Progressing  Goal: Maintain or return to baseline ADL function  Description: INTERVENTIONS:  -  Assess patient's ability to carry out ADLs; assess patient's baseline for ADL function and identify physical deficits which impact ability to perform ADLs (bathing, care of mouth/teeth, toileting, grooming, dressing, etc )  - Assess/evaluate cause of self-care deficits   - Assess range of motion  - Assess patient's mobility; develop plan if impaired  - Assess patient's need for assistive devices and provide as appropriate  - Encourage maximum independence but intervene and supervise when necessary  - Involve family in performance of ADLs  - Assess for home care needs following discharge   - Consider OT consult to assist with ADL evaluation and planning for discharge  - Provide patient education as appropriate  Outcome: Progressing  Goal: Maintain or return mobility status to optimal level  Description: INTERVENTIONS:  - Assess patient's baseline mobility status (ambulation, transfers, stairs, etc )    - Identify cognitive and physical deficits and behaviors that affect mobility  - Identify mobility aids required to assist with transfers and/or ambulation (gait belt, sit-to-stand, lift, walker, cane, etc )  - [place] fall precautions as indicated by assessment  - Record patient progress and toleration of activity level on Mobility SBAR; progress patient to next Phase/Stage  - Instruct patient to call for assistance with activity based on assessment  - Consider rehabilitation consult to assist with strengthening/weightbearing, etc   Outcome: Progressing     Problem: DISCHARGE PLANNING  Goal: Discharge to home or other facility with appropriate resources  Description: INTERVENTIONS:  - Identify barriers to discharge w/patient and caregiver  - Arrange for needed discharge resources and transportation as appropriate  - Identify discharge learning needs (meds, wound care, etc )  - Arrange for interpretive services to assist at discharge as needed  - Refer to Case Management Department for coordinating discharge planning if the patient needs post-hospital services based on physician/advanced practitioner order or complex needs related to functional status, cognitive ability, or social support system  Outcome: Progressing     Problem: Knowledge Deficit  Goal: Patient/family/caregiver demonstrates understanding of disease process, treatment plan, medications, and discharge instructions  Description: Complete learning assessment and assess knowledge base    Interventions:  - Provide teaching at level of understanding  - Provide teaching via preferred learning methods  Outcome: Progressing     Problem: SKIN/TISSUE INTEGRITY - ADULT  Goal: Skin integrity remains intact  Description: INTERVENTIONS  - Identify patients at risk for skin breakdown  - Assess and monitor skin integrity  - Assess and monitor nutrition and hydration status  - Monitor labs (i e  albumin)  - Assess for incontinence   - Turn and reposition patient  - Assist with mobility/ambulation  - Relieve pressure over bony prominences  - Avoid friction and shearing  - Provide appropriate hygiene as needed including keeping skin clean and dry  - Evaluate need for skin moisturizer/barrier cream  - Collaborate with interdisciplinary team (i e  Nutrition, Rehabilitation, etc )   - Patient/family teaching  Outcome: Progressing  Goal: Incision(s), wounds(s) or drain site(s) healing without S/S of infection  Description: INTERVENTIONS  - Assess and document risk factors for skin impairment   - Assess and document dressing, incision, wound bed, drain sites and surrounding tissue  - Consider nutrition services referral as needed  - Oral mucous membranes remain intact  - Provide patient/ family education  Outcome: Progressing  Goal: Oral mucous membranes remain intact  Description: INTERVENTIONS  - Assess oral mucosa and hygiene practices  - Implement preventative oral hygiene regimen  - Implement oral medicated treatments as ordered  - Initiate Nutrition services referral as needed  Outcome: Progressing     Problem: MUSCULOSKELETAL - ADULT  Goal: Maintain or return mobility to safest level of function  Description: INTERVENTIONS:  - Assess patient's ability to carry out ADLs; assess patient's baseline for ADL function and identify physical deficits which impact ability to perform ADLs (bathing, care of mouth/teeth, toileting, grooming, dressing, etc )  - Assess/evaluate cause of self-care deficits   - Assess range of motion  - Assess patient's mobility  - Assess patient's need for assistive devices and provide as appropriate  - Encourage maximum independence but intervene and supervise when necessary  - Involve family in performance of ADLs  - Assess for home care needs following discharge   - Consider OT consult to assist with ADL evaluation and planning for discharge  - Provide patient education as appropriate  Outcome: Progressing  Goal: Maintain proper alignment of affected body part  Description: INTERVENTIONS:  - Support, maintain and protect limb and body alignment  - Provide patient/ family with appropriate education  Outcome: Progressing     Problem: Prexisting or High Potential for Compromised Skin Integrity  Goal: Skin integrity is maintained or improved  Description: INTERVENTIONS:  - Identify patients at risk for skin breakdown  - Assess and monitor skin integrity  - Assess and monitor nutrition and hydration status  - Monitor labs   - Assess for incontinence   - Turn and reposition patient  - Assist with mobility/ambulation  - Relieve pressure over bony prominences  - Avoid friction and shearing  - Provide appropriate hygiene as needed including keeping skin clean and dry  - Evaluate need for skin moisturizer/barrier cream  - Collaborate with interdisciplinary team   - Patient/family teaching  - Consider wound care consult   Outcome: Progressing

## 2020-09-18 ENCOUNTER — TELEPHONE (OUTPATIENT)
Dept: NEUROSURGERY | Facility: CLINIC | Age: 70
End: 2020-09-18

## 2020-09-18 LAB
GLUCOSE SERPL-MCNC: 104 MG/DL (ref 65–140)
GLUCOSE SERPL-MCNC: 126 MG/DL (ref 65–140)
GLUCOSE SERPL-MCNC: 90 MG/DL (ref 65–140)
GLUCOSE SERPL-MCNC: 94 MG/DL (ref 65–140)

## 2020-09-18 PROCEDURE — 82948 REAGENT STRIP/BLOOD GLUCOSE: CPT

## 2020-09-18 PROCEDURE — 99231 SBSQ HOSP IP/OBS SF/LOW 25: CPT | Performed by: PHYSICIAN ASSISTANT

## 2020-09-18 RX ADMIN — MELATONIN 3 MG: at 20:47

## 2020-09-18 RX ADMIN — QUETIAPINE FUMARATE 12.5 MG: 25 TABLET ORAL at 18:53

## 2020-09-18 RX ADMIN — PANTOPRAZOLE SODIUM 40 MG: 40 TABLET, DELAYED RELEASE ORAL at 06:46

## 2020-09-18 NOTE — PROGRESS NOTES
Pastoral Care Progress Note    2020  Patient: Keyur Flynn : [id number]  Admission Date & Time: 9/15/2020 1928  MRN: [id number] CSN: [id number]         entered patient's room after hearing him call out for help  assisted patient in requesting assistance (needed repositioning) and kept him company until assistance was provided     provided emotional support and active listening as patient spoke of his desire to be discharged and encouraged cooperation with medical staff      20 1100   Clinical Encounter Type   Visited With Patient   Routine Visit Introduction                Chaplaincy Interventions Utilized:   Empowerment: Encouraged focus on present and Provided anxiety containment    Exploration: Explored emotional needs & resources    Collaboration: Advocated for patient/family and Consulted with interdisciplinary team    Relationship Building: Cultivated a relationship of care and support and Listened empathically    Chaplaincy Outcomes Achieved:  Distress reduced

## 2020-09-18 NOTE — PLAN OF CARE
Problem: Potential for Falls  Goal: Patient will remain free of falls  Description: INTERVENTIONS:  - Assess patient frequently for physical needs  -  Identify cognitive and physical deficits and behaviors that affect risk of falls    -  [place] fall precautions as indicated by assessment   - Educate patient/family on patient safety including physical limitations  - Instruct patient to call for assistance with activity based on assessment  - Modify environment to reduce risk of injury  - Consider OT/PT consult to assist with strengthening/mobility  Outcome: Progressing     Problem: PAIN - ADULT  Goal: Verbalizes/displays adequate comfort level or baseline comfort level  Description: Interventions:  - Encourage patient to monitor pain and request assistance  - Assess pain using appropriate pain scale  - Administer analgesics based on type and severity of pain and evaluate response  - Implement non-pharmacological measures as appropriate and evaluate response  - Consider cultural and social influences on pain and pain management  - Notify physician/advanced practitioner if interventions unsuccessful or patient reports new pain  Outcome: Progressing     Problem: INFECTION - ADULT  Goal: Absence or prevention of progression during hospitalization  Description: INTERVENTIONS:  - Assess and monitor for signs and symptoms of infection  - Monitor lab/diagnostic results  - Monitor all insertion sites, i e  indwelling lines, tubes, and drains  - Monitor endotracheal if appropriate and nasal secretions for changes in amount and color  - [place] appropriate cooling/warming therapies per order  - Administer medications as ordered  - Instruct and encourage patient and family to use good hand hygiene technique  - Identify and instruct in appropriate isolation precautions for identified infection/condition  Outcome: Progressing  Goal: Absence of fever/infection during neutropenic period  Description: INTERVENTIONS:  - Monitor WBC    Outcome: Progressing     Problem: SAFETY ADULT  Goal: Patient will remain free of falls  Description: INTERVENTIONS:  - Assess patient frequently for physical needs  -  Identify cognitive and physical deficits and behaviors that affect risk of falls    -  [place] fall precautions as indicated by assessment   - Educate patient/family on patient safety including physical limitations  - Instruct patient to call for assistance with activity based on assessment  - Modify environment to reduce risk of injury  - Consider OT/PT consult to assist with strengthening/mobility  Outcome: Progressing  Goal: Maintain or return to baseline ADL function  Description: INTERVENTIONS:  -  Assess patient's ability to carry out ADLs; assess patient's baseline for ADL function and identify physical deficits which impact ability to perform ADLs (bathing, care of mouth/teeth, toileting, grooming, dressing, etc )  - Assess/evaluate cause of self-care deficits   - Assess range of motion  - Assess patient's mobility; develop plan if impaired  - Assess patient's need for assistive devices and provide as appropriate  - Encourage maximum independence but intervene and supervise when necessary  - Involve family in performance of ADLs  - Assess for home care needs following discharge   - Consider OT consult to assist with ADL evaluation and planning for discharge  - Provide patient education as appropriate  Outcome: Progressing  Goal: Maintain or return mobility status to optimal level  Description: INTERVENTIONS:  - Assess patient's baseline mobility status (ambulation, transfers, stairs, etc )    - Identify cognitive and physical deficits and behaviors that affect mobility  - Identify mobility aids required to assist with transfers and/or ambulation (gait belt, sit-to-stand, lift, walker, cane, etc )  - [place] fall precautions as indicated by assessment  - Record patient progress and toleration of activity level on Mobility SBAR; progress patient to next Phase/Stage  - Instruct patient to call for assistance with activity based on assessment  - Consider rehabilitation consult to assist with strengthening/weightbearing, etc   Outcome: Progressing     Problem: DISCHARGE PLANNING  Goal: Discharge to home or other facility with appropriate resources  Description: INTERVENTIONS:  - Identify barriers to discharge w/patient and caregiver  - Arrange for needed discharge resources and transportation as appropriate  - Identify discharge learning needs (meds, wound care, etc )  - Arrange for interpretive services to assist at discharge as needed  - Refer to Case Management Department for coordinating discharge planning if the patient needs post-hospital services based on physician/advanced practitioner order or complex needs related to functional status, cognitive ability, or social support system  Outcome: Progressing     Problem: Knowledge Deficit  Goal: Patient/family/caregiver demonstrates understanding of disease process, treatment plan, medications, and discharge instructions  Description: Complete learning assessment and assess knowledge base    Interventions:  - Provide teaching at level of understanding  - Provide teaching via preferred learning methods  Outcome: Progressing     Problem: SKIN/TISSUE INTEGRITY - ADULT  Goal: Skin integrity remains intact  Description: INTERVENTIONS  - Identify patients at risk for skin breakdown  - Assess and monitor skin integrity  - Assess and monitor nutrition and hydration status  - Monitor labs (i e  albumin)  - Assess for incontinence   - Turn and reposition patient  - Assist with mobility/ambulation  - Relieve pressure over bony prominences  - Avoid friction and shearing  - Provide appropriate hygiene as needed including keeping skin clean and dry  - Evaluate need for skin moisturizer/barrier cream  - Collaborate with interdisciplinary team (i e  Nutrition, Rehabilitation, etc )   - Patient/family teaching  Outcome: Progressing  Goal: Incision(s), wounds(s) or drain site(s) healing without S/S of infection  Description: INTERVENTIONS  - Assess and document risk factors for skin impairment   - Assess and document dressing, incision, wound bed, drain sites and surrounding tissue  - Consider nutrition services referral as needed  - Oral mucous membranes remain intact  - Provide patient/ family education  Outcome: Progressing  Goal: Oral mucous membranes remain intact  Description: INTERVENTIONS  - Assess oral mucosa and hygiene practices  - Implement preventative oral hygiene regimen  - Implement oral medicated treatments as ordered  - Initiate Nutrition services referral as needed  Outcome: Progressing     Problem: MUSCULOSKELETAL - ADULT  Goal: Maintain or return mobility to safest level of function  Description: INTERVENTIONS:  - Assess patient's ability to carry out ADLs; assess patient's baseline for ADL function and identify physical deficits which impact ability to perform ADLs (bathing, care of mouth/teeth, toileting, grooming, dressing, etc )  - Assess/evaluate cause of self-care deficits   - Assess range of motion  - Assess patient's mobility  - Assess patient's need for assistive devices and provide as appropriate  - Encourage maximum independence but intervene and supervise when necessary  - Involve family in performance of ADLs  - Assess for home care needs following discharge   - Consider OT consult to assist with ADL evaluation and planning for discharge  - Provide patient education as appropriate  Outcome: Progressing  Goal: Maintain proper alignment of affected body part  Description: INTERVENTIONS:  - Support, maintain and protect limb and body alignment  - Provide patient/ family with appropriate education  Outcome: Progressing     Problem: Prexisting or High Potential for Compromised Skin Integrity  Goal: Skin integrity is maintained or improved  Description: INTERVENTIONS:  - Identify patients at risk for skin breakdown  - Assess and monitor skin integrity  - Assess and monitor nutrition and hydration status  - Monitor labs   - Assess for incontinence   - Turn and reposition patient  - Assist with mobility/ambulation  - Relieve pressure over bony prominences  - Avoid friction and shearing  - Provide appropriate hygiene as needed including keeping skin clean and dry  - Evaluate need for skin moisturizer/barrier cream  - Collaborate with interdisciplinary team   - Patient/family teaching  - Consider wound care consult   Outcome: Progressing

## 2020-09-18 NOTE — ASSESSMENT & PLAN NOTE
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

## 2020-09-18 NOTE — CASE MANAGEMENT
IP rehab follow up:    [facility] message from [place]; they have no available beds  CM will discuss alternate options with Brother Dr Jessie Linder  CM called and spoke with Kassy Yang at [facility]; 4pm BLS  cancelled  Dr Gela Carbone and Mitzi  advised  0948 hrs:   CM called and spoke with pt's Brother Dr Maranda Matthews, advised of no bed at [place] and obtained alternate choices  Dr Jessie Linder asked for referrals in the area of [facility]; he stated that his intent is for rehab with probable transition to longterm care  Note: pt declined by all [facility]  Pt's Brother reported that many family members have been residents of [address] [place] [address] and asked for referral to their facility also         [facility] referrals sent to:   [facility] [id number] [facility]

## 2020-09-18 NOTE — PROGRESS NOTES
Progress Note - Luly Nava 1950, 69 y o  male MRN: [id number]  Unit/Bed#: -01 Encounter: [id number]  Primary Care Provider: Conor Orellana   Date and time admitted to hospital: 9/15/2020  7:28 PM    * Encephalopathy  Assessment & Plan  · Initially brought in for fall at rehab facility however patient's brother and rehab staff reporting worsening of baseline cognitive impairment since discharge from hospital 8/29, in setting of recent prolonged and complicated hospitalization  · Per rehab notes, noted to have increasing behavioral issues with agitation/screaming and calling out "help me" deuce   at night concerning for underlying dementia   · Was started on seroquel 25 mg qhs, lexapro increased to 20 mg, no other recent med changes  · 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 and stable ventriculomegaly  · Shunt series unremarkable   · Appreciate neurology consult and recommendations  · Discontinued Seroquel; recommend if resuming to start 12 5 mg qhs   · Routine EEG showed temporal sharps which may be epileptogenic however low suspicion for seizuers per neurology so no need for vEEG at this time   · Outpatient follow-up  · Appreciate geriatrics consult and recommendations   · Taper off Lexapro (10 mg daily x 1 week then 5 mg x 1 week then stop)  · May resume low dose Seroquel 12 5 mg at bedtime and up-titrate as needed     Normal pressure hydrocephalus ([address] )  Assessment & Plan  · S/p VA 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  shunt infection/meningitis/peritonitis during last hospitalization treated with meropenem until 9/9  · CTH in ED with stable ventriculomegaly,  shunt catheter tip in the left lateral ventricle   · Shunt series: "Left-sided shunt demonstrates intact tubing within and above the neck   The tubing is slightly more difficult to visualize over the chest but appears to terminate in the region of the cavoatrial junction "  · Appreciate neurosurgery recommendations; low suspicion for shunt malfunction given stable CT head, no surgical intervention necessary     Leukocytosis  Assessment & Plan  · Unclear etiology; patient's bother who is a physician reports chronic leukocytosis around 11,000   · No other SRIS criteria present  · UA unremarkable  · Chest x-ray: Small left base infiltrate and/or slight atelectasis     · Procalcitonin negative x 2   · Blood cultures negative x 48 hours   · Patient with multiple skin abrasions, sacral ulcer and fungal infection   · Continue local wound care  · Monitor off antibiotics    Abnormal EKG  Assessment & Plan  · EKG x 2 with mild ST depressions  · Patient without chest pain   · Troponin negative x 2  · Recent ROSEMARIE largely unremarkable    Urinary retention  Assessment & Plan  · PVR in ED noted to be 270 mL and Griffith catheter inserted  · Evaluated by Urology, appreciate recommendations   · No significant evidence of urinary retention   · D/C'd Griffith catheter on 09/16  · Urinary retention protocol    Fall  Assessment & Plan  · Patient initially presented after sustaining a fall at rehab facility  · CT head without acute intracranial abnormality  · X-ray left tibia and fibula without acute osseous abnormality  · PT/OT recommending return to rehab    Essential hypertension  Assessment & Plan  · BP acceptable, monitor routinely  · Continue lisinopril 10 mg daily    Type 2 diabetes mellitus [facility])  Assessment & Plan  Lab Results   Component Value Date    HGBA1C 5 6 07/31/2020       Recent Labs     09/17/20  1136 09/17/20  1551 09/17/20  2032 09/18/20  0808   POCGLU 119 118 108 104       Blood Sugar Average: Last 72 hrs:    · Home insulin regimen: Lantus 10U qam/12U qpm  · During last hospitalization only required SSI  · Monitor accuchecks and continue with SSI coverage for now   · Diabetic diet  · Hypoglycemia protocol     VTE Pharmacologic Prophylaxis:   Pharmacologic: Enoxaparin (Lovenox)  Mechanical VTE Prophylaxis in Place: No    Patient Centered Rounds: I have performed bedside rounds with nursing staff today     Discussions with Specialists or Other Care Team Provider: primary RN, case management     Education and Discussions with Family / Patient: will call patient's brother, Dr Gina Richmond, with update     Time Spent for Care: 15 minutes  More than 50% of total time spent on counseling and coordination of care as described above  Current Length of Stay: 2 day(s)    Current Patient Status: Inpatient   Certification Statement: The patient will continue to require additional inpatient hospital stay due to pending placement     Discharge Plan: medically stable, pending rehab placement     Code Status: Level 1 - Full Code    Subjective:   Patient offers no complaints today  States "I'm not supposed to be here" and asking to have his wrist bands cut off  He is confused and somewhat agitated but otherwise cooperative and answers questions appropriately  Objective:     Vitals:   Temp (24hrs), Av 8 °F (36 °C), Min:96 °F (35 6 °C), Max:97 6 °F (36 4 °C)    Temp:  [96 °F (35 6 °C)-97 6 °F (36 4 °C)] 97 6 °F (36 4 °C)  HR:  [66-75] 66  Resp:  [17-19] 18  BP: (117-136)/(63-66) 117/63  SpO2:  [98 %] 98 %  Body mass index is 28 12 kg/m²  Input and Output Summary (last 24 hours): Intake/Output Summary (Last 24 hours) at 2020 1240  Last data filed at 2020 1734  Gross per 24 hour   Intake 360 ml   Output    Net 360 ml       Physical Exam:     Physical Exam  Vitals signs and nursing note reviewed  Exam conducted with a chaperone present  Cardiovascular:      Rate and Rhythm: Normal rate and regular rhythm  Heart sounds: No murmur  Pulmonary:      Effort: Pulmonary effort is normal  No respiratory distress  Breath sounds: Decreased breath sounds present  No wheezing or rales  Abdominal:      General: Bowel sounds are normal  There is no distension  Palpations: Abdomen is soft  Tenderness: There is no abdominal tenderness  Neurological:      Mental Status: He is alert  Comments: Oriented to self    Psychiatric:         Behavior: Behavior is agitated  Behavior is not aggressive  Behavior is cooperative  Additional Data:     Labs:    Results from last 7 days   Lab Units 09/17/20  0447   WBC Thousand/uL 10 64*   HEMOGLOBIN g/dL 13 2   HEMATOCRIT % 42 4   PLATELETS Thousands/uL 218   NEUTROS PCT % 73   LYMPHS PCT % 13*   MONOS PCT % 10   EOS PCT % 2     Results from last 7 days   Lab Units 09/17/20  0447  09/15/20  2307   SODIUM mmol/L 140   < > 137   POTASSIUM mmol/L 3 6   < > 4 0   CHLORIDE mmol/L 107   < > 103   CO2 mmol/L 24   < > 25   BUN mg/dL 14   < > 12   CREATININE mg/dL 0 68   < > 0 63   ANION GAP mmol/L 9   < > 9   CALCIUM mg/dL 9 1   < > 9 7   ALBUMIN g/dL  --   --  3 1*   TOTAL BILIRUBIN mg/dL  --   --  0 93   ALK PHOS U/L  --   --  116   ALT U/L  --   --  19   AST U/L  --   --  15   GLUCOSE RANDOM mg/dL 106   < > 125    < > = values in this interval not displayed  Results from last 7 days   Lab Units 09/18/20  0808 09/17/20  2032 09/17/20  1551 09/17/20  1136 09/17/20  0622 09/16/20  2101 09/16/20  1556 09/16/20  1049 09/16/20  0615   POC GLUCOSE mg/dl 104 108 118 119 112 117 127 100 127         Results from last 7 days   Lab Units 09/17/20  0447 09/16/20  1502   PROCALCITONIN ng/ml <0 05 <0 05           * I Have Reviewed All Lab Data Listed Above  * Additional Pertinent Lab Tests Reviewed: No New Labs Available For Today    Imaging:    Imaging Reports Reviewed Today Include: none  Imaging Personally Reviewed by Myself Includes:  none    Recent Cultures (last 7 days):     Results from last 7 days   Lab Units 09/16/20  0938   BLOOD CULTURE  No Growth at 48 hrs  No Growth at 48 hrs         Last 24 Hours Medication List:   Current Facility-Administered Medications   Medication Dose Route Frequency Provider Last Rate    acetaminophen  650 mg Oral Q6H PRN Eva Jean PA-C      allopurinol  300 mg Oral Daily Eva Jean PA-C      bisacodyl  10 mg Rectal Daily PRN Khushi Manzano PA-C      docusate sodium  100 mg Oral BID Khushi Manzano PA-C      enoxaparin  40 mg Subcutaneous Daily Khushi Manzano PA-C      escitalopram  10 mg Oral Daily Linda Aragon PA-C      insulin lispro  2-12 Units Subcutaneous 4x Daily (AC & HS) Khushi Manzano PA-C      lisinopril  10 mg Oral Daily Khushi Manzano PA-C      melatonin  3 mg Oral HS Khushi Manzano PA-C      VERONICA ANTIFUNGAL   Topical BID Eddi Farooq MD      oxyCODONE  5 mg Oral Q4H PRN Khushi Manzano PA-C      pantoprazole  40 mg Oral Early Morning Khushi Manzano PA-C      QUEtiapine  12 5 mg Oral HS Linda Aragon PA-C      senna  1 tablet Oral Daily Khushi Manzano PA-C      travoprost  1 drop Right Eye HS Khushi Manzano PA-C          Today, Patient Was Seen By: Elmer Smith PA-C    ** Please Note: Dictation voice to text software may have been used in the creation of this document   **

## 2020-09-18 NOTE — RESPIRATORY THERAPY NOTE
CPAP Note        09/18/20 0932   Inhalation Therapy Tx   Resp Comments Patient has been refusing CPAP x2 days  Order will be D/C'd and machine will be pulled from patient's room

## 2020-09-18 NOTE — NURSING NOTE
Oriented  to self, year, not date, time or situation  Calling out frequently for help, finding patient to state he needs help to get out and go home  Also states he will be driving himself  Incontinent of large amount urine x1  BM x1 small soft stool on bedpan  MASD of sacrum, Calazyme ointment applied after incontinence  Fed self lunch with assistance- hands tremulous  Refused to eat anything more than puddings

## 2020-09-18 NOTE — CASE MANAGEMENT
IP rehab follow up:    [facility] responses from [facility] and [facility]; requesting longterm care gilbert and more information on behavior  Shae Vásquez requesting therapy test for pt's ability to stand and pivot  CM e-mailed pt's Brother Dr Esther Thornton, in [place], the longterm care application and referral updates  CM cc'd CMs [place] and Lynne, in e-mail, to facilitate coordination of dc planning  Note: pt moved from [place] [id number] to [facility] today

## 2020-09-18 NOTE — ASSESSMENT & PLAN NOTE
· Unclear etiology; patient's bother who is a physician reports chronic leukocytosis around 11,000   · No other SRIS criteria present  · UA unremarkable  · Chest x-ray: Small left base infiltrate and/or slight atelectasis     · Procalcitonin negative x 2   · Blood cultures negative x 48 hours   · Patient with multiple skin abrasions, sacral ulcer and fungal infection   · Continue local wound care  · Monitor off antibiotics

## 2020-09-18 NOTE — PROGRESS NOTES
Pt is refusing medications and RN to assess pt at this time  Will continue to monitor throughout shift

## 2020-09-18 NOTE — ASSESSMENT & PLAN NOTE
· PVR in ED noted to be 270 mL and Griffith catheter inserted  · Evaluated by Urology, appreciate recommendations   · No significant evidence of urinary retention   · D/C'd Griffith catheter on 09/16  · Urinary retention protocol  · Unfortunately this morning patient is oliguric  · Will straight catheterization, bladder scan, strict intake and output, monitor BMP

## 2020-09-18 NOTE — ASSESSMENT & PLAN NOTE
Lab Results   Component Value Date    HGBA1C 5 6 07/31/2020       Recent Labs     09/17/20  1136 09/17/20  1551 09/17/20 2032 09/18/20  0808   POCGLU 119 118 108 104       Blood Sugar Average: Last 72 hrs:    · Home insulin regimen: Lantus 10U qam/12U qpm  · During last hospitalization only required SSI  · Monitor accuchecks and continue with SSI coverage for now   · Diabetic diet  · Hypoglycemia protocol

## 2020-09-19 LAB
ANION GAP SERPL CALCULATED.3IONS-SCNC: 5 MMOL/L (ref 4–13)
BUN SERPL-MCNC: 11 MG/DL (ref 5–25)
CALCIUM SERPL-MCNC: 9 MG/DL (ref 8.3–10.1)
CHLORIDE SERPL-SCNC: 105 MMOL/L (ref 100–108)
CO2 SERPL-SCNC: 29 MMOL/L (ref 21–32)
CREAT SERPL-MCNC: 0.64 MG/DL (ref 0.6–1.3)
GFR SERPL CREATININE-BSD FRML MDRD: 99 ML/MIN/1.73SQ M
GLUCOSE SERPL-MCNC: 114 MG/DL (ref 65–140)
GLUCOSE SERPL-MCNC: 136 MG/DL (ref 65–140)
GLUCOSE SERPL-MCNC: 76 MG/DL (ref 65–140)
GLUCOSE SERPL-MCNC: 93 MG/DL (ref 65–140)
GLUCOSE SERPL-MCNC: 95 MG/DL (ref 65–140)
GLUCOSE SERPL-MCNC: 97 MG/DL (ref 65–140)
POTASSIUM SERPL-SCNC: 3.6 MMOL/L (ref 3.5–5.3)
SODIUM SERPL-SCNC: 139 MMOL/L (ref 136–145)

## 2020-09-19 PROCEDURE — 80048 BASIC METABOLIC PNL TOTAL CA: CPT | Performed by: FAMILY MEDICINE

## 2020-09-19 PROCEDURE — 82948 REAGENT STRIP/BLOOD GLUCOSE: CPT

## 2020-09-19 PROCEDURE — 99233 SBSQ HOSP IP/OBS HIGH 50: CPT | Performed by: FAMILY MEDICINE

## 2020-09-19 RX ADMIN — SENNOSIDES 8.6 MG: 8.6 TABLET, FILM COATED ORAL at 09:21

## 2020-09-19 RX ADMIN — DOCUSATE SODIUM 100 MG: 100 CAPSULE, LIQUID FILLED ORAL at 09:21

## 2020-09-19 RX ADMIN — PANTOPRAZOLE SODIUM 40 MG: 40 TABLET, DELAYED RELEASE ORAL at 06:26

## 2020-09-19 RX ADMIN — ENOXAPARIN SODIUM 40 MG: 40 INJECTION SUBCUTANEOUS at 09:20

## 2020-09-19 RX ADMIN — MICONAZOLE NITRATE: 20 CREAM TOPICAL at 09:22

## 2020-09-19 RX ADMIN — LISINOPRIL 10 MG: 10 TABLET ORAL at 09:21

## 2020-09-19 RX ADMIN — QUETIAPINE FUMARATE 12.5 MG: 25 TABLET ORAL at 21:13

## 2020-09-19 RX ADMIN — ALLOPURINOL 300 MG: 300 TABLET ORAL at 09:21

## 2020-09-19 RX ADMIN — MELATONIN 3 MG: at 21:13

## 2020-09-19 RX ADMIN — ESCITALOPRAM OXALATE 10 MG: 10 TABLET ORAL at 09:21

## 2020-09-19 NOTE — PROGRESS NOTES
Progress Note - Yasmin Primer 1950, 69 y o  male MRN: [id number]    Unit/Bed#: [facility] 701-01 Encounter: [id number]    Primary Care Provider: Jagdish Alonso   Date and time admitted to hospital: 9/15/2020  7:28 PM        Urinary retention  Assessment & Plan  · PVR in ED noted to be 270 mL and Griffith catheter inserted  · Evaluated by Urology, appreciate recommendations   · No significant evidence of urinary retention   · D/C'd Griffith catheter on 09/16  · Urinary retention protocol  · Unfortunately this morning patient is oliguric  · Will straight catheterization, bladder scan, strict intake and output, monitor BMP    Leukocytosis  Assessment & Plan  · Unclear etiology; patient's bother who is a physician reports chronic leukocytosis around 11,000   · No other SRIS criteria present  · UA unremarkable  · Chest x-ray: Small left base infiltrate and/or slight atelectasis     · Procalcitonin negative x 2   · Blood cultures negative x 48 hours   · Patient with multiple skin abrasions, sacral ulcer and fungal infection   · Continue local wound care  · Monitor off antibiotics    Fall  Assessment & Plan  · Patient initially presented after sustaining a fall at rehab facility  · CT head without acute intracranial abnormality  · X-ray left tibia and fibula without acute osseous abnormality  · PT/OT recommending return to rehab    Abnormal EKG  Assessment & Plan  · EKG x 2 with mild ST depressions  · Patient without chest pain   · Troponin negative x 2  · Recent ROSEMARIE largely unremarkable    Essential hypertension  Assessment & Plan  · BP acceptable, monitor routinely  · Continue lisinopril 10 mg daily    Normal pressure hydrocephalus (HCC)  Assessment & Plan  · S/p VA shunt after  shunt infection/meningitis/peritonitis during last hospitalization treated with meropenem until 9/9  · CTH in ED with stable ventriculomegaly,  shunt catheter tip in the left lateral ventricle   · Shunt series: "Left-sided shunt demonstrates intact tubing within and above the neck   The tubing is slightly more difficult to visualize over the chest but appears to terminate in the region of the cavoatrial junction "  · Appreciate neurosurgery recommendations; low suspicion for shunt malfunction given stable CT head, no surgical intervention necessary     Type 2 diabetes mellitus [facility])  Assessment & Plan  Lab Results   Component Value Date    HGBA1C 5 6 07/31/2020       Recent Labs     09/17/20  1136 09/17/20  1551 09/17/20  2032 09/18/20  0808   POCGLU 119 118 108 104       Blood Sugar Average: Last 72 hrs:    · Home insulin regimen: Lantus 10U qam/12U qpm  · During last hospitalization only required SSI  · Monitor accuchecks and continue with SSI coverage for now   · Diabetic diet  · Hypoglycemia protocol     * Encephalopathy  Assessment & Plan  · Initially brought in for fall at rehab facility however patient's brother and rehab staff reporting worsening of baseline cognitive impairment since discharge from hospital 8/29, in setting of recent prolonged and complicated hospitalization  · Per rehab notes, noted to have increasing behavioral issues with agitation/screaming and calling out "help me" deuce   at night concerning for underlying dementia   · Was started on seroquel 25 mg qhs, lexapro increased to 20 mg, no other recent med changes  · 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 and stable ventriculomegaly  · Shunt series unremarkable   · Appreciate neurology consult and recommendations  · Discontinued Seroquel; recommend if resuming to start 12 5 mg qhs   · Routine EEG showed temporal sharps which may be epileptogenic however low suspicion for seizuers per neurology so no need for vEEG at this time   · Outpatient follow-up  · Appreciate geriatrics consult and recommendations   · Taper off Lexapro (10 mg daily x 1 week then 5 mg x 1 week then stop)  · May resume low dose Seroquel 12 5 mg at bedtime and up-titrate as needed         VTE Pharmacologic Prophylaxis: Pharmacologic: Enoxaparin (Lovenox)  Mechanical VTE Prophylaxis in Place: Yes    Patient Centered Rounds: I have performed bedside rounds with nursing staff today  Education and Discussions with Family / Patient: patient at bedside    Time Spent for Care: 30 minutes  More than 50% of total time spent on counseling and coordination of care as described above  Current Length of Stay: 3 day(s)    Current Patient Status: Inpatient   Certification Statement: The patient will continue to require additional inpatient hospital stay due to Acute urinary retention    Discharge Plan:  Short-term rehab once medically cleared    Code Status: Level 1 - Full Code      Subjective:   Patient was examined at bedside, he is calm and cooperative  Only oriented to self, not oriented to place or time  Patient's friend is at bedside who he recognized  Patient does not seem to be in any distress  Denies any abdominal pain  As per nursing report, patient did not have any urinary output since yesterday  Objective:     Vitals:   Temp (24hrs), Av 6 °F (36 4 °C), Min:97 4 °F (36 3 °C), Max:97 8 °F (36 6 °C)    Temp:  [97 4 °F (36 3 °C)-97 8 °F (36 6 °C)] 97 4 °F (36 3 °C)  HR:  [52-62] 52  Resp:  [16-18] 18  BP: (118-135)/(72) 135/72  SpO2:  [97 %-99 %] 99 %  Body mass index is 28 12 kg/m²  Input and Output Summary (last 24 hours): Intake/Output Summary (Last 24 hours) at 2020 1158  Last data filed at 2020 1101  Gross per 24 hour   Intake 180 ml   Output 460 ml   Net -280 ml       Physical Exam:     Physical Exam  Vitals signs and nursing note reviewed  Constitutional:       General: He is not in acute distress  Appearance: Normal appearance  HENT:      Head: Normocephalic and atraumatic  Right Ear: External ear normal       Left Ear: External ear normal       Nose: Nose normal    Eyes:      Extraocular Movements: Extraocular movements intact     Neck:      Musculoskeletal: Normal range of motion  Pulmonary:      Effort: Pulmonary effort is normal    Neurological:      General: No focal deficit present  Mental Status: He is alert  Comments: Patient is calm and cooperative, oriented to self only   Psychiatric:         Mood and Affect: Mood normal             Additional Data:     Labs:    Results from last 7 days   Lab Units 09/17/20  0447   WBC Thousand/uL 10 64*   HEMOGLOBIN g/dL 13 2   HEMATOCRIT % 42 4   PLATELETS Thousands/uL 218   NEUTROS PCT % 73   LYMPHS PCT % 13*   MONOS PCT % 10   EOS PCT % 2     Results from last 7 days   Lab Units 09/17/20  0447  09/15/20  2307   SODIUM mmol/L 140   < > 137   POTASSIUM mmol/L 3 6   < > 4 0   CHLORIDE mmol/L 107   < > 103   CO2 mmol/L 24   < > 25   BUN mg/dL 14   < > 12   CREATININE mg/dL 0 68   < > 0 63   ANION GAP mmol/L 9   < > 9   CALCIUM mg/dL 9 1   < > 9 7   ALBUMIN g/dL  --   --  3 1*   TOTAL BILIRUBIN mg/dL  --   --  0 93   ALK PHOS U/L  --   --  116   ALT U/L  --   --  19   AST U/L  --   --  15   GLUCOSE RANDOM mg/dL 106   < > 125    < > = values in this interval not displayed  Results from last 7 days   Lab Units 09/19/20  1030 09/19/20  0624 09/19/20  0617 09/18/20  2048 09/18/20  1649 09/18/20  1309 09/18/20  0808 09/17/20  2032 09/17/20  1551 09/17/20  1136 09/17/20  0622 09/16/20  2101   POC GLUCOSE mg/dl 93 95 76 94 90 126 104 108 118 119 112 117         Results from last 7 days   Lab Units 09/17/20  0447 09/16/20  1502   PROCALCITONIN ng/ml <0 05 <0 05           * I Have Reviewed All Lab Data Listed Above  * Additional Pertinent Lab Tests Reviewed: [address] Admission Reviewed         Results from last 7 days   Lab Units 09/16/20  0938   BLOOD CULTURE  No Growth at 48 hrs  No Growth at 48 hrs         Last 24 Hours Medication List:   Current Facility-Administered Medications   Medication Dose Route Frequency Provider Last Rate    acetaminophen  650 mg Oral Q6H PRN Lucio Izaguirre PA-C      allopurinol  300 mg Oral Daily Coretta Morales PA-C      bisacodyl  10 mg Rectal Daily PRN Coretta Morales PA-C      docusate sodium  100 mg Oral BID oCretta Morales PA-C      enoxaparin  40 mg Subcutaneous Daily Coretta Morales PA-C      escitalopram  10 mg Oral Daily Linda E EVI Aragon      insulin lispro  2-12 Units Subcutaneous 4x Daily (AC & HS) Coretta Morales PA-C      lisinopril  10 mg Oral Daily Coretta Morales PA-C      melatonin  3 mg Oral HS Coretta Morales PA-C      VERONICA ANTIFUNGAL   Topical BID Emile King MD      oxyCODONE  5 mg Oral Q4H PRN Coretta Morales PA-C      pantoprazole  40 mg Oral Early Morning Coretta Morales PA-C      QUEtiapine  12 5 mg Oral HS Linda Aragon PA-C      senna  1 tablet Oral Daily Coretta Morales PA-C      travoprost  1 drop Right Eye HS Coretta Morales PA-C          Today, Patient Was Seen By: Skylar Flores MD    ** Please Note: Dictation voice to text software may have been used in the creation of this document   **

## 2020-09-19 NOTE — PLAN OF CARE
Problem: Potential for Falls  Goal: Patient will remain free of falls  Description: INTERVENTIONS:  - Assess patient frequently for physical needs  -  Identify cognitive and physical deficits and behaviors that affect risk of falls    -  [place] fall precautions as indicated by assessment   - Educate patient/family on patient safety including physical limitations  - Instruct patient to call for assistance with activity based on assessment  - Modify environment to reduce risk of injury  - Consider OT/PT consult to assist with strengthening/mobility  Outcome: Progressing     Problem: PAIN - ADULT  Goal: Verbalizes/displays adequate comfort level or baseline comfort level  Description: Interventions:  - Encourage patient to monitor pain and request assistance  - Assess pain using appropriate pain scale  - Administer analgesics based on type and severity of pain and evaluate response  - Implement non-pharmacological measures as appropriate and evaluate response  - Consider cultural and social influences on pain and pain management  - Notify physician/advanced practitioner if interventions unsuccessful or patient reports new pain  Outcome: Progressing     Problem: INFECTION - ADULT  Goal: Absence or prevention of progression during hospitalization  Description: INTERVENTIONS:  - Assess and monitor for signs and symptoms of infection  - Monitor lab/diagnostic results  - Monitor all insertion sites, i e  indwelling lines, tubes, and drains  - Monitor endotracheal if appropriate and nasal secretions for changes in amount and color  - [place] appropriate cooling/warming therapies per order  - Administer medications as ordered  - Instruct and encourage patient and family to use good hand hygiene technique  - Identify and instruct in appropriate isolation precautions for identified infection/condition  Outcome: Progressing  Goal: Absence of fever/infection during neutropenic period  Description: INTERVENTIONS:  - Monitor WBC    Outcome: Progressing     Problem: SAFETY ADULT  Goal: Patient will remain free of falls  Description: INTERVENTIONS:  - Assess patient frequently for physical needs  -  Identify cognitive and physical deficits and behaviors that affect risk of falls    -  [place] fall precautions as indicated by assessment   - Educate patient/family on patient safety including physical limitations  - Instruct patient to call for assistance with activity based on assessment  - Modify environment to reduce risk of injury  - Consider OT/PT consult to assist with strengthening/mobility  Outcome: Progressing  Goal: Maintain or return to baseline ADL function  Description: INTERVENTIONS:  -  Assess patient's ability to carry out ADLs; assess patient's baseline for ADL function and identify physical deficits which impact ability to perform ADLs (bathing, care of mouth/teeth, toileting, grooming, dressing, etc )  - Assess/evaluate cause of self-care deficits   - Assess range of motion  - Assess patient's mobility; develop plan if impaired  - Assess patient's need for assistive devices and provide as appropriate  - Encourage maximum independence but intervene and supervise when necessary  - Involve family in performance of ADLs  - Assess for home care needs following discharge   - Consider OT consult to assist with ADL evaluation and planning for discharge  - Provide patient education as appropriate  Outcome: Progressing  Goal: Maintain or return mobility status to optimal level  Description: INTERVENTIONS:  - Assess patient's baseline mobility status (ambulation, transfers, stairs, etc )    - Identify cognitive and physical deficits and behaviors that affect mobility  - Identify mobility aids required to assist with transfers and/or ambulation (gait belt, sit-to-stand, lift, walker, cane, etc )  - [place] fall precautions as indicated by assessment  - Record patient progress and toleration of activity level on Mobility SBAR; progress patient to next Phase/Stage  - Instruct patient to call for assistance with activity based on assessment  - Consider rehabilitation consult to assist with strengthening/weightbearing, etc   Outcome: Progressing     Problem: DISCHARGE PLANNING  Goal: Discharge to home or other facility with appropriate resources  Description: INTERVENTIONS:  - Identify barriers to discharge w/patient and caregiver  - Arrange for needed discharge resources and transportation as appropriate  - Identify discharge learning needs (meds, wound care, etc )  - Arrange for interpretive services to assist at discharge as needed  - Refer to Case Management Department for coordinating discharge planning if the patient needs post-hospital services based on physician/advanced practitioner order or complex needs related to functional status, cognitive ability, or social support system  Outcome: Progressing     Problem: Knowledge Deficit  Goal: Patient/family/caregiver demonstrates understanding of disease process, treatment plan, medications, and discharge instructions  Description: Complete learning assessment and assess knowledge base    Interventions:  - Provide teaching at level of understanding  - Provide teaching via preferred learning methods  Outcome: Progressing     Problem: SKIN/TISSUE INTEGRITY - ADULT  Goal: Skin integrity remains intact  Description: INTERVENTIONS  - Identify patients at risk for skin breakdown  - Assess and monitor skin integrity  - Assess and monitor nutrition and hydration status  - Monitor labs (i e  albumin)  - Assess for incontinence   - Turn and reposition patient  - Assist with mobility/ambulation  - Relieve pressure over bony prominences  - Avoid friction and shearing  - Provide appropriate hygiene as needed including keeping skin clean and dry  - Evaluate need for skin moisturizer/barrier cream  - Collaborate with interdisciplinary team (i e  Nutrition, Rehabilitation, etc )   - Patient/family teaching  Outcome: Progressing  Goal: Incision(s), wounds(s) or drain site(s) healing without S/S of infection  Description: INTERVENTIONS  - Assess and document risk factors for skin impairment   - Assess and document dressing, incision, wound bed, drain sites and surrounding tissue  - Consider nutrition services referral as needed  - Oral mucous membranes remain intact  - Provide patient/ family education  Outcome: Progressing  Goal: Oral mucous membranes remain intact  Description: INTERVENTIONS  - Assess oral mucosa and hygiene practices  - Implement preventative oral hygiene regimen  - Implement oral medicated treatments as ordered  - Initiate Nutrition services referral as needed  Outcome: Progressing     Problem: MUSCULOSKELETAL - ADULT  Goal: Maintain or return mobility to safest level of function  Description: INTERVENTIONS:  - Assess patient's ability to carry out ADLs; assess patient's baseline for ADL function and identify physical deficits which impact ability to perform ADLs (bathing, care of mouth/teeth, toileting, grooming, dressing, etc )  - Assess/evaluate cause of self-care deficits   - Assess range of motion  - Assess patient's mobility  - Assess patient's need for assistive devices and provide as appropriate  - Encourage maximum independence but intervene and supervise when necessary  - Involve family in performance of ADLs  - Assess for home care needs following discharge   - Consider OT consult to assist with ADL evaluation and planning for discharge  - Provide patient education as appropriate  Outcome: Progressing  Goal: Maintain proper alignment of affected body part  Description: INTERVENTIONS:  - Support, maintain and protect limb and body alignment  - Provide patient/ family with appropriate education  Outcome: Progressing     Problem: Prexisting or High Potential for Compromised Skin Integrity  Goal: Skin integrity is maintained or improved  Description: INTERVENTIONS:  - Identify patients at risk for skin breakdown  - Assess and monitor skin integrity  - Assess and monitor nutrition and hydration status  - Monitor labs   - Assess for incontinence   - Turn and reposition patient  - Assist with mobility/ambulation  - Relieve pressure over bony prominences  - Avoid friction and shearing  - Provide appropriate hygiene as needed including keeping skin clean and dry  - Evaluate need for skin moisturizer/barrier cream  - Collaborate with interdisciplinary team   - Patient/family teaching  - Consider wound care consult   Outcome: Progressing

## 2020-09-20 LAB
ANION GAP SERPL CALCULATED.3IONS-SCNC: 8 MMOL/L (ref 4–13)
BUN SERPL-MCNC: 9 MG/DL (ref 5–25)
CALCIUM SERPL-MCNC: 9.2 MG/DL (ref 8.3–10.1)
CHLORIDE SERPL-SCNC: 104 MMOL/L (ref 100–108)
CO2 SERPL-SCNC: 27 MMOL/L (ref 21–32)
CREAT SERPL-MCNC: 0.64 MG/DL (ref 0.6–1.3)
ERYTHROCYTE [DISTWIDTH] IN BLOOD BY AUTOMATED COUNT: 15.7 % (ref 11.6–15.1)
GFR SERPL CREATININE-BSD FRML MDRD: 99 ML/MIN/1.73SQ M
GLUCOSE SERPL-MCNC: 100 MG/DL (ref 65–140)
GLUCOSE SERPL-MCNC: 101 MG/DL (ref 65–140)
GLUCOSE SERPL-MCNC: 119 MG/DL (ref 65–140)
GLUCOSE SERPL-MCNC: 131 MG/DL (ref 65–140)
GLUCOSE SERPL-MCNC: 136 MG/DL (ref 65–140)
HCT VFR BLD AUTO: 41.8 % (ref 36.5–49.3)
HGB BLD-MCNC: 13.6 G/DL (ref 12–17)
MCH RBC QN AUTO: 26.9 PG (ref 26.8–34.3)
MCHC RBC AUTO-ENTMCNC: 32.5 G/DL (ref 31.4–37.4)
MCV RBC AUTO: 83 FL (ref 82–98)
PLATELET # BLD AUTO: 203 THOUSANDS/UL (ref 149–390)
PMV BLD AUTO: 10.1 FL (ref 8.9–12.7)
POTASSIUM SERPL-SCNC: 3.2 MMOL/L (ref 3.5–5.3)
RBC # BLD AUTO: 5.05 MILLION/UL (ref 3.88–5.62)
SODIUM SERPL-SCNC: 139 MMOL/L (ref 136–145)
WBC # BLD AUTO: 10.33 THOUSAND/UL (ref 4.31–10.16)

## 2020-09-20 PROCEDURE — 85027 COMPLETE CBC AUTOMATED: CPT | Performed by: FAMILY MEDICINE

## 2020-09-20 PROCEDURE — 80048 BASIC METABOLIC PNL TOTAL CA: CPT | Performed by: FAMILY MEDICINE

## 2020-09-20 PROCEDURE — 99232 SBSQ HOSP IP/OBS MODERATE 35: CPT | Performed by: FAMILY MEDICINE

## 2020-09-20 PROCEDURE — 82948 REAGENT STRIP/BLOOD GLUCOSE: CPT

## 2020-09-20 RX ORDER — POTASSIUM CHLORIDE 20 MEQ/1
40 TABLET, EXTENDED RELEASE ORAL ONCE
Status: COMPLETED | OUTPATIENT
Start: 2020-09-20 | End: 2020-09-20

## 2020-09-20 RX ORDER — QUETIAPINE FUMARATE 25 MG/1
12.5 TABLET, FILM COATED ORAL ONCE
Status: COMPLETED | OUTPATIENT
Start: 2020-09-20 | End: 2020-09-20

## 2020-09-20 RX ADMIN — QUETIAPINE FUMARATE 12.5 MG: 25 TABLET ORAL at 15:13

## 2020-09-20 RX ADMIN — ALLOPURINOL 300 MG: 300 TABLET ORAL at 08:37

## 2020-09-20 RX ADMIN — MICONAZOLE NITRATE: 20 CREAM TOPICAL at 16:07

## 2020-09-20 RX ADMIN — ESCITALOPRAM OXALATE 10 MG: 10 TABLET ORAL at 08:37

## 2020-09-20 RX ADMIN — MICONAZOLE NITRATE: 20 CREAM TOPICAL at 08:37

## 2020-09-20 RX ADMIN — QUETIAPINE FUMARATE 12.5 MG: 25 TABLET, FILM COATED ORAL at 21:37

## 2020-09-20 RX ADMIN — SENNOSIDES 8.6 MG: 8.6 TABLET, FILM COATED ORAL at 08:37

## 2020-09-20 RX ADMIN — DOCUSATE SODIUM 100 MG: 100 CAPSULE, LIQUID FILLED ORAL at 16:06

## 2020-09-20 RX ADMIN — LISINOPRIL 10 MG: 10 TABLET ORAL at 08:37

## 2020-09-20 RX ADMIN — DOCUSATE SODIUM 100 MG: 100 CAPSULE, LIQUID FILLED ORAL at 08:36

## 2020-09-20 RX ADMIN — MELATONIN 3 MG: at 21:37

## 2020-09-20 RX ADMIN — POTASSIUM CHLORIDE 40 MEQ: 1500 TABLET, EXTENDED RELEASE ORAL at 13:00

## 2020-09-20 RX ADMIN — ENOXAPARIN SODIUM 40 MG: 40 INJECTION SUBCUTANEOUS at 08:36

## 2020-09-20 NOTE — ASSESSMENT & PLAN NOTE
· Initially brought in for fall at rehab facility however patient's brother and rehab staff reporting worsening of baseline cognitive impairment since discharge from hospital 8/29, in setting of recent prolonged and complicated hospitalization  · Per rehab notes, noted to have increasing behavioral issues with agitation/screaming and calling out "help me" deuce  at night concerning for underlying dementia   · Was started on seroquel 25 mg qhs, lexapro increased to 20 mg, no other recent med changes  · 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 and stable ventriculomegaly  · Shunt series unremarkable   · Appreciate neurology consult and recommendations  · Discontinued Seroquel; recommend if resuming to start 12 5 mg qhs   · Routine EEG showed temporal sharps which may be epileptogenic however low suspicion for seizuers per neurology so no need for vEEG at this time   · Outpatient follow-up  · Appreciate geriatrics consult and recommendations   · Taper off Lexapro (10 mg daily x 1 week then 5 mg x 1 week then stop)  · May resume low dose Seroquel 12 5 mg at bedtime and up-titrate as needed     Patient is medically cleared to be discharged to rehab  Pending acceptance   CM aware

## 2020-09-20 NOTE — PLAN OF CARE
Problem: Potential for Falls  Goal: Patient will remain free of falls  Description: INTERVENTIONS:  - Assess patient frequently for physical needs  -  Identify cognitive and physical deficits and behaviors that affect risk of falls    -  [place] fall precautions as indicated by assessment   - Educate patient/family on patient safety including physical limitations  - Instruct patient to call for assistance with activity based on assessment  - Modify environment to reduce risk of injury  - Consider OT/PT consult to assist with strengthening/mobility  Outcome: Progressing     Problem: PAIN - ADULT  Goal: Verbalizes/displays adequate comfort level or baseline comfort level  Description: Interventions:  - Encourage patient to monitor pain and request assistance  - Assess pain using appropriate pain scale  - Administer analgesics based on type and severity of pain and evaluate response  - Implement non-pharmacological measures as appropriate and evaluate response  - Consider cultural and social influences on pain and pain management  - Notify physician/advanced practitioner if interventions unsuccessful or patient reports new pain  Outcome: Progressing     Problem: INFECTION - ADULT  Goal: Absence or prevention of progression during hospitalization  Description: INTERVENTIONS:  - Assess and monitor for signs and symptoms of infection  - Monitor lab/diagnostic results  - Monitor all insertion sites, i e  indwelling lines, tubes, and drains  - Monitor endotracheal if appropriate and nasal secretions for changes in amount and color  - [place] appropriate cooling/warming therapies per order  - Administer medications as ordered  - Instruct and encourage patient and family to use good hand hygiene technique  - Identify and instruct in appropriate isolation precautions for identified infection/condition  Outcome: Progressing  Goal: Absence of fever/infection during neutropenic period  Description: INTERVENTIONS:  - Monitor WBC    Outcome: Progressing     Problem: SAFETY ADULT  Goal: Patient will remain free of falls  Description: INTERVENTIONS:  - Assess patient frequently for physical needs  -  Identify cognitive and physical deficits and behaviors that affect risk of falls    -  [place] fall precautions as indicated by assessment   - Educate patient/family on patient safety including physical limitations  - Instruct patient to call for assistance with activity based on assessment  - Modify environment to reduce risk of injury  - Consider OT/PT consult to assist with strengthening/mobility  Outcome: Progressing  Goal: Maintain or return to baseline ADL function  Description: INTERVENTIONS:  -  Assess patient's ability to carry out ADLs; assess patient's baseline for ADL function and identify physical deficits which impact ability to perform ADLs (bathing, care of mouth/teeth, toileting, grooming, dressing, etc )  - Assess/evaluate cause of self-care deficits   - Assess range of motion  - Assess patient's mobility; develop plan if impaired  - Assess patient's need for assistive devices and provide as appropriate  - Encourage maximum independence but intervene and supervise when necessary  - Involve family in performance of ADLs  - Assess for home care needs following discharge   - Consider OT consult to assist with ADL evaluation and planning for discharge  - Provide patient education as appropriate  Outcome: Progressing  Goal: Maintain or return mobility status to optimal level  Description: INTERVENTIONS:  - Assess patient's baseline mobility status (ambulation, transfers, stairs, etc )    - Identify cognitive and physical deficits and behaviors that affect mobility  - Identify mobility aids required to assist with transfers and/or ambulation (gait belt, sit-to-stand, lift, walker, cane, etc )  - [place] fall precautions as indicated by assessment  - Record patient progress and toleration of activity level on Mobility SBAR; progress patient to next Phase/Stage  - Instruct patient to call for assistance with activity based on assessment  - Consider rehabilitation consult to assist with strengthening/weightbearing, etc   Outcome: Progressing     Problem: DISCHARGE PLANNING  Goal: Discharge to home or other facility with appropriate resources  Description: INTERVENTIONS:  - Identify barriers to discharge w/patient and caregiver  - Arrange for needed discharge resources and transportation as appropriate  - Identify discharge learning needs (meds, wound care, etc )  - Arrange for interpretive services to assist at discharge as needed  - Refer to Case Management Department for coordinating discharge planning if the patient needs post-hospital services based on physician/advanced practitioner order or complex needs related to functional status, cognitive ability, or social support system  Outcome: Progressing     Problem: Knowledge Deficit  Goal: Patient/family/caregiver demonstrates understanding of disease process, treatment plan, medications, and discharge instructions  Description: Complete learning assessment and assess knowledge base    Interventions:  - Provide teaching at level of understanding  - Provide teaching via preferred learning methods  Outcome: Progressing     Problem: SKIN/TISSUE INTEGRITY - ADULT  Goal: Skin integrity remains intact  Description: INTERVENTIONS  - Identify patients at risk for skin breakdown  - Assess and monitor skin integrity  - Assess and monitor nutrition and hydration status  - Monitor labs (i e  albumin)  - Assess for incontinence   - Turn and reposition patient  - Assist with mobility/ambulation  - Relieve pressure over bony prominences  - Avoid friction and shearing  - Provide appropriate hygiene as needed including keeping skin clean and dry  - Evaluate need for skin moisturizer/barrier cream  - Collaborate with interdisciplinary team (i e  Nutrition, Rehabilitation, etc )   - Patient/family teaching  Outcome: Progressing  Goal: Incision(s), wounds(s) or drain site(s) healing without S/S of infection  Description: INTERVENTIONS  - Assess and document risk factors for skin impairment   - Assess and document dressing, incision, wound bed, drain sites and surrounding tissue  - Consider nutrition services referral as needed  - Oral mucous membranes remain intact  - Provide patient/ family education  Outcome: Progressing  Goal: Oral mucous membranes remain intact  Description: INTERVENTIONS  - Assess oral mucosa and hygiene practices  - Implement preventative oral hygiene regimen  - Implement oral medicated treatments as ordered  - Initiate Nutrition services referral as needed  Outcome: Progressing     Problem: MUSCULOSKELETAL - ADULT  Goal: Maintain or return mobility to safest level of function  Description: INTERVENTIONS:  - Assess patient's ability to carry out ADLs; assess patient's baseline for ADL function and identify physical deficits which impact ability to perform ADLs (bathing, care of mouth/teeth, toileting, grooming, dressing, etc )  - Assess/evaluate cause of self-care deficits   - Assess range of motion  - Assess patient's mobility  - Assess patient's need for assistive devices and provide as appropriate  - Encourage maximum independence but intervene and supervise when necessary  - Involve family in performance of ADLs  - Assess for home care needs following discharge   - Consider OT consult to assist with ADL evaluation and planning for discharge  - Provide patient education as appropriate  Outcome: Progressing  Goal: Maintain proper alignment of affected body part  Description: INTERVENTIONS:  - Support, maintain and protect limb and body alignment  - Provide patient/ family with appropriate education  Outcome: Progressing     Problem: Prexisting or High Potential for Compromised Skin Integrity  Goal: Skin integrity is maintained or improved  Description: INTERVENTIONS:  - Identify patients at risk for skin breakdown  - Assess and monitor skin integrity  - Assess and monitor nutrition and hydration status  - Monitor labs   - Assess for incontinence   - Turn and reposition patient  - Assist with mobility/ambulation  - Relieve pressure over bony prominences  - Avoid friction and shearing  - Provide appropriate hygiene as needed including keeping skin clean and dry  - Evaluate need for skin moisturizer/barrier cream  - Collaborate with interdisciplinary team   - Patient/family teaching  - Consider wound care consult   Outcome: Progressing

## 2020-09-20 NOTE — ASSESSMENT & PLAN NOTE
· Grover cath inserted on admission  · Evaluated by Urology, appreciate recommendations   · No significant evidence of urinary retention   · D/C'd Grover catheter on 09/16  · Did not require any straight cath since grover was out  · Able to urinate spontaneously

## 2020-09-20 NOTE — PROGRESS NOTES
Progress Note - Izabel Soto 1950, 69 y o  male MRN: [id number]    Unit/Bed#: [facility] 701-01 Encounter: [id number]    Primary Care Provider: Katherine Birmingham   Date and time admitted to hospital: 9/15/2020  7:28 PM        * Encephalopathy  Assessment & Plan  · Initially brought in for fall at rehab facility however patient's brother and rehab staff reporting worsening of baseline cognitive impairment since discharge from hospital 8/29, in setting of recent prolonged and complicated hospitalization  · Per rehab notes, noted to have increasing behavioral issues with agitation/screaming and calling out "help me" deuce  at night concerning for underlying dementia   · Was started on seroquel 25 mg qhs, lexapro increased to 20 mg, no other recent med changes  · 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 and stable ventriculomegaly  · Shunt series unremarkable   · Appreciate neurology consult and recommendations  · Discontinued Seroquel; recommend if resuming to start 12 5 mg qhs   · Routine EEG showed temporal sharps which may be epileptogenic however low suspicion for seizuers per neurology so no need for vEEG at this time   · Outpatient follow-up  · Appreciate geriatrics consult and recommendations   · Taper off Lexapro (10 mg daily x 1 week then 5 mg x 1 week then stop)  · May resume low dose Seroquel 12 5 mg at bedtime and up-titrate as needed     Patient is medically cleared to be discharged to rehab  Pending acceptance  CM aware    Urinary retention  Assessment & Plan  · Grover cath inserted on admission  · Evaluated by Urology, appreciate recommendations   · No significant evidence of urinary retention   · D/C'd Grover catheter on 09/16  · Did not require any straight cath since grover was out  · Able to urinate spontaneously      Leukocytosis  Assessment & Plan  · Unclear etiology; patient's bother who is a physician reports chronic leukocytosis around 11,000   · No other SRIS criteria present  · UA unremarkable  · Chest x-ray: Small left base infiltrate and/or slight atelectasis  · Procalcitonin negative x 2   · Blood cultures negative x 48 hours   · Patient with multiple skin abrasions, sacral ulcer and fungal infection   · Continue local wound care  · Monitor off antibiotics           VTE Pharmacologic Prophylaxis:   Pharmacologic: Enoxaparin (Lovenox)  Mechanical VTE Prophylaxis in Place: Yes    Patient Centered Rounds: I have performed bedside rounds with nursing staff today  Discussions with Specialists or Other Care Team Provider: no    Education and Discussions with Family / Patient: Spoke to brother rodrigo over the phone    Time Spent for Care: 30 minutes  More than 50% of total time spent on counseling and coordination of care as described above  Current Length of Stay: 4 day(s)    Current Patient Status: Inpatient   Certification Statement: The patient will continue to require additional inpatient hospital stay due to Placement    Discharge Plan: rehab once accepted    Code Status: Level 1 - Full Code      Subjective:   No acute overnight events  Patient is watching tv from the bed  Pleasant  Alert awake  Oriented to self, but not time or place  Knows the president  conversates well  Objective:     Vitals:   Temp (24hrs), Av 2 °F (36 8 °C), Min:97 9 °F (36 6 °C), Max:98 4 °F (36 9 °C)    Temp:  [97 9 °F (36 6 °C)-98 4 °F (36 9 °C)] 98 2 °F (36 8 °C)  HR:  [63-67] 67  Resp:  [18-19] 18  BP: (109-155)/() 129/75  SpO2:  [94 %-95 %] 95 %  Body mass index is 28 12 kg/m²  Input and Output Summary (last 24 hours): Intake/Output Summary (Last 24 hours) at 2020 1414  Last data filed at 2020 0900  Gross per 24 hour   Intake 600 ml   Output 231 ml   Net 369 ml       Physical Exam:     Physical Exam  Vitals signs and nursing note reviewed  Constitutional:       General: He is not in acute distress  Appearance: Normal appearance  HENT:      Head: Normocephalic and atraumatic        Right Ear: External ear normal       Left Ear: External ear normal       Nose: Nose normal    Eyes:      Extraocular Movements: Extraocular movements intact  Neck:      Musculoskeletal: Normal range of motion  Pulmonary:      Effort: Pulmonary effort is normal    Neurological:      Mental Status: He is alert  Mental status is at baseline  Comments: Oriented to self  Not place or time  However knows the president  Knows distant memories   Psychiatric:         Mood and Affect: Mood normal           Additional Data:     Labs:    Results from last 7 days   Lab Units 09/20/20  0454 09/17/20  0447   WBC Thousand/uL 10 33* 10 64*   HEMOGLOBIN g/dL 13 6 13 2   HEMATOCRIT % 41 8 42 4   PLATELETS Thousands/uL 203 218   NEUTROS PCT %  --  73   LYMPHS PCT %  --  13*   MONOS PCT %  --  10   EOS PCT %  --  2     Results from last 7 days   Lab Units 09/20/20  0454  09/15/20  2307   SODIUM mmol/L 139   < > 137   POTASSIUM mmol/L 3 2*   < > 4 0   CHLORIDE mmol/L 104   < > 103   CO2 mmol/L 27   < > 25   BUN mg/dL 9   < > 12   CREATININE mg/dL 0 64   < > 0 63   ANION GAP mmol/L 8   < > 9   CALCIUM mg/dL 9 2   < > 9 7   ALBUMIN g/dL  --   --  3 1*   TOTAL BILIRUBIN mg/dL  --   --  0 93   ALK PHOS U/L  --   --  116   ALT U/L  --   --  19   AST U/L  --   --  15   GLUCOSE RANDOM mg/dL 100   < > 125    < > = values in this interval not displayed  Results from last 7 days   Lab Units 09/20/20  1058 09/20/20  0628 09/19/20  2104 09/19/20  1607 09/19/20  1030 09/19/20  0624 09/19/20  0617 09/18/20  2048 09/18/20  1649 09/18/20  1309 09/18/20  0808 09/17/20  2032   POC GLUCOSE mg/dl 131 101 97 114 93 95 76 94 90 126 104 108         Results from last 7 days   Lab Units 09/17/20  0447 09/16/20  1502   PROCALCITONIN ng/ml <0 05 <0 05           * I Have Reviewed All Lab Data Listed Above  * Additional Pertinent Lab Tests Reviewed:  All [facility] Reviewed       Recent Cultures (last 7 days):     Results from last 7 days   Lab Units 09/16/20  0938   BLOOD CULTURE  No Growth After 4 Days  No Growth After 4 Days  Last 24 Hours Medication List:   Current Facility-Administered Medications   Medication Dose Route Frequency Provider Last Rate    acetaminophen  650 mg Oral Q6H PRN Cabral Peeling, PA-C      allopurinol  300 mg Oral Daily Cabral Peeling, PA-C      bisacodyl  10 mg Rectal Daily PRN Cabral Peeling, PA-C      docusate sodium  100 mg Oral BID Cabral Peeling, PA-C      enoxaparin  40 mg Subcutaneous Daily Cabral Peeling, PA-C      escitalopram  10 mg Oral Daily Linda E Held, PA-C      insulin lispro  2-12 Units Subcutaneous 4x Daily (AC & HS) Cabral Peeling, PA-C      lisinopril  10 mg Oral Daily Cabral Peeling, PA-C      melatonin  3 mg Oral HS Cabral Peeling, PA-C      VERONICA ANTIFUNGAL   Topical BID Sushma Mccullough MD      oxyCODONE  5 mg Oral Q4H PRN Cabral Peeling, PA-C      pantoprazole  40 mg Oral Early Morning Cabral Peeling, PA-C      QUEtiapine  12 5 mg Oral HS Linda E Held, PA-C      senna  1 tablet Oral Daily Cabral Peeling, PA-C      travoprost  1 drop Right Eye HS Cabral Peeling, PA-C          Today, Patient Was Seen By: Aisha Rueda MD    ** Please Note: Dictation voice to text software may have been used in the creation of this document   **

## 2020-09-21 LAB
BACTERIA BLD CULT: NORMAL
BACTERIA BLD CULT: NORMAL
GLUCOSE SERPL-MCNC: 106 MG/DL (ref 65–140)
GLUCOSE SERPL-MCNC: 111 MG/DL (ref 65–140)
GLUCOSE SERPL-MCNC: 158 MG/DL (ref 65–140)
GLUCOSE SERPL-MCNC: 97 MG/DL (ref 65–140)
SARS-COV-2 RNA RESP QL NAA+PROBE: NEGATIVE

## 2020-09-21 PROCEDURE — 97535 SELF CARE MNGMENT TRAINING: CPT

## 2020-09-21 PROCEDURE — 82948 REAGENT STRIP/BLOOD GLUCOSE: CPT

## 2020-09-21 PROCEDURE — 99232 SBSQ HOSP IP/OBS MODERATE 35: CPT | Performed by: FAMILY MEDICINE

## 2020-09-21 PROCEDURE — 97530 THERAPEUTIC ACTIVITIES: CPT

## 2020-09-21 PROCEDURE — U0003 INFECTIOUS AGENT DETECTION BY NUCLEIC ACID (DNA OR RNA); SEVERE ACUTE RESPIRATORY SYNDROME CORONAVIRUS 2 (SARS-COV-2) (CORONAVIRUS DISEASE [COVID-19]), AMPLIFIED PROBE TECHNIQUE, MAKING USE OF HIGH THROUGHPUT TECHNOLOGIES AS DESCRIBED BY CMS-2020-01-R: HCPCS | Performed by: FAMILY MEDICINE

## 2020-09-21 RX ADMIN — SENNOSIDES 8.6 MG: 8.6 TABLET, FILM COATED ORAL at 08:12

## 2020-09-21 RX ADMIN — QUETIAPINE FUMARATE 12.5 MG: 25 TABLET ORAL at 21:22

## 2020-09-21 RX ADMIN — LISINOPRIL 10 MG: 10 TABLET ORAL at 08:12

## 2020-09-21 RX ADMIN — MELATONIN 3 MG: at 21:21

## 2020-09-21 RX ADMIN — ENOXAPARIN SODIUM 40 MG: 40 INJECTION SUBCUTANEOUS at 08:12

## 2020-09-21 RX ADMIN — DOCUSATE SODIUM 100 MG: 100 CAPSULE, LIQUID FILLED ORAL at 08:12

## 2020-09-21 RX ADMIN — DOCUSATE SODIUM 100 MG: 100 CAPSULE, LIQUID FILLED ORAL at 17:09

## 2020-09-21 RX ADMIN — MICONAZOLE NITRATE: 20 CREAM TOPICAL at 08:13

## 2020-09-21 RX ADMIN — ALLOPURINOL 300 MG: 300 TABLET ORAL at 08:12

## 2020-09-21 RX ADMIN — INSULIN LISPRO 2 UNITS: 100 INJECTION, SOLUTION INTRAVENOUS; SUBCUTANEOUS at 09:43

## 2020-09-21 RX ADMIN — MICONAZOLE NITRATE: 20 CREAM TOPICAL at 17:10

## 2020-09-21 RX ADMIN — ESCITALOPRAM OXALATE 10 MG: 10 TABLET ORAL at 08:12

## 2020-09-21 NOTE — OCCUPATIONAL THERAPY NOTE
Occupational Therapy Note          09/21/20 0843   OT Last Visit   OT Visit Date 09/21/20   Restrictions/Precautions   Weight Bearing Precautions Per Order No   Other Precautions Cognitive; Bed Alarm; Fall Risk   Pain Assessment   Pain Assessment Tool 0-10   Pain Score No Pain   Patient's Stated Pain Goal No pain   ADL   Where Assessed Edge of bed   Grooming Assistance 3  Moderate Assistance   Grooming Deficit Setup;Steadying;Verbal cueing;Supervision/safety; Increased time to complete;Wash/dry hands   Grooming Comments Pt required mod A to wash hands/ face while sitting EOB  Pt required a lot of VCs to wash hands in basin  He struggled to lean forward and fully wash hands  He was able to brush teeth with set up and supervision   UB Bathing Assistance 3  Moderate Assistance   UB Bathing Deficit Setup;Steadying;Verbal cueing;Supervision/safety; Increased time to complete; Chest   UB Bathing Comments Pt required mod a to fully wash UB including his chest where he spilled breakast and could not reach    LB Bathing Assistance 3  Moderate Assistance   LB Bathing Deficit Setup;Steadying;Verbal cueing;Supervision/safety; Increased time to complete; Buttocks;Right lower leg including foot; Left lower leg including foot   LB Bathing Comments Pt required Mod A to wash bottom and LE    UB Dressing Assistance 6  Modified independent   UB Dressing Deficit Setup;Steadying;Verbal cueing;Supervision/safety; Increased time to complete; Thread RUE; Thread LUE; Fasteners   UB Dressing Comments Mod A to thread arms and tie gown around back    Bed Mobility   Rolling R 2  Maximal assistance   Additional items Assist x 2;Bedrails; Increased time required;Verbal cues   Rolling L 4  Minimal assistance   Additional items Assist x 2; Increased time required;Verbal cues;LE management   Supine to Sit 2  Maximal assistance   Additional items Assist x 2;HOB elevated; Increased time required;Verbal cues;LE management   Sit to Supine 2  Maximal assistance Additional items Assist x 2; Increased time required;LE management   Transfers   Sit to Stand Unable to assess   Cognition   Overall Cognitive Status Impaired   Arousal/Participation Responsive;Lethargic   Attention Attends with cues to redirect   Orientation Level Oriented to person;Disoriented to situation;Disoriented to time;Disoriented to place   Memory Decreased short term memory;Decreased recall of recent events;Decreased recall of precautions   Following Commands Follows one step commands with increased time or repetition   Comments Pt pleasant and agrreable to therapy    Assessment   Assessment Patient participated in Occupational therapy with focus on dressing, grooming, bathing, bed mobility, activity tolerance and sitting tolerance while completing self care tasks sitting EOB  Pt was cleared by RN/ [facility] for participation in OT  Pt identifiers confirmed and pt was agreeable to participate in OT  Pt reported that he did not have any pain  Pt presented laying supine with HOB raised upon initiation of OT  Pt required assist with bed mobility and sitting tolerance 2* weakness, stiffness and poor sense of proprioception  Pt thought he was going to fall forward even though he had a strong posterior lean while sitting EOB  Pt required assist with bathing and dressing 2* poor activity tolerance and tremors  Pt returned to supine position with bed alarm on and all needs within reach  OT continuing to recommend post-acute rehabilitation upon discharge  Plan   Treatment Interventions ADL retraining;Functional transfer training;UE strengthening/ROM; Endurance training;Cognitive reorientation;Patient/family training;Equipment evaluation/education; Energy conservation; Activityengagement   Goal Expiration Date 09/30/20   OT Frequency 2-3x/wk   Recommendation   OT Discharge Recommendation Post-Acute Rehabilitation Services   OT - OK to Discharge Yes   [place] Index   Feeding 5   Bathing 0   Grooming Score 0   Dressing Score 5   Bladder Score 0   Bowels Score 0   Toilet Use Score 0   Transfers (Bed/Chair) Score 0   Mobility (Level Surface) Score 0   Stairs Score 0   [place] Index Score 10             Kassandra WOOD

## 2020-09-21 NOTE — PHYSICAL THERAPY NOTE
PHYSICAL THERAPY NOTE  Patient Name: Duke Taylor  RQTJO'N Date: 9/21/2020 09/21/20 1133   PT Last Visit   PT Visit Date 09/21/20   Pain Assessment   Pain Assessment Tool 0-10   Pain Score No Pain   Restrictions/Precautions   Weight Bearing Precautions Per Order No   Other Precautions Cognitive; Bed Alarm;Multiple lines; Fall Risk;Pain   General   Chart Reviewed Yes   Response to Previous Treatment Patient with no complaints from previous session  Family/Caregiver Present No   Cognition   Overall Cognitive Status Impaired   Arousal/Participation Alert; Responsive; Cooperative   Attention Attends with cues to redirect   Orientation Level Oriented to place; Disoriented to time;Disoriented to place; Disoriented to situation   Memory Decreased recall of precautions   Following Commands Follows one step commands with increased time or repetition   Comments Pt very pleasant and cooperative to work w/ therapy   Subjective   Subjective Pt lying supine and agreeable to work w/ therapy   Bed Mobility   Supine to Sit 2  Maximal assistance   Additional items Assist x 2;HOB elevated; Increased time required;Verbal cues;LE management   Sit to Supine 2  Maximal assistance   Additional items Assist x 2;HOB elevated; Increased time required;Verbal cues;LE management   Additional Comments Pt lying supine upon PT arrival  Pt sat EOB ~8 minutes at Max A 2' tremors and retropulsion  Pt able to perform minimal LE therex seated EOB   Pt returned lying supine at end of session w/ PCA present to assist pt w/ feeding   Transfers   Sit to Stand Unable to assess  (poor sitting balance)   Balance   Static Sitting Poor -   Endurance Deficit   Endurance Deficit Yes   Endurance Deficit Description weakness, fatigue, pain   Activity Tolerance   Activity Tolerance Patient limited by fatigue;Patient limited by pain   Nurse Made Aware yes   Exercises   Knee AROM Long Arc Quad Sitting;Bilateral;5 reps   Marching Sitting;Bilateral;5 reps   Assessment   Prognosis Fair   Problem List Decreased strength;Decreased range of motion;Decreased endurance; Impaired balance;Decreased mobility; Decreased cognition;Decreased safety awareness   Assessment Pt presents with decreased mobility, strength, balance, and activity tolerance  Pt demonstrated ability to perform bed mobility at Max Ax2  Pt sat EOB ~8 minutes at Max A 2' tremors and significant retropulsion  Pt able to perform LAQ and marching seated EOB w/ AAROM  PCA present to assist pt w/ eating lunch; therefore, pt was returned lying supine w/ PCA present  Pt continues to benefit from skilled therapy to maximize functional independence  Recommendation at this time is rehab  Pt would benefit from bed mobility, sitting balance, strength, and activity tolerance   Goals   Patient Goals to eat his lunch   STG Expiration Date 09/26/20   PT Treatment Day 1   Plan   Treatment/Interventions Functional transfer training;LE strengthening/ROM; Therapeutic exercise; Endurance training;Patient/family training;Equipment eval/education; Bed mobility;Spoke to nursing   Progress Slow progress, decreased activity tolerance   PT Frequency 2-3x/wk   Recommendation   PT Discharge Recommendation   (return to SNF w/ PT services)   Equipment Recommended Wheelchair   PT - OK to Discharge Yes  (when medically cleared back to SNF)     Lorette Cabot, PT, DPT

## 2020-09-21 NOTE — PLAN OF CARE
Problem: OCCUPATIONAL THERAPY ADULT  Goal: Performs self-care activities at highest level of function for planned discharge setting  See evaluation for individualized goals  Description: Treatment Interventions: ADL retraining, Functional transfer training, UE strengthening/ROM, Endurance training, Cognitive reorientation, Patient/family training, Equipment evaluation/education, Fine motor coordination activities, Compensatory technique education, Energy conservation, Activityengagement          See flowsheet documentation for full assessment, interventions and recommendations  Outcome: Progressing  Note: Limitation: Decreased ADL status, Decreased UE strength, Decreased Safe judgement during ADL, Decreased cognition, Decreased endurance, Decreased self-care trans, Decreased high-level ADLs, Decreased fine motor control  Prognosis: Fair  Assessment: Patient participated in Occupational therapy with focus on dressing, grooming, bathing, bed mobility, activity tolerance and sitting tolerance while completing self care tasks sitting EOB  Pt was cleared by RN/ Fabienne Werner for participation in OT  Pt identifiers confirmed and pt was agreeable to participate in OT  Pt reported that he did not have any pain  Pt presented laying supine with HOB raised upon initiation of OT  Pt required assist with bed mobility and sitting tolerance 2* weakness, stiffness and poor sense of proprioception  Pt thought he was going to fall forward even though he had a strong posterior lean while sitting EOB  Pt required assist with bathing and dressing 2* poor activity tolerance and tremors  Pt returned to supine position with bed alarm on and all needs within reach  OT continuing to recommend post-acute rehabilitation upon discharge  OT Discharge Recommendation: Post-Acute Rehabilitation Services  OT - OK to Discharge:  Yes

## 2020-09-21 NOTE — DISCHARGE SUMMARY
Discharge- Rashad Nicolas 1950, 69 y o  male MRN: [id number]    Unit/Bed#: [facility] 701-01 Encounter: [id number]    Primary Care Provider: Kailey Becerra   Date and time admitted to hospital: 9/15/2020  7:28 PM        No new Assessment & Plan notes have been filed under this hospital service since the last note was generated  Service: Hospitalist           Discharging Physician / Practitioner: Madelaine Schmitz DO  PCP: Kailey Becerra  Admission Date:   Admission Orders (From admission, onward)     Ordered        09/16/20 1603  Inpatient Admission  Once         09/16/20 0003  Place in Observation  Once                   Discharge Date: 09/22/20    Disposition:      Other: [facility]    Reason for Admission:   Fall at the rehab facility and confusion    Discharge Diagnoses:     Please see assessment and plan section above for further details regarding discharge diagnoses  Resolved Problems  Date Reviewed: 9/22/2020    None          Consultations During Hospital Stay:  · Neurosurgery  · Neurology  · Geriatrician  · Urology    Procedures Performed:   · EEG:  No electrographic seizures occurred during the recording  ·     Wound Care Recommendations:  When applicable, please see wound care section of After Visit Summary  Diet Recommendations at Discharge:   Diet -        Diet Orders   (From admission, onward)             Start     Ordered    09/16/20 0155  Diet Dysphagia/Modified Consistency; Dysphagia 2-Mechanical Soft; Nectar Thick Liquid; Consistent Carbohydrate Diet Level 2 (5 carb servings/75 grams CHO/meal)  Diet effective now     Question Answer Comment   Diet Type Dysphagia/Modified Consistency    Dysphagia/Modified Consistency Dysphagia 2-Mechanical Soft    Liquid Modifier Nectar Thick Liquid    Other Restriction(s): Consistent Carbohydrate Diet Level 2 (5 carb servings/75 grams CHO/meal)    RD to adjust diet per protocol?  Yes        09/16/20 0154              Fluid Restriction - No Fluid Restriction at Discharge  Instructions for any Catheters / Lines Present at Discharge (including removal date, if applicable):     Significant Findings / Test Results:   · CT Head 9/15/2020:  No acute intracranial abnormality  Stable exam  · X-ray shunt series:Left-sided shunt demonstrates intact tubing within and above the neck  The tubing is slightly more difficult to visualize over the chest but appears to terminate in the region of the cavoatrial junction  Incidental Findings:   · None    Test Results Pending at Discharge (will require follow up):   · none     Outpatient Tests Requested:  · none    Complications:  None    Hospital Course:     Mcneil Gowers is a 69 y o  male patient who originally presented to the hospital on 9/15/2020   Patient initially admitted after a fall at [facility] rehab however on presentation reports of worsening confusion/agitation (deuce at night) since recent hospitalization/discharge for  shunt infection (h/o NPH)  For this reason, had neurology and neurosurgery see patient  CTH stable and low suspicion for shunt malfunction per neurosurgery  EEG showed possible epileptiform activity however low suspicion for seizures per neuro  Likely progressing underlying dementia  Geriatrics following  Recommended to taper off Lexapro (fairly new med) and resume Seroquel at lower dose and up-titrate as needed  Now patient is stable to discharged to rehab  Condition at Discharge: good     Discharge Day Visit / Exam:     * Please refer to separate progress note for these details *    Goals of Care Discussions:  · Code Status at Discharge: Level 1 - Full Code  · Were there any Goals of Care Discussions during Hospitalization?: No  ·      Discharge instructions/Information to patient and family:   See after visit summary section titled Discharge Instructions for information provided to patient and family        Planned Readmission: no      Discharge Statement:  I spent 45 minutes discharging the patient  This time was spent on the day of discharge  I had direct contact with the patient on the day of discharge  Greater than 50% of the total time was spent examining patient, answering all patient questions, arranging and discussing plan of care with patient as well as directly providing post-discharge instructions  Additional time then spent on discharge activities      ** Please Note: This note has been constructed using a voice recognition system **

## 2020-09-21 NOTE — PLAN OF CARE
Problem: Potential for Falls  Goal: Patient will remain free of falls  Description: INTERVENTIONS:  - Assess patient frequently for physical needs  -  Identify cognitive and physical deficits and behaviors that affect risk of falls    -  [place] fall precautions as indicated by assessment   - Educate patient/family on patient safety including physical limitations  - Instruct patient to call for assistance with activity based on assessment  - Modify environment to reduce risk of injury  - Consider OT/PT consult to assist with strengthening/mobility  Outcome: Progressing     Problem: PAIN - ADULT  Goal: Verbalizes/displays adequate comfort level or baseline comfort level  Description: Interventions:  - Encourage patient to monitor pain and request assistance  - Assess pain using appropriate pain scale  - Administer analgesics based on type and severity of pain and evaluate response  - Implement non-pharmacological measures as appropriate and evaluate response  - Consider cultural and social influences on pain and pain management  - Notify physician/advanced practitioner if interventions unsuccessful or patient reports new pain  Outcome: Progressing     Problem: INFECTION - ADULT  Goal: Absence or prevention of progression during hospitalization  Description: INTERVENTIONS:  - Assess and monitor for signs and symptoms of infection  - Monitor lab/diagnostic results  - Monitor all insertion sites, i e  indwelling lines, tubes, and drains  - Monitor endotracheal if appropriate and nasal secretions for changes in amount and color  - [place] appropriate cooling/warming therapies per order  - Administer medications as ordered  - Instruct and encourage patient and family to use good hand hygiene technique  - Identify and instruct in appropriate isolation precautions for identified infection/condition  Outcome: Progressing  Goal: Absence of fever/infection during neutropenic period  Description: INTERVENTIONS:  - Monitor WBC    Outcome: Progressing     Problem: SAFETY ADULT  Goal: Patient will remain free of falls  Description: INTERVENTIONS:  - Assess patient frequently for physical needs  -  Identify cognitive and physical deficits and behaviors that affect risk of falls    -  [place] fall precautions as indicated by assessment   - Educate patient/family on patient safety including physical limitations  - Instruct patient to call for assistance with activity based on assessment  - Modify environment to reduce risk of injury  - Consider OT/PT consult to assist with strengthening/mobility  Outcome: Progressing  Goal: Maintain or return to baseline ADL function  Description: INTERVENTIONS:  -  Assess patient's ability to carry out ADLs; assess patient's baseline for ADL function and identify physical deficits which impact ability to perform ADLs (bathing, care of mouth/teeth, toileting, grooming, dressing, etc )  - Assess/evaluate cause of self-care deficits   - Assess range of motion  - Assess patient's mobility; develop plan if impaired  - Assess patient's need for assistive devices and provide as appropriate  - Encourage maximum independence but intervene and supervise when necessary  - Involve family in performance of ADLs  - Assess for home care needs following discharge   - Consider OT consult to assist with ADL evaluation and planning for discharge  - Provide patient education as appropriate  Outcome: Progressing  Goal: Maintain or return mobility status to optimal level  Description: INTERVENTIONS:  - Assess patient's baseline mobility status (ambulation, transfers, stairs, etc )    - Identify cognitive and physical deficits and behaviors that affect mobility  - Identify mobility aids required to assist with transfers and/or ambulation (gait belt, sit-to-stand, lift, walker, cane, etc )  - [place] fall precautions as indicated by assessment  - Record patient progress and toleration of activity level on Mobility SBAR; progress patient to next Phase/Stage  - Instruct patient to call for assistance with activity based on assessment  - Consider rehabilitation consult to assist with strengthening/weightbearing, etc   Outcome: Progressing     Problem: DISCHARGE PLANNING  Goal: Discharge to home or other facility with appropriate resources  Description: INTERVENTIONS:  - Identify barriers to discharge w/patient and caregiver  - Arrange for needed discharge resources and transportation as appropriate  - Identify discharge learning needs (meds, wound care, etc )  - Arrange for interpretive services to assist at discharge as needed  - Refer to Case Management Department for coordinating discharge planning if the patient needs post-hospital services based on physician/advanced practitioner order or complex needs related to functional status, cognitive ability, or social support system  Outcome: Progressing     Problem: Knowledge Deficit  Goal: Patient/family/caregiver demonstrates understanding of disease process, treatment plan, medications, and discharge instructions  Description: Complete learning assessment and assess knowledge base    Interventions:  - Provide teaching at level of understanding  - Provide teaching via preferred learning methods  Outcome: Progressing     Problem: SKIN/TISSUE INTEGRITY - ADULT  Goal: Skin integrity remains intact  Description: INTERVENTIONS  - Identify patients at risk for skin breakdown  - Assess and monitor skin integrity  - Assess and monitor nutrition and hydration status  - Monitor labs (i e  albumin)  - Assess for incontinence   - Turn and reposition patient  - Assist with mobility/ambulation  - Relieve pressure over bony prominences  - Avoid friction and shearing  - Provide appropriate hygiene as needed including keeping skin clean and dry  - Evaluate need for skin moisturizer/barrier cream  - Collaborate with interdisciplinary team (i e  Nutrition, Rehabilitation, etc )   - Patient/family teaching  Outcome: Progressing  Goal: Incision(s), wounds(s) or drain site(s) healing without S/S of infection  Description: INTERVENTIONS  - Assess and document risk factors for skin impairment   - Assess and document dressing, incision, wound bed, drain sites and surrounding tissue  - Consider nutrition services referral as needed  - Oral mucous membranes remain intact  - Provide patient/ family education  Outcome: Progressing  Goal: Oral mucous membranes remain intact  Description: INTERVENTIONS  - Assess oral mucosa and hygiene practices  - Implement preventative oral hygiene regimen  - Implement oral medicated treatments as ordered  - Initiate Nutrition services referral as needed  Outcome: Progressing     Problem: MUSCULOSKELETAL - ADULT  Goal: Maintain or return mobility to safest level of function  Description: INTERVENTIONS:  - Assess patient's ability to carry out ADLs; assess patient's baseline for ADL function and identify physical deficits which impact ability to perform ADLs (bathing, care of mouth/teeth, toileting, grooming, dressing, etc )  - Assess/evaluate cause of self-care deficits   - Assess range of motion  - Assess patient's mobility  - Assess patient's need for assistive devices and provide as appropriate  - Encourage maximum independence but intervene and supervise when necessary  - Involve family in performance of ADLs  - Assess for home care needs following discharge   - Consider OT consult to assist with ADL evaluation and planning for discharge  - Provide patient education as appropriate  Outcome: Progressing  Goal: Maintain proper alignment of affected body part  Description: INTERVENTIONS:  - Support, maintain and protect limb and body alignment  - Provide patient/ family with appropriate education  Outcome: Progressing     Problem: Prexisting or High Potential for Compromised Skin Integrity  Goal: Skin integrity is maintained or improved  Description: INTERVENTIONS:  - Identify patients at risk for skin breakdown  - Assess and monitor skin integrity  - Assess and monitor nutrition and hydration status  - Monitor labs   - Assess for incontinence   - Turn and reposition patient  - Assist with mobility/ambulation  - Relieve pressure over bony prominences  - Avoid friction and shearing  - Provide appropriate hygiene as needed including keeping skin clean and dry  - Evaluate need for skin moisturizer/barrier cream  - Collaborate with interdisciplinary team   - Patient/family teaching  - Consider wound care consult   Outcome: Progressing

## 2020-09-21 NOTE — SOCIAL WORK
Generic application received from pt's brother  Application sent to [employer], [facility], and [facility] who are following

## 2020-09-21 NOTE — PLAN OF CARE
Problem: PHYSICAL THERAPY ADULT  Goal: Performs mobility at highest level of function for planned discharge setting  See evaluation for individualized goals  Description: Treatment/Interventions: Functional transfer training, LE strengthening/ROM, Elevations, Therapeutic exercise, Endurance training, Cognitive reorientation, Patient/family training, Equipment eval/education, Bed mobility, Compensatory technique education, Continued evaluation, Spoke to nursing, Spoke to case management, OT, Family  Equipment Recommended: (TBD)       See flowsheet documentation for full assessment, interventions and recommendations  Outcome: Progressing  Note: Prognosis: Fair  Problem List: Decreased strength, Decreased range of motion, Decreased endurance, Impaired balance, Decreased mobility, Decreased cognition, Decreased safety awareness  Assessment: Pt presents with decreased mobility, strength, balance, and activity tolerance  Pt demonstrated ability to perform bed mobility at Max Ax2  Pt sat EOB ~8 minutes at Max A 2' tremors and significant retropulsion  Pt able to perform LAQ and marching seated EOB w/ AAROM  PCA present to assist pt w/ eating lunch; therefore, pt was returned lying supine w/ PCA present  Pt continues to benefit from skilled therapy to maximize functional independence  Recommendation at this time is rehab  Pt would benefit from bed mobility, sitting balance, strength, and activity tolerance  Barriers to Discharge: None     PT Discharge Recommendation: (return to SNF w/ PT services)     PT - OK to Discharge: Yes(when medically cleared back to SNF)    See flowsheet documentation for full assessment

## 2020-09-21 NOTE — ASSESSMENT & PLAN NOTE
Lab Results   Component Value Date    HGBA1C 5 6 07/31/2020       Recent Labs     09/20/20  1058 09/20/20  1601 09/20/20  2114 09/21/20  0653   POCGLU 131 136 119 158*       Blood Sugar Average: Last 72 hrs:    · Home insulin regimen: Lantus 10U qam/12U qpm  · During last hospitalization only required SSI  · Monitor accuchecks and continue with SSI coverage for now   · Diabetic diet  · Hypoglycemia protocol

## 2020-09-21 NOTE — RESTORATIVE TECHNICIAN NOTE
Restorative Specialist Mobility Note                      Repositioned: Other (Comment)(Rep/sat pt upright in bed  Bed alarm on   Pt callbell, phone/tray within reach )          Vaishali HOLLAND, Restorative Technician, [employer]

## 2020-09-21 NOTE — PROGRESS NOTES
Progress Note - Juliano Wolfe 1950, 69 y o  male MRN: [id number]    Unit/Bed#: [facility] 701-01 Encounter: [id number]    Primary Care Provider: Nolan Rodriguez   Date and time admitted to hospital: 9/15/2020  7:28 PM        * Encephalopathy  Assessment & Plan  · Initially brought in for fall at rehab facility however patient's brother and rehab staff reporting worsening of baseline cognitive impairment since discharge from hospital 8/29, in setting of recent prolonged and complicated hospitalization  · Per rehab notes, noted to have increasing behavioral issues with agitation/screaming and calling out "help me" deuce  at night concerning for underlying dementia   · Was started on seroquel 25 mg qhs, lexapro increased to 20 mg, no other recent med changes  · 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 and stable ventriculomegaly  · Shunt series unremarkable   · Appreciate neurology consult and recommendations  · Discontinued Seroquel; recommend if resuming to start 12 5 mg qhs   · Routine EEG showed temporal sharps which may be epileptogenic however low suspicion for seizuers per neurology so no need for vEEG at this time   · Outpatient follow-up  · Appreciate geriatrics consult and recommendations   · Taper off Lexapro (10 mg daily x 1 week then 5 mg x 1 week then stop)  · May resume low dose Seroquel 12 5 mg at bedtime and up-titrate as needed     Patient is medically cleared to be discharged to rehab  Pending acceptance  CM aware    Urinary retention  Assessment & Plan  · Grover cath inserted on admission  · Evaluated by Urology, appreciate recommendations   · No significant evidence of urinary retention   · D/C'd Grover catheter on 09/16  · Did not require any straight cath since grover was out  · Able to urinate spontaneously      Leukocytosis  Assessment & Plan  · Unclear etiology; patient's bother who is a physician reports chronic leukocytosis around 11,000   · No other SRIS criteria present  · UA unremarkable  · Chest x-ray: Small left base infiltrate and/or slight atelectasis     · Procalcitonin negative x 2   · Blood cultures negative x 48 hours   · Patient with multiple skin abrasions, sacral ulcer and fungal infection   · Continue local wound care  · Monitor off antibiotics    Fall  Assessment & Plan  · Patient initially presented after sustaining a fall at rehab facility  · CT head without acute intracranial abnormality  · X-ray left tibia and fibula without acute osseous abnormality  · PT/OT recommending return to rehab    Abnormal EKG  Assessment & Plan  · EKG x 2 with mild ST depressions  · Patient without chest pain   · Troponin negative x 2  · Recent ROSEMARIE largely unremarkable    Essential hypertension  Assessment & Plan  · BP acceptable, monitor routinely  · Continue lisinopril 10 mg daily    Normal pressure hydrocephalus (HCC)  Assessment & Plan  · S/p VA shunt after  shunt infection/meningitis/peritonitis during last hospitalization treated with meropenem until 9/9  · CTH in ED with stable ventriculomegaly,  shunt catheter tip in the left lateral ventricle   · Shunt series: "Left-sided shunt demonstrates intact tubing within and above the neck   The tubing is slightly more difficult to visualize over the chest but appears to terminate in the region of the cavoatrial junction "  · Appreciate neurosurgery recommendations; low suspicion for shunt malfunction given stable CT head, no surgical intervention necessary     Type 2 diabetes mellitus [facility])  Assessment & Plan  Lab Results   Component Value Date    HGBA1C 5 6 07/31/2020       Recent Labs     09/20/20  1058 09/20/20  1601 09/20/20  2114 09/21/20  0653   POCGLU 131 136 119 158*       Blood Sugar Average: Last 72 hrs:    · Home insulin regimen: Lantus 10U qam/12U qpm  · During last hospitalization only required SSI  · Monitor accuchecks and continue with SSI coverage for now   · Diabetic diet  · Hypoglycemia protocol          VTE Pharmacologic Prophylaxis:   Pharmacologic: Enoxaparin (Lovenox)  Mechanical VTE Prophylaxis in Place: Yes    Patient Centered Rounds: I have performed bedside rounds with nursing staff today  Discussions with Specialists or Other Care Team Provider: none      Education and Discussions with Family / Patient:     Time Spent for Care: 30 minutes  More than 50% of total time spent on counseling and coordination of care as described above  Current Length of Stay: 5 day(s)    Current Patient Status: Inpatient   Certification Statement: The patient will continue to require additional inpatient hospital stay due to placement to rehab    Discharge Plan: discharge likely to rehab tomorrow    Code Status: Level 1 - Full Code      Subjective:   No acute overnight events  Patient examined at bedside, he is, and cooperative  However occasional   Of mild agitation where he screams out of the room asking for help  However easily redirectable    Objective:     Vitals:   Temp (24hrs), Av 3 °F (36 3 °C), Min:97 3 °F (36 3 °C), Max:97 3 °F (36 3 °C)    Temp:  [97 3 °F (36 3 °C)] 97 3 °F (36 3 °C)  HR:  [66-68] 68  Resp:  [17-18] 18  BP: (123)/(67) 123/67  SpO2:  [94 %-97 %] 97 %  Body mass index is 28 12 kg/m²  Input and Output Summary (last 24 hours): Intake/Output Summary (Last 24 hours) at 2020 1614  Last data filed at 2020 1201  Gross per 24 hour   Intake 680 ml   Output 268 ml   Net 412 ml       Physical Exam:     Physical Exam  Vitals signs and nursing note reviewed  Constitutional:       General: He is not in acute distress  Appearance: Normal appearance  Comments: Elderly   HENT:      Head: Normocephalic and atraumatic  Right Ear: External ear normal       Left Ear: External ear normal       Nose: Nose normal    Eyes:      Extraocular Movements: Extraocular movements intact  Neck:      Musculoskeletal: Normal range of motion     Pulmonary:      Effort: Pulmonary effort is normal    Neurological:      Mental Status: He is alert  Mental status is at baseline  Comments: Alert and oriented to self only  Psychiatric:         Mood and Affect: Mood normal          Additional Data:     Labs:    Results from last 7 days   Lab Units 09/20/20  0454 09/17/20  0447   WBC Thousand/uL 10 33* 10 64*   HEMOGLOBIN g/dL 13 6 13 2   HEMATOCRIT % 41 8 42 4   PLATELETS Thousands/uL 203 218   NEUTROS PCT %  --  73   LYMPHS PCT %  --  13*   MONOS PCT %  --  10   EOS PCT %  --  2     Results from last 7 days   Lab Units 09/20/20  0454  09/15/20  2307   SODIUM mmol/L 139   < > 137   POTASSIUM mmol/L 3 2*   < > 4 0   CHLORIDE mmol/L 104   < > 103   CO2 mmol/L 27   < > 25   BUN mg/dL 9   < > 12   CREATININE mg/dL 0 64   < > 0 63   ANION GAP mmol/L 8   < > 9   CALCIUM mg/dL 9 2   < > 9 7   ALBUMIN g/dL  --   --  3 1*   TOTAL BILIRUBIN mg/dL  --   --  0 93   ALK PHOS U/L  --   --  116   ALT U/L  --   --  19   AST U/L  --   --  15   GLUCOSE RANDOM mg/dL 100   < > 125    < > = values in this interval not displayed  Results from last 7 days   Lab Units 09/21/20  1115 09/21/20  0653 09/20/20  2114 09/20/20  1601 09/20/20  1058 09/20/20  0628 09/19/20  2104 09/19/20  1607 09/19/20  1030 09/19/20  0624 09/19/20  0617 09/18/20  2048   POC GLUCOSE mg/dl 111 158* 119 136 131 101 97 114 93 95 76 94         Results from last 7 days   Lab Units 09/17/20  0447 09/16/20  1502   PROCALCITONIN ng/ml <0 05 <0 05           * I Have Reviewed All Lab Data Listed Above  * Additional Pertinent Lab Tests Reviewed: Ward [address] Admission Reviewed     Recent Cultures (last 7 days):     Results from last 7 days   Lab Units 09/16/20  0938   BLOOD CULTURE  No Growth After 5 Days  No Growth After 5 Days         Last 24 Hours Medication List:   Current Facility-Administered Medications   Medication Dose Route Frequency Provider Last Rate    acetaminophen  650 mg Oral Q6H PRN Joylene Cradle, PA-C      allopurinol  300 mg Oral Daily Carmen Mueller, EVI      bisacodyl  10 mg Rectal Daily PRN Mellody Alejandro, PA-LAURA      docusate sodium  100 mg Oral BID Mellody Alejandro, PA-LAURA      enoxaparin  40 mg Subcutaneous Daily Mellody Alejandro, EVI      escitalopram  10 mg Oral Daily Linda E Held, EVI      insulin lispro  2-12 Units Subcutaneous 4x Daily (AC & HS) Mellody Alejandro, EVI      lisinopril  10 mg Oral Daily Mellody Alejandro, EVI      melatonin  3 mg Oral HS Mellody Alejandro, EVI      VERONICA ANTIFUNGAL   Topical BID Tate Jerome MD      oxyCODONE  5 mg Oral Q4H PRN Mellody Alejandro, EVI      pantoprazole  40 mg Oral Early Morning Mellody Alejandro, EVI      QUEtiapine  12 5 mg Oral HS Linda E Held, EVI      senna  1 tablet Oral Daily Mellody Alejandro, EVI      travoprost  1 drop Right Eye HS Mellody Alejandro, EVI          Today, Patient Was Seen By: Kala Meigs, MD    ** Please Note: Dictation voice to text software may have been used in the creation of this document   **

## 2020-09-21 NOTE — WOUND OSTOMY CARE
Progress Note - Wound   Mcneil Gowers 69 y o  male MRN: [id number]  Unit/Bed#: [facility] 701-01 Encounter: [id number]        Assessment:   Patient is seen for wound assessment  Patient is dependent with postioning and is a max assist for full exam  Patient is incontinent of bowel and has an urinary catheter in place  Primary nurse present during exam     Findings  1  POA DTI on sacrum          2  MASD with fungus bilateral buttocks     Heels intact     See flowsheets for details     Skin care plans:  1-Apply VERONICA to sacrum, buttocks BID and PRN  2-Hydraguard to bilateral heel BID and PRN  3-Elevate heels to offload pressure  4-Ehob cushion when out of bed  5-Turn/repoisiton q2h or when medically stable for pressure re-distribution on skin  6-Moisturize skin daily with skin nourishing cream   7-Low air loss mattress  Please use a flat sheet instead of a fitted sheet with a low air loss mattress               Call or tigertext with any questions  Wound Care will continue to follow        Wound 08/16/20 Pressure Injury Sacrum Left (Active)   Wound Image   09/21/20 1105   Wound Description Fragile;Light purple;Non-blanchable erythema;Slough; Yellow 09/21/20 1105   Pressure Injury Stage DTPI 09/21/20 1105   Frances-wound Assessment Excoriated;Fragile;Erythema 09/21/20 1105   Wound Length (cm) 8 cm 09/21/20 1105   Wound Width (cm) 8 cm 09/21/20 1105   Wound Surface Area (cm^2) 64 cm^2 09/21/20 1105   Treatments Cleansed 09/20/20 0730   Dressing Moisture barrier 09/21/20 1105

## 2020-09-22 VITALS
WEIGHT: 225 LBS | OXYGEN SATURATION: 97 % | HEART RATE: 68 BPM | BODY MASS INDEX: 27.98 KG/M2 | DIASTOLIC BLOOD PRESSURE: 68 MMHG | SYSTOLIC BLOOD PRESSURE: 129 MMHG | RESPIRATION RATE: 19 BRPM | TEMPERATURE: 98.1 F | HEIGHT: 75 IN

## 2020-09-22 LAB
GLUCOSE SERPL-MCNC: 129 MG/DL (ref 65–140)
GLUCOSE SERPL-MCNC: 130 MG/DL (ref 65–140)
GLUCOSE SERPL-MCNC: 95 MG/DL (ref 65–140)

## 2020-09-22 PROCEDURE — 82948 REAGENT STRIP/BLOOD GLUCOSE: CPT

## 2020-09-22 PROCEDURE — 99239 HOSP IP/OBS DSCHRG MGMT >30: CPT | Performed by: INTERNAL MEDICINE

## 2020-09-22 RX ORDER — ESCITALOPRAM OXALATE 10 MG/1
10 TABLET ORAL DAILY
Refills: 0
Start: 2020-09-22 | End: 2020-10-07

## 2020-09-22 RX ORDER — QUETIAPINE FUMARATE 25 MG/1
12.5 TABLET, FILM COATED ORAL
Refills: 0
Start: 2020-09-22 | End: 2020-10-07

## 2020-09-22 RX ORDER — LISINOPRIL 10 MG/1
10 TABLET ORAL DAILY
Refills: 0
Start: 2020-09-22 | End: 2020-11-12 | Stop reason: HOSPADM

## 2020-09-22 RX ADMIN — ALLOPURINOL 300 MG: 300 TABLET ORAL at 07:51

## 2020-09-22 RX ADMIN — PANTOPRAZOLE SODIUM 40 MG: 40 TABLET, DELAYED RELEASE ORAL at 06:18

## 2020-09-22 RX ADMIN — LISINOPRIL 10 MG: 10 TABLET ORAL at 07:50

## 2020-09-22 RX ADMIN — ENOXAPARIN SODIUM 40 MG: 40 INJECTION SUBCUTANEOUS at 07:49

## 2020-09-22 RX ADMIN — ESCITALOPRAM OXALATE 10 MG: 10 TABLET ORAL at 07:51

## 2020-09-22 RX ADMIN — MICONAZOLE NITRATE: 20 CREAM TOPICAL at 07:52

## 2020-09-22 NOTE — TRANSPORTATION MEDICAL NECESSITY
Section I - General Information    Name of Patient: Susannah Eric                 : [id number]    Medicare #: [id number]  Transport Date: 20 (PCS is valid for round trips on this date and for all repetitive trips in the 60-day range as noted below )  Origin: [facility]                                                         Destination: [place]  Is the pt's stay covered under Medicare Part A (PPS/DRG)   []     Closest appropriate facility? If no, why is transport to more distant facility required? Yes  If hospice pt, is this transport related to pt's terminal illness? NA     Section II - Medical Necessity Questionnaire  Ambulance transportation is medically necessary only if other means of transport are contraindicated or would be potentially harmful to the patient  To meet this requirement, the patient must either be "bed confined" or suffer from a condition such that transport by means other than ambulance is contraindicated by the patient's condition  The following questions must be answered by the medical professional signing below for this form to be valid:    1)  Describe the MEDICAL CONDITION (physical and/or mental) of this patient AT [address] [place] [address] that requires the patient to be transported in an ambulance and why transport by other means is contraindicated by the patient's condition: confused; fall risk; encephalopathy; max assist x2; poor balance    2) Is the patient "bed confined" as defined below? Yes  To be "be confined" the patient must satisfy all three of the following conditions: (1) unable to get up from bed without Assistance; AND (2) unable to ambulate; AND (3) unable to sit in a chair or wheelchair  3) Can this patient safely be transported by car or wheelchair van (i e , seated during transport without a medical attendant or monitoring)?    No    4) In addition to completing questions 1-3 above, please check any of the following conditions that apply*:   *Note: supporting documentation for any boxes checked must be maintained in the patient's medical records  If hosp-hosp transfer, describe services needed at 2nd facility not available at 1st facility? Patient is confused  Medical attendant required   Unable to tolerate seated position for time needed to transport   Other(specify) fall risk    Section III - Signature of Physician or Healthcare Professional  I certify that the above information is true and correct based on my evaluation of this patient, and represent that the patient requires transport by ambulance and that other forms of transport are contraindicated  I understand that this information will be used by the Centers for Medicare and Medicaid Services (CMS) to support the determination of medical necessity for ambulance services, and I represent that I have personal knowledge of the patient's condition at time of transport  [x]  If this box is checked, I also certify that the patient is physically or mentally incapable of signing the ambulance service's claim and that the institution with which I am affiliated has furnished care, services, or assistance to the patient  My signature below is made on behalf of the patient pursuant to 42 CFR §424 36(b)(4)  In accordance with 42 CFR §424 37, the specific reason(s) that the patient is physically or mentally incapable of signing the claim form is as follows: confused  Signature of Physician* or Healthcare Professional________________________________________________  Signature Date 09/22/20 (For scheduled repetitive transports, this form is not valid for transports performed more than 60 days after this date)    Printed Name & Credentials of Physician or Healthcare Professional (MD, DO, RN, etc ) VALENTINO Botlon  *Form must be signed by patient's attending physician for scheduled, repetitive transports   For non-repetitive, unscheduled ambulance transports, if unable to obtain the signature of the attending physician, any of the following may sign (choose appropriate option below)  [] Physician Assistant []  Clinical Nurse Specialist []  Registered Nurse  []  Nurse Practitioner  [x] Discharge Planner

## 2020-09-22 NOTE — PLAN OF CARE
Problem: Potential for Falls  Goal: Patient will remain free of falls  Description: INTERVENTIONS:  - Assess patient frequently for physical needs  -  Identify cognitive and physical deficits and behaviors that affect risk of falls    -  [place] fall precautions as indicated by assessment   - Educate patient/family on patient safety including physical limitations  - Instruct patient to call for assistance with activity based on assessment  - Modify environment to reduce risk of injury  - Consider OT/PT consult to assist with strengthening/mobility  Outcome: Progressing     Problem: PAIN - ADULT  Goal: Verbalizes/displays adequate comfort level or baseline comfort level  Description: Interventions:  - Encourage patient to monitor pain and request assistance  - Assess pain using appropriate pain scale  - Administer analgesics based on type and severity of pain and evaluate response  - Implement non-pharmacological measures as appropriate and evaluate response  - Consider cultural and social influences on pain and pain management  - Notify physician/advanced practitioner if interventions unsuccessful or patient reports new pain  Outcome: Progressing     Problem: INFECTION - ADULT  Goal: Absence or prevention of progression during hospitalization  Description: INTERVENTIONS:  - Assess and monitor for signs and symptoms of infection  - Monitor lab/diagnostic results  - Monitor all insertion sites, i e  indwelling lines, tubes, and drains  - Monitor endotracheal if appropriate and nasal secretions for changes in amount and color  - [place] appropriate cooling/warming therapies per order  - Administer medications as ordered  - Instruct and encourage patient and family to use good hand hygiene technique  - Identify and instruct in appropriate isolation precautions for identified infection/condition  Outcome: Progressing  Goal: Absence of fever/infection during neutropenic period  Description: INTERVENTIONS:  - Monitor WBC    Outcome: Progressing     Problem: SAFETY ADULT  Goal: Patient will remain free of falls  Description: INTERVENTIONS:  - Assess patient frequently for physical needs  -  Identify cognitive and physical deficits and behaviors that affect risk of falls    -  [place] fall precautions as indicated by assessment   - Educate patient/family on patient safety including physical limitations  - Instruct patient to call for assistance with activity based on assessment  - Modify environment to reduce risk of injury  - Consider OT/PT consult to assist with strengthening/mobility  Outcome: Progressing  Goal: Maintain or return to baseline ADL function  Description: INTERVENTIONS:  -  Assess patient's ability to carry out ADLs; assess patient's baseline for ADL function and identify physical deficits which impact ability to perform ADLs (bathing, care of mouth/teeth, toileting, grooming, dressing, etc )  - Assess/evaluate cause of self-care deficits   - Assess range of motion  - Assess patient's mobility; develop plan if impaired  - Assess patient's need for assistive devices and provide as appropriate  - Encourage maximum independence but intervene and supervise when necessary  - Involve family in performance of ADLs  - Assess for home care needs following discharge   - Consider OT consult to assist with ADL evaluation and planning for discharge  - Provide patient education as appropriate  Outcome: Progressing  Goal: Maintain or return mobility status to optimal level  Description: INTERVENTIONS:  - Assess patient's baseline mobility status (ambulation, transfers, stairs, etc )    - Identify cognitive and physical deficits and behaviors that affect mobility  - Identify mobility aids required to assist with transfers and/or ambulation (gait belt, sit-to-stand, lift, walker, cane, etc )  - [place] fall precautions as indicated by assessment  - Record patient progress and toleration of activity level on Mobility SBAR; progress patient to next Phase/Stage  - Instruct patient to call for assistance with activity based on assessment  - Consider rehabilitation consult to assist with strengthening/weightbearing, etc   Outcome: Progressing     Problem: DISCHARGE PLANNING  Goal: Discharge to home or other facility with appropriate resources  Description: INTERVENTIONS:  - Identify barriers to discharge w/patient and caregiver  - Arrange for needed discharge resources and transportation as appropriate  - Identify discharge learning needs (meds, wound care, etc )  - Arrange for interpretive services to assist at discharge as needed  - Refer to Case Management Department for coordinating discharge planning if the patient needs post-hospital services based on physician/advanced practitioner order or complex needs related to functional status, cognitive ability, or social support system  Outcome: Progressing     Problem: Knowledge Deficit  Goal: Patient/family/caregiver demonstrates understanding of disease process, treatment plan, medications, and discharge instructions  Description: Complete learning assessment and assess knowledge base    Interventions:  - Provide teaching at level of understanding  - Provide teaching via preferred learning methods  Outcome: Progressing     Problem: SKIN/TISSUE INTEGRITY - ADULT  Goal: Skin integrity remains intact  Description: INTERVENTIONS  - Identify patients at risk for skin breakdown  - Assess and monitor skin integrity  - Assess and monitor nutrition and hydration status  - Monitor labs (i e  albumin)  - Assess for incontinence   - Turn and reposition patient  - Assist with mobility/ambulation  - Relieve pressure over bony prominences  - Avoid friction and shearing  - Provide appropriate hygiene as needed including keeping skin clean and dry  - Evaluate need for skin moisturizer/barrier cream  - Collaborate with interdisciplinary team (i e  Nutrition, Rehabilitation, etc )   - Patient/family teaching  Outcome: Progressing  Goal: Incision(s), wounds(s) or drain site(s) healing without S/S of infection  Description: INTERVENTIONS  - Assess and document risk factors for skin impairment   - Assess and document dressing, incision, wound bed, drain sites and surrounding tissue  - Consider nutrition services referral as needed  - Oral mucous membranes remain intact  - Provide patient/ family education  Outcome: Progressing  Goal: Oral mucous membranes remain intact  Description: INTERVENTIONS  - Assess oral mucosa and hygiene practices  - Implement preventative oral hygiene regimen  - Implement oral medicated treatments as ordered  - Initiate Nutrition services referral as needed  Outcome: Progressing     Problem: MUSCULOSKELETAL - ADULT  Goal: Maintain or return mobility to safest level of function  Description: INTERVENTIONS:  - Assess patient's ability to carry out ADLs; assess patient's baseline for ADL function and identify physical deficits which impact ability to perform ADLs (bathing, care of mouth/teeth, toileting, grooming, dressing, etc )  - Assess/evaluate cause of self-care deficits   - Assess range of motion  - Assess patient's mobility  - Assess patient's need for assistive devices and provide as appropriate  - Encourage maximum independence but intervene and supervise when necessary  - Involve family in performance of ADLs  - Assess for home care needs following discharge   - Consider OT consult to assist with ADL evaluation and planning for discharge  - Provide patient education as appropriate  Outcome: Progressing  Goal: Maintain proper alignment of affected body part  Description: INTERVENTIONS:  - Support, maintain and protect limb and body alignment  - Provide patient/ family with appropriate education  Outcome: Progressing     Problem: Prexisting or High Potential for Compromised Skin Integrity  Goal: Skin integrity is maintained or improved  Description: INTERVENTIONS:  - Identify patients at risk for skin breakdown  - Assess and monitor skin integrity  - Assess and monitor nutrition and hydration status  - Monitor labs   - Assess for incontinence   - Turn and reposition patient  - Assist with mobility/ambulation  - Relieve pressure over bony prominences  - Avoid friction and shearing  - Provide appropriate hygiene as needed including keeping skin clean and dry  - Evaluate need for skin moisturizer/barrier cream  - Collaborate with interdisciplinary team   - Patient/family teaching  - Consider wound care consult   Outcome: Progressing

## 2020-09-22 NOTE — NURSING NOTE
Report called to [facility], paperwork faxed to them and pt briefed for the [facility] site dc'd cath itp intact

## 2020-09-22 NOTE — SOCIAL WORK
Authorization received from Shivani at [facility]  Pt approved for rehab to [facility]  Next review in 3 days to Harvey Gomez at fax# [phone]  Auth# [id number]  CM provided Quita Nageotte at [facility] with auth information  CM arranged with [facility] for a 1:15pm dc to [facility]  CM notified pt's brother Dr Estehr Parra, pt's bedside RN Clarissa Chacon, and Quita Nageotte at [facility] of dc time  Facility transfer form and CMN completed  CMN signed and placed in medical record bin

## 2020-09-22 NOTE — PLAN OF CARE
Problem: Potential for Falls  Goal: Patient will remain free of falls  Description: INTERVENTIONS:  - Assess patient frequently for physical needs  -  Identify cognitive and physical deficits and behaviors that affect risk of falls    -  [place] fall precautions as indicated by assessment   - Educate patient/family on patient safety including physical limitations  - Instruct patient to call for assistance with activity based on assessment  - Modify environment to reduce risk of injury  - Consider OT/PT consult to assist with strengthening/mobility  9/22/2020 1221 by Oksana Madrid RN  Outcome: Adequate for Discharge  9/22/2020 0804 by Oksana Madrid RN  Outcome: Progressing     Problem: PAIN - ADULT  Goal: Verbalizes/displays adequate comfort level or baseline comfort level  Description: Interventions:  - Encourage patient to monitor pain and request assistance  - Assess pain using appropriate pain scale  - Administer analgesics based on type and severity of pain and evaluate response  - Implement non-pharmacological measures as appropriate and evaluate response  - Consider cultural and social influences on pain and pain management  - Notify physician/advanced practitioner if interventions unsuccessful or patient reports new pain  9/22/2020 1221 by Oksana Madrid RN  Outcome: Adequate for Discharge  9/22/2020 0804 by Oksana Madrid RN  Outcome: Progressing     Problem: INFECTION - ADULT  Goal: Absence or prevention of progression during hospitalization  Description: INTERVENTIONS:  - Assess and monitor for signs and symptoms of infection  - Monitor lab/diagnostic results  - Monitor all insertion sites, i e  indwelling lines, tubes, and drains  - Monitor endotracheal if appropriate and nasal secretions for changes in amount and color  - [place] appropriate cooling/warming therapies per order  - Administer medications as ordered  - Instruct and encourage patient and family to use good hand hygiene technique  - Identify and instruct in appropriate isolation precautions for identified infection/condition  9/22/2020 1221 by Wally Daniels RN  Outcome: Adequate for Discharge  9/22/2020 0804 by Wally Daniels RN  Outcome: Progressing  Goal: Absence of fever/infection during neutropenic period  Description: INTERVENTIONS:  - Monitor WBC    9/22/2020 1221 by Wally Daniels RN  Outcome: Adequate for Discharge  9/22/2020 0804 by Wally Daniels RN  Outcome: Progressing     Problem: SAFETY ADULT  Goal: Patient will remain free of falls  Description: INTERVENTIONS:  - Assess patient frequently for physical needs  -  Identify cognitive and physical deficits and behaviors that affect risk of falls    -  [place] fall precautions as indicated by assessment   - Educate patient/family on patient safety including physical limitations  - Instruct patient to call for assistance with activity based on assessment  - Modify environment to reduce risk of injury  - Consider OT/PT consult to assist with strengthening/mobility  9/22/2020 1221 by Wally Daniels RN  Outcome: Adequate for Discharge  9/22/2020 0804 by Wally Daniels RN  Outcome: Progressing  Goal: Maintain or return to baseline ADL function  Description: INTERVENTIONS:  -  Assess patient's ability to carry out ADLs; assess patient's baseline for ADL function and identify physical deficits which impact ability to perform ADLs (bathing, care of mouth/teeth, toileting, grooming, dressing, etc )  - Assess/evaluate cause of self-care deficits   - Assess range of motion  - Assess patient's mobility; develop plan if impaired  - Assess patient's need for assistive devices and provide as appropriate  - Encourage maximum independence but intervene and supervise when necessary  - Involve family in performance of ADLs  - Assess for home care needs following discharge   - Consider OT consult to assist with ADL evaluation and planning for discharge  - Provide patient education as appropriate  9/22/2020 [phone] by Yancy Jackson RN  Outcome: Adequate for Discharge  9/22/2020 0804 by Yancy Jackson RN  Outcome: Progressing  Goal: Maintain or return mobility status to optimal level  Description: INTERVENTIONS:  - Assess patient's baseline mobility status (ambulation, transfers, stairs, etc )    - Identify cognitive and physical deficits and behaviors that affect mobility  - Identify mobility aids required to assist with transfers and/or ambulation (gait belt, sit-to-stand, lift, walker, cane, etc )  - [place] fall precautions as indicated by assessment  - Record patient progress and toleration of activity level on Mobility SBAR; progress patient to next Phase/Stage  - Instruct patient to call for assistance with activity based on assessment  - Consider rehabilitation consult to assist with strengthening/weightbearing, etc   9/22/2020 1221 by Yancy Jackson RN  Outcome: Adequate for Discharge  9/22/2020 0804 by Yancy Jackson RN  Outcome: Progressing     Problem: DISCHARGE PLANNING  Goal: Discharge to home or other facility with appropriate resources  Description: INTERVENTIONS:  - Identify barriers to discharge w/patient and caregiver  - Arrange for needed discharge resources and transportation as appropriate  - Identify discharge learning needs (meds, wound care, etc )  - Arrange for interpretive services to assist at discharge as needed  - Refer to Case Management Department for coordinating discharge planning if the patient needs post-hospital services based on physician/advanced practitioner order or complex needs related to functional status, cognitive ability, or social support system  9/22/2020 1221 by Yancy Jackson RN  Outcome: Adequate for Discharge  9/22/2020 0804 by Yancy Jackson RN  Outcome: Progressing     Problem: Knowledge Deficit  Goal: Patient/family/caregiver demonstrates understanding of disease process, treatment plan, medications, and discharge instructions  Description: Complete learning assessment and assess knowledge base    Interventions:  - Provide teaching at level of understanding  - Provide teaching via preferred learning methods  9/22/2020 1221 by Chen Stover RN  Outcome: Adequate for Discharge  9/22/2020 0804 by Chen Stover RN  Outcome: Progressing     Problem: SKIN/TISSUE INTEGRITY - ADULT  Goal: Skin integrity remains intact  Description: INTERVENTIONS  - Identify patients at risk for skin breakdown  - Assess and monitor skin integrity  - Assess and monitor nutrition and hydration status  - Monitor labs (i e  albumin)  - Assess for incontinence   - Turn and reposition patient  - Assist with mobility/ambulation  - Relieve pressure over bony prominences  - Avoid friction and shearing  - Provide appropriate hygiene as needed including keeping skin clean and dry  - Evaluate need for skin moisturizer/barrier cream  - Collaborate with interdisciplinary team (i e  Nutrition, Rehabilitation, etc )   - Patient/family teaching  9/22/2020 1221 by Chen Stover RN  Outcome: Adequate for Discharge  9/22/2020 0804 by Chen Stover RN  Outcome: Progressing  Goal: Incision(s), wounds(s) or drain site(s) healing without S/S of infection  Description: INTERVENTIONS  - Assess and document risk factors for skin impairment   - Assess and document dressing, incision, wound bed, drain sites and surrounding tissue  - Consider nutrition services referral as needed  - Oral mucous membranes remain intact  - Provide patient/ family education  9/22/2020 1221 by Chen Stover RN  Outcome: Adequate for Discharge  9/22/2020 0804 by Chen Stover RN  Outcome: Progressing  Goal: Oral mucous membranes remain intact  Description: INTERVENTIONS  - Assess oral mucosa and hygiene practices  - Implement preventative oral hygiene regimen  - Implement oral medicated treatments as ordered  - Initiate Nutrition services referral as needed  9/22/2020 1221 by Chen Stover RN  Outcome: Adequate for Discharge  9/22/2020 0804 by Chen Stover RN  Outcome: Progressing     Problem: MUSCULOSKELETAL - ADULT  Goal: Maintain or return mobility to safest level of function  Description: INTERVENTIONS:  - Assess patient's ability to carry out ADLs; assess patient's baseline for ADL function and identify physical deficits which impact ability to perform ADLs (bathing, care of mouth/teeth, toileting, grooming, dressing, etc )  - Assess/evaluate cause of self-care deficits   - Assess range of motion  - Assess patient's mobility  - Assess patient's need for assistive devices and provide as appropriate  - Encourage maximum independence but intervene and supervise when necessary  - Involve family in performance of ADLs  - Assess for home care needs following discharge   - Consider OT consult to assist with ADL evaluation and planning for discharge  - Provide patient education as appropriate  9/22/2020 1221 by Aram Jennings RN  Outcome: Adequate for Discharge  9/22/2020 0804 by Aram Jennings RN  Outcome: Progressing  Goal: Maintain proper alignment of affected body part  Description: INTERVENTIONS:  - Support, maintain and protect limb and body alignment  - Provide patient/ family with appropriate education  9/22/2020 1221 by Aram Jennings RN  Outcome: Adequate for Discharge  9/22/2020 0804 by Aram Jennings RN  Outcome: Progressing     Problem: Prexisting or High Potential for Compromised Skin Integrity  Goal: Skin integrity is maintained or improved  Description: INTERVENTIONS:  - Identify patients at risk for skin breakdown  - Assess and monitor skin integrity  - Assess and monitor nutrition and hydration status  - Monitor labs   - Assess for incontinence   - Turn and reposition patient  - Assist with mobility/ambulation  - Relieve pressure over bony prominences  - Avoid friction and shearing  - Provide appropriate hygiene as needed including keeping skin clean and dry  - Evaluate need for skin moisturizer/barrier cream  - Collaborate with interdisciplinary team   - Patient/family teaching  - Consider wound care consult   9/22/2020 1221 by Lee Ann Coleman RN  Outcome: Adequate for Discharge  9/22/2020 0804 by Lee Ann Coleman RN  Outcome: Progressing

## 2020-09-22 NOTE — PROGRESS NOTES
09/22/20 0900   Scientologist V[address] Involvement Patient active with [facility]   Drea Aware/Contacted Other (Comment)  (Pt claims to speak with  weekly)     Patient does not have any family that he lives with, just a friend  Patient has a son that is relatively local and a well established relationship with his [facility]  Patient had no spiritual needs at this time

## 2020-09-23 ENCOUNTER — TELEPHONE (OUTPATIENT)
Dept: NEUROLOGY | Facility: CLINIC | Age: 70
End: 2020-09-23

## 2020-09-23 NOTE — UTILIZATION REVIEW
Notification of Discharge  This is a Notification of Discharge from our facility [address]  Please be advised that this patient has been discharge from our facility  Below you will find the admission and discharge date and time including the patients disposition  PRESENTATION DATE: 9/15/2020  7:28 PM  OBS ADMISSION DATE:   IP ADMISSION DATE: 9/16/20 1603   DISCHARGE DATE: 9/22/2020  2:43 PM  DISPOSITION: Non [facility] Non [facility]   Admission Orders listed below:  Admission Orders (From admission, onward)     Ordered        09/16/20 1603  Inpatient Admission  Once         09/16/20 0003  Place in Observation  Once                   Please contact the UR Department if additional information is required to close this patient's authorization/case  [address] [place] Utilization Review Department  Main: [phone] x carefully listen to the prompts  All voicemails are confidential   [email or web address]  Send all requests for admission clinical reviews, approved or denied determinations and any other requests to dedicated fax number below belonging to the campus where the patient is receiving treatment   List of dedicated fax numbers:  [address] DENIALS (Administrative/Medical Necessity) [phone]   [address] (Maternity/NICU/Pediatrics) [phone]   [facility] [phone]   130 [facility] [phone]   [address] [place] [address] [phone]   [address] [phone]   [facility]  [phone]   2205 [facility]  2401 [facility] 1000 [facility] [phone]

## 2020-09-23 NOTE — UTILIZATION REVIEW
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, baseline cognitive impairment,  mesenteric ischemia s/p exploratory laparotomy x 2 Aug 2020, also ZHOU on CPAP, DM2 with neuropathy and gout,  initially admitted as Observation on 9/15/20 for evaluation and treatment of fall, increased confusion and urinary retention  Evaluated by Neurology  Converted to Inpatient on 9/16/20 due to increased confusion with Acute Toxic Metabolic Encephalopathy,  NeuroSurgical evaluation of  Shunt and seizure evaluation  9/17 Neurosurgery consult: POD #25 left [facility] shunt placement  GCS 14  Low suspicion for shunt malfunction  No surgical intervention needed  Neurology:   Suspect possibly toxic metabolic encephalopathy vs  Progression of underlying dementia  Patient's baseline per family is usually alert and oriented to person and place  Although low suspicion for seizures, will check routine EEG for further evaluation  Recently started Seroquel 25 mg HS and Lexapro was increased to 20 mg  Seroquel discontinued yesterday; if restarted, recommend 12 5 mg HS  Internal Medicine: Awake and alert, oriented to self only, follows commands  EEG pending  Geriatric Medicine: Acute metabolic encephalopathy  multifactorial likely culmination of multiple recent environment changes between hospital and [facility], recent admission for  shunt infection and mesenteric ischemia, recent shunt change due to infection end of July and multiple chronic medical conditions and medicaion changes superimposed on baseline cognitive impairment  On admission and evaluation today pt is irritated and refusing evaluation, screaming out at times    Given recent initiation of Lexapro on return to rehab facility following last hospital discharge and worsening agitation/insomnia with dose increase consider tapering down by 50% per week to off, could use slower taper if pt does not tolerate but as has not been on long term per MAR from [facility] from 20 and 20 and dose increase only in the past week would not expect intolerance to typical taper  Started on Seroquel at rehab  for behaviors, consider restart at reduced dose at bedtime  Case Management:  Patient is pending insurance authorization for return to [place] [id number]       Internal Medicine: Patient medically stable, pending rehab placement  Case Management: [facility] has no available beds  Multiple referrals send in the [facility] and [facility] area for Hector Schwab  Responses from [address] [place] [address] and [facility], requesting additional information  Pertinent Labs/Diagnostic Results:        Patient Name:  Viridiana Gay  MRN: [id number]   : 1950 File #: [id number]   Age: 69 y o      Report Date: 2020                                                                            Study type: Routine EEG      ICD 10 diagnosis: Encephalopathy, unspecified G93 40     Start time: 1025                 End time: 3454     -------------------------------------------------------------------------------------------------------------------   Patient History:      69 y o  male with prior TIA,HTN, NPH complicated by  shunt infection meningitis/peritonitis with subsequent [facility] shunt placement 2020  Cognitive inpairment, history of  mesenteric ischema s/p ex lap x 2020, neuropathy,sleep apnea, diabetes, who presents with worsening confusion        -------------------------------------------------------------------------------------------------------------------   EEG Description:     During wakefulness, background activity consists of continuous  diffuse slightly irregular, normal  voltage,  symmetric, reactive  9 Hz alpha with intermixed theta activity with AP gradient present     During drowsiness and light sleep, background activity attenuated and was replaced by diffusely distributed theta activity       -------------------------------------------------------------------------------------------------------------------   Activation Procedures:     Hyperventilation was not performed  Stepped photic stimulation between 1-30 cps was performed and did not induce any changes       Abnormal Findings:  See "Background Activity"     Intermittent theta slowing which is sometimes sharply contoured is evident in the left anterior temporal lobe  Rare sharp waves are noted in the left anterior temporal area during sleep  No electrographic seizures occurred during this recording      Other findings: The single lead ECG demonstrated regular rhythm      Events: No push button events occurred during this study      -------------------------------------------------------------------------------------------------------------------   EEG Interpretation:     This 35 minute Routine EEG recorded during wakefulness, drowsiness, and sleep is abnormal, due to diffuse background irregularity and intermixed theta slowing, intermittent left anterior temporal sometimes sharply contoured theta slowing and rare left anterior temporal sharp waves noted during sleep  These abnormalities are consistent with mild to moderate diffuse cerebral dysfunction with superimposed focal left anterior temporal neuronal dysfunction which may ne epileptogenic   Clinical correlation is recommended        No electrographic seizures occurred during the recording                   Results from last 7 days   Lab Units 09/17/20 0447   WBC Thousand/uL 10 64*   HEMOGLOBIN g/dL 13 2   HEMATOCRIT % 42 4   PLATELETS Thousands/uL 218   NEUTROS ABS Thousands/µL 7 84*         Results from last 7 days   Lab Units 09/17/20 0447   SODIUM mmol/L 140   POTASSIUM mmol/L 3 6   CHLORIDE mmol/L 107   CO2 mmol/L 24   ANION GAP mmol/L 9   BUN mg/dL 14   CREATININE mg/dL 0 68   EGFR ml/min/1 73sq m 96   CALCIUM mg/dL 9 1         Results from last 7 days     Results from last 7 days   Lab Units 09/17/20  0447   GLUCOSE RANDOM mg/dL 106                 Results from last 7 days   Lab Units 09/17/20  0447 09/16/20  1502   PROCALCITONIN ng/ml <0 05 <0 05       Vital Signs:       Date and Time  Temp  Pulse  SpO2  Resp  BP    09/18/20 2220  97 8 °F (36 6 °C)  62  97 %  16  118/72    09/18/20 0808  --  --  --  18  117/63    09/18/20 0003  --  --  98 %  --  --    09/17/20 2143  97 6 °F (36 4 °C)  66  98 %  --  --    09/17/20 2143  --  --  --  19  136/66    09/17/20 1516  96 °F (35 6 °C)   75  --  --  --    09/17/20 1516  --  --  --  17  117/64    09/17/20 0700  98 5 °F (36 9 °C)  70  96 %  16  118/60          Medications:       Last 24 Hours Medication List:          Medication Dose Route Frequency    acetaminophen  650 mg Oral Q6H PRN    allopurinol  300 mg Oral Daily    bisacodyl  10 mg Rectal Daily PRN    docusate sodium  100 mg Oral BID    enoxaparin  40 mg Subcutaneous Daily    escitalopram  10 mg Oral Daily    insulin lispro  2-12 Units Subcutaneous 4x Daily (AC & HS)    lisinopril  10 mg Oral Daily    melatonin  3 mg Oral HS    VERONICA ANTIFUNGAL   Topical BID    oxyCODONE  5 mg Oral Q4H PRN    pantoprazole  40 mg Oral Early Morning    QUEtiapine  12 5 mg Oral HS    senna  1 tablet Oral Daily    travoprost  1 drop Right Eye HS             Network Utilization Review Department  [email or web address]  ATTENTION: Please call with any questions or concerns to [phone] and carefully listen to the prompts so that you are directed to the right person  All voicemails are confidential   Ty Limb all requests for admission clinical reviews, approved or denied determinations and any other requests to dedicated fax number below belonging to the campus where the patient is receiving treatment   List of dedicated fax numbers for the Facilities:  FACILITY NAME UR FAX NUMBER   ADMISSION DENIALS (Administrative/Medical Necessity) 3524 [facility] (Maternity/NICU/Pediatrics) [place] [phone]   Alison Ash [phone]   [facility] Opitz [phone]   [facility] 1525 [facility] [phone]   1102 [facility] [phone]   2209 [facility]   2401 [facility] And [address] [phone]

## 2020-09-30 ENCOUNTER — NURSING HOME VISIT (OUTPATIENT)
Dept: GERIATRICS | Facility: OTHER | Age: 70
End: 2020-09-30
Payer: COMMERCIAL

## 2020-09-30 DIAGNOSIS — T85.730D INFECTION OF VENTRICULOPERITONEAL SHUNT, SUBSEQUENT ENCOUNTER: ICD-10-CM

## 2020-09-30 DIAGNOSIS — F41.9 ANXIETY: ICD-10-CM

## 2020-09-30 DIAGNOSIS — G91.2 NORMAL PRESSURE HYDROCEPHALUS (HCC): ICD-10-CM

## 2020-09-30 DIAGNOSIS — F32.1 CURRENT MODERATE EPISODE OF MAJOR DEPRESSIVE DISORDER WITHOUT PRIOR EPISODE (HCC): Primary | ICD-10-CM

## 2020-09-30 DIAGNOSIS — G47.01 INSOMNIA DUE TO MEDICAL CONDITION: ICD-10-CM

## 2020-09-30 DIAGNOSIS — R41.9 NEUROCOGNITIVE DISORDER: ICD-10-CM

## 2020-09-30 PROCEDURE — 99310 SBSQ NF CARE HIGH MDM 45: CPT | Performed by: NURSE PRACTITIONER

## 2020-09-30 NOTE — PROGRESS NOTES
[address] [facility] [address]  [address]  POS: 31: SNF/Short Term Rehab, [place]     PSYCHIATRIC EVALUATION     NAME: All Jacob  AGE: 69 y o  SEX: male [id number]    DATE OF ENCOUNTER: 9/30/2020    Code status:  full code    Assessment and Plan      Diagnosis ICD-10-CM Associated Orders   1  Current moderate episode of major depressive disorder without prior episode ([facility] [address] )  F32 1    2  Normal pressure hydrocephalus (HCC)  G91 2    3  Infection of ventriculoperitoneal shunt, subsequent encounter  T85 730D    4  Anxiety  F41 9    5  Insomnia due to medical condition  G47 01    6  Neurocognitive disorder  R41 9        All medications and routine orders were reviewed and updated as needed  Plan discussed with: Nurse    Treatment Recommendations/Precautions:    Increase Seroquel 25 mg, Melatonin 6 mg    Medication management every 2 weeks    Medications Risks/Benefits:      Risks, Benefits And Possible Side Effects Of Medications:    Risks, benefits, and possible side effects of medications explained to Abelardo Tubbs and he verbalizes understanding and agreement for treatment  Controlled Medication Discussion:     Not applicable - controlled prescriptions are not prescribed by this practice    Chief Complaint     Seen for Psychiatric Consult  at Nursing Facility/Assisted Living    History of Present Illness     Patient admitted to [facility] 9/22/2020 after hospitalization for fall and worsening confusion  Was previously at [facility]  History of hydrocephalus status post  shunt infection, hypertension, diabetes mellitus type 2, dementia with behavioral disturbances, Depression      Seen by Geriatrics while hospitalized: "Acute metabolic encephalopathy   -multifactorial likely culmination of multiple recent environment changes between hospital and [facility], recent admission for  shunt infection and mesenteric ischemia, recent shunt change due to infection end of July and multiple chronic medical conditions and medicaion changes superimposed on baseline cognitive impairment"      Recommended taper off Lexapro and restart Seroquel at reduced dose, had previously been on Seroquel 25 mg  Currently on reduced dose of Lexapro and Seroquel 12 5 mg  Continues calling out for help throughout the night  He presents as pleasantly confused, calm and watching TV  He is oriented to self, states he feels safe here  Does not seem to recall having trouble at night  Would recommend increasing Seroquel 25 mg and increasing Melatonin 6 mg        He  reports difficulty sleeping, fluctuating appetite, fluctuating energy levels    The following portions of the patient's history were reviewed and updated as appropriate: allergies, current medications, past family history, past medical history, past social history, past surgical history and problem list     Past Psychiatric History:     Past Inpatient Psychiatric Treatment:   No history of past inpatient psychiatric admissions  Past Outpatient Psychiatric Treatment:    No history of past outpatient psychiatric treatment  Past Suicide Attempts: no  Past Violent Behavior: no  Past Psychiatric Medication Trials: Lexapro and Seroquel    Traumatic History:     Abuse: none  Other Traumatic Events: none    HISTORY:  Past Medical History:   Diagnosis Date    Cancer ([facility] 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     Diabetes mellitus ([facility] )     DM2 (diabetes mellitus, type 2) ([facility] )     Gout     HTN (hypertension)     Hydrocephalus ([facility] )     Hypertension     Neuropathy     ZHOU on CPAP     Pressure injury of skin     TIA (transient ischemic attack)      Family History   Problem Relation Age of Onset    Polymyositis Mother     Heart failure Father      Social History     Socioeconomic History    Marital status:      Spouse name: None    Number of children: None    Years of education: None    Highest education level: None   Occupational History    None   Social Needs    Financial resource strain: None    Food insecurity     Worry: None     Inability: None    Transportation needs     Medical: None     Non-medical: None   Tobacco Use    Smoking status: Never Smoker    Smokeless tobacco: Never Used   Substance and Sexual Activity    Alcohol use: Not Currently     Frequency: Monthly or less     Drinks per session: 1 or 2    Drug use: Never    Sexual activity: None   Lifestyle    Physical activity     Days per week: None     Minutes per session: None    Stress: None   Relationships    Social connections     Talks on phone: None     Gets together: None     Attends Sabianism service: None     Active member of club or organization: None     Attends meetings of clubs or organizations: None     Relationship status: None    Intimate partner violence     Fear of current or ex partner: None     Emotionally abused: None     Physically abused: None     Forced sexual activity: None   Other Topics Concern    None   Social History Narrative    None       Allergies: Allergies   Allergen Reactions    Pollen Extract Allergic Rhinitis       Review of Systems     Review of Systems   Constitutional: Positive for activity change  Psychiatric/Behavioral: Positive for behavioral problems, confusion and sleep disturbance  All other systems reviewed and are negative        Medications and orders       Current Outpatient Medications:     acetaminophen (TYLENOL) 325 mg tablet, Take 2 tablets (650 mg total) by mouth every 6 (six) hours as needed for mild pain, Disp: 30 tablet, Rfl: 0    allopurinol (ZYLOPRIM) 300 mg tablet, 300 mg daily, Disp: , Rfl:     Bisacodyl (DULCOLAX RE), Insert into the rectum as needed, Disp: , Rfl:     docusate sodium (COLACE) 100 mg capsule, Take 1 capsule (100 mg total) by mouth 2 (two) times a day, Disp: 10 capsule, Rfl: 0    escitalopram (LEXAPRO) 10 mg tablet, Take 1 tablet (10 mg total) by mouth daily Take 10 mg daily x3 days Then 5 mg x 7 days then discontinue, Disp: , Rfl: 0    insulin lispro (HumaLOG) 100 units/mL injection, Inject 1-6 Units under the skin 3 (three) times a day before meals, Disp:  , Rfl: 0    Lancets (ONETOUCH DELICA PLUS [id number]) MISC, 2 (two) times a day, Disp: , Rfl:     lisinopril (ZESTRIL) 10 mg tablet, Take 1 tablet (10 mg total) by mouth daily, Disp:  , Rfl: 0    melatonin 3 mg, Take 1 tablet (3 mg total) by mouth daily at bedtime, Disp: , Rfl: 0    multivitamin (THERAGRAN) TABS, Take 1 tablet by mouth daily, Disp: , Rfl:     oxyCODONE (ROXICODONE) 5 mg immediate release tablet, Take 1 tablet (5 mg total) by mouth every 4 (four) hours as needed for moderate pain or severe painMax Daily Amount: 30 mg, Disp: 10 tablet, Rfl: 0    pantoprazole (PROTONIX) 40 mg tablet, Take 40 mg by mouth daily, Disp: , Rfl:     QUEtiapine (SEROquel) 25 mg tablet, Take 0 5 tablets (12 5 mg total) by mouth daily at bedtime, Disp:  , Rfl: 0    senna (SENOKOT) 8 6 mg, Take 1 tablet (8 6 mg total) by mouth daily, Disp: 120 each, Rfl: 0    travoprost (Travatan Z) 0 004 % ophthalmic solution, Administer 1 drop to the right eye daily at bedtime , Disp: , Rfl:       Objective     Mental Status Evaluation:    Appearance age appropriate   Behavior pleasant, cooperative, calm   Speech normal rate, normal volume, normal pitch   Mood dysphoric   Affect blunted   Thought Processes disorganized   Associations loose associations   Thought Content no overt delusions   Perceptual Disturbances: no auditory hallucinations, no visual hallucinations   Abnormal Thoughts  Risk Potential Suicidal ideation - None  Homicidal ideation - None  Potential for aggression - Yes, due to poor impulse control   Orientation oriented to person   Memory recent memory impaired   Consciousness alert and awake   Attention Span Concentration Span poor attention span  poor concentration   Intellect appears to be of average intelligence   Insight poor Judgement poor   Muscle Strength and  Gait uses wheelchair   Motor Activity no abnormal movements   Language difficulty naming common objects, difficulty repeating a phrase, difficulty writing a sentence   Fund of Knowledge impaired knowledge of current events  impaired fund of knowledge regarding past history  impaired fund of knowledge regarding vocabulary               Physical Exam  Vitals signs and nursing note reviewed  Psychiatric:         Attention and Perception: He is inattentive  Mood and Affect: Affect is blunt  Cognition and Memory: Cognition is impaired  Judgment: Judgment is impulsive  Pertinent Laboratory/Diagnostic Studies:   I have personally reviewed pertinent lab results  Counseling / Coordination of Care  Total floor / unit time spent today 35 minutes  Greater than 50% of total time was spent with the patient and / or family counseling and / or coordination of care       AR [place] 09/30/20

## 2020-10-02 NOTE — TELEPHONE ENCOUNTER
Sched HFU appt 11/4/2020 with Isabella Johnston in [facility] as a VIRTUAL TELEPHONE VISIT, pt is in [address] [place] [address]

## 2020-10-07 ENCOUNTER — NURSING HOME VISIT (OUTPATIENT)
Dept: GERIATRICS | Facility: OTHER | Age: 70
End: 2020-10-07
Payer: COMMERCIAL

## 2020-10-07 DIAGNOSIS — T85.730A: ICD-10-CM

## 2020-10-07 DIAGNOSIS — G93.40 ENCEPHALOPATHY: ICD-10-CM

## 2020-10-07 DIAGNOSIS — G47.01 INSOMNIA DUE TO MEDICAL CONDITION: ICD-10-CM

## 2020-10-07 DIAGNOSIS — F41.9 ANXIETY: ICD-10-CM

## 2020-10-07 DIAGNOSIS — F32.1 CURRENT MODERATE EPISODE OF MAJOR DEPRESSIVE DISORDER WITHOUT PRIOR EPISODE (HCC): Primary | ICD-10-CM

## 2020-10-07 DIAGNOSIS — R41.9 NEUROCOGNITIVE DISORDER: ICD-10-CM

## 2020-10-07 PROCEDURE — 99309 SBSQ NF CARE MODERATE MDM 30: CPT | Performed by: NURSE PRACTITIONER

## 2020-10-07 RX ORDER — LANOLIN ALCOHOL/MO/W.PET/CERES
6 CREAM (GRAM) TOPICAL
Refills: 0
Start: 2020-10-07

## 2020-10-07 RX ORDER — QUETIAPINE FUMARATE 25 MG/1
25 TABLET, FILM COATED ORAL
Refills: 0
Start: 2020-10-07

## 2020-10-09 ENCOUNTER — HOSPITAL ENCOUNTER (OUTPATIENT)
Dept: CT IMAGING | Facility: HOSPITAL | Age: 70
Discharge: HOME | End: 2020-10-09
Payer: COMMERCIAL

## 2020-10-09 DIAGNOSIS — G91.2 NORMAL PRESSURE HYDROCEPHALUS (HCC): ICD-10-CM

## 2020-10-09 PROCEDURE — 70450 CT HEAD/BRAIN W/O DYE: CPT

## 2020-10-09 PROCEDURE — G1004 CDSM NDSC: HCPCS

## 2020-10-14 ENCOUNTER — NURSING HOME VISIT (OUTPATIENT)
Dept: GERIATRICS | Facility: OTHER | Age: 70
End: 2020-10-14
Payer: COMMERCIAL

## 2020-10-14 DIAGNOSIS — R41.9 NEUROCOGNITIVE DISORDER: ICD-10-CM

## 2020-10-14 DIAGNOSIS — G91.2 NORMAL PRESSURE HYDROCEPHALUS (HCC): ICD-10-CM

## 2020-10-14 DIAGNOSIS — G47.01 INSOMNIA DUE TO MEDICAL CONDITION: ICD-10-CM

## 2020-10-14 DIAGNOSIS — F41.9 ANXIETY: Primary | ICD-10-CM

## 2020-10-14 PROCEDURE — 99309 SBSQ NF CARE MODERATE MDM 30: CPT | Performed by: NURSE PRACTITIONER

## 2020-10-16 RX ORDER — TRAZODONE HYDROCHLORIDE 50 MG/1
50 TABLET ORAL
COMMUNITY

## 2020-10-21 ENCOUNTER — NURSING HOME VISIT (OUTPATIENT)
Dept: GERIATRICS | Facility: OTHER | Age: 70
End: 2020-10-21
Payer: COMMERCIAL

## 2020-10-21 DIAGNOSIS — F32.1 CURRENT MODERATE EPISODE OF MAJOR DEPRESSIVE DISORDER WITHOUT PRIOR EPISODE (HCC): ICD-10-CM

## 2020-10-21 DIAGNOSIS — G47.01 INSOMNIA DUE TO MEDICAL CONDITION: ICD-10-CM

## 2020-10-21 DIAGNOSIS — R41.9 NEUROCOGNITIVE DISORDER: Primary | ICD-10-CM

## 2020-10-21 DIAGNOSIS — F41.9 ANXIETY: ICD-10-CM

## 2020-10-21 DIAGNOSIS — G91.2 NORMAL PRESSURE HYDROCEPHALUS (HCC): ICD-10-CM

## 2020-10-21 PROCEDURE — 99309 SBSQ NF CARE MODERATE MDM 30: CPT | Performed by: NURSE PRACTITIONER

## 2020-10-22 ENCOUNTER — OFFICE VISIT (OUTPATIENT)
Dept: NEUROSURGERY | Facility: CLINIC | Age: 70
End: 2020-10-22

## 2020-10-22 VITALS
HEART RATE: 83 BPM | BODY MASS INDEX: 27.98 KG/M2 | TEMPERATURE: 97.1 F | RESPIRATION RATE: 16 BRPM | SYSTOLIC BLOOD PRESSURE: 91 MMHG | DIASTOLIC BLOOD PRESSURE: 57 MMHG | WEIGHT: 225 LBS | HEIGHT: 75 IN

## 2020-10-22 DIAGNOSIS — G91.2 NORMAL PRESSURE HYDROCEPHALUS (HCC): ICD-10-CM

## 2020-10-22 DIAGNOSIS — Z98.2 STATUS POST VENTRICULOPERITONEAL SHUNT: Primary | ICD-10-CM

## 2020-10-22 DIAGNOSIS — Z48.89 AFTERCARE FOLLOWING SURGERY: ICD-10-CM

## 2020-10-22 PROCEDURE — 99024 POSTOP FOLLOW-UP VISIT: CPT | Performed by: NEUROLOGICAL SURGERY

## 2020-11-04 ENCOUNTER — NURSING HOME VISIT (OUTPATIENT)
Dept: GERIATRICS | Facility: OTHER | Age: 70
End: 2020-11-04
Payer: COMMERCIAL

## 2020-11-04 ENCOUNTER — TELEMEDICINE (OUTPATIENT)
Dept: NEUROLOGY | Facility: CLINIC | Age: 70
End: 2020-11-04
Payer: COMMERCIAL

## 2020-11-04 ENCOUNTER — HOSPITAL ENCOUNTER (INPATIENT)
Facility: HOSPITAL | Age: 70
LOS: 7 days | Discharge: NURSING, REHAB OR LONG-TERM CARE | DRG: 853 | End: 2020-11-12
Attending: EMERGENCY MEDICINE | Admitting: INTERNAL MEDICINE
Payer: COMMERCIAL

## 2020-11-04 VITALS
HEART RATE: 80 BPM | TEMPERATURE: 98.9 F | DIASTOLIC BLOOD PRESSURE: 70 MMHG | RESPIRATION RATE: 18 BRPM | SYSTOLIC BLOOD PRESSURE: 114 MMHG | WEIGHT: 216 LBS | BODY MASS INDEX: 27 KG/M2

## 2020-11-04 DIAGNOSIS — G47.01 INSOMNIA DUE TO MEDICAL CONDITION: ICD-10-CM

## 2020-11-04 DIAGNOSIS — G91.0 COMMUNICATING HYDROCEPHALUS (HCC): ICD-10-CM

## 2020-11-04 DIAGNOSIS — R41.9 NEUROCOGNITIVE DISORDER: Primary | ICD-10-CM

## 2020-11-04 DIAGNOSIS — F32.1 CURRENT MODERATE EPISODE OF MAJOR DEPRESSIVE DISORDER WITHOUT PRIOR EPISODE (HCC): ICD-10-CM

## 2020-11-04 DIAGNOSIS — G93.41 METABOLIC ENCEPHALOPATHY: ICD-10-CM

## 2020-11-04 DIAGNOSIS — R33.9 URINARY RETENTION: ICD-10-CM

## 2020-11-04 DIAGNOSIS — I63.40 CEREBROVASCULAR ACCIDENT (CVA) DUE TO EMBOLISM OF CEREBRAL ARTERY (HCC): ICD-10-CM

## 2020-11-04 DIAGNOSIS — F29 UNSPECIFIED PSYCHOSIS NOT DUE TO A SUBSTANCE OR KNOWN PHYSIOLOGICAL CONDITION (HCC): Primary | ICD-10-CM

## 2020-11-04 DIAGNOSIS — F41.9 ANXIETY: ICD-10-CM

## 2020-11-04 DIAGNOSIS — I95.9 HYPOTENSION, UNSPECIFIED HYPOTENSION TYPE: ICD-10-CM

## 2020-11-04 DIAGNOSIS — R41.9 NEUROCOGNITIVE DISORDER: ICD-10-CM

## 2020-11-04 DIAGNOSIS — L89.150 UNSTAGEABLE PRESSURE ULCER OF SACRAL REGION (HCC): ICD-10-CM

## 2020-11-04 DIAGNOSIS — L98.429 SKIN ULCER OF SACRUM, UNSPECIFIED ULCER STAGE (HCC): Primary | ICD-10-CM

## 2020-11-04 PROCEDURE — 99285 EMERGENCY DEPT VISIT HI MDM: CPT

## 2020-11-04 PROCEDURE — 99309 SBSQ NF CARE MODERATE MDM 30: CPT | Performed by: NURSE PRACTITIONER

## 2020-11-04 PROCEDURE — 99442 PR PHYS/QHP TELEPHONE EVALUATION 11-20 MIN: CPT | Performed by: PHYSICIAN ASSISTANT

## 2020-11-04 RX ORDER — ONDANSETRON 4 MG/1
4 TABLET, FILM COATED ORAL EVERY 8 HOURS PRN
COMMUNITY

## 2020-11-04 RX ORDER — LORAZEPAM 0.5 MG/1
0.5 TABLET ORAL 2 TIMES DAILY
COMMUNITY

## 2020-11-04 RX ORDER — ESCITALOPRAM OXALATE 5 MG/1
5 TABLET ORAL DAILY
COMMUNITY

## 2020-11-05 ENCOUNTER — APPOINTMENT (EMERGENCY)
Dept: CT IMAGING | Facility: HOSPITAL | Age: 70
DRG: 853 | End: 2020-11-05
Payer: COMMERCIAL

## 2020-11-05 ENCOUNTER — APPOINTMENT (EMERGENCY)
Dept: RADIOLOGY | Facility: HOSPITAL | Age: 70
DRG: 853 | End: 2020-11-05
Payer: COMMERCIAL

## 2020-11-05 PROBLEM — N17.9 AKI (ACUTE KIDNEY INJURY) (HCC): Status: ACTIVE | Noted: 2020-11-05

## 2020-11-05 PROBLEM — I95.9 HYPOTENSION: Status: ACTIVE | Noted: 2020-11-05

## 2020-11-05 PROBLEM — N39.0 UTI (URINARY TRACT INFECTION): Status: ACTIVE | Noted: 2020-11-05

## 2020-11-05 PROBLEM — E43 SEVERE PROTEIN-CALORIE MALNUTRITION (HCC): Status: ACTIVE | Noted: 2020-11-05

## 2020-11-05 PROBLEM — L98.429 SACRAL ULCER (HCC): Status: ACTIVE | Noted: 2020-11-05

## 2020-11-05 LAB
ALBUMIN SERPL BCP-MCNC: 2.3 G/DL (ref 3.5–5)
ALP SERPL-CCNC: 102 U/L (ref 46–116)
ALT SERPL W P-5'-P-CCNC: 60 U/L (ref 12–78)
ANION GAP SERPL CALCULATED.3IONS-SCNC: 11 MMOL/L (ref 4–13)
ANION GAP SERPL CALCULATED.3IONS-SCNC: 11 MMOL/L (ref 4–13)
APTT PPP: 43 SECONDS (ref 23–37)
AST SERPL W P-5'-P-CCNC: 81 U/L (ref 5–45)
ATRIAL RATE: 88 BPM
BACTERIA UR QL AUTO: ABNORMAL /HPF
BASE EX.OXY STD BLDV CALC-SCNC: 82.5 % (ref 60–80)
BASE EXCESS BLDV CALC-SCNC: -5.3 MMOL/L
BASOPHILS # BLD AUTO: 0.06 THOUSANDS/ΜL (ref 0–0.1)
BASOPHILS # BLD AUTO: 0.09 THOUSANDS/ΜL (ref 0–0.1)
BASOPHILS NFR BLD AUTO: 1 % (ref 0–1)
BASOPHILS NFR BLD AUTO: 1 % (ref 0–1)
BILIRUB SERPL-MCNC: 0.35 MG/DL (ref 0.2–1)
BILIRUB UR QL STRIP: NEGATIVE
BUN SERPL-MCNC: 43 MG/DL (ref 5–25)
BUN SERPL-MCNC: 56 MG/DL (ref 5–25)
CALCIUM ALBUM COR SERPL-MCNC: 10.9 MG/DL (ref 8.3–10.1)
CALCIUM SERPL-MCNC: 7.7 MG/DL (ref 8.3–10.1)
CALCIUM SERPL-MCNC: 9.5 MG/DL (ref 8.3–10.1)
CHLORIDE SERPL-SCNC: 109 MMOL/L (ref 100–108)
CHLORIDE SERPL-SCNC: 115 MMOL/L (ref 100–108)
CLARITY UR: ABNORMAL
CO2 SERPL-SCNC: 19 MMOL/L (ref 21–32)
CO2 SERPL-SCNC: 26 MMOL/L (ref 21–32)
COLOR UR: YELLOW
CREAT SERPL-MCNC: 1.37 MG/DL (ref 0.6–1.3)
CREAT SERPL-MCNC: 1.82 MG/DL (ref 0.6–1.3)
EOSINOPHIL # BLD AUTO: 0.22 THOUSAND/ΜL (ref 0–0.61)
EOSINOPHIL # BLD AUTO: 0.23 THOUSAND/ΜL (ref 0–0.61)
EOSINOPHIL NFR BLD AUTO: 2 % (ref 0–6)
EOSINOPHIL NFR BLD AUTO: 2 % (ref 0–6)
ERYTHROCYTE [DISTWIDTH] IN BLOOD BY AUTOMATED COUNT: 17.6 % (ref 11.6–15.1)
ERYTHROCYTE [DISTWIDTH] IN BLOOD BY AUTOMATED COUNT: 18 % (ref 11.6–15.1)
GFR SERPL CREATININE-BSD FRML MDRD: 37 ML/MIN/1.73SQ M
GFR SERPL CREATININE-BSD FRML MDRD: 52 ML/MIN/1.73SQ M
GLUCOSE SERPL-MCNC: 111 MG/DL (ref 65–140)
GLUCOSE SERPL-MCNC: 114 MG/DL (ref 65–140)
GLUCOSE SERPL-MCNC: 119 MG/DL (ref 65–140)
GLUCOSE SERPL-MCNC: 121 MG/DL (ref 65–140)
GLUCOSE SERPL-MCNC: 129 MG/DL (ref 65–140)
GLUCOSE SERPL-MCNC: 129 MG/DL (ref 65–140)
GLUCOSE UR STRIP-MCNC: NEGATIVE MG/DL
HCO3 BLDV-SCNC: 19.5 MMOL/L (ref 24–30)
HCT VFR BLD AUTO: 35.1 % (ref 36.5–49.3)
HCT VFR BLD AUTO: 42.1 % (ref 36.5–49.3)
HGB BLD-MCNC: 10.8 G/DL (ref 12–17)
HGB BLD-MCNC: 12.7 G/DL (ref 12–17)
HGB UR QL STRIP.AUTO: ABNORMAL
IMM GRANULOCYTES # BLD AUTO: 0.16 THOUSAND/UL (ref 0–0.2)
IMM GRANULOCYTES # BLD AUTO: 0.21 THOUSAND/UL (ref 0–0.2)
IMM GRANULOCYTES NFR BLD AUTO: 2 % (ref 0–2)
IMM GRANULOCYTES NFR BLD AUTO: 2 % (ref 0–2)
INR PPP: 1.14 (ref 0.84–1.19)
KETONES UR STRIP-MCNC: NEGATIVE MG/DL
LACTATE SERPL-SCNC: 1 MMOL/L (ref 0.5–2)
LACTATE SERPL-SCNC: 2.2 MMOL/L (ref 0.5–2)
LEUKOCYTE ESTERASE UR QL STRIP: ABNORMAL
LYMPHOCYTES # BLD AUTO: 1.05 THOUSANDS/ΜL (ref 0.6–4.47)
LYMPHOCYTES # BLD AUTO: 1.36 THOUSANDS/ΜL (ref 0.6–4.47)
LYMPHOCYTES NFR BLD AUTO: 10 % (ref 14–44)
LYMPHOCYTES NFR BLD AUTO: 10 % (ref 14–44)
MCH RBC QN AUTO: 26.4 PG (ref 26.8–34.3)
MCH RBC QN AUTO: 27.1 PG (ref 26.8–34.3)
MCHC RBC AUTO-ENTMCNC: 30.2 G/DL (ref 31.4–37.4)
MCHC RBC AUTO-ENTMCNC: 30.8 G/DL (ref 31.4–37.4)
MCV RBC AUTO: 88 FL (ref 82–98)
MCV RBC AUTO: 88 FL (ref 82–98)
MONOCYTES # BLD AUTO: 0.78 THOUSAND/ΜL (ref 0.17–1.22)
MONOCYTES # BLD AUTO: 0.95 THOUSAND/ΜL (ref 0.17–1.22)
MONOCYTES NFR BLD AUTO: 7 % (ref 4–12)
MONOCYTES NFR BLD AUTO: 7 % (ref 4–12)
NEUTROPHILS # BLD AUTO: 10.84 THOUSANDS/ΜL (ref 1.85–7.62)
NEUTROPHILS # BLD AUTO: 8.63 THOUSANDS/ΜL (ref 1.85–7.62)
NEUTS SEG NFR BLD AUTO: 78 % (ref 43–75)
NEUTS SEG NFR BLD AUTO: 78 % (ref 43–75)
NITRITE UR QL STRIP: NEGATIVE
NON-SQ EPI CELLS URNS QL MICRO: ABNORMAL /HPF
NRBC BLD AUTO-RTO: 0 /100 WBCS
NRBC BLD AUTO-RTO: 0 /100 WBCS
NT-PROBNP SERPL-MCNC: 375 PG/ML
O2 CT BLDV-SCNC: 13.9 ML/DL
PCO2 BLDV: 35.5 MM HG (ref 42–50)
PH BLDV: 7.36 [PH] (ref 7.3–7.4)
PH UR STRIP.AUTO: 6 [PH]
PLATELET # BLD AUTO: 251 THOUSANDS/UL (ref 149–390)
PLATELET # BLD AUTO: 367 THOUSANDS/UL (ref 149–390)
PMV BLD AUTO: 10.3 FL (ref 8.9–12.7)
PMV BLD AUTO: 10.4 FL (ref 8.9–12.7)
PO2 BLDV: 49.5 MM HG (ref 35–45)
POTASSIUM SERPL-SCNC: 4.6 MMOL/L (ref 3.5–5.3)
POTASSIUM SERPL-SCNC: 4.9 MMOL/L (ref 3.5–5.3)
PROCALCITONIN SERPL-MCNC: 0.08 NG/ML
PROT SERPL-MCNC: 7.1 G/DL (ref 6.4–8.2)
PROT UR STRIP-MCNC: NEGATIVE MG/DL
PROTHROMBIN TIME: 14.7 SECONDS (ref 11.6–14.5)
QRS AXIS: 51 DEGREES
QRSD INTERVAL: 82 MS
QT INTERVAL: 354 MS
QTC INTERVAL: 403 MS
RBC # BLD AUTO: 3.98 MILLION/UL (ref 3.88–5.62)
RBC # BLD AUTO: 4.81 MILLION/UL (ref 3.88–5.62)
RBC #/AREA URNS AUTO: ABNORMAL /HPF
SARS-COV-2 RNA RESP QL NAA+PROBE: NEGATIVE
SODIUM SERPL-SCNC: 145 MMOL/L (ref 136–145)
SODIUM SERPL-SCNC: 146 MMOL/L (ref 136–145)
SP GR UR STRIP.AUTO: 1.01 (ref 1–1.03)
T WAVE AXIS: 162 DEGREES
TROPONIN I SERPL-MCNC: <0.02 NG/ML
UROBILINOGEN UR QL STRIP.AUTO: 0.2 E.U./DL
VENTRICULAR RATE: 78 BPM
WBC # BLD AUTO: 10.91 THOUSAND/UL (ref 4.31–10.16)
WBC # BLD AUTO: 13.67 THOUSAND/UL (ref 4.31–10.16)
WBC #/AREA URNS AUTO: ABNORMAL /HPF

## 2020-11-05 PROCEDURE — 93010 ELECTROCARDIOGRAM REPORT: CPT | Performed by: INTERNAL MEDICINE

## 2020-11-05 PROCEDURE — 93005 ELECTROCARDIOGRAM TRACING: CPT

## 2020-11-05 PROCEDURE — 85025 COMPLETE CBC W/AUTO DIFF WBC: CPT | Performed by: PHYSICIAN ASSISTANT

## 2020-11-05 PROCEDURE — 96366 THER/PROPH/DIAG IV INF ADDON: CPT

## 2020-11-05 PROCEDURE — 80053 COMPREHEN METABOLIC PANEL: CPT | Performed by: PHYSICIAN ASSISTANT

## 2020-11-05 PROCEDURE — 87186 SC STD MICRODIL/AGAR DIL: CPT | Performed by: PHYSICIAN ASSISTANT

## 2020-11-05 PROCEDURE — 99291 CRITICAL CARE FIRST HOUR: CPT | Performed by: PHYSICIAN ASSISTANT

## 2020-11-05 PROCEDURE — 36415 COLL VENOUS BLD VENIPUNCTURE: CPT | Performed by: PHYSICIAN ASSISTANT

## 2020-11-05 PROCEDURE — 96361 HYDRATE IV INFUSION ADD-ON: CPT

## 2020-11-05 PROCEDURE — 83605 ASSAY OF LACTIC ACID: CPT | Performed by: PHYSICIAN ASSISTANT

## 2020-11-05 PROCEDURE — 84484 ASSAY OF TROPONIN QUANT: CPT | Performed by: PHYSICIAN ASSISTANT

## 2020-11-05 PROCEDURE — 84145 PROCALCITONIN (PCT): CPT | Performed by: PHYSICIAN ASSISTANT

## 2020-11-05 PROCEDURE — 81001 URINALYSIS AUTO W/SCOPE: CPT | Performed by: PHYSICIAN ASSISTANT

## 2020-11-05 PROCEDURE — 92610 EVALUATE SWALLOWING FUNCTION: CPT

## 2020-11-05 PROCEDURE — 99222 1ST HOSP IP/OBS MODERATE 55: CPT | Performed by: INTERNAL MEDICINE

## 2020-11-05 PROCEDURE — 82948 REAGENT STRIP/BLOOD GLUCOSE: CPT

## 2020-11-05 PROCEDURE — 87635 SARS-COV-2 COVID-19 AMP PRB: CPT | Performed by: PHYSICIAN ASSISTANT

## 2020-11-05 PROCEDURE — 96365 THER/PROPH/DIAG IV INF INIT: CPT

## 2020-11-05 PROCEDURE — 80048 BASIC METABOLIC PNL TOTAL CA: CPT | Performed by: PHYSICIAN ASSISTANT

## 2020-11-05 PROCEDURE — 87040 BLOOD CULTURE FOR BACTERIA: CPT | Performed by: PHYSICIAN ASSISTANT

## 2020-11-05 PROCEDURE — 87077 CULTURE AEROBIC IDENTIFY: CPT | Performed by: PHYSICIAN ASSISTANT

## 2020-11-05 PROCEDURE — 70450 CT HEAD/BRAIN W/O DYE: CPT

## 2020-11-05 PROCEDURE — 83880 ASSAY OF NATRIURETIC PEPTIDE: CPT | Performed by: PHYSICIAN ASSISTANT

## 2020-11-05 PROCEDURE — 99223 1ST HOSP IP/OBS HIGH 75: CPT | Performed by: INTERNAL MEDICINE

## 2020-11-05 PROCEDURE — NC001 PR NO CHARGE: Performed by: SURGERY

## 2020-11-05 PROCEDURE — 71045 X-RAY EXAM CHEST 1 VIEW: CPT

## 2020-11-05 PROCEDURE — G1004 CDSM NDSC: HCPCS

## 2020-11-05 PROCEDURE — 82805 BLOOD GASES W/O2 SATURATION: CPT | Performed by: PHYSICIAN ASSISTANT

## 2020-11-05 PROCEDURE — 87086 URINE CULTURE/COLONY COUNT: CPT | Performed by: PHYSICIAN ASSISTANT

## 2020-11-05 PROCEDURE — 96360 HYDRATION IV INFUSION INIT: CPT

## 2020-11-05 PROCEDURE — 0JB70ZZ EXCISION OF BACK SUBCUTANEOUS TISSUE AND FASCIA, OPEN APPROACH: ICD-10-PCS | Performed by: SURGERY

## 2020-11-05 PROCEDURE — 85730 THROMBOPLASTIN TIME PARTIAL: CPT | Performed by: PHYSICIAN ASSISTANT

## 2020-11-05 PROCEDURE — 85610 PROTHROMBIN TIME: CPT | Performed by: PHYSICIAN ASSISTANT

## 2020-11-05 RX ORDER — QUETIAPINE FUMARATE 25 MG/1
25 TABLET, FILM COATED ORAL
Status: DISCONTINUED | OUTPATIENT
Start: 2020-11-05 | End: 2020-11-12 | Stop reason: HOSPADM

## 2020-11-05 RX ORDER — DOCUSATE SODIUM 100 MG/1
100 CAPSULE, LIQUID FILLED ORAL 2 TIMES DAILY
Status: DISCONTINUED | OUTPATIENT
Start: 2020-11-05 | End: 2020-11-12 | Stop reason: HOSPADM

## 2020-11-05 RX ORDER — ESCITALOPRAM OXALATE 10 MG/1
5 TABLET ORAL DAILY
Status: DISCONTINUED | OUTPATIENT
Start: 2020-11-05 | End: 2020-11-12 | Stop reason: HOSPADM

## 2020-11-05 RX ORDER — TRAVOPROST OPHTHALMIC SOLUTION 0.04 MG/ML
1 SOLUTION OPHTHALMIC
Status: DISCONTINUED | OUTPATIENT
Start: 2020-11-05 | End: 2020-11-12 | Stop reason: HOSPADM

## 2020-11-05 RX ORDER — HEPARIN SODIUM 5000 [USP'U]/ML
5000 INJECTION, SOLUTION INTRAVENOUS; SUBCUTANEOUS EVERY 8 HOURS SCHEDULED
Status: DISCONTINUED | OUTPATIENT
Start: 2020-11-05 | End: 2020-11-11

## 2020-11-05 RX ORDER — PANTOPRAZOLE SODIUM 40 MG/1
40 TABLET, DELAYED RELEASE ORAL
Status: DISCONTINUED | OUTPATIENT
Start: 2020-11-05 | End: 2020-11-12 | Stop reason: HOSPADM

## 2020-11-05 RX ORDER — SODIUM CHLORIDE 9 MG/ML
100 INJECTION, SOLUTION INTRAVENOUS CONTINUOUS
Status: DISCONTINUED | OUTPATIENT
Start: 2020-11-05 | End: 2020-11-05

## 2020-11-05 RX ORDER — VANCOMYCIN HYDROCHLORIDE 500 MG/100ML
500 INJECTION, SOLUTION INTRAVENOUS ONCE
Status: COMPLETED | OUTPATIENT
Start: 2020-11-05 | End: 2020-11-05

## 2020-11-05 RX ORDER — SODIUM CHLORIDE, SODIUM GLUCONATE, SODIUM ACETATE, POTASSIUM CHLORIDE, MAGNESIUM CHLORIDE, SODIUM PHOSPHATE, DIBASIC, AND POTASSIUM PHOSPHATE .53; .5; .37; .037; .03; .012; .00082 G/100ML; G/100ML; G/100ML; G/100ML; G/100ML; G/100ML; G/100ML
100 INJECTION, SOLUTION INTRAVENOUS CONTINUOUS
Status: DISCONTINUED | OUTPATIENT
Start: 2020-11-05 | End: 2020-11-06

## 2020-11-05 RX ORDER — TRAZODONE HYDROCHLORIDE 50 MG/1
50 TABLET ORAL
Status: DISCONTINUED | OUTPATIENT
Start: 2020-11-05 | End: 2020-11-09

## 2020-11-05 RX ORDER — DOXYCYCLINE 40 MG/1
100 CAPSULE ORAL EVERY MORNING
COMMUNITY
End: 2020-11-12 | Stop reason: HOSPADM

## 2020-11-05 RX ORDER — LANOLIN ALCOHOL/MO/W.PET/CERES
6 CREAM (GRAM) TOPICAL
Status: DISCONTINUED | OUTPATIENT
Start: 2020-11-05 | End: 2020-11-09

## 2020-11-05 RX ORDER — SENNOSIDES 8.6 MG
1 TABLET ORAL DAILY
Status: DISCONTINUED | OUTPATIENT
Start: 2020-11-05 | End: 2020-11-12 | Stop reason: HOSPADM

## 2020-11-05 RX ORDER — ALLOPURINOL 100 MG/1
300 TABLET ORAL DAILY
Status: DISCONTINUED | OUTPATIENT
Start: 2020-11-05 | End: 2020-11-12 | Stop reason: HOSPADM

## 2020-11-05 RX ORDER — LORAZEPAM 0.5 MG/1
0.5 TABLET ORAL DAILY
Status: DISCONTINUED | OUTPATIENT
Start: 2020-11-05 | End: 2020-11-09

## 2020-11-05 RX ORDER — BISACODYL 10 MG
10 SUPPOSITORY, RECTAL RECTAL DAILY
Status: DISCONTINUED | OUTPATIENT
Start: 2020-11-05 | End: 2020-11-12

## 2020-11-05 RX ADMIN — SODIUM CHLORIDE, SODIUM GLUCONATE, SODIUM ACETATE, POTASSIUM CHLORIDE, MAGNESIUM CHLORIDE, SODIUM PHOSPHATE, DIBASIC, AND POTASSIUM PHOSPHATE 125 ML/HR: .53; .5; .37; .037; .03; .012; .00082 INJECTION, SOLUTION INTRAVENOUS at 06:09

## 2020-11-05 RX ADMIN — VANCOMYCIN HYDROCHLORIDE 500 MG: 500 INJECTION, SOLUTION INTRAVENOUS at 07:00

## 2020-11-05 RX ADMIN — METRONIDAZOLE 500 MG: 500 INJECTION, SOLUTION INTRAVENOUS at 09:34

## 2020-11-05 RX ADMIN — HEPARIN SODIUM 5000 UNITS: 5000 INJECTION INTRAVENOUS; SUBCUTANEOUS at 21:22

## 2020-11-05 RX ADMIN — SODIUM CHLORIDE 1000 ML: 0.9 INJECTION, SOLUTION INTRAVENOUS at 01:13

## 2020-11-05 RX ADMIN — HEPARIN SODIUM 5000 UNITS: 5000 INJECTION INTRAVENOUS; SUBCUTANEOUS at 06:17

## 2020-11-05 RX ADMIN — VANCOMYCIN HYDROCHLORIDE 1500 MG: 1 INJECTION, POWDER, LYOPHILIZED, FOR SOLUTION INTRAVENOUS at 02:11

## 2020-11-05 RX ADMIN — CEFEPIME HYDROCHLORIDE 2000 MG: 2 INJECTION, POWDER, FOR SOLUTION INTRAVENOUS at 22:35

## 2020-11-05 RX ADMIN — PIPERACILLIN SODIUM AND TAZOBACTAM SODIUM 3.38 G: 36; 4.5 INJECTION, POWDER, FOR SOLUTION INTRAVENOUS at 01:12

## 2020-11-05 RX ADMIN — CEFEPIME HYDROCHLORIDE 2000 MG: 2 INJECTION, POWDER, FOR SOLUTION INTRAVENOUS at 08:41

## 2020-11-05 RX ADMIN — SODIUM CHLORIDE 1000 ML: 0.9 INJECTION, SOLUTION INTRAVENOUS at 01:14

## 2020-11-05 RX ADMIN — HEPARIN SODIUM 5000 UNITS: 5000 INJECTION INTRAVENOUS; SUBCUTANEOUS at 13:22

## 2020-11-05 RX ADMIN — SODIUM CHLORIDE 100 ML/HR: 0.9 INJECTION, SOLUTION INTRAVENOUS at 03:46

## 2020-11-05 RX ADMIN — SODIUM CHLORIDE 1000 ML: 0.9 INJECTION, SOLUTION INTRAVENOUS at 00:33

## 2020-11-05 RX ADMIN — METRONIDAZOLE 500 MG: 500 INJECTION, SOLUTION INTRAVENOUS at 17:11

## 2020-11-05 RX ADMIN — SODIUM CHLORIDE, SODIUM GLUCONATE, SODIUM ACETATE, POTASSIUM CHLORIDE, MAGNESIUM CHLORIDE, SODIUM PHOSPHATE, DIBASIC, AND POTASSIUM PHOSPHATE 125 ML/HR: .53; .5; .37; .037; .03; .012; .00082 INJECTION, SOLUTION INTRAVENOUS at 16:00

## 2020-11-06 ENCOUNTER — APPOINTMENT (INPATIENT)
Dept: RADIOLOGY | Facility: HOSPITAL | Age: 70
DRG: 853 | End: 2020-11-06
Payer: COMMERCIAL

## 2020-11-06 ENCOUNTER — APPOINTMENT (INPATIENT)
Dept: MRI IMAGING | Facility: HOSPITAL | Age: 70
DRG: 853 | End: 2020-11-06
Payer: COMMERCIAL

## 2020-11-06 PROBLEM — N17.9 AKI (ACUTE KIDNEY INJURY) (HCC): Status: RESOLVED | Noted: 2020-11-05 | Resolved: 2020-11-06

## 2020-11-06 PROBLEM — E44.0 MODERATE PROTEIN-CALORIE MALNUTRITION (HCC): Status: ACTIVE | Noted: 2020-11-05

## 2020-11-06 LAB
ANION GAP SERPL CALCULATED.3IONS-SCNC: 12 MMOL/L (ref 4–13)
BUN SERPL-MCNC: 29 MG/DL (ref 5–25)
CALCIUM SERPL-MCNC: 8.9 MG/DL (ref 8.3–10.1)
CHLORIDE SERPL-SCNC: 111 MMOL/L (ref 100–108)
CO2 SERPL-SCNC: 20 MMOL/L (ref 21–32)
CREAT SERPL-MCNC: 1.05 MG/DL (ref 0.6–1.3)
ERYTHROCYTE [DISTWIDTH] IN BLOOD BY AUTOMATED COUNT: 17.7 % (ref 11.6–15.1)
GFR SERPL CREATININE-BSD FRML MDRD: 71 ML/MIN/1.73SQ M
GLUCOSE SERPL-MCNC: 103 MG/DL (ref 65–140)
GLUCOSE SERPL-MCNC: 105 MG/DL (ref 65–140)
GLUCOSE SERPL-MCNC: 107 MG/DL (ref 65–140)
GLUCOSE SERPL-MCNC: 92 MG/DL (ref 65–140)
GLUCOSE SERPL-MCNC: 95 MG/DL (ref 65–140)
HCT VFR BLD AUTO: 41.5 % (ref 36.5–49.3)
HGB BLD-MCNC: 12.9 G/DL (ref 12–17)
MCH RBC QN AUTO: 26.9 PG (ref 26.8–34.3)
MCHC RBC AUTO-ENTMCNC: 31.1 G/DL (ref 31.4–37.4)
MCV RBC AUTO: 87 FL (ref 82–98)
PLATELET # BLD AUTO: 281 THOUSANDS/UL (ref 149–390)
PMV BLD AUTO: 10.2 FL (ref 8.9–12.7)
POTASSIUM SERPL-SCNC: 4.5 MMOL/L (ref 3.5–5.3)
PROCALCITONIN SERPL-MCNC: 0.07 NG/ML
RBC # BLD AUTO: 4.8 MILLION/UL (ref 3.88–5.62)
SODIUM SERPL-SCNC: 143 MMOL/L (ref 136–145)
WBC # BLD AUTO: 12.9 THOUSAND/UL (ref 4.31–10.16)

## 2020-11-06 PROCEDURE — 99233 SBSQ HOSP IP/OBS HIGH 50: CPT | Performed by: INTERNAL MEDICINE

## 2020-11-06 PROCEDURE — 80048 BASIC METABOLIC PNL TOTAL CA: CPT | Performed by: INTERNAL MEDICINE

## 2020-11-06 PROCEDURE — 70551 MRI BRAIN STEM W/O DYE: CPT

## 2020-11-06 PROCEDURE — 82948 REAGENT STRIP/BLOOD GLUCOSE: CPT

## 2020-11-06 PROCEDURE — 99232 SBSQ HOSP IP/OBS MODERATE 35: CPT | Performed by: INTERNAL MEDICINE

## 2020-11-06 PROCEDURE — 84145 PROCALCITONIN (PCT): CPT | Performed by: INTERNAL MEDICINE

## 2020-11-06 PROCEDURE — G1004 CDSM NDSC: HCPCS

## 2020-11-06 PROCEDURE — 85027 COMPLETE CBC AUTOMATED: CPT | Performed by: INTERNAL MEDICINE

## 2020-11-06 RX ORDER — CHLORHEXIDINE GLUCONATE 0.12 MG/ML
15 RINSE ORAL EVERY 12 HOURS SCHEDULED
Status: DISCONTINUED | OUTPATIENT
Start: 2020-11-06 | End: 2020-11-12 | Stop reason: HOSPADM

## 2020-11-06 RX ORDER — ASPIRIN 81 MG/1
81 TABLET, CHEWABLE ORAL DAILY
Status: DISCONTINUED | OUTPATIENT
Start: 2020-11-07 | End: 2020-11-11

## 2020-11-06 RX ORDER — ASPIRIN 325 MG
325 TABLET ORAL ONCE
Status: COMPLETED | OUTPATIENT
Start: 2020-11-06 | End: 2020-11-06

## 2020-11-06 RX ORDER — ATORVASTATIN CALCIUM 40 MG/1
40 TABLET, FILM COATED ORAL
Status: DISCONTINUED | OUTPATIENT
Start: 2020-11-07 | End: 2020-11-12 | Stop reason: HOSPADM

## 2020-11-06 RX ORDER — ALBUMIN, HUMAN INJ 5% 5 %
12.5 SOLUTION INTRAVENOUS ONCE
Status: COMPLETED | OUTPATIENT
Start: 2020-11-06 | End: 2020-11-07

## 2020-11-06 RX ADMIN — QUETIAPINE FUMARATE 25 MG: 25 TABLET ORAL at 21:01

## 2020-11-06 RX ADMIN — CEFEPIME HYDROCHLORIDE 2000 MG: 2 INJECTION, POWDER, FOR SOLUTION INTRAVENOUS at 11:20

## 2020-11-06 RX ADMIN — METRONIDAZOLE 500 MG: 500 INJECTION, SOLUTION INTRAVENOUS at 00:00

## 2020-11-06 RX ADMIN — MELATONIN 6 MG: at 21:01

## 2020-11-06 RX ADMIN — SODIUM CHLORIDE, SODIUM GLUCONATE, SODIUM ACETATE, POTASSIUM CHLORIDE, MAGNESIUM CHLORIDE, SODIUM PHOSPHATE, DIBASIC, AND POTASSIUM PHOSPHATE 75 ML/HR: .53; .5; .37; .037; .03; .012; .00082 INJECTION, SOLUTION INTRAVENOUS at 18:11

## 2020-11-06 RX ADMIN — TRAZODONE HYDROCHLORIDE 50 MG: 50 TABLET ORAL at 21:01

## 2020-11-06 RX ADMIN — METRONIDAZOLE 500 MG: 500 INJECTION, SOLUTION INTRAVENOUS at 18:08

## 2020-11-06 RX ADMIN — HEPARIN SODIUM 5000 UNITS: 5000 INJECTION INTRAVENOUS; SUBCUTANEOUS at 14:13

## 2020-11-06 RX ADMIN — TRAVOPROST 1 DROP: 0.04 SOLUTION OPHTHALMIC at 21:02

## 2020-11-06 RX ADMIN — CEFEPIME HYDROCHLORIDE 2000 MG: 2 INJECTION, POWDER, FOR SOLUTION INTRAVENOUS at 20:57

## 2020-11-06 RX ADMIN — ALBUMIN (HUMAN) 12.5 G: 12.5 INJECTION, SOLUTION INTRAVENOUS at 20:57

## 2020-11-06 RX ADMIN — HEPARIN SODIUM 5000 UNITS: 5000 INJECTION INTRAVENOUS; SUBCUTANEOUS at 05:19

## 2020-11-06 RX ADMIN — HEPARIN SODIUM 5000 UNITS: 5000 INJECTION INTRAVENOUS; SUBCUTANEOUS at 21:01

## 2020-11-06 RX ADMIN — METRONIDAZOLE 500 MG: 500 INJECTION, SOLUTION INTRAVENOUS at 09:46

## 2020-11-06 RX ADMIN — ASPIRIN 325 MG ORAL TABLET 325 MG: 325 PILL ORAL at 20:58

## 2020-11-06 RX ADMIN — SODIUM CHLORIDE, SODIUM GLUCONATE, SODIUM ACETATE, POTASSIUM CHLORIDE, MAGNESIUM CHLORIDE, SODIUM PHOSPHATE, DIBASIC, AND POTASSIUM PHOSPHATE 125 ML/HR: .53; .5; .37; .037; .03; .012; .00082 INJECTION, SOLUTION INTRAVENOUS at 03:02

## 2020-11-07 ENCOUNTER — APPOINTMENT (INPATIENT)
Dept: MRI IMAGING | Facility: HOSPITAL | Age: 70
DRG: 853 | End: 2020-11-07
Payer: COMMERCIAL

## 2020-11-07 PROBLEM — I63.40 CEREBROVASCULAR ACCIDENT (CVA) DUE TO EMBOLISM OF CEREBRAL ARTERY (HCC): Status: ACTIVE | Noted: 2020-11-07

## 2020-11-07 LAB
ANION GAP SERPL CALCULATED.3IONS-SCNC: 11 MMOL/L (ref 4–13)
BACTERIA UR CULT: ABNORMAL
BUN SERPL-MCNC: 29 MG/DL (ref 5–25)
CALCIUM SERPL-MCNC: 8.4 MG/DL (ref 8.3–10.1)
CHLORIDE SERPL-SCNC: 111 MMOL/L (ref 100–108)
CO2 SERPL-SCNC: 22 MMOL/L (ref 21–32)
CORTIS SERPL-MCNC: 27.6 UG/DL
CREAT SERPL-MCNC: 1.24 MG/DL (ref 0.6–1.3)
ERYTHROCYTE [DISTWIDTH] IN BLOOD BY AUTOMATED COUNT: 17.5 % (ref 11.6–15.1)
GFR SERPL CREATININE-BSD FRML MDRD: 58 ML/MIN/1.73SQ M
GLUCOSE SERPL-MCNC: 107 MG/DL (ref 65–140)
GLUCOSE SERPL-MCNC: 108 MG/DL (ref 65–140)
GLUCOSE SERPL-MCNC: 147 MG/DL (ref 65–140)
GLUCOSE SERPL-MCNC: 178 MG/DL (ref 65–140)
HCT VFR BLD AUTO: 37.6 % (ref 36.5–49.3)
HGB BLD-MCNC: 12 G/DL (ref 12–17)
LACTATE SERPL-SCNC: 1.3 MMOL/L (ref 0.5–2)
MCH RBC QN AUTO: 27.6 PG (ref 26.8–34.3)
MCHC RBC AUTO-ENTMCNC: 31.9 G/DL (ref 31.4–37.4)
MCV RBC AUTO: 87 FL (ref 82–98)
PLATELET # BLD AUTO: 369 THOUSANDS/UL (ref 149–390)
PMV BLD AUTO: 9.8 FL (ref 8.9–12.7)
POTASSIUM SERPL-SCNC: 3.6 MMOL/L (ref 3.5–5.3)
RBC # BLD AUTO: 4.34 MILLION/UL (ref 3.88–5.62)
SODIUM SERPL-SCNC: 144 MMOL/L (ref 136–145)
TROPONIN I SERPL-MCNC: 0.02 NG/ML
TROPONIN I SERPL-MCNC: 0.02 NG/ML
TSH SERPL DL<=0.05 MIU/L-ACNC: 0.7 UIU/ML (ref 0.36–3.74)
WBC # BLD AUTO: 21.78 THOUSAND/UL (ref 4.31–10.16)

## 2020-11-07 PROCEDURE — 84484 ASSAY OF TROPONIN QUANT: CPT | Performed by: PHYSICIAN ASSISTANT

## 2020-11-07 PROCEDURE — 82948 REAGENT STRIP/BLOOD GLUCOSE: CPT

## 2020-11-07 PROCEDURE — 4A133B1 MONITORING OF ARTERIAL PRESSURE, PERIPHERAL, PERCUTANEOUS APPROACH: ICD-10-PCS | Performed by: INTERNAL MEDICINE

## 2020-11-07 PROCEDURE — 99232 SBSQ HOSP IP/OBS MODERATE 35: CPT | Performed by: SURGERY

## 2020-11-07 PROCEDURE — 76377 3D RENDER W/INTRP POSTPROCES: CPT

## 2020-11-07 PROCEDURE — 87081 CULTURE SCREEN ONLY: CPT | Performed by: PHYSICIAN ASSISTANT

## 2020-11-07 PROCEDURE — 80048 BASIC METABOLIC PNL TOTAL CA: CPT | Performed by: PHYSICIAN ASSISTANT

## 2020-11-07 PROCEDURE — 36620 INSERTION CATHETER ARTERY: CPT | Performed by: PHYSICIAN ASSISTANT

## 2020-11-07 PROCEDURE — 03HY32Z INSERTION OF MONITORING DEVICE INTO UPPER ARTERY, PERCUTANEOUS APPROACH: ICD-10-PCS | Performed by: INTERNAL MEDICINE

## 2020-11-07 PROCEDURE — 87040 BLOOD CULTURE FOR BACTERIA: CPT | Performed by: PHYSICIAN ASSISTANT

## 2020-11-07 PROCEDURE — NC001 PR NO CHARGE: Performed by: PHYSICIAN ASSISTANT

## 2020-11-07 PROCEDURE — 82533 TOTAL CORTISOL: CPT | Performed by: PHYSICIAN ASSISTANT

## 2020-11-07 PROCEDURE — 70544 MR ANGIOGRAPHY HEAD W/O DYE: CPT

## 2020-11-07 PROCEDURE — 84443 ASSAY THYROID STIM HORMONE: CPT | Performed by: PHYSICIAN ASSISTANT

## 2020-11-07 PROCEDURE — 93005 ELECTROCARDIOGRAM TRACING: CPT

## 2020-11-07 PROCEDURE — 99223 1ST HOSP IP/OBS HIGH 75: CPT | Performed by: PHYSICIAN ASSISTANT

## 2020-11-07 PROCEDURE — 85027 COMPLETE CBC AUTOMATED: CPT | Performed by: PHYSICIAN ASSISTANT

## 2020-11-07 PROCEDURE — G1004 CDSM NDSC: HCPCS

## 2020-11-07 PROCEDURE — 4A133J1 MONITORING OF ARTERIAL PULSE, PERIPHERAL, PERCUTANEOUS APPROACH: ICD-10-PCS | Performed by: INTERNAL MEDICINE

## 2020-11-07 PROCEDURE — 83605 ASSAY OF LACTIC ACID: CPT | Performed by: PHYSICIAN ASSISTANT

## 2020-11-07 RX ORDER — SODIUM CHLORIDE, SODIUM GLUCONATE, SODIUM ACETATE, POTASSIUM CHLORIDE, MAGNESIUM CHLORIDE, SODIUM PHOSPHATE, DIBASIC, AND POTASSIUM PHOSPHATE .53; .5; .37; .037; .03; .012; .00082 G/100ML; G/100ML; G/100ML; G/100ML; G/100ML; G/100ML; G/100ML
500 INJECTION, SOLUTION INTRAVENOUS ONCE
Status: COMPLETED | OUTPATIENT
Start: 2020-11-07 | End: 2020-11-07

## 2020-11-07 RX ORDER — MIDODRINE HYDROCHLORIDE 5 MG/1
5 TABLET ORAL
Status: DISCONTINUED | OUTPATIENT
Start: 2020-11-07 | End: 2020-11-08

## 2020-11-07 RX ORDER — POTASSIUM CHLORIDE 14.9 MG/ML
20 INJECTION INTRAVENOUS ONCE
Status: COMPLETED | OUTPATIENT
Start: 2020-11-07 | End: 2020-11-07

## 2020-11-07 RX ADMIN — LORAZEPAM 0.5 MG: 0.5 TABLET ORAL at 10:14

## 2020-11-07 RX ADMIN — METRONIDAZOLE 500 MG: 500 INJECTION, SOLUTION INTRAVENOUS at 16:36

## 2020-11-07 RX ADMIN — METRONIDAZOLE 500 MG: 500 INJECTION, SOLUTION INTRAVENOUS at 01:00

## 2020-11-07 RX ADMIN — HEPARIN SODIUM 5000 UNITS: 5000 INJECTION INTRAVENOUS; SUBCUTANEOUS at 06:02

## 2020-11-07 RX ADMIN — CEFEPIME HYDROCHLORIDE 2000 MG: 2 INJECTION, POWDER, FOR SOLUTION INTRAVENOUS at 21:12

## 2020-11-07 RX ADMIN — MELATONIN 6 MG: at 21:12

## 2020-11-07 RX ADMIN — HEPARIN SODIUM 5000 UNITS: 5000 INJECTION INTRAVENOUS; SUBCUTANEOUS at 21:12

## 2020-11-07 RX ADMIN — TRAVOPROST 1 DROP: 0.04 SOLUTION OPHTHALMIC at 22:53

## 2020-11-07 RX ADMIN — CEFEPIME HYDROCHLORIDE 2000 MG: 2 INJECTION, POWDER, FOR SOLUTION INTRAVENOUS at 10:44

## 2020-11-07 RX ADMIN — PHENYLEPHRINE HYDROCHLORIDE 25 MCG/MIN: 10 INJECTION INTRAVENOUS at 00:07

## 2020-11-07 RX ADMIN — NOREPINEPHRINE BITARTRATE 2 MCG/MIN: 1 INJECTION INTRAVENOUS at 03:35

## 2020-11-07 RX ADMIN — SENNOSIDES 8.6 MG: 8.6 TABLET, FILM COATED ORAL at 10:14

## 2020-11-07 RX ADMIN — ALLOPURINOL 300 MG: 100 TABLET ORAL at 10:14

## 2020-11-07 RX ADMIN — TRAZODONE HYDROCHLORIDE 50 MG: 50 TABLET ORAL at 21:12

## 2020-11-07 RX ADMIN — CHLORHEXIDINE GLUCONATE 0.12% ORAL RINSE 15 ML: 1.2 LIQUID ORAL at 21:12

## 2020-11-07 RX ADMIN — SODIUM CHLORIDE, SODIUM GLUCONATE, SODIUM ACETATE, POTASSIUM CHLORIDE, MAGNESIUM CHLORIDE, SODIUM PHOSPHATE, DIBASIC, AND POTASSIUM PHOSPHATE 500 ML: .53; .5; .37; .037; .03; .012; .00082 INJECTION, SOLUTION INTRAVENOUS at 06:12

## 2020-11-07 RX ADMIN — BISACODYL 10 MG: 10 SUPPOSITORY RECTAL at 10:15

## 2020-11-07 RX ADMIN — NOREPINEPHRINE BITARTRATE 6 MCG/MIN: 1 INJECTION INTRAVENOUS at 21:12

## 2020-11-07 RX ADMIN — ESCITALOPRAM OXALATE 5 MG: 10 TABLET ORAL at 10:14

## 2020-11-07 RX ADMIN — VANCOMYCIN HYDROCHLORIDE 1750 MG: 1 INJECTION, POWDER, LYOPHILIZED, FOR SOLUTION INTRAVENOUS at 14:12

## 2020-11-07 RX ADMIN — MIDODRINE HYDROCHLORIDE 5 MG: 5 TABLET ORAL at 16:35

## 2020-11-07 RX ADMIN — ATORVASTATIN CALCIUM 40 MG: 40 TABLET, FILM COATED ORAL at 16:35

## 2020-11-07 RX ADMIN — CHLORHEXIDINE GLUCONATE 0.12% ORAL RINSE 15 ML: 1.2 LIQUID ORAL at 10:14

## 2020-11-07 RX ADMIN — DOCUSATE SODIUM 100 MG: 100 CAPSULE ORAL at 10:14

## 2020-11-07 RX ADMIN — ASPIRIN 81 MG CHEWABLE TABLET 81 MG: 81 TABLET CHEWABLE at 10:14

## 2020-11-07 RX ADMIN — HEPARIN SODIUM 5000 UNITS: 5000 INJECTION INTRAVENOUS; SUBCUTANEOUS at 16:34

## 2020-11-07 RX ADMIN — POTASSIUM CHLORIDE 20 MEQ: 14.9 INJECTION, SOLUTION INTRAVENOUS at 07:38

## 2020-11-07 RX ADMIN — MIDODRINE HYDROCHLORIDE 5 MG: 5 TABLET ORAL at 10:14

## 2020-11-07 RX ADMIN — QUETIAPINE FUMARATE 25 MG: 25 TABLET ORAL at 21:12

## 2020-11-07 RX ADMIN — METRONIDAZOLE 500 MG: 500 INJECTION, SOLUTION INTRAVENOUS at 10:14

## 2020-11-07 RX ADMIN — MIDODRINE HYDROCHLORIDE 5 MG: 5 TABLET ORAL at 07:38

## 2020-11-07 RX ADMIN — COLLAGENASE SANTYL: 250 OINTMENT TOPICAL at 10:45

## 2020-11-08 ENCOUNTER — APPOINTMENT (INPATIENT)
Dept: CT IMAGING | Facility: HOSPITAL | Age: 70
DRG: 853 | End: 2020-11-08
Payer: COMMERCIAL

## 2020-11-08 ENCOUNTER — APPOINTMENT (INPATIENT)
Dept: ULTRASOUND IMAGING | Facility: HOSPITAL | Age: 70
DRG: 853 | End: 2020-11-08
Payer: COMMERCIAL

## 2020-11-08 ENCOUNTER — APPOINTMENT (INPATIENT)
Dept: NON INVASIVE DIAGNOSTICS | Facility: HOSPITAL | Age: 70
DRG: 853 | End: 2020-11-08
Payer: COMMERCIAL

## 2020-11-08 LAB
ALBUMIN SERPL BCP-MCNC: 2.2 G/DL (ref 3.5–5)
ALP SERPL-CCNC: 83 U/L (ref 46–116)
ALT SERPL W P-5'-P-CCNC: 13 U/L (ref 12–78)
ANION GAP SERPL CALCULATED.3IONS-SCNC: 13 MMOL/L (ref 4–13)
AST SERPL W P-5'-P-CCNC: 12 U/L (ref 5–45)
BASOPHILS # BLD AUTO: 0.07 THOUSANDS/ΜL (ref 0–0.1)
BASOPHILS NFR BLD AUTO: 0 % (ref 0–1)
BILIRUB SERPL-MCNC: 0.48 MG/DL (ref 0.2–1)
BUN SERPL-MCNC: 19 MG/DL (ref 5–25)
CALCIUM ALBUM COR SERPL-MCNC: 10 MG/DL (ref 8.3–10.1)
CALCIUM SERPL-MCNC: 8.6 MG/DL (ref 8.3–10.1)
CHLORIDE SERPL-SCNC: 111 MMOL/L (ref 100–108)
CO2 SERPL-SCNC: 20 MMOL/L (ref 21–32)
CREAT SERPL-MCNC: 1.07 MG/DL (ref 0.6–1.3)
EOSINOPHIL # BLD AUTO: 0.35 THOUSAND/ΜL (ref 0–0.61)
EOSINOPHIL NFR BLD AUTO: 2 % (ref 0–6)
ERYTHROCYTE [DISTWIDTH] IN BLOOD BY AUTOMATED COUNT: 17.6 % (ref 11.6–15.1)
GFR SERPL CREATININE-BSD FRML MDRD: 69 ML/MIN/1.73SQ M
GLUCOSE SERPL-MCNC: 109 MG/DL (ref 65–140)
GLUCOSE SERPL-MCNC: 113 MG/DL (ref 65–140)
GLUCOSE SERPL-MCNC: 174 MG/DL (ref 65–140)
HCT VFR BLD AUTO: 39.8 % (ref 36.5–49.3)
HGB BLD-MCNC: 12.4 G/DL (ref 12–17)
IMM GRANULOCYTES # BLD AUTO: 0.18 THOUSAND/UL (ref 0–0.2)
IMM GRANULOCYTES NFR BLD AUTO: 1 % (ref 0–2)
LYMPHOCYTES # BLD AUTO: 1.05 THOUSANDS/ΜL (ref 0.6–4.47)
LYMPHOCYTES NFR BLD AUTO: 5 % (ref 14–44)
MAGNESIUM SERPL-MCNC: 1.9 MG/DL (ref 1.6–2.6)
MCH RBC QN AUTO: 27.1 PG (ref 26.8–34.3)
MCHC RBC AUTO-ENTMCNC: 31.2 G/DL (ref 31.4–37.4)
MCV RBC AUTO: 87 FL (ref 82–98)
MONOCYTES # BLD AUTO: 0.92 THOUSAND/ΜL (ref 0.17–1.22)
MONOCYTES NFR BLD AUTO: 4 % (ref 4–12)
NEUTROPHILS # BLD AUTO: 20.45 THOUSANDS/ΜL (ref 1.85–7.62)
NEUTS SEG NFR BLD AUTO: 88 % (ref 43–75)
NRBC BLD AUTO-RTO: 0 /100 WBCS
PHOSPHATE SERPL-MCNC: 3.4 MG/DL (ref 2.3–4.1)
PLATELET # BLD AUTO: 335 THOUSANDS/UL (ref 149–390)
PMV BLD AUTO: 10.7 FL (ref 8.9–12.7)
POTASSIUM SERPL-SCNC: 4 MMOL/L (ref 3.5–5.3)
PROCALCITONIN SERPL-MCNC: 0.16 NG/ML
PROT SERPL-MCNC: 6.1 G/DL (ref 6.4–8.2)
RBC # BLD AUTO: 4.58 MILLION/UL (ref 3.88–5.62)
SODIUM SERPL-SCNC: 144 MMOL/L (ref 136–145)
WBC # BLD AUTO: 23.02 THOUSAND/UL (ref 4.31–10.16)

## 2020-11-08 PROCEDURE — 84145 PROCALCITONIN (PCT): CPT | Performed by: PHYSICIAN ASSISTANT

## 2020-11-08 PROCEDURE — 82948 REAGENT STRIP/BLOOD GLUCOSE: CPT

## 2020-11-08 PROCEDURE — 85025 COMPLETE CBC W/AUTO DIFF WBC: CPT | Performed by: PHYSICIAN ASSISTANT

## 2020-11-08 PROCEDURE — 93306 TTE W/DOPPLER COMPLETE: CPT | Performed by: INTERNAL MEDICINE

## 2020-11-08 PROCEDURE — 92526 ORAL FUNCTION THERAPY: CPT

## 2020-11-08 PROCEDURE — 70450 CT HEAD/BRAIN W/O DYE: CPT

## 2020-11-08 PROCEDURE — 80053 COMPREHEN METABOLIC PANEL: CPT | Performed by: PHYSICIAN ASSISTANT

## 2020-11-08 PROCEDURE — 93306 TTE W/DOPPLER COMPLETE: CPT

## 2020-11-08 PROCEDURE — 83735 ASSAY OF MAGNESIUM: CPT | Performed by: PHYSICIAN ASSISTANT

## 2020-11-08 PROCEDURE — 99233 SBSQ HOSP IP/OBS HIGH 50: CPT | Performed by: INTERNAL MEDICINE

## 2020-11-08 PROCEDURE — 84100 ASSAY OF PHOSPHORUS: CPT | Performed by: PHYSICIAN ASSISTANT

## 2020-11-08 PROCEDURE — G1004 CDSM NDSC: HCPCS

## 2020-11-08 RX ORDER — MIDODRINE HYDROCHLORIDE 5 MG/1
10 TABLET ORAL
Status: DISCONTINUED | OUTPATIENT
Start: 2020-11-08 | End: 2020-11-12 | Stop reason: HOSPADM

## 2020-11-08 RX ORDER — NYSTATIN 100000 U/G
CREAM TOPICAL 2 TIMES DAILY
Status: DISCONTINUED | OUTPATIENT
Start: 2020-11-08 | End: 2020-11-12 | Stop reason: HOSPADM

## 2020-11-08 RX ORDER — LORAZEPAM 2 MG/ML
0.5 INJECTION INTRAMUSCULAR ONCE
Status: COMPLETED | OUTPATIENT
Start: 2020-11-08 | End: 2020-11-08

## 2020-11-08 RX ORDER — ACETAMINOPHEN 325 MG/1
650 TABLET ORAL 4 TIMES DAILY PRN
Status: DISCONTINUED | OUTPATIENT
Start: 2020-11-08 | End: 2020-11-12 | Stop reason: HOSPADM

## 2020-11-08 RX ADMIN — QUETIAPINE FUMARATE 25 MG: 25 TABLET ORAL at 21:54

## 2020-11-08 RX ADMIN — VANCOMYCIN HYDROCHLORIDE 1500 MG: 1 INJECTION, POWDER, LYOPHILIZED, FOR SOLUTION INTRAVENOUS at 15:22

## 2020-11-08 RX ADMIN — ASPIRIN 81 MG CHEWABLE TABLET 81 MG: 81 TABLET CHEWABLE at 10:12

## 2020-11-08 RX ADMIN — ATORVASTATIN CALCIUM 40 MG: 40 TABLET, FILM COATED ORAL at 15:21

## 2020-11-08 RX ADMIN — CEFEPIME HYDROCHLORIDE 2000 MG: 2 INJECTION, POWDER, FOR SOLUTION INTRAVENOUS at 10:15

## 2020-11-08 RX ADMIN — CEFEPIME HYDROCHLORIDE 2000 MG: 2 INJECTION, POWDER, FOR SOLUTION INTRAVENOUS at 22:01

## 2020-11-08 RX ADMIN — SENNOSIDES 8.6 MG: 8.6 TABLET, FILM COATED ORAL at 10:13

## 2020-11-08 RX ADMIN — ESCITALOPRAM OXALATE 5 MG: 10 TABLET ORAL at 10:14

## 2020-11-08 RX ADMIN — MELATONIN 6 MG: at 21:54

## 2020-11-08 RX ADMIN — HEPARIN SODIUM 5000 UNITS: 5000 INJECTION INTRAVENOUS; SUBCUTANEOUS at 21:54

## 2020-11-08 RX ADMIN — COLLAGENASE SANTYL: 250 OINTMENT TOPICAL at 11:03

## 2020-11-08 RX ADMIN — METRONIDAZOLE 500 MG: 500 INJECTION, SOLUTION INTRAVENOUS at 17:26

## 2020-11-08 RX ADMIN — METRONIDAZOLE 500 MG: 500 INJECTION, SOLUTION INTRAVENOUS at 10:59

## 2020-11-08 RX ADMIN — METRONIDAZOLE 500 MG: 500 INJECTION, SOLUTION INTRAVENOUS at 00:00

## 2020-11-08 RX ADMIN — TRAVOPROST 1 DROP: 0.04 SOLUTION OPHTHALMIC at 21:55

## 2020-11-08 RX ADMIN — ALLOPURINOL 300 MG: 100 TABLET ORAL at 10:12

## 2020-11-08 RX ADMIN — LORAZEPAM 0.5 MG: 0.5 TABLET ORAL at 10:14

## 2020-11-08 RX ADMIN — NOREPINEPHRINE BITARTRATE 6 MCG/MIN: 1 INJECTION INTRAVENOUS at 10:11

## 2020-11-08 RX ADMIN — INSULIN LISPRO 1 UNITS: 100 INJECTION, SOLUTION INTRAVENOUS; SUBCUTANEOUS at 15:24

## 2020-11-08 RX ADMIN — MIDODRINE HYDROCHLORIDE 5 MG: 5 TABLET ORAL at 10:17

## 2020-11-08 RX ADMIN — ACETAMINOPHEN 650 MG: 325 TABLET, FILM COATED ORAL at 23:03

## 2020-11-08 RX ADMIN — LORAZEPAM 0.5 MG: 2 INJECTION INTRAMUSCULAR; INTRAVENOUS at 22:59

## 2020-11-08 RX ADMIN — VANCOMYCIN HYDROCHLORIDE 1500 MG: 1 INJECTION, POWDER, LYOPHILIZED, FOR SOLUTION INTRAVENOUS at 02:00

## 2020-11-08 RX ADMIN — CHLORHEXIDINE GLUCONATE 0.12% ORAL RINSE 15 ML: 1.2 LIQUID ORAL at 10:12

## 2020-11-08 RX ADMIN — MIDODRINE HYDROCHLORIDE 10 MG: 5 TABLET ORAL at 15:21

## 2020-11-08 RX ADMIN — NYSTATIN: 100000 CREAM TOPICAL at 15:19

## 2020-11-08 RX ADMIN — HEPARIN SODIUM 5000 UNITS: 5000 INJECTION INTRAVENOUS; SUBCUTANEOUS at 15:21

## 2020-11-08 RX ADMIN — HEPARIN SODIUM 5000 UNITS: 5000 INJECTION INTRAVENOUS; SUBCUTANEOUS at 05:20

## 2020-11-08 RX ADMIN — DOCUSATE SODIUM 100 MG: 100 CAPSULE ORAL at 10:12

## 2020-11-08 RX ADMIN — TRAZODONE HYDROCHLORIDE 50 MG: 50 TABLET ORAL at 21:54

## 2020-11-09 ENCOUNTER — APPOINTMENT (INPATIENT)
Dept: VASCULAR ULTRASOUND | Facility: HOSPITAL | Age: 70
DRG: 853 | End: 2020-11-09
Payer: COMMERCIAL

## 2020-11-09 LAB
ALBUMIN SERPL BCP-MCNC: 1.9 G/DL (ref 3.5–5)
ALP SERPL-CCNC: 67 U/L (ref 46–116)
ALT SERPL W P-5'-P-CCNC: 20 U/L (ref 12–78)
ANION GAP SERPL CALCULATED.3IONS-SCNC: 8 MMOL/L (ref 4–13)
AST SERPL W P-5'-P-CCNC: 15 U/L (ref 5–45)
ATRIAL RATE: 51 BPM
ATRIAL RATE: 63 BPM
BACTERIA BLD CULT: ABNORMAL
BILIRUB SERPL-MCNC: 0.35 MG/DL (ref 0.2–1)
BUN SERPL-MCNC: 17 MG/DL (ref 5–25)
CALCIUM ALBUM COR SERPL-MCNC: 10 MG/DL (ref 8.3–10.1)
CALCIUM SERPL-MCNC: 8.3 MG/DL (ref 8.3–10.1)
CHLORIDE SERPL-SCNC: 113 MMOL/L (ref 100–108)
CHOLEST SERPL-MCNC: 81 MG/DL (ref 50–200)
CO2 SERPL-SCNC: 22 MMOL/L (ref 21–32)
CREAT SERPL-MCNC: 1.01 MG/DL (ref 0.6–1.3)
ERYTHROCYTE [DISTWIDTH] IN BLOOD BY AUTOMATED COUNT: 17.4 % (ref 11.6–15.1)
GFR SERPL CREATININE-BSD FRML MDRD: 74 ML/MIN/1.73SQ M
GLUCOSE SERPL-MCNC: 101 MG/DL (ref 65–140)
GLUCOSE SERPL-MCNC: 114 MG/DL (ref 65–140)
GLUCOSE SERPL-MCNC: 120 MG/DL (ref 65–140)
GLUCOSE SERPL-MCNC: 126 MG/DL (ref 65–140)
GLUCOSE SERPL-MCNC: 139 MG/DL (ref 65–140)
GLUCOSE SERPL-MCNC: 98 MG/DL (ref 65–140)
GRAM STN SPEC: ABNORMAL
HCT VFR BLD AUTO: 35.4 % (ref 36.5–49.3)
HDLC SERPL-MCNC: 34 MG/DL
HGB BLD-MCNC: 10.9 G/DL (ref 12–17)
LDLC SERPL CALC-MCNC: 32 MG/DL (ref 0–100)
MCH RBC QN AUTO: 27 PG (ref 26.8–34.3)
MCHC RBC AUTO-ENTMCNC: 30.8 G/DL (ref 31.4–37.4)
MCV RBC AUTO: 88 FL (ref 82–98)
MRSA NOSE QL CULT: NORMAL
P AXIS: 60 DEGREES
P AXIS: 69 DEGREES
PLATELET # BLD AUTO: 265 THOUSANDS/UL (ref 149–390)
PMV BLD AUTO: 9.8 FL (ref 8.9–12.7)
POTASSIUM SERPL-SCNC: 3.5 MMOL/L (ref 3.5–5.3)
PR INTERVAL: 148 MS
PR INTERVAL: 148 MS
PROCALCITONIN SERPL-MCNC: 0.16 NG/ML
PROT SERPL-MCNC: 5.4 G/DL (ref 6.4–8.2)
QRS AXIS: 57 DEGREES
QRS AXIS: 61 DEGREES
QRSD INTERVAL: 102 MS
QRSD INTERVAL: 90 MS
QT INTERVAL: 448 MS
QT INTERVAL: 448 MS
QTC INTERVAL: 412 MS
QTC INTERVAL: 458 MS
RBC # BLD AUTO: 4.03 MILLION/UL (ref 3.88–5.62)
SODIUM SERPL-SCNC: 143 MMOL/L (ref 136–145)
T WAVE AXIS: 132 DEGREES
T WAVE AXIS: 51 DEGREES
TRIGL SERPL-MCNC: 76 MG/DL
VANCOMYCIN TROUGH SERPL-MCNC: 24.6 UG/ML (ref 10–20)
VENTRICULAR RATE: 51 BPM
VENTRICULAR RATE: 63 BPM
WBC # BLD AUTO: 16.79 THOUSAND/UL (ref 4.31–10.16)

## 2020-11-09 PROCEDURE — 80053 COMPREHEN METABOLIC PANEL: CPT | Performed by: PSYCHIATRY & NEUROLOGY

## 2020-11-09 PROCEDURE — 99222 1ST HOSP IP/OBS MODERATE 55: CPT | Performed by: NURSE PRACTITIONER

## 2020-11-09 PROCEDURE — 92526 ORAL FUNCTION THERAPY: CPT

## 2020-11-09 PROCEDURE — 85027 COMPLETE CBC AUTOMATED: CPT | Performed by: PSYCHIATRY & NEUROLOGY

## 2020-11-09 PROCEDURE — 0T9B70Z DRAINAGE OF BLADDER WITH DRAINAGE DEVICE, VIA NATURAL OR ARTIFICIAL OPENING: ICD-10-PCS | Performed by: INTERNAL MEDICINE

## 2020-11-09 PROCEDURE — NC001 PR NO CHARGE: Performed by: INTERNAL MEDICINE

## 2020-11-09 PROCEDURE — 93010 ELECTROCARDIOGRAM REPORT: CPT | Performed by: INTERNAL MEDICINE

## 2020-11-09 PROCEDURE — 99233 SBSQ HOSP IP/OBS HIGH 50: CPT | Performed by: INTERNAL MEDICINE

## 2020-11-09 PROCEDURE — 82948 REAGENT STRIP/BLOOD GLUCOSE: CPT

## 2020-11-09 PROCEDURE — 84145 PROCALCITONIN (PCT): CPT | Performed by: PHYSICIAN ASSISTANT

## 2020-11-09 PROCEDURE — 80061 LIPID PANEL: CPT | Performed by: INTERNAL MEDICINE

## 2020-11-09 PROCEDURE — 93880 EXTRACRANIAL BILAT STUDY: CPT | Performed by: SURGERY

## 2020-11-09 PROCEDURE — 99232 SBSQ HOSP IP/OBS MODERATE 35: CPT | Performed by: INTERNAL MEDICINE

## 2020-11-09 PROCEDURE — 80202 ASSAY OF VANCOMYCIN: CPT | Performed by: INTERNAL MEDICINE

## 2020-11-09 PROCEDURE — 99232 SBSQ HOSP IP/OBS MODERATE 35: CPT | Performed by: PSYCHIATRY & NEUROLOGY

## 2020-11-09 PROCEDURE — 93880 EXTRACRANIAL BILAT STUDY: CPT

## 2020-11-09 RX ORDER — VANCOMYCIN HYDROCHLORIDE 1 G/200ML
1000 INJECTION, SOLUTION INTRAVENOUS EVERY 12 HOURS
Status: DISCONTINUED | OUTPATIENT
Start: 2020-11-09 | End: 2020-11-09

## 2020-11-09 RX ORDER — KETOROLAC TROMETHAMINE 30 MG/ML
15 INJECTION, SOLUTION INTRAMUSCULAR; INTRAVENOUS ONCE
Status: DISCONTINUED | OUTPATIENT
Start: 2020-11-09 | End: 2020-11-09

## 2020-11-09 RX ORDER — LORAZEPAM 0.5 MG/1
0.5 TABLET ORAL
Status: DISCONTINUED | OUTPATIENT
Start: 2020-11-09 | End: 2020-11-12 | Stop reason: HOSPADM

## 2020-11-09 RX ORDER — LANOLIN ALCOHOL/MO/W.PET/CERES
9 CREAM (GRAM) TOPICAL
Status: DISCONTINUED | OUTPATIENT
Start: 2020-11-09 | End: 2020-11-12 | Stop reason: HOSPADM

## 2020-11-09 RX ORDER — ALBUMIN, HUMAN INJ 5% 5 %
12.5 SOLUTION INTRAVENOUS ONCE
Status: COMPLETED | OUTPATIENT
Start: 2020-11-09 | End: 2020-11-09

## 2020-11-09 RX ADMIN — CEFEPIME HYDROCHLORIDE 2000 MG: 2 INJECTION, POWDER, FOR SOLUTION INTRAVENOUS at 08:52

## 2020-11-09 RX ADMIN — MIDODRINE HYDROCHLORIDE 10 MG: 5 TABLET ORAL at 17:14

## 2020-11-09 RX ADMIN — METRONIDAZOLE 500 MG: 500 INJECTION, SOLUTION INTRAVENOUS at 08:26

## 2020-11-09 RX ADMIN — VANCOMYCIN HYDROCHLORIDE 1000 MG: 1 INJECTION, SOLUTION INTRAVENOUS at 08:23

## 2020-11-09 RX ADMIN — NYSTATIN: 100000 CREAM TOPICAL at 17:36

## 2020-11-09 RX ADMIN — ASPIRIN 81 MG CHEWABLE TABLET 81 MG: 81 TABLET CHEWABLE at 08:23

## 2020-11-09 RX ADMIN — COLLAGENASE SANTYL: 250 OINTMENT TOPICAL at 08:52

## 2020-11-09 RX ADMIN — LORAZEPAM 0.5 MG: 0.5 TABLET ORAL at 21:25

## 2020-11-09 RX ADMIN — ATORVASTATIN CALCIUM 40 MG: 40 TABLET, FILM COATED ORAL at 17:14

## 2020-11-09 RX ADMIN — CHLORHEXIDINE GLUCONATE 0.12% ORAL RINSE 15 ML: 1.2 LIQUID ORAL at 21:22

## 2020-11-09 RX ADMIN — CEFEPIME HYDROCHLORIDE 2000 MG: 2 INJECTION, POWDER, FOR SOLUTION INTRAVENOUS at 21:32

## 2020-11-09 RX ADMIN — HEPARIN SODIUM 5000 UNITS: 5000 INJECTION INTRAVENOUS; SUBCUTANEOUS at 05:26

## 2020-11-09 RX ADMIN — ALBUMIN (HUMAN) 12.5 G: 12.5 INJECTION, SOLUTION INTRAVENOUS at 07:12

## 2020-11-09 RX ADMIN — LORAZEPAM 0.5 MG: 0.5 TABLET ORAL at 08:23

## 2020-11-09 RX ADMIN — CHLORHEXIDINE GLUCONATE 0.12% ORAL RINSE 15 ML: 1.2 LIQUID ORAL at 08:22

## 2020-11-09 RX ADMIN — NYSTATIN: 100000 CREAM TOPICAL at 08:22

## 2020-11-09 RX ADMIN — ALLOPURINOL 300 MG: 100 TABLET ORAL at 08:22

## 2020-11-09 RX ADMIN — ESCITALOPRAM OXALATE 5 MG: 10 TABLET ORAL at 08:23

## 2020-11-09 RX ADMIN — HEPARIN SODIUM 5000 UNITS: 5000 INJECTION INTRAVENOUS; SUBCUTANEOUS at 13:30

## 2020-11-09 RX ADMIN — NOREPINEPHRINE BITARTRATE 2 MCG/MIN: 1 INJECTION INTRAVENOUS at 09:51

## 2020-11-09 RX ADMIN — MELATONIN 9 MG: at 21:22

## 2020-11-09 RX ADMIN — METRONIDAZOLE 500 MG: 500 INJECTION, SOLUTION INTRAVENOUS at 17:14

## 2020-11-09 RX ADMIN — TRAVOPROST 1 DROP: 0.04 SOLUTION OPHTHALMIC at 21:32

## 2020-11-09 RX ADMIN — MIDODRINE HYDROCHLORIDE 10 MG: 5 TABLET ORAL at 07:05

## 2020-11-09 RX ADMIN — METRONIDAZOLE 500 MG: 500 INJECTION, SOLUTION INTRAVENOUS at 01:25

## 2020-11-09 RX ADMIN — QUETIAPINE FUMARATE 25 MG: 25 TABLET ORAL at 21:22

## 2020-11-09 RX ADMIN — MIDODRINE HYDROCHLORIDE 10 MG: 5 TABLET ORAL at 12:10

## 2020-11-09 RX ADMIN — HEPARIN SODIUM 5000 UNITS: 5000 INJECTION INTRAVENOUS; SUBCUTANEOUS at 21:22

## 2020-11-10 LAB
ANION GAP SERPL CALCULATED.3IONS-SCNC: 10 MMOL/L (ref 4–13)
BACTERIA BLD CULT: NORMAL
BUN SERPL-MCNC: 13 MG/DL (ref 5–25)
CALCIUM SERPL-MCNC: 8 MG/DL (ref 8.3–10.1)
CHLORIDE SERPL-SCNC: 113 MMOL/L (ref 100–108)
CO2 SERPL-SCNC: 21 MMOL/L (ref 21–32)
CREAT SERPL-MCNC: 0.86 MG/DL (ref 0.6–1.3)
ERYTHROCYTE [DISTWIDTH] IN BLOOD BY AUTOMATED COUNT: 18 % (ref 11.6–15.1)
GFR SERPL CREATININE-BSD FRML MDRD: 87 ML/MIN/1.73SQ M
GLUCOSE SERPL-MCNC: 101 MG/DL (ref 65–140)
GLUCOSE SERPL-MCNC: 103 MG/DL (ref 65–140)
GLUCOSE SERPL-MCNC: 110 MG/DL (ref 65–140)
GLUCOSE SERPL-MCNC: 141 MG/DL (ref 65–140)
GLUCOSE SERPL-MCNC: 96 MG/DL (ref 65–140)
HCT VFR BLD AUTO: 32.5 % (ref 36.5–49.3)
HGB BLD-MCNC: 10.3 G/DL (ref 12–17)
MCH RBC QN AUTO: 27.8 PG (ref 26.8–34.3)
MCHC RBC AUTO-ENTMCNC: 31.7 G/DL (ref 31.4–37.4)
MCV RBC AUTO: 88 FL (ref 82–98)
PLATELET # BLD AUTO: 258 THOUSANDS/UL (ref 149–390)
PMV BLD AUTO: 9.9 FL (ref 8.9–12.7)
POTASSIUM SERPL-SCNC: 3.5 MMOL/L (ref 3.5–5.3)
RBC # BLD AUTO: 3.71 MILLION/UL (ref 3.88–5.62)
SODIUM SERPL-SCNC: 144 MMOL/L (ref 136–145)
WBC # BLD AUTO: 11.97 THOUSAND/UL (ref 4.31–10.16)

## 2020-11-10 PROCEDURE — 99232 SBSQ HOSP IP/OBS MODERATE 35: CPT | Performed by: INTERNAL MEDICINE

## 2020-11-10 PROCEDURE — 85027 COMPLETE CBC AUTOMATED: CPT | Performed by: INTERNAL MEDICINE

## 2020-11-10 PROCEDURE — 99232 SBSQ HOSP IP/OBS MODERATE 35: CPT | Performed by: PSYCHIATRY & NEUROLOGY

## 2020-11-10 PROCEDURE — 82948 REAGENT STRIP/BLOOD GLUCOSE: CPT

## 2020-11-10 PROCEDURE — 99233 SBSQ HOSP IP/OBS HIGH 50: CPT | Performed by: INTERNAL MEDICINE

## 2020-11-10 PROCEDURE — 80048 BASIC METABOLIC PNL TOTAL CA: CPT | Performed by: INTERNAL MEDICINE

## 2020-11-10 PROCEDURE — NC001 PR NO CHARGE: Performed by: INTERNAL MEDICINE

## 2020-11-10 RX ORDER — CLOPIDOGREL BISULFATE 75 MG/1
75 TABLET ORAL DAILY
Status: DISCONTINUED | OUTPATIENT
Start: 2020-11-10 | End: 2020-11-11

## 2020-11-10 RX ADMIN — HEPARIN SODIUM 5000 UNITS: 5000 INJECTION INTRAVENOUS; SUBCUTANEOUS at 06:44

## 2020-11-10 RX ADMIN — HEPARIN SODIUM 5000 UNITS: 5000 INJECTION INTRAVENOUS; SUBCUTANEOUS at 22:16

## 2020-11-10 RX ADMIN — MIDODRINE HYDROCHLORIDE 10 MG: 5 TABLET ORAL at 17:24

## 2020-11-10 RX ADMIN — DOCUSATE SODIUM 100 MG: 100 CAPSULE ORAL at 08:19

## 2020-11-10 RX ADMIN — MELATONIN 9 MG: at 22:16

## 2020-11-10 RX ADMIN — NYSTATIN: 100000 CREAM TOPICAL at 17:24

## 2020-11-10 RX ADMIN — DOCUSATE SODIUM 100 MG: 100 CAPSULE ORAL at 17:23

## 2020-11-10 RX ADMIN — QUETIAPINE FUMARATE 25 MG: 25 TABLET ORAL at 22:16

## 2020-11-10 RX ADMIN — ASPIRIN 81 MG CHEWABLE TABLET 81 MG: 81 TABLET CHEWABLE at 08:19

## 2020-11-10 RX ADMIN — CLOPIDOGREL BISULFATE 75 MG: 75 TABLET ORAL at 13:39

## 2020-11-10 RX ADMIN — BISACODYL 10 MG: 10 SUPPOSITORY RECTAL at 08:23

## 2020-11-10 RX ADMIN — PANTOPRAZOLE SODIUM 40 MG: 40 TABLET, DELAYED RELEASE ORAL at 06:39

## 2020-11-10 RX ADMIN — ESCITALOPRAM OXALATE 5 MG: 10 TABLET ORAL at 08:19

## 2020-11-10 RX ADMIN — ATORVASTATIN CALCIUM 40 MG: 40 TABLET, FILM COATED ORAL at 17:23

## 2020-11-10 RX ADMIN — CEFEPIME HYDROCHLORIDE 2000 MG: 2 INJECTION, POWDER, FOR SOLUTION INTRAVENOUS at 09:45

## 2020-11-10 RX ADMIN — CHLORHEXIDINE GLUCONATE 0.12% ORAL RINSE 15 ML: 1.2 LIQUID ORAL at 22:15

## 2020-11-10 RX ADMIN — METRONIDAZOLE 500 MG: 500 INJECTION, SOLUTION INTRAVENOUS at 17:23

## 2020-11-10 RX ADMIN — NYSTATIN: 100000 CREAM TOPICAL at 08:23

## 2020-11-10 RX ADMIN — SENNOSIDES 8.6 MG: 8.6 TABLET, FILM COATED ORAL at 08:19

## 2020-11-10 RX ADMIN — MIDODRINE HYDROCHLORIDE 10 MG: 5 TABLET ORAL at 13:39

## 2020-11-10 RX ADMIN — LORAZEPAM 0.5 MG: 0.5 TABLET ORAL at 22:16

## 2020-11-10 RX ADMIN — ALLOPURINOL 300 MG: 100 TABLET ORAL at 08:19

## 2020-11-10 RX ADMIN — METRONIDAZOLE 500 MG: 500 INJECTION, SOLUTION INTRAVENOUS at 01:57

## 2020-11-10 RX ADMIN — COLLAGENASE SANTYL: 250 OINTMENT TOPICAL at 08:23

## 2020-11-10 RX ADMIN — MIDODRINE HYDROCHLORIDE 10 MG: 5 TABLET ORAL at 06:39

## 2020-11-10 RX ADMIN — CEFEPIME HYDROCHLORIDE 2000 MG: 2 INJECTION, POWDER, FOR SOLUTION INTRAVENOUS at 22:13

## 2020-11-10 RX ADMIN — METRONIDAZOLE 500 MG: 500 INJECTION, SOLUTION INTRAVENOUS at 08:17

## 2020-11-10 RX ADMIN — TRAVOPROST 1 DROP: 0.04 SOLUTION OPHTHALMIC at 22:16

## 2020-11-10 RX ADMIN — HEPARIN SODIUM 5000 UNITS: 5000 INJECTION INTRAVENOUS; SUBCUTANEOUS at 13:40

## 2020-11-11 ENCOUNTER — APPOINTMENT (INPATIENT)
Dept: VASCULAR ULTRASOUND | Facility: HOSPITAL | Age: 70
DRG: 853 | End: 2020-11-11
Payer: COMMERCIAL

## 2020-11-11 ENCOUNTER — TELEPHONE (OUTPATIENT)
Dept: NON INVASIVE DIAGNOSTICS | Facility: HOSPITAL | Age: 70
End: 2020-11-11

## 2020-11-11 ENCOUNTER — ANESTHESIA EVENT (INPATIENT)
Dept: NON INVASIVE DIAGNOSTICS | Facility: HOSPITAL | Age: 70
DRG: 853 | End: 2020-11-11
Payer: COMMERCIAL

## 2020-11-11 VITALS — HEART RATE: 60 BPM

## 2020-11-11 PROBLEM — R65.20 SEVERE SEPSIS (HCC): Status: RESOLVED | Noted: 2020-08-16 | Resolved: 2020-11-11

## 2020-11-11 PROBLEM — A41.9 SEVERE SEPSIS (HCC): Status: RESOLVED | Noted: 2020-08-16 | Resolved: 2020-11-11

## 2020-11-11 LAB
ANION GAP SERPL CALCULATED.3IONS-SCNC: 12 MMOL/L (ref 4–13)
BUN SERPL-MCNC: 12 MG/DL (ref 5–25)
CALCIUM SERPL-MCNC: 8.1 MG/DL (ref 8.3–10.1)
CHLORIDE SERPL-SCNC: 112 MMOL/L (ref 100–108)
CO2 SERPL-SCNC: 20 MMOL/L (ref 21–32)
CREAT SERPL-MCNC: 0.8 MG/DL (ref 0.6–1.3)
ERYTHROCYTE [DISTWIDTH] IN BLOOD BY AUTOMATED COUNT: 18.6 % (ref 11.6–15.1)
FLUAV RNA RESP QL NAA+PROBE: NEGATIVE
FLUBV RNA RESP QL NAA+PROBE: NEGATIVE
GFR SERPL CREATININE-BSD FRML MDRD: 90 ML/MIN/1.73SQ M
GLUCOSE SERPL-MCNC: 101 MG/DL (ref 65–140)
GLUCOSE SERPL-MCNC: 103 MG/DL (ref 65–140)
GLUCOSE SERPL-MCNC: 104 MG/DL (ref 65–140)
GLUCOSE SERPL-MCNC: 117 MG/DL (ref 65–140)
GLUCOSE SERPL-MCNC: 144 MG/DL (ref 65–140)
GLUCOSE SERPL-MCNC: 95 MG/DL (ref 65–140)
HCT VFR BLD AUTO: 33 % (ref 36.5–49.3)
HGB BLD-MCNC: 10.3 G/DL (ref 12–17)
MCH RBC QN AUTO: 27.6 PG (ref 26.8–34.3)
MCHC RBC AUTO-ENTMCNC: 31.2 G/DL (ref 31.4–37.4)
MCV RBC AUTO: 89 FL (ref 82–98)
PLATELET # BLD AUTO: 253 THOUSANDS/UL (ref 149–390)
PMV BLD AUTO: 10 FL (ref 8.9–12.7)
POTASSIUM SERPL-SCNC: 3.2 MMOL/L (ref 3.5–5.3)
RBC # BLD AUTO: 3.73 MILLION/UL (ref 3.88–5.62)
RSV RNA RESP QL NAA+PROBE: NEGATIVE
SARS-COV-2 RNA RESP QL NAA+PROBE: NEGATIVE
SODIUM SERPL-SCNC: 144 MMOL/L (ref 136–145)
WBC # BLD AUTO: 11.25 THOUSAND/UL (ref 4.31–10.16)

## 2020-11-11 PROCEDURE — B24BZZ4 ULTRASONOGRAPHY OF HEART WITH AORTA, TRANSESOPHAGEAL: ICD-10-PCS | Performed by: INTERNAL MEDICINE

## 2020-11-11 PROCEDURE — 99232 SBSQ HOSP IP/OBS MODERATE 35: CPT | Performed by: INTERNAL MEDICINE

## 2020-11-11 PROCEDURE — 0241U HB NFCT DS VIR RESP RNA 4 TRGT: CPT | Performed by: INTERNAL MEDICINE

## 2020-11-11 PROCEDURE — 82948 REAGENT STRIP/BLOOD GLUCOSE: CPT

## 2020-11-11 PROCEDURE — 93325 DOPPLER ECHO COLOR FLOW MAPG: CPT | Performed by: INTERNAL MEDICINE

## 2020-11-11 PROCEDURE — 85027 COMPLETE CBC AUTOMATED: CPT | Performed by: INTERNAL MEDICINE

## 2020-11-11 PROCEDURE — 99232 SBSQ HOSP IP/OBS MODERATE 35: CPT | Performed by: PSYCHIATRY & NEUROLOGY

## 2020-11-11 PROCEDURE — 93312 ECHO TRANSESOPHAGEAL: CPT | Performed by: INTERNAL MEDICINE

## 2020-11-11 PROCEDURE — 80048 BASIC METABOLIC PNL TOTAL CA: CPT | Performed by: INTERNAL MEDICINE

## 2020-11-11 PROCEDURE — 93320 DOPPLER ECHO COMPLETE: CPT | Performed by: INTERNAL MEDICINE

## 2020-11-11 PROCEDURE — 93312 ECHO TRANSESOPHAGEAL: CPT

## 2020-11-11 PROCEDURE — 93970 EXTREMITY STUDY: CPT | Performed by: SURGERY

## 2020-11-11 PROCEDURE — NC001 PR NO CHARGE: Performed by: INTERNAL MEDICINE

## 2020-11-11 PROCEDURE — 93970 EXTREMITY STUDY: CPT

## 2020-11-11 RX ORDER — LIDOCAINE HYDROCHLORIDE 20 MG/ML
INJECTION, SOLUTION EPIDURAL; INFILTRATION; INTRACAUDAL; PERINEURAL AS NEEDED
Status: DISCONTINUED | OUTPATIENT
Start: 2020-11-11 | End: 2020-11-11

## 2020-11-11 RX ORDER — POTASSIUM CHLORIDE 20 MEQ/1
40 TABLET, EXTENDED RELEASE ORAL ONCE
Status: COMPLETED | OUTPATIENT
Start: 2020-11-11 | End: 2020-11-11

## 2020-11-11 RX ORDER — PROPOFOL 10 MG/ML
INJECTION, EMULSION INTRAVENOUS CONTINUOUS PRN
Status: DISCONTINUED | OUTPATIENT
Start: 2020-11-11 | End: 2020-11-11

## 2020-11-11 RX ORDER — SODIUM CHLORIDE 9 MG/ML
INJECTION, SOLUTION INTRAVENOUS CONTINUOUS PRN
Status: DISCONTINUED | OUTPATIENT
Start: 2020-11-11 | End: 2020-11-11

## 2020-11-11 RX ORDER — PROPOFOL 10 MG/ML
INJECTION, EMULSION INTRAVENOUS AS NEEDED
Status: DISCONTINUED | OUTPATIENT
Start: 2020-11-11 | End: 2020-11-11

## 2020-11-11 RX ORDER — GLYCOPYRROLATE 0.2 MG/ML
INJECTION INTRAMUSCULAR; INTRAVENOUS AS NEEDED
Status: DISCONTINUED | OUTPATIENT
Start: 2020-11-11 | End: 2020-11-11

## 2020-11-11 RX ADMIN — ATORVASTATIN CALCIUM 40 MG: 40 TABLET, FILM COATED ORAL at 18:26

## 2020-11-11 RX ADMIN — PANTOPRAZOLE SODIUM 40 MG: 40 TABLET, DELAYED RELEASE ORAL at 06:02

## 2020-11-11 RX ADMIN — PHENYLEPHRINE HYDROCHLORIDE 100 MCG: 10 INJECTION INTRAVENOUS at 09:51

## 2020-11-11 RX ADMIN — NYSTATIN: 100000 CREAM TOPICAL at 18:27

## 2020-11-11 RX ADMIN — CLOPIDOGREL BISULFATE 75 MG: 75 TABLET ORAL at 10:59

## 2020-11-11 RX ADMIN — ESCITALOPRAM OXALATE 5 MG: 10 TABLET ORAL at 10:58

## 2020-11-11 RX ADMIN — METRONIDAZOLE 500 MG: 500 INJECTION, SOLUTION INTRAVENOUS at 00:58

## 2020-11-11 RX ADMIN — APIXABAN 5 MG: 5 TABLET, FILM COATED ORAL at 13:28

## 2020-11-11 RX ADMIN — MIDODRINE HYDROCHLORIDE 10 MG: 5 TABLET ORAL at 11:06

## 2020-11-11 RX ADMIN — ASPIRIN 81 MG CHEWABLE TABLET 81 MG: 81 TABLET CHEWABLE at 10:58

## 2020-11-11 RX ADMIN — DOCUSATE SODIUM 100 MG: 100 CAPSULE ORAL at 18:26

## 2020-11-11 RX ADMIN — PROPOFOL 80 MG: 10 INJECTION, EMULSION INTRAVENOUS at 09:37

## 2020-11-11 RX ADMIN — MIDODRINE HYDROCHLORIDE 10 MG: 5 TABLET ORAL at 06:02

## 2020-11-11 RX ADMIN — ALLOPURINOL 300 MG: 100 TABLET ORAL at 10:57

## 2020-11-11 RX ADMIN — QUETIAPINE FUMARATE 25 MG: 25 TABLET ORAL at 22:16

## 2020-11-11 RX ADMIN — LIDOCAINE HYDROCHLORIDE 70 MG: 20 INJECTION, SOLUTION EPIDURAL; INFILTRATION; INTRACAUDAL at 09:37

## 2020-11-11 RX ADMIN — CHLORHEXIDINE GLUCONATE 0.12% ORAL RINSE 15 ML: 1.2 LIQUID ORAL at 22:16

## 2020-11-11 RX ADMIN — POTASSIUM CHLORIDE 40 MEQ: 1500 TABLET, EXTENDED RELEASE ORAL at 10:57

## 2020-11-11 RX ADMIN — NYSTATIN: 100000 CREAM TOPICAL at 08:21

## 2020-11-11 RX ADMIN — MELATONIN 9 MG: at 22:16

## 2020-11-11 RX ADMIN — SODIUM CHLORIDE: 0.9 INJECTION, SOLUTION INTRAVENOUS at 09:15

## 2020-11-11 RX ADMIN — GLYCOPYRROLATE 0.1 MG: 0.2 INJECTION, SOLUTION INTRAMUSCULAR; INTRAVENOUS at 09:35

## 2020-11-11 RX ADMIN — PHENYLEPHRINE HYDROCHLORIDE 100 MCG: 10 INJECTION INTRAVENOUS at 09:42

## 2020-11-11 RX ADMIN — LORAZEPAM 0.5 MG: 0.5 TABLET ORAL at 22:16

## 2020-11-11 RX ADMIN — PHENYLEPHRINE HYDROCHLORIDE 100 MCG: 10 INJECTION INTRAVENOUS at 09:46

## 2020-11-11 RX ADMIN — TRAVOPROST 1 DROP: 0.04 SOLUTION OPHTHALMIC at 22:16

## 2020-11-11 RX ADMIN — HEPARIN SODIUM 5000 UNITS: 5000 INJECTION INTRAVENOUS; SUBCUTANEOUS at 06:01

## 2020-11-11 RX ADMIN — PROPOFOL 100 MCG/KG/MIN: 10 INJECTION, EMULSION INTRAVENOUS at 09:38

## 2020-11-11 RX ADMIN — APIXABAN 5 MG: 5 TABLET, FILM COATED ORAL at 18:25

## 2020-11-11 RX ADMIN — MIDODRINE HYDROCHLORIDE 10 MG: 5 TABLET ORAL at 18:26

## 2020-11-12 VITALS
WEIGHT: 290.13 LBS | SYSTOLIC BLOOD PRESSURE: 100 MMHG | TEMPERATURE: 98.8 F | RESPIRATION RATE: 20 BRPM | HEART RATE: 58 BPM | BODY MASS INDEX: 36.26 KG/M2 | OXYGEN SATURATION: 95 % | DIASTOLIC BLOOD PRESSURE: 65 MMHG

## 2020-11-12 LAB
ANION GAP SERPL CALCULATED.3IONS-SCNC: 12 MMOL/L (ref 4–13)
BUN SERPL-MCNC: 13 MG/DL (ref 5–25)
CALCIUM SERPL-MCNC: 8.3 MG/DL (ref 8.3–10.1)
CHLORIDE SERPL-SCNC: 113 MMOL/L (ref 100–108)
CO2 SERPL-SCNC: 21 MMOL/L (ref 21–32)
CREAT SERPL-MCNC: 0.83 MG/DL (ref 0.6–1.3)
ERYTHROCYTE [DISTWIDTH] IN BLOOD BY AUTOMATED COUNT: 18.6 % (ref 11.6–15.1)
GFR SERPL CREATININE-BSD FRML MDRD: 89 ML/MIN/1.73SQ M
GLUCOSE SERPL-MCNC: 101 MG/DL (ref 65–140)
GLUCOSE SERPL-MCNC: 103 MG/DL (ref 65–140)
GLUCOSE SERPL-MCNC: 128 MG/DL (ref 65–140)
HCT VFR BLD AUTO: 33 % (ref 36.5–49.3)
HGB BLD-MCNC: 10.2 G/DL (ref 12–17)
MCH RBC QN AUTO: 27.3 PG (ref 26.8–34.3)
MCHC RBC AUTO-ENTMCNC: 30.9 G/DL (ref 31.4–37.4)
MCV RBC AUTO: 88 FL (ref 82–98)
PLATELET # BLD AUTO: 277 THOUSANDS/UL (ref 149–390)
PMV BLD AUTO: 9.6 FL (ref 8.9–12.7)
POTASSIUM SERPL-SCNC: 3.5 MMOL/L (ref 3.5–5.3)
RBC # BLD AUTO: 3.74 MILLION/UL (ref 3.88–5.62)
SODIUM SERPL-SCNC: 146 MMOL/L (ref 136–145)
WBC # BLD AUTO: 11.07 THOUSAND/UL (ref 4.31–10.16)

## 2020-11-12 PROCEDURE — 82948 REAGENT STRIP/BLOOD GLUCOSE: CPT

## 2020-11-12 PROCEDURE — 85027 COMPLETE CBC AUTOMATED: CPT | Performed by: INTERNAL MEDICINE

## 2020-11-12 PROCEDURE — 99239 HOSP IP/OBS DSCHRG MGMT >30: CPT | Performed by: INTERNAL MEDICINE

## 2020-11-12 PROCEDURE — 99232 SBSQ HOSP IP/OBS MODERATE 35: CPT | Performed by: INTERNAL MEDICINE

## 2020-11-12 PROCEDURE — 80048 BASIC METABOLIC PNL TOTAL CA: CPT | Performed by: INTERNAL MEDICINE

## 2020-11-12 RX ORDER — ATORVASTATIN CALCIUM 40 MG/1
40 TABLET, FILM COATED ORAL
Refills: 0
Start: 2020-11-12

## 2020-11-12 RX ORDER — MIDODRINE HYDROCHLORIDE 10 MG/1
10 TABLET ORAL
Refills: 0
Start: 2020-11-12

## 2020-11-12 RX ORDER — NYSTATIN 100000 U/G
CREAM TOPICAL 2 TIMES DAILY
Qty: 30 G | Refills: 0 | Status: SHIPPED | OUTPATIENT
Start: 2020-11-12

## 2020-11-12 RX ADMIN — COLLAGENASE SANTYL: 250 OINTMENT TOPICAL at 12:48

## 2020-11-12 RX ADMIN — ALLOPURINOL 300 MG: 100 TABLET ORAL at 09:23

## 2020-11-12 RX ADMIN — PANTOPRAZOLE SODIUM 40 MG: 40 TABLET, DELAYED RELEASE ORAL at 05:21

## 2020-11-12 RX ADMIN — SENNOSIDES 8.6 MG: 8.6 TABLET, FILM COATED ORAL at 09:24

## 2020-11-12 RX ADMIN — NYSTATIN: 100000 CREAM TOPICAL at 09:26

## 2020-11-12 RX ADMIN — DOCUSATE SODIUM 100 MG: 100 CAPSULE ORAL at 09:23

## 2020-11-12 RX ADMIN — ESCITALOPRAM OXALATE 5 MG: 10 TABLET ORAL at 09:24

## 2020-11-12 RX ADMIN — MIDODRINE HYDROCHLORIDE 10 MG: 5 TABLET ORAL at 12:48

## 2020-11-12 RX ADMIN — CHLORHEXIDINE GLUCONATE 0.12% ORAL RINSE 15 ML: 1.2 LIQUID ORAL at 09:24

## 2020-11-12 RX ADMIN — MIDODRINE HYDROCHLORIDE 10 MG: 5 TABLET ORAL at 09:24

## 2020-11-12 RX ADMIN — APIXABAN 5 MG: 5 TABLET, FILM COATED ORAL at 09:23

## 2020-11-13 LAB
BACTERIA BLD CULT: NORMAL
BACTERIA BLD CULT: NORMAL

## 2020-11-16 ENCOUNTER — NURSING HOME VISIT (OUTPATIENT)
Dept: GERIATRICS | Facility: OTHER | Age: 70
End: 2020-11-16
Payer: COMMERCIAL

## 2020-11-16 VITALS
BODY MASS INDEX: 27.96 KG/M2 | HEART RATE: 74 BPM | WEIGHT: 211 LBS | TEMPERATURE: 98.5 F | SYSTOLIC BLOOD PRESSURE: 104 MMHG | DIASTOLIC BLOOD PRESSURE: 63 MMHG | HEIGHT: 73 IN

## 2020-11-16 DIAGNOSIS — L89.150 UNSTAGEABLE PRESSURE ULCER OF SACRAL REGION (HCC): Primary | ICD-10-CM

## 2020-11-16 PROCEDURE — 99305 1ST NF CARE MODERATE MDM 35: CPT | Performed by: SURGERY

## 2020-11-17 ENCOUNTER — TELEPHONE (OUTPATIENT)
Dept: UROLOGY | Facility: MEDICAL CENTER | Age: 70
End: 2020-11-17

## 2020-11-17 ENCOUNTER — TELEPHONE (OUTPATIENT)
Dept: NEUROLOGY | Facility: CLINIC | Age: 70
End: 2020-11-17

## 2021-04-16 ENCOUNTER — TELEPHONE (OUTPATIENT)
Dept: NEUROSURGERY | Facility: CLINIC | Age: 71
End: 2021-04-16

## 2021-04-16 NOTE — TELEPHONE ENCOUNTER
Patient is scheduled to see Dr Farooq Olivares on Thursday 4/22/21  This is a 6 month f/u with CT head  The previous appointment line stated that the facility was to arrange the CT at [facility]  The CT head is not scheduled  I called [phone] [facility] Phone) and [phone] [facility])  I have been unable to reach this patient by phone  A letter is being sent

## 2021-04-16 NOTE — LETTER
DATE: 21    [address] [place] [address] , [facility] 1950   MRN: [id number]       Our office has been unsuccessful in trying to reach you by phone regarding an upcoming appointment scheduled for 21 with Dr Stefania Guallpa  At you last office visit, on 10/22/20, you were advised to return for an office visit in 6 months with new CT head  As we can see through our system, the CT head has not been completed or scheduled  Please contact our office at [phone] so that we may assist you  Thank you      Sincerely,     [facility]

## 2021-09-11 NOTE — PROGRESS NOTES
Progress Note - Infectious Disease   Smiley Reyna 69 y o  male MRN: [id number]  Unit/Bed#: [facility] 629-01 Encounter: [id number]      Impression/Plan:  1  Severe sepsis, tachycardia, leukocytosis, elevated lactic acid   With early development of high fevers  Secondary to  shunt infection with meningitis and peritonitis  Not on vasopressors   Blood cultures are negative thus far   Patient's fever and leukocytosis had improved but now he is having fever and rising white cell count once again   May all be secondary to the developing drug eruption as below   All the  shunt apparatus has been removed   Repeat blood cultures negative thus far   Repeat CSF cultures negative   The white cell count has come down and the temperature has decreased  -antibiotic plan as below  -monitor temperatures and hemodynamics  -recheck CBC with diff and and CMP weekly while on the meropenem  -supportive care      2   shunt infection  Fluid WBC count was 61 with neutrophil predominance  Gram stain from shunt fluid shows GNRs and culture grew Pseudomonas    Suspect secondary to peritonitis in the setting of #3   Status post externalization of  shunt on 07/30   Repeat CSF analysis shows slightly decreased neutrophil predominant pleocytosis   Repeat CSF fluid from the distal externalized shunt still with Pseudomonas aeruginosa despite externalization of shunt   Now status post removal of the entire  shunt apparatus   Having fever and leukocytosis   Patient's temperatures come down since discontinuing the cefepime and starting the meropenem   Leukocytosis improved   Repeat CSF analysis looks fairly bland and the follow-up cultures are negative  -continue meropenem  -recheck CBC with diff and CMP weekly while on the meropenem  -neurosurgery follow-up ongoing  -await repeat placement of  shunt apparatus later this week     3   Ischemic bowel with peritonitis   Status post exploratory laparotomy, VAC placement   Status post femoral artery and SMA artery cannulation for paraverine by vascular surgery   Suspect intra-abdominal infection is etiology of #2   Status post second-look on  with no resection performed   Peritoneal fluid culture also shows Pseudomonas   Distal externalized shunt still has Pseudomonas aeruginosa   Now status post remove the entire  shunt apparatus  -antibiotic plan as above  -surgery follow-up     4  NPH requiring  shunt placement in , status post recent revision in 2019  Now status post removal of entire shunt apparatus   Patient for replacement of apparatus this week     5  Lumbar spinal stenosis status post L5-S1 fusion on 2020      6  Diabetes mellitus with neuropathy      7  Rash-appears consistent with a drug eruption   Suspect secondary to the cefepime  Resolving since discontinuing the cefepime  -no additional cefepime  -antibiotics as above  -serial exams    Discussed the above management plan in detail with the primary service    Antibiotics:  Meropenem 6  Antibiotics 17   shunt removal 9    Subjective:  Patient has no fever, chills, sweats; no nausea, vomiting, diarrhea; no cough, shortness of breath; no pain  No new symptoms  Objective:  Vitals:  Temp:  [97 5 °F (36 4 °C)-99 3 °F (37 4 °C)] 97 9 °F (36 6 °C)  HR:  [53-71] 53  Resp:  [16-18] 17  BP: (116-131)/(61-65) 122/64  SpO2:  [95 %-100 %] 100 %  Temp (24hrs), Av 3 °F (36 8 °C), Min:97 5 °F (36 4 °C), Max:99 3 °F (37 4 °C)  Current: Temperature: 97 9 °F (36 6 °C)    Physical Exam:   General Appearance:  Somnolent, arousable, answers simple yes no questions, nontoxic, no acute distress  Throat: Oropharynx moist without lesions  Lungs:   Clear to auscultation bilaterally; no wheezes, rhonchi or rales; respirations unlabored   Heart:  Bradycardic; no murmur, rub or gallop   Abdomen:   Soft, non-tender, non-distended, positive bowel sounds  Extremities: No clubbing, cyanosis or edema   Skin: No new rashes or lesions  No draining wounds noted  Labs, Imaging, & Other studies:   All pertinent labs and imaging studies were personally reviewed  Results from last 7 days   Lab Units 08/17/20  0519 08/15/20  0551 08/14/20  0554   WBC Thousand/uL 9 49 9 88 13 13*   HEMOGLOBIN g/dL 12 1 11 3* 11 6*   PLATELETS Thousands/uL 248 253 310     Results from last 7 days   Lab Units 08/17/20  0519 08/15/20  0551 08/14/20  0554  08/12/20  0516 08/11/20  0521   SODIUM mmol/L 138 140 140   < > 137 136  136   POTASSIUM mmol/L 4 2 4 2 4 0   < > 4 3 4 2  4 2   CHLORIDE mmol/L 103 106 107   < > 103 103  103   CO2 mmol/L 31 28 28   < > 26 26  26   BUN mg/dL 14 15 18   < > 17 19  19   CREATININE mg/dL 0 64 0 73 0 73   < > 0 78 0 90  0 90   EGFR ml/min/1 73sq m 99 93 93   < > 91 86  86   CALCIUM mg/dL 8 4 7 9* 7 8*   < > 8 1* 7 5*  7 5*   AST U/L  --   --   --   --  16 21   ALT U/L  --   --   --   --  25 32   ALK PHOS U/L  --   --   --   --  104 151*    < > = values in this interval not displayed  Results from last 7 days   Lab Units 08/11/20  1627 08/10/20  1405 08/10/20  1356   BLOOD CULTURE   --  No Growth After 5 Days  No Growth After 5 Days     GRAM STAIN RESULT  No bacteria seen  3+ Polys  --   --      Results from last 7 days   Lab Units 08/14/20  0554 08/12/20  0516 08/11/20  0521 08/10/20  1355   PROCALCITONIN ng/ml 0 11 0 31* 0 76* <0 05 show

## 2021-10-04 NOTE — ASSESSMENT & PLAN NOTE
Refill  LOV 6/22/21   NOV not scheduled  Last ordered    amphetamine-dextroamphetamine (Adderall) 20 MG tablet 60 tablet 0 9/8/2021 10/8/2021    Sig - Route: Take 1 tablet by mouth 2 times daily. - Oral      Med is loaded   · Continue modified diet per most recent SLP recs: pureed with NTL     · Aspiration precautions   · Started iv fluids last night due to lethargy and poor appetite, now appears to have improved mental status

## 2023-09-07 NOTE — ASSESSMENT & PLAN NOTE
Detail Level: Detailed · PVR in ED noted to be 270 mL, grover catheter inserted  · Consult urology Add 69407 Cpt? (Important Note: In 2017 The Use Of 16797 Is Being Tracked By Cms To Determine Future Global Period Reimbursement For Global Periods): yes

## (undated) DEVICE — SUT SILK 2-0 18 IN A185H

## (undated) DEVICE — RADIFOCUS GLIDEWIRE: Brand: GLIDEWIRE

## (undated) DEVICE — PLUMEPEN PRO 10FT

## (undated) DEVICE — RANEY SCALP CLIP STERILE: Brand: AESCULAP

## (undated) DEVICE — MEDTRONIC DLP TOURNIQUET KIT

## (undated) DEVICE — NEEDLE SPINAL 25G X 3.5 IN QUINCKE

## (undated) DEVICE — ELECTRODE BLADE MOD  E-Z CLEAN 6.5IN -0014M

## (undated) DEVICE — INTENDED FOR TISSUE SEPARATION, AND OTHER PROCEDURES THAT REQUIRE A SHARP SURGICAL BLADE TO PUNCTURE OR CUT.: Brand: BARD-PARKER ® CARBON RIB-BACK BLADES

## (undated) DEVICE — BULB SYRINGE,IRRIGATION WITH PROTECTIVE CAP: Brand: DOVER

## (undated) DEVICE — VIAL DECANTER

## (undated) DEVICE — PASSER 48407 CATHETER 38CM DISP.: Brand: CATHTER PASSER, DISPOSABLE

## (undated) DEVICE — SUT SILK 2-0 SH 30 IN K833H

## (undated) DEVICE — DRESSING MEPILEX AG BORDER 4 X 4 IN

## (undated) DEVICE — Device

## (undated) DEVICE — GAUZE SPONGES,16 PLY: Brand: CURITY

## (undated) DEVICE — GLOVE SRG BIOGEL 8

## (undated) DEVICE — SNAP KOVER: Brand: UNBRANDED

## (undated) DEVICE — PROXIMATE PLUS MD MULTI-DIRECTIONAL RELEASE SKIN STAPLERS CONTAINS 35 STAINLESS STEEL STAPLES APPROXIMATE CLOSED DIMENSIONS: 6.9MM X 3.9MM WIDE: Brand: PROXIMATE

## (undated) DEVICE — TUBING SUCTION 5MM X 12 FT

## (undated) DEVICE — OCCLUSIVE GAUZE STRIP,3% BISMUTH TRIBROMOPHENATE IN PETROLATUM BLEND: Brand: XEROFORM

## (undated) DEVICE — SPONGE STICK WITH PVP-I: Brand: KENDALL

## (undated) DEVICE — FLEXIBLE ADHESIVE BANDAGE,X-LARGE: Brand: CURITY

## (undated) DEVICE — 3M™ TEGADERM™ TRANSPARENT FILM DRESSING FRAME STYLE, 1627, 4 IN X 10 IN (10 CM X 25 CM), 20/CT 4CT/CASE: Brand: 3M™ TEGADERM™

## (undated) DEVICE — SPONGE PVP SCRUB WING STERILE

## (undated) DEVICE — ANTIBACTERIAL VIOLET BRAIDED (POLYGLACTIN 910), SYNTHETIC ABSORBABLE SUTURE: Brand: COATED VICRYL

## (undated) DEVICE — ACE WRAP 4 IN UNSTERILE

## (undated) DEVICE — SUT SILK 3-0 SH CR/8 18 IN C013D

## (undated) DEVICE — KERLIX BANDAGE ROLL: Brand: KERLIX

## (undated) DEVICE — HEMOSTATIC MATRIX SURGIFLO 8ML W/THROMBIN

## (undated) DEVICE — BETHLEHEM UNIVERSAL SPINE, KIT: Brand: CARDINAL HEALTH

## (undated) DEVICE — FLEXCIL HIGH PRESSURE CONTRAST INJECTION LINE: Brand: NAMIC

## (undated) DEVICE — SUT VICRYL PLUS 3-0 RB-1 CR/8 18 IN VCP713D

## (undated) DEVICE — SUT SILK 2-0 TIES 144 IN LA55G

## (undated) DEVICE — NON-DEHP HIGH FLOW RATE EXTENSION SET, MALE LUER LOCK ADAPTER

## (undated) DEVICE — 3M™ IOBAN™ 2 ANTIMICROBIAL INCISE DRAPE 6651EZ: Brand: IOBAN™ 2

## (undated) DEVICE — SUT PROLENE 3-0 V-7 36 IN 8976H

## (undated) DEVICE — MONITORING SPINAL IMPULSE CASE FEE

## (undated) DEVICE — INFUSER BAG 500ML

## (undated) DEVICE — STERILE ICS CARDIOVASCULAR PK: Brand: CARDINAL HEALTH

## (undated) DEVICE — SYRINGE KIT,PACKAGED,,150FT,MK 7(ANGIO-ARTERION, 150ML SYR KIT W/QFT,MC)(60729385): Brand: MEDRAD® MARK 7 ARTERION DISPOSABLE SYRINGE 150 ML WITH QUICK FILL TUBE

## (undated) DEVICE — INTENDED FOR TISSUE SEPARATION, AND OTHER PROCEDURES THAT REQUIRE A SHARP SURGICAL BLADE TO PUNCTURE OR CUT.: Brand: BARD-PARKER SAFETY BLADES SIZE 15, STERILE

## (undated) DEVICE — HYDROPHILIC WOUND DRESSING WITH ZINC PLUS VITAMINS A AND B6.: Brand: DERMAGRAN®-B

## (undated) DEVICE — PREP PAD BNS: Brand: CONVERTORS

## (undated) DEVICE — BETHLEHEM UNIVERSAL  MIONR EXT: Brand: CARDINAL HEALTH

## (undated) DEVICE — IV SET 15 DROP STERILE 0/Y GRAVITY

## (undated) DEVICE — PENCIL ELECTROSURG E-Z CLEAN -0035H

## (undated) DEVICE — PAD GROUNDING ADULT

## (undated) DEVICE — FLUID MANAGEMENT KIT - IR

## (undated) DEVICE — GLOVE SRG BIOGEL 7.5

## (undated) DEVICE — ABDOMINAL PAD: Brand: DERMACEA

## (undated) DEVICE — SPONGE LAP 18 X 18 IN

## (undated) DEVICE — GLOVE INDICATOR PI UNDERGLOVE SZ 8.5 BLUE

## (undated) DEVICE — DRAPE TOWEL: Brand: CONVERTORS

## (undated) DEVICE — IMMOBILIZER KNEE UNIVERSAL 22 IN

## (undated) DEVICE — COVER PROBE INTRAOPERATIVE 6 X 96 IN

## (undated) DEVICE — TOOL 9MH30 LEGEND 9CM 3MM MH: Brand: MIDAS REX

## (undated) DEVICE — SUT VICRYL 3-0 SH 27 IN J416H

## (undated) DEVICE — TRAY FOLEY 16FR URIMETER SURESTEP

## (undated) DEVICE — SUPPLY FEE STD

## (undated) DEVICE — PREP SURGICAL PURPREP 26ML

## (undated) DEVICE — TIBURON SPLIT SHEET: Brand: CONVERTORS

## (undated) DEVICE — SYRINGE 10ML LL

## (undated) DEVICE — DRAPE MICROSCOPE OPMI PENTERO

## (undated) DEVICE — 10FR FRAZIER SUCTION HANDLE: Brand: CARDINAL HEALTH

## (undated) DEVICE — LIGHT HANDLE COVER SLEEVE DISP BLUE STELLAR

## (undated) DEVICE — SUTURE BOOTS YELLOW

## (undated) DEVICE — TOOL 15BA50 LEGEND 15CM 5MM BA: Brand: MIDAS REX™

## (undated) DEVICE — ASTOUND STANDARD SURGICAL GOWN, XXL: Brand: CONVERTORS

## (undated) DEVICE — MICROPUNCTURE INTRODUCER SET SILHOUETTE TRANSITIONLESS PUSH-PLUS DESIGN - STIFFENED CANNULA WITH NITINOL WIRE GUIDE: Brand: MICROPUNCTURE

## (undated) DEVICE — 3M™ TEGADERM™ TRANSPARENT FILM DRESSING FRAME STYLE, 1624W, 2-3/8 IN X 2-3/4 IN (6 CM X 7 CM), 100/CT 4CT/CASE: Brand: 3M™ TEGADERM™

## (undated) DEVICE — SUT SILK 3-0 SH 30 IN K832H

## (undated) DEVICE — LIGACLIP MCA MULTIPLE CLIP APPLIERS, 20 SMALL CLIPS: Brand: LIGACLIP

## (undated) DEVICE — DRAPE SHEET X-LG

## (undated) DEVICE — LIGACLIP MCA MULTIPLE CLIP APPLIERS, 20 MEDIUM CLIPS: Brand: LIGACLIP

## (undated) DEVICE — GUIDEWIRE VASC .035 260CM 15CM TAP ST

## (undated) DEVICE — CATH DIAG 5FR .035 70CM PIG CSC 20

## (undated) DEVICE — CHLORAPREP HI-LITE 26ML ORANGE

## (undated) DEVICE — COBAN 4 IN STERILE

## (undated) DEVICE — DRAPE C-ARM BAG/DOME XR ELSTIC OPEN LF

## (undated) DEVICE — ROSEBUD DISSECTORS: Brand: DEROYAL

## (undated) DEVICE — CURITY NON-ADHERENT STRIPS: Brand: CURITY

## (undated) DEVICE — 3M™ TEGADERM™ TRANSPARENT FILM DRESSING FRAME STYLE, 1628, 6 IN X 8 IN (15 CM X 20 CM), 10/CT 8CT/CASE: Brand: 3M™ TEGADERM™

## (undated) DEVICE — GLOVE SRG BIOGEL ORTHOPEDIC 7.5

## (undated) DEVICE — BETHLEHEM MAJOR GENERAL PACK: Brand: CARDINAL HEALTH

## (undated) DEVICE — DRAPE C-ARM X-RAY

## (undated) DEVICE — SPACER 8880823 ELEVATE X-LOR 23X8MM
Type: IMPLANTABLE DEVICE | Site: SPINE LUMBAR | Status: NON-FUNCTIONAL
Brand: ELEVATE™ SPINAL SYSTEM

## (undated) DEVICE — GLOVE INDICATOR PI UNDERGLOVE SZ 9 BLUE

## (undated) DEVICE — INSTRUMENT 8670015 PAK 2 NEEDLES TROCAR

## (undated) DEVICE — CATH TEMPO AQUA 4FVER135Â°100CM: Brand: TEMPO AQUA

## (undated) DEVICE — 3M™ TEGADERM™ TRANSPARENT FILM DRESSING FRAME STYLE, 1626W, 4 IN X 4-3/4 IN (10 CM X 12 CM), 50/CT 4CT/CASE: Brand: 3M™ TEGADERM™

## (undated) DEVICE — X-RAY DETECTABLE SPONGES,16 PLY: Brand: VISTEC

## (undated) DEVICE — SPONGE LAP 18 X 18 IN STRL RFD

## (undated) DEVICE — SUT MONOCRYL PLUS 4-0 PS-2 18 IN MCP496G

## (undated) DEVICE — BLADE SAGITTAL 25.6 X 9.5MM

## (undated) DEVICE — DRAPE C-ARMOUR

## (undated) DEVICE — POOLE SUCTION HANDLE: Brand: CARDINAL HEALTH

## (undated) DEVICE — 3M™ IOBAN™ 2 ANTIMICROBIAL INCISE DRAPE 6650EZ: Brand: IOBAN™ 2

## (undated) DEVICE — 3M™ STERI-STRIP™ REINFORCED ADHESIVE SKIN CLOSURES, R1547, 1/2 IN X 4 IN (12 MM X 100 MM), 6 STRIPS/ENVELOPE: Brand: 3M™ STERI-STRIP™

## (undated) DEVICE — DISPOSABLE EQUIPMENT COVER: Brand: SMALL TOWEL DRAPE

## (undated) DEVICE — MINOR PROCEDURE DRAPE: Brand: CONVERTORS

## (undated) DEVICE — PINNACLE R/O II INTRODUCER SHEATH WITH RADIOPAQUE MARKER: Brand: PINNACLE

## (undated) DEVICE — 3000CC GUARDIAN II: Brand: GUARDIAN

## (undated) DEVICE — ADHESIVE SKIN HIGH VISCOSITY EXOFIN 1ML

## (undated) DEVICE — CHLORAPREP HI-LITE 10.5ML ORANGE

## (undated) DEVICE — RADIFOCUS TORQUE DEVICE MULTI-TORQUE VISE: Brand: RADIFOCUS TORQUE DEVICE

## (undated) DEVICE — NEEDLE 25G X 1 1/2

## (undated) DEVICE — SUT PROLENE 6-0 BV-1/BV-1 24 IN 8805H

## (undated) DEVICE — GLOVE SRG BIOGEL 9

## (undated) DEVICE — VESSEL LOOPS X-RAY DETECTABLE: Brand: DEROYAL

## (undated) DEVICE — SUT PDS II 1 XLH 96 IN LOOPED Z881G

## (undated) DEVICE — TELFA NON-ADHERENT ABSORBENT DRESSING: Brand: TELFA

## (undated) DEVICE — PADDING CAST 4 IN  COTTON STRL

## (undated) DEVICE — MICROPUNCTURE 401

## (undated) DEVICE — DRAPE INTESTINAL ISOLATION BAG

## (undated) DEVICE — INTENDED FOR TISSUE SEPARATION, AND OTHER PROCEDURES THAT REQUIRE A SHARP SURGICAL BLADE TO PUNCTURE OR CUT.: Brand: BARD-PARKER SAFETY BLADES SIZE 10, STERILE

## (undated) DEVICE — SUT SILK 0 30 IN A306H

## (undated) DEVICE — SUT VICRYL 2-0 REEL 54 IN J286G

## (undated) DEVICE — SURGIFOAM 8.5 X 12.5

## (undated) DEVICE — GLOVE INDICATOR PI UNDERGLOVE SZ 7.5 BLUE

## (undated) DEVICE — PACK BURR HOLE PBDS RF

## (undated) DEVICE — ABTHERA OPEN ABDOMEN DRESSING WITH SENSATRAC PAD: Brand: ABTHERA™ SENSAT.R.A.C.™